# Patient Record
Sex: FEMALE | Race: BLACK OR AFRICAN AMERICAN | Employment: OTHER | URBAN - METROPOLITAN AREA
[De-identification: names, ages, dates, MRNs, and addresses within clinical notes are randomized per-mention and may not be internally consistent; named-entity substitution may affect disease eponyms.]

---

## 2017-03-31 ENCOUNTER — HOSPITAL ENCOUNTER (OUTPATIENT)
Dept: OTHER | Age: 77
Discharge: HOME | End: 2017-03-31
Attending: INTERNAL MEDICINE | Admitting: INTERNAL MEDICINE

## 2017-03-31 LAB
ALBUMIN SERPL-MCNC: 4.2 GM/DL (ref 3.4–5)
ALP BLD-CCNC: 146 IU/L (ref 40–129)
ALT SERPL-CCNC: 14 U/L (ref 10–40)
ANION GAP SERPL CALCULATED.3IONS-SCNC: 16 MMOL/L (ref 4–16)
AST SERPL-CCNC: 19 IU/L (ref 15–37)
BILIRUB SERPL-MCNC: 0.4 MG/DL (ref 0–1)
BUN BLDV-MCNC: 5 MG/DL (ref 6–23)
CALCIUM SERPL-MCNC: 9.8 MG/DL (ref 8.3–10.6)
CHLORIDE BLD-SCNC: 97 MMOL/L (ref 99–110)
CO2: 28 MMOL/L (ref 21–32)
CREAT SERPL-MCNC: 0.5 MG/DL (ref 0.6–1.1)
GFR AFRICAN AMERICAN: >60 ML/MIN/1.73M2
GFR NON-AFRICAN AMERICAN: >60 ML/MIN/1.73M2
GLUCOSE BLD-MCNC: 132 MG/DL (ref 70–140)
POTASSIUM SERPL-SCNC: 5 MMOL/L (ref 3.5–5.1)
SODIUM BLD-SCNC: 141 MMOL/L (ref 135–145)
TOTAL PROTEIN: 7.4 GM/DL (ref 6.4–8.2)

## 2017-04-01 LAB — HIV SCREEN: NON REACTIVE

## 2017-04-05 ENCOUNTER — HOSPITAL ENCOUNTER (OUTPATIENT)
Dept: CT IMAGING | Age: 77
Discharge: HOME | End: 2017-04-05
Attending: RADIOLOGY | Admitting: RADIOLOGY

## 2017-04-05 DIAGNOSIS — C21.1 ADENOCARCINOMA OF ANAL CANAL (HCC): ICD-10-CM

## 2017-04-05 DIAGNOSIS — C21.1 MALIGNANT NEOPLASM OF ANAL CANAL (HCC): ICD-10-CM

## 2017-04-05 RX ORDER — SODIUM CHLORIDE 0.9 % (FLUSH) 0.9 %
10 SYRINGE (ML) INJECTION ONCE
Status: COMPLETED | OUTPATIENT
Start: 2017-04-05 | End: 2017-04-05

## 2017-04-05 RX ADMIN — Medication 10 ML: at 10:40

## 2017-04-06 ENCOUNTER — HOSPITAL ENCOUNTER (OUTPATIENT)
Dept: PET IMAGING | Age: 77
Discharge: HOME | End: 2017-04-06
Attending: RADIOLOGY | Admitting: RADIOLOGY

## 2017-04-06 DIAGNOSIS — C21.1 MALIGNANT NEOPLASM OF ANAL CANAL (HCC): ICD-10-CM

## 2017-04-06 DIAGNOSIS — C21.1 ADENOCARCINOMA OF ANAL CANAL (HCC): ICD-10-CM

## 2017-05-08 ENCOUNTER — HOSPITAL ENCOUNTER (OUTPATIENT)
Dept: OTHER | Age: 77
Discharge: HOME | End: 2017-05-08
Attending: INTERNAL MEDICINE | Admitting: INTERNAL MEDICINE

## 2017-05-08 LAB
ALBUMIN SERPL-MCNC: 3.7 GM/DL (ref 3.4–5)
ALP BLD-CCNC: 113 IU/L (ref 40–128)
ALT SERPL-CCNC: 12 U/L (ref 10–40)
ANION GAP SERPL CALCULATED.3IONS-SCNC: 12 MMOL/L (ref 4–16)
AST SERPL-CCNC: 16 IU/L (ref 15–37)
BILIRUB SERPL-MCNC: 0.3 MG/DL (ref 0–1)
BUN BLDV-MCNC: 8 MG/DL (ref 6–23)
CALCIUM SERPL-MCNC: 9 MG/DL (ref 8.3–10.6)
CHLORIDE BLD-SCNC: 97 MMOL/L (ref 99–110)
CO2: 28 MMOL/L (ref 21–32)
CREAT SERPL-MCNC: 0.5 MG/DL (ref 0.6–1.1)
GFR AFRICAN AMERICAN: >60 ML/MIN/1.73M2
GFR NON-AFRICAN AMERICAN: >60 ML/MIN/1.73M2
GLUCOSE BLD-MCNC: 114 MG/DL (ref 70–140)
POTASSIUM SERPL-SCNC: 4.1 MMOL/L (ref 3.5–5.1)
SODIUM BLD-SCNC: 137 MMOL/L (ref 135–145)
TOTAL PROTEIN: 6.9 GM/DL (ref 6.4–8.2)

## 2017-05-18 ENCOUNTER — HOSPITAL ENCOUNTER (OUTPATIENT)
Dept: OTHER | Age: 77
Discharge: HOME | End: 2017-05-18
Attending: INTERNAL MEDICINE | Admitting: INTERNAL MEDICINE

## 2017-05-18 LAB
ALBUMIN SERPL-MCNC: 3.8 GM/DL (ref 3.4–5)
ALP BLD-CCNC: 94 IU/L (ref 40–128)
ALT SERPL-CCNC: 13 U/L (ref 10–40)
ANION GAP SERPL CALCULATED.3IONS-SCNC: 12 MMOL/L (ref 4–16)
AST SERPL-CCNC: 17 IU/L (ref 15–37)
BILIRUB SERPL-MCNC: 0.4 MG/DL (ref 0–1)
BUN BLDV-MCNC: 9 MG/DL (ref 6–23)
CALCIUM SERPL-MCNC: 8.8 MG/DL (ref 8.3–10.6)
CHLORIDE BLD-SCNC: 100 MMOL/L (ref 99–110)
CO2: 26 MMOL/L (ref 21–32)
CREAT SERPL-MCNC: 0.4 MG/DL (ref 0.6–1.1)
GFR AFRICAN AMERICAN: >60 ML/MIN/1.73M2
GFR NON-AFRICAN AMERICAN: >60 ML/MIN/1.73M2
GLUCOSE BLD-MCNC: 142 MG/DL (ref 70–140)
POTASSIUM SERPL-SCNC: 3.9 MMOL/L (ref 3.5–5.1)
SODIUM BLD-SCNC: 138 MMOL/L (ref 135–145)
TOTAL PROTEIN: 6.3 GM/DL (ref 6.4–8.2)

## 2017-06-05 ENCOUNTER — HOSPITAL ENCOUNTER (OUTPATIENT)
Dept: OTHER | Age: 77
Discharge: HOME | End: 2017-06-05
Attending: INTERNAL MEDICINE | Admitting: INTERNAL MEDICINE

## 2017-06-05 LAB
ALBUMIN SERPL-MCNC: 3.4 GM/DL (ref 3.4–5)
ALP BLD-CCNC: 91 IU/L (ref 40–128)
ALT SERPL-CCNC: 28 U/L (ref 10–40)
ANION GAP SERPL CALCULATED.3IONS-SCNC: 14 MMOL/L (ref 4–16)
AST SERPL-CCNC: 34 IU/L (ref 15–37)
BILIRUB SERPL-MCNC: 0.6 MG/DL (ref 0–1)
BUN BLDV-MCNC: 5 MG/DL (ref 6–23)
CALCIUM SERPL-MCNC: 8.9 MG/DL (ref 8.3–10.6)
CHLORIDE BLD-SCNC: 99 MMOL/L (ref 99–110)
CO2: 27 MMOL/L (ref 21–32)
CREAT SERPL-MCNC: 0.5 MG/DL (ref 0.6–1.1)
GFR AFRICAN AMERICAN: >60 ML/MIN/1.73M2
GFR NON-AFRICAN AMERICAN: >60 ML/MIN/1.73M2
GLUCOSE BLD-MCNC: 104 MG/DL (ref 70–140)
POTASSIUM SERPL-SCNC: 3.5 MMOL/L (ref 3.5–5.1)
SODIUM BLD-SCNC: 140 MMOL/L (ref 135–145)
TOTAL PROTEIN: 5.7 GM/DL (ref 6.4–8.2)

## 2017-06-22 ENCOUNTER — HOSPITAL ENCOUNTER (OUTPATIENT)
Dept: OTHER | Age: 77
Discharge: HOME | End: 2017-06-22
Attending: INTERNAL MEDICINE | Admitting: INTERNAL MEDICINE

## 2017-06-22 LAB
ALBUMIN SERPL-MCNC: 3.6 GM/DL (ref 3.4–5)
ALP BLD-CCNC: 100 IU/L (ref 40–128)
ALT SERPL-CCNC: 26 U/L (ref 10–40)
ANION GAP SERPL CALCULATED.3IONS-SCNC: 12 MMOL/L (ref 4–16)
AST SERPL-CCNC: 30 IU/L (ref 15–37)
BILIRUB SERPL-MCNC: 0.5 MG/DL (ref 0–1)
BUN BLDV-MCNC: 4 MG/DL (ref 6–23)
CALCIUM SERPL-MCNC: 8.8 MG/DL (ref 8.3–10.6)
CHLORIDE BLD-SCNC: 103 MMOL/L (ref 99–110)
CO2: 25 MMOL/L (ref 21–32)
CREAT SERPL-MCNC: 0.5 MG/DL (ref 0.6–1.1)
GFR AFRICAN AMERICAN: >60 ML/MIN/1.73M2
GFR NON-AFRICAN AMERICAN: >60 ML/MIN/1.73M2
GLUCOSE BLD-MCNC: 112 MG/DL (ref 70–140)
POTASSIUM SERPL-SCNC: 5.1 MMOL/L (ref 3.5–5.1)
SODIUM BLD-SCNC: 140 MMOL/L (ref 135–145)
TOTAL PROTEIN: 6 GM/DL (ref 6.4–8.2)

## 2017-08-10 ENCOUNTER — HOSPITAL ENCOUNTER (OUTPATIENT)
Dept: OTHER | Age: 77
Discharge: HOME | End: 2017-08-10
Attending: INTERNAL MEDICINE | Admitting: INTERNAL MEDICINE

## 2017-08-10 LAB
ALBUMIN SERPL-MCNC: 3.9 GM/DL (ref 3.4–5)
ALP BLD-CCNC: 102 IU/L (ref 40–129)
ALT SERPL-CCNC: 13 U/L (ref 10–40)
ANION GAP SERPL CALCULATED.3IONS-SCNC: 12 MMOL/L (ref 4–16)
AST SERPL-CCNC: 21 IU/L (ref 15–37)
BILIRUB SERPL-MCNC: 0.5 MG/DL (ref 0–1)
BUN BLDV-MCNC: 7 MG/DL (ref 6–23)
CALCIUM SERPL-MCNC: 9.1 MG/DL (ref 8.3–10.6)
CHLORIDE BLD-SCNC: 102 MMOL/L (ref 99–110)
CO2: 25 MMOL/L (ref 21–32)
CREAT SERPL-MCNC: 0.4 MG/DL (ref 0.6–1.1)
GFR AFRICAN AMERICAN: >60 ML/MIN/1.73M2
GFR NON-AFRICAN AMERICAN: >60 ML/MIN/1.73M2
GLUCOSE BLD-MCNC: 124 MG/DL (ref 70–140)
POTASSIUM SERPL-SCNC: 4.3 MMOL/L (ref 3.5–5.1)
SODIUM BLD-SCNC: 139 MMOL/L (ref 135–145)
TOTAL PROTEIN: 6.3 GM/DL (ref 6.4–8.2)

## 2017-11-20 ENCOUNTER — HOSPITAL ENCOUNTER (OUTPATIENT)
Dept: OTHER | Age: 77
Discharge: HOME | End: 2017-11-20
Attending: INTERNAL MEDICINE | Admitting: INTERNAL MEDICINE

## 2017-11-20 LAB
ALBUMIN SERPL-MCNC: 3.9 GM/DL (ref 3.4–5)
ALP BLD-CCNC: 88 IU/L (ref 40–128)
ALT SERPL-CCNC: 12 U/L (ref 10–40)
ANION GAP SERPL CALCULATED.3IONS-SCNC: 12 MMOL/L (ref 4–16)
AST SERPL-CCNC: 17 IU/L (ref 15–37)
BILIRUB SERPL-MCNC: 0.4 MG/DL (ref 0–1)
BUN BLDV-MCNC: 11 MG/DL (ref 6–23)
CALCIUM SERPL-MCNC: 9 MG/DL (ref 8.3–10.6)
CHLORIDE BLD-SCNC: 98 MMOL/L (ref 99–110)
CO2: 28 MMOL/L (ref 21–32)
CREAT SERPL-MCNC: 0.5 MG/DL (ref 0.6–1.1)
GFR AFRICAN AMERICAN: >60 ML/MIN/1.73M2
GFR NON-AFRICAN AMERICAN: >60 ML/MIN/1.73M2
GLUCOSE BLD-MCNC: 113 MG/DL (ref 70–99)
POTASSIUM SERPL-SCNC: 3.5 MMOL/L (ref 3.5–5.1)
SODIUM BLD-SCNC: 138 MMOL/L (ref 135–145)
TOTAL PROTEIN: 6.1 GM/DL (ref 6.4–8.2)

## 2018-06-05 ENCOUNTER — HOSPITAL ENCOUNTER (OUTPATIENT)
Dept: OTHER | Age: 78
Discharge: HOME | End: 2018-06-05
Attending: INTERNAL MEDICINE | Admitting: INTERNAL MEDICINE

## 2018-06-05 LAB
ALBUMIN SERPL-MCNC: 4.3 GM/DL (ref 3.4–5)
ALP BLD-CCNC: 116 IU/L (ref 40–128)
ALT SERPL-CCNC: 15 U/L (ref 10–40)
ANION GAP SERPL CALCULATED.3IONS-SCNC: 12 MMOL/L (ref 4–16)
AST SERPL-CCNC: 21 IU/L (ref 15–37)
BILIRUB SERPL-MCNC: 0.5 MG/DL (ref 0–1)
BUN BLDV-MCNC: 14 MG/DL (ref 6–23)
CALCIUM SERPL-MCNC: 9.4 MG/DL (ref 8.3–10.6)
CEA: 9.6 NG/ML
CHLORIDE BLD-SCNC: 99 MMOL/L (ref 99–110)
CO2: 29 MMOL/L (ref 21–32)
CREAT SERPL-MCNC: 0.5 MG/DL (ref 0.6–1.1)
GFR AFRICAN AMERICAN: >60 ML/MIN/1.73M2
GFR NON-AFRICAN AMERICAN: >60 ML/MIN/1.73M2
GLUCOSE BLD-MCNC: 107 MG/DL (ref 70–99)
POTASSIUM SERPL-SCNC: 4 MMOL/L (ref 3.5–5.1)
SODIUM BLD-SCNC: 140 MMOL/L (ref 135–145)
TOTAL PROTEIN: 6.7 GM/DL (ref 6.4–8.2)

## 2019-01-17 ENCOUNTER — HOSPITAL ENCOUNTER (OUTPATIENT)
Age: 79
Setting detail: SPECIMEN
Discharge: HOME | End: 2019-01-17
Payer: MEDICARE

## 2019-01-17 LAB
ALBUMIN SERPL-MCNC: 4.5 GM/DL (ref 3.4–5)
ALP BLD-CCNC: 123 IU/L (ref 40–128)
ALT SERPL-CCNC: 16 U/L (ref 10–40)
ANION GAP SERPL CALCULATED.3IONS-SCNC: 14 MMOL/L (ref 4–16)
AST SERPL-CCNC: 22 IU/L (ref 15–37)
BILIRUB SERPL-MCNC: 0.7 MG/DL (ref 0–1)
BUN BLDV-MCNC: 10 MG/DL (ref 6–23)
CALCIUM SERPL-MCNC: 9.7 MG/DL (ref 8.3–10.6)
CEA: 9.2 NG/ML
CHLORIDE BLD-SCNC: 99 MMOL/L (ref 99–110)
CO2: 27 MMOL/L (ref 21–32)
CREAT SERPL-MCNC: 0.5 MG/DL (ref 0.6–1.1)
GFR AFRICAN AMERICAN: >60 ML/MIN/1.73M2
GFR NON-AFRICAN AMERICAN: >60 ML/MIN/1.73M2
GLUCOSE BLD-MCNC: 116 MG/DL (ref 70–99)
POTASSIUM SERPL-SCNC: 3.6 MMOL/L (ref 3.5–5.1)
SODIUM BLD-SCNC: 140 MMOL/L (ref 135–145)
TOTAL PROTEIN: 6.7 GM/DL (ref 6.4–8.2)

## 2019-01-17 PROCEDURE — 80053 COMPREHEN METABOLIC PANEL: CPT

## 2019-01-17 PROCEDURE — 82378 CARCINOEMBRYONIC ANTIGEN: CPT

## 2019-10-30 ENCOUNTER — HOSPITAL ENCOUNTER (OUTPATIENT)
Dept: RADIATION ONCOLOGY | Age: 79
Discharge: HOME | End: 2019-10-30
Payer: MEDICARE

## 2019-10-30 VITALS
BODY MASS INDEX: 24.35 KG/M2 | HEIGHT: 61 IN | HEART RATE: 98 BPM | SYSTOLIC BLOOD PRESSURE: 133 MMHG | RESPIRATION RATE: 16 BRPM | DIASTOLIC BLOOD PRESSURE: 60 MMHG | WEIGHT: 129 LBS | TEMPERATURE: 97.6 F | OXYGEN SATURATION: 99 %

## 2019-10-30 DIAGNOSIS — C21.1 MALIGNANT NEOPLASM OF ANAL CANAL (HCC): ICD-10-CM

## 2019-10-30 ASSESSMENT — PAIN DESCRIPTION - PAIN TYPE: TYPE: CHRONIC PAIN

## 2019-10-30 ASSESSMENT — PAIN DESCRIPTION - FREQUENCY: FREQUENCY: CONTINUOUS

## 2019-10-30 ASSESSMENT — PAIN DESCRIPTION - ONSET: ONSET: ON-GOING

## 2019-10-30 ASSESSMENT — PAIN - FUNCTIONAL ASSESSMENT: PAIN_FUNCTIONAL_ASSESSMENT: PREVENTS OR INTERFERES SOME ACTIVE ACTIVITIES AND ADLS

## 2019-10-30 ASSESSMENT — PAIN SCALES - GENERAL: PAINLEVEL_OUTOF10: 7

## 2019-10-30 ASSESSMENT — PAIN DESCRIPTION - DESCRIPTORS: DESCRIPTORS: ACHING;NAGGING

## 2019-10-30 ASSESSMENT — PAIN DESCRIPTION - ORIENTATION: ORIENTATION: RIGHT;LEFT

## 2019-10-30 ASSESSMENT — PAIN DESCRIPTION - PROGRESSION: CLINICAL_PROGRESSION: NOT CHANGED

## 2019-10-30 ASSESSMENT — PAIN DESCRIPTION - LOCATION: LOCATION: KNEE

## 2019-11-01 ENCOUNTER — TELEPHONE (OUTPATIENT)
Dept: RADIATION ONCOLOGY | Age: 79
End: 2019-11-01

## 2020-01-17 ENCOUNTER — HOSPITAL ENCOUNTER (OUTPATIENT)
Dept: INFUSION THERAPY | Age: 80
Discharge: HOME | End: 2020-01-17
Payer: MEDICARE

## 2020-01-17 LAB
ALBUMIN SERPL-MCNC: 4.2 GM/DL (ref 3.4–5)
ALP BLD-CCNC: 111 IU/L (ref 40–128)
ALT SERPL-CCNC: 15 U/L (ref 10–40)
ANION GAP SERPL CALCULATED.3IONS-SCNC: 12 MMOL/L (ref 4–16)
AST SERPL-CCNC: 18 IU/L (ref 15–37)
BILIRUB SERPL-MCNC: 0.5 MG/DL (ref 0–1)
BUN BLDV-MCNC: 14 MG/DL (ref 6–23)
CALCIUM SERPL-MCNC: 9.4 MG/DL (ref 8.3–10.6)
CEA: 8.6 NG/ML
CHLORIDE BLD-SCNC: 99 MMOL/L (ref 99–110)
CO2: 28 MMOL/L (ref 21–32)
CREAT SERPL-MCNC: 0.5 MG/DL (ref 0.6–1.1)
DIFFERENTIAL TYPE: ABNORMAL
GFR AFRICAN AMERICAN: >60 ML/MIN/1.73M2
GFR NON-AFRICAN AMERICAN: >60 ML/MIN/1.73M2
GLUCOSE BLD-MCNC: 142 MG/DL (ref 70–99)
HCT VFR BLD CALC: 35.3 % (ref 37–47)
HEMOGLOBIN: 11.9 GM/DL (ref 12.5–16)
LYMPHOCYTES ABSOLUTE: 0.3 K/CU MM
LYMPHOCYTES RELATIVE PERCENT: 4 % (ref 24–44)
MCH RBC QN AUTO: 30.7 PG (ref 27–31)
MCHC RBC AUTO-ENTMCNC: 33.7 % (ref 32–36)
MCV RBC AUTO: 91.2 FL (ref 78–100)
MONOCYTES ABSOLUTE: 0.3 K/CU MM
MONOCYTES RELATIVE PERCENT: 3 % (ref 0–4)
PDW BLD-RTO: 14.1 % (ref 11.7–14.9)
PLATELET # BLD: 309 K/CU MM (ref 140–440)
PMV BLD AUTO: 9.1 FL (ref 7.5–11.1)
POTASSIUM SERPL-SCNC: 4.4 MMOL/L (ref 3.5–5.1)
RBC # BLD: 3.87 M/CU MM (ref 4.2–5.4)
SEGMENTED NEUTROPHILS ABSOLUTE COUNT: 7.7 K/CU MM
SEGMENTED NEUTROPHILS RELATIVE PERCENT: 93 % (ref 36–66)
SODIUM BLD-SCNC: 139 MMOL/L (ref 135–145)
TOTAL PROTEIN: 6.6 GM/DL (ref 6.4–8.2)
WBC # BLD: 8.3 K/CU MM (ref 4–10.5)

## 2020-01-17 PROCEDURE — 36415 COLL VENOUS BLD VENIPUNCTURE: CPT

## 2020-01-17 PROCEDURE — 85025 COMPLETE CBC W/AUTO DIFF WBC: CPT

## 2020-01-17 PROCEDURE — 80053 COMPREHEN METABOLIC PANEL: CPT

## 2020-01-17 PROCEDURE — 82378 CARCINOEMBRYONIC ANTIGEN: CPT

## 2020-07-08 ENCOUNTER — HOSPITAL ENCOUNTER (OUTPATIENT)
Dept: RADIATION ONCOLOGY | Age: 80
Discharge: HOME | End: 2020-07-08
Payer: MEDICARE

## 2020-07-08 VITALS — BODY MASS INDEX: 26.07 KG/M2 | WEIGHT: 138 LBS

## 2020-07-08 ASSESSMENT — PAIN SCALES - GENERAL: PAINLEVEL_OUTOF10: 0

## 2020-07-08 NOTE — PROGRESS NOTES
[address] [facility]  [address] [facility], [address] [facility]  Phone: [phone]  Fax: [phone]    RADIATION ONCOLOGY FOLLOW UP REPORT    PATIENT NAME:  Lito Patiño              : 1940  MEDICAL RECORD NO: [id number]    [facility] NO: [id number]        PROVIDER: Tamra Fay MD      Pursuant to the emergency declaration under the [address] [place] [address] waiver authority and the [employer] Act, this Virtual  Visit was conducted, with patient's consent, to reduce the patient's risk of exposure to COVID-19 and provide continuity of care for an established patient. This patient was contacted today via telephone with audio capabilities only due to technology barriers preventing use of do[employer]. me virtual platform with 2-way Audio/Video capability. Patient gave verbal consent which was obtained during the visit. Total visit time was 12 min reviewing medical records, patient history, and imaging. Telephone call started at [phone] and ended at [phone]. Provider: Dr. Belgica Tavarez MD, Radiation Oncologist  Nursing update conducted by telephone prior to physician visit by Thelma Olson RN       DATE OF SERVICE: 2020     FOLLOW UP PHYSICIANS:   Primary Care: Deana Yang MD       DIAGNOSIS:   Cancer Staging  No matching staging information was found for the patient. TREATMENT COURSE:      Malignant neoplasm of anal canal ([address]    3/8/2017 Initial Diagnosis     Malignant neoplasm of anal canal ([address]       - 7/3/2017 Radiation     Pelvic IMRT + concurrent systemic therapy  4500 cGy to elective LNs  5400 cGy to primary tumor using SIB-IMRT / 30 fx       Chemotherapy     Concurrent with RT  5-FU + Mitomycin-C           CURRENT THERAPY:  Observation      INTERVAL HISTORY:   Doing well since last clinical visit.   Denies new symptoms including abdominal pain, jaundice, pelvic pain, diarrhea, rectal bleeding, dysuria/hematuria, vaginal bleeding  Good energy/appetite with no recent weight loss.       Past Medical History:   Diagnosis Date    Acid reflux     Anxiety     Bronchitis In Past    Carotid stenosis 11/10/2015    Cerebral embolism with cerebral infarction ([facility] 9-18-15    Seizure X 1, No Residual    Chronic back pain     Depression     History of external beam radiation therapy 2017    pelvis/anal canal in 2017 per [facility]. Beatrice Alanis [facility]    Hypertension     Prolonged emergence from general anesthesia     RA (rheumatoid arthritis) ([facility]     \"All Over\"    Seizure ([facility] 9-18-15    X 1    Sleep apnea     Uses CPAP    Teeth missing     Upper And Lower    Urinary incontinence     UTI (urinary tract infection) In Past    No Current Symptoms    Wears dentures     Full Upper , Partial Lower    Wears glasses     Wears partial dentures     Lower        Past Surgical History:   Procedure Laterality Date    CAROTID ENDARTERECTOMY Right [id number]    CARPAL TUNNEL RELEASE      COLONOSCOPY  In Past    DENTAL SURGERY      Teeth Extracted In Past    DILATION AND CURETTAGE OF UTERUS  1960's    Prior To Vaginal Hysterectomy    HYSTERECTOMY, VAGINAL  1960's       Family History   Problem Relation Age of Onset    Stroke Mother     Heart Disease Mother     Arthritis Mother     Diabetes Mother     High Blood Pressure Mother     High Cholesterol Mother     Miscarriages / [place] Mother     Other Father         \"Surgery For Twisted Bowels\"    Heart Disease Sister     Stroke Sister     High Blood Pressure Brother     Heart Disease Brother     Hearing Loss Brother         Heart Attack    Diabetes Sister     High Blood Pressure Sister     Other Sister         Gout    Arthritis Sister     Mental Illness Sister         Schizophrenia    High Blood Pressure Brother     High Blood Pressure Son     Mental Illness Son         Anxiety    Depression Son    [place] Other Son         Pancreatitis, Surgery For Brain Aneurysm    High Blood Pressure Son     Other Son         Gout    Learning Disabilities Son         Autistic, Mentally Retarded    Other Son         Seizures       Social History     Socioeconomic History    Marital status:      Spouse name: Not on file    Number of children: Not on file    Years of education: Not on file    Highest education level: Not on file   Occupational History    Not on file   Social Needs    Financial resource strain: Not on file    Food insecurity     Worry: Not on file     Inability: Not on file   [employer] needs     Medical: Not on file     Non-medical: Not on file   Tobacco Use    Smoking status: Never Smoker    Smokeless tobacco: Never Used   Substance and Sexual Activity    Alcohol use: No     Alcohol/week: 0.0 standard drinks    Drug use: No    Sexual activity: Never   Lifestyle    Physical activity     Days per week: Not on file     Minutes per session: Not on file    Stress: Not on file   Relationships    Social connections     Talks on phone: Not on file     Gets together: Not on file     Attends Confucianist service: Not on file     Active member of club or organization: Not on file     Attends meetings of clubs or organizations: Not on file     Relationship status: Not on file    Intimate partner violence     Fear of current or ex partner: Not on file     Emotionally abused: Not on file     Physically abused: Not on file     Forced sexual activity: Not on file   Other Topics Concern    Not on file   Social History Narrative    Not on file         MEDICATIONS:   Current Outpatient Medications   Medication Sig Dispense Refill    diltiazem (DILACOR XR) 180 MG extended release capsule Take 180 mg by mouth daily      atorvastatin (LIPITOR) 20 MG tablet Take 20 mg by mouth daily      aspirin 81 MG tablet Take 81 mg by mouth daily      POTASSIUM CHLORIDE ER PO Take 10 mEq by mouth      Cyanocobalamin (VITAMIN B-12 PO) Take by mouth every morning      CALCIUM PO Take by mouth every morning      vitamin D3 (COLECALCIFEROL) 400 UNITS TABS Take by mouth every morning      folic acid (FOLVITE) 103 MCG tablet Take 400 mcg by mouth every morning      hydrochlorothiazide (MICROZIDE) 12.5 MG capsule Take 12.5 mg by mouth every morning       methotrexate 2.5 MG tablet Take 2.5 mg by mouth once a week Indications: Take 7 tablets one time a week       predniSONE (DELTASONE) 5 MG tablet Take 5 mg by mouth 3 times daily       clopidogrel (PLAVIX) 75 MG tablet Take 1 tablet by mouth daily (Patient taking differently: Take 75 mg by mouth every morning ) 30 tablet 0     No current facility-administered medications for this encounter. REVIEW OF SYSTEMS:  Pertinent positive and negatives in History: the remainder of the 14-pt ROS is negative. EXAMINATION:   There were no vitals filed for this visit.       A&O x3, NAD  Nl mood/affect/judgement      LABORATORY STUDIES:   CBC:   Lab Results   Component Value Date    WBC 8.3 01/17/2020    RBC 3.87 01/17/2020    RBC 3.24 08/10/2017    HGB 11.9 01/17/2020    HCT 35.3 01/17/2020    MCV 91.2 01/17/2020    MCH 30.7 01/17/2020    MCHC 33.7 01/17/2020    RDW 14.1 01/17/2020     01/17/2020    MPV 9.1 01/17/2020     CMP:  Lab Results   Component Value Date     01/17/2020    K 4.4 01/17/2020    CL 99 01/17/2020    CO2 28 01/17/2020    BUN 14 01/17/2020    CREATININE 0.5 01/17/2020    GFRAA >60 01/17/2020    LABGLOM >60 01/17/2020    GLUCOSE 142 01/17/2020    PROT 6.6 01/17/2020    LABALBU 4.2 01/17/2020    CALCIUM 9.4 01/17/2020    BILITOT 0.5 01/17/2020    ALKPHOS 111 01/17/2020    AST 18 01/17/2020    ALT 15 01/17/2020     Onc labs:   Lab Results   Component Value Date    CEA 8.6 01/17/2020       MEDICAL DECISION MAKING:   Doing well without new symptoms  Did not get CT scans ordered 8 months ago  This visit is her 3-yr post tx visit  Will order CT Chest/Abd/Pelvis and she can RTC in 6 months for clinical eval pending normal results      Electronically signed by Electronically signed by Coni Lee MD on 7/8/2020 at 9:43 AM

## 2020-07-17 ENCOUNTER — HOSPITAL ENCOUNTER (OUTPATIENT)
Dept: INFUSION THERAPY | Age: 80
Discharge: HOME | End: 2020-07-17
Payer: MEDICARE

## 2020-07-17 LAB
ALBUMIN SERPL-MCNC: 4 GM/DL (ref 3.4–5)
ALP BLD-CCNC: 93 IU/L (ref 40–128)
ALT SERPL-CCNC: 12 U/L (ref 10–40)
ANION GAP SERPL CALCULATED.3IONS-SCNC: 13 MMOL/L (ref 4–16)
AST SERPL-CCNC: 15 IU/L (ref 15–37)
BASOPHILS ABSOLUTE: 0 K/CU MM
BASOPHILS RELATIVE PERCENT: 0.3 % (ref 0–1)
BILIRUB SERPL-MCNC: 0.5 MG/DL (ref 0–1)
BUN BLDV-MCNC: 16 MG/DL (ref 6–23)
CALCIUM SERPL-MCNC: 9.5 MG/DL (ref 8.3–10.6)
CEA: 7.5 NG/ML
CHLORIDE BLD-SCNC: 102 MMOL/L (ref 99–110)
CO2: 28 MMOL/L (ref 21–32)
CREAT SERPL-MCNC: 0.6 MG/DL (ref 0.6–1.1)
DIFFERENTIAL TYPE: ABNORMAL
EOSINOPHILS ABSOLUTE: 0.1 K/CU MM
EOSINOPHILS RELATIVE PERCENT: 0.8 % (ref 0–3)
GFR AFRICAN AMERICAN: >60 ML/MIN/1.73M2
GFR NON-AFRICAN AMERICAN: >60 ML/MIN/1.73M2
GLUCOSE BLD-MCNC: 125 MG/DL (ref 70–99)
HCT VFR BLD CALC: 32.7 % (ref 37–47)
HEMOGLOBIN: 11.2 GM/DL (ref 12.5–16)
LYMPHOCYTES ABSOLUTE: 0.4 K/CU MM
LYMPHOCYTES RELATIVE PERCENT: 5.4 % (ref 24–44)
MCH RBC QN AUTO: 31 PG (ref 27–31)
MCHC RBC AUTO-ENTMCNC: 34.3 % (ref 32–36)
MCV RBC AUTO: 90.6 FL (ref 78–100)
MONOCYTES ABSOLUTE: 0.7 K/CU MM
MONOCYTES RELATIVE PERCENT: 9 % (ref 0–4)
PDW BLD-RTO: 14.9 % (ref 11.7–14.9)
PLATELET # BLD: 318 K/CU MM (ref 140–440)
PMV BLD AUTO: 9.2 FL (ref 7.5–11.1)
POTASSIUM SERPL-SCNC: 3.5 MMOL/L (ref 3.5–5.1)
RBC # BLD: 3.61 M/CU MM (ref 4.2–5.4)
SEGMENTED NEUTROPHILS ABSOLUTE COUNT: 6.4 K/CU MM
SEGMENTED NEUTROPHILS RELATIVE PERCENT: 84.5 % (ref 36–66)
SODIUM BLD-SCNC: 143 MMOL/L (ref 135–145)
TOTAL PROTEIN: 6.3 GM/DL (ref 6.4–8.2)
WBC # BLD: 7.6 K/CU MM (ref 4–10.5)

## 2020-07-17 PROCEDURE — 80053 COMPREHEN METABOLIC PANEL: CPT

## 2020-07-17 PROCEDURE — 85025 COMPLETE CBC W/AUTO DIFF WBC: CPT

## 2020-07-17 PROCEDURE — 36415 COLL VENOUS BLD VENIPUNCTURE: CPT

## 2020-07-17 PROCEDURE — 82378 CARCINOEMBRYONIC ANTIGEN: CPT

## 2020-07-24 ENCOUNTER — HOSPITAL ENCOUNTER (OUTPATIENT)
Dept: INFUSION THERAPY | Age: 80
Discharge: HOME | End: 2020-07-24
Payer: MEDICARE

## 2020-07-24 PROCEDURE — 99211 OFF/OP EST MAY X REQ PHY/QHP: CPT

## 2020-11-03 PROBLEM — I10 HYPERTENSION: Status: RESOLVED | Noted: 2020-11-03 | Resolved: 2020-11-03

## 2020-11-09 PROBLEM — D64.9 ANEMIA, UNSPECIFIED: Status: ACTIVE | Noted: 2020-11-09

## 2020-11-09 PROBLEM — R53.83 OTHER FATIGUE: Status: ACTIVE | Noted: 2020-11-09

## 2021-01-16 ENCOUNTER — HOSPITAL ENCOUNTER (OUTPATIENT)
Age: 81
Discharge: HOME | End: 2021-01-16
Payer: MEDICARE

## 2021-01-16 ENCOUNTER — HOSPITAL ENCOUNTER (OUTPATIENT)
Dept: GENERAL RADIOLOGY | Age: 81
Discharge: HOME | End: 2021-01-16
Payer: MEDICARE

## 2021-01-16 DIAGNOSIS — M79.631 RIGHT FOREARM PAIN: ICD-10-CM

## 2021-01-16 DIAGNOSIS — M79.641 RIGHT HAND PAIN: ICD-10-CM

## 2021-01-16 PROCEDURE — 73130 X-RAY EXAM OF HAND: CPT

## 2021-01-16 PROCEDURE — 73090 X-RAY EXAM OF FOREARM: CPT

## 2021-01-20 ENCOUNTER — HOSPITAL ENCOUNTER (OUTPATIENT)
Dept: RADIATION ONCOLOGY | Age: 81
Discharge: HOME | End: 2021-01-20
Payer: MEDICARE

## 2021-01-20 ENCOUNTER — HOSPITAL ENCOUNTER (OUTPATIENT)
Dept: INFUSION THERAPY | Age: 81
Discharge: HOME | End: 2021-01-20
Payer: MEDICARE

## 2021-01-20 VITALS
SYSTOLIC BLOOD PRESSURE: 149 MMHG | TEMPERATURE: 97.7 F | BODY MASS INDEX: 23.45 KG/M2 | OXYGEN SATURATION: 98 % | HEART RATE: 129 BPM | DIASTOLIC BLOOD PRESSURE: 74 MMHG | RESPIRATION RATE: 16 BRPM | HEIGHT: 62 IN | WEIGHT: 127.4 LBS

## 2021-01-20 DIAGNOSIS — C21.0: ICD-10-CM

## 2021-01-20 DIAGNOSIS — C21.0: Primary | ICD-10-CM

## 2021-01-20 LAB
ALBUMIN SERPL-MCNC: 4 GM/DL (ref 3.4–5)
ALP BLD-CCNC: 107 IU/L (ref 40–128)
ALT SERPL-CCNC: 16 U/L (ref 10–40)
ANION GAP SERPL CALCULATED.3IONS-SCNC: 11 MMOL/L (ref 4–16)
AST SERPL-CCNC: 22 IU/L (ref 15–37)
BASOPHILS ABSOLUTE: 0 K/CU MM
BASOPHILS RELATIVE PERCENT: 0.2 % (ref 0–1)
BILIRUB SERPL-MCNC: 0.6 MG/DL (ref 0–1)
BUN BLDV-MCNC: 13 MG/DL (ref 6–23)
CALCIUM SERPL-MCNC: 9 MG/DL (ref 8.3–10.6)
CHLORIDE BLD-SCNC: 102 MMOL/L (ref 99–110)
CO2: 26 MMOL/L (ref 21–32)
CREAT SERPL-MCNC: 0.6 MG/DL (ref 0.6–1.1)
DIFFERENTIAL TYPE: ABNORMAL
EOSINOPHILS ABSOLUTE: 0.1 K/CU MM
EOSINOPHILS RELATIVE PERCENT: 0.6 % (ref 0–3)
GFR AFRICAN AMERICAN: >60 ML/MIN/1.73M2
GFR NON-AFRICAN AMERICAN: >60 ML/MIN/1.73M2
GLUCOSE BLD-MCNC: 116 MG/DL (ref 70–99)
HCT VFR BLD CALC: 34.8 % (ref 37–47)
HEMOGLOBIN: 11.8 GM/DL (ref 12.5–16)
LYMPHOCYTES ABSOLUTE: 0.5 K/CU MM
LYMPHOCYTES RELATIVE PERCENT: 6 % (ref 24–44)
MCH RBC QN AUTO: 30.6 PG (ref 27–31)
MCHC RBC AUTO-ENTMCNC: 33.9 % (ref 32–36)
MCV RBC AUTO: 90.2 FL (ref 78–100)
MONOCYTES ABSOLUTE: 0.5 K/CU MM
MONOCYTES RELATIVE PERCENT: 6.2 % (ref 0–4)
PDW BLD-RTO: 14.3 % (ref 11.7–14.9)
PLATELET # BLD: 318 K/CU MM (ref 140–440)
PMV BLD AUTO: 9.3 FL (ref 7.5–11.1)
POTASSIUM SERPL-SCNC: 3.5 MMOL/L (ref 3.5–5.1)
RBC # BLD: 3.86 M/CU MM (ref 4.2–5.4)
SEGMENTED NEUTROPHILS ABSOLUTE COUNT: 7 K/CU MM
SEGMENTED NEUTROPHILS RELATIVE PERCENT: 87 % (ref 36–66)
SODIUM BLD-SCNC: 139 MMOL/L (ref 135–145)
TOTAL PROTEIN: 6.1 GM/DL (ref 6.4–8.2)
WBC # BLD: 8 K/CU MM (ref 4–10.5)

## 2021-01-20 PROCEDURE — 85025 COMPLETE CBC W/AUTO DIFF WBC: CPT

## 2021-01-20 PROCEDURE — 36415 COLL VENOUS BLD VENIPUNCTURE: CPT

## 2021-01-20 PROCEDURE — 99214 OFFICE O/P EST MOD 30 MIN: CPT | Performed by: RADIOLOGY

## 2021-01-20 PROCEDURE — 99211 OFF/OP EST MAY X REQ PHY/QHP: CPT

## 2021-01-20 PROCEDURE — 80053 COMPREHEN METABOLIC PANEL: CPT

## 2021-01-20 ASSESSMENT — PAIN SCALES - GENERAL: PAINLEVEL_OUTOF10: 0

## 2021-01-20 NOTE — PROGRESS NOTES
Archana Velazquez  1/20/2021    Patient is seen today for follow up. Vitals:    01/20/21 0943   BP: (!) 149/74   Pulse: 129   Resp: 16   Temp: 97.7 °F (36.5 °C)   SpO2: 98%        Oxygen Therapy  SpO2: 98 %  Pulse Oximeter Device Mode: Intermittent  Pulse Oximeter Device Location: Finger, Left  O2 Device: None (Room air)  Skin Assessment: Clean, dry, & intact    Wt Readings from Last 3 Encounters:   01/20/21 127 lb 6.4 oz (57.8 kg)   07/08/20 138 lb (62.6 kg)   10/30/19 129 lb (58.5 kg)       Pain Assessment  Pain Assessment: 0-10  Pain Level: 0  Patient's Stated Pain Goal: No pain  Multiple Pain Sites: No  Denies Need for Intervention       Allergies   Allergen Reactions    Cortisone Rash    Codeine      Unsure Of Reaction    Pcn [Penicillins]      Unknown Reaction        Current Outpatient Medications   Medication Sig Dispense Refill    diltiazem (DILACOR XR) 180 MG extended release capsule Take 180 mg by mouth daily      atorvastatin (LIPITOR) 20 MG tablet Take 20 mg by mouth daily      aspirin 81 MG tablet Take 81 mg by mouth daily      POTASSIUM CHLORIDE ER PO Take 10 mEq by mouth      Cyanocobalamin (VITAMIN B-12 PO) Take by mouth every morning      CALCIUM PO Take by mouth every morning      vitamin D3 (COLECALCIFEROL) 400 UNITS TABS Take by mouth every morning      folic acid (FOLVITE) 013 MCG tablet Take 400 mcg by mouth every morning      clopidogrel (PLAVIX) 75 MG tablet Take 1 tablet by mouth daily (Patient taking differently: Take 75 mg by mouth every morning ) 30 tablet 0    hydrochlorothiazide (MICROZIDE) 12.5 MG capsule Take 12.5 mg by mouth every morning       methotrexate 2.5 MG tablet Take 2.5 mg by mouth once a week Indications: Take 7 tablets one time a week       predniSONE (DELTASONE) 5 MG tablet Take 5 mg by mouth 3 times daily        No current facility-administered medications for this encounter. Additional Comments: Patient here for a 6 month follow up.  Denies any pain at this time.     Electronically signed by Miki Oliver MA on 1/20/2021 at 9:48 AM

## 2021-01-20 NOTE — PROGRESS NOTES
[address] [address], [address] [facility]  Phone: [phone]  Fax: [phone]    RADIATION ONCOLOGY FOLLOW UP REPORT    PATIENT NAME:  Tone Quan              : 1940  MEDICAL RECORD NO: [id number]    [facility] NO: [id number]        PROVIDER: Alexandra Dumas MD      DATE OF SERVICE: 2021     FOLLOW UP PHYSICIANS:   Medical Oncology: Carlton Cevallos MD   GI: Dr. Alberta Correa      DIAGNOSIS:  Visit Diagnoses       Codes    Malignant tumor of anus ([facility]    -  Primary C21.0         T2 N0 M0 - Stage II      TREATMENT COURSE:   Oncology History   Malignant neoplasm of anal canal ([facility]   3/8/2017 Initial Diagnosis    Malignant neoplasm of anal canal ([facility]      - 7/3/2017 Radiation    Pelvic IMRT + concurrent systemic therapy  4500 cGy to elective LNs  5400 cGy to primary tumor using SIB-IMRT / 30 fx      Chemotherapy    Concurrent with RT  5-FU + Mitomycin-C         CURRENT THERAPY:  Surveillance      INTERVAL HISTORY:   Last visit was telemed visit 6 months ago. That was her 3-yr treatment completion anniversary and we ordered her final set of CT imaging studies but she never had them done. Today she states she has been doing well. She denies rectal bleeding, pelvic pain, diarrhea, tenesmus, new masses. No recent weight loss. No chest pain, increased dyspnea, cough, hemoptysis. Pts daughter gives much of the interval history and states she has more trouble with dementia and memory lately.       Past Medical History:   Diagnosis Date    Acid reflux     Anxiety     Bronchitis In Past    Carotid stenosis 11/10/2015    Cerebral embolism with cerebral infarction ([facility] 15    Seizure X 1, No Residual    Chronic back pain     Depression     History of external beam radiation therapy 2017    pelvis/anal canal in 2017 per Dr. Benji Baptiste [facility]    Hypertension     Prolonged emergence from general anesthesia     RA (rheumatoid arthritis) ([facility] \"All Over\"    Seizure ([facility] 9-18-15    X 1    Sleep apnea     Uses CPAP    Teeth missing     Upper And Lower    Urinary incontinence     UTI (urinary tract infection) In Past    No Current Symptoms    Wears dentures     Full Upper , Partial Lower    Wears glasses     Wears partial dentures     Lower        Past Surgical History:   Procedure Laterality Date    CAROTID ENDARTERECTOMY Right [id number]    CARPAL TUNNEL RELEASE      COLONOSCOPY  In Past    DENTAL SURGERY      Teeth Extracted In Past    DILATION AND CURETTAGE OF UTERUS  1960's    Prior To Vaginal Hysterectomy    HYSTERECTOMY, VAGINAL  1960's       Family History   Problem Relation Age of Onset    Stroke Mother     Heart Disease Mother     Arthritis Mother     Diabetes Mother     High Blood Pressure Mother     High Cholesterol Mother     Miscarriages / [place] Mother     Other Father         \"Surgery For Twisted Bowels\"    Heart Disease Sister     Stroke Sister     High Blood Pressure Brother     Heart Disease Brother     Hearing Loss Brother         Heart Attack    Diabetes Sister     High Blood Pressure Sister     Other Sister         Gout    Arthritis Sister     Mental Illness Sister         Schizophrenia    High Blood Pressure Brother     High Blood Pressure Son     Mental Illness Son         Anxiety    Depression Son     Other Son         Pancreatitis, Surgery For Brain Aneurysm    High Blood Pressure Son     Other Son         Gout    Learning Disabilities Son         Autistic, Mentally Retarded    Other Son         Seizures       Social History     Socioeconomic History    Marital status:      Spouse name: Not on file    Number of children: Not on file    Years of education: Not on file    Highest education level: Not on file   Occupational History    Not on file   Social Needs    Financial resource strain: Not on file    Food insecurity     Worry: Not on file     Inability: Not on file   [employer] Transportation needs     Medical: Not on file     Non-medical: Not on file   Tobacco Use    Smoking status: Never Smoker    Smokeless tobacco: Never Used   Substance and Sexual Activity    Alcohol use: No     Alcohol/week: 0.0 standard drinks    Drug use: No    Sexual activity: Never   Lifestyle    Physical activity     Days per week: Not on file     Minutes per session: Not on file    Stress: Not on file   Relationships    Social connections     Talks on phone: Not on file     Gets together: Not on file     Attends Shinto service: Not on file     Active member of club or organization: Not on file     Attends meetings of clubs or organizations: Not on file     Relationship status: Not on file    Intimate partner violence     Fear of current or ex partner: Not on file     Emotionally abused: Not on file     Physically abused: Not on file     Forced sexual activity: Not on file   Other Topics Concern    Not on file   Social History Narrative    Not on file         MEDICATIONS:     Current Outpatient Medications:     diltiazem (DILACOR XR) 180 MG extended release capsule, Take 180 mg by mouth daily, Disp: , Rfl:     atorvastatin (LIPITOR) 20 MG tablet, Take 20 mg by mouth daily, Disp: , Rfl:     aspirin 81 MG tablet, Take 81 mg by mouth daily, Disp: , Rfl:     POTASSIUM CHLORIDE ER PO, Take 10 mEq by mouth, Disp: , Rfl:     Cyanocobalamin (VITAMIN B-12 PO), Take by mouth every morning, Disp: , Rfl:     CALCIUM PO, Take by mouth every morning, Disp: , Rfl:     vitamin D3 (COLECALCIFEROL) 400 UNITS TABS, Take by mouth every morning, Disp: , Rfl:     folic acid (FOLVITE) 001 MCG tablet, Take 400 mcg by mouth every morning, Disp: , Rfl:     clopidogrel (PLAVIX) 75 MG tablet, Take 1 tablet by mouth daily (Patient taking differently: Take 75 mg by mouth every morning ), Disp: 30 tablet, Rfl: 0    hydrochlorothiazide (MICROZIDE) 12.5 MG capsule, Take 12.5 mg by mouth every morning , Disp: , Rfl:    methotrexate 2.5 MG tablet, Take 2.5 mg by mouth once a week Indications: Take 7 tablets one time a week , Disp: , Rfl:     predniSONE (DELTASONE) 5 MG tablet, Take 5 mg by mouth 3 times daily , Disp: , Rfl:       REVIEW OF SYSTEMS:  Pertinent positive and negatives in History: the remainder of the 14-pt ROS is negative.       EXAMINATION:   Vitals:    01/20/21 0943   BP: (!) 149/74   Pulse: 129   Resp: 16   Temp: 97.7 °F (36.5 °C)   SpO2: 98%     A&O x3, NAD  Sclera anicteric, EOMI  No cervical/SCV LAD - No right inguinal LAD, there is a firm mass ~1.5cm that is movable in left lateral inguinofemoral region  HR RRR, no m/r/g  Lungs CTAB  Abd soft, NTND, no HSM  No UE/LE edema  No spinal or other bony tenderness to firm palpation  Nl mood/affect/judgement  Rectal: nl sphincter tone, small non-nodular tissue thickening in posterior anal canal from 5-7 o'clock position (dorsal lithotomy) ~1cm in length      LABORATORY STUDIES:   CBC:   Lab Results   Component Value Date    WBC 7.6 07/17/2020    RBC 3.61 07/17/2020    RBC 3.24 08/10/2017    HGB 11.2 07/17/2020    HCT 32.7 07/17/2020    MCV 90.6 07/17/2020    MCH 31.0 07/17/2020    MCHC 34.3 07/17/2020    RDW 14.9 07/17/2020     07/17/2020    MPV 9.2 07/17/2020     CMP:  Lab Results   Component Value Date     07/17/2020    K 3.5 07/17/2020     07/17/2020    CO2 28 07/17/2020    BUN 16 07/17/2020    CREATININE 0.6 07/17/2020    GFRAA >60 07/17/2020    LABGLOM >60 07/17/2020    GLUCOSE 125 07/17/2020    PROT 6.3 07/17/2020    LABALBU 4.0 07/17/2020    CALCIUM 9.5 07/17/2020    BILITOT 0.5 07/17/2020    ALKPHOS 93 07/17/2020    AST 15 07/17/2020    ALT 12 07/17/2020     Onc labs:   Lab Results   Component Value Date    CEA 7.5 07/17/2020       MEDICAL DECISION MAKING:     3.5 years post-treatment completion  Reviewed surveillance guidelines:  CT Chest/Abd/Pel yearly x3 years, Anoscopy Q6-12 months x 3 years, DAVIS + node palpation Q3-6 months x 5 yrs    Ordered CT images as above - will eval for nodes in inguinal region based on exam today  Check CBC/CMP     RTC 6 months pending normal scan results      TIME SPENT   [] 10 - 19 minutes (33226)  [] 20 - 29 minutes (86307)  [x] 30 - 39 minutes (42138)  [] 40 - 54 minutes (17075)    [x] Preparing to see patient and reviewing tests and clinical notes for interim history  [] Individual interpretation of results  [x] Evaluating patient  [] Communicating results to patient/family/caregiver  [x] Counseling and educating the patient/family/caregiver  [x] Discussion or coordination of care with other health care professionals and coordinating care  [x] Ordering of unique tests, medications, or procedures  [x] Documentation within the EHR       Electronically signed by Electronically signed by Ramírez Harmon MD on 1/20/2021 at 10:25 AM

## 2021-01-21 NOTE — PROGRESS NOTES
Patient Name: Ronel Mathews  Patient : 1940  Patient MRN: [id number]     Primary Oncologist: Kandace Dutta MD  Referring Provider: Kandace Dutta MD     Date of Service: 2021      Chief Complaint:   Chief Complaint   Patient presents with   [address]     She came in for follow-up visit. 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 She has been noticing episodes of blood in stool for a couple of months and no prior colonoscopy until she was seen by Dr. Sylvie Mishra. She has a long history of constipation and has been taking stool softeners. She underwent colonoscopy by Dr. Sylvie Mishra on 2017, which revealed a large friable obstructing mass lesion noted in the anal canal and diverticulosis coli. Pathology report revealed invasive intermediate grade keratinizing squamous cell carcinoma. She is currently  and has four children. She is agreeable to have the HIV test, as recommended by the guidelines. She denied any history of sexually transmitted disease. Bblood test in 2016 revealed white cell 7.1, hemoglobin 11.9, platelet 821. Creatinine was 0.52, AST 17, ALT 12. She was seen by Dr. Irene Marrero, who performed the rectal exam.  After having discussion with him, clinically she was felt to have stage [id number].     PET-CT scan in 2017 revealed no evidence of metastatic disease, except for the heterogenous activity in the area of soft tissue fullness in the anal region, compatible with the history of anal cancer. She started concomitant mitomycin/capecitabine and radiation therapy on May 8, 2017. Blood tests in June 2017 revealed white cell 4.4, hemoglobin 9.4, platelet 552. CMP was stable. Dr. Bolivar Cowden temporarily held radiation and Xeloda and eventually restarted again. She completed concomitant chemoradiation therapy on July 3, 2017. We discussed about surveillance and survivorship. I referred her back to see Dr. Paulino Ware. She will need anoscopy every six to 12 months up to three years. Imaging study will be considered once a year for three years. She gained weight since the last office visits. I reminded to follow-up with Dr. Paulino Ware for the potential anoscopy. CT chest, abdomen and pelvis in July 2018 showed no evidence of progression of the disease. DEXA scan in December 2018 was wnl. Colonoscopy was in September 2018. Repeat in 3 years as per GI. She is on MTX. Labs in June 2019 were reviewed. CT chest, abdomen and pelvis in July 2019 showed no evidence of metastatic disease. Mammogram in April 2019 was benign. CT chest, abdomen and pelvis in November 2019 were negative for metastatic disease. I gave refill of senna. Labs in January 2020 were stable. CEA improved. Blood test in July 2020 was reviewed. MTX and prednisone were increased due to increasing joint pain related to RA. She has proximal finger joint deformities with nodules. On January 26, 2020 when she came in for follow-up visit. She refused to have flu and Covid vaccine. She will call Dr. Paulino Ware for follow-up endoscopic study. She was seen by radiation oncologist.  [facility] in January 2021 showed stable CBC and CMP with hemoglobin of 11.8. She has mild anemia. I will order anemic w.u with next OV.     I referred her back to see Dr. Susy Vargas for removal of the [facility]. She denies any  nausea, vomiting or diarrhea. No bowel habit changes. No melena or hematuria. Denies any depression. We discussed about diet and I recommend to drink enough water. PAST MEDICAL HISTORY:  Hypertension, history of sleep apnea, rheumatoid arthritis on methotrexate, right carotid endarterectomy on November 18, 2015, and CVA in September 2015. She has a history of partial hysterectomy in 1965 or 1966. Last menstrual period was in 1966. FAMILY HISTORY:  No history of malignancy in the family. SOCIAL HISTORY:  No history of smoking. No alcohol or illicit drug use. She is  and has four children. Oncology History   Malignant neoplasm of anal canal ([facility] [address]   3/8/2017 Initial Diagnosis    Malignant neoplasm of anal canal ([facility] [address]      - 7/3/2017 Radiation    Pelvic IMRT + concurrent systemic therapy  4500 cGy to elective LNs  5400 cGy to primary tumor using SIB-IMRT / 30 fx      Chemotherapy    Concurrent with RT  5-FU + Mitomycin-C         Review of Systems: \"Per interval history; otherwise 10 point ROS is negative. \"     Vital Signs:  /62 (Site: Left Upper Arm, Position: Sitting, Cuff Size: Medium Adult)   Pulse 117   Temp 97.4 °F (36.3 °C) (Infrared)   Resp 16   Ht 5' 4\" (1.626 m)   Wt 127 lb (57.6 kg)   SpO2 99%   BMI 21.80 kg/m²     Physical Exam:  CONSTITUTIONAL: awake, alert, cooperative, no apparent distress   EYES: pupils equal, round and reactive to light, sclera clear and conjunctiva normal  ENT: Normocephalic, without obvious abnormality, atraumatic  NECK: supple, symmetrical, no jugular venous distension and no carotid bruits   HEMATOLOGIC/LYMPHATIC: no cervical, supraclavicular or axillary lymphadenopathy   LUNGS: no increased work of breathing and clear to auscultation   CARDIOVASCULAR: regular rate and rhythm, normal S1 and S2, no murmur noted  ABDOMEN: normal bowel sounds x 4, soft, non-distended, non-tender, no masses palpated, no

## 2021-01-26 ENCOUNTER — OFFICE VISIT (OUTPATIENT)
Dept: ONCOLOGY | Age: 81
End: 2021-01-26
Payer: MEDICARE

## 2021-01-26 ENCOUNTER — HOSPITAL ENCOUNTER (OUTPATIENT)
Dept: INFUSION THERAPY | Age: 81
Discharge: HOME | End: 2021-01-26
Payer: MEDICARE

## 2021-01-26 VITALS
RESPIRATION RATE: 16 BRPM | BODY MASS INDEX: 21.68 KG/M2 | HEIGHT: 64 IN | OXYGEN SATURATION: 99 % | WEIGHT: 127 LBS | DIASTOLIC BLOOD PRESSURE: 62 MMHG | SYSTOLIC BLOOD PRESSURE: 138 MMHG | HEART RATE: 117 BPM | TEMPERATURE: 97.4 F

## 2021-01-26 DIAGNOSIS — D64.9 ANEMIA, UNSPECIFIED TYPE: Primary | ICD-10-CM

## 2021-01-26 PROCEDURE — G8420 CALC BMI NORM PARAMETERS: HCPCS | Performed by: INTERNAL MEDICINE

## 2021-01-26 PROCEDURE — 1123F ACP DISCUSS/DSCN MKR DOCD: CPT | Performed by: INTERNAL MEDICINE

## 2021-01-26 PROCEDURE — 99211 OFF/OP EST MAY X REQ PHY/QHP: CPT

## 2021-01-26 PROCEDURE — G8399 PT W/DXA RESULTS DOCUMENT: HCPCS | Performed by: INTERNAL MEDICINE

## 2021-01-26 PROCEDURE — G8484 FLU IMMUNIZE NO ADMIN: HCPCS | Performed by: INTERNAL MEDICINE

## 2021-01-26 PROCEDURE — G8427 DOCREV CUR MEDS BY ELIG CLIN: HCPCS | Performed by: INTERNAL MEDICINE

## 2021-01-26 PROCEDURE — 1090F PRES/ABSN URINE INCON ASSESS: CPT | Performed by: INTERNAL MEDICINE

## 2021-01-26 PROCEDURE — 4040F PNEUMOC VAC/ADMIN/RCVD: CPT | Performed by: INTERNAL MEDICINE

## 2021-01-26 PROCEDURE — 1036F TOBACCO NON-USER: CPT | Performed by: INTERNAL MEDICINE

## 2021-01-26 PROCEDURE — 99213 OFFICE O/P EST LOW 20 MIN: CPT | Performed by: INTERNAL MEDICINE

## 2021-01-26 NOTE — PROGRESS NOTES
MA Rooming Questions  Patient: Andrea Mac  MRN: [id number]    Date: 1/26/2021        1. Do you have any new issues?   no         2. Do you need any refills on medications?    no    3. Have you had any imaging done since your last visit?   no    4. Have you been hospitalized or seen in the emergency room since your last visit here?   no    5. Did the patient have a depression screening completed today?  No    No data recorded     PHQ-9 Given to (if applicable):               PHQ-9 Score (if applicable):                     [] Positive     []  Negative              Does question #9 need addressed (if applicable)                     [] Yes    []  No               Tayla Turner MA

## 2021-02-24 NOTE — PROGRESS NOTES
2/24/21-LM concerning  surgery on 3/2/21 - have your covid-19 test performed within 10  days of your procedure, then quarantine yourself until after your procedure. Please call the PAT Nurse.

## 2021-02-25 RX ORDER — DOCUSATE SODIUM 100 MG/1
50 CAPSULE, LIQUID FILLED ORAL NIGHTLY
COMMUNITY
End: 2022-10-06

## 2021-02-25 NOTE — PROGRESS NOTES
Surgery:   3/2/21                       Arrival time:   Nothing to eat or drink after midnight unless instructed to take certain medications by the doctor or the nurse the am of surgery  Arrive at the front information desk -1st floor /[facility] is on [address] [facility]  Please leave money and all other valuables at home. Wear comfortable clothing. If you wear contacts please bring a case. No make up. You may be asked to remove rings or body piercing. Please bring insurance cards and picture ID am of procedure. Please bring any consent or paper work from your doctor. If you become ill,such as a cold, sore throat or fever contact your doctor. Please bathe or shower am of procedure.   Medications to take AM of procedure:     Any questions call [facility]  [phone]

## 2021-02-26 ENCOUNTER — HOSPITAL ENCOUNTER (OUTPATIENT)
Dept: LAB | Age: 81
Discharge: HOME | End: 2021-02-26
Payer: MEDICARE

## 2021-02-26 PROCEDURE — C9803 HOPD COVID-19 SPEC COLLECT: HCPCS

## 2021-02-26 PROCEDURE — U0002 COVID-19 LAB TEST NON-CDC: HCPCS

## 2021-02-27 LAB
SARS-COV-2: NOT DETECTED
SOURCE: NORMAL

## 2021-03-02 ENCOUNTER — HOSPITAL ENCOUNTER (OUTPATIENT)
Age: 81
Setting detail: OUTPATIENT SURGERY
Discharge: HOME | End: 2021-03-02
Attending: SURGERY | Admitting: SURGERY
Payer: MEDICARE

## 2021-03-02 ENCOUNTER — ANESTHESIA (OUTPATIENT)
Dept: OPERATING ROOM | Age: 81
End: 2021-03-02
Payer: MEDICARE

## 2021-03-02 ENCOUNTER — ANESTHESIA EVENT (OUTPATIENT)
Dept: OPERATING ROOM | Age: 81
End: 2021-03-02
Payer: MEDICARE

## 2021-03-02 VITALS
TEMPERATURE: 98.5 F | BODY MASS INDEX: 23.37 KG/M2 | WEIGHT: 127 LBS | SYSTOLIC BLOOD PRESSURE: 156 MMHG | OXYGEN SATURATION: 100 % | HEIGHT: 62 IN | RESPIRATION RATE: 16 BRPM | DIASTOLIC BLOOD PRESSURE: 74 MMHG | HEART RATE: 87 BPM

## 2021-03-02 VITALS — SYSTOLIC BLOOD PRESSURE: 101 MMHG | DIASTOLIC BLOOD PRESSURE: 58 MMHG | OXYGEN SATURATION: 100 %

## 2021-03-02 LAB
BASOPHILS ABSOLUTE: 0 K/CU MM
BASOPHILS RELATIVE PERCENT: 0.4 % (ref 0–1)
DIFFERENTIAL TYPE: ABNORMAL
EOSINOPHILS ABSOLUTE: 0.1 K/CU MM
EOSINOPHILS RELATIVE PERCENT: 0.9 % (ref 0–3)
HCT VFR BLD CALC: 36.7 % (ref 37–47)
HEMOGLOBIN: 12 GM/DL (ref 12.5–16)
IMMATURE NEUTROPHIL %: 0.3 % (ref 0–0.43)
LYMPHOCYTES ABSOLUTE: 0.7 K/CU MM
LYMPHOCYTES RELATIVE PERCENT: 9.8 % (ref 24–44)
MCH RBC QN AUTO: 30.5 PG (ref 27–31)
MCHC RBC AUTO-ENTMCNC: 32.7 % (ref 32–36)
MCV RBC AUTO: 93.4 FL (ref 78–100)
MONOCYTES ABSOLUTE: 0.7 K/CU MM
MONOCYTES RELATIVE PERCENT: 10.4 % (ref 0–4)
NUCLEATED RBC %: 0 %
PDW BLD-RTO: 15.2 % (ref 11.7–14.9)
PLATELET # BLD: 336 K/CU MM (ref 140–440)
PMV BLD AUTO: 9.8 FL (ref 7.5–11.1)
RBC # BLD: 3.93 M/CU MM (ref 4.2–5.4)
SEGMENTED NEUTROPHILS ABSOLUTE COUNT: 5.3 K/CU MM
SEGMENTED NEUTROPHILS RELATIVE PERCENT: 78.2 % (ref 36–66)
TOTAL IMMATURE NEUTOROPHIL: 0.02 K/CU MM
TOTAL NUCLEATED RBC: 0 K/CU MM
WBC # BLD: 6.7 K/CU MM (ref 4–10.5)

## 2021-03-02 PROCEDURE — 3600000012 HC SURGERY LEVEL 2 ADDTL 15MIN: Performed by: SURGERY

## 2021-03-02 PROCEDURE — 6360000002 HC RX W HCPCS: Performed by: NURSE ANESTHETIST, CERTIFIED REGISTERED

## 2021-03-02 PROCEDURE — 2709999900 HC NON-CHARGEABLE SUPPLY: Performed by: SURGERY

## 2021-03-02 PROCEDURE — 2580000003 HC RX 258: Performed by: ANESTHESIOLOGY

## 2021-03-02 PROCEDURE — 2580000003 HC RX 258: Performed by: SURGERY

## 2021-03-02 PROCEDURE — 7100000011 HC PHASE II RECOVERY - ADDTL 15 MIN: Performed by: SURGERY

## 2021-03-02 PROCEDURE — 3600000002 HC SURGERY LEVEL 2 BASE: Performed by: SURGERY

## 2021-03-02 PROCEDURE — 3700000000 HC ANESTHESIA ATTENDED CARE: Performed by: SURGERY

## 2021-03-02 PROCEDURE — 7100000010 HC PHASE II RECOVERY - FIRST 15 MIN: Performed by: SURGERY

## 2021-03-02 PROCEDURE — 6360000002 HC RX W HCPCS: Performed by: SURGERY

## 2021-03-02 PROCEDURE — 3700000001 HC ADD 15 MINUTES (ANESTHESIA): Performed by: SURGERY

## 2021-03-02 PROCEDURE — 2500000003 HC RX 250 WO HCPCS: Performed by: SURGERY

## 2021-03-02 PROCEDURE — 85025 COMPLETE CBC W/AUTO DIFF WBC: CPT

## 2021-03-02 RX ORDER — CEFAZOLIN SODIUM 1 G/50ML
1000 INJECTION, SOLUTION INTRAVENOUS EVERY 8 HOURS
Status: DISCONTINUED | OUTPATIENT
Start: 2021-03-02 | End: 2021-03-02 | Stop reason: SDUPTHER

## 2021-03-02 RX ORDER — LIDOCAINE HYDROCHLORIDE 20 MG/ML
INJECTION, SOLUTION INTRAVENOUS PRN
Status: DISCONTINUED | OUTPATIENT
Start: 2021-03-02 | End: 2021-03-02 | Stop reason: SDUPTHER

## 2021-03-02 RX ORDER — LIDOCAINE HYDROCHLORIDE 10 MG/ML
INJECTION, SOLUTION INFILTRATION; PERINEURAL
Status: COMPLETED | OUTPATIENT
Start: 2021-03-02 | End: 2021-03-02

## 2021-03-02 RX ORDER — PROPOFOL 10 MG/ML
INJECTION, EMULSION INTRAVENOUS PRN
Status: DISCONTINUED | OUTPATIENT
Start: 2021-03-02 | End: 2021-03-02 | Stop reason: SDUPTHER

## 2021-03-02 RX ORDER — SODIUM CHLORIDE, SODIUM LACTATE, POTASSIUM CHLORIDE, CALCIUM CHLORIDE 600; 310; 30; 20 MG/100ML; MG/100ML; MG/100ML; MG/100ML
INJECTION, SOLUTION INTRAVENOUS CONTINUOUS
Status: DISCONTINUED | OUTPATIENT
Start: 2021-03-02 | End: 2021-03-02 | Stop reason: HOSPADM

## 2021-03-02 RX ADMIN — CEFAZOLIN SODIUM 1 G: 1 INJECTION, POWDER, FOR SOLUTION INTRAMUSCULAR; INTRAVENOUS at 11:33

## 2021-03-02 RX ADMIN — SODIUM CHLORIDE, POTASSIUM CHLORIDE, SODIUM LACTATE AND CALCIUM CHLORIDE: 600; 310; 30; 20 INJECTION, SOLUTION INTRAVENOUS at 11:26

## 2021-03-02 RX ADMIN — PROPOFOL 220 MG: 10 INJECTION, EMULSION INTRAVENOUS at 11:32

## 2021-03-02 RX ADMIN — LIDOCAINE HYDROCHLORIDE 50 MG: 20 INJECTION, SOLUTION INTRAVENOUS at 11:32

## 2021-03-02 RX ADMIN — SODIUM CHLORIDE, POTASSIUM CHLORIDE, SODIUM LACTATE AND CALCIUM CHLORIDE: 600; 310; 30; 20 INJECTION, SOLUTION INTRAVENOUS at 11:12

## 2021-03-02 ASSESSMENT — PULMONARY FUNCTION TESTS
PIF_VALUE: 1

## 2021-03-02 ASSESSMENT — PAIN - FUNCTIONAL ASSESSMENT: PAIN_FUNCTIONAL_ASSESSMENT: 0-10

## 2021-03-02 ASSESSMENT — PAIN SCALES - GENERAL: PAINLEVEL_OUTOF10: 0

## 2021-03-02 NOTE — ANESTHESIA POSTPROCEDURE EVALUATION
Department of Anesthesiology  Postprocedure Note    Patient: Nomi Boothe  MRN: [id number]  YOB: 1940  Date of evaluation: 3/2/2021  Time:  12:10 PM     Procedure Summary     Date: 03/02/21 Room / Location: [address] [place] [address]    Anesthesia Start: 1125 Anesthesia Stop: 1210    Procedure: PORT REMOVAL (N/A ) Diagnosis: (NO LONGER NEEDED , THEREPY COMPLETE)    Surgeons: Janice West MD Responsible Provider: Andre Lr MD    Anesthesia Type: MAC ASA Status: 3          Anesthesia Type: MAC    Werner Phase I:  10    Werner Phase II:   10  Last vitals: Reviewed and per EMR flowsheets.        Anesthesia Post Evaluation    Patient location during evaluation: bedside  Patient participation: complete - patient participated  Level of consciousness: awake and alert  Pain score: 0  Airway patency: patent  Nausea & Vomiting: no nausea and no vomiting  Complications: no  Cardiovascular status: hemodynamically stable  Respiratory status: acceptable, room air, spontaneous ventilation and nonlabored ventilation  Hydration status: euvolemic

## 2021-03-02 NOTE — OP NOTE
Operative Note      Patient: Tone Quan  YOB: 1940  MRN: [id number]    Date of Procedure: 3/2/2021    Pre-Op Diagnosis: NO LONGER NEEDED , THEREPY COMPLETE    Post-Op Diagnosis: Same       Procedure(s):  PORT REMOVAL    Surgeon(s):  Margret Aden MD      Anesthesia: Monitor Anesthesia Care    Estimated Blood Loss (mL): Minimal      Detailed Description of Procedure:   Patient presented with exposed area of the port on the right infraclavicular area  Timeout performed per checklist  Local anesthesia infiltrated  Previous incision was reopened across the existing port port 3 cm long  Patient created a pocket of the port and the port was released from the attachments along the suture line coming to the pectoralis fascia the catheter site was easily retrieved from the surgeon positions  Reviewed this with antibiotic solution)  Lap sponge and the needle counts correct  Tolerated procedure well        Electronically signed by Margret Aden MD on 3/2/2021 at 12:11 PM

## 2021-03-02 NOTE — ANESTHESIA PRE PROCEDURE
Department of Anesthesiology  Preprocedure Note       Name:  Pina Dias   Age:  68 y.o.  :  1940                                          MRN:  [id number]         Date:  3/2/2021      Surgeon: Raciel Le):  Dheeraj Nicholson MD    Procedure: Procedure(s):  PORT REMOVAL    Medications prior to admission:   Prior to Admission medications    Medication Sig Start Date End Date Taking? Authorizing Provider   docusate sodium (COLACE) 100 MG capsule Take 100 mg by mouth nightly 2 tablets   Yes Historical Provider, MD   diltiazem (DILACOR XR) 180 MG extended release capsule Take 180 mg by mouth daily    Historical Provider, MD   atorvastatin (LIPITOR) 20 MG tablet Take 20 mg by mouth daily    Historical Provider, MD   aspirin 81 MG tablet Take 81 mg by mouth daily    Historical Provider, MD   CALCIUM PO Take by mouth every morning    Historical Provider, MD   vitamin D3 (COLECALCIFEROL) 400 UNITS TABS Take 5,000 Units by mouth daily     Historical Provider, MD   folic acid (FOLVITE) 925 MCG tablet Take 400 mcg by mouth every morning    Historical Provider, MD   methotrexate 2.5 MG tablet Take 2.5 mg by mouth once a week Indications: Take 7 tablets one time a week     Historical Provider, MD   predniSONE (DELTASONE) 5 MG tablet Take 5 mg by mouth 3 times daily     Historical Provider, MD       Current medications:    No current facility-administered medications for this encounter.       Current Outpatient Medications   Medication Sig Dispense Refill    docusate sodium (COLACE) 100 MG capsule Take 100 mg by mouth nightly 2 tablets      diltiazem (DILACOR XR) 180 MG extended release capsule Take 180 mg by mouth daily      atorvastatin (LIPITOR) 20 MG tablet Take 20 mg by mouth daily      aspirin 81 MG tablet Take 81 mg by mouth daily      CALCIUM PO Take by mouth every morning      vitamin D3 (COLECALCIFEROL) 400 UNITS TABS Take 5,000 Units by mouth daily       folic acid (FOLVITE) 611 MCG tablet Take 400 PO2ART, [id number], ESL9ULT, BEART, [id number]     Type & Screen (If Applicable):  No results found for: LABABO, LABRH    Drug/Infectious Status (If Applicable):  No results found for: HIV, HEPCAB    COVID-19 Screening (If Applicable):   Lab Results   Component Value Date    COVID19 NOT DETECTED 02/26/2021         Anesthesia Evaluation  Patient summary reviewed history of anesthetic complications (Prolonged emergence from general anesthesia): Airway: Mallampati: II  TM distance: >3 FB   Neck ROM: full  Mouth opening: > = 3 FB Dental:          Pulmonary:normal exam    (+) sleep apnea: on CPAP,                             Cardiovascular:  Exercise tolerance: good (>4 METS),   (+) hypertension:, pulmonary hypertension: mild, hyperlipidemia         Beta Blocker:  Not on Beta Blocker      ROS comment: Echo 9/2015:  CONCLUSIONS:   1. Normal left ventricular size and function. Ejection fraction 55%. 2.  Normal right ventricular size and function. 3.  Chamber dimensions are normal.   4.  Valves structures are normal.   5.  Diastolic dysfunction. 7.  Trace mitral regurgitation. 8.  Mild tricuspid regurgitation with mild pulmonary hypertension at 34 mmHg. Neuro/Psych:   (+) seizures:, CVA:, psychiatric history:depression/anxiety             GI/Hepatic/Renal:   (+) GERD:,          ROS comment: colon cancer. Endo/Other:    (+) : arthritis: rheumatoid. , malignancy/cancer. Abdominal:           Vascular: negative vascular ROS. Anesthesia Plan      MAC     ASA 3       Induction: intravenous. Anesthetic plan and risks discussed with patient. TARUN Chavez - DOMINGUEZ   3/2/2021        Pre Anesthesia Evaluation complete. Anesthesia plan, risks, benefits, alternatives, and personnel discussed with patient and/or legal guardian. Patient and/or legal guardian verbalized an understanding and agreed to proceed.  Anesthesia plan discussed with care team members and agreed upon.   Ariela Paris APRN - CRNA  3/2/2021

## 2021-03-02 NOTE — PROGRESS NOTES
1220:  Pt received from OR alert and oriented with no c/o pain or discomfort. Gauze drsg to right upper chest in place with no bleeding or drainage noted.

## 2021-03-02 NOTE — PROGRESS NOTES
1320:  Discharge instructions given to include COVID education, pt verbalized an understanding. 1330:  Pt discharged to home alert and oriented with no c/o pain or discomfort. Gauze drsg to right upper chest intact with no bleeding or drainage noted. Pt transferred to POV with out incident.

## 2021-04-07 ENCOUNTER — APPOINTMENT (OUTPATIENT)
Dept: CT IMAGING | Age: 81
DRG: 072 | End: 2021-04-07
Payer: MEDICARE

## 2021-04-07 ENCOUNTER — HOSPITAL ENCOUNTER (INPATIENT)
Age: 81
LOS: 1 days | Discharge: HOME | DRG: 072 | End: 2021-04-09
Attending: EMERGENCY MEDICINE | Admitting: INTERNAL MEDICINE
Payer: MEDICARE

## 2021-04-07 DIAGNOSIS — R56.9 SEIZURE (HCC): Primary | ICD-10-CM

## 2021-04-07 DIAGNOSIS — Z86.73 HISTORY OF CVA (CEREBROVASCULAR ACCIDENT): ICD-10-CM

## 2021-04-07 LAB
ALBUMIN SERPL-MCNC: 3.4 GM/DL (ref 3.4–5)
ALP BLD-CCNC: 117 IU/L (ref 40–129)
ALT SERPL-CCNC: 15 U/L (ref 10–40)
ANION GAP SERPL CALCULATED.3IONS-SCNC: 7 MMOL/L (ref 4–16)
AST SERPL-CCNC: 19 IU/L (ref 15–37)
BACTERIA: NEGATIVE /HPF
BASE EXCESS MIXED: 3 (ref 0–2.3)
BASOPHILS ABSOLUTE: 0 K/CU MM
BASOPHILS RELATIVE PERCENT: 0.3 % (ref 0–1)
BILIRUB SERPL-MCNC: 0.5 MG/DL (ref 0–1)
BILIRUBIN URINE: NEGATIVE MG/DL
BLOOD, URINE: NEGATIVE
BUN BLDV-MCNC: 17 MG/DL (ref 6–23)
CALCIUM SERPL-MCNC: 8.9 MG/DL (ref 8.3–10.6)
CHLORIDE BLD-SCNC: 101 MMOL/L (ref 99–110)
CLARITY: CLEAR
CO2: 28 MMOL/L (ref 21–32)
COLOR: COLORLESS
COMMENT: ABNORMAL
CREAT SERPL-MCNC: 0.5 MG/DL (ref 0.6–1.1)
DIFFERENTIAL TYPE: ABNORMAL
EOSINOPHILS ABSOLUTE: 0 K/CU MM
EOSINOPHILS RELATIVE PERCENT: 0.3 % (ref 0–3)
GFR AFRICAN AMERICAN: >60 ML/MIN/1.73M2
GFR NON-AFRICAN AMERICAN: >60 ML/MIN/1.73M2
GLUCOSE BLD-MCNC: 104 MG/DL (ref 70–99)
GLUCOSE, URINE: NEGATIVE MG/DL
HCO3 VENOUS: 28.5 MMOL/L (ref 19–25)
HCT VFR BLD CALC: 37.6 % (ref 37–47)
HEMOGLOBIN: 12.3 GM/DL (ref 12.5–16)
IMMATURE NEUTROPHIL %: 0.3 % (ref 0–0.43)
INR BLD: 0.98 INDEX
KETONES, URINE: NEGATIVE MG/DL
LACTATE: 1.6 MMOL/L (ref 0.4–2)
LEUKOCYTE ESTERASE, URINE: NEGATIVE
LYMPHOCYTES ABSOLUTE: 0.5 K/CU MM
LYMPHOCYTES RELATIVE PERCENT: 5.2 % (ref 24–44)
MAGNESIUM: 1.9 MG/DL (ref 1.8–2.4)
MCH RBC QN AUTO: 30.5 PG (ref 27–31)
MCHC RBC AUTO-ENTMCNC: 32.7 % (ref 32–36)
MCV RBC AUTO: 93.3 FL (ref 78–100)
MONOCYTES ABSOLUTE: 0.7 K/CU MM
MONOCYTES RELATIVE PERCENT: 7.6 % (ref 0–4)
MUCUS: ABNORMAL HPF
NITRITE URINE, QUANTITATIVE: NEGATIVE
NUCLEATED RBC %: 0 %
O2 SAT, VEN: 78.7 % (ref 50–70)
PCO2, VEN: 46 MMHG (ref 38–52)
PDW BLD-RTO: 15.1 % (ref 11.7–14.9)
PH VENOUS: 7.4 (ref 7.32–7.42)
PH, URINE: 8 (ref 5–8)
PLATELET # BLD: 317 K/CU MM (ref 140–440)
PMV BLD AUTO: 9.9 FL (ref 7.5–11.1)
PO2, VEN: 44 MMHG (ref 28–48)
POTASSIUM SERPL-SCNC: 3.5 MMOL/L (ref 3.5–5.1)
PROTEIN UA: NEGATIVE MG/DL
PROTHROMBIN TIME: 11.9 SECONDS (ref 11.7–14.5)
RBC # BLD: 4.03 M/CU MM (ref 4.2–5.4)
RBC URINE: ABNORMAL /HPF (ref 0–6)
SEGMENTED NEUTROPHILS ABSOLUTE COUNT: 7.9 K/CU MM
SEGMENTED NEUTROPHILS RELATIVE PERCENT: 86.3 % (ref 36–66)
SODIUM BLD-SCNC: 136 MMOL/L (ref 135–145)
SPECIFIC GRAVITY UA: 1.03 (ref 1–1.03)
SQUAMOUS EPITHELIAL: <1 /HPF
TOTAL IMMATURE NEUTOROPHIL: 0.03 K/CU MM
TOTAL NUCLEATED RBC: 0 K/CU MM
TOTAL PROTEIN: 5.8 GM/DL (ref 6.4–8.2)
TRICHOMONAS: ABNORMAL /HPF
TROPONIN T: 0.02 NG/ML
TROPONIN T: <0.01 NG/ML
UROBILINOGEN, URINE: NEGATIVE MG/DL (ref 0.2–1)
WBC # BLD: 9.2 K/CU MM (ref 4–10.5)
WBC UA: <1 /HPF (ref 0–5)

## 2021-04-07 PROCEDURE — G0378 HOSPITAL OBSERVATION PER HR: HCPCS

## 2021-04-07 PROCEDURE — 6370000000 HC RX 637 (ALT 250 FOR IP): Performed by: NURSE PRACTITIONER

## 2021-04-07 PROCEDURE — 83605 ASSAY OF LACTIC ACID: CPT

## 2021-04-07 PROCEDURE — 81001 URINALYSIS AUTO W/SCOPE: CPT

## 2021-04-07 PROCEDURE — 96372 THER/PROPH/DIAG INJ SC/IM: CPT

## 2021-04-07 PROCEDURE — 93005 ELECTROCARDIOGRAM TRACING: CPT | Performed by: EMERGENCY MEDICINE

## 2021-04-07 PROCEDURE — 6360000002 HC RX W HCPCS: Performed by: EMERGENCY MEDICINE

## 2021-04-07 PROCEDURE — 93010 ELECTROCARDIOGRAM REPORT: CPT | Performed by: INTERNAL MEDICINE

## 2021-04-07 PROCEDURE — 96365 THER/PROPH/DIAG IV INF INIT: CPT

## 2021-04-07 PROCEDURE — 82805 BLOOD GASES W/O2 SATURATION: CPT

## 2021-04-07 PROCEDURE — 84484 ASSAY OF TROPONIN QUANT: CPT

## 2021-04-07 PROCEDURE — 70450 CT HEAD/BRAIN W/O DYE: CPT

## 2021-04-07 PROCEDURE — 99285 EMERGENCY DEPT VISIT HI MDM: CPT

## 2021-04-07 PROCEDURE — 6360000004 HC RX CONTRAST MEDICATION: Performed by: EMERGENCY MEDICINE

## 2021-04-07 PROCEDURE — 2580000003 HC RX 258: Performed by: NURSE PRACTITIONER

## 2021-04-07 PROCEDURE — 96366 THER/PROPH/DIAG IV INF ADDON: CPT

## 2021-04-07 PROCEDURE — 94761 N-INVAS EAR/PLS OXIMETRY MLT: CPT

## 2021-04-07 PROCEDURE — 6360000002 HC RX W HCPCS: Performed by: NURSE PRACTITIONER

## 2021-04-07 PROCEDURE — 85610 PROTHROMBIN TIME: CPT

## 2021-04-07 PROCEDURE — 80053 COMPREHEN METABOLIC PANEL: CPT

## 2021-04-07 PROCEDURE — 83735 ASSAY OF MAGNESIUM: CPT

## 2021-04-07 PROCEDURE — 2580000003 HC RX 258: Performed by: EMERGENCY MEDICINE

## 2021-04-07 PROCEDURE — 85025 COMPLETE CBC W/AUTO DIFF WBC: CPT

## 2021-04-07 PROCEDURE — 70496 CT ANGIOGRAPHY HEAD: CPT

## 2021-04-07 PROCEDURE — 36415 COLL VENOUS BLD VENIPUNCTURE: CPT

## 2021-04-07 RX ORDER — ACETAMINOPHEN 325 MG/1
650 TABLET ORAL EVERY 6 HOURS PRN
Status: DISCONTINUED | OUTPATIENT
Start: 2021-04-07 | End: 2021-04-09 | Stop reason: HOSPADM

## 2021-04-07 RX ORDER — ASPIRIN 81 MG/1
81 TABLET ORAL NIGHTLY
Status: DISCONTINUED | OUTPATIENT
Start: 2021-04-07 | End: 2021-04-09 | Stop reason: HOSPADM

## 2021-04-07 RX ORDER — HYDROCHLOROTHIAZIDE 12.5 MG/1
12.5 TABLET ORAL DAILY
Status: DISCONTINUED | OUTPATIENT
Start: 2021-04-08 | End: 2021-04-09 | Stop reason: HOSPADM

## 2021-04-07 RX ORDER — SODIUM CHLORIDE 0.9 % (FLUSH) 0.9 %
5-40 SYRINGE (ML) INJECTION EVERY 12 HOURS SCHEDULED
Status: DISCONTINUED | OUTPATIENT
Start: 2021-04-07 | End: 2021-04-09 | Stop reason: HOSPADM

## 2021-04-07 RX ORDER — ATORVASTATIN CALCIUM 20 MG/1
20 TABLET, FILM COATED ORAL NIGHTLY
Status: DISCONTINUED | OUTPATIENT
Start: 2021-04-07 | End: 2021-04-09 | Stop reason: HOSPADM

## 2021-04-07 RX ORDER — SODIUM CHLORIDE 9 MG/ML
25 INJECTION, SOLUTION INTRAVENOUS PRN
Status: DISCONTINUED | OUTPATIENT
Start: 2021-04-07 | End: 2021-04-09 | Stop reason: HOSPADM

## 2021-04-07 RX ORDER — ACETAMINOPHEN 650 MG/1
650 SUPPOSITORY RECTAL EVERY 6 HOURS PRN
Status: DISCONTINUED | OUTPATIENT
Start: 2021-04-07 | End: 2021-04-09 | Stop reason: HOSPADM

## 2021-04-07 RX ORDER — SODIUM CHLORIDE 0.9 % (FLUSH) 0.9 %
10 SYRINGE (ML) INJECTION PRN
Status: DISCONTINUED | OUTPATIENT
Start: 2021-04-07 | End: 2021-04-09 | Stop reason: HOSPADM

## 2021-04-07 RX ORDER — HYDROCHLOROTHIAZIDE 12.5 MG/1
12.5 CAPSULE, GELATIN COATED ORAL DAILY
Status: ON HOLD | COMMUNITY
End: 2021-04-09 | Stop reason: ALTCHOICE

## 2021-04-07 RX ORDER — SODIUM CHLORIDE 9 MG/ML
1000 INJECTION, SOLUTION INTRAVENOUS CONTINUOUS
Status: DISCONTINUED | OUTPATIENT
Start: 2021-04-07 | End: 2021-04-09 | Stop reason: HOSPADM

## 2021-04-07 RX ORDER — POLYETHYLENE GLYCOL 3350 17 G/17G
17 POWDER, FOR SOLUTION ORAL DAILY PRN
Status: DISCONTINUED | OUTPATIENT
Start: 2021-04-07 | End: 2021-04-09 | Stop reason: HOSPADM

## 2021-04-07 RX ORDER — PREDNISONE 10 MG/1
5 TABLET ORAL 2 TIMES DAILY
Status: DISCONTINUED | OUTPATIENT
Start: 2021-04-07 | End: 2021-04-09 | Stop reason: HOSPADM

## 2021-04-07 RX ORDER — DOCUSATE SODIUM 100 MG/1
100 CAPSULE, LIQUID FILLED ORAL NIGHTLY
Status: DISCONTINUED | OUTPATIENT
Start: 2021-04-07 | End: 2021-04-09 | Stop reason: HOSPADM

## 2021-04-07 RX ORDER — PROMETHAZINE HYDROCHLORIDE 25 MG/1
12.5 TABLET ORAL EVERY 6 HOURS PRN
Status: DISCONTINUED | OUTPATIENT
Start: 2021-04-07 | End: 2021-04-09 | Stop reason: HOSPADM

## 2021-04-07 RX ORDER — ONDANSETRON 2 MG/ML
4 INJECTION INTRAMUSCULAR; INTRAVENOUS EVERY 6 HOURS PRN
Status: DISCONTINUED | OUTPATIENT
Start: 2021-04-07 | End: 2021-04-09 | Stop reason: HOSPADM

## 2021-04-07 RX ORDER — DILTIAZEM HYDROCHLORIDE 180 MG/1
180 CAPSULE, COATED, EXTENDED RELEASE ORAL NIGHTLY
Status: DISCONTINUED | OUTPATIENT
Start: 2021-04-07 | End: 2021-04-09 | Stop reason: HOSPADM

## 2021-04-07 RX ADMIN — ENOXAPARIN SODIUM 40 MG: 40 INJECTION SUBCUTANEOUS at 21:22

## 2021-04-07 RX ADMIN — LEVETIRACETAM 1000 MG: 100 INJECTION, SOLUTION INTRAVENOUS at 13:14

## 2021-04-07 RX ADMIN — DILTIAZEM HYDROCHLORIDE 180 MG: 180 CAPSULE, COATED, EXTENDED RELEASE ORAL at 23:43

## 2021-04-07 RX ADMIN — PREDNISONE 5 MG: 10 TABLET ORAL at 15:26

## 2021-04-07 RX ADMIN — DOCUSATE SODIUM 100 MG: 100 CAPSULE, LIQUID FILLED ORAL at 21:23

## 2021-04-07 RX ADMIN — IOPAMIDOL 80 ML: 755 INJECTION, SOLUTION INTRAVENOUS at 12:56

## 2021-04-07 RX ADMIN — PREDNISONE 5 MG: 10 TABLET ORAL at 21:23

## 2021-04-07 RX ADMIN — LEVETIRACETAM 500 MG: 100 INJECTION, SOLUTION INTRAVENOUS at 21:24

## 2021-04-07 RX ADMIN — ASPIRIN 81 MG: 81 TABLET, COATED ORAL at 21:23

## 2021-04-07 RX ADMIN — LEVETIRACETAM 500 MG: 100 INJECTION, SOLUTION INTRAVENOUS at 12:10

## 2021-04-07 RX ADMIN — SODIUM CHLORIDE 1000 ML: 9 INJECTION, SOLUTION INTRAVENOUS at 12:09

## 2021-04-07 RX ADMIN — SODIUM CHLORIDE, PRESERVATIVE FREE 10 ML: 5 INJECTION INTRAVENOUS at 23:44

## 2021-04-07 RX ADMIN — ATORVASTATIN CALCIUM 20 MG: 20 TABLET, FILM COATED ORAL at 23:43

## 2021-04-07 ASSESSMENT — PAIN SCALES - GENERAL: PAINLEVEL_OUTOF10: 0

## 2021-04-07 NOTE — ED NOTES
Provided pt with coffee and daughter remains at bedside. Pt denies any further needs at this time.       Andrey Hathaway RN  04/07/21 0809

## 2021-04-07 NOTE — ED NOTES
Report recieved from [facility]. Care transferred at this time.      Naheed Brewster RN  04/07/21 1944

## 2021-04-07 NOTE — H&P
History and Physical      Name:  Baltazar Nixon /Age/Sex: 1940  (68 y.o. female)   MRN & CSN:  [id number] & [id number] Admission Date/Time: 2021 11:13 AM   Location:  [facility] PCP: Steph Mendoza MD       Discussed patient with Dr. Greg Almanza and Plan:     Baltazar Nixon is a 68 y.o.  female  who presents with witnessed seizure    - Seizure  Occurred in Neurologist's office  Keppra loaded in ED; cont BID (pt had been weaned off anticonvulsants remotely)  Head CT/CTA non-acute  Neuro non-focal on exam  Brain MRI, per neuro  Seizure and fall precautions  UA pending, check TSH    - Elevated trop  0.020  Denies CP; denies hx of PCI/known CAD/CHF  Trend trop  Holding on CARD c/s      Chronic  - HTN- Cont dilt, HCTZ  - HLD- Cont statin  - RA- takes methotrexate, prednisone TID  - Hx of malignant neoplasm of anal canal- dx ; had mediport removed 2021; follows with Dr Anne Marie Wynne [facility]- noncompliant  - CVA hx- reports residual paresthesias, left side    Discussed patient with ER physician     Diet GEN   DVT Prophylaxis [x] Lovenox, []  Heparin, [] SCDs, [] Ambulation   GI Prophylaxis [] PPI,  [] H2 Blocker,  [] Carafate,  [] Diet/Tube Feeds   Code Status Full   Disposition Patient requires continued admission due to seizure   MDM [] Low, [x] Moderate,[]  High  Patient's risk as above due to      [] One or more chronic illnesses with exacerbation progression      [x] Two or more stable chronic illnesses      [x] Undiagnosed new problem with uncertain prognosis      [] Elective major surgery      []Prescription drug management       History of Present Illness:     Chief Complaint: witnessed seizure    Baltazar Nixon is a 68 y.o.  female  who presents with the above complaints, onset today. Context is, patient has hx of remote CVA with seizures believed to be associated with CVA. She was taking keppra, was remotely weaned off it.   While in Neurologist's office today for f/u, she had a witnessed tonic-clonic episode, lasting about 1 minute. She does not have recall of event. Denies recent seizures. Denies focal deficits, changes in speech/vision. Is aaox3 at time of exam.  She denies chest pain/pressure, sob, abdominal pain, new back pain, n/v, diarrhea. Confirms FULL code status. Denies regular alcohol use. She uses walker at baseline to ambulate. Son is at bedside. Ten point ROS reviewed negative, unless as noted above    Objective:     No intake or output data in the 24 hours ending 04/07/21 1418     Vitals:   Vitals:    04/07/21 1119   BP: 120/71   Pulse: 92   Resp: 18   Temp: 97.9 °F (36.6 °C)   SpO2: 100%       Physical Exam:     GEN Awake female, sitting upright in bed in no apparent distress. Appears given age. EYES Pupils are equally round. No scleral erythema, discharge, or conjunctivitis. HENT Mucous membranes are moist. Oral pharynx without exudates, no evidence of thrush. NECK Supple, no apparent thyromegaly or masses. RESP Clear to auscultation, no wheezes, rales or rhonchi. Symmetric chest movement while on room air. CARDIO/VASC S1/S2 auscultated. Regular rate without appreciable murmurs, rubs, or gallops. No JVD or carotid bruits. Peripheral pulses equal bilaterally and palpable. No peripheral edema. GI Abdomen is soft without significant tenderness, masses, or guarding. Bowel sounds are normoactive. Rectal exam deferred.  No costovertebral angle tenderness. Webber catheter is not present. HEME/LYMPH No palpable cervical lymphadenopathy and no hepatosplenomegaly. No petechiae or ecchymoses. MSK No gross joint deformities. Strength equal in BLE and BUE  SKIN Normal coloration, warm, dry. NEURO Cranial nerves appear grossly intact, normal speech, no lateralizing weakness. PSYCH Awake, alert, oriented x 4. Affect appropriate.     Past Medical History:        Past Medical History:   Diagnosis Date    Acid reflux     Anxiety     Bronchitis In Past    Cancer ([facility] [address] colon cancer    Carotid stenosis 11/10/2015    Cerebral embolism with cerebral infarction ([facility] [address] 9-18-15    Seizure X 1, No Residual    Chronic back pain     Depression     History of blood transfusion     History of external beam radiation therapy 2017    pel[facility] 2017 [facility] Hyper[facility]     Hypertension     Prolonged emergence from general anesthesia     RA (rheumatoid arthritis) ([facility] [address]     \"All Over\"    Seizure ([facility] [address] 9-18-15    X 1    Sleep apnea     Uses CPAP    Teeth missing     Upper And Lower    Urinary incontinence     UTI (urinary tract infection) In Past    No Current Symptoms    Wears dentures     Full Upper , Partial Lower    Wears glasses     Wears partial dentures     Lower       PSHX:  has a past surgical history that includes Carpal tunnel release; Dental surgery; Colonoscopy (In Past); Hysterectomy, vaginal (1960's); Dilation and curettage of uterus (1960's); Carotid endarterectomy (Right, [id number]); and Port Surgery (N/A, 3/2/2021). Allergies: Allergies   Allergen Reactions    Cortisone Rash    Codeine      Unsure Of Reaction    Pcn [Penicillins]      Unknown Reaction       FAM HX: family history includes Arthritis in her mother and sister; Depression in her son; Diabetes in her mother and sister; Hearing Loss in her brother; Heart Disease in her brother, mother, and sister; High Blood Pressure in her brother, brother, mother, sister, son, and son; High Cholesterol in her mother; Learning Disabilities in her son; Mental Illness in her sister and son; [employer] L[employer] / [place] in her mother; Other in her father, sister, son, son, and son; Stroke in her mother and sister.     Soc HX:   Social History     Socioeconomic History    Marital status:      Spouse name: Not on file    Number of children: Not on file    Years of education: Not on file    Highest education level: Not on file   Occupational History    Not on file   Social Needs    Financial resource strain: Not on file    Food insecurity     Worry: Not on file     Inability: Not on file    Transportation needs     Medical: Not on file     Non-medical: Not on file   Tobacco Use    Smoking status: Never Smoker    Smokeless tobacco: Never Used   Substance and Sexual Activity    Alcohol use: No     Alcohol/week: 0.0 standard drinks    Drug use: No    Sexual activity: Never   Lifestyle    Physical activity     Days per week: Not on file     Minutes per session: Not on file    Stress: Not on file   Relationships    Social connections     Talks on phone: Not on file     Gets together: Not on file     Attends Sikhism service: Not on file     Active member of club or organization: Not on file     Attends meetings of clubs or organizations: Not on file     Relationship status: Not on file    Intimate partner violence     Fear of current or ex partner: Not on file     Emotionally abused: Not on file     Physically abused: Not on file     Forced sexual activity: Not on file   Other Topics Concern    Not on file   Social History Narrative    Not on file       Medications:     Medications:      docusate sodium (COLACE) 100 MG capsule Take 100 mg by mouth nightly 2 tablets Historical Provider, MD Needs Review   diltiazem (DILACOR XR) 180 MG extended release capsule Take 180 mg by mouth nightly  Historical Provider, MD Needs Review   atorvastatin (LIPITOR) 20 MG tablet Take 20 mg by mouth nightly  Historical Provider, MD Needs Review   aspirin 81 MG tablet Take 81 mg by mouth nightly  Historical Provider, MD Needs Review   CALCIUM PO Take by mouth nightly  Historical Provider, MD Needs Review   vitamin D3 (COLECALCIFEROL) 400 UNITS TABS Take 5,000 Units by mouth daily  Historical Provider, MD Needs Review   folic acid (FOLVITE) 779 MCG tablet Take 400 mcg by mouth every morning Historical Provider, MD Needs Review   methotrexate 2.5 MG tablet Take 2.5 mg by mouth once a week to the right parotid.  There are calcifications or   clips near the right parotid gland. BONES: No acute osseous abnormality. CTA HEAD:     ANTERIOR CIRCULATION: No significant stenosis of the intracranial internal   carotid, anterior cerebral, or middle cerebral arteries. No aneurysm. POSTERIOR CIRCULATION: No significant stenosis of the vertebral, basilar, or   posterior cerebral arteries. No aneurysm. OTHER: No dural venous sinus thrombosis on this non-dedicated study. BRAIN: No mass effect or midline shift. No extra-axial fluid collection. The   gray-white differentiation is maintained.      Impression:       Unremarkable CTA of the head and neck.      CT Head WO Contrast [[id number]] Collected: 04/07/21 1316     Order Status: Completed Updated: 04/07/21 1340     Narrative:       EXAMINATION:   CT OF THE HEAD WITHOUT CONTRAST  4/7/2021 12:43 pm     TECHNIQUE:   CT of the head was performed without the administration of intravenous   contrast. Dose modulation, iterative reconstruction, and/or weight based   adjustment of the mA/kV was utilized to reduce the radiation dose to as low   as reasonably achievable. COMPARISON:   November 26, 2015     HISTORY:   ORDERING SYSTEM PROVIDED HISTORY: Seizure, history of remote seizure   associated with CVA   TECHNOLOGIST PROVIDED HISTORY:   Reason for exam:->Seizure, history of remote seizure associated with CVA   Has a \"code stroke\" or \"stroke alert\" been called? ->No   Decision Support Exception->Emergency Medical Condition (MA)   Reason for Exam: Seizure, history of remote seizure associated with CVA   Acuity: Acute   Type of Exam: Initial     FINDINGS:   BRAIN/VENTRICLES: There is no acute intracranial hemorrhage, mass effect or   midline shift.  No abnormal extra-axial fluid collection.  The gray-white   differentiation is maintained without evidence of an acute infarct.  There is   no evidence of hydrocephalus.  There are calcified plaques in

## 2021-04-07 NOTE — ED NOTES
Patient went to the bathroom and was unable to obtain a urine sample because it was contaminated with a bowel movement.       Horace Seip, RN  04/07/21 9740

## 2021-04-07 NOTE — ED PROVIDER NOTES
Emergency Department Encounter    Patient: Gloria Milan  MRN: [id number]  : 1940  Date of Evaluation: 2021  ED Provider:  KANIKA ROLLINS      Triage Chief Complaint:   Seizures      [place]:  Gloria Milan is a 68 y.o. female that presents to the emergency department with report of a seizure. Report is called prior to the patient's arrival by neurologist Dr. Angely Ji. Patient was being seen for a routine follow-up visit when she had a \"witnessed seizure\" in the office. Episode lasted 1 or 2 minutes and seem to be generalized. 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 \"  Neurologist indicates patient was on antiepileptics a long time ago, though they were titrated off. Patient was feeling dizzy today prior to going to the neurologist.  This was associated with a generalized, gradual onset headache. She denies any light or sound sensitivity. She continues to feel dizzy while in the emergency department. She denies any sensation of movement or spinning. She denies any thunderclap headache. She denies any neck pain or stiffness. She denies any new weakness, numbness, or tingling. Neurologist performed full evaluation in the emergency department, and she has \"minimal\" left-sided weakness from her old stroke, but this is baseline. Neurologist has requested evaluation including CT imaging and administration of levetiracetam.  Patient reports no other particular provocative or alleviating factors. ROS - see HPI, below listed is current ROS at time of my eval:  CONSTITUTIONAL: No fevers, chills, or sweats. EYES: No vision change, redness, drainage, or discharge. HENT: No sore throat, runny nose, or earache. No dental pain. No painful swallowing. RESPIRATORY: No difficulty breathing, cough, or sputum production. CARDIOVASCULAR: No anginal-type chest pain, orthopnea, or edema. GASTROINTESTINAL: No nausea, vomiting, or abdominal pain.   No Heart Disease Brother     Hearing Loss Brother         Heart Attack    Diabetes Sister     High Blood Pressure Sister     Other Sister         Gout    Arthritis Sister     Mental Illness Sister         Schizophrenia    High Blood Pressure Brother     High Blood Pressure Son     Mental Illness Son         Anxiety    Depression Son     Other Son         Pancreatitis, Surgery For Brain Aneurysm    High Blood Pressure Son     Other Son         Gout    Learning Disabilities Son         Autistic, Mentally Retarded    Other Son         Seizures     Social History     Socioeconomic History    Marital status:      Spouse name: Not on file    Number of children: Not on file    Years of education: Not on file    Highest education level: Not on file   Occupational History    Not on file   Social Needs    Financial resource strain: Not on file    Food insecurity     Worry: Not on file     Inability: Not on file   [employer] needs     Medical: Not on file     Non-medical: Not on file   Tobacco Use    Smoking status: Never Smoker    Smokeless tobacco: Never Used   Substance and Sexual Activity    Alcohol use: No     Alcohol/week: 0.0 standard drinks    Drug use: No    Sexual activity: Never   Lifestyle    Physical activity     Days per week: Not on file     Minutes per session: Not on file    Stress: Not on file   Relationships    Social connections     Talks on phone: Not on file     Gets together: Not on file     Attends Bahai service: Not on file     Active member of club or organization: Not on file     Attends meetings of clubs or organizations: Not on file     Relationship status: Not on file    Intimate partner violence     Fear of current or ex partner: Not on file     Emotionally abused: Not on file     Physically abused: Not on file     Forced sexual activity: Not on file   Other Topics Concern    Not on file   Social History Narrative    Not on file     Current Facility-Administered Medications   Medication Dose Route Frequency Provider Last Rate Last Admin    0.9 % sodium chloride infusion  1,000 mL Intravenous Continuous Adalgisa Sanchez MD   Stopped at 04/07/21 2100    sodium chloride flush 0.9 % injection 5-40 mL  5-40 mL Intravenous 2 times per day Rhenda , APRN - NP   10 mL at 04/08/21 0924    sodium chloride flush 0.9 % injection 10 mL  10 mL Intravenous PRN Rhenda Bacon, APRN - NP        0.9 % sodium chloride infusion  25 mL Intravenous PRN Rhenda , APRN - NP        enoxaparin (LOVENOX) injection 40 mg  40 mg Subcutaneous Nightly Rhenda Bacon, APRN - NP   40 mg at 04/07/21 2122    promethazine (PHENERGAN) tablet 12.5 mg  12.5 mg Oral Q6H PRN Rhenda Bacon, APRN - NP        Or    ondansetron (ZOFRAN) injection 4 mg  4 mg Intravenous Q6H PRN Rhenda Bacon, APRN - NP        polyethylene glycol (GLYCOLAX) packet 17 g  17 g Oral Daily PRN Rhenda Bacon, APRN - NP        acetaminophen (TYLENOL) tablet 650 mg  650 mg Oral Q6H PRN Rhenda , APRN - NP   650 mg at 04/08/21 1234    Or    acetaminophen (TYLENOL) suppository 650 mg  650 mg Rectal Q6H PRN Rhenda Bacon, APRN - NP        levETIRAcetam (KEPPRA) 500 mg in sodium chloride 0.9 % 100 mL IVPB  500 mg Intravenous Q12H Rhenda , APRN - NP   Stopped at 04/08/21 1001    aspirin EC tablet 81 mg  81 mg Oral Nightly Rhenda , APRN - NP   81 mg at 04/07/21 2123    atorvastatin (LIPITOR) tablet 20 mg  20 mg Oral Nightly Rhenda Bacon, APRN - NP   20 mg at 04/07/21 2343    dilTIAZem (CARDIZEM CD) extended release capsule 180 mg  180 mg Oral Nightly Rhenda Bacon, APRN - NP   180 mg at 04/07/21 2343    docusate sodium (COLACE) capsule 100 mg  100 mg Oral Nightly Rhenda Bacon, APRN - NP   100 mg at 04/07/21 2123    hydroCHLOROthiazide (HYDRODIURIL) tablet 12.5 mg  12.5 mg Oral Daily TARUN Vargas NP   12.5 mg at 04/08/21 9652    predniSONE (DELTASONE) tablet 5 mg  5 mg Oral BID TARUN Dallas NP   5 mg at 04/08/21 9772     Allergies   Allergen Reactions    Cortisone Rash    Codeine      Unsure Of Reaction    Pcn [Penicillins]      Unknown Reaction       Nursing Notes Reviewed    Physical Exam:  Triage VS:    ED Triage Vitals   Enc Vitals Group      BP       Pulse       Resp       Temp       Temp src       SpO2       Weight       Height       Head Circumference       Peak Flow       Pain Score       Pain Loc       Pain Edu? Excl. in [address]? My pulse ox interpretation is -normal on room air    GENERAL: Patient is awake, alert, and oriented appropriately. Patient is resting comfortably in a still position on the exam table. Patient speaking in full and complete sentences. Well-nourished and well-developed. HEENT: Normocephalic and atraumatic. Pupils equal, round, and reactive to light. No redness or matting. Bilateral external ears are unremarkable. Nasal mucosa is pink without purulence. Oral mucosa is moist and pink. NECK: Supple with normal range of motion. No Kernig's or Brudzinski signs. No visible JVD. RESPIRATORY: Symmetric aeration bilaterally. No audible wheezes, rales, rhonchi, or stridor. No chest wall tenderness. CARDIOVASCULAR: Regular rate and rhythm. No audible murmurs, rubs, or gallops. No central or peripheral cyanosis. GASTROINTESTINAL: Soft, nontender, and nondistended. No McBurney's or Bailey's point tenderness. No guarding, rebound, rigidity. No mass or pulsatile mass. Bowel sounds are present in all quadrants. No costovertebral angle tenderness. NEUROLOGICAL: Awake, alert and oriented x 3. Cranial nerves III through XII are grossly intact as tested without facial droop or dermatomal paresthesias. Of note, forehead wrinkles are symmetric and intact. Conjugate gaze without entrapment. No asymmetry of the corners of the mouth or nasolabial folds.   No gross motor or cerebellar deficits. Motor exhibits 5/5 strength in the bilateral trapezius, biceps, triceps, handgrip, quadriceps, psoas, dorsiflexors, and plantar flexors. No gross dermatomal paresthesias. No gross deficits of the cerebellum. MUSCULOSKELETAL: Metacarpophalangeal joint changes consistent with history of rheumatoid arthritis. Otherwise, no asymmetric edema, [place] Dilling' sign, or cords. No tenderness or limitation range of motion to the bilateral shoulders, elbows, wrists, hips, knees, or ankles. No accompanying long bone tenderness or deformity. SKIN: Normal tone for ethnicity. Normal turgor and brisk capillary refill peripherally. No petechiae, purpura, vesicles, bullae, or other lesions. No icterus. PSYCHIATRIC: Normal mood. Normal affect. No voiced suicidal or homicidal ideation. Patient does not respond to internal stimuli. Emergency department course. Patient is [facility] to [facility] and assessed and reassessed by me. After initial evaluation, orders are placed for medical screening studies including CBC, metabolic panel, magnesium, and a CT of the head and CT angiogram of head and neck. NIH stroke scale is 0. With the reported seizure activity and the neurologist evaluation, thrombolytics would not be indicated, so stroke alert is not activated. Levetiracetam 500 mg first dose is ordered IV. Patient is agreeable to continuing plan. At approximately 1230, neurology Chandana Tarango presents to the emergency department to begin further evaluation of the patient. She has requested a total dosage of 1500 mg for the levetiracetam.  She will discuss with the on-call neurologist and provide further recommendations. Medical screening studies are pending. Upon most recent reevaluation, patient remains generally stable. There has been no recurrent seizure activity. There has been no indication of an inappropriate postictal phase or status epilepticus.   Laboratory testing is generally reassuring. Neurology team has requested further management in the hospital. Hospitalist KAL Santana is paged at [phone] to discuss admission. Initial verbal orders are discussed at 0542. Decision time to admit: 1325. Patient seen during [facility], I did don appropriate PPE during my encounters with the patient, including n95 (when appropriate) mask and eye protection as appropriate.     I have reviewed and interpreted all of the currently available lab results from this visit (if applicable):  Results for orders placed or performed during the hospital encounter of 04/07/21   CBC Auto Differential   Result Value Ref Range    WBC 9.2 4.0 - 10.5 K/CU MM    RBC 4.03 (L) 4.2 - 5.4 M/CU MM    Hemoglobin 12.3 (L) 12.5 - 16.0 GM/DL    Hematocrit 37.6 37 - 47 %    MCV 93.3 78 - 100 FL    MCH 30.5 27 - 31 PG    MCHC 32.7 32.0 - 36.0 %    RDW 15.1 (H) 11.7 - 14.9 %    Platelets 689 348 - 450 K/CU MM    MPV 9.9 7.5 - 11.1 FL    Differential Type AUTOMATED DIFFERENTIAL     Segs Relative 86.3 (H) 36 - 66 %    Lymphocytes % 5.2 (L) 24 - 44 %    Monocytes % 7.6 (H) 0 - 4 %    Eosinophils % 0.3 0 - 3 %    Basophils % 0.3 0 - 1 %    Segs Absolute 7.9 K/CU MM    Lymphocytes Absolute 0.5 K/CU MM    Monocytes Absolute 0.7 K/CU MM    Eosinophils Absolute 0.0 K/CU MM    Basophils Absolute 0.0 K/CU MM    Nucleated RBC % 0.0 %    Total Nucleated RBC 0.0 K/CU MM    Total Immature Neutrophil 0.03 K/CU MM    Immature Neutrophil % 0.3 0 - 0.43 %   Troponin   Result Value Ref Range    Troponin T 0.020 (H) <0.01 NG/ML   Protime-INR   Result Value Ref Range    Protime 11.9 11.7 - 14.5 SECONDS    INR 0.98 INDEX   Urinalysis Reflex to Culture    Specimen: Urine   Result Value Ref Range    Color, UA COLORLESS (A) YELLOW    Clarity, UA CLEAR CLEAR    Glucose, Urine NEGATIVE NEGATIVE MG/DL    Bilirubin Urine NEGATIVE NEGATIVE MG/DL    Ketones, Urine NEGATIVE NEGATIVE MG/DL    Specific Gravity, UA 1.029 1.001 - 1.035    Blood, Urine NEGATIVE NEGATIVE    pH, Urine 8.0 5.0 - 8.0    Protein, UA NEGATIVE NEGATIVE MG/DL    Urobilinogen, Urine NEGATIVE 0.2 - 1.0 MG/DL    Nitrite Urine, Quantitative NEGATIVE NEGATIVE    Leukocyte Esterase, Urine NEGATIVE NEGATIVE    RBC, UA NONE SEEN 0 - 6 /HPF    WBC, UA <1 0 - 5 /HPF    Bacteria, UA NEGATIVE NEGATIVE /HPF    Squam Epithel, UA <1 /HPF    Mucus, UA RARE (A) NEGATIVE HPF    Trichomonas, UA NONE SEEN NONE SEEN /HPF   Lactic Acid, Plasma   Result Value Ref Range    Lactate 1.6 0.4 - 2.0 mMOL/L   Blood Gas, Venous   Result Value Ref Range    pH, Julio 7.40 7.32 - 7.42    pCO2, Julio 46 38 - 52 mmHG    pO2, Julio 44 28 - 48 mmHG    Base Exc, Mixed 3 (H) 0 - 2.3    HCO3, Venous 28.5 (H) 19 - 25 MMOL/L    O2 Sat, Julio 78.7 (H) 50 - 70 %    Comment VBG    Comprehensive Metabolic Panel w/ Reflex to MG   Result Value Ref Range    Sodium 136 135 - 145 MMOL/L    Potassium 3.5 3.5 - 5.1 MMOL/L    Chloride 101 99 - 110 mMol/L    CO2 28 21 - 32 MMOL/L    BUN 17 6 - 23 MG/DL    CREATININE 0.5 (L) 0.6 - 1.1 MG/DL    Glucose 104 (H) 70 - 99 MG/DL    Calcium 8.9 8.3 - 10.6 MG/DL    Albumin 3.4 3.4 - 5.0 GM/DL    Total Protein 5.8 (L) 6.4 - 8.2 GM/DL    Total Bilirubin 0.5 0.0 - 1.0 MG/DL    ALT 15 10 - 40 U/L    AST 19 15 - 37 IU/L    Alkaline Phosphatase 117 40 - 129 IU/L    GFR Non-African American >60 >60 mL/min/1.73m2    GFR African American >60 >60 mL/min/1.73m2    Anion Gap 7 4 - 16   Magnesium   Result Value Ref Range    Magnesium 1.9 1.8 - 2.4 mg/dl   Troponin   Result Value Ref Range    Troponin T <0.010 <0.01 NG/ML   Troponin   Result Value Ref Range    Troponin T <0.010 <0.01 NG/ML   Basic Metabolic Panel w/ Reflex to MG   Result Value Ref Range    Sodium 136 135 - 145 MMOL/L    Potassium 3.9 3.5 - 5.1 MMOL/L    Chloride 104 99 - 110 mMol/L    CO2 27 21 - 32 MMOL/L    Anion Gap 5 4 - 16    BUN 12 6 - 23 MG/DL    CREATININE 0.6 0.6 - 1.1 MG/DL    Glucose 100 (H) 70 - 99 MG/DL    Calcium 8.2 (L) 8.3 - 10.6 MG/DL 2015 HISTORY: ORDERING SYSTEM PROVIDED HISTORY: Seizure, history of remote seizure associated with CVA TECHNOLOGIST PROVIDED HISTORY: Reason for exam:->Seizure, history of remote seizure associated with CVA Has a \"code stroke\" or \"stroke alert\" been called? ->No Decision Support Exception->Emergency Medical Condition (MA) Reason for Exam: Seizure, history of remote seizure associated with CVA Acuity: Acute Type of Exam: Initial FINDINGS: BRAIN/VENTRICLES: There is no acute intracranial hemorrhage, mass effect or midline shift. No abnormal extra-axial fluid collection. The gray-white differentiation is maintained without evidence of an acute infarct. There is no evidence of hydrocephalus. There are calcified plaques in the cavernous portion of the internal carotid arteries. There are basal ganglia calcifications bilaterally. ORBITS: The visualized portion of the orbits demonstrate no acute abnormality. SINUSES: The visualized paranasal sinuses and mastoid air cells demonstrate no acute abnormality. SOFT TISSUES/SKULL:  No acute abnormality of the visualized skull or soft tissues. 1. No acute intracranial abnormality. Cta Head Neck W Contrast    Result Date: 4/7/2021  EXAMINATION: CTA OF THE HEAD AND NECK WITH CONTRAST 4/7/2021 12:54 pm: TECHNIQUE: CTA of the head and neck was performed with the administration of intravenous contrast. Multiplanar reformatted images are provided for review. MIP images are provided for review. Stenosis of the internal carotid arteries measured using NASCET criteria. Dose modulation, iterative reconstruction, and/or weight based adjustment of the mA/kV was utilized to reduce the radiation dose to as low as reasonably achievable. COMPARISON: None.  HISTORY: ORDERING SYSTEM PROVIDED HISTORY: Seizure, history of remote seizure associated with CVA TECHNOLOGIST PROVIDED HISTORY: Reason for exam:->Seizure, history of remote seizure associated with CVA Decision Support Exception->Emergency Medical Condition (MA) Reason for Exam: Seizure, history of remote seizure associated with CVA Acuity: Acute Type of Exam: Initial Relevant Medical/Surgical History: 80 ML ISOVUE 56 FINDINGS: CTA NECK: AORTIC ARCH/ARCH VESSELS: No dissection or arterial injury. No significant stenosis of the brachiocephalic or subclavian arteries. CAROTID ARTERIES: No dissection, arterial injury, or hemodynamically significant stenosis by NASCET criteria. VERTEBRAL ARTERIES: No dissection, arterial injury, or significant stenosis. SOFT TISSUES: The lung apices are clear. No cervical or superior mediastinal lymphadenopathy. The larynx and pharynx are unremarkable. No acute abnormality of the salivary and thyroid glands. There are indeterminate calcifications posterior to the right parotid. There are calcifications or clips near the right parotid gland. BONES: No acute osseous abnormality. CTA HEAD: ANTERIOR CIRCULATION: No significant stenosis of the intracranial internal carotid, anterior cerebral, or middle cerebral arteries. No aneurysm. POSTERIOR CIRCULATION: No significant stenosis of the vertebral, basilar, or posterior cerebral arteries. No aneurysm. OTHER: No dural venous sinus thrombosis on this non-dedicated study. BRAIN: No mass effect or midline shift. No extra-axial fluid collection. The gray-white differentiation is maintained. Unremarkable CTA of the head and neck. EKG (if obtained): (All EKG's are interpreted by myself in the absence of a cardiologist)  Twelve-lead EKG interpreted by me in the absence of a cardiologist.  There is no criteria ST elevation or reciprocal change. There are no hyperacute T wave changes. There is no sign of acute ischemia or infarction. This tracing shows a normal sinus rhythm with sinus arrhythmia.   Rate and intervals are 92 beats per minute, [place] interval 176 milliseconds, QRS duration 84 milliseconds, QTc interval 462 milliseconds, and R axis normal at

## 2021-04-08 ENCOUNTER — APPOINTMENT (OUTPATIENT)
Dept: MRI IMAGING | Age: 81
DRG: 072 | End: 2021-04-08
Payer: MEDICARE

## 2021-04-08 LAB
ANION GAP SERPL CALCULATED.3IONS-SCNC: 5 MMOL/L (ref 4–16)
BASOPHILS ABSOLUTE: 0 K/CU MM
BASOPHILS RELATIVE PERCENT: 0.3 % (ref 0–1)
BUN BLDV-MCNC: 12 MG/DL (ref 6–23)
CALCIUM SERPL-MCNC: 8.2 MG/DL (ref 8.3–10.6)
CHLORIDE BLD-SCNC: 104 MMOL/L (ref 99–110)
CO2: 27 MMOL/L (ref 21–32)
CREAT SERPL-MCNC: 0.6 MG/DL (ref 0.6–1.1)
DIFFERENTIAL TYPE: ABNORMAL
EOSINOPHILS ABSOLUTE: 0 K/CU MM
EOSINOPHILS RELATIVE PERCENT: 0.2 % (ref 0–3)
GFR AFRICAN AMERICAN: >60 ML/MIN/1.73M2
GFR NON-AFRICAN AMERICAN: >60 ML/MIN/1.73M2
GLUCOSE BLD-MCNC: 100 MG/DL (ref 70–99)
HCT VFR BLD CALC: 32.2 % (ref 37–47)
HEMOGLOBIN: 10.6 GM/DL (ref 12.5–16)
IMMATURE NEUTROPHIL %: 0.3 % (ref 0–0.43)
LYMPHOCYTES ABSOLUTE: 0.7 K/CU MM
LYMPHOCYTES RELATIVE PERCENT: 11.3 % (ref 24–44)
MCH RBC QN AUTO: 30.9 PG (ref 27–31)
MCHC RBC AUTO-ENTMCNC: 32.9 % (ref 32–36)
MCV RBC AUTO: 93.9 FL (ref 78–100)
MONOCYTES ABSOLUTE: 0.4 K/CU MM
MONOCYTES RELATIVE PERCENT: 6.7 % (ref 0–4)
NUCLEATED RBC %: 0 %
PDW BLD-RTO: 15 % (ref 11.7–14.9)
PLATELET # BLD: 302 K/CU MM (ref 140–440)
PMV BLD AUTO: 10 FL (ref 7.5–11.1)
POTASSIUM SERPL-SCNC: 3.9 MMOL/L (ref 3.5–5.1)
RBC # BLD: 3.43 M/CU MM (ref 4.2–5.4)
SEGMENTED NEUTROPHILS ABSOLUTE COUNT: 4.8 K/CU MM
SEGMENTED NEUTROPHILS RELATIVE PERCENT: 81.2 % (ref 36–66)
SODIUM BLD-SCNC: 136 MMOL/L (ref 135–145)
TOTAL IMMATURE NEUTOROPHIL: 0.02 K/CU MM
TOTAL NUCLEATED RBC: 0 K/CU MM
TROPONIN T: <0.01 NG/ML
TSH HIGH SENSITIVITY: 0.64 UIU/ML (ref 0.27–4.2)
WBC # BLD: 5.9 K/CU MM (ref 4–10.5)

## 2021-04-08 PROCEDURE — 94761 N-INVAS EAR/PLS OXIMETRY MLT: CPT

## 2021-04-08 PROCEDURE — 70551 MRI BRAIN STEM W/O DYE: CPT

## 2021-04-08 PROCEDURE — 2580000003 HC RX 258: Performed by: NURSE PRACTITIONER

## 2021-04-08 PROCEDURE — 96372 THER/PROPH/DIAG INJ SC/IM: CPT

## 2021-04-08 PROCEDURE — 6360000002 HC RX W HCPCS: Performed by: NURSE PRACTITIONER

## 2021-04-08 PROCEDURE — 84443 ASSAY THYROID STIM HORMONE: CPT

## 2021-04-08 PROCEDURE — 99223 1ST HOSP IP/OBS HIGH 75: CPT | Performed by: NURSE PRACTITIONER

## 2021-04-08 PROCEDURE — 6370000000 HC RX 637 (ALT 250 FOR IP): Performed by: NURSE PRACTITIONER

## 2021-04-08 PROCEDURE — 85025 COMPLETE CBC W/AUTO DIFF WBC: CPT

## 2021-04-08 PROCEDURE — G0378 HOSPITAL OBSERVATION PER HR: HCPCS

## 2021-04-08 PROCEDURE — 96366 THER/PROPH/DIAG IV INF ADDON: CPT

## 2021-04-08 PROCEDURE — 95819 EEG AWAKE AND ASLEEP: CPT

## 2021-04-08 PROCEDURE — 80048 BASIC METABOLIC PNL TOTAL CA: CPT

## 2021-04-08 PROCEDURE — 36415 COLL VENOUS BLD VENIPUNCTURE: CPT

## 2021-04-08 RX ADMIN — ENOXAPARIN SODIUM 40 MG: 40 INJECTION SUBCUTANEOUS at 21:14

## 2021-04-08 RX ADMIN — ACETAMINOPHEN 650 MG: 325 TABLET ORAL at 12:34

## 2021-04-08 RX ADMIN — DOCUSATE SODIUM 100 MG: 100 CAPSULE, LIQUID FILLED ORAL at 21:14

## 2021-04-08 RX ADMIN — DILTIAZEM HYDROCHLORIDE 180 MG: 180 CAPSULE, COATED, EXTENDED RELEASE ORAL at 21:14

## 2021-04-08 RX ADMIN — PREDNISONE 5 MG: 10 TABLET ORAL at 09:24

## 2021-04-08 RX ADMIN — LEVETIRACETAM 500 MG: 100 INJECTION, SOLUTION INTRAVENOUS at 09:24

## 2021-04-08 RX ADMIN — SODIUM CHLORIDE, PRESERVATIVE FREE 10 ML: 5 INJECTION INTRAVENOUS at 09:24

## 2021-04-08 RX ADMIN — PREDNISONE 5 MG: 10 TABLET ORAL at 21:14

## 2021-04-08 RX ADMIN — LEVETIRACETAM 500 MG: 100 INJECTION, SOLUTION INTRAVENOUS at 21:15

## 2021-04-08 RX ADMIN — ATORVASTATIN CALCIUM 20 MG: 20 TABLET, FILM COATED ORAL at 21:14

## 2021-04-08 RX ADMIN — SODIUM CHLORIDE, PRESERVATIVE FREE 10 ML: 5 INJECTION INTRAVENOUS at 21:15

## 2021-04-08 RX ADMIN — HYDROCHLOROTHIAZIDE 12.5 MG: 12.5 TABLET ORAL at 09:24

## 2021-04-08 RX ADMIN — ASPIRIN 81 MG: 81 TABLET, COATED ORAL at 21:14

## 2021-04-08 ASSESSMENT — PAIN DESCRIPTION - PAIN TYPE: TYPE: ACUTE PAIN

## 2021-04-08 ASSESSMENT — PAIN SCALES - GENERAL
PAINLEVEL_OUTOF10: 8
PAINLEVEL_OUTOF10: 8

## 2021-04-08 ASSESSMENT — PAIN DESCRIPTION - ORIENTATION: ORIENTATION: RIGHT

## 2021-04-08 NOTE — CONSULTS
No Current Symptoms    Wears dentures     Full Upper , Partial Lower    Wears glasses     Wears partial dentures     Lower    :   Past Surgical History:   Procedure Laterality Date    CAROTID ENDARTERECTOMY Right [id number]    CARPAL TUNNEL RELEASE      COLONOSCOPY  In Past    DENTAL SURGERY      Teeth Extracted In Past    DILATION AND CURETTAGE OF UTERUS  1960's    Prior To Vaginal Hysterectomy    HYSTERECTOMY, VAGINAL  1960's    PORT SURGERY N/A 3/2/2021    PORT REMOVAL performed by Zoë Cisse MD at [facility] OR     Medications:  Scheduled Meds:   sodium chloride flush  5-40 mL Intravenous 2 times per day    enoxaparin  40 mg Subcutaneous Nightly    levetiracetam  500 mg Intravenous Q12H    aspirin  81 mg Oral Nightly    atorvastatin  20 mg Oral Nightly    dilTIAZem  180 mg Oral Nightly    docusate sodium  100 mg Oral Nightly    hydroCHLOROthiazide  12.5 mg Oral Daily    predniSONE  5 mg Oral BID     Continuous Infusions:   sodium chloride Stopped (04/07/21 2100)    sodium chloride       PRN Meds:.sodium chloride flush, sodium chloride, promethazine **OR** ondansetron, polyethylene glycol, acetaminophen **OR** acetaminophen    Allergies   Allergen Reactions    Cortisone Rash    Codeine      Unsure Of Reaction    Pcn [Penicillins]      Unknown Reaction     Social History     Socioeconomic History    Marital status:      Spouse name: Not on file    Number of children: Not on file    Years of education: Not on file    Highest education level: Not on file   Occupational History    Not on file   Social Needs    Financial resource strain: Not on file    Food insecurity     Worry: Not on file     Inability: Not on file    Transportation needs     Medical: Not on file     Non-medical: Not on file   Tobacco Use    Smoking status: Never Smoker    Smokeless tobacco: Never Used   Substance and Sexual Activity    Alcohol use: No     Alcohol/week: 0.0 standard drinks    Drug use: No    Sexual activity: Never   Lifestyle    Physical activity     Days per week: Not on file     Minutes per session: Not on file    Stress: Not on file   Relationships    Social connections     Talks on phone: Not on file     Gets together: Not on file     Attends Buddhism service: Not on file     Active member of club or organization: Not on file     Attends meetings of clubs or organizations: Not on file     Relationship status: Not on file    Intimate partner violence     Fear of current or ex partner: Not on file     Emotionally abused: Not on file     Physically abused: Not on file     Forced sexual activity: Not on file   Other Topics Concern    Not on file   Social History Narrative    Not on file      Family History   Problem Relation Age of Onset    Stroke Mother     Heart Disease Mother     Arthritis Mother     Diabetes Mother     High Blood Pressure Mother     High Cholesterol Mother    Jessie Guzman / [place] Mother     Other Father         \"Surgery For Twisted Bowels\"    Heart Disease Sister     Stroke Sister     High Blood Pressure Brother     Heart Disease Brother     Hearing Loss Brother         Heart Attack    Diabetes Sister     High Blood Pressure Sister     Other Sister         Gout    Arthritis Sister     Mental Illness Sister         Schizophrenia    High Blood Pressure Brother     High Blood Pressure Son     Mental Illness Son         Anxiety    Depression Son     Other Son         Pancreatitis, Surgery For Brain Aneurysm    High Blood Pressure Son     Other Son         Gout    Learning Disabilities Son         Autistic, Mentally Retarded    Other Son         Seizures       Review of Symptoms:    14-point system review completed. All of which are negative except as mentioned above. Physical Exam:           Gen: A&O x 4, NAD, cooperative  HEENT: NC/AT, EOMI, PERRL, mmm, neck supple, no meningeal signs;    Heart: regular   Lungs: no distress  Ext: no edema, no calf tenderness b/l  Psych: normal mood and affect  Skin: no rashes or lesions    NEUROLOGIC EXAM:    Mental Status: A&O to self, location, month and year, NAD, speech clear, language fluent, repetition and naming intact, follows commands appropriately    Cranial Nerve Exam:   CN II-XII: PERRL, VFF, no nystagmus, no gaze paresis, sensation V1-V3 intact b/l, muscles of facial expression symmetric; hearing intact to conversational tone, palate elevates symmetrically, shoulder elevation symmetric and tongue protrudes midline with movement side to side. Motor Exam:       Strength 5/5 UE's/LE's b/l  Tone and bulk normal   No pronator drift    Deep Tendon Reflexes: 1/4 biceps, triceps, brachioradialis, patellar, and achilles b/l; flexor plantar responses b/l    Sensation: Intact light touch UE's/LE's b/l  Decreased light touch and temp on the RUE and RLE    Coordination/Cerebellum:       Tremors--none      Rapidly alternating movements: no dysdiadochokinesia b/l                Finger-to-Nose: no dysmetria b/l    Gait and stance:      Gait: deferred      LABS:     Recent Labs     04/07/21  1145 04/08/21  0532   WBC 9.2 5.9    136   K 3.5 3.9    104   CO2 28 27   BUN 17 12   CREATININE 0.5* 0.6   GLUCOSE 104* 100*   INR 0.98  --          IMAGING:    MRI Brain; Impression:     1. 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 This may represent the source of the patient's seizures. 2. Progressive chronic small vessel ischemic changes. 3. No acute intracranial process identified. MRI brain w contrast pending  EEG pending       ASSESSMENT/PLAN:     3 56-year-old female with acute seizure activity, will rule out epileptic etiology with the EEG superimposed on new we found right mesial temporal lobe sclerosis. Will rule out malignancy with contrasted MRI of the brain.   Plan of care as follows:  1. Neuro exam:  1. Patient has mild right upper extremity drift, and decreased sensation on the right arm and right leg  2. Neurodiagnostics:  1. MRI without contrast as above  2. MRI with contrast pending  3. EEG pending  3. Medications:  1. Keppra 500mg twice daily  2. Continue aspirin and statin  4. PT/OT/ST:  1. Per their recommendations  5. Follow-up:  1. Pending completion of neurodiagnostics        Thank you for allowing us to participate in the care of your patient. If there are any questions regarding evaluation please feel free to contact us. TARUN Latif - CNP, 4/8/2021  Attending Note:  I have rounded on this patient with Rod Greene CNP. I have reviewed the chart and we have discussed this case in detail. The patient was seen and examined by myself. Pertinent labs and imaging have been personally reviewed. Our findings and impressions were discussed with the patient. I concur with the Nurse Practioner's assessment and plan.     Scotty Marcus DO 4/23/2021 9:10 AM

## 2021-04-08 NOTE — CARE COORDINATION
In to see patient to initiate discharge planning though she is out of room at this time. CM will re-attempt.

## 2021-04-08 NOTE — PROGRESS NOTES
Patient arrived from ED via bed. Alert and oriented. No s/s of acute distress. Patient was oriented to the unit. Educated patient on the used of call light. 4x4 skin assessment was  completed by this RN and Janice Kenney. Patient has redness to buttocks but no pressure injuries noted.

## 2021-04-08 NOTE — ED NOTES
Attempt to call report to [facility]. Requested me to call back in 5 mins.      María Aquino RN  04/07/21 7334

## 2021-04-08 NOTE — PROGRESS NOTES
Hospitalist Progress Note      Name:  Gloria Milan /Age/Sex: 1940  (68 y.o. female)   MRN & CSN:  [id number] & [id number] Admission Date/Time: 2021 11:13 AM   Location:  [facility]- PCP: Mir Wilson MD             Assessment and Plan:     Gloria Milan is a 68 y.o.  female  who presents with witnessed seizure     - Seizure  Occurred in Neurologist's office  Keppra loaded in ED; cont BID (pt had been weaned off anticonvulsants remotely)  Discussed with neurology, MRI brain without contrast     - Elevated trop  0.020  Denies CP; denies hx of PCI/known CAD/CHF  Trend trop  Holding on CARD c/s        Chronic  - HTN- Cont dilt, HCTZ  - HLD- Cont statin  - RA- takes methotrexate, prednisone TID  - Hx of malignant neoplasm of anal canal- dx ; had mediport removed 2021; follows with Dr Leigha Douglas  - VIC- noncompliant  - CVA hx- reports residual paresthesias, left side     History of anal cancer-completed treatment in 2017 at the local cancer center, daughter reports that she had a follow-up scope by gastroenterology last year at [facility], reviewed oncology note in epic    Subjective:     Patient reports she is here because of a seizure - she had a sz in Dr. Fuchs He office yesterday    She lives with her 64year old disabled son - who needs a lot of care, including bathing which she does herself - she is his PCG despite having RA she says. She has some hand deformities. Her dgt is in the room with her    MRI just done this am - results reviewed with her and her dgt    Objective:        Intake/Output Summary (Last 24 hours) at 2021 1016  Last data filed at 2021 2100  Gross per 24 hour   Intake 1000 ml   Output --   Net 1000 ml      Vitals:   Vitals:    21 0900   BP: 136/68   Pulse: 65   Resp: 18   Temp:    SpO2: 100%     Physical Exam:      Lungs-respiratory effort nonlabored at rest    Neurologic-speech clear    Skin-no cyanosis      Medications:   Medications:    sodium chloride flush  5-40 mL Intravenous 2 times per day    enoxaparin  40 mg Subcutaneous Nightly    levetiracetam  500 mg Intravenous Q12H    aspirin  81 mg Oral Nightly    atorvastatin  20 mg Oral Nightly    dilTIAZem  180 mg Oral Nightly    docusate sodium  100 mg Oral Nightly    hydroCHLOROthiazide  12.5 mg Oral Daily    predniSONE  5 mg Oral BID      Infusions:    sodium chloride Stopped (04/07/21 2100)    sodium chloride       PRN Meds: sodium chloride flush, 10 mL, PRN  sodium chloride, 25 mL, PRN  promethazine, 12.5 mg, Q6H PRN    Or  ondansetron, 4 mg, Q6H PRN  polyethylene glycol, 17 g, Daily PRN  acetaminophen, 650 mg, Q6H PRN    Or  acetaminophen, 650 mg, Q6H PRN

## 2021-04-09 ENCOUNTER — APPOINTMENT (OUTPATIENT)
Dept: MRI IMAGING | Age: 81
DRG: 072 | End: 2021-04-09
Payer: MEDICARE

## 2021-04-09 VITALS
BODY MASS INDEX: 24.53 KG/M2 | TEMPERATURE: 97.7 F | HEART RATE: 80 BPM | HEIGHT: 62 IN | DIASTOLIC BLOOD PRESSURE: 73 MMHG | SYSTOLIC BLOOD PRESSURE: 146 MMHG | RESPIRATION RATE: 22 BRPM | WEIGHT: 133.3 LBS | OXYGEN SATURATION: 97 %

## 2021-04-09 PROCEDURE — 94761 N-INVAS EAR/PLS OXIMETRY MLT: CPT

## 2021-04-09 PROCEDURE — 96366 THER/PROPH/DIAG IV INF ADDON: CPT

## 2021-04-09 PROCEDURE — 99233 SBSQ HOSP IP/OBS HIGH 50: CPT | Performed by: NURSE PRACTITIONER

## 2021-04-09 PROCEDURE — 6360000002 HC RX W HCPCS: Performed by: NURSE PRACTITIONER

## 2021-04-09 PROCEDURE — 6360000004 HC RX CONTRAST MEDICATION: Performed by: NURSE PRACTITIONER

## 2021-04-09 PROCEDURE — A9579 GAD-BASE MR CONTRAST NOS,1ML: HCPCS | Performed by: NURSE PRACTITIONER

## 2021-04-09 PROCEDURE — 2580000003 HC RX 258: Performed by: NURSE PRACTITIONER

## 2021-04-09 PROCEDURE — 6370000000 HC RX 637 (ALT 250 FOR IP): Performed by: NURSE PRACTITIONER

## 2021-04-09 PROCEDURE — 70552 MRI BRAIN STEM W/DYE: CPT

## 2021-04-09 PROCEDURE — G0378 HOSPITAL OBSERVATION PER HR: HCPCS

## 2021-04-09 PROCEDURE — 1200000000 HC SEMI PRIVATE

## 2021-04-09 RX ORDER — DONEPEZIL HYDROCHLORIDE 10 MG/1
10 TABLET, FILM COATED ORAL EVERY MORNING
COMMUNITY
Start: 2021-04-06 | End: 2022-04-20 | Stop reason: SDUPTHER

## 2021-04-09 RX ORDER — LEVETIRACETAM 500 MG/1
500 TABLET ORAL 2 TIMES DAILY
Qty: 60 TABLET | Refills: 1 | Status: SHIPPED | OUTPATIENT
Start: 2021-04-09 | End: 2021-12-07 | Stop reason: SDUPTHER

## 2021-04-09 RX ORDER — TRIAMTERENE AND HYDROCHLOROTHIAZIDE 37.5; 25 MG/1; MG/1
TABLET ORAL
COMMUNITY
Start: 2021-03-16

## 2021-04-09 RX ADMIN — LEVETIRACETAM 500 MG: 100 INJECTION, SOLUTION INTRAVENOUS at 08:24

## 2021-04-09 RX ADMIN — HYDROCHLOROTHIAZIDE 12.5 MG: 12.5 TABLET ORAL at 08:24

## 2021-04-09 RX ADMIN — GADOTERIDOL 12 ML: 279.3 INJECTION, SOLUTION INTRAVENOUS at 07:59

## 2021-04-09 RX ADMIN — SODIUM CHLORIDE, PRESERVATIVE FREE 10 ML: 5 INJECTION INTRAVENOUS at 08:29

## 2021-04-09 RX ADMIN — PREDNISONE 5 MG: 10 TABLET ORAL at 08:24

## 2021-04-09 ASSESSMENT — PAIN SCALES - GENERAL: PAINLEVEL_OUTOF10: 0

## 2021-04-09 NOTE — DISCHARGE SUMMARY
Discharge Summary    Name:  Viki Sosa /Age/Sex: 1940  (68 y.o. female)   MRN & CSN:  [id number] & [id number] Admission Date/Time: 2021 11:13 AM   Attending:  Aliya Ordonez MD Discharging Physician: Scooter Huang MD     HPI:     Per H&P:  Chief Complaint: witnessed seizure     Viki Sosa is a 68 y.o.  female  who presents with the above complaints, onset today. Context is, patient has hx of remote CVA with seizures believed to be associated with CVA. She was taking keppra, was remotely weaned off it. While in Neurologist's office today for f/u, she had a witnessed tonic-clonic episode, lasting about 1 minute. She does not have recall of event. Denies recent seizures. Denies focal deficits, changes in speech/vision. Is aaox3 at time of exam.  She denies chest pain/pressure, sob, abdominal pain, new back pain, n/v, diarrhea. Confirms FULL code status. Denies regular alcohol use. She uses walker at baseline to ambulate. Son is at bedside. Hospital Course:     Hurley Dandy is a pleasant 68year old who presented to the ED after having a witnessed GTC seizure in Dr. Sabiha Malone office. She had right arm weakness after the seizure. She was diagnosed with seizures in  and was placed on Keppra 500 mg twice a day. She had been weaned off of it. The last time Keppra was dispensed per pharmacy record was in 2018 (it was prescribed by neurologist Dr. Sylvia Blankenship who is now retired). He has a history of multiple blackout episodes when she was younger in her 25s. She was admitted and while here she had an MRI which showed volume loss and abnormal T2 signal in the right hippocampus suggestive of mesial temporal sclerosis or a prior remote insult. The radiologist felt that this may be the source of her seizures. She was seen by neurology while here. She has been restarted on Keppra 500 mg BID. She is now doing well, and was discharged home in stable condition.     Problem list    Acute nightly              docusate sodium (COLACE) 100 MG capsule  Take 100 mg by mouth nightly 2 tablets             donepezil (ARICEPT) 5 MG tablet               folic acid (FOLVITE) 572 MCG tablet  Take 400 mcg by mouth every morning             levETIRAcetam (KEPPRA) 500 MG tablet  Take 1 tablet by mouth 2 times daily             methotrexate 2.5 MG tablet  Take 2.5 mg by mouth once a week Indications: Take 7 tablets one time a week              predniSONE (DELTASONE) 5 MG tablet  Take 5 mg by mouth 2 times daily              triamterene-hydroCHLOROthiazide (MAXZIDE-25) 37.5-25 MG per tablet  take 1 tablet by mouth once daily             vitamin D3 (COLECALCIFEROL) 400 UNITS TABS  Take 5,000 Units by mouth daily                  Objective Findings at Discharge:   /80   Pulse 79   Temp 97.8 °F (36.6 °C) (Oral)   Resp 18   Ht 5' 2\" (1.575 m)   Wt 133 lb 4.8 oz (60.5 kg)   SpO2 97%   BMI 24.38 kg/m²            PHYSICAL EXAM   GEN Awake female, sitting upright in bed in no apparent distress. Appears given age.     LABS:    CBC:   Lab Results   Component Value Date    WBC 5.9 04/08/2021    HGB 10.6 04/08/2021    HCT 32.2 04/08/2021    MCV 93.9 04/08/2021     04/08/2021     BMP:   Lab Results   Component Value Date     04/08/2021    K 3.9 04/08/2021     04/08/2021    CO2 27 04/08/2021    PHOS 3.3 09/17/2015    BUN 12 04/08/2021    CREATININE 0.6 04/08/2021    CALCIUM 8.2 04/08/2021     Hepatic:   Recent Labs     04/07/21  1145   AST 19   ALT 15   BILITOT 0.5   ALKPHOS 117        IMAGING:    MRI BRAIN W CONTRAST [[id number]] Collected: 04/09/21 2664      Order Status: Completed Updated: 04/09/21 1006     Narrative:       EXAMINATION:   MRI OF THE BRAIN WITH CONTRAST  4/9/2021 7:58 am     TECHNIQUE:   Multiplanar multisequence MRI of the head/brain was performed with the   administration of intravenous contrast.     COMPARISON:   04/08/2021     HISTORY:   ORDERING SYSTEM PROVIDED HISTORY: rule out METs   TECHNOLOGIST PROVIDED HISTORY:   Reason for exam:->rule out METs   Reason for Exam: r/o mets; pt had a MRI brain without contrast 04/08/2021   Acuity: Acute   Type of Exam: Initial   Relevant Medical/Surgical History: 12mL prohance; GFR >60     FINDINGS:   Tiny focus of linear enhancement is identified within the high left frontal   lobe near the convexity likely representing a small developmental venous   anomaly, which is incidental.     No intracranial hemorrhage,  mass effect or hydrocephalus is detected. Chronic microvascular disease is better detailed on previous noncontrast MRI   of the brain. Impression:       1. Small incidental developmental venous anomaly within the high left   frontal lobe near the convexity. 2.  No abnormal enhancement to suggest intracranial metastatic disease. MRI BRAIN WO CONTRAST [[id number]] Collected: 04/08/21 0912     Order Status: Completed Updated: 04/08/21 1006     Narrative:       EXAMINATION:   MRI OF THE BRAIN WITHOUT CONTRAST  4/8/2021 8:51 am     TECHNIQUE:   Multiplanar multisequence MRI of the brain was performed without the   administration of intravenous contrast.     COMPARISON:   09/17/2015     HISTORY:   ORDERING SYSTEM PROVIDED HISTORY: witnessed tonic-clonic seizure   TECHNOLOGIST PROVIDED HISTORY:   Reason for exam:->witnessed tonic-clonic seizure   Decision Support Exception->Emergency Medical Condition (MA)   Reason for Exam: witnessed tonic-clonic seizure   Acuity: Acute   Type of Exam: Initial   Relevant Medical/Surgical History: colon ca     FINDINGS:   INTRACRANIAL STRUCTURES/VENTRICLES: There are no areas of restricted   diffusion identified to suggest an acute infarct. There is no acute   intracranial hemorrhage. No mass effect or midline shift is present.  There   is focal and confluent abnormal increased T2/FLAIR signal intensity within   the periventricular, subcortical and deep white matter of both hemispheres, worsened from the previous exam.     There is abnormal increased T2 signal intensity and volume loss within the   right hippocampus. There is no cortical dysplasia identified. There is no sellar or suprasellar mass present. There is no ventriculomegaly   or abnormal extra-axial fluid collection present. The proximal portions of   the [place] demonstrate normal flow voids. There is no   cerebellopontine angle mass identified. ORBITS: Limited evaluation of the orbits is unremarkable. SINUSES: Paranasal sinuses and mastoid air cells are clear. BONES/SOFT TISSUES: Bone marrow signal intensity is normal.  Degenerative   overgrowth is noted surrounding the dens suggesting pannus, similar to the   previous exam.      Impression:       1. 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 This may represent the source of the patient's seizures. 2. Progressive chronic small vessel ischemic changes. 3. No acute intracranial process identified. CTA HEAD NECK W CONTRAST [[id number]] Collected: 04/07/21 1322     Order Status: Completed Updated: 04/07/21 7132     Narrative:       EXAMINATION:   CTA OF THE HEAD AND NECK WITH CONTRAST 4/7/2021 12:54 pm:     TECHNIQUE:   CTA of the head and neck was performed with the administration of intravenous   contrast. Multiplanar reformatted images are provided for review. MIP images   are provided for review. Stenosis of the internal carotid arteries measured   using NASCET criteria. Dose modulation, iterative reconstruction, and/or   weight based adjustment of the mA/kV was utilized to reduce the radiation   dose to as low as reasonably achievable. COMPARISON:   None.      HISTORY:   ORDERING SYSTEM PROVIDED HISTORY: Seizure, history of remote seizure   associated with CVA   TECHNOLOGIST PROVIDED HISTORY:   Reason for exam:->Seizure, history of remote seizure associated with CVA   Decision Support Exception->Emergency Medical Condition (MA)   Reason for Exam: Seizure, history of remote seizure associated with CVA   Acuity: Acute   Type of Exam: Initial   Relevant Medical/Surgical History: 80 ML ISOVUE 56     FINDINGS:     CTA NECK:     AORTIC ARCH/ARCH VESSELS: No dissection or arterial injury. No significant   stenosis of the brachiocephalic or subclavian arteries. CAROTID ARTERIES: No dissection, arterial injury, or hemodynamically   significant stenosis by NASCET criteria. VERTEBRAL ARTERIES: No dissection, arterial injury, or significant stenosis. SOFT TISSUES: The lung apices are clear. No cervical or superior mediastinal   lymphadenopathy. The larynx and pharynx are unremarkable. No acute   abnormality of the salivary and thyroid glands. There are indeterminate   calcifications posterior to the right parotid. There are calcifications or   clips near the right parotid gland. BONES: No acute osseous abnormality. CTA HEAD:     ANTERIOR CIRCULATION: No significant stenosis of the intracranial internal   carotid, anterior cerebral, or middle cerebral arteries. No aneurysm. POSTERIOR CIRCULATION: No significant stenosis of the vertebral, basilar, or   posterior cerebral arteries. No aneurysm. OTHER: No dural venous sinus thrombosis on this non-dedicated study. BRAIN: No mass effect or midline shift. No extra-axial fluid collection. The   gray-white differentiation is maintained. Impression:       Unremarkable CTA of the head and neck.       CT Head WO Contrast [[id number]] Collected: 04/07/21 1316     Order Status: Completed Updated: 04/07/21 1512     Narrative:       EXAMINATION:   CT OF THE HEAD WITHOUT CONTRAST  4/7/2021 12:43 pm     TECHNIQUE:   CT of the head was performed without the administration of intravenous   contrast. Dose modulation, iterative reconstruction, and/or weight based   adjustment of the mA/kV was utilized to reduce the radiation dose to as low   as reasonably achievable. COMPARISON:   November 26, 2015     HISTORY:   ORDERING SYSTEM PROVIDED HISTORY: Seizure, history of remote seizure   associated with CVA   TECHNOLOGIST PROVIDED HISTORY:   Reason for exam:->Seizure, history of remote seizure associated with CVA   Has a \"code stroke\" or \"stroke alert\" been called? ->No   Decision Support Exception->Emergency Medical Condition (MA)   Reason for Exam: Seizure, history of remote seizure associated with CVA   Acuity: Acute   Type of Exam: Initial     FINDINGS:   BRAIN/VENTRICLES: There is no acute intracranial hemorrhage, mass effect or   midline shift. No abnormal extra-axial fluid collection. The gray-white   differentiation is maintained without evidence of an acute infarct. There is   no evidence of hydrocephalus. There are calcified plaques in the cavernous   portion of the internal carotid arteries. There are basal ganglia   calcifications bilaterally. ORBITS: The visualized portion of the orbits demonstrate no acute abnormality. SINUSES: The visualized paranasal sinuses and mastoid air cells demonstrate   no acute abnormality. SOFT TISSUES/SKULL:  No acute abnormality of the visualized skull or soft   tissues. Impression:       1. No acute intracranial abnormality.           Discharge Time of 35 minutes    Electronically signed by Hammad Dickey MD on 4/9/2021 at 1:43 PM

## 2021-04-09 NOTE — PROGRESS NOTES
Neurology Service Progress Note   [facility]  Patient Name: Wicho Moore  : 1940        Subjective:   Reason for consult: \"I had a seizure yesterday\"    Patient seen and examined  No acute changes overnight  Goddaughter at the bedside states she was confused yesterday and she is concerned about her mentation but I talked to Dr. Janice Salinas today and she is actually already scheduled for neuro cognitive work up next week. No repeat seizures. Tolerating Keppra well.        Past Medical History:   Diagnosis Date    Acid reflux     Anxiety     Bronchitis In Past    Cancer ([facility]     colon cancer    Carotid stenosis 11/10/2015    Cerebral embolism with cerebral infarction ([facility] 9-18-15    Seizure X 1, No Residual    Chronic back pain     Depression     History of blood transfusion     History of external beam radiation therapy 2017    pelvis/anal canal in 2017 per Dr. Whitfield Opitz [facility] [address] Hyperlipidemia     Hypertension     Prolonged emergence from general anesthesia     RA (rheumatoid arthritis) ([facility]     \"All Over\"    Seizure ([facility] 9-18-15    X 1    Sleep apnea     Uses CPAP    Teeth missing     Upper And Lower    Urinary incontinence     UTI (urinary tract infection) In Past    No Current Symptoms    Wears dentures     Full Upper , Partial Lower    Wears glasses     Wears partial dentures     Lower    :   Past Surgical History:   Procedure Laterality Date    CAROTID ENDARTERECTOMY Right [id number]    CARPAL TUNNEL RELEASE      COLONOSCOPY  In Past    DENTAL SURGERY      Teeth Extracted In Past    DILATION AND CURETTAGE OF UTERUS  's    Prior To Vaginal Hysterectomy    HYSTERECTOMY, VAGINAL  's    PORT SURGERY N/A 3/2/2021    PORT REMOVAL performed by Lore Huang MD at [facility] OR     Medications:  Scheduled Meds:   sodium chloride flush  5-40 mL Intravenous 2 times per day    enoxaparin  40 mg Subcutaneous Nightly    levetiracetam  500 mg Intravenous Q12H    aspirin  81 mg Oral Nightly    atorvastatin  20 mg Oral Nightly    dilTIAZem  180 mg Oral Nightly    docusate sodium  100 mg Oral Nightly    hydroCHLOROthiazide  12.5 mg Oral Daily    predniSONE  5 mg Oral BID     Continuous Infusions:   sodium chloride Stopped (04/07/21 2100)    sodium chloride       PRN Meds:.sodium chloride flush, sodium chloride, promethazine **OR** ondansetron, polyethylene glycol, acetaminophen **OR** acetaminophen    Allergies   Allergen Reactions    Cortisone Rash    Codeine      Unsure Of Reaction    Pcn [Penicillins]      Unknown Reaction     Social History     Socioeconomic History    Marital status:      Spouse name: Not on file    Number of children: Not on file    Years of education: Not on file    Highest education level: Not on file   Occupational History    Not on file   Social Needs    Financial resource strain: Not on file    Food insecurity     Worry: Not on file     Inability: Not on file    Transportation needs     Medical: Not on file     Non-medical: Not on file   Tobacco Use    Smoking status: Never Smoker    Smokeless tobacco: Never Used   Substance and Sexual Activity    Alcohol use: No     Alcohol/week: 0.0 standard drinks    Drug use: No    Sexual activity: Never   Lifestyle    Physical activity     Days per week: Not on file     Minutes per session: Not on file    Stress: Not on file   Relationships    Social connections     Talks on phone: Not on file     Gets together: Not on file     Attends Hinduism service: Not on file     Active member of club or organization: Not on file     Attends meetings of clubs or organizations: Not on file     Relationship status: Not on file    Intimate partner violence     Fear of current or ex partner: Not on file     Emotionally abused: Not on file     Physically abused: Not on file     Forced sexual activity: Not on file   Other Topics Concern    Not on file   Social History Narrative    Not on file      Family History   Problem Relation Age of Onset    Stroke Mother     Heart Disease Mother     Arthritis Mother     Diabetes Mother     High Blood Pressure Mother     High Cholesterol Mother    Rendell [place] / [place] Mother     Other Father         \"Surgery For Twisted Bowels\"    Heart Disease Sister     Stroke Sister     High Blood Pressure Brother     Heart Disease Brother     Hearing Loss Brother         Heart Attack    Diabetes Sister     High Blood Pressure Sister     Other Sister         Gout    Arthritis Sister     Mental Illness Sister         Schizophrenia    High Blood Pressure Brother     High Blood Pressure Son     Mental Illness Son         Anxiety    Depression Son     Other Son         Pancreatitis, Surgery For Brain Aneurysm    High Blood Pressure Son     Other Son         Gout    Learning Disabilities Son         Autistic, Mentally Retarded    Other Son         Seizures       Review of Symptoms:    14-point system review completed. All of which are negative except as mentioned above. Physical Exam:           Gen: A&O x 4, NAD, cooperative  HEENT: NC/AT, EOMI, PERRL, mmm, neck supple, no meningeal signs; Heart: regular   Lungs: no distress  Ext: no edema, no calf tenderness b/l  Psych: normal mood and affect  Skin: no rashes or lesions    NEUROLOGIC EXAM:    Mental Status: A&O to self, location, month and year, NAD, speech clear, language fluent, repetition and naming intact, follows commands appropriately    Cranial Nerve Exam:   CN II-XII: PERRL, VFF, no nystagmus, no gaze paresis, sensation V1-V3 intact b/l, muscles of facial expression symmetric; hearing intact to conversational tone, palate elevates symmetrically, shoulder elevation symmetric and tongue protrudes midline with movement side to side.     Motor Exam:       Strength 5/5 UE's/LE's b/l  Tone and bulk normal   No pronator drift    Deep Tendon Reflexes: 1/4 biceps, triceps, brachioradialis, patellar, and achilles b/l; flexor plantar responses b/l    Sensation: Intact light touch UE's/LE's b/l  Decreased light touch and temp on the RUE and RLE    Coordination/Cerebellum:       Tremors--none      Rapidly alternating movements: no dysdiadochokinesia b/l                Finger-to-Nose: no dysmetria b/l    Gait and stance:      Gait: deferred      LABS:     Recent Labs     04/07/21  1145 04/08/21  0532   WBC 9.2 5.9    136   K 3.5 3.9    104   CO2 28 27   BUN 17 12   CREATININE 0.5* 0.6   GLUCOSE 104* 100*   INR 0.98  --          IMAGING:    MRI Brain; Impression:     1. 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 This may represent the source of the patient's seizures. 2. Progressive chronic small vessel ischemic changes. 3. No acute intracranial process identified. MRI brain w contrast- left venous anomaly, no acute treatment   EEG-normal awake       ASSESSMENT/PLAN:     3 42-year-old female with acute seizure activity, will rule out epileptic etiology with the EEG superimposed on new we found right mesial temporal lobe sclerosis. Will rule out malignancy with contrasted MRI of the brain. Plan of care as follows:  1. Neuro exam:  1. Patient has mild right upper extremity drift, and decreased sensation on the right arm and right leg  2. Neurodiagnostics:  1. MRI without contrast as above  2. MRI with contrast as above   3. EEG normal awake   3. Medications:  1. Keppra 500mg twice daily  2. Continue aspirin and statin  4. PT/OT/ST:  1. Per their recommendations  5. Follow-up:  1. Stable for discharge, follow up in our office         Thank you for allowing us to participate in the care of your patient. If there are any questions regarding evaluation please feel free to contact us.      TARUN Girard - ELIZA, 4/9/2021

## 2021-04-09 NOTE — CARE COORDINATION
In to see patient for discharge planning though she was eating breakfast. CM will re-attempt today. 1:40 PM  Chart reviewed, in to see pt for dc planning. Introduced self and role explained. Pt was admitted for witnessed seizure. States she lives at home with her mentally handicapped son/Antonio who is 64years old and she is his primary caregiver. Pt sister is visiting (pt provided permission for PHI sharing with sister present) and she voiced concern for patient. Pt has [facility] aide for 6 hours/day for her son. She is agreeable to skilled [facility] for herself and agreed to referral to [facility]. Explained she follows with PCP, has insurance with affordable RX co-pays and reliable transportation per her daughter/Patricia or granddaughter/Dwayne. Elderly resources placed on AVS with AAA/Passport information. CM remains available if needs arise.        3:07 PM  PS to Dr. Alexandr Lima to request [facility] order and PS to [facility] with [facility] to provide [facility] referral.

## 2021-04-09 NOTE — PROGRESS NOTES
[facility] Liaison spoke w/pt & is aware of discharge & will initiate [facility].

## 2021-04-09 NOTE — PROGRESS NOTES
CLINICAL PHARMACY NOTE: MEDS TO [address] Select Patient?: No  Total # of Prescriptions Filled: 1   The following medications were delivered to the patient:  · levetiracetam 500mg  Total # of Interventions Completed: 0  Time Spent (min): 15    Additional Documentation:

## 2021-04-09 NOTE — PROGRESS NOTES
Upon arriving to patients room I woke her, she stated she was at home and was about to walk out the front door. I asked her where she was and she apologized and said she was at the hospital. I began to assess patient and asked her name and birth date she began to struggle to get out the words and sounded mixed up. Paged Sarah Putnam, as patient began to wake more she became less confused and was able to state her birth date perfectly clear.

## 2021-04-09 NOTE — PROCEDURES
[address], [facility], [address] [facility]                             Routine EEG Report        History: This is a patient presenting to assess for seizure disorder. Technical:  This routine EEG recording was collected digitally and stored in a computer that has seizure detection as well as spike detection software. Report: With the patient alert, the background showed an alpha rhythm in the 8-9 Hz range. The background activity attenuated with eye opening. No focal slowing or epileptiform discharges were noted. The patient slept during the recording achieving stage II. Hyperventilation and photic stimulation were performed as activating maneuvers during the recording; no abnormalities were elicited by these maneuvers. Clinical Event: None    Single channel EKG showed regular rhythm. IMPRESSION:    Normal awake and sleep EEG. No focal slowing or epileptiform discharges were seen. No clinical episode took place.     Signed: Electronically signed by Colton Bull DO

## 2021-04-12 LAB
EKG ATRIAL RATE: 92 BPM
EKG DIAGNOSIS: NORMAL
EKG P AXIS: 60 DEGREES
EKG P-R INTERVAL: 176 MS
EKG Q-T INTERVAL: 374 MS
EKG QRS DURATION: 84 MS
EKG QTC CALCULATION (BAZETT): 462 MS
EKG R AXIS: 27 DEGREES
EKG T AXIS: 62 DEGREES
EKG VENTRICULAR RATE: 92 BPM

## 2021-04-17 NOTE — PROGRESS NOTES
Physician Progress Note      PATIENT:               Raúl Ly  CSN #:                  [id number]  :                       1940  ADMIT DATE:       2021 11:13 AM  DISCH DATE:        2021 3:42 PM  RESPONDING  PROVIDER #:        Susan Fernández MD          QUERY TEXT:    Pt admitted with seizures. Pt noted to have prior CVA and Mesial Temporal   Sclerosis. If possible, please document in progress notes and discharge   summary if you are evaluating and/or treating any of the following: The medical record reflects the following:  Risk Factors: Prior CVA   Clinical Indicators: MRI Brain w/o contrast: 1. Volume loss and abnormal   increased T2 signal intensity within the right  hippocampus suggestive of mesial temporal sclerosis or prior remote insult. D/C Summary about medical history: \"She was diagnosed with seizures in    and was placed on Keppra 500 mg twice a day. She had been weaned off of it. \"   and \"She was admitted and while here she had an MRI which showed volume loss   and abnormal T2 signal in the right hippocampus suggestive of mesial temporal   sclerosis or a prior remote insult. The radiologist felt that this may be the   source of her seizures. She was seen by neurology while here. \"  Treatment: Restarted on Keppra 500mg BID. 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     Query created by: Lili Rodriguez on 4/15/2021 5:07 PM      Electronically signed by:  Susan Fernández MD 2021 12:04 PM

## 2021-05-08 ENCOUNTER — APPOINTMENT (OUTPATIENT)
Dept: CT IMAGING | Age: 81
DRG: 565 | End: 2021-05-08
Payer: MEDICARE

## 2021-05-08 ENCOUNTER — APPOINTMENT (OUTPATIENT)
Dept: GENERAL RADIOLOGY | Age: 81
DRG: 565 | End: 2021-05-08
Payer: MEDICARE

## 2021-05-08 ENCOUNTER — HOSPITAL ENCOUNTER (INPATIENT)
Age: 81
LOS: 2 days | Discharge: NURSING, REHAB OR LONG-TERM CARE | DRG: 565 | End: 2021-05-14
Attending: HOSPITALIST | Admitting: HOSPITALIST
Payer: MEDICARE

## 2021-05-08 DIAGNOSIS — S89.92XA INJURY OF LEFT KNEE, INITIAL ENCOUNTER: Primary | ICD-10-CM

## 2021-05-08 DIAGNOSIS — S82.142A CLOSED FRACTURE OF LEFT TIBIAL PLATEAU, INITIAL ENCOUNTER: ICD-10-CM

## 2021-05-08 PROBLEM — M25.462 EFFUSION, LEFT KNEE: Status: ACTIVE | Noted: 2021-05-08

## 2021-05-08 LAB
ALBUMIN SERPL-MCNC: 3.8 GM/DL (ref 3.4–5)
ALP BLD-CCNC: 134 IU/L (ref 40–129)
ALT SERPL-CCNC: 15 U/L (ref 10–40)
ANION GAP SERPL CALCULATED.3IONS-SCNC: 6 MMOL/L (ref 4–16)
AST SERPL-CCNC: 26 IU/L (ref 15–37)
BASOPHILS ABSOLUTE: 0 K/CU MM
BASOPHILS RELATIVE PERCENT: 0.2 % (ref 0–1)
BILIRUB SERPL-MCNC: 0.9 MG/DL (ref 0–1)
BUN BLDV-MCNC: 14 MG/DL (ref 6–23)
CALCIUM SERPL-MCNC: 9.7 MG/DL (ref 8.3–10.6)
CHLORIDE BLD-SCNC: 100 MMOL/L (ref 99–110)
CO2: 29 MMOL/L (ref 21–32)
CREAT SERPL-MCNC: 0.5 MG/DL (ref 0.6–1.1)
DIFFERENTIAL TYPE: ABNORMAL
EOSINOPHILS ABSOLUTE: 0 K/CU MM
EOSINOPHILS RELATIVE PERCENT: 0 % (ref 0–3)
GFR AFRICAN AMERICAN: >60 ML/MIN/1.73M2
GFR NON-AFRICAN AMERICAN: >60 ML/MIN/1.73M2
GLUCOSE BLD-MCNC: 140 MG/DL (ref 70–99)
HCT VFR BLD CALC: 36.8 % (ref 37–47)
HEMOGLOBIN: 12.3 GM/DL (ref 12.5–16)
IMMATURE NEUTROPHIL %: 0.4 % (ref 0–0.43)
LYMPHOCYTES ABSOLUTE: 0.4 K/CU MM
LYMPHOCYTES RELATIVE PERCENT: 4.2 % (ref 24–44)
MCH RBC QN AUTO: 30.8 PG (ref 27–31)
MCHC RBC AUTO-ENTMCNC: 33.4 % (ref 32–36)
MCV RBC AUTO: 92.2 FL (ref 78–100)
MONOCYTES ABSOLUTE: 0.5 K/CU MM
MONOCYTES RELATIVE PERCENT: 5.5 % (ref 0–4)
NUCLEATED RBC %: 0 %
PDW BLD-RTO: 14.6 % (ref 11.7–14.9)
PLATELET # BLD: 330 K/CU MM (ref 140–440)
PMV BLD AUTO: 9.5 FL (ref 7.5–11.1)
POTASSIUM SERPL-SCNC: 4.2 MMOL/L (ref 3.5–5.1)
RBC # BLD: 3.99 M/CU MM (ref 4.2–5.4)
SEGMENTED NEUTROPHILS ABSOLUTE COUNT: 8.1 K/CU MM
SEGMENTED NEUTROPHILS RELATIVE PERCENT: 89.7 % (ref 36–66)
SODIUM BLD-SCNC: 135 MMOL/L (ref 135–145)
TOTAL IMMATURE NEUTOROPHIL: 0.04 K/CU MM
TOTAL NUCLEATED RBC: 0 K/CU MM
TOTAL PROTEIN: 6.2 GM/DL (ref 6.4–8.2)
WBC # BLD: 9 K/CU MM (ref 4–10.5)

## 2021-05-08 PROCEDURE — 6370000000 HC RX 637 (ALT 250 FOR IP): Performed by: PHYSICIAN ASSISTANT

## 2021-05-08 PROCEDURE — 99285 EMERGENCY DEPT VISIT HI MDM: CPT

## 2021-05-08 PROCEDURE — 96372 THER/PROPH/DIAG INJ SC/IM: CPT

## 2021-05-08 PROCEDURE — G0378 HOSPITAL OBSERVATION PER HR: HCPCS

## 2021-05-08 PROCEDURE — 73564 X-RAY EXAM KNEE 4 OR MORE: CPT

## 2021-05-08 PROCEDURE — 96374 THER/PROPH/DIAG INJ IV PUSH: CPT

## 2021-05-08 PROCEDURE — 80053 COMPREHEN METABOLIC PANEL: CPT

## 2021-05-08 PROCEDURE — 1200000000 HC SEMI PRIVATE

## 2021-05-08 PROCEDURE — 6360000002 HC RX W HCPCS: Performed by: NURSE PRACTITIONER

## 2021-05-08 PROCEDURE — 6370000000 HC RX 637 (ALT 250 FOR IP): Performed by: NURSE PRACTITIONER

## 2021-05-08 PROCEDURE — 2580000003 HC RX 258: Performed by: NURSE PRACTITIONER

## 2021-05-08 PROCEDURE — 73700 CT LOWER EXTREMITY W/O DYE: CPT

## 2021-05-08 PROCEDURE — 85025 COMPLETE CBC W/AUTO DIFF WBC: CPT

## 2021-05-08 RX ORDER — KETOROLAC TROMETHAMINE 30 MG/ML
15 INJECTION, SOLUTION INTRAMUSCULAR; INTRAVENOUS EVERY 6 HOURS PRN
Status: DISCONTINUED | OUTPATIENT
Start: 2021-05-08 | End: 2021-05-09

## 2021-05-08 RX ORDER — HYDROCODONE BITARTRATE AND ACETAMINOPHEN 5; 325 MG/1; MG/1
1 TABLET ORAL EVERY 6 HOURS PRN
Status: DISCONTINUED | OUTPATIENT
Start: 2021-05-08 | End: 2021-05-11

## 2021-05-08 RX ORDER — LEVETIRACETAM 500 MG/1
500 TABLET ORAL 2 TIMES DAILY
Status: DISCONTINUED | OUTPATIENT
Start: 2021-05-08 | End: 2021-05-14 | Stop reason: HOSPADM

## 2021-05-08 RX ORDER — ATORVASTATIN CALCIUM 20 MG/1
20 TABLET, FILM COATED ORAL NIGHTLY
Status: DISCONTINUED | OUTPATIENT
Start: 2021-05-08 | End: 2021-05-14 | Stop reason: HOSPADM

## 2021-05-08 RX ORDER — SODIUM CHLORIDE 9 MG/ML
25 INJECTION, SOLUTION INTRAVENOUS PRN
Status: DISCONTINUED | OUTPATIENT
Start: 2021-05-08 | End: 2021-05-14 | Stop reason: HOSPADM

## 2021-05-08 RX ORDER — ACETAMINOPHEN 500 MG
500 TABLET ORAL ONCE
Status: COMPLETED | OUTPATIENT
Start: 2021-05-08 | End: 2021-05-08

## 2021-05-08 RX ORDER — TRIAMTERENE AND HYDROCHLOROTHIAZIDE 37.5; 25 MG/1; MG/1
1 TABLET ORAL DAILY
Status: DISCONTINUED | OUTPATIENT
Start: 2021-05-08 | End: 2021-05-14 | Stop reason: HOSPADM

## 2021-05-08 RX ORDER — HEPARIN SODIUM 5000 [USP'U]/ML
5000 INJECTION, SOLUTION INTRAVENOUS; SUBCUTANEOUS EVERY 8 HOURS SCHEDULED
Status: DISCONTINUED | OUTPATIENT
Start: 2021-05-08 | End: 2021-05-14 | Stop reason: HOSPADM

## 2021-05-08 RX ORDER — POLYETHYLENE GLYCOL 3350 17 G/17G
17 POWDER, FOR SOLUTION ORAL DAILY PRN
Status: DISCONTINUED | OUTPATIENT
Start: 2021-05-08 | End: 2021-05-14 | Stop reason: HOSPADM

## 2021-05-08 RX ORDER — SODIUM CHLORIDE 0.9 % (FLUSH) 0.9 %
5-40 SYRINGE (ML) INJECTION EVERY 12 HOURS SCHEDULED
Status: DISCONTINUED | OUTPATIENT
Start: 2021-05-08 | End: 2021-05-14 | Stop reason: HOSPADM

## 2021-05-08 RX ORDER — SODIUM CHLORIDE 0.9 % (FLUSH) 0.9 %
10 SYRINGE (ML) INJECTION PRN
Status: DISCONTINUED | OUTPATIENT
Start: 2021-05-08 | End: 2021-05-14 | Stop reason: HOSPADM

## 2021-05-08 RX ORDER — DONEPEZIL HYDROCHLORIDE 5 MG/1
5 TABLET, FILM COATED ORAL NIGHTLY
Status: DISCONTINUED | OUTPATIENT
Start: 2021-05-08 | End: 2021-05-14 | Stop reason: HOSPADM

## 2021-05-08 RX ORDER — ACETAMINOPHEN 325 MG/1
650 TABLET ORAL EVERY 6 HOURS PRN
Status: DISCONTINUED | OUTPATIENT
Start: 2021-05-08 | End: 2021-05-14 | Stop reason: HOSPADM

## 2021-05-08 RX ORDER — ACETAMINOPHEN 650 MG/1
650 SUPPOSITORY RECTAL EVERY 6 HOURS PRN
Status: DISCONTINUED | OUTPATIENT
Start: 2021-05-08 | End: 2021-05-14 | Stop reason: HOSPADM

## 2021-05-08 RX ORDER — DOCUSATE SODIUM 100 MG/1
100 CAPSULE, LIQUID FILLED ORAL NIGHTLY
Status: DISCONTINUED | OUTPATIENT
Start: 2021-05-08 | End: 2021-05-14 | Stop reason: HOSPADM

## 2021-05-08 RX ORDER — DILTIAZEM HYDROCHLORIDE 180 MG/1
180 CAPSULE, COATED, EXTENDED RELEASE ORAL NIGHTLY
Status: DISCONTINUED | OUTPATIENT
Start: 2021-05-08 | End: 2021-05-13

## 2021-05-08 RX ORDER — ASPIRIN 81 MG/1
81 TABLET, CHEWABLE ORAL NIGHTLY
Status: DISCONTINUED | OUTPATIENT
Start: 2021-05-08 | End: 2021-05-14 | Stop reason: HOSPADM

## 2021-05-08 RX ORDER — PREDNISONE 1 MG/1
5 TABLET ORAL 2 TIMES DAILY
Status: DISCONTINUED | OUTPATIENT
Start: 2021-05-08 | End: 2021-05-14 | Stop reason: HOSPADM

## 2021-05-08 RX ADMIN — HEPARIN SODIUM 5000 UNITS: 5000 INJECTION INTRAVENOUS; SUBCUTANEOUS at 22:43

## 2021-05-08 RX ADMIN — ATORVASTATIN CALCIUM 20 MG: 20 TABLET, FILM COATED ORAL at 20:10

## 2021-05-08 RX ADMIN — DONEPEZIL HYDROCHLORIDE 5 MG: 5 TABLET, FILM COATED ORAL at 20:10

## 2021-05-08 RX ADMIN — DOCUSATE SODIUM 100 MG: 100 CAPSULE, LIQUID FILLED ORAL at 20:10

## 2021-05-08 RX ADMIN — ASPIRIN 81 MG CHEWABLE TABLET 81 MG: 81 TABLET CHEWABLE at 20:10

## 2021-05-08 RX ADMIN — ACETAMINOPHEN 500 MG: 500 TABLET ORAL at 13:08

## 2021-05-08 RX ADMIN — LEVETIRACETAM 500 MG: 500 TABLET, FILM COATED ORAL at 20:11

## 2021-05-08 RX ADMIN — PREDNISONE 5 MG: 5 TABLET ORAL at 20:11

## 2021-05-08 RX ADMIN — HYDROCODONE BITARTRATE AND ACETAMINOPHEN 1 TABLET: 5; 325 TABLET ORAL at 18:38

## 2021-05-08 RX ADMIN — KETOROLAC TROMETHAMINE 15 MG: 30 INJECTION, SOLUTION INTRAMUSCULAR; INTRAVENOUS at 20:11

## 2021-05-08 RX ADMIN — SODIUM CHLORIDE, PRESERVATIVE FREE 10 ML: 5 INJECTION INTRAVENOUS at 20:11

## 2021-05-08 RX ADMIN — DILTIAZEM HYDROCHLORIDE 180 MG: 180 CAPSULE, COATED, EXTENDED RELEASE ORAL at 20:10

## 2021-05-08 ASSESSMENT — PAIN SCALES - GENERAL: PAINLEVEL_OUTOF10: 10

## 2021-05-08 ASSESSMENT — PAIN DESCRIPTION - DIRECTION
RADIATING_TOWARDS: DOWN
RADIATING_TOWARDS: DOWN

## 2021-05-08 ASSESSMENT — PAIN DESCRIPTION - PROGRESSION
CLINICAL_PROGRESSION: GRADUALLY WORSENING

## 2021-05-08 ASSESSMENT — PAIN DESCRIPTION - ONSET
ONSET: ON-GOING
ONSET: ON-GOING

## 2021-05-08 ASSESSMENT — PAIN DESCRIPTION - LOCATION
LOCATION: LEG
LOCATION: LEG

## 2021-05-08 ASSESSMENT — PAIN DESCRIPTION - ORIENTATION: ORIENTATION: RIGHT

## 2021-05-08 ASSESSMENT — PAIN DESCRIPTION - PAIN TYPE
TYPE: ACUTE PAIN
TYPE: ACUTE PAIN

## 2021-05-08 NOTE — ED NOTES
Patient tolerated knee immobilizer. Pain improved with tylenol.      Marvetta Nageotte, RN  05/08/21 7054

## 2021-05-08 NOTE — H&P
History and Physical      Name:  Christa Castrejon /Age/Sex: 1940  (68 y.o. female)   MRN & CSN:  [id number] & [id number] Admission Date/Time: 2021 11:34 AM   Location:  [place] [address] PCP: Sol Gama MD       Discussed patient with Dr. Pastor Munson and Plan:     Christa Castrejon is a 68 y.o.  female  who presents with left knee pain, inability to care for self    - Large effusion, left knee, with ? Tibial plateau fx  XR with effusion but indeterminate fx  CT LEFT knee pending from ED  Neurovascularly intact  Mechanical fall  Ortho consulted, Dr Krista Macdonald  PT/OT/CM consulted- ? Placement, as pt lives alone  Up with assistance, fall precautions      Chronic  - HTN- Cont dilt, HCTZ  - HLD- Cont statin  - RA- takes methotrexate, prednisone TID  - Hx of malignant neoplasm of anal canal- dx ; had mediport removed 2021; follows with Dr Manuel Alvarado  - VIC- noncompliant  - CVA hx- reports residual paresthesias, left side; cont aricept    Discussed patient with ER physician     Diet CARD   DVT Prophylaxis [x] Lovenox, []  Heparin, [] SCDs, [] Ambulation   GI Prophylaxis [] PPI,  [] H2 Blocker,  [] Carafate,  [] Diet/Tube Feeds   Code Status Full   Disposition Patient requires continued admission due to    MDM [] Low, [x] Moderate,[]  High  Patient's risk as above due to      [] One or more chronic illnesses with exacerbation progression      [x] Two or more stable chronic illnesses      [x] Undiagnosed new problem with uncertain prognosis      [] Elective major surgery      []Prescription drug management       History of Present Illness:     Chief Complaint: LEFT knee pain, inability to take of self    Christa Castrejon is a 68 y.o.  female  who presents with the above complaints, onset today. Context is, patient had mechanical fall today in her home, landing on her knees, mostly the left. Denies hitting head, LOC. Denies headache, neck pain.  States she ambulates with cane while in the home and while she was in the kitchen, she turned and \"got her legs twisted up\" and fell. She lives independently and feels she will not be able to care for her self at home. Denies fever/chills, urinary sx, resp sx, chest pain/pressure, sob, abdominal pain, new back pain, n/v, diarrhea. PMH as above in A/P. Daughter is at bedside; helps with hx. Pt confirms FULL code status. Denies regular alcohol use. Ten point ROS reviewed negative, unless as noted above    Objective:     No intake or output data in the 24 hours ending 05/08/21 1342     Vitals:   Vitals:    05/08/21 1152   BP: (!) 141/97   Pulse: 83   Resp: 19   SpO2: 100%   TEMP    97.2    Physical Exam:     GEN Awake female, sitting upright in bed in no apparent distress. Appears given age. EYES Pupils are equally round. No scleral erythema, discharge, or conjunctivitis. HENT Mucous membranes are moist. Oral pharynx without exudates, no evidence of thrush. NECK Supple, no apparent thyromegaly or masses. RESP Clear to auscultation, no wheezes, rales or rhonchi. Symmetric chest movement while on room air. CARDIO/VASC S1/S2 auscultated. Regular rate without appreciable murmurs, rubs, or gallops. No JVD or carotid bruits. Peripheral pulses equal bilaterally and palpable. No peripheral edema. GI Abdomen is soft without significant tenderness, masses, or guarding. Bowel sounds are normoactive. Rectal exam deferred.  No costovertebral angle tenderness. Webber catheter is not present. HEME/LYMPH No palpable cervical lymphadenopathy and no hepatosplenomegaly. No petechiae or ecchymoses. MSK LEFT knee with edema, no warmth or erythema. Limited ROM due to pain. + CMS distally in LLE. Strength equal in BLE and BUE  SKIN Normal coloration, warm, dry. NEURO Cranial nerves appear grossly intact, normal speech, no lateralizing weakness. PSYCH Awake, alert, oriented x 3. Affect appropriate.     Past Medical History:        Past Medical History:   Diagnosis Date    Acid reflux Historical Provider, MD Needs Review   folic acid (FOLVITE) 527 MCG tablet Take 400 mcg by mouth every morning Historical Provider, MD Needs Review   methotrexate 2.5 MG tablet Take 2.5 mg by mouth once a week Indications:  Take 7 tablets one time a week  Historical Provider, MD Needs Review   predniSONE (DELTASONE) 5 MG tablet Take 5 mg by mouth 2 times daily  Historical Provider, MD Needs Review       Data:         Electronically signed by TARUN Frazier NP on 5/8/2021 at 1:42 PM

## 2021-05-08 NOTE — ED PROVIDER NOTES
EMERGENCY DEPARTMENT ENCOUNTER      PCP: Saint Richardson, MD    [address] [facility]    Chief Complaint   Patient presents with   Hezzie Low    Knee Pain     left         This patient was not evaluated by the attending physician. I have independently evaluated this patient. HPI    Ghulam Padilla is a 68 y.o. female who presents with fall. Onset was 4 hours. Context is patient was at home and while ambulating tripped causing her to fall onto bent left knee. She has left knee pain since. No other injuries. No head, neck, torso trauma. Knee pain is generalized, does not radiate. There is associated swelling. Pain is worse with range of motion and attempted ambulation. .   The fall was mechanical in nature without preceding symptoms. No unusual behavior per family member who is present today. REVIEW OF SYSTEMS    General: No Fever  ENT:  No visual changes. No headache. Cardiac: No Chest Pain, No syncope  Respiratory: No cough or difficulty breathing  GI: No vomiting. No Bloody Stool or Diarrhea  : No Dysuria or Hematuria  MSKTL:  See HPI. No neck or back pain.   Skin:  Denies rash  Neurologic:  Denies headache, focal weakness or sensory changes   Endocrine:  Denies polyuria or polydypsia   Lymphatic:  Denies swollen glands   See HPI and nursing notes for additional information       PAST MEDICAL & SURGICAL HISTORY    Past Medical History:   Diagnosis Date    Acid reflux     Anxiety     Bronchitis In Past    Cancer ([address]     colon cancer    Carotid stenosis 11/10/2015    Cerebral embolism with cerebral infarction ([address] 9-18-15    Seizure X 1, No Residual    Chronic back pain     Depression     History of blood transfusion     History of external beam radiation therapy 2017    pelvis/anal canal in 2017 per Dr. Franco Dumas at [facility] Hyperlipidemia     Hypertension     Prolonged emergence from general anesthesia     RA (rheumatoid arthritis) ([address]     \"All Over\"    Seizure ([address] 9-18-15    X 1    Sleep apnea     Uses CPAP    Teeth missing     Upper And Lower    Urinary incontinence     UTI (urinary tract infection) In Past    No Current Symptoms    Wears dentures     Full Upper , Partial Lower    Wears glasses     Wears partial dentures     Lower     Past Surgical History:   Procedure Laterality Date    CAROTID ENDARTERECTOMY Right [id number]    CARPAL TUNNEL RELEASE      COLONOSCOPY  In Past    DENTAL SURGERY      Teeth Extracted In Past    DILATION AND CURETTAGE OF UTERUS  1960's    Prior To Vaginal Hysterectomy    HYSTERECTOMY, VAGINAL  1960's    PORT SURGERY N/A 3/2/2021    PORT REMOVAL performed by Michel Jenkins MD at [address] [place] [address]    Current Outpatient Rx   Medication Sig Dispense Refill    levETIRAcetam (KEPPRA) 500 MG tablet Take 1 tablet by mouth 2 times daily 60 tablet 1    donepezil (ARICEPT) 5 MG tablet       triamterene-hydroCHLOROthiazide (MAXZIDE-25) 37.5-25 MG per tablet take 1 tablet by mouth once daily      docusate sodium (COLACE) 100 MG capsule Take 100 mg by mouth nightly 2 tablets      diltiazem (DILACOR XR) 180 MG extended release capsule Take 180 mg by mouth nightly       atorvastatin (LIPITOR) 20 MG tablet Take 20 mg by mouth nightly       aspirin 81 MG tablet Take 81 mg by mouth nightly       CALCIUM PO Take by mouth nightly       vitamin D3 (COLECALCIFEROL) 400 UNITS TABS Take 5,000 Units by mouth daily       folic acid (FOLVITE) 193 MCG tablet Take 400 mcg by mouth every morning      methotrexate 2.5 MG tablet Take 2.5 mg by mouth once a week Indications:  Take 7 tablets one time a week       predniSONE (DELTASONE) 5 MG tablet Take 5 mg by mouth 2 times daily          ALLERGIES    Allergies   Allergen Reactions    Cortisone Rash    Codeine      Unsure Of Reaction    Pcn [Penicillins]      Unknown Reaction       SOCIAL & FAMILY HISTORY    Social History     Socioeconomic History    Marital status:      Spouse name: None    Number of children: None    Years of education: None    Highest education level: None   Occupational History    None   Social Needs    Financial resource strain: None    Food insecurity     Worry: None     Inability: None    Transportation needs     Medical: None     Non-medical: None   Tobacco Use    Smoking status: Never Smoker    Smokeless tobacco: Never Used   Substance and Sexual Activity    Alcohol use: No     Alcohol/week: 0.0 standard drinks    Drug use: No    Sexual activity: Never   Lifestyle    Physical activity     Days per week: None     Minutes per session: None    Stress: None   Relationships    Social connections     Talks on phone: None     Gets together: None     Attends Spiritism service: None     Active member of club or organization: None     Attends meetings of clubs or organizations: None     Relationship status: None    Intimate partner violence     Fear of current or ex partner: None     Emotionally abused: None     Physically abused: None     Forced sexual activity: None   Other Topics Concern    None   Social History Narrative    None     Family History   Problem Relation Age of Onset    Stroke Mother     Heart Disease Mother     Arthritis Mother     Diabetes Mother     High Blood Pressure Mother     High Cholesterol Mother    Rosanna Germain / [place] Mother     Other Father         \"Surgery For Twisted Bowels\"    Heart Disease Sister     Stroke Sister     High Blood Pressure Brother     Heart Disease Brother     Hearing Loss Brother         Heart Attack    Diabetes Sister     High Blood Pressure Sister     Other Sister         Gout    Arthritis Sister     Mental Illness Sister         Schizophrenia    High Blood Pressure Brother     High Blood Pressure Son     Mental Illness Son         Anxiety    Depression Son     Other Son         Pancreatitis, Surgery For Brain Aneurysm    High Blood Pressure Son     Other Son         Gout    Light touch sensation intact throughout. - Upper and lower extremity DTRs 2+ bilaterally.  -No truncal ataxia    Psych: Pleasant affect, no hallucinations          RADIOLOGY   XR KNEE LEFT (MIN 4 VIEWS)   Preliminary Result   Findings are suspicious for a depressed lateral tibial plateau fracture with   no obvious fracture line identified. There is a well-corticated ossific   density adjacent to the lateral compartment which may be related to prior   trauma or a loose body. Large joint effusion. Osteopenia. CT KNEE LEFT WO CONTRAST    (Results Pending)           ED COURSE & MEDICAL DECISION MAKING        Patient presents as above with mechanical fall and subsequent knee injury. X-rays today are concerning for lateral tibial plateau fracture. 1315-I discussed patient case with patient's preferred orthopedist, Dr. Karla Martin. He agrees to consult patient. He recommends knee immobilizer for comfort. In consideration of current COVID19 pandemic, with effort to minimize unnecessary provider exposure, this patient was seen at bedside by me independently. However, in compliance with current hospital CEZAR/ED protocol, prior to admission I did discuss this patient case with emergency department physician, Dr. Nelli Rogers.      [phone] I discussed Pt case with hospitalist nurse practitioner Shi Gibbons, who agrees to admit Pt. he will follow CT results and consult orthopedist, Dr. Karla Martin. Clinical  IMPRESSION    1. Injury of left knee, initial encounter    2. Closed fracture of left tibial plateau, initial encounter        Pt admitted. Comment: Please note this report has been produced using speech recognition software and may contain errors related to that system including errors in grammar, punctuation, and spelling, as well as words and phrases that may be inappropriate. If there are any questions or concerns please feel free to contact the dictating provider for clarification.         Ines Wisdom

## 2021-05-08 NOTE — ED TRIAGE NOTES
Pt to ED with complaints of left knee pain after trip and fall. Pt denies hitting head and denies being on any blood thinners.

## 2021-05-08 NOTE — ED NOTES
[address] [facility] paged Dr Cotter Cam  05/08/21 1308  1314 Dr Mariam Lorenzo returned call      Raleigh Cast  05/08/21 1762

## 2021-05-08 NOTE — ED NOTES
[facility] p[facility] hospitalist     Billy Shepherd  05/08/21 1322  1341 Loreta Son mid level with apogee returned call      Billy Shepherd  05/08/21 1344

## 2021-05-08 NOTE — ED NOTES
1624 paged Dr Johnson Mealing  05/08/21 1253  1300 Dr Aj Miner returned call      Violet Clark  05/08/21 1309

## 2021-05-09 ENCOUNTER — APPOINTMENT (OUTPATIENT)
Dept: ULTRASOUND IMAGING | Age: 81
DRG: 565 | End: 2021-05-09
Payer: MEDICARE

## 2021-05-09 LAB
ANION GAP SERPL CALCULATED.3IONS-SCNC: 9 MMOL/L (ref 4–16)
BASOPHILS ABSOLUTE: 0 K/CU MM
BASOPHILS RELATIVE PERCENT: 0.3 % (ref 0–1)
BUN BLDV-MCNC: 21 MG/DL (ref 6–23)
CALCIUM SERPL-MCNC: 8.4 MG/DL (ref 8.3–10.6)
CHLORIDE BLD-SCNC: 101 MMOL/L (ref 99–110)
CO2: 27 MMOL/L (ref 21–32)
CREAT SERPL-MCNC: 0.6 MG/DL (ref 0.6–1.1)
DIFFERENTIAL TYPE: ABNORMAL
EOSINOPHILS ABSOLUTE: 0 K/CU MM
EOSINOPHILS RELATIVE PERCENT: 0.3 % (ref 0–3)
GFR AFRICAN AMERICAN: >60 ML/MIN/1.73M2
GFR NON-AFRICAN AMERICAN: >60 ML/MIN/1.73M2
GLUCOSE BLD-MCNC: 91 MG/DL (ref 70–99)
HCT VFR BLD CALC: 30.9 % (ref 37–47)
HEMOGLOBIN: 10.2 GM/DL (ref 12.5–16)
IMMATURE NEUTROPHIL %: 0.5 % (ref 0–0.43)
LYMPHOCYTES ABSOLUTE: 0.6 K/CU MM
LYMPHOCYTES RELATIVE PERCENT: 10.1 % (ref 24–44)
MCH RBC QN AUTO: 30.3 PG (ref 27–31)
MCHC RBC AUTO-ENTMCNC: 33 % (ref 32–36)
MCV RBC AUTO: 91.7 FL (ref 78–100)
MONOCYTES ABSOLUTE: 0.7 K/CU MM
MONOCYTES RELATIVE PERCENT: 12.2 % (ref 0–4)
NUCLEATED RBC %: 0 %
PDW BLD-RTO: 14.7 % (ref 11.7–14.9)
PLATELET # BLD: 282 K/CU MM (ref 140–440)
PMV BLD AUTO: 9.9 FL (ref 7.5–11.1)
POTASSIUM SERPL-SCNC: 4 MMOL/L (ref 3.5–5.1)
RBC # BLD: 3.37 M/CU MM (ref 4.2–5.4)
SEGMENTED NEUTROPHILS ABSOLUTE COUNT: 4.6 K/CU MM
SEGMENTED NEUTROPHILS RELATIVE PERCENT: 76.6 % (ref 36–66)
SODIUM BLD-SCNC: 137 MMOL/L (ref 135–145)
TOTAL IMMATURE NEUTOROPHIL: 0.03 K/CU MM
TOTAL NUCLEATED RBC: 0 K/CU MM
WBC # BLD: 6 K/CU MM (ref 4–10.5)

## 2021-05-09 PROCEDURE — 99222 1ST HOSP IP/OBS MODERATE 55: CPT | Performed by: INTERNAL MEDICINE

## 2021-05-09 PROCEDURE — 6360000002 HC RX W HCPCS: Performed by: NURSE PRACTITIONER

## 2021-05-09 PROCEDURE — 96372 THER/PROPH/DIAG INJ SC/IM: CPT

## 2021-05-09 PROCEDURE — 94761 N-INVAS EAR/PLS OXIMETRY MLT: CPT

## 2021-05-09 PROCEDURE — 96376 TX/PRO/DX INJ SAME DRUG ADON: CPT

## 2021-05-09 PROCEDURE — 76882 US LMTD JT/FCL EVL NVASC XTR: CPT

## 2021-05-09 PROCEDURE — G0378 HOSPITAL OBSERVATION PER HR: HCPCS

## 2021-05-09 PROCEDURE — 6370000000 HC RX 637 (ALT 250 FOR IP): Performed by: HOSPITALIST

## 2021-05-09 PROCEDURE — 2580000003 HC RX 258: Performed by: NURSE PRACTITIONER

## 2021-05-09 PROCEDURE — 93970 EXTREMITY STUDY: CPT

## 2021-05-09 PROCEDURE — 36415 COLL VENOUS BLD VENIPUNCTURE: CPT

## 2021-05-09 PROCEDURE — 80048 BASIC METABOLIC PNL TOTAL CA: CPT

## 2021-05-09 PROCEDURE — 85025 COMPLETE CBC W/AUTO DIFF WBC: CPT

## 2021-05-09 PROCEDURE — 6370000000 HC RX 637 (ALT 250 FOR IP): Performed by: NURSE PRACTITIONER

## 2021-05-09 PROCEDURE — 93925 LOWER EXTREMITY STUDY: CPT

## 2021-05-09 RX ORDER — KETOROLAC TROMETHAMINE 30 MG/ML
15 INJECTION, SOLUTION INTRAMUSCULAR; INTRAVENOUS ONCE
Status: COMPLETED | OUTPATIENT
Start: 2021-05-09 | End: 2021-05-09

## 2021-05-09 RX ORDER — HYDROCODONE BITARTRATE AND ACETAMINOPHEN 5; 325 MG/1; MG/1
1 TABLET ORAL EVERY 6 HOURS PRN
Status: DISCONTINUED | OUTPATIENT
Start: 2021-05-09 | End: 2021-05-14 | Stop reason: HOSPADM

## 2021-05-09 RX ORDER — CILOSTAZOL 100 MG/1
50 TABLET ORAL 2 TIMES DAILY
Status: DISCONTINUED | OUTPATIENT
Start: 2021-05-09 | End: 2021-05-13

## 2021-05-09 RX ADMIN — KETOROLAC TROMETHAMINE 15 MG: 30 INJECTION, SOLUTION INTRAMUSCULAR; INTRAVENOUS at 22:43

## 2021-05-09 RX ADMIN — HEPARIN SODIUM 5000 UNITS: 5000 INJECTION INTRAVENOUS; SUBCUTANEOUS at 05:24

## 2021-05-09 RX ADMIN — HYDROCODONE BITARTRATE AND ACETAMINOPHEN 1 TABLET: 5; 325 TABLET ORAL at 20:12

## 2021-05-09 RX ADMIN — HEPARIN SODIUM 5000 UNITS: 5000 INJECTION INTRAVENOUS; SUBCUTANEOUS at 13:59

## 2021-05-09 RX ADMIN — PREDNISONE 5 MG: 5 TABLET ORAL at 20:23

## 2021-05-09 RX ADMIN — TRIAMTERENE AND HYDROCHLOROTHIAZIDE 1 TABLET: 37.5; 25 TABLET ORAL at 08:51

## 2021-05-09 RX ADMIN — DILTIAZEM HYDROCHLORIDE 180 MG: 180 CAPSULE, COATED, EXTENDED RELEASE ORAL at 20:23

## 2021-05-09 RX ADMIN — ASPIRIN 81 MG CHEWABLE TABLET 81 MG: 81 TABLET CHEWABLE at 20:24

## 2021-05-09 RX ADMIN — ATORVASTATIN CALCIUM 20 MG: 20 TABLET, FILM COATED ORAL at 20:24

## 2021-05-09 RX ADMIN — CILOSTAZOL 50 MG: 100 TABLET ORAL at 20:23

## 2021-05-09 RX ADMIN — SODIUM CHLORIDE, PRESERVATIVE FREE 10 ML: 5 INJECTION INTRAVENOUS at 20:24

## 2021-05-09 RX ADMIN — DOCUSATE SODIUM 100 MG: 100 CAPSULE, LIQUID FILLED ORAL at 20:23

## 2021-05-09 RX ADMIN — HEPARIN SODIUM 5000 UNITS: 5000 INJECTION INTRAVENOUS; SUBCUTANEOUS at 20:26

## 2021-05-09 RX ADMIN — HYDROCODONE BITARTRATE AND ACETAMINOPHEN 1 TABLET: 5; 325 TABLET ORAL at 14:00

## 2021-05-09 RX ADMIN — DONEPEZIL HYDROCHLORIDE 5 MG: 5 TABLET, FILM COATED ORAL at 20:23

## 2021-05-09 RX ADMIN — LEVETIRACETAM 500 MG: 500 TABLET, FILM COATED ORAL at 20:23

## 2021-05-09 RX ADMIN — PREDNISONE 5 MG: 5 TABLET ORAL at 08:51

## 2021-05-09 RX ADMIN — KETOROLAC TROMETHAMINE 15 MG: 30 INJECTION, SOLUTION INTRAMUSCULAR; INTRAVENOUS at 08:48

## 2021-05-09 RX ADMIN — SODIUM CHLORIDE, PRESERVATIVE FREE 10 ML: 5 INJECTION INTRAVENOUS at 08:51

## 2021-05-09 RX ADMIN — LEVETIRACETAM 500 MG: 500 TABLET, FILM COATED ORAL at 08:51

## 2021-05-09 ASSESSMENT — PAIN DESCRIPTION - PROGRESSION
CLINICAL_PROGRESSION: GRADUALLY WORSENING

## 2021-05-09 ASSESSMENT — PAIN - FUNCTIONAL ASSESSMENT: PAIN_FUNCTIONAL_ASSESSMENT: ACTIVITIES ARE NOT PREVENTED

## 2021-05-09 ASSESSMENT — PAIN SCALES - GENERAL
PAINLEVEL_OUTOF10: 0
PAINLEVEL_OUTOF10: 0
PAINLEVEL_OUTOF10: 10
PAINLEVEL_OUTOF10: 0
PAINLEVEL_OUTOF10: 6
PAINLEVEL_OUTOF10: 10

## 2021-05-09 ASSESSMENT — PAIN DESCRIPTION - DIRECTION: RADIATING_TOWARDS: ACROSS

## 2021-05-09 ASSESSMENT — PAIN DESCRIPTION - ONSET: ONSET: ON-GOING

## 2021-05-09 ASSESSMENT — PAIN DESCRIPTION - LOCATION: LOCATION: ANKLE

## 2021-05-09 ASSESSMENT — PAIN DESCRIPTION - PAIN TYPE: TYPE: ACUTE PAIN

## 2021-05-09 ASSESSMENT — PAIN DESCRIPTION - FREQUENCY: FREQUENCY: CONTINUOUS

## 2021-05-09 NOTE — PROGRESS NOTES
ALT 15   BILITOT 0.9   ALKPHOS 134*       Imaging Last 24 Hours:  Xr Knee Left (min 4 Views)    Result Date: 5/8/2021  EXAMINATION: FOUR X-RAY VIEWS OF THE LEFT KNEE 5/8/2021 11:39 am COMPARISON: 06/30/2011 HISTORY: ORDERING SYSTEM PROVIDED HISTORY: fall, pain TECHNOLOGIST PROVIDED HISTORY: Reason for exam:->fall, pain Reason for Exam: fall, pain Acuity: Acute Type of Exam: Initial FINDINGS: Tricompartmental degenerative changes of the left knee. There is osteopenia. There is depression of the lateral tibial plateau with slightly increased sclerosis. There is a well-corticated ossific density adjacent to the lateral compartment as well which may be related to prior trauma. A discrete fracture line is otherwise not well-visualized. Large joint effusion. Vascular calcifications. Findings are suspicious for a depressed lateral tibial plateau fracture with no obvious fracture line identified. There is a well-corticated ossific density adjacent to the lateral compartment which may be related to prior trauma or a loose body. Large joint effusion. Osteopenia. Ct Knee Left Wo Contrast    Result Date: 5/8/2021  EXAMINATION: CT OF THE LEFT KNEE WITHOUT CONTRAST 5/8/2021 2:25 pm TECHNIQUE: CT of the left knee was performed without the administration of intravenous contrast.  Multiplanar reformatted images are provided for review. Dose modulation, iterative reconstruction, and/or weight based adjustment of the mA/kV was utilized to reduce the radiation dose to as low as reasonably achievable.  COMPARISON: Radiographs same day HISTORY ORDERING SYSTEM PROVIDED HISTORY: knee pain, possible lateral plateau fx on xray TECHNOLOGIST PROVIDED HISTORY: Reason for exam:->knee pain, possible lateral plateau fx on xray Decision Support Exception - unselect if not a suspected or confirmed emergency medical condition->Emergency Medical Condition (MA) Reason for Exam: knee pain, possible lateral plateau fx on xray FINDINGS: No

## 2021-05-09 NOTE — PROGRESS NOTES
Has PAD noted on arterial doppler  and add pletal in addition to ASA. Consult to vasc with her LE pain.  Warm on exam but continue to monitor

## 2021-05-09 NOTE — CONSULTS
ONCOLOGY HEMATOLOGY CARE ([facility])  CONSULTATION REPORT    2021  5:14 PM    Patient:    Michelle Alegre  : 1940   68 y.o. MRN: [id number]  Admitted: 2021 11:34 AM ATT: Junior Dari MD   1115/1115-A  AdmitDx: Injury of left knee, initial encounter [S89.92XA]  Closed fracture of left tibial plateau, initial encounter [S82.142A]  Effusion, left knee [M25.462]  PCP: Felecia Lord MD    Reason for Consult: H/o Anal cancer    Requesting Physician:  Junior Dari MD      History Obtained From:  Patient and review of all records      [facility]    Chief Complaint   Patient presents with    Fall    Knee Pain     left       HISTORY OF PRESENT ILLNESS   Michelle Alegre is a 68 y.o. female reported that she attained a fall and reported landing on left knee. Reported pain and swelling of the left knee. No fever. No chest pain, increased sob, palpitations or any dizziness. No fever. No overt bleeding. No ha, vision changes. No abdominal pain, nausea, emesis, diarrhea or any constipation. Reported pain in the LE sometimes even at rest.    21: MRI brain:  1.  Small incidental developmental venous anomaly within the high left   frontal lobe near the convexity.       2.  No abnormal enhancement to suggest intracranial metastatic disease.         21: CT knee:  No acute bony abnormality identified.  Chronic appearing concavity of the   lateral tibial plateau.       Advanced tricompartmental osteoarthrosis with multiple intra-articular   bodies. [place] Feast is a small to moderate-sized knee joint effusion with synovial   thickening.       Extensive vascular calcifications. 21: Ultrasound:  1. Partially limited study due to patient movement throughout the study. 2. Three-vessel runoff into the mid right leg and two-vessel runoff into the   mid left leg with no visualized flow in the mid left posterior tibial artery.    3. At least 75% stenosis between the distal left superficial femoral artery and proximal left popliteal artery by peak systolic velocity criteria. 4. 50-74% stenosis between proximal and distal portions of the right   popliteal artery by peak systolic velocity criteria. 5. Biphasic and monophasic waveforms throughout the bilateral extremities   suggestive of inflow disease.      5/9/21: Ultrasound:  No evidence of DVT in either lower extremity.       In the left suprapatellar region at the area of interest, when correlating   with the recent CT as well, there is a complex joint effusion with synovial   thickening and intra-articular bodies.       Right-sided Baker's cyst.           BACKGROUND ONCOLOGY HISTORY:  T2NxMx anal cancer s/p CXRT completed in July 2017, followed by Dr Jun Sahu, last visit was in jan 2021     [address]    Past Medical History:   Diagnosis Date    Acid reflux     Anxiety     Bronchitis In Past    Cancer ([address]     colon cancer    Carotid stenosis 11/10/2015    Cerebral embolism with cerebral infarction ([address] 9-18-15    Seizure X 1, No Residual    Chronic back pain     Depression     History of blood transfusion     History of external beam radiation therapy 2017    pelvis/anal canal in 2017 per Dr. U[facility]a [facility] [address] [facility] Hyperlipidemia     Hypertension     Prolonged emergence from general anesthesia     RA (rheumatoid arthritis) ([address]     \"All Over\"    Seizure ([address] 9-18-15    X 1    Sleep apnea     Uses CPAP    Teeth missing     Upper And Lower    Urinary incontinence     UTI (urinary tract infection) In Past    No Current Symptoms    Wears dentures     Full Upper , Partial Lower    Wears glasses     Wears partial dentures     Lower       SURGICAL HISTORY    Past Surgical History:   Procedure Laterality Date    CAROTID ENDARTERECTOMY Right [id number]    CARPAL TUNNEL RELEASE      COLONOSCOPY  In Past    DENTAL SURGERY      Teeth Extracted In Past    DILATION AND CURETTAGE OF UTERUS  1960's    Prior To Vaginal Hysterectomy    HYSTERECTOMY, VAGINAL  1960's    PORT SURGERY N/A 3/2/2021    PORT REMOVAL performed by Raul Kidd [facility] at [facility] OR       Current Medications:    Medications    Scheduled Medications:    cilostazol  50 mg Oral BID    aspirin  81 mg Oral Nightly    atorvastatin  20 mg Oral Nightly    dilTIAZem  180 mg Oral Nightly    docusate sodium  100 mg Oral Nightly    donepezil  5 mg Oral Nightly    levETIRAcetam  500 mg Oral BID    predniSONE  5 mg Oral BID    triamterene-hydroCHLOROthiazide  1 tablet Oral Daily    sodium chloride flush  5-40 mL Intravenous 2 times per day    heparin (porcine)  5,000 Units Subcutaneous 3 times per day     PRN Medications: HYDROcodone 5 mg - acetaminophen, HYDROcodone 5 mg - acetaminophen, sodium chloride flush, sodium chloride, polyethylene glycol, acetaminophen **OR** acetaminophen    Allergies:  Cortisone, Codeine, and Pcn [penicillins]    FAMILY HISTORY    Family History   Problem Relation Age of Onset    Stroke Mother     Heart Disease Mother     Arthritis Mother     Diabetes Mother     High Blood Pressure Mother     High Cholesterol Mother     Miscarriages / [place] Mother     Other Father         \"Surgery For Twisted Bowels\"    Heart Disease Sister     Stroke Sister     High Blood Pressure Brother     Heart Disease Brother     Hearing Loss Brother         Heart Attack    Diabetes Sister     High Blood Pressure Sister     Other Sister         Gout    Arthritis Sister     Mental Illness Sister         Schizophrenia    High Blood Pressure Brother     High Blood Pressure Son     Mental Illness Son         Anxiety    Depression Son     Other Son         Pancreatitis, Surgery For Brain Aneurysm    High Blood Pressure Son     Other Son         Gout    Learning Disabilities Son         Autistic, Mentally Retarded    Other Son         Seizures       SOCIAL HISTORY    Social History     Socioeconomic History    Marital status:      Spouse name: None    Number of children: None    Years of education: None    Highest education level: None   Occupational History    None   Social Needs    Financial resource strain: None    Food insecurity     Worry: None     Inability: None    Transportation needs     Medical: None     Non-medical: None   Tobacco Use    Smoking status: Never Smoker    Smokeless tobacco: Never Used   Substance and Sexual Activity    Alcohol use: No     Alcohol/week: 0.0 standard drinks    Drug use: No    Sexual activity: Never   Lifestyle    Physical activity     Days per week: None     Minutes per session: None    Stress: None   Relationships    Social connections     Talks on phone: None     Gets together: None     Attends Religion service: None     Active member of club or organization: None     Attends meetings of clubs or organizations: None     Relationship status: None    Intimate partner violence     Fear of current or ex partner: None     Emotionally abused: None     Physically abused: None     Forced sexual activity: None   Other Topics Concern    None   Social History Narrative    None       REVIEW OF SYSTEMS    Constitutional:  Denies fever, chills, loss of appetite  HEENT:  Denies swelling of neck glands  Respiratory:  Denies cough, shortness of breath or hemoptysis  Cardiovascular:  Denies chest pain, palpitations or swelling   GI:  Denies abdominal pain, nausea, vomiting, constipation or diarrhea   Musculoskeletal:  Denies back pain   Skin:  Denies rash   Neurologic:  Denies headache, focal weakness or sensory changes   PSYCHIATRIC:  Neg depression, personality changes, anxiety. ENDOCRINE:  Neg polydipsia, polyuria, abnormal weight changes, heat /cold intolerance.   ALL/IMM:  Neg reactions to drugs other than listed  All systems negative except  for symptoms of HPI and as marked as above    PHYSICAL EXAM      Vitals: BP (!) 107/55   Pulse 84   Temp 98.6 °F (37 °C) (Oral)   Resp 16   Ht 5' 2\" (1.575 m) Wt 125 lb (56.7 kg)   SpO2 97%   BMI 22.86 kg/m²     CONSTITUTIONAL: awake, alert, lying on the bed  EYES: STEPHEN, No palor or any icetrus  ENT: ATNC  NECK: No JVD  HEMATOLOGIC/LYMPHATIC: no cervical, supraclavicular or axillary lymphadenopathy   LUNGS: CTAB  CARDIOVASCULAR: s1s2 rrr  ABDOMEN: soft ntnd bs pos  MUSCULOSKELETAL: left knee in brace, swelling, slight ttp, fluctuant  NEUROLOGIC: GI  EXTREMITIES: Discoloration LE      LABORATORY RESULTS  CBC:   Recent Labs     05/08/21  1445 05/09/21  0556   WBC 9.0 6.0   HGB 12.3* 10.2*    282     BMP:    Recent Labs     05/08/21  1445 05/09/21  0556    137   K 4.2 4.0    101   CO2 29 27   BUN 14 21   CREATININE 0.5* 0.6   GLUCOSE 140* 91     Hepatic:   Recent Labs     05/08/21  1445   AST 26   ALT 15   BILITOT 0.9   ALKPHOS 134*     INR: No results for input(s): INR in the last 72 hours. RADIOLOGY REPORTS  Reviewed    IMPRESSION & RECOMMENDATIONS:  Left knee swelling: Note h/o RA on MTX and fall. Ortho consulted. May need arthrocentesis. Adequate analgesic regimen. Knee rest.     H/O anal cancer: Completed Chemo XRT in 2017, is being followed    Anemia: NC. Anemia w/u ordered    LE pan:vasc surgery consulted. Continue other medical care    ECOG Performance Status (ECOG Scale 0-4): This plan was discussed with the patient and she verbalized understanding. We will continue to follow the patient. Thank you for allowing us to participate in the care of this patient.      Rosenda Small MD, 5/9/2021, 5:14 PM

## 2021-05-10 LAB
BASOPHILS ABSOLUTE: 0 K/CU MM
BASOPHILS RELATIVE PERCENT: 0.2 % (ref 0–1)
DIFFERENTIAL TYPE: ABNORMAL
EOSINOPHILS ABSOLUTE: 0 K/CU MM
EOSINOPHILS RELATIVE PERCENT: 0.2 % (ref 0–3)
ERYTHROCYTE SEDIMENTATION RATE: 5 MM/HR (ref 0–30)
FERRITIN: 66 NG/ML (ref 15–150)
FOLATE: 11.7 NG/ML (ref 3.1–17.5)
HCT VFR BLD CALC: 32.2 % (ref 37–47)
HEMOGLOBIN: 10.3 GM/DL (ref 12.5–16)
IMMATURE NEUTROPHIL %: 0.7 % (ref 0–0.43)
IRON: 30 UG/DL (ref 37–145)
LACTATE DEHYDROGENASE: 285 IU/L (ref 120–246)
LYMPHOCYTES ABSOLUTE: 0.6 K/CU MM
LYMPHOCYTES RELATIVE PERCENT: 6.8 % (ref 24–44)
MCH RBC QN AUTO: 29.9 PG (ref 27–31)
MCHC RBC AUTO-ENTMCNC: 32 % (ref 32–36)
MCV RBC AUTO: 93.6 FL (ref 78–100)
MONOCYTES ABSOLUTE: 0.9 K/CU MM
MONOCYTES RELATIVE PERCENT: 10.8 % (ref 0–4)
NUCLEATED RBC %: 0 %
PCT TRANSFERRIN: 9 % (ref 10–44)
PDW BLD-RTO: 14.8 % (ref 11.7–14.9)
PLATELET # BLD: 287 K/CU MM (ref 140–440)
PMV BLD AUTO: 10.3 FL (ref 7.5–11.1)
RBC # BLD: 3.44 M/CU MM (ref 4.2–5.4)
RETICULOCYTE COUNT PCT: 2.3 % (ref 0.2–2.2)
SEGMENTED NEUTROPHILS ABSOLUTE COUNT: 6.6 K/CU MM
SEGMENTED NEUTROPHILS RELATIVE PERCENT: 81.3 % (ref 36–66)
TOTAL IMMATURE NEUTOROPHIL: 0.06 K/CU MM
TOTAL IRON BINDING CAPACITY: 321 UG/DL (ref 250–450)
TOTAL NUCLEATED RBC: 0 K/CU MM
UNSATURATED IRON BINDING CAPACITY: 291 UG/DL (ref 110–370)
VITAMIN B-12: 859.8 PG/ML (ref 211–911)
WBC # BLD: 8.1 K/CU MM (ref 4–10.5)

## 2021-05-10 PROCEDURE — 85045 AUTOMATED RETICULOCYTE COUNT: CPT

## 2021-05-10 PROCEDURE — 6370000000 HC RX 637 (ALT 250 FOR IP): Performed by: HOSPITALIST

## 2021-05-10 PROCEDURE — 83550 IRON BINDING TEST: CPT

## 2021-05-10 PROCEDURE — 83615 LACTATE (LD) (LDH) ENZYME: CPT

## 2021-05-10 PROCEDURE — 36415 COLL VENOUS BLD VENIPUNCTURE: CPT

## 2021-05-10 PROCEDURE — 83540 ASSAY OF IRON: CPT

## 2021-05-10 PROCEDURE — 0S9D3ZZ DRAINAGE OF LEFT KNEE JOINT, PERCUTANEOUS APPROACH: ICD-10-PCS | Performed by: ORTHOPAEDIC SURGERY

## 2021-05-10 PROCEDURE — 85652 RBC SED RATE AUTOMATED: CPT

## 2021-05-10 PROCEDURE — 84165 PROTEIN E-PHORESIS SERUM: CPT

## 2021-05-10 PROCEDURE — 2580000003 HC RX 258: Performed by: NURSE PRACTITIONER

## 2021-05-10 PROCEDURE — 82746 ASSAY OF FOLIC ACID SERUM: CPT

## 2021-05-10 PROCEDURE — 6360000002 HC RX W HCPCS: Performed by: NURSE PRACTITIONER

## 2021-05-10 PROCEDURE — 2500000003 HC RX 250 WO HCPCS: Performed by: PHYSICIAN ASSISTANT

## 2021-05-10 PROCEDURE — 6360000002 HC RX W HCPCS: Performed by: PHYSICIAN ASSISTANT

## 2021-05-10 PROCEDURE — 6370000000 HC RX 637 (ALT 250 FOR IP): Performed by: NURSE PRACTITIONER

## 2021-05-10 PROCEDURE — 82728 ASSAY OF FERRITIN: CPT

## 2021-05-10 PROCEDURE — 96372 THER/PROPH/DIAG INJ SC/IM: CPT

## 2021-05-10 PROCEDURE — G0378 HOSPITAL OBSERVATION PER HR: HCPCS

## 2021-05-10 PROCEDURE — 82607 VITAMIN B-12: CPT

## 2021-05-10 PROCEDURE — 85025 COMPLETE CBC W/AUTO DIFF WBC: CPT

## 2021-05-10 PROCEDURE — 94761 N-INVAS EAR/PLS OXIMETRY MLT: CPT

## 2021-05-10 RX ORDER — TRIAMCINOLONE ACETONIDE 40 MG/ML
40 INJECTION, SUSPENSION INTRA-ARTICULAR; INTRAMUSCULAR ONCE
Status: DISCONTINUED | OUTPATIENT
Start: 2021-05-10 | End: 2021-05-14 | Stop reason: HOSPADM

## 2021-05-10 RX ORDER — LIDOCAINE HYDROCHLORIDE 10 MG/ML
5 INJECTION, SOLUTION INFILTRATION; PERINEURAL ONCE
Status: COMPLETED | OUTPATIENT
Start: 2021-05-10 | End: 2021-05-10

## 2021-05-10 RX ORDER — DEXAMETHASONE SODIUM PHOSPHATE 4 MG/ML
4 INJECTION, SOLUTION INTRA-ARTICULAR; INTRALESIONAL; INTRAMUSCULAR; INTRAVENOUS; SOFT TISSUE ONCE
Status: COMPLETED | OUTPATIENT
Start: 2021-05-10 | End: 2021-05-10

## 2021-05-10 RX ADMIN — HYDROCODONE BITARTRATE AND ACETAMINOPHEN 1 TABLET: 5; 325 TABLET ORAL at 13:42

## 2021-05-10 RX ADMIN — TRIAMTERENE AND HYDROCHLOROTHIAZIDE 1 TABLET: 37.5; 25 TABLET ORAL at 09:42

## 2021-05-10 RX ADMIN — DILTIAZEM HYDROCHLORIDE 180 MG: 180 CAPSULE, COATED, EXTENDED RELEASE ORAL at 21:02

## 2021-05-10 RX ADMIN — PREDNISONE 5 MG: 5 TABLET ORAL at 21:02

## 2021-05-10 RX ADMIN — ATORVASTATIN CALCIUM 20 MG: 20 TABLET, FILM COATED ORAL at 21:02

## 2021-05-10 RX ADMIN — HEPARIN SODIUM 5000 UNITS: 5000 INJECTION INTRAVENOUS; SUBCUTANEOUS at 13:42

## 2021-05-10 RX ADMIN — HEPARIN SODIUM 5000 UNITS: 5000 INJECTION INTRAVENOUS; SUBCUTANEOUS at 21:02

## 2021-05-10 RX ADMIN — DEXAMETHASONE SODIUM PHOSPHATE 4 MG: 4 INJECTION, SOLUTION INTRAMUSCULAR; INTRAVENOUS at 16:36

## 2021-05-10 RX ADMIN — SODIUM CHLORIDE, PRESERVATIVE FREE 10 ML: 5 INJECTION INTRAVENOUS at 21:01

## 2021-05-10 RX ADMIN — HYDROCODONE BITARTRATE AND ACETAMINOPHEN 1 TABLET: 5; 325 TABLET ORAL at 05:25

## 2021-05-10 RX ADMIN — CILOSTAZOL 50 MG: 100 TABLET ORAL at 21:02

## 2021-05-10 RX ADMIN — CILOSTAZOL 50 MG: 100 TABLET ORAL at 09:42

## 2021-05-10 RX ADMIN — LEVETIRACETAM 500 MG: 500 TABLET, FILM COATED ORAL at 09:42

## 2021-05-10 RX ADMIN — ASPIRIN 81 MG CHEWABLE TABLET 81 MG: 81 TABLET CHEWABLE at 21:01

## 2021-05-10 RX ADMIN — HYDROCODONE BITARTRATE AND ACETAMINOPHEN 1 TABLET: 5; 325 TABLET ORAL at 21:02

## 2021-05-10 RX ADMIN — LIDOCAINE HYDROCHLORIDE 5 ML: 10 INJECTION, SOLUTION INFILTRATION; PERINEURAL at 16:36

## 2021-05-10 RX ADMIN — PREDNISONE 5 MG: 5 TABLET ORAL at 09:42

## 2021-05-10 RX ADMIN — HEPARIN SODIUM 5000 UNITS: 5000 INJECTION INTRAVENOUS; SUBCUTANEOUS at 05:24

## 2021-05-10 RX ADMIN — LEVETIRACETAM 500 MG: 500 TABLET, FILM COATED ORAL at 21:01

## 2021-05-10 RX ADMIN — METHOTREXATE 17.5 MG: 2.5 TABLET ORAL at 09:43

## 2021-05-10 RX ADMIN — DOCUSATE SODIUM 100 MG: 100 CAPSULE, LIQUID FILLED ORAL at 21:04

## 2021-05-10 RX ADMIN — DONEPEZIL HYDROCHLORIDE 5 MG: 5 TABLET, FILM COATED ORAL at 21:02

## 2021-05-10 ASSESSMENT — PAIN SCALES - GENERAL
PAINLEVEL_OUTOF10: 4
PAINLEVEL_OUTOF10: 5
PAINLEVEL_OUTOF10: 7
PAINLEVEL_OUTOF10: 0

## 2021-05-10 NOTE — CONSULTS
Department of Cardiovascular & Thoracic Surgery   Consult Note    Reason for Consult:  PAD    Requesting Physician: Dr. Roger Hernández    Date of Consult: 5/10/21      History Obtained From:  patient     HISTORY OF PRESENT ILLNESS:    The patient is a 68 y.o. female who presents with L leg pain. She states she suffered a fall at home over the weekend, landing on her left knee. She states that she also has pain in the left foot which she first noticed a couple of months ago. It happens when she is laying in bed. She does not have pain when she walks, though she doesn't walk much because she is weak and has bad arthritis. She is a lifelong nonsmoker. She denies any issues with wounds on the feet.        Past Medical History:        Diagnosis Date    Acid reflux     Anxiety     Bronchitis In Past    Cancer ([address]     colon cancer    Carotid stenosis 11/10/2015    Cerebral embolism with cerebral infarction ([address] 9-18-15    Seizure X 1, No Residual    Chronic back pain     Depression     History of blood transfusion     History of external beam radiation therapy 2017    pelvis/anal canal in 2017 per Dr. Alissa Arias at [facility] Hyperlipidemia     Hypertension     Prolonged emergence from general anesthesia     RA (rheumatoid arthritis) ([address]     \"All Over\"    Seizure ([address] 9-18-15    X 1    Sleep apnea     Uses CPAP    Teeth missing     Upper And Lower    Urinary incontinence     UTI (urinary tract infection) In Past    No Current Symptoms    Wears dentures     Full Upper , Partial Lower    Wears glasses     Wears partial dentures     Lower     Past Surgical History:        Procedure Laterality Date    CAROTID ENDARTERECTOMY Right [id number]    CARPAL TUNNEL RELEASE      COLONOSCOPY  In Past    DENTAL SURGERY      Teeth Extracted In Past    DILATION AND CURETTAGE OF UTERUS  1960's    Prior To Vaginal Hysterectomy    HYSTERECTOMY, VAGINAL  1960's    PORT SURGERY N/A 3/2/2021    PORT REMOVAL performed by Monika Minaya MD at [facility] OR     Current Medications:   Current Facility-Administered Medications: methotrexate (RHEUMATREX) chemo tablet 17.5 mg, 17.5 mg, Oral, Once per day on Mon  triamcinolone acetonide (KENALOG-40) injection 40 mg, 40 mg, Intra-articular, Once  HYDROcodone-acetaminophen (NORCO) 5-325 MG per tablet 1 tablet, 1 tablet, Oral, Q6H PRN  cilostazol (PLETAL) tablet 50 mg, 50 mg, Oral, BID  aspirin chewable tablet 81 mg, 81 mg, Oral, Nightly  atorvastatin (LIPITOR) tablet 20 mg, 20 mg, Oral, Nightly  dilTIAZem (CARDIZEM CD) extended release capsule 180 mg, 180 mg, Oral, Nightly  docusate sodium (COLACE) capsule 100 mg, 100 mg, Oral, Nightly  donepezil (ARICEPT) tablet 5 mg, 5 mg, Oral, Nightly  levETIRAcetam (KEPPRA) tablet 500 mg, 500 mg, Oral, BID  predniSONE (DELTASONE) tablet 5 mg, 5 mg, Oral, BID  triamterene-hydroCHLOROthiazide (MAXZIDE-25) 37.5-25 MG per tablet 1 tablet, 1 tablet, Oral, Daily  HYDROcodone-acetaminophen (NORCO) 5-325 MG per tablet 1 tablet, 1 tablet, Oral, Q6H PRN  sodium chloride flush 0.9 % injection 5-40 mL, 5-40 mL, Intravenous, 2 times per day  sodium chloride flush 0.9 % injection 10 mL, 10 mL, Intravenous, PRN  0.9 % sodium chloride infusion, 25 mL, Intravenous, PRN  polyethylene glycol (GLYCOLAX) packet 17 g, 17 g, Oral, Daily PRN  acetaminophen (TYLENOL) tablet 650 mg, 650 mg, Oral, Q6H PRN **OR** acetaminophen (TYLENOL) suppository 650 mg, 650 mg, Rectal, Q6H PRN  heparin (porcine) injection 5,000 Units, 5,000 Units, Subcutaneous, 3 times per day  Allergies:     Allergies   Allergen Reactions    Cortisone Rash    Codeine      Unsure Of Reaction    Pcn [Penicillins]      Unknown Reaction       Social History:   Social History     Socioeconomic History    Marital status:      Spouse name: Not on file    Number of children: Not on file    Years of education: Not on file    Highest education level: Not on file   Occupational History    Not on file Social Needs    Financial resource strain: Not on file    Food insecurity     Worry: Not on file     Inability: Not on file   [employer] needs     Medical: Not on file     Non-medical: Not on file   Tobacco Use    Smoking status: Never Smoker    Smokeless tobacco: Never Used   Substance and Sexual Activity    Alcohol use: No     Alcohol/week: 0.0 standard drinks    Drug use: No    Sexual activity: Never   Lifestyle    Physical activity     Days per week: Not on file     Minutes per session: Not on file    Stress: Not on file   Relationships    Social connections     Talks on phone: Not on file     Gets together: Not on file     Attends Yazdanism service: Not on file     Active member of club or organization: Not on file     Attends meetings of clubs or organizations: Not on file     Relationship status: Not on file    Intimate partner violence     Fear of current or ex partner: Not on file     Emotionally abused: Not on file     Physically abused: Not on file     Forced sexual activity: Not on file   Other Topics Concern    Not on file   Social History Narrative    Not on file       Family History:        Problem Relation Age of Onset    Stroke Mother     Heart Disease Mother     Arthritis Mother     Diabetes Mother     High Blood Pressure Mother     High Cholesterol Mother    Koby Chaudhari / Ray Barry Mother     Other Father         \"Surgery For Twisted Bowels\"    Heart Disease Sister     Stroke Sister     High Blood Pressure Brother     Heart Disease Brother     Hearing Loss Brother         Heart Attack    Diabetes Sister     High Blood Pressure Sister     Other Sister         Gout    Arthritis Sister     Mental Illness Sister         Schizophrenia    High Blood Pressure Brother     High Blood Pressure Son     Mental Illness Son         Anxiety    Depression Son     Other Son         Pancreatitis, Surgery For Brain Aneurysm    High Blood Pressure Son    Pablo Galla Other Son Gout    Learning Disabilities Son         Autistic, Mentally Retarded    Other Son         Seizures       REVIEW OF SYSTEMS:  Constitutional: - fatigue, - fever, - chills, - night sweats  Eyes: - vision loss  Cardiovascular: -  chest pain, - palpitations, - leg swelling, + leg pain   Respiratory: - cough, - shortness of breath, - wheezing   GI: - nausea, - vomiting, - abdominal pain, - constipation, - diarrhea   : - dysuria   MSK: - joint pain, - muscle pain  Integument: - rash, - skin color change   Heme: - easy bruising or bleeding  Neurologic: - headache, - weakness, - dizziness, - paresthesias       EXAM:  Constitutional: Blood pressure (!) 107/54, pulse 83, temperature 97.8 °F (36.6 °C), temperature source Oral, resp. rate 16, height 5' 2\" (1.575 m), weight 125 lb (56.7 kg), SpO2 96 %, not currently breastfeeding. No apparent distress, appears stated age and cooperative. Neurologic: follows commands, no focal weakness noted   Lungs: Good respiratory effort. Clear to auscultation,   CV: Regular rate/ rhythm , no peripheral edema, feet warm and well perfused, biphasic DP and PT pulse L side   GI: Soft, non-tender in all four quadrants, non-distended, + bowel sounds, liver and spleen no palpable masses  : bladder nondistended   MSK: no obvious deformity   Skin: warm, pink and dry       DATA:  Impression   1. Partially limited study due to patient movement throughout the study. 2. Three-vessel runoff into the mid right leg and two-vessel runoff into the   mid left leg with no visualized flow in the mid left posterior tibial artery. 3. At least 75% stenosis between the distal left superficial femoral artery   and proximal left popliteal artery by peak systolic velocity criteria. 4. 50-74% stenosis between proximal and distal portions of the right   popliteal artery by peak systolic velocity criteria.    5. Biphasic and monophasic waveforms throughout the bilateral extremities   suggestive of inflow disease. IMPRESSION  Patient Active Problem List   Diagnosis    Cerebral embolism with cerebral infarction ([address]    Partial seizures ([address]    RA (rheumatoid arthritis) (HCC)    Left hemiparesis (HCC)    Abnormality of gait    Essential hypertension    Rheumatoid arthritis involving multiple sites with positive rheumatoid factor ([address]    Carotid stenosis    CVA, old, hemiparesis ([address]    Malignant neoplasm of anal canal (HCC)    Anemia, unspecified    Other fatigue    Seizure ([address]    Effusion, left knee       PAD      RECOMMENDATIONS:  Pt does have some peripheral vascular disease but she has excellent pulses in the L foot and it is well perfused. No critical limb ischemia at this time. Recommend outpt follow up for surveillance.          Romayne Hillier PA-C

## 2021-05-10 NOTE — PROGRESS NOTES
Dr. Nam Prudent. 2.  She has anemia. Anemic work-up has been ordered. 3.  She has history of RA. She fell and swollen left knee. Awaiting for orthopedic evaluation. To continue with the present management. We will continue to follow the patient. Thank you.

## 2021-05-10 NOTE — PROGRESS NOTES
Progress Note  Date:5/10/2021       Room:[address] [place] [address]  Patient Jessica Duran     YOB: 1940     Age:80 y.o. Pain is better and daughter at bedside  Subjective    Subjective:  Diet:  Poor intake. Pain:  She complains of pain that is moderate. Review of Systems  Objective         Vitals Last 24 Hours:  TEMPERATURE:  Temp  Av.2 °F (36.8 °C)  Min: 97.9 °F (36.6 °C)  Max: 98.6 °F (37 °C)  RESPIRATIONS RANGE: Resp  Av  Min: 16  Max: 20  PULSE OXIMETRY RANGE: SpO2  Av.3 %  Min: 97 %  Max: 98 %  PULSE RANGE: Pulse  Av.8  Min: 84  Max: 90  BLOOD PRESSURE RANGE: Systolic (21CNA), AZL:798 , Min:107 , SCHOFIELD:341   ; Diastolic (74IWI), YQX:21, Min:54, Max:58    I/O (24Hr): Intake/Output Summary (Last 24 hours) at 5/10/2021 0736  Last data filed at 5/10/2021 0315  Gross per 24 hour   Intake 240 ml   Output 500 ml   Net -260 ml     Objective:  General Appearance:  Uncomfortable. Vital signs: (most recent): Blood pressure (!) 123/58, pulse 87, temperature 98 °F (36.7 °C), temperature source Oral, resp. rate 16, height 5' 2\" (1.575 m), weight 125 lb (56.7 kg), SpO2 98 %, not currently breastfeeding. Vital signs are normal.    HEENT: Normal HEENT exam.    Lungs:  Normal effort. Heart: Normal rate. Abdomen: Abdomen is soft. Bowel sounds are normal.     Extremities: Decreased range of motion. There is effusion. Pulses: Distal pulses are intact. Neurological: Patient is alert. Pupils:  Pupils are equal, round, and reactive to light. Skin:  Warm.       Labs/Imaging/Diagnostics    Labs:  CBC:  Recent Labs     21  1445 21  0556   WBC 9.0 6.0   RBC 3.99* 3.37*   HGB 12.3* 10.2*   HCT 36.8* 30.9*   MCV 92.2 91.7   RDW 14.6 14.7    282     CHEMISTRIES:  Recent Labs     21  1445 21  0556    137   K 4.2 4.0    101   CO2 29 27   BUN 14 21   CREATININE 0.5* 0.6   GLUCOSE 140* 91     PT/INR:No results for input(s): PROTIME, INR in the last 72 hours. APTT:No results for input(s): APTT in the last 72 hours. LIVER PROFILE:  Recent Labs     05/08/21  1445   AST 26   ALT 15   BILITOT 0.9   ALKPHOS 134*       Imaging Last 24 Hours:  Xr Knee Left (min 4 Views)    Result Date: 5/8/2021  EXAMINATION: FOUR X-RAY VIEWS OF THE LEFT KNEE 5/8/2021 11:39 am COMPARISON: 06/30/2011 HISTORY: ORDERING SYSTEM PROVIDED HISTORY: fall, pain TECHNOLOGIST PROVIDED HISTORY: Reason for exam:->fall, pain Reason for Exam: fall, pain Acuity: Acute Type of Exam: Initial FINDINGS: Tricompartmental degenerative changes of the left knee. There is osteopenia. There is depression of the lateral tibial plateau with slightly increased sclerosis. There is a well-corticated ossific density adjacent to the lateral compartment as well which may be related to prior trauma. A discrete fracture line is otherwise not well-visualized. Large joint effusion. Vascular calcifications. Findings are suspicious for a depressed lateral tibial plateau fracture with no obvious fracture line identified. There is a well-corticated ossific density adjacent to the lateral compartment which may be related to prior trauma or a loose body. Large joint effusion. Osteopenia. Ct Knee Left Wo Contrast    Result Date: 5/8/2021  EXAMINATION: CT OF THE LEFT KNEE WITHOUT CONTRAST 5/8/2021 2:25 pm TECHNIQUE: CT of the left knee was performed without the administration of intravenous contrast.  Multiplanar reformatted images are provided for review. Dose modulation, iterative reconstruction, and/or weight based adjustment of the mA/kV was utilized to reduce the radiation dose to as low as reasonably achievable.  COMPARISON: Radiographs same day HISTORY ORDERING SYSTEM PROVIDED HISTORY: knee pain, possible lateral plateau fx on xray TECHNOLOGIST PROVIDED HISTORY: Reason for exam:->knee pain, possible lateral plateau fx on xray Decision Support Exception - unselect if not a suspected or confirmed emergency medical condition->Emergency Medical Condition (MA) Reason for Exam: knee pain, possible lateral plateau fx on xray FINDINGS: No acute fracture is seen. No evidence of dislocation. There is lateral patellar subluxation. The tibia is slightly laterally subluxated in relation to the femur. Chronic appearing concavity of the lateral tibial plateau. Advanced left knee tricompartmental osteoarthrosis with multiple intra-articular bodies. There is a small to moderate-sized knee joint effusion with synovial thickening. Small Baker's cyst. Extensive vascular calcifications are seen. No acute bony abnormality identified. Chronic appearing concavity of the lateral tibial plateau. Advanced tricompartmental osteoarthrosis with multiple intra-articular bodies. There is a small to moderate-sized knee joint effusion with synovial thickening. Extensive vascular calcifications. Assessment//Plan           Hospital Problems           Last Modified POA    Effusion, left knee 5/8/2021 Yes        Assessment & Plan  Fall with left knee pain   -CT with chronic changes but severe OA and has effusion  -ortho eval  -PT/OT  Leg pain with PAD  -75% stenosis left SFA and prox left popliteal and 50-74% stenosis popliteal artery  -no DVT  -ASA, statin, added pletal  -vasc consult  HTN  -CCb, thiazide  RA  -MTX, pred  Hx of CVA  -ASa, statin, aricept  -keppra  AOCD   -stable  Hx of anal cancer  -oncology f/u and labs ordered  -will place official consult as do not see one  DVT prophyalxis  -heparin      PT/OT eval and vasc and ortho recs. Anticipate discharge after above consultant recommendations and PT/OT evals.    Electronically signed by Niraj Griffin MD on 5/9/21 at 7:28 AM EDT

## 2021-05-10 NOTE — CONSULTS
Orthopaedic Consult    Patient Name: Leann Martin   (44/5/2073)  MRN   [id number]   Today's date:  5/10/2021     CHIEF COMPLAINT:  \"I fell on my knee and it hurts. \"    History Obtained From:  patient, electronic medical record    HISTORY OF PRESENT ILLNESS:      The patient is a 68 y.o. female  who presents with history of mechanical fall on 5/8/21. She states that she walks with a walker and tripped causing her to fall on her bent LEFT knee. She states she has known osteo and rheumatoid arthritis and has had previous injections in the knee. She takes prednisone daily as well as methotrexate. She states that after her fall she was unable to walk on the left knee. She had immediate pain and swelling. XR from ER was personally viewed today and demonstrates advanced tricompartmental OA. CT also performed and demonstrates same with no acute fracture. Pt placed in a knee immobilizer and consultation requested for further eval and tx.       Past Medical History         Diagnosis Date    Acid reflux     Anxiety     Bronchitis In Past    Cancer ([address]     colon cancer    Carotid stenosis 11/10/2015    Cerebral embolism with cerebral infarction ([address] 9-18-15    Seizure X 1, No Residual    Chronic back pain     Depression     History of blood transfusion     History of external beam radiation therapy 2017    pelvis/anal canal in 2017 per Dr. Rosemary Keita at [facility] Hyperlipidemia     Hypertension     Prolonged emergence from general anesthesia     RA (rheumatoid arthritis) ([address]     \"All Over\"    Seizure ([address] 9-18-15    X 1    Sleep apnea     Uses CPAP    Teeth missing     Upper And Lower    Urinary incontinence     UTI (urinary tract infection) In Past    No Current Symptoms    Wears dentures     Full Upper , Partial Lower    Wears glasses     Wears partial dentures     Lower       Past Surgical History         Procedure Laterality Date    CAROTID ENDARTERECTOMY Right [id number]    CARPAL TUNNEL RELEASE      COLONOSCOPY  In Past    DENTAL SURGERY      Teeth Extracted In Past    DILATION AND CURETTAGE OF UTERUS  1960's    Prior To Vaginal Hysterectomy    HYSTERECTOMY, VAGINAL  1960's    PORT SURGERY N/A 3/2/2021    PORT REMOVAL performed by Julia [facility]arcenas MD at [facility]       Medications Prior to Admission:     Prior to Admission medications    Medication Sig Start Date End Date Taking? Authorizing Provider   levETIRAcetam (KEPPRA) 500 MG tablet Take 1 tablet by mouth 2 times daily 4/9/21  Yes Coni Coker MD   triamterene-hydroCHLOROthiazide [facility]) 37.5-25 MG per tablet take 1 tablet by mouth once daily 3/16/21  Yes Historical Provider, MD   docusate sodium (COLACE) 100 MG capsule Take 100 mg by mouth nightly 2 tablets   Yes Historical Provider, MD   diltiazem (DILACOR XR) 180 MG extended release capsule Take 180 mg by mouth nightly    Yes Historical Provider, MD   atorvastatin (LIPITOR) 20 MG tablet Take 20 mg by mouth nightly    Yes Historical Provider, MD   aspirin 81 MG tablet Take 81 mg by mouth nightly    Yes Historical Provider, MD   CALCIUM PO Take by mouth nightly    Yes Historical Provider, MD   vitamin D3 (COLECALCIFEROL) 400 UNITS TABS Take 5,000 Units by mouth daily    Yes Historical Provider, MD   folic acid (FOLVITE) 691 MCG tablet Take 400 mcg by mouth every morning   Yes Historical Provider, MD   methotrexate 2.5 MG tablet Take 2.5 mg by mouth once a week Indications: Take 7 tablets one time a week    Yes Historical Provider, MD   predniSONE (DELTASONE) 5 MG tablet Take 5 mg by mouth 2 times daily    Yes Historical Provider, MD   donepezil (ARICEPT) 5 MG tablet  4/6/21   Historical Provider, MD       Allergies     Cortisone, Codeine, and Pcn [penicillins]    Social History   TOBACCO:   reports that she has never smoked.  She has never used smokeless tobacco.  ETOH:   reports no history of alcohol use. Family History         Problem Relation Age of Onset    Stroke Mother     Heart Disease Mother     Arthritis Mother     Diabetes Mother     High Blood Pressure Mother     High Cholesterol Mother    [de-identified] / [place] Mother     Other Father         \"Surgery For Twisted Bowels\"    Heart Disease Sister     Stroke Sister     High Blood Pressure Brother     Heart Disease Brother     Hearing Loss Brother         Heart Attack    Diabetes Sister     High Blood Pressure Sister     Other Sister         Gout    Arthritis Sister     Mental Illness Sister         Schizophrenia    High Blood Pressure Brother     High Blood Pressure Son     Mental Illness Son         Anxiety    Depression Son     Other Son         Pancreatitis, Surgery For Brain Aneurysm    High Blood Pressure Son     Other Son         Gout    Learning Disabilities Son         Autistic, Mentally Retarded    Other Son         Seizures       Review of Systems   Constitutional:  No weight loss, no night sweats  Eyes:  No visual changes  ENT:  No nasal drainage or ear pain  CV:  No chest pain  PULM:  No cough, no shortness of breath  GI:  No nausea, vomiting, diarrhea  :  No dysuria  Musc:  As above. Neuro: No numbness, tingling or parethesias  Skin:  No rashes  Psych: No depression symptoms    Physical Exam:    Vitals: BP (!) 107/54   Pulse 83   Temp 97.8 °F (36.6 °C) (Oral)   Resp 16   Ht 5' 2\" (1.575 m)   Wt 125 lb (56.7 kg)   SpO2 96%   BMI 22.86 kg/m² ,  Body mass index is 22.86 kg/m². General appearance: alert, appears stated age and cooperative  Skin: Skin color, texture, turgor normal. No rashes or lesions  HEENT: Head: Normal, normocephalic, atraumatic. Neck: supple, symmetrical, trachea midline  Lungs: Pt without audible wheezing, no distress. Easy conversations. Heart: regular rate and rhythm  Extremities: Moderate effusion of LEFT knee with generalized tenderness.   Limited flexion due to pain.  Right knee with small effusion as well. Valgus arthrosis noted bilaterally. Mild LE edema. No calf tenderness. Neurologic: Mental status: Alert, oriented, thought content appropriate    Labs     CBC:   Recent Labs     05/08/21  1445 05/09/21  0556 05/10/21  1031   WBC 9.0 6.0 8.1   HGB 12.3* 10.2* 10.3*    282 287     BMP:    Recent Labs     05/08/21  1445 05/09/21  0556    137   K 4.2 4.0    101   CO2 29 27   BUN 14 21   CREATININE 0.5* 0.6   GLUCOSE 140* 91     INR: No results for input(s): INR in the last 72 hours. X-ray view   Imaging Review  LEFT knee (5/8/21): Findings are suspicious for a depressed lateral tibial plateau fracture with   no obvious fracture line identified.  There is a well-corticated ossific   density adjacent to the lateral compartment which may be related to prior   trauma or a loose body.       Large joint effusion.         CT LEFT knee (5/8/21): No acute bony abnormality identified.  Chronic appearing concavity of the   lateral tibial plateau.       Advanced tricompartmental osteoarthrosis with multiple intra-articular   bodies. Winston Dawson is a small to moderate-sized knee joint effusion with synovial   thickening.       Extensive vascular calcifications. Assessment and Plan     Patient Active Problem List   Diagnosis Code    Cerebral embolism with cerebral infarction ([address] I63.40    Partial seizures ([address] R56.9    RA (rheumatoid arthritis) ([facility]) M06.9    Left hemiparesis ([address] G81.94    Abnormality of gait R26.9    Essential hypertension I10    Rheumatoid arthritis involving multiple sites with positive rheumatoid factor ([facility]) M05.79    Carotid stenosis I65.29    CVA, old, hemiparesis ([address] I69.359    Malignant neoplasm of anal canal ([facility]) C21.1    Anemia, unspecified D64.9    Other fatigue R53.83    Seizure ([facility]) R56.9    Effusion, left knee M25.462     1. Advanced tricompartmental OA LEFT knee  2.   Rheumatoid

## 2021-05-11 PROCEDURE — 6370000000 HC RX 637 (ALT 250 FOR IP): Performed by: NURSE PRACTITIONER

## 2021-05-11 PROCEDURE — 97535 SELF CARE MNGMENT TRAINING: CPT

## 2021-05-11 PROCEDURE — 99212 OFFICE O/P EST SF 10 MIN: CPT | Performed by: NURSE PRACTITIONER

## 2021-05-11 PROCEDURE — G0378 HOSPITAL OBSERVATION PER HR: HCPCS

## 2021-05-11 PROCEDURE — 94761 N-INVAS EAR/PLS OXIMETRY MLT: CPT

## 2021-05-11 PROCEDURE — 2580000003 HC RX 258: Performed by: NURSE PRACTITIONER

## 2021-05-11 PROCEDURE — 6370000000 HC RX 637 (ALT 250 FOR IP): Performed by: HOSPITALIST

## 2021-05-11 PROCEDURE — 97166 OT EVAL MOD COMPLEX 45 MIN: CPT

## 2021-05-11 PROCEDURE — 96372 THER/PROPH/DIAG INJ SC/IM: CPT

## 2021-05-11 PROCEDURE — 97162 PT EVAL MOD COMPLEX 30 MIN: CPT

## 2021-05-11 PROCEDURE — 6360000002 HC RX W HCPCS: Performed by: NURSE PRACTITIONER

## 2021-05-11 PROCEDURE — 97530 THERAPEUTIC ACTIVITIES: CPT

## 2021-05-11 PROCEDURE — 97116 GAIT TRAINING THERAPY: CPT

## 2021-05-11 RX ORDER — FERROUS SULFATE 325(65) MG
325 TABLET ORAL
Status: DISCONTINUED | OUTPATIENT
Start: 2021-05-11 | End: 2021-05-14 | Stop reason: HOSPADM

## 2021-05-11 RX ADMIN — HYDROCODONE BITARTRATE AND ACETAMINOPHEN 1 TABLET: 5; 325 TABLET ORAL at 17:27

## 2021-05-11 RX ADMIN — SODIUM CHLORIDE, PRESERVATIVE FREE 10 ML: 5 INJECTION INTRAVENOUS at 21:39

## 2021-05-11 RX ADMIN — CILOSTAZOL 50 MG: 100 TABLET ORAL at 21:38

## 2021-05-11 RX ADMIN — TRIAMTERENE AND HYDROCHLOROTHIAZIDE 1 TABLET: 37.5; 25 TABLET ORAL at 09:16

## 2021-05-11 RX ADMIN — FERROUS SULFATE TAB 325 MG (65 MG ELEMENTAL FE) 325 MG: 325 (65 FE) TAB at 09:21

## 2021-05-11 RX ADMIN — DILTIAZEM HYDROCHLORIDE 180 MG: 180 CAPSULE, COATED, EXTENDED RELEASE ORAL at 21:38

## 2021-05-11 RX ADMIN — DOCUSATE SODIUM 100 MG: 100 CAPSULE, LIQUID FILLED ORAL at 21:38

## 2021-05-11 RX ADMIN — CILOSTAZOL 50 MG: 100 TABLET ORAL at 09:17

## 2021-05-11 RX ADMIN — DONEPEZIL HYDROCHLORIDE 5 MG: 5 TABLET, FILM COATED ORAL at 21:38

## 2021-05-11 RX ADMIN — LEVETIRACETAM 500 MG: 500 TABLET, FILM COATED ORAL at 09:17

## 2021-05-11 RX ADMIN — PREDNISONE 5 MG: 5 TABLET ORAL at 21:38

## 2021-05-11 RX ADMIN — ATORVASTATIN CALCIUM 20 MG: 20 TABLET, FILM COATED ORAL at 21:38

## 2021-05-11 RX ADMIN — HYDROCODONE BITARTRATE AND ACETAMINOPHEN 1 TABLET: 5; 325 TABLET ORAL at 05:13

## 2021-05-11 RX ADMIN — HEPARIN SODIUM 5000 UNITS: 5000 INJECTION INTRAVENOUS; SUBCUTANEOUS at 21:37

## 2021-05-11 RX ADMIN — LEVETIRACETAM 500 MG: 500 TABLET, FILM COATED ORAL at 21:38

## 2021-05-11 RX ADMIN — ASPIRIN 81 MG CHEWABLE TABLET 81 MG: 81 TABLET CHEWABLE at 21:38

## 2021-05-11 RX ADMIN — HEPARIN SODIUM 5000 UNITS: 5000 INJECTION INTRAVENOUS; SUBCUTANEOUS at 13:59

## 2021-05-11 RX ADMIN — HEPARIN SODIUM 5000 UNITS: 5000 INJECTION INTRAVENOUS; SUBCUTANEOUS at 05:10

## 2021-05-11 RX ADMIN — PREDNISONE 5 MG: 5 TABLET ORAL at 09:16

## 2021-05-11 ASSESSMENT — PAIN SCALES - GENERAL
PAINLEVEL_OUTOF10: 7
PAINLEVEL_OUTOF10: 0

## 2021-05-11 NOTE — PROGRESS NOTES
ONCOLOGY HEMATOLOGY CARE ([facility])  PROGRESS NOTE      Patient was seen and examined today. Not in any acute distress and no overnight events. She had knee aspiration and corticosteroid injection yesterday. Continues to deny fever, chills, night sweats  No chest pain, shortness of breath, no palpitations  She denies dizziness, headache  No abodominal pain  Reports pain in knee is improved    PHYSICAL EXAM    Vitals: BP (!) 113/59   Pulse 92   Temp 98.2 °F (36.8 °C) (Oral)   Resp 17   Ht 5' 2\" (1.575 m)   Wt 125 lb (56.7 kg)   SpO2 96%   BMI 22.86 kg/m²   CONSTITUTIONAL: awake, alert, cooperative, no apparent distress   EYES: sclera clear and conjunctiva normal  ENT: Normocephalic, without obvious abnormality, atraumatic  NECK: supple, symmetrical  HEMATOLOGIC/LYMPHATIC: no cervical, supraclavicular or axillary lymphadenopathy   LUNGS: no increased work of breathing and clear to auscultation   CARDIOVASCULAR: regular rate and rhythm, normal S1 and S2, no murmur noted  ABDOMEN: normal bowel sounds x 4, soft, non-distended, non-tender, no masses palpated, no hepatosplenomgaly   MUSCULOSKELETAL: full range of motion noted, tone is normal  NEUROLOGIC: awake, alert, oriented to name, place and time. Motor skills grossly intact. SKIN: Normal skin color, texture, turgor and no jaundice. appears intact   EXTREMITIES: Left knee swelling, bandage to knee c/d/i    LABORATORY RESULTS  CBC:   Recent Labs     05/08/21  1445 05/09/21  0556 05/10/21  1031   WBC 9.0 6.0 8.1   HGB 12.3* 10.2* 10.3*    282 287     BMP:    Recent Labs     05/08/21  1445 05/09/21  0556    137   K 4.2 4.0    101   CO2 29 27   BUN 14 21   CREATININE 0.5* 0.6   GLUCOSE 140* 91     Hepatic:   Recent Labs     05/08/21  1445   AST 26   ALT 15   BILITOT 0.9   ALKPHOS 134*       ASSESSMENT/ RECOMMENDATION    1. History of anal cancer. She had chemo and radiotherapy in 2017. She has been also seen by Dr. Lisa Cullen.     2.  She has anemia. Anemic work-reviewed. We will resume oral iron. 3.  She has history of RA. She fell and swollen left knee. Awaiting for orthopedic evaluation. 4. Seizure disorder- remains on keppra    To continue with the present management. We will continue to follow the patient. Thank you.

## 2021-05-11 NOTE — PROGRESS NOTES
Per Dtr , her mom stated that she was having the same pain across the top of her fore head as she did when she experienced her last seizure. Dtr wanted this to be noted.  Stated Nursing staff will cont to monitor

## 2021-05-11 NOTE — PROGRESS NOTES
Hospitalist Progress Note      Name:  Jennifer Live /Age/Sex: 1940  (68 y.o. female)   MRN & CSN:  [id number] & [id number] Admission Date/Time: 2021 11:34 AM   Location:  [address] [place] [address] PCP: Maksim Quezada MD         Hospital Day: 4    ASSESSMENT & PLAN:  Jennifer Live is a 68 y.o.  female   presented to the hospital with complaint of left knee pain after a fall. Left knee swelling. CT of the left knee demonstrated small to moderate sized left knee effusion. Bilateral lower extremity Doppler without evidence of DVT. Underwent arthrocentesis of the left knee with aspiration of 30 cc of bloody fluid and subsequently had a intra-articular steroid injection. #.  Mechanical fall---with a left knee effusion  -PT/OT    #. Left knee effusion---s/p arthrocentesis with aspiration of 30 cc of bloody fluid and intra-articular corticosteroid injection    #. Rheumatoid arthritis---on methotrexate and prednisone    #. Peripheral vascular disease  -Aspirin and Lipitor    #. Hypertension---on Maxzide    #. Seizure disorder---on Keppra 500 twice daily    #. History of anal cancer---s/p chemo and radiation in 2017      Diet DIET CARDIAC;   DVT Prophylaxis [x] Lovenox, []  Heparin, [] SCDs, [] Ambulation   GI Prophylaxis [] PPI,  [] H2 Blocker,  [] Carafate,  [] Diet/Tube Feeds   Code Status Full Code   Disposition  Home/SNF   MDM [] Low, [x] Moderate,[]  High     Chief complaint/Interval History/ROS     Chief Complaint: Follow home, left knee pain, left knee effusion    INTERVAL HISTORY:    Underwent a left knee arthrocentesis with removal of 30 cc of bloody fluid. ROS:  No chest pain. No abdominal pain. No nausea. No vomiting. Objective:        Intake/Output Summary (Last 24 hours) at 2021 1537  Last data filed at 2021 0039  Gross per 24 hour   Intake --   Output 1050 ml   Net -1050 ml      Vitals:   Vitals:    21 1403   BP: (!) 112/58   Pulse: 96   Resp: 18   Temp: 97.4 °F (36.3 °C) SpO2: 98%     Physical Exam:   GEN: Awake female, nontoxic appearing. Pleasant. Answers questions appropriate. EYES: No eye discharge. Ocular muscles intact. HENT: Membranes moist.  No nasal discharge. NECK: Supple  RESP: Clear to auscultation bilaterally  CV: RRR. No pitting extremity edema. GI: Abdomen soft. Nontender. Nondistended. :  no Webber in place  MSK: No bony fractures. Left knee effusion. SKIN: warm, dry, no rashes,  NEURO: Cranial nerves appear grossly intact, normal speech, no lateralizing weakness.   PSYCH: Awake, alert, oriented     Medications:   Medications:    ferrous sulfate  325 mg Oral Daily with breakfast    methotrexate  17.5 mg Oral Once per day on Mon    triamcinolone acetonide  40 mg Intra-articular Once    cilostazol  50 mg Oral BID    aspirin  81 mg Oral Nightly    atorvastatin  20 mg Oral Nightly    dilTIAZem  180 mg Oral Nightly    docusate sodium  100 mg Oral Nightly    donepezil  5 mg Oral Nightly    levETIRAcetam  500 mg Oral BID    predniSONE  5 mg Oral BID    triamterene-hydroCHLOROthiazide  1 tablet Oral Daily    sodium chloride flush  5-40 mL Intravenous 2 times per day    heparin (porcine)  5,000 Units Subcutaneous 3 times per day      Infusions:    sodium chloride       PRN Meds: HYDROcodone 5 mg - acetaminophen, 1 tablet, Q6H PRN  HYDROcodone 5 mg - acetaminophen, 1 tablet, Q6H PRN  sodium chloride flush, 10 mL, PRN  sodium chloride, 25 mL, PRN  polyethylene glycol, 17 g, Daily PRN  acetaminophen, 650 mg, Q6H PRN    Or  acetaminophen, 650 mg, Q6H PRN        Electronically signed by Mya Nelson MD on 5/11/2021 at 3:37 PM

## 2021-05-11 NOTE — PROGRESS NOTES
Occupational Therapy    [facility] ACUTE CARE OCCUPATIONAL THERAPY EVALUATION    Diandra Toney, 1940, 1115/1115-A, 5/11/2021    Discharge Recommendation: Ronaldo Israel    History:  [place]:  The primary encounter diagnosis was Injury of left knee, initial encounter. A diagnosis of Closed fracture of left tibial plateau, initial encounter was also pertinent to this visit. Subjective:  Patient states: \"My mind is gone. \"  Pain: Pt denied pain at rest this date, reported 7/10 pain in Lt knee with OOB activity  Communication with other providers: PT Michaela Daily, RN Zari Iniguez  Restrictions: General Precautions, Fall Risk, WBAT LLE, Bed/Chair Alarm    Home Setup/Prior level of function:  Social/Functional History  Lives With: Son (pt reports son is handicapped; daughter stays at night but works during day)  Type of Home: [address]: One level  Home Access: Stairs to enter without rails  Entrance Stairs - Number of Steps: 1  Bathroom Shower/Tub: Tub/Shower unit, Shower chair with back  [employer]: Grab bars in [address] [place]: [address] [facility] [address] [facility], 4 wheeled walker (Uses cane in home; Alyssa Mock in community)  [address] Help From: Family  ADL Assistance: Needs assistance (Family assists with tub transfers and lower body/back washing; assist with upper and lower body dressing)  Homemaking Assistance: Needs assistance (Unable to stand long enough to perform dishwashing/laundry etc)  Ambulation Assistance: Independent (mod I with cane in home, 4WW in community)   Transfer Assistance: Needs assistance (transferring into tub)  Active : Yes (was driving, had seizure and unable to drive for six weeks)  Additional Comments: Pt may not be a reliable historian and above information should be confirmed with daughter. Examination:  · Observation: Supine in bed upon arrival. Pleasant and agreeable to treatment. Pt reported being confused.    · Vision: WFL (glasses)  · Hearing: WFL  · Vitals: Stable vitals

## 2021-05-12 ENCOUNTER — TELEPHONE (OUTPATIENT)
Dept: ONCOLOGY | Age: 81
End: 2021-05-12

## 2021-05-12 PROBLEM — M25.462 EFFUSION OF LEFT KNEE: Status: ACTIVE | Noted: 2021-05-12

## 2021-05-12 LAB
ALBUMIN ELP: 2.9 GM/DL (ref 3.2–5.6)
ALBUMIN SERPL-MCNC: 3.4 GM/DL (ref 3.4–5)
ALP BLD-CCNC: 92 IU/L (ref 40–129)
ALPHA-1-GLOBULIN: 0.3 GM/DL (ref 0.1–0.4)
ALPHA-2-GLOBULIN: 0.8 GM/DL (ref 0.4–1.2)
ALT SERPL-CCNC: 16 U/L (ref 10–40)
ANION GAP SERPL CALCULATED.3IONS-SCNC: 8 MMOL/L (ref 4–16)
AST SERPL-CCNC: 20 IU/L (ref 15–37)
BANDED NEUTROPHILS ABSOLUTE COUNT: 0.3 K/CU MM
BANDED NEUTROPHILS RELATIVE PERCENT: 3 % (ref 5–11)
BETA GLOBULIN: 0.9 GM/DL (ref 0.5–1.3)
BILIRUB SERPL-MCNC: 0.4 MG/DL (ref 0–1)
BUN BLDV-MCNC: 20 MG/DL (ref 6–23)
CALCIUM SERPL-MCNC: 9.1 MG/DL (ref 8.3–10.6)
CHLORIDE BLD-SCNC: 102 MMOL/L (ref 99–110)
CO2: 25 MMOL/L (ref 21–32)
CREAT SERPL-MCNC: 0.8 MG/DL (ref 0.6–1.1)
DIFFERENTIAL TYPE: ABNORMAL
GAMMA GLOBULIN: 0.5 GM/DL (ref 0.5–1.6)
GFR AFRICAN AMERICAN: >60 ML/MIN/1.73M2
GFR NON-AFRICAN AMERICAN: >60 ML/MIN/1.73M2
GLUCOSE BLD-MCNC: 193 MG/DL (ref 70–99)
GLUCOSE BLD-MCNC: 195 MG/DL (ref 70–99)
HCT VFR BLD CALC: 29.9 % (ref 37–47)
HCT VFR BLD CALC: 32.5 % (ref 37–47)
HEMOGLOBIN: 11 GM/DL (ref 12.5–16)
HEMOGLOBIN: 9.8 GM/DL (ref 12.5–16)
LYMPHOCYTES ABSOLUTE: 0.3 K/CU MM
LYMPHOCYTES RELATIVE PERCENT: 3 % (ref 24–44)
MCH RBC QN AUTO: 30.7 PG (ref 27–31)
MCH RBC QN AUTO: 31.3 PG (ref 27–31)
MCHC RBC AUTO-ENTMCNC: 32.8 % (ref 32–36)
MCHC RBC AUTO-ENTMCNC: 33.8 % (ref 32–36)
MCV RBC AUTO: 92.6 FL (ref 78–100)
MCV RBC AUTO: 93.7 FL (ref 78–100)
MONOCYTES ABSOLUTE: 0.4 K/CU MM
MONOCYTES RELATIVE PERCENT: 4 % (ref 0–4)
PDW BLD-RTO: 14.7 % (ref 11.7–14.9)
PDW BLD-RTO: 14.7 % (ref 11.7–14.9)
PLATELET # BLD: 336 K/CU MM (ref 140–440)
PLATELET # BLD: 370 K/CU MM (ref 140–440)
PMV BLD AUTO: 9.6 FL (ref 7.5–11.1)
PMV BLD AUTO: 9.7 FL (ref 7.5–11.1)
POTASSIUM SERPL-SCNC: 4.4 MMOL/L (ref 3.5–5.1)
RBC # BLD: 3.19 M/CU MM (ref 4.2–5.4)
RBC # BLD: 3.51 M/CU MM (ref 4.2–5.4)
SEGMENTED NEUTROPHILS ABSOLUTE COUNT: 9 K/CU MM
SEGMENTED NEUTROPHILS RELATIVE PERCENT: 90 % (ref 36–66)
SODIUM BLD-SCNC: 135 MMOL/L (ref 135–145)
SPEP INTERPRETATION: ABNORMAL
TOTAL PROTEIN: 5.5 GM/DL (ref 6.4–8.2)
TOTAL PROTEIN: 5.7 GM/DL (ref 6.4–8.2)
TOXIC GRANULATION: PRESENT
TROPONIN T: 0.01 NG/ML
WBC # BLD: 10 K/CU MM (ref 4–10.5)
WBC # BLD: 9.3 K/CU MM (ref 4–10.5)

## 2021-05-12 PROCEDURE — 6370000000 HC RX 637 (ALT 250 FOR IP): Performed by: HOSPITALIST

## 2021-05-12 PROCEDURE — 6360000002 HC RX W HCPCS: Performed by: NURSE PRACTITIONER

## 2021-05-12 PROCEDURE — 85007 BL SMEAR W/DIFF WBC COUNT: CPT

## 2021-05-12 PROCEDURE — 97110 THERAPEUTIC EXERCISES: CPT

## 2021-05-12 PROCEDURE — 97116 GAIT TRAINING THERAPY: CPT

## 2021-05-12 PROCEDURE — 6370000000 HC RX 637 (ALT 250 FOR IP): Performed by: NURSE PRACTITIONER

## 2021-05-12 PROCEDURE — 85027 COMPLETE CBC AUTOMATED: CPT

## 2021-05-12 PROCEDURE — 80053 COMPREHEN METABOLIC PANEL: CPT

## 2021-05-12 PROCEDURE — 6370000000 HC RX 637 (ALT 250 FOR IP): Performed by: SPECIALIST

## 2021-05-12 PROCEDURE — 6370000000 HC RX 637 (ALT 250 FOR IP): Performed by: INTERNAL MEDICINE

## 2021-05-12 PROCEDURE — 94761 N-INVAS EAR/PLS OXIMETRY MLT: CPT

## 2021-05-12 PROCEDURE — 93005 ELECTROCARDIOGRAM TRACING: CPT | Performed by: PHYSICIAN ASSISTANT

## 2021-05-12 PROCEDURE — 82962 GLUCOSE BLOOD TEST: CPT

## 2021-05-12 PROCEDURE — 97535 SELF CARE MNGMENT TRAINING: CPT

## 2021-05-12 PROCEDURE — 84484 ASSAY OF TROPONIN QUANT: CPT

## 2021-05-12 PROCEDURE — 2580000003 HC RX 258: Performed by: NURSE PRACTITIONER

## 2021-05-12 PROCEDURE — 99231 SBSQ HOSP IP/OBS SF/LOW 25: CPT | Performed by: INTERNAL MEDICINE

## 2021-05-12 PROCEDURE — 97530 THERAPEUTIC ACTIVITIES: CPT

## 2021-05-12 PROCEDURE — 96372 THER/PROPH/DIAG INJ SC/IM: CPT

## 2021-05-12 PROCEDURE — 36415 COLL VENOUS BLD VENIPUNCTURE: CPT

## 2021-05-12 PROCEDURE — 2140000000 HC CCU INTERMEDIATE R&B

## 2021-05-12 RX ORDER — HYDROCORTISONE ACETATE 25 MG/1
25 SUPPOSITORY RECTAL 2 TIMES DAILY
Status: DISCONTINUED | OUTPATIENT
Start: 2021-05-12 | End: 2021-05-14 | Stop reason: HOSPADM

## 2021-05-12 RX ORDER — DILTIAZEM HYDROCHLORIDE 5 MG/ML
10 INJECTION INTRAVENOUS ONCE
Status: DISCONTINUED | OUTPATIENT
Start: 2021-05-12 | End: 2021-05-12

## 2021-05-12 RX ORDER — SODIUM CHLORIDE 9 MG/ML
INJECTION, SOLUTION INTRAVENOUS CONTINUOUS
Status: DISCONTINUED | OUTPATIENT
Start: 2021-05-12 | End: 2021-05-13

## 2021-05-12 RX ORDER — MESALAMINE 1000 MG/1
500 SUPPOSITORY RECTAL 2 TIMES DAILY
Status: DISCONTINUED | OUTPATIENT
Start: 2021-05-12 | End: 2021-05-12

## 2021-05-12 RX ORDER — LORAZEPAM 0.5 MG/1
0.5 TABLET ORAL EVERY 4 HOURS PRN
Status: DISCONTINUED | OUTPATIENT
Start: 2021-05-12 | End: 2021-05-14 | Stop reason: HOSPADM

## 2021-05-12 RX ORDER — FERROUS SULFATE 325(65) MG
325 TABLET ORAL
Qty: 30 TABLET | Refills: 2 | Status: SHIPPED | OUTPATIENT
Start: 2021-05-13 | End: 2022-04-20

## 2021-05-12 RX ADMIN — HEPARIN SODIUM 5000 UNITS: 5000 INJECTION INTRAVENOUS; SUBCUTANEOUS at 05:17

## 2021-05-12 RX ADMIN — SODIUM CHLORIDE, PRESERVATIVE FREE 10 ML: 5 INJECTION INTRAVENOUS at 09:09

## 2021-05-12 RX ADMIN — DILTIAZEM HYDROCHLORIDE 180 MG: 180 CAPSULE, COATED, EXTENDED RELEASE ORAL at 20:32

## 2021-05-12 RX ADMIN — HYDROCODONE BITARTRATE AND ACETAMINOPHEN 1 TABLET: 5; 325 TABLET ORAL at 05:17

## 2021-05-12 RX ADMIN — LORAZEPAM 0.5 MG: 0.5 TABLET ORAL at 18:42

## 2021-05-12 RX ADMIN — DONEPEZIL HYDROCHLORIDE 5 MG: 5 TABLET, FILM COATED ORAL at 20:39

## 2021-05-12 RX ADMIN — HEPARIN SODIUM 5000 UNITS: 5000 INJECTION INTRAVENOUS; SUBCUTANEOUS at 20:33

## 2021-05-12 RX ADMIN — FERROUS SULFATE TAB 325 MG (65 MG ELEMENTAL FE) 325 MG: 325 (65 FE) TAB at 09:08

## 2021-05-12 RX ADMIN — HYDROCODONE BITARTRATE AND ACETAMINOPHEN 1 TABLET: 5; 325 TABLET ORAL at 12:11

## 2021-05-12 RX ADMIN — LEVETIRACETAM 500 MG: 500 TABLET, FILM COATED ORAL at 20:32

## 2021-05-12 RX ADMIN — DOCUSATE SODIUM 100 MG: 100 CAPSULE, LIQUID FILLED ORAL at 20:32

## 2021-05-12 RX ADMIN — SODIUM CHLORIDE: 9 INJECTION, SOLUTION INTRAVENOUS at 21:58

## 2021-05-12 RX ADMIN — CILOSTAZOL 50 MG: 100 TABLET ORAL at 09:08

## 2021-05-12 RX ADMIN — TRIAMTERENE AND HYDROCHLOROTHIAZIDE 1 TABLET: 37.5; 25 TABLET ORAL at 09:08

## 2021-05-12 RX ADMIN — ATORVASTATIN CALCIUM 20 MG: 20 TABLET, FILM COATED ORAL at 20:32

## 2021-05-12 RX ADMIN — SODIUM CHLORIDE, PRESERVATIVE FREE 10 ML: 5 INJECTION INTRAVENOUS at 21:56

## 2021-05-12 RX ADMIN — HYDROCORTISONE ACETATE 25 MG: 25 SUPPOSITORY RECTAL at 21:08

## 2021-05-12 RX ADMIN — PREDNISONE 5 MG: 5 TABLET ORAL at 09:08

## 2021-05-12 RX ADMIN — LEVETIRACETAM 500 MG: 500 TABLET, FILM COATED ORAL at 09:08

## 2021-05-12 RX ADMIN — HEPARIN SODIUM 5000 UNITS: 5000 INJECTION INTRAVENOUS; SUBCUTANEOUS at 14:31

## 2021-05-12 RX ADMIN — PREDNISONE 5 MG: 5 TABLET ORAL at 20:32

## 2021-05-12 ASSESSMENT — PAIN SCALES - GENERAL
PAINLEVEL_OUTOF10: 0
PAINLEVEL_OUTOF10: 4
PAINLEVEL_OUTOF10: 7

## 2021-05-12 ASSESSMENT — PAIN DESCRIPTION - FREQUENCY: FREQUENCY: INTERMITTENT

## 2021-05-12 ASSESSMENT — PAIN DESCRIPTION - LOCATION: LOCATION: KNEE

## 2021-05-12 ASSESSMENT — PAIN DESCRIPTION - DESCRIPTORS: DESCRIPTORS: ACHING;DULL

## 2021-05-12 NOTE — DISCHARGE INSTR - COC
Continuity of Care Form    Patient Name: Sherrie Peck   :  1940  MRN:  [id number]    Admit date:  2021  Discharge date:  21    Code Status Order: Full Code   Advance Directives:   [facility] Documentation       Date/Time Healthcare Directive Type of Healthcare Directive Copy in [address] [facility] [address] Agent's Name Healthcare Agent's Phone Number    21 1723  No, patient does not have an advance directive for healthcare treatment -- -- -- -- --            Admitting Physician:  Spencer Christianson MD  PCP: Angeles Almodovar MD    Discharging Nurse: JEN [facility] Unit/Room#: 1115/1115-A  Discharging Unit Phone Number: [phone]    Emergency Contact:   Extended Emergency Contact Information  Primary Emergency Contact: [address] of [address] [place] [address] Phone: [phone]  Mobile Phone: [phone]  Relation: Grandchild  Secondary Emergency Contact: [facility] [address] Phone: [phone]  Mobile Phone: [phone]  Relation: Child    Past Surgical History:  Past Surgical History:   Procedure Laterality Date    ARTHROCENTESIS / ASPIRATION / INJECTION KNEE  2021         CAROTID ENDARTERECTOMY Right [id number]    CARPAL TUNNEL RELEASE      COLONOSCOPY  In Past    DENTAL SURGERY      Teeth Extracted In Past    DILATION AND CURETTAGE OF UTERUS  's    Prior To Vaginal Hysterectomy    HYSTERECTOMY, VAGINAL  's    PORT SURGERY N/A 3/2/2021    PORT REMOVAL performed by Amarjit Mahoney MD at [facility] OR       Immunization History: There is no immunization history on file for this patient.     Active Problems:  Patient Active Problem List   Diagnosis Code    Cerebral embolism with cerebral infarction ([address] I63.40    Partial seizures ([address] R56.9    RA (rheumatoid arthritis) ([address] M06.9    Left hemiparesis (HCC) G81.94    Abnormality of gait R26.9    Essential hypertension I10    Rheumatoid arthritis involving multiple sites with positive rheumatoid factor (HCC) M05.79    Carotid stenosis I65.29    CVA, old, hemiparesis ([address] I69.359    Malignant neoplasm of anal canal (HCC) C21.1    Anemia, unspecified D64.9    Other fatigue R53.83    Seizure (HCC) R56.9    Effusion, left knee M25.462    Effusion of left knee M25.462       Isolation/Infection:   Isolation            No Isolation          Patient Infection Status       Infection Onset Added Last Indicated Last Indicated By Review Planned Expiration Resolved Resolved By    None active    Resolved    COVID-19 Rule Out 02/26/21 02/26/21 02/26/21 Covid-19 Ambulatory (Ordered)   02/27/21 Rule-Out Test Resulted            Nurse Assessment:  Last Vital Signs: BP (!) 146/67   Pulse 95   Temp 97.9 °F (36.6 °C) (Oral)   Resp 16   Ht 5' 2\" (1.575 m)   Wt 125 lb (56.7 kg)   SpO2 100%   BMI 22.86 kg/m²     Last documented pain score (0-10 scale): Pain Level: 0  Last Weight:   Wt Readings from Last 1 Encounters:   05/10/21 125 lb (56.7 kg)     Mental Status:  oriented    IV Access:  - None    Nursing Mobility/ADLs:  Walking   Assisted  Transfer  Assisted  Bathing  Assisted  Dressing  Assisted  Toileting  Assisted  Feeding  Independent  Med Admin  Assisted  Med Delivery   whole    Wound Care Documentation and Therapy:  Incision 11/18/15 Neck Right (Active)   Number of days: 2001        Elimination:  Continence:   · Bowel: Yes  · Bladder: Yes  Urinary Catheter: None   Colostomy/Ileostomy/Ileal Conduit: No       Date of Last BM: 5/11/21    Intake/Output Summary (Last 24 hours) at 5/12/2021 1143  Last data filed at 5/12/2021 0908  Gross per 24 hour   Intake 10 ml   Output --   Net 10 ml     No intake/output data recorded.     Safety Concerns:     None    Impairments/Disabilities:      None    Nutrition Therapy:  Current Nutrition Therapy:   - Oral Diet:  General    Routes of Feeding: Oral  Liquids: No Restrictions  Daily Fluid Restriction: no  Last Modified Barium Swallow with Video (Video Swallowing Test): not done    Treatments at the Time of Hospital Discharge:   Respiratory Treatments:   Oxygen Therapy:  is not on home oxygen therapy. Ventilator:    - No ventilator support    Rehab Therapies: Physical Therapy  Weight Bearing Status/Restrictions: as tolerated  Other Medical Equipment (for information only, NOT a DME order): Other Treatments:     Patient's personal belongings (please select all that are sent with patient):  None    RN SIGNATURE:  Electronically signed by Michael Brambila RN on 5/14/21 at 12:03 PM EDT      PHYSICIAN SECTION    Prognosis: Good    Condition at Discharge: Stable    Rehab Potential (if transferring to Rehab): Good    Recommended Labs or Other Treatments After Discharge:     Physician Certification: I certify the above information and transfer of María Ramos  is necessary for the continuing treatment of the diagnosis listed and that she requires [facility] for greater 30 days.      Update Admission H&P: No change in H&P    PHYSICIAN SIGNATURE:  Electronically signed by Franco Bridges MD on 5/13/21 at 11:44 AM EDT

## 2021-05-12 NOTE — PROGRESS NOTES
Occupational Therapy  . Occupational Therapy Treatment Note      Name: Sherrie Peck MRN: [id number] :   1940   Date:  2021   Admission Date: 2021 Room:  [address] [place] [address]-A     Primary Problem:  The primary encounter diagnosis was Injury of left knee, initial encounter. A diagnosis of Closed fracture of left tibial plateau, initial encounter was also pertinent to this visit. Restrictions/Precautions:    General Precautions, Fall Risk, WBAT LLE    Communication with other providers:  Per chart review and Nurse Rylie, patient is appropriate for therapeutic intervention. Notified RN Rylie of apparent blood staining on pull up and toilet tissue. Nurse notified that pt reports Hx of rectal cancer and that blood during toileting is new from status at home. Also reported to nurse that it appears to be rectal, but is hard for this therapist to determine for certain. Pt reported Hx of removal of uterus. Subjective:  Patient states:  \"I had chemo for cancer in my rectum. The blood in my depends is new. I wasn't having that at home. It's new. \" Family member Matthew Vladislav present and expressing concern for blood in Depends, stating, \"She never told me about this. \"   Pain: 0/10, denies pain at this time (location, type, intensity)    Objective:    Observation:  A&O x4, received seated in bedside chair, urgent for toilet and call light no. Pt required up to Mod A c RW + max cues for safe body positioning for safety / unsteadiness with decreased safety awareness and urgency to get to toilet as fast as possible. Required cues to slow down and not worry about an accident in the depends and safe pace was more important. Actively participated throughout. Objective Measures:  N/A    Treatment, including education:  Therapeutic Activity Training:   Therapeutic activity training was instructed today. Cues were given for safety, sequence, UE/LE placement, awareness, and balance.     Activities performed today included OT POC per patient's tolerance. Time in:  1107  Time out:  1135  Timed treatment minutes:  28  Total treatment time:  28      Electronically signed by:    LIZ Vaz  5/12/2021, 10:12 AM       Goals:  1. Pt will complete all aspects of bed mobility for EOB/OOB ADLs with supervision with HOB flat. 2. Pt will complete UB/LB bathing with Min A using long-handled sponge. 3. Pt will complete all aspects of LB dressing with Mod A using AE PRN. 4. Pt will complete all functional transfers to and from bed, chair, toilet, shower chair with SBA. 5. Pt will ambulate HH distance to bathroom for toileting with RW with SBA. 6. Pt will complete all aspects of toileting task with SBA. 7. Pt will complete oral hygiene/grooming routine in standing at sink with SBA. 8. Pt will complete ther ex/ther act with focus on activity tolerance in standing > 5 minutes.

## 2021-05-12 NOTE — CARE COORDINATION
F/u with Pt in the room. LSW talked to the Pt about her discharge plans. Therapy recommended SNF. Pt stated that she would like to go to [facility]. LSW called [facility] with the referral. LSW had to leave a message. LSW faxed facesheet to [facility]. Call from [facility]. They will accept the Pt. They can take her when she is medically stable. . PS sent to hospitlist.

## 2021-05-12 NOTE — PROGRESS NOTES
Physical Therapy    Physical Therapy Treatment Note  Name: Michelle Alegre MRN: [id number] :   1940   Date:  2021   Admission Date: 2021 Room:  [address] [place] [address]   Restrictions/Precautions:        general precautions, WBAT L LE, fall risk    Communication with other providers:  RN approves tx session. Subjective:  Patient states:  Agreeable to tx session; \"I don't know why they want me to go to more rehab. \"    Pain:   Location, Type, Intensity (0/10 to 10/10):  Soreness LLE does not rate. Objective:    Observation:  Pt in chair upon arrival.    Treatment, including education/measures:  Transfers:  Scooting :SBA  Sit to stand :[place]  Stand to sit :CGA; With VC's for eccentric control. Gait:  Pt amb with RW for 75 ft with CG assist; pt amb with verydecreased tremaine, R toe out, wide JUAN, forward flexed posture. No LOB but pt very unsteady throughout amb trial.   Pt needed VC's for pathway, safety    Exercises: Ankle Pumps x 10  Marching x 10  LAQ x 10  Pillow Squeezes x 10  Glut Sets x 10    Safety  Patient left safely in the chair, with call light/phone in reach with alarm applied. Gait belt and mask were used for transfers and gait.     Assessment / Impression:       Patient's tolerance of treatment:  good   Adverse Reaction: none  Significant change in status and impact:  none  Barriers to improvement:  Safety, endurance, strength    Plan for Next Session:    Will cont to work towards pt's goals per her tolerance    Time in:  1500  Time out:  1523  Timed treatment minutes:  23  Total treatment time:  23    Previously filed items:  Social/Functional History  Lives With: Son(pt reports son is handicapped; daughter stays at night but works during day)  Type of Home: [address]: One level  Home Access: Stairs to enter without rails  Entrance Stairs - Number of Steps: 1  Bathroom Shower/Tub: Tub/Shower unit, Shower chair with back  [employer]: Grab bars in [address]: Lashonda Rosas, 4 wheeled walker(Uses cane in home; Hedwig Blotter in community)  [address] Help From: Family  ADL Assistance: Needs assistance(family assists with tub transfers and lower body/back washing; assist with upper and lower body dressing)  Homemaking Assistance: Needs assistance(unable to stand long enough to perform dishwashing/laundry etc)  Active : Yes(was driving, had seizure and unable to drive for six weeks)  Short term goals  Time Frame for Short term goals: 1 week  Short term goal 1: Pt to complete all bed mobility mod I  Short term goal 2: Pt to complete all STS transfers to/from bed, commode, and chair mod I  Short term goal 3: Pt to ambulate 48' with LRAD CGA       Electronically signed by:    Malena Camopverde PTA  5/12/2021, 3:25 PM

## 2021-05-12 NOTE — PROGRESS NOTES
ONCOLOGY HEMATOLOGY CARE ([facility])  PROGRESS NOTE      Patient was seen and examined today. Not in any acute distress and no overnight events. She had knee aspiration and corticosteroid injection yesterday. Clinically stable. No acute pain. Likely she will go to rehab. To continue with PT.    PHYSICAL EXAM    Vitals: BP (!) 146/67   Pulse 95   Temp 97.9 °F (36.6 °C) (Oral)   Resp 16   Ht 5' 2\" (1.575 m)   Wt 125 lb (56.7 kg)   SpO2 100%   BMI 22.86 kg/m²   CONSTITUTIONAL: awake, alert, cooperative, no apparent distress   EYES: sclera clear and conjunctiva normal  ENT: Normocephalic, without obvious abnormality, atraumatic  NECK: supple, symmetrical  HEMATOLOGIC/LYMPHATIC: no cervical, supraclavicular or axillary lymphadenopathy   LUNGS: no increased work of breathing and clear to auscultation   CARDIOVASCULAR: regular rate and rhythm, normal S1 and S2, no murmur noted  ABDOMEN: normal bowel sounds x 4, soft, non-distended, non-tender, no masses palpated, no hepatosplenomgaly   MUSCULOSKELETAL: full range of motion noted, tone is normal  NEUROLOGIC: awake, alert, oriented to name, place and time. Motor skills grossly intact. CN II - XII grossly intact. SKIN: Normal skin color, texture, turgor and no jaundice. appears intact   EXTREMITIES: Left knee swelling improves. LABORATORY RESULTS  CBC:   Recent Labs     05/10/21  1031   WBC 8.1   HGB 10.3*        BMP:    No results for input(s): NA, K, CL, CO2, BUN, CREATININE, GLUCOSE in the last 72 hours. Hepatic:   No results for input(s): AST, ALT, ALB, BILITOT, ALKPHOS in the last 72 hours. ASSESSMENT/ RECOMMENDATION    1. History of anal cancer. She had chemo and radiotherapy in 2017. She has been also seen by Dr. Nel Bojorquez. Clinically in remission. 2.  She has anemia. Anemic work-reviewed. We will resume oral iron. 3.  She has history of RA. She fell and swollen left knee. S/p knee aspiration and steroid injection    4.  Seizure disorder- remains on keppra    To continue with PT/OT. We will continue to follow the patient. Thank you.

## 2021-05-12 NOTE — PROGRESS NOTES
Hospitalist Progress Note      Name:  Wicho Moore /Age/Sex: 1940  (68 y.o. female)   MRN & CSN:  [id number] & [id number] Admission Date/Time: 2021 11:34 AM   Location:  [facility] PCP: Xavier Kim MD         Hospital Day: 5    ASSESSMENT & PLAN:  Wicho Moore is a 68 y.o.  female   presented to the hospital with complaint of left knee pain after a fall. Left knee swelling. CT of the left knee demonstrated small to moderate sized left knee effusion. Bilateral lower extremity Doppler without evidence of DVT. Underwent arthrocentesis of the left knee with aspiration of 30 cc of bloody fluid and subsequently had a intra-articular steroid injection. #.  Mechanical fall---with a left knee effusion. PT recommended SNF placement for rehab placement. Daughter and the patient to think about SNF placement and make a decision by today. #.  Left knee effusion---s/p arthrocentesis with aspiration of 30 cc of bloody fluid and intra-articular corticosteroid injection. Swelling of the knee has subsided. Reported minimal pain with ambulation. #.  Rheumatoid arthritis---on methotrexate and prednisone    #. Peripheral vascular disease--aspirin and Lipitor. #.  Hypertension---on Maxzide    #. Seizure disorder---on Keppra 500 twice daily    #. History of anal cancer---s/p chemo and radiation in 2017      Diet DIET CARDIAC;   DVT Prophylaxis [x] Lovenox, []  Heparin, [] SCDs, [] Ambulation   GI Prophylaxis [] PPI,  [] H2 Blocker,  [] Carafate,  [] Diet/Tube Feeds   Code Status Full Code   Disposition  Home/SNF   MDM [] Low, [x] Moderate,[]  High     Chief complaint/Interval History/ROS     Chief Complaint: Follow home, left knee pain, left knee effusion    INTERVAL HISTORY:    : Overall stable. PT recommended SNF placement. Daughter and the patient to discuss whether to pursue SNF placement. Hopefully they will make decision today.     : Underwent a left knee arthrocentesis with removal of 30 cc of bloody fluid. ROS:  No chest pain. No abdominal pain. No nausea. No vomiting. Objective: Intake/Output Summary (Last 24 hours) at 5/12/2021 1133  Last data filed at 5/12/2021 0908  Gross per 24 hour   Intake 10 ml   Output --   Net 10 ml      Vitals:   Vitals:    05/12/21 0718   BP: (!) 146/67   Pulse: 95   Resp: 16   Temp: 97.9 °F (36.6 °C)   SpO2: 100%     Physical Exam:   GEN: Awake female, nontoxic appearing. Pleasant. Answers questions appropriate. EYES: No eye discharge. Ocular muscles intact. HENT: Membranes moist.  No nasal discharge. NECK: Supple  RESP: Clear to auscultation bilaterally  CV: RRR. No pitting extremity edema. GI: Abdomen soft. Nontender. Nondistended. :  no Webber in place  MSK: No bony fractures. Left knee effusion. SKIN: warm, dry, no rashes,  NEURO: Cranial nerves appear grossly intact, normal speech, no lateralizing weakness.   PSYCH: Awake, alert, oriented     Medications:   Medications:    ferrous sulfate  325 mg Oral Daily with breakfast    methotrexate  17.5 mg Oral Once per day on Mon    triamcinolone acetonide  40 mg Intra-articular Once    cilostazol  50 mg Oral BID    aspirin  81 mg Oral Nightly    atorvastatin  20 mg Oral Nightly    dilTIAZem  180 mg Oral Nightly    docusate sodium  100 mg Oral Nightly    donepezil  5 mg Oral Nightly    levETIRAcetam  500 mg Oral BID    predniSONE  5 mg Oral BID    triamterene-hydroCHLOROthiazide  1 tablet Oral Daily    sodium chloride flush  5-40 mL Intravenous 2 times per day    heparin (porcine)  5,000 Units Subcutaneous 3 times per day      Infusions:    sodium chloride       PRN Meds: HYDROcodone 5 mg - acetaminophen, 1 tablet, Q6H PRN  sodium chloride flush, 10 mL, PRN  sodium chloride, 25 mL, PRN  polyethylene glycol, 17 g, Daily PRN  acetaminophen, 650 mg, Q6H PRN    Or  acetaminophen, 650 mg, Q6H PRN        Electronically signed by Vj Manuel MD on 5/12/2021 at 11:33 AM

## 2021-05-13 LAB
ALBUMIN SERPL-MCNC: 3.5 GM/DL (ref 3.4–5)
ALP BLD-CCNC: 98 IU/L (ref 40–129)
ALT SERPL-CCNC: 16 U/L (ref 10–40)
AMYLASE: 114 U/L (ref 25–115)
ANION GAP SERPL CALCULATED.3IONS-SCNC: 5 MMOL/L (ref 4–16)
APTT: 65.4 SECONDS (ref 25.1–37.1)
AST SERPL-CCNC: 20 IU/L (ref 15–37)
BASOPHILS ABSOLUTE: 0 K/CU MM
BASOPHILS RELATIVE PERCENT: 0.1 % (ref 0–1)
BILIRUB SERPL-MCNC: 0.6 MG/DL (ref 0–1)
BUN BLDV-MCNC: 17 MG/DL (ref 6–23)
CALCIUM SERPL-MCNC: 9.2 MG/DL (ref 8.3–10.6)
CHLORIDE BLD-SCNC: 105 MMOL/L (ref 99–110)
CO2: 26 MMOL/L (ref 21–32)
CREAT SERPL-MCNC: 0.6 MG/DL (ref 0.6–1.1)
DIFFERENTIAL TYPE: ABNORMAL
EKG ATRIAL RATE: 101 BPM
EKG ATRIAL RATE: 114 BPM
EKG ATRIAL RATE: 85 BPM
EKG DIAGNOSIS: NORMAL
EKG P AXIS: 28 DEGREES
EKG P AXIS: 55 DEGREES
EKG P AXIS: 63 DEGREES
EKG P-R INTERVAL: 150 MS
EKG P-R INTERVAL: 164 MS
EKG P-R INTERVAL: 178 MS
EKG Q-T INTERVAL: 332 MS
EKG Q-T INTERVAL: 356 MS
EKG Q-T INTERVAL: 376 MS
EKG QRS DURATION: 100 MS
EKG QRS DURATION: 84 MS
EKG QRS DURATION: 84 MS
EKG QTC CALCULATION (BAZETT): 447 MS
EKG QTC CALCULATION (BAZETT): 457 MS
EKG QTC CALCULATION (BAZETT): 461 MS
EKG R AXIS: -41 DEGREES
EKG R AXIS: 39 DEGREES
EKG R AXIS: 58 DEGREES
EKG T AXIS: 36 DEGREES
EKG T AXIS: 51 DEGREES
EKG T AXIS: 63 DEGREES
EKG VENTRICULAR RATE: 101 BPM
EKG VENTRICULAR RATE: 114 BPM
EKG VENTRICULAR RATE: 85 BPM
EOSINOPHILS ABSOLUTE: 0 K/CU MM
EOSINOPHILS RELATIVE PERCENT: 0 % (ref 0–3)
GFR AFRICAN AMERICAN: >60 ML/MIN/1.73M2
GFR NON-AFRICAN AMERICAN: >60 ML/MIN/1.73M2
GLUCOSE BLD-MCNC: 121 MG/DL (ref 70–99)
HCT VFR BLD CALC: 30.9 % (ref 37–47)
HEMOGLOBIN: 10.7 GM/DL (ref 12.5–16)
IMMATURE NEUTROPHIL %: 0.6 % (ref 0–0.43)
INR BLD: 0.95 INDEX
LIPASE: 29 IU/L (ref 13–60)
LV EF: 53 %
LVEF MODALITY: NORMAL
LYMPHOCYTES ABSOLUTE: 0.6 K/CU MM
LYMPHOCYTES RELATIVE PERCENT: 5.7 % (ref 24–44)
MCH RBC QN AUTO: 31.8 PG (ref 27–31)
MCHC RBC AUTO-ENTMCNC: 34.6 % (ref 32–36)
MCV RBC AUTO: 91.7 FL (ref 78–100)
MONOCYTES ABSOLUTE: 0.7 K/CU MM
MONOCYTES RELATIVE PERCENT: 6.4 % (ref 0–4)
NUCLEATED RBC %: 0 %
PDW BLD-RTO: 14.7 % (ref 11.7–14.9)
PLATELET # BLD: 373 K/CU MM (ref 140–440)
PMV BLD AUTO: 9.8 FL (ref 7.5–11.1)
POTASSIUM SERPL-SCNC: 4.6 MMOL/L (ref 3.5–5.1)
PROTHROMBIN TIME: 11.5 SECONDS (ref 11.7–14.5)
RBC # BLD: 3.37 M/CU MM (ref 4.2–5.4)
SEGMENTED NEUTROPHILS ABSOLUTE COUNT: 9.1 K/CU MM
SEGMENTED NEUTROPHILS RELATIVE PERCENT: 87.2 % (ref 36–66)
SODIUM BLD-SCNC: 136 MMOL/L (ref 135–145)
TOTAL IMMATURE NEUTOROPHIL: 0.06 K/CU MM
TOTAL NUCLEATED RBC: 0 K/CU MM
TOTAL PROTEIN: 5.5 GM/DL (ref 6.4–8.2)
TROPONIN T: 0.02 NG/ML
TROPONIN T: 0.05 NG/ML
WBC # BLD: 10.5 K/CU MM (ref 4–10.5)

## 2021-05-13 PROCEDURE — 94761 N-INVAS EAR/PLS OXIMETRY MLT: CPT

## 2021-05-13 PROCEDURE — 80053 COMPREHEN METABOLIC PANEL: CPT

## 2021-05-13 PROCEDURE — 97116 GAIT TRAINING THERAPY: CPT

## 2021-05-13 PROCEDURE — 93005 ELECTROCARDIOGRAM TRACING: CPT | Performed by: NURSE PRACTITIONER

## 2021-05-13 PROCEDURE — 6370000000 HC RX 637 (ALT 250 FOR IP): Performed by: SPECIALIST

## 2021-05-13 PROCEDURE — 85730 THROMBOPLASTIN TIME PARTIAL: CPT

## 2021-05-13 PROCEDURE — 36415 COLL VENOUS BLD VENIPUNCTURE: CPT

## 2021-05-13 PROCEDURE — 82150 ASSAY OF AMYLASE: CPT

## 2021-05-13 PROCEDURE — 93010 ELECTROCARDIOGRAM REPORT: CPT | Performed by: INTERNAL MEDICINE

## 2021-05-13 PROCEDURE — 2580000003 HC RX 258

## 2021-05-13 PROCEDURE — 93306 TTE W/DOPPLER COMPLETE: CPT

## 2021-05-13 PROCEDURE — 6370000000 HC RX 637 (ALT 250 FOR IP): Performed by: NURSE PRACTITIONER

## 2021-05-13 PROCEDURE — APPNB60 APP NON BILLABLE TIME 46-60 MINS: Performed by: NURSE PRACTITIONER

## 2021-05-13 PROCEDURE — 99232 SBSQ HOSP IP/OBS MODERATE 35: CPT | Performed by: INTERNAL MEDICINE

## 2021-05-13 PROCEDURE — 6360000002 HC RX W HCPCS: Performed by: NURSE PRACTITIONER

## 2021-05-13 PROCEDURE — 6370000000 HC RX 637 (ALT 250 FOR IP): Performed by: HOSPITALIST

## 2021-05-13 PROCEDURE — 85610 PROTHROMBIN TIME: CPT

## 2021-05-13 PROCEDURE — 84484 ASSAY OF TROPONIN QUANT: CPT

## 2021-05-13 PROCEDURE — 99223 1ST HOSP IP/OBS HIGH 75: CPT | Performed by: INTERNAL MEDICINE

## 2021-05-13 PROCEDURE — 83690 ASSAY OF LIPASE: CPT

## 2021-05-13 PROCEDURE — 97535 SELF CARE MNGMENT TRAINING: CPT

## 2021-05-13 PROCEDURE — 2140000000 HC CCU INTERMEDIATE R&B

## 2021-05-13 PROCEDURE — 6360000002 HC RX W HCPCS

## 2021-05-13 PROCEDURE — 2580000003 HC RX 258: Performed by: NURSE PRACTITIONER

## 2021-05-13 PROCEDURE — 97530 THERAPEUTIC ACTIVITIES: CPT

## 2021-05-13 PROCEDURE — 85025 COMPLETE CBC W/AUTO DIFF WBC: CPT

## 2021-05-13 RX ORDER — DILTIAZEM HYDROCHLORIDE 240 MG/1
240 CAPSULE, COATED, EXTENDED RELEASE ORAL NIGHTLY
Status: DISCONTINUED | OUTPATIENT
Start: 2021-05-13 | End: 2021-05-14 | Stop reason: HOSPADM

## 2021-05-13 RX ADMIN — HEPARIN SODIUM 5000 UNITS: 5000 INJECTION INTRAVENOUS; SUBCUTANEOUS at 05:50

## 2021-05-13 RX ADMIN — DILTIAZEM HYDROCHLORIDE 240 MG: 240 CAPSULE, COATED, EXTENDED RELEASE ORAL at 20:07

## 2021-05-13 RX ADMIN — HEPARIN SODIUM 5000 UNITS: 5000 INJECTION INTRAVENOUS; SUBCUTANEOUS at 13:37

## 2021-05-13 RX ADMIN — SODIUM CHLORIDE, PRESERVATIVE FREE 10 ML: 5 INJECTION INTRAVENOUS at 10:59

## 2021-05-13 RX ADMIN — DOCUSATE SODIUM 100 MG: 100 CAPSULE, LIQUID FILLED ORAL at 20:06

## 2021-05-13 RX ADMIN — HYDROCODONE BITARTRATE AND ACETAMINOPHEN 1 TABLET: 5; 325 TABLET ORAL at 20:06

## 2021-05-13 RX ADMIN — HEPARIN SODIUM 5000 UNITS: 5000 INJECTION INTRAVENOUS; SUBCUTANEOUS at 20:54

## 2021-05-13 RX ADMIN — ASPIRIN 81 MG CHEWABLE TABLET 81 MG: 81 TABLET CHEWABLE at 20:06

## 2021-05-13 RX ADMIN — PREDNISONE 5 MG: 5 TABLET ORAL at 09:42

## 2021-05-13 RX ADMIN — LEVETIRACETAM 500 MG: 500 TABLET, FILM COATED ORAL at 09:42

## 2021-05-13 RX ADMIN — ATORVASTATIN CALCIUM 20 MG: 20 TABLET, FILM COATED ORAL at 20:07

## 2021-05-13 RX ADMIN — DONEPEZIL HYDROCHLORIDE 5 MG: 5 TABLET, FILM COATED ORAL at 20:07

## 2021-05-13 RX ADMIN — DILTIAZEM HYDROCHLORIDE 30 MG: 30 TABLET, FILM COATED ORAL at 09:41

## 2021-05-13 RX ADMIN — HYDROCORTISONE ACETATE 25 MG: 25 SUPPOSITORY RECTAL at 09:42

## 2021-05-13 RX ADMIN — PREDNISONE 5 MG: 5 TABLET ORAL at 20:06

## 2021-05-13 RX ADMIN — HYDROCORTISONE ACETATE 25 MG: 25 SUPPOSITORY RECTAL at 20:54

## 2021-05-13 RX ADMIN — FERROUS SULFATE TAB 325 MG (65 MG ELEMENTAL FE) 325 MG: 325 (65 FE) TAB at 10:59

## 2021-05-13 RX ADMIN — TRIAMTERENE AND HYDROCHLOROTHIAZIDE 1 TABLET: 37.5; 25 TABLET ORAL at 09:42

## 2021-05-13 RX ADMIN — LEVETIRACETAM 500 MG: 500 TABLET, FILM COATED ORAL at 20:07

## 2021-05-13 ASSESSMENT — ENCOUNTER SYMPTOMS
EYE PAIN: 0
VOMITING: 0
NAUSEA: 0
BLOOD IN STOOL: 0
CHEST TIGHTNESS: 1
SHORTNESS OF BREATH: 0
ABDOMINAL PAIN: 0
BACK PAIN: 0
CONSTIPATION: 0
WHEEZING: 0
PHOTOPHOBIA: 0
DIARRHEA: 0
COUGH: 0
COLOR CHANGE: 0

## 2021-05-13 ASSESSMENT — PAIN SCALES - GENERAL
PAINLEVEL_OUTOF10: 8
PAINLEVEL_OUTOF10: 0
PAINLEVEL_OUTOF10: 0

## 2021-05-13 ASSESSMENT — PAIN DESCRIPTION - ORIENTATION: ORIENTATION: RIGHT

## 2021-05-13 ASSESSMENT — PAIN - FUNCTIONAL ASSESSMENT: PAIN_FUNCTIONAL_ASSESSMENT: PREVENTS OR INTERFERES SOME ACTIVE ACTIVITIES AND ADLS

## 2021-05-13 ASSESSMENT — PAIN DESCRIPTION - FREQUENCY: FREQUENCY: INTERMITTENT

## 2021-05-13 ASSESSMENT — PAIN DESCRIPTION - LOCATION: LOCATION: KNEE

## 2021-05-13 ASSESSMENT — PAIN DESCRIPTION - DESCRIPTORS: DESCRIPTORS: ACHING

## 2021-05-13 NOTE — PROGRESS NOTES
Pt having increased heart rate. Hospitalist notified and ordered telemetry and EKG. EKG results show SVT.

## 2021-05-13 NOTE — PROGRESS NOTES
Occupational Therapy  . Occupational Therapy Treatment Note      Name: Jennifer Live MRN: [id number] :   1940   Date:  2021   Admission Date: 2021 Room:  [facility]5/3125-A     Primary Problem:  The primary encounter diagnosis was Injury of left knee, initial encounter. A diagnosis of Closed fracture of left tibial plateau, initial encounter was also pertinent to this visit    Restrictions/Precautions:    WBAT LLE, general precautions, fall risk    Communication with other providers:  Per Nurse Radha swain for tx. Subjective:  Patient states:  \"can you pull the back of my britches up. \"  Pain: denies   (location, type, intensity)    Objective:    Observation:  patient in high fowlers with daughter and CM in room. m patient urgently needing to use restroom. Objective Measures:  Tele- HR- , room air    Treatment, including education:    ADL activity training was instructed today. Cues were given for safety, sequence, UE/LE placement, visual cues, and balance. Activities performed today included dressing, toileting, hand hygiene, and grooming. Oral care- SET UP while in high fowlers. Facial hygiene- MOD I while in high fowlers  toileting- CGA. Malka care while seated. LB dressing- Min A. patient able to doff depends, required assistance to thread BLE  And assistance to pull back of depends up . Grooming- Mod I while high fowlers. Therapeutic activity training was instructed today. Cues were given for safety, sequence, UE/LE placement, awareness, and balance. Activities performed today included bed mobility training, sup-sit, sit-stand, SPT. Supine to EOB- SBA with extra effort  Stand to FWW- CGA with extra effort  Functional mobility x10 feet x2- CGA for safety. Patient with R toe out, forward flexed position. Appears unsteady throughout. Sit to commode- CGA with use of toilet seat for slow and safe descent. Stand from commode-  CGA-Min A with use of toilet seat to push up.   Sit to

## 2021-05-13 NOTE — CONSULTS
Electrophysiology Consult Note      Reason for consultation:  svt    Chief complaint : sp mechanical fall    Referring physician: Mejia Faustin      Primary care physician: Paz Mathew MD      History of Present Illness:     Patient is 70-year-old female with history of carotid stenosis, CVA, seizures, hypertension, hyperlipidemia, rheumatoid arthritis, malignancy of anal canal presents with complaints of left knee pain. Patient reports that she had a mechanical fall at home and after falling to her knees she began to have the left knee pain. She denies lightheadedness or dizziness prior to the fall. Patient noted to have a small left knee effusion on CT. She did have arthrocentesis of the left knee and 30 cc of bloody fluid was aspirated. Patient also received steroid injection    EP was consulted for the episode of SVT. Patient reports he was laying in the bed when her heart rate increased to 160s. She denied any palpitations or feeling tachycardic at that time. However she did have some chest tightness. She reports that she had intermittent chest tightness over the last few weeks. She reports that it will come suddenly and resolve suddenly. She denies aggravating or elevating factors.   She denies any shortness of breath, lightheadedness, syncope or edema or dizziness    Patient was given adenosine and converted to sinus rhythm      Pastmedical history:   Past Medical History:   Diagnosis Date    Acid reflux     Anxiety     Bronchitis In Past    Cancer ([facility] [address]     colon cancer    Carotid stenosis 11/10/2015    Cerebral embolism with cerebral infarction ([facility] [address] 9-18-15    Seizure X 1, No Residual    Chronic back pain     Depression     History of blood transfusion     History of external beam radiation therapy 2017    pelvis/anal canal in 2017 [facility] Annie Cuba [facility]    Hyperlipidemia     Hypertension     Prolonged emergence from general anesthesia  RA (rheumatoid arthritis) (HCC)     \"All Over\"    Seizure ([facility] [address] 9-18-15    X 1    Sleep apnea     Uses CPAP    Teeth missing     Upper And Lower    Urinary incontinence     UTI (urinary tract infection) In Past    No Current Symptoms    Wears dentures     Full Upper , Partial Lower    Wears glasses     Wears partial dentures     Lower       Surgical history :   Past Surgical History:   Procedure Laterality Date    ARTHROCENTESIS / ASPIRATION / INJECTION KNEE  5/11/2021         CAROTID ENDARTERECTOMY Right [id number]    CARPAL TUNNEL RELEASE      COLONOSCOPY  In Past    DENTAL SURGERY      Teeth Extracted In Past    DILATION AND CURETTAGE OF UTERUS  1960's    Prior To Vaginal Hysterectomy    HYSTERECTOMY, VAGINAL  1960's    PORT SURGERY N/A 3/2/2021    PORT REMOVAL [facility] by [facility] OR       Family history:   Family History   Problem Relation Age of Onset    Stroke Mother     Heart Disease Mother     Arthritis Mother     Diabetes Mother     High Blood Pressure Mother     High Cholesterol Mother     Miscarriages / [place] Mother     Other Father         \"Surgery For Twisted Bowels\"    Heart Disease Sister     Stroke Sister     High Blood Pressure Brother     Heart Disease Brother     Hearing Loss Brother         Heart Attack    Diabetes Sister     High Blood Pressure Sister     Other Sister         Gout    Arthritis Sister     Mental Illness Sister         Schizophrenia    High Blood Pressure Brother     High Blood Pressure Son     Mental Illness Son         Anxiety    Depression Son     Other Son         Pancreatitis, Surgery For Brain Aneurysm    High Blood Pressure Son     Other Son         Gout    Learning Disabilities Son         Autistic, Mentally Retarded    Other Son         Seizures       Social history :  reports that she has never smoked.  She has never used smokeless tobacco. She reports that she does not drink alcohol or use arthralgias. Negative for back pain, myalgias and neck stiffness. Skin: Negative for color change and rash. Allergic/Immunologic: Negative for food allergies. Neurological: Negative for dizziness, syncope, numbness and headaches. Hematological: Does not bruise/bleed easily. Psychiatric/Behavioral: Negative for agitation, behavioral problems and confusion. Examination:      Vitals:    05/13/21 0700 05/13/21 0844 05/13/21 0941 05/13/21 1130   BP: 138/75  138/75 (!) 143/95   Pulse: 87   106   Resp: 18 17  19   Temp: 98.4 °F (36.9 °C)   98.1 °F (36.7 °C)   TempSrc: Oral   Oral   SpO2: 98% 98%  99%   Weight:       Height:            Body mass index is 23.96 kg/m². Physical Exam  Constitutional:       Appearance: Normal appearance. She is not ill-appearing. HENT:      Head: Normocephalic and atraumatic. Mouth/Throat:      Mouth: Mucous membranes are moist.   Eyes:      Conjunctiva/sclera: Conjunctivae normal.   Neck:      Musculoskeletal: Normal range of motion. Cardiovascular:      Rate and Rhythm: Normal rate and regular rhythm. Heart sounds: No murmur (grade 2/6 systolic murmur). Pulmonary:      Effort: Pulmonary effort is normal.      Breath sounds: No rales. Abdominal:      General: Abdomen is flat. Palpations: Abdomen is soft. Musculoskeletal: Normal range of motion. General: No tenderness. Right lower leg: No edema. Left lower leg: No edema. Skin:     General: Skin is warm and dry. Neurological:      General: No focal deficit present. Mental Status: She is alert and oriented to person, place, and time.                CBC:   Lab Results   Component Value Date    WBC 10.5 05/13/2021    HGB 10.7 05/13/2021    HCT 30.9 05/13/2021     05/13/2021     Lipids:  Lab Results   Component Value Date    CHOL 178 09/21/2015    TRIG 197 (H) 09/21/2015    HDL 63 09/21/2015    LDLDIRECT 95 09/21/2015     PT/INR:   Lab Results   Component Value Date INR 0.95 05/13/2021        BMP:    Lab Results   Component Value Date     05/13/2021    K 4.6 05/13/2021     05/13/2021    CO2 26 05/13/2021    BUN 17 05/13/2021     CMP:   Lab Results   Component Value Date    AST 20 05/13/2021    PROT 5.5 (L) 05/13/2021    BILITOT 0.6 05/13/2021    ALKPHOS 98 05/13/2021     TSH:  No results found for: TSH    EKGINTERPRETATION - EKG Interpretation:        IMPRESSION / RECOMMENDATIONS:     SVT  Sp mecahnical fall  Hypertension   Hyperlipidemia  History of Anal cancer    Discussed with the patient about pathophysiology of SVT and decided to manage this medically. Increase Cardizem as tolerated. 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 Patient does not want any procedures at this point. We will follow the patient as an outpatient    Thanks again for allowing me to participate in care of this patient. Please call me if you have any questions. With best regards. Antionette Koch MD, 5/13/2021 2:27 PM     Please note this report has been partially produced using speech recognition software and may contain errors related to that system including errors in grammar, punctuation, and spelling, as well as words and phrases that may be inappropriate. If there are any questions or concerns please feel free to contact the dictating provider for clarification.

## 2021-05-13 NOTE — CONSULTS
[address] [place] [address], [address] [place] [address]                                  CONSULTATION    PATIENT NAME: Lamin Arnold                      :        1940  MED REC NO:   [id number]                          ROOM:       1107  ACCOUNT NO:   [de-identified]                           ADMIT DATE: 2021  PROVIDER:     Raffi Cheney MD    CONSULT DATE:  2021    The patient is in room 1115. CHIEF COMPLAINT:  1. History of rectal bleeding. 2.  History of carcinoma of the anal canal status post radiation and  chemotherapy. HISTORY OF PRESENT ILLNESS:  The patient is a 72-year-old [facility] [employer]  female patient known to me who was diagnosed with carcinoma of the anal  canal on 2017 and subsequently received radiation and chemotherapy  and also has history of hypertension, seizure disorder, rheumatoid  arthritis, CVA, gastroesophageal reflux disease, chronic back pain,  urinary tract infection/urinary incontinence, depression/anxiety and  hyperlipidemia. The patient was admitted to the hospital for swelling  of the left knee after she had a fall on 2021. The patient  apparently was doing well, but last night started passing a small amount  of bright red blood per rectum. The patient denies abdominal pain,  nausea, vomiting, hematemesis, anorexia or weight loss. The patient's  hemoglobin on 05/10/2021 was 10.3 gm percent and today is 11 gm percent. The patient is hemodynamically stable.     The patient has had multiple colonoscopies done by me, the first one was  on 2017 when she was diagnosed with anal canal carcinoma and the  second colonoscopy on 10/09/2017 showed diverticulosis coli and post  radiation changes in the anal canal and the third colonoscopy in the  2018 again showed diverticulosis coli and post radiation changes  distally in the rectum and anal canal and her last colonoscopy was on  2021

## 2021-05-13 NOTE — PROGRESS NOTES
Rapid response called. Adenosine 6 mg push at 2203. EKG redone at 2209. Labs drawn. Rapid ended at 2211. Pt will be transferred to 3N. HR stable at this time. Message left per Asher to family member Alena Aden and unable to reach Nicki Rodríguez.

## 2021-05-13 NOTE — PROGRESS NOTES
ONCOLOGY HEMATOLOGY CARE ([facility])  PROGRESS NOTE      Patient was seen and examined today. Not in any acute distress and no overnight events. She had knee aspiration and corticosteroid injection yesterday. She had episode of bright red blood per rectum yesterday  and was seen by Dr. Viv Wasserman. She was suspected to have bleeding from hemorrhoid or from the previous radiation therapy. This morning she denied any further bloody stool. PHYSICAL EXAM    Vitals: /70   Pulse 84   Temp 98.5 °F (36.9 °C) (Oral)   Resp 17   Ht 5' 2\" (1.575 m)   Wt 131 lb (59.4 kg)   SpO2 100%   BMI 23.96 kg/m²   CONSTITUTIONAL: awake, alert, cooperative, no apparent distress   EYES: sclera clear and conjunctiva normal  ENT: Normocephalic, without obvious abnormality, atraumatic  NECK: supple, symmetrical  HEMATOLOGIC/LYMPHATIC: no cervical, supraclavicular or axillary lymphadenopathy   LUNGS: no increased work of breathing and clear to auscultation   CARDIOVASCULAR: regular rate and rhythm, normal S1 and S2, no murmur noted  ABDOMEN: normal bowel sounds x 4, soft, non-distended, non-tender, no masses palpated, no hepatosplenomgaly   MUSCULOSKELETAL: full range of motion noted, tone is normal  NEUROLOGIC: awake, alert, oriented to name, place and time. No focal neuro deficit. SKIN: No jaundice. appears intact   EXTREMITIES: Left knee swelling improves. No cyanosis. LABORATORY RESULTS  CBC:   Recent Labs     05/12/21  1325 05/12/21  2330 05/13/21  0349   WBC 10.0 9.3 10.5   HGB 11.0* 9.8* 10.7*    336 373     BMP:    Recent Labs     05/12/21 2220 05/13/21  0349    136   K 4.4 4.6    105   CO2 25 26   BUN 20 17   CREATININE 0.8 0.6   GLUCOSE 193* 121*     Hepatic:   Recent Labs     05/12/21 2220 05/13/21  0349   AST 20 20   ALT 16 16   BILITOT 0.4 0.6   ALKPHOS 92 98       ASSESSMENT/ RECOMMENDATION    1. History of anal cancer. She had chemo and radiotherapy in 2017.   She has been also seen by

## 2021-05-13 NOTE — SIGNIFICANT EVENT
Significant event documentation:    Reason for call/time of call:   Rapid response tachycardia    Physical Exam:   Vitals:    05/12/21 2221   BP: 120/64   Pulse: 116   Resp: 20   Temp:    SpO2:       Mental status: A&Ox3, no lateralizing weakness, patient is anxious  CV: tachycardic  Lungs: CTA    Interventions/orders placed:  Rapid response called for heart rate in the 160s, /53. EKG obtained showing SVT. RN stated that patient had just received Ativan and her nighttime Cardizem. According to chart, I do not see any history of atrial fibrillation or tachyarrhythmias and patient is unable to state whether or not she has had this before. Patient stated that she felt anxious and had some slight left-sided chest pain without radiation. Patient was given fluids and was given 6 mg of adenosine. Patient's heart rate went from the 160s to low 100s. Blood pressure was stable. Patient was transferred to [facility] for closer monitoring. Cardiology consult placed, CBC, CMP, troponin stat pending. I called patient's daughter Carrie Dougherty and discussed events in detail, answering all questions, states that patient was on cardizem prior to hospitalization and they don't know why. Dr. Torres Even present at bedside, in agreement. Daughter is asking if she can potentially be power of  for her mother.       Harrison Garcia PA-C  Hospitalist

## 2021-05-13 NOTE — PROGRESS NOTES
Hospitalist Progress Note      Name:  Marilyn Lieberman /Age/Sex: 1940  (68 y.o. female)   MRN & CSN:  [id number] & [id number] Admission Date/Time: 2021 11:34 AM   Location:  [facility]/3125 PCP: Toby Dalton MD         Hospital Day: 6    ASSESSMENT & PLAN:  Marilyn Lieberman is a 68 y.o.  female   presented to the hospital with complaint of left knee pain after a fall. Left knee swelling. CT of the left knee demonstrated small to moderate sized left knee effusion. Bilateral lower extremity Doppler without evidence of DVT. Underwent arthrocentesis of the left knee with aspiration of 30 cc of bloody fluid and subsequently had a intra-articular steroid injection. #.  SVT---patient went into SVT on the night of -. Heart rate is in the 160s. Adenosine given. Cardiology consulted. -Increase diltiazem 240 mg daily  -Observe overnight    #. Mechanical fall---with a left knee effusion. PT recommended SNF placement for rehab placement. Daughter and the patient to think about SNF placement and make a decision by today. #.  Left knee effusion---s/p arthrocentesis with aspiration of 30 cc of bloody fluid and intra-articular corticosteroid injection. Swelling of the knee has subsided. Evaluate ambulation at this point. #.  Rheumatoid arthritis---on methotrexate and prednisone    #. Peripheral vascular disease--aspirin and Lipitor. #.  Hypertension---on Maxzide    #. Seizure disorder---on Keppra 500 twice daily    #. History of anal cancer---s/p chemo and radiation in 2017. Noted to have blood in the diaper x2 episodes yesterday. Hemoglobin stable. GI consulted. Patient had colonoscopy in 2021. GI will consider sigmoidoscopy depending on clinical course.   -Continue monitoring    Diet DIET CARDIAC;   DVT Prophylaxis [x] Lovenox, []  Heparin, [] SCDs, [] Ambulation   GI Prophylaxis [] PPI,  [] H2 Blocker,  [] Carafate,  [] Diet/Tube Feeds   Code Status Full Code   Disposition Home/SNF   MDM [] Low, [x] Moderate,[]  High     Chief complaint/Interval History/ROS     Chief Complaint: Follow home, left knee pain, left knee effusion    INTERVAL HISTORY:    5/13: Patient went into SVT last night. She was given adenosine. Heart rate is more improved. Seen by cardiology. Diltiazem increased to 240 mg daily. 5/12: Overall stable. PT recommended SNF placement. Daughter and the patient to discuss whether to pursue SNF placement. Hopefully they will make decision today. 5/11: Underwent a left knee arthrocentesis with removal of 30 cc of bloody fluid. ROS:  No chest pain. No abdominal pain. No nausea. No vomiting. Objective: Intake/Output Summary (Last 24 hours) at 5/13/2021 1207  Last data filed at 5/12/2021 1845  Gross per 24 hour   Intake 360 ml   Output --   Net 360 ml      Vitals:   Vitals:    05/13/21 1130   BP: (!) 143/95   Pulse: 106   Resp: 19   Temp: 98.1 °F (36.7 °C)   SpO2: 99%     Physical Exam:   GEN: Awake female, nontoxic appearing. Pleasant. Answers questions appropriate. EYES: No eye discharge. Ocular muscles intact. HENT: Membranes moist.  No nasal discharge. NECK: Supple  RESP: Clear to auscultation bilaterally  CV: RRR. No pitting extremity edema. GI: Abdomen soft. Nontender. Nondistended. :  no Webber in place  MSK: No bony fractures. SKIN: warm, dry, no rashes,  NEURO: Cranial nerves appear grossly intact, normal speech, no lateralizing weakness.   PSYCH: Awake, alert, oriented     Medications:   Medications:    dilTIAZem  240 mg Oral Nightly    hydrocortisone  25 mg Rectal BID    ferrous sulfate  325 mg Oral Daily with breakfast    methotrexate  17.5 mg Oral Once per day on Mon    triamcinolone acetonide  40 mg Intra-articular Once    [Held by provider] cilostazol  50 mg Oral BID    aspirin  81 mg Oral Nightly    atorvastatin  20 mg Oral Nightly    docusate sodium  100 mg Oral Nightly    donepezil  5 mg Oral Nightly    levETIRAcetam  500 mg Oral BID    predniSONE  5 mg Oral BID    triamterene-hydroCHLOROthiazide  1 tablet Oral Daily    sodium chloride flush  5-40 mL Intravenous 2 times per day    heparin (porcine)  5,000 Units Subcutaneous 3 times per day      Infusions:    sodium chloride       PRN Meds: LORazepam, 0.5 mg, Q4H PRN  HYDROcodone 5 mg - acetaminophen, 1 tablet, Q6H PRN  sodium chloride flush, 10 mL, PRN  sodium chloride, 25 mL, PRN  polyethylene glycol, 17 g, Daily PRN  acetaminophen, 650 mg, Q6H PRN    Or  acetaminophen, 650 mg, Q6H PRN        Electronically signed by Leander Diaz MD on 5/13/2021 at 12:07 PM

## 2021-05-13 NOTE — PROGRESS NOTES
Physical Therapy    Physical Therapy Treatment Note  Name: Bhargav Galo MRN: [id number] :   1940   Date:  2021   Admission Date: 2021 Room:  [address] [place], [place] [address]A   Restrictions/Precautions:        Fall risk  Communication with other providers:  RN  Subjective:  Patient states:  \"I want to get up in the chair\"  Pain:   Location, Type, Intensity (0/10 to 10/10):  No c/o  Objective:    Observation:  Supine in bed  Treatment, including education/measures:  Sup to sit: SBA  Sitting balance: SBA  Transfers: CGA from bed with RW  Gait: ambulated x40ft with CGA, x1 min LOB d/t pain in knee, able to self correct, antalgic gait pattern, patient reports this is close to her baseline. Patient left in chair with chair alarm, call light within reach, RN notified, gait belt used.   Assessment / Impression:       Patient's tolerance of treatment:  Tolerated well   Adverse Reaction: None  Significant change in status and impact:  Improved mobility  Barriers to improvement:  Arthritis, balance, strength  Plan for Next Session:    Cont POC  Time in:  1334  Time out:  1345  Timed treatment minutes:  11  Total treatment time:  11    Previously filed items:  Social/Functional History  Lives With: Son(pt reports son is handicapped; daughter stays at night but works during day)  Type of Home: House  Home Layout: One level  Home Access: Stairs to enter without rails  Entrance Stairs - Number of Steps: 1  Bathroom Shower/Tub: Tub/Shower unit, Shower chair with back  [employer]: Grab bars in [address]: [employer], 4 wheeled walker(Uses cane in home; Farhana Madeleine in community)  [address] Help From: Family  ADL Assistance: Needs assistance(family assists with tub transfers and lower body/back washing; assist with upper and lower body dressing)  Homemaking Assistance: Needs assistance(unable to stand long enough to perform dishwashing/laundry etc)  Active : Yes(was driving, had seizure and unable to drive for six

## 2021-05-14 VITALS
WEIGHT: 131 LBS | SYSTOLIC BLOOD PRESSURE: 126 MMHG | OXYGEN SATURATION: 97 % | DIASTOLIC BLOOD PRESSURE: 72 MMHG | RESPIRATION RATE: 14 BRPM | HEART RATE: 74 BPM | BODY MASS INDEX: 24.11 KG/M2 | TEMPERATURE: 97.8 F | HEIGHT: 62 IN

## 2021-05-14 LAB
SARS-COV-2, NAAT: NOT DETECTED
SOURCE: NORMAL

## 2021-05-14 PROCEDURE — 6360000002 HC RX W HCPCS: Performed by: NURSE PRACTITIONER

## 2021-05-14 PROCEDURE — 6370000000 HC RX 637 (ALT 250 FOR IP): Performed by: NURSE PRACTITIONER

## 2021-05-14 PROCEDURE — 87635 SARS-COV-2 COVID-19 AMP PRB: CPT

## 2021-05-14 PROCEDURE — 99231 SBSQ HOSP IP/OBS SF/LOW 25: CPT | Performed by: INTERNAL MEDICINE

## 2021-05-14 PROCEDURE — 2580000003 HC RX 258: Performed by: NURSE PRACTITIONER

## 2021-05-14 PROCEDURE — 6370000000 HC RX 637 (ALT 250 FOR IP): Performed by: SPECIALIST

## 2021-05-14 PROCEDURE — APPSS45 APP SPLIT SHARED TIME 31-45 MINUTES: Performed by: NURSE PRACTITIONER

## 2021-05-14 PROCEDURE — 94761 N-INVAS EAR/PLS OXIMETRY MLT: CPT

## 2021-05-14 RX ORDER — DILTIAZEM HYDROCHLORIDE 240 MG/1
240 CAPSULE, EXTENDED RELEASE ORAL NIGHTLY
Qty: 30 CAPSULE | Refills: 1 | Status: SHIPPED | OUTPATIENT
Start: 2021-05-14 | End: 2021-08-30 | Stop reason: SDUPTHER

## 2021-05-14 RX ADMIN — SODIUM CHLORIDE, PRESERVATIVE FREE 10 ML: 5 INJECTION INTRAVENOUS at 07:55

## 2021-05-14 RX ADMIN — PREDNISONE 5 MG: 5 TABLET ORAL at 07:54

## 2021-05-14 RX ADMIN — LEVETIRACETAM 500 MG: 500 TABLET, FILM COATED ORAL at 07:55

## 2021-05-14 RX ADMIN — HYDROCORTISONE ACETATE 25 MG: 25 SUPPOSITORY RECTAL at 07:55

## 2021-05-14 RX ADMIN — FERROUS SULFATE TAB 325 MG (65 MG ELEMENTAL FE) 325 MG: 325 (65 FE) TAB at 07:54

## 2021-05-14 RX ADMIN — ACETAMINOPHEN 650 MG: 325 TABLET ORAL at 13:59

## 2021-05-14 RX ADMIN — TRIAMTERENE AND HYDROCHLOROTHIAZIDE 1 TABLET: 37.5; 25 TABLET ORAL at 07:55

## 2021-05-14 RX ADMIN — HEPARIN SODIUM 5000 UNITS: 5000 INJECTION INTRAVENOUS; SUBCUTANEOUS at 05:40

## 2021-05-14 NOTE — PROGRESS NOTES
ONCOLOGY HEMATOLOGY CARE ([facility])  PROGRESS NOTE      Patient was seen and examined today. Not in any acute distress and no overnight events. She had knee aspiration and corticosteroid injection yesterday. She had episode of bright red blood per rectum and was seen by Dr. Viv Wasserman. She was suspected to have bleeding from hemorrhoid or from the previous radiation therapy. She denied further episodes of bright red blood per rectum. Awaiting for placement to [facility]. She has been ambulating with assistance. PHYSICAL EXAM    Vitals: /60   Pulse 61   Temp 97.9 °F (36.6 °C) (Oral)   Resp 13   Ht 5' 2\" (1.575 m)   Wt 131 lb (59.4 kg)   SpO2 100%   BMI 23.96 kg/m²   CONSTITUTIONAL: awake, alert, cooperative, no apparent distress   EYES: sclera clear and conjunctiva normal  ENT: Normocephalic, without obvious abnormality, atraumatic  NECK: supple, symmetrical  HEMATOLOGIC/LYMPHATIC: no cervical, supraclavicular or axillary lymphadenopathy   LUNGS: no increased work of breathing and clear to auscultation   CARDIOVASCULAR: regular rate and rhythm, normal S1 and S2, no murmur noted  ABDOMEN: normal bowel sounds x 4, soft, non-distended, non-tender, no masses palpated, no hepatosplenomgaly   MUSCULOSKELETAL: full range of motion noted, tone is normal  NEUROLOGIC: awake, alert, oriented to name, place and time. Cranial nerves II through XII grossly intact. SKIN: No jaundice. appears intact   EXTREMITIES: Left knee swelling improves. No cyanosis.     LABORATORY RESULTS  CBC:   Recent Labs     05/12/21  1325 05/12/21  2330 05/13/21  0349   WBC 10.0 9.3 10.5   HGB 11.0* 9.8* 10.7*    336 373     BMP:    Recent Labs     05/12/21 2220 05/13/21  0349    136   K 4.4 4.6    105   CO2 25 26   BUN 20 17   CREATININE 0.8 0.6   GLUCOSE 193* 121*     Hepatic:   Recent Labs     05/12/21 2220 05/13/21  0349   AST 20 20   ALT 16 16   BILITOT 0.4 0.6   ALKPHOS 92 98       ASSESSMENT/ RECOMMENDATION    1. History of anal cancer. She had chemo and radiotherapy in 2017. Had colonoscopy in February 2021. She has been also seen by Dr. Lolita Camara. Clinically in remission. 2.  She has anemia. Anemic work-reviewed. She is on oral iron. We will continue to monitor CBC. 3.  She has history of RA. She fell and  Had swollen left knee. S/p knee aspiration and steroid injection    4. Seizure disorder- remains on keppra    To continue with PT/OT. Likely she will be discharged to [facility]. I recommend to follow-up at the cancer center couple weeks after discharge.   Thanks

## 2021-05-14 NOTE — PROGRESS NOTES
Dr. Juan R Perez at bedside. To discharge patient to [facility] today. Raymon Padilla CM to make sure [facility] is ready to take patient and setting up transport. This RN notified daughter Judith Maravilla of discharge plan. Will update with transport time.

## 2021-05-14 NOTE — DISCHARGE SUMMARY
Discharge Summary    Name:  Bhargav Galo /Age/Sex: 1940  (68 y.o. female)   MRN & CSN:  [id number] & [id number] Admission Date/Time: 2021 11:34 AM   Attending:  Rafa Tucker MD Discharging Physician: Rafa Tucker MD     Hospital Course:   Bhargav Galo is a 68 y.o.  female   presented to the hospital with complaint of left knee pain after a fall. Left knee swelling. CT of the left knee demonstrated small to moderate sized left knee effusion. Bilateral lower extremity Doppler without evidence of DVT. Underwent arthrocentesis of the left knee with aspiration of 30 cc of bloody fluid and subsequently had a intra-articular steroid injection. Patient was noted to have blood stained diapers concerning for rectal bleeding. Given her history of and no cancer, GI was consulted. GI evaluated the patient without plans of colonoscopy since the patient had a colonoscopy in 2021. Hemoglobin remained stable. Additionally, patient had an episode of SVT which responded to adenosine. Diltiazem was increased from 180 mg to 240 mg daily. Patient was evaluated by electrophysiology. No plans for procedures at this point. Patient to follow-up with EP on outpatient basis. Patient discharged in stable condition.     #. SVT---patient went into SVT on the night of -21. Heart rate is in the 160s. Adenosine given. #.  Mechanical fall---with a left knee effusion.        #. Left knee effusion---s/p arthrocentesis with aspiration of 30 cc of bloody fluid and intra-articular corticosteroid injection. Swelling of the knee has subsided. Able to ambulate with minimal pain at this point.     #. Rheumatoid arthritis---on methotrexate and prednisone     #. Peripheral vascular disease--aspirin and Lipitor.     #. Hypertension---on Maxzide     #.  Seizure disorder---on Keppra 500 twice daily     #. History of anal cancer---s/p chemo and radiation in 2017.   Noted to have blood in the diaper x2 episodes yesterday. Hemoglobin stable. GI consulted. Patient had colonoscopy in February 2021. GI will consider sigmoidoscopy depending on clinical course.     The patient expressed appropriate understanding of and agreement with the discharge recommendations, medications, and plan. Consults this admission:  IP CONSULT TO ORTHOPEDIC SURGERY  IP CONSULT TO ORTHOPEDIC SURGERY  IP CONSULT TO HOSPITALIST  IP CONSULT TO CASE MANAGEMENT  IP CONSULT TO VASCULAR SURGERY  IP CONSULT TO ONCOLOGY  IP CONSULT TO HOME CARE NEEDS  IP CONSULT TO GI  IP CONSULT TO CARDIOLOGY    Discharge Instruction:   Follow-up with PCP    Diet:  regular diet   Activity: activity as tolerated  Disposition: Discharged to:   []Home, []C, []SNF, []Acute Rehab, []Hospice  Condition on discharge: Stable    Discharge Medications:      Lora Cabot   [place] Medication Instructions [id number]    Printed on:05/14/21 3491   Medication Information                      aspirin 81 MG tablet  Take 81 mg by mouth nightly              atorvastatin (LIPITOR) 20 MG tablet  Take 20 mg by mouth nightly              CALCIUM PO  Take by mouth nightly              dilTIAZem (DILACOR XR) 240 MG extended release capsule  Take 1 capsule by mouth nightly             docusate sodium (COLACE) 100 MG capsule  Take 100 mg by mouth nightly 2 tablets             donepezil (ARICEPT) 5 MG tablet               ferrous sulfate (IRON 325) 325 (65 Fe) MG tablet  Take 1 tablet by mouth daily (with breakfast)             folic acid (FOLVITE) 016 MCG tablet  Take 400 mcg by mouth every morning             levETIRAcetam (KEPPRA) 500 MG tablet  Take 1 tablet by mouth 2 times daily             methotrexate 2.5 MG tablet  Take 2.5 mg by mouth once a week Indications:  Take 7 tablets one time a week              predniSONE (DELTASONE) 5 MG tablet  Take 5 mg by mouth 2 times daily              triamterene-hydroCHLOROthiazide (MAXZIDE-25) 37.5-25 MG per tablet  take 1 tablet by mouth once daily             vitamin D3 (COLECALCIFEROL) 400 UNITS TABS  Take 5,000 Units by mouth daily                  Objective Findings at Discharge:   BP (!) 157/78   Pulse 89   Temp 98.1 °F (36.7 °C) (Oral)   Resp 17   Ht 5' 2\" (1.575 m)   Wt 131 lb (59.4 kg)   SpO2 97%   BMI 23.96 kg/m²            PHYSICAL EXAM   GEN Awake female, nontoxic appearing. Pleasant. Answers questions appropriate. EYES no eye discharge. Ocular muscles intact. HENT membranes moist.  No nasal discharge. NECK Supple  RESP clear to auscultation bilaterally  CARDIO/VASC regular rate and rhythm. No pitting lower extremity edema. No murmur. GI abdomen soft. Nontender. Distended.  no Webber in place. MSK No gross joint deformities. SKIN Normal coloration, warm, dry. NEURO Cranial nerves appear grossly intact, normal speech, no lateralizing weakness. PSYCH Awake, alert, oriented x 4. Affect appropriate.     BMP/CBC  Recent Labs     05/12/21  1325 05/12/21  2220 05/12/21  2330 05/13/21  0349   NA  --  135  --  136   K  --  4.4  --  4.6   CL  --  102  --  105   CO2  --  25  --  26   BUN  --  20  --  17   CREATININE  --  0.8  --  0.6   WBC 10.0  --  9.3 10.5   HCT 32.5*  --  29.9* 30.9*     --  336 373       IMAGING:  All imaging reviewed    Discharge Time of 28 minutes    Electronically signed by Lori Rivera MD on 5/14/2021 at 9:34 AM

## 2021-05-14 NOTE — PROGRESS NOTES
ASPIRATION / INJECTION KNEE (5/11/2021). Social History:   reports that she has never smoked. She has never used smokeless tobacco. She reports that she does not drink alcohol or use drugs. Family history:  family history includes Arthritis in her mother and sister; Depression in her son; Diabetes in her mother and sister; Hearing Loss in her brother; Heart Disease in her brother, mother, and sister; High Blood Pressure in her brother, brother, mother, sister, son, and son; High Cholesterol in her mother; Learning Disabilities in her son; Mental Illness in her sister and son; Simón Oms / Raúl Hopkinsy in her mother; Other in her father, sister, son, son, and son; Stroke in her mother and sister. Allergies   Allergen Reactions    Cortisone Rash    Codeine      Unsure Of Reaction    Pcn [Penicillins]      Unknown Reaction       Review of Systems:   All 14 systems were reviewed and are negative  Except for the positive findings which are documented     BP (!) 157/78   Pulse 89   Temp 98.1 °F (36.7 °C) (Oral)   Resp 17   Ht 5' 2\" (1.575 m)   Wt 131 lb (59.4 kg)   SpO2 97%   BMI 23.96 kg/m²       Intake/Output Summary (Last 24 hours) at 5/14/2021 1209  Last data filed at 5/14/2021 8701  Gross per 24 hour   Intake 240 ml   Output --   Net 240 ml       Physical Exam:  Constitutional:  Well developed,  No acute distress  HENT:  Normocephalic, Atraumatic, Bilateral external ears normal,  Nose normal.   Neck- trachea is midline  Eyes:  PEERL, Conjunctiva normal  Respiratory:  Normal breath sounds, No respiratory distress, No wheezing, No chest tenderness. Cardiovascular:  Normal heart rate, Normal rhythm, no murmurs appreciated, No rubs appreciated, No gallops appreciated, JVP not elevated  Abdomen/GI:  Soft, No tenderness  Musculoskeletal:  Intact distal pulses, no edema to lower legs,  No tenderness, No cyanosis, No clubbing. Integument:  Warm, Dry  Lymphatic:  No lymphadenopathy noted.    Neurologic: Alert & oriented  Psychiatric:  Affect and Mood :pleasant     Medications:    dilTIAZem  240 mg Oral Nightly    hydrocortisone  25 mg Rectal BID    ferrous sulfate  325 mg Oral Daily with breakfast    methotrexate  17.5 mg Oral Once per day on Mon    triamcinolone acetonide  40 mg Intra-articular Once    aspirin  81 mg Oral Nightly    atorvastatin  20 mg Oral Nightly    docusate sodium  100 mg Oral Nightly    donepezil  5 mg Oral Nightly    levETIRAcetam  500 mg Oral BID    predniSONE  5 mg Oral BID    triamterene-hydroCHLOROthiazide  1 tablet Oral Daily    sodium chloride flush  5-40 mL Intravenous 2 times per day    heparin (porcine)  5,000 Units Subcutaneous 3 times per day      sodium chloride       LORazepam, HYDROcodone 5 mg - acetaminophen, sodium chloride flush, sodium chloride, polyethylene glycol, acetaminophen **OR** acetaminophen    Lab Data:  CBC:   Recent Labs     05/12/21  1325 05/12/21  2330 05/13/21  0349   WBC 10.0 9.3 10.5   HGB 11.0* 9.8* 10.7*   HCT 32.5* 29.9* 30.9*   MCV 92.6 93.7 91.7    336 373     BMP:   Recent Labs     05/12/21  2220 05/13/21  0349    136   K 4.4 4.6    105   CO2 25 26   BUN 20 17   CREATININE 0.8 0.6     PT/INR:   Recent Labs     05/13/21  0349   PROTIME 11.5*   INR 0.95     BNP:  No results for input(s): PROBNP in the last 72 hours. TROPONIN:   Recent Labs     05/12/21  2220 05/13/21  0349 05/13/21  1239   TROPONINT 0.015* 0.054* 0.023*        ECHO :       NM Myocardial Spect:       Impression:  Active Problems:    Effusion, left knee    Effusion of left knee    Injury of left knee    SVT (supraventricular tachycardia) ([facility])  Resolved Problems:    * No resolved hospital problems. *       All labs, medications and tests reviewed by myself, continue all other medications of all above medical condition listed as is except for changes mentioned above. Thank you   Please call with questions.     Electronically signed by Afshan Lees APRN - CNP on 5/14/2021 at 12:09 PM

## 2021-05-14 NOTE — PROGRESS NOTES
Patient dressed, IV removed. Daughter at bedside. Carl Pink here to transport patient to [facility]. Discharge teaching complete. Discharged with all belongings.

## 2021-05-14 NOTE — PLAN OF CARE
Problem: Falls - Risk of:  Goal: Will remain free from falls  Description: Will remain free from falls  5/14/2021 0952 by Tony Gates RN  Outcome: Completed  5/13/2021 2002 by Brigida Molina RN  Outcome: Ongoing  Goal: Absence of physical injury  Description: Absence of physical injury  5/14/2021 0952 by Tony Gates RN  Outcome: Completed  5/13/2021 2002 by Brigida Molina RN  Outcome: Ongoing     Problem: Pain:  Goal: Pain level will decrease  Description: Pain level will decrease  5/14/2021 0952 by Tony Gates RN  Outcome: Completed  5/13/2021 2002 by Brigida Molina RN  Outcome: Ongoing  Goal: Control of acute pain  Description: Control of acute pain  5/14/2021 0952 by Tony Gates RN  Outcome: Completed  5/13/2021 2002 by Brigida Molina RN  Outcome: Ongoing  Goal: Control of chronic pain  Description: Control of chronic pain  5/14/2021 0952 by Tony Gates RN  Outcome: Completed  5/13/2021 2002 by Brigida Molina RN  Outcome: Ongoing     Problem: Skin Integrity:  Goal: Will show no infection signs and symptoms  Description: Will show no infection signs and symptoms  Outcome: Completed  Goal: Absence of new skin breakdown  Description: Absence of new skin breakdown  Outcome: Completed

## 2021-05-18 ENCOUNTER — HOSPITAL ENCOUNTER (OUTPATIENT)
Age: 81
Setting detail: SPECIMEN
Discharge: HOME | End: 2021-05-18

## 2021-05-18 LAB
ALBUMIN SERPL-MCNC: 4 GM/DL (ref 3.4–5)
ALP BLD-CCNC: 126 IU/L (ref 40–128)
ALT SERPL-CCNC: 13 U/L (ref 10–40)
ANION GAP SERPL CALCULATED.3IONS-SCNC: 11 MMOL/L (ref 4–16)
AST SERPL-CCNC: 14 IU/L (ref 15–37)
BASOPHILS ABSOLUTE: 0 K/CU MM
BASOPHILS RELATIVE PERCENT: 0.2 % (ref 0–1)
BILIRUB SERPL-MCNC: 0.7 MG/DL (ref 0–1)
BUN BLDV-MCNC: 12 MG/DL (ref 6–23)
CALCIUM SERPL-MCNC: 9.4 MG/DL (ref 8.3–10.6)
CHLORIDE BLD-SCNC: 98 MMOL/L (ref 99–110)
CO2: 28 MMOL/L (ref 21–32)
CREAT SERPL-MCNC: 0.6 MG/DL (ref 0.6–1.1)
DIFFERENTIAL TYPE: ABNORMAL
EOSINOPHILS ABSOLUTE: 0.1 K/CU MM
EOSINOPHILS RELATIVE PERCENT: 0.7 % (ref 0–3)
GFR AFRICAN AMERICAN: >60 ML/MIN/1.73M2
GFR NON-AFRICAN AMERICAN: >60 ML/MIN/1.73M2
GLUCOSE BLD-MCNC: 66 MG/DL (ref 70–99)
HCT VFR BLD CALC: 37.9 % (ref 37–47)
HEMOGLOBIN: 12.4 GM/DL (ref 12.5–16)
IMMATURE NEUTROPHIL %: 2.1 % (ref 0–0.43)
LYMPHOCYTES ABSOLUTE: 0.9 K/CU MM
LYMPHOCYTES RELATIVE PERCENT: 9.8 % (ref 24–44)
MCH RBC QN AUTO: 31.2 PG (ref 27–31)
MCHC RBC AUTO-ENTMCNC: 32.7 % (ref 32–36)
MCV RBC AUTO: 95.2 FL (ref 78–100)
MONOCYTES ABSOLUTE: 1 K/CU MM
MONOCYTES RELATIVE PERCENT: 10.8 % (ref 0–4)
NUCLEATED RBC %: 0 %
PDW BLD-RTO: 15.9 % (ref 11.7–14.9)
PLATELET # BLD: 436 K/CU MM (ref 140–440)
PMV BLD AUTO: 9.7 FL (ref 7.5–11.1)
POTASSIUM SERPL-SCNC: 3.5 MMOL/L (ref 3.5–5.1)
RBC # BLD: 3.98 M/CU MM (ref 4.2–5.4)
SEGMENTED NEUTROPHILS ABSOLUTE COUNT: 6.9 K/CU MM
SEGMENTED NEUTROPHILS RELATIVE PERCENT: 76.4 % (ref 36–66)
SODIUM BLD-SCNC: 137 MMOL/L (ref 135–145)
TOTAL IMMATURE NEUTOROPHIL: 0.19 K/CU MM
TOTAL NUCLEATED RBC: 0 K/CU MM
TOTAL PROTEIN: 6.2 GM/DL (ref 6.4–8.2)
WBC # BLD: 9.1 K/CU MM (ref 4–10.5)

## 2021-05-18 PROCEDURE — 80053 COMPREHEN METABOLIC PANEL: CPT

## 2021-05-18 PROCEDURE — 85025 COMPLETE CBC W/AUTO DIFF WBC: CPT

## 2021-05-18 PROCEDURE — 36415 COLL VENOUS BLD VENIPUNCTURE: CPT

## 2021-05-24 ENCOUNTER — OFFICE VISIT (OUTPATIENT)
Dept: CARDIOLOGY CLINIC | Age: 81
End: 2021-05-24
Payer: MEDICARE

## 2021-05-24 VITALS
DIASTOLIC BLOOD PRESSURE: 64 MMHG | WEIGHT: 127 LBS | SYSTOLIC BLOOD PRESSURE: 130 MMHG | HEIGHT: 62 IN | HEART RATE: 94 BPM | BODY MASS INDEX: 23.37 KG/M2

## 2021-05-24 DIAGNOSIS — I47.1 SVT (SUPRAVENTRICULAR TACHYCARDIA) (HCC): Primary | ICD-10-CM

## 2021-05-24 DIAGNOSIS — I10 ESSENTIAL HYPERTENSION: ICD-10-CM

## 2021-05-24 PROCEDURE — 93000 ELECTROCARDIOGRAM COMPLETE: CPT | Performed by: NURSE PRACTITIONER

## 2021-05-24 PROCEDURE — G8427 DOCREV CUR MEDS BY ELIG CLIN: HCPCS | Performed by: NURSE PRACTITIONER

## 2021-05-24 PROCEDURE — 1123F ACP DISCUSS/DSCN MKR DOCD: CPT | Performed by: NURSE PRACTITIONER

## 2021-05-24 PROCEDURE — 1111F DSCHRG MED/CURRENT MED MERGE: CPT | Performed by: NURSE PRACTITIONER

## 2021-05-24 PROCEDURE — 1090F PRES/ABSN URINE INCON ASSESS: CPT | Performed by: NURSE PRACTITIONER

## 2021-05-24 PROCEDURE — 99214 OFFICE O/P EST MOD 30 MIN: CPT | Performed by: NURSE PRACTITIONER

## 2021-05-24 PROCEDURE — 1036F TOBACCO NON-USER: CPT | Performed by: NURSE PRACTITIONER

## 2021-05-24 PROCEDURE — 4040F PNEUMOC VAC/ADMIN/RCVD: CPT | Performed by: NURSE PRACTITIONER

## 2021-05-24 PROCEDURE — G8399 PT W/DXA RESULTS DOCUMENT: HCPCS | Performed by: NURSE PRACTITIONER

## 2021-05-24 PROCEDURE — G8420 CALC BMI NORM PARAMETERS: HCPCS | Performed by: NURSE PRACTITIONER

## 2021-05-24 ASSESSMENT — ENCOUNTER SYMPTOMS
COLOR CHANGE: 0
SINUS PAIN: 0
ABDOMINAL PAIN: 0
CHEST TIGHTNESS: 0
NAUSEA: 0
SINUS PRESSURE: 0
EYE PAIN: 0
VOMITING: 0
EYE DISCHARGE: 0
BLOOD IN STOOL: 0
CONSTIPATION: 0
DIARRHEA: 0
WHEEZING: 0
BACK PAIN: 0
ABDOMINAL DISTENTION: 0
COUGH: 0
SHORTNESS OF BREATH: 0

## 2021-05-24 NOTE — PROGRESS NOTES
Electrophysiology  Note      Reason for consultation:  svt    Chief complaint: follow up on SVT    Referring physician: Mario Russell      Primary care physician: Susy Tinoco PA-C      History of Present Illness: This visit 5/24/2021  Patient is here today for follow up on SVT. She states that she is feeling well. She denies chest pain,  palpitations, shortness of breath, edema, dizziness, or syncope. Previous visit  Patient is 62-year-old female with history of carotid stenosis, CVA, seizures, hypertension, hyperlipidemia, rheumatoid arthritis, malignancy of anal canal presents with complaints of left knee pain. Patient reports that she had a mechanical fall at home and after falling to her knees she began to have the left knee pain. She denies lightheadedness or dizziness prior to the fall. Patient noted to have a small left knee effusion on CT. She did have arthrocentesis of the left knee and 30 cc of bloody fluid was aspirated. Patient also received steroid injection    EP was consulted for the episode of SVT. Patient reports he was laying in the bed when her heart rate increased to 160s. She denied any palpitations or feeling tachycardic at that time. However she did have some chest tightness. She reports that she had intermittent chest tightness over the last few weeks. She reports that it will come suddenly and resolve suddenly. She denies aggravating or elevating factors.   She denies any shortness of breath, lightheadedness, syncope or edema or dizziness    Patient was given adenosine and converted to sinus rhythm      Pastmedical history:   Past Medical History:   Diagnosis Date    Acid reflux     Anxiety     Bronchitis In Past    Cancer ([facility] [address]     colon cancer    Carotid stenosis 11/10/2015    Cerebral embolism with cerebral infarction ([facility] [address] 9-18-15    Seizure X 1, No Residual    Chronic back pain     Depression     History of blood transfusion     History of external beam radiation therapy 2017    pelvis/anal canal in 2017 per Dr. Sedrick Izquierdo [facility]    Hyperlipidemia     Hypertension     Prolonged emergence from general anesthesia     RA (rheumatoid arthritis) ([facility] [address]     \"All Over\"    Seizure ([facility] [address] 9-18-15    X 1    Sleep apnea     Uses CPAP    Teeth missing     Upper And Lower    Urinary incontinence     UTI (urinary tract infection) In Past    No Current Symptoms    Wears dentures     Full Upper , Partial Lower    Wears glasses     Wears partial dentures     Lower       Surgical history :   Past Surgical History:   Procedure Laterality Date    ARTHROCENTESIS / ASPIRATION / INJECTION KNEE  5/11/2021         CAROTID ENDARTERECTOMY Right [id number]    CARPAL TUNNEL RELEASE      COLONOSCOPY  In Past    DENTAL SURGERY      Teeth Extracted In Past    DILATION AND CURETTAGE OF UTERUS  1960's    Prior To Vaginal Hysterectomy    HYSTERECTOMY, VAGINAL  1960's    PORT SURGERY N/A 3/2/2021    PORT REMOVAL performed by [facility] at [facility]       Family history:   Family History   Problem Relation Age of Onset    Stroke Mother     Heart Disease Mother     Arthritis Mother     Diabetes Mother     High Blood Pressure Mother     High Cholesterol Mother    Eda Rocker / Jewel Stephan Mother     Other Father         \"Surgery For Twisted Bowels\"    Heart Disease Sister     Stroke Sister     High Blood Pressure Brother     Heart Disease Brother     Hearing Loss Brother         Heart Attack    Diabetes Sister     High Blood Pressure Sister     Other Sister         Gout    Arthritis Sister     Mental Illness Sister         Schizophrenia    High Blood Pressure Brother     High Blood Pressure Son     Mental Illness Son         Anxiety    Depression Son     Other Son         Pancreatitis, Surgery For Brain Aneurysm    High Blood Pressure Son     Other Son         Gout    Learning Disabilities Son Autistic, Mentally Retarded    Other Son         Seizures       Social history :  reports that she has never smoked. She has never used smokeless tobacco. She reports that she does not drink alcohol and does not use drugs. Allergies   Allergen Reactions    Cortisone Rash    Codeine      Unsure Of Reaction    Pcn [Penicillins]      Unknown Reaction       Current Outpatient Medications on File Prior to Visit   Medication Sig Dispense Refill    dilTIAZem (DILACOR XR) 240 MG extended release capsule Take 1 capsule by mouth nightly 30 capsule 1    levETIRAcetam (KEPPRA) 500 MG tablet Take 1 tablet by mouth 2 times daily 60 tablet 1    donepezil (ARICEPT) 5 MG tablet       triamterene-hydroCHLOROthiazide (MAXZIDE-25) 37.5-25 MG per tablet take 1 tablet by mouth once daily      docusate sodium (COLACE) 100 MG capsule Take 100 mg by mouth nightly 2 tablets      atorvastatin (LIPITOR) 20 MG tablet Take 20 mg by mouth nightly       aspirin 81 MG tablet Take 81 mg by mouth nightly       CALCIUM PO Take by mouth nightly       vitamin D3 (COLECALCIFEROL) 400 UNITS TABS Take 5,000 Units by mouth daily       folic acid (FOLVITE) 478 MCG tablet Take 400 mcg by mouth every morning      methotrexate 2.5 MG tablet Take 2.5 mg by mouth once a week Indications: Take 7 tablets one time a week       predniSONE (DELTASONE) 5 MG tablet Take 5 mg by mouth 2 times daily       ferrous sulfate (IRON 325) 325 (65 Fe) MG tablet Take 1 tablet by mouth daily (with breakfast) 30 tablet 2     No current facility-administered medications on file prior to visit. Review of Systems:   Review of Systems   Constitutional: Negative for activity change, chills, fatigue and fever. HENT: Negative for congestion, sinus pressure and sinus pain. Eyes: Negative for pain, discharge and visual disturbance. Respiratory: Negative for cough, chest tightness, shortness of breath and wheezing.     Cardiovascular: Negative for chest pain, palpitations and leg swelling. Gastrointestinal: Negative for abdominal distention, abdominal pain, blood in stool, constipation, diarrhea, nausea and vomiting. Endocrine: Negative for cold intolerance and heat intolerance. Genitourinary: Negative for difficulty urinating, dysuria and flank pain. Musculoskeletal: Positive for arthralgias. Negative for back pain, myalgias and neck stiffness. Skin: Negative for color change, pallor, rash and wound. Allergic/Immunologic: Negative for food allergies. Neurological: Negative for dizziness, syncope, numbness and headaches. Hematological: Does not bruise/bleed easily. Psychiatric/Behavioral: Negative for agitation, behavioral problems and confusion. The patient is not nervous/anxious. Examination:      Vitals:    05/24/21 1048 05/24/21 1053   BP: (!) 148/70 130/64   Site: Left Upper Arm Left Upper Arm   Position: Sitting Sitting   Cuff Size: Medium Adult Medium Adult   Pulse: 94    Weight: 127 lb (57.6 kg)    Height: 5' 2\" (1.575 m)         Body mass index is 23.23 kg/m². Physical Exam  Constitutional:       Appearance: Normal appearance. HENT:      Head: Normocephalic and atraumatic. Right Ear: External ear normal.      Left Ear: External ear normal.      Mouth/Throat:      Mouth: Mucous membranes are moist.      Pharynx: No oropharyngeal exudate or posterior oropharyngeal erythema. Eyes:      General:         Right eye: No discharge. Left eye: No discharge. Conjunctiva/sclera: Conjunctivae normal.   Neck:      Vascular: No carotid bruit. Cardiovascular:      Rate and Rhythm: Normal rate and regular rhythm. Heart sounds: No murmur (grade 2/6 systolic murmur) heard. Pulmonary:      Effort: Pulmonary effort is normal.      Breath sounds: No wheezing or rhonchi. Abdominal:      General: Abdomen is flat. There is no distension. Palpations: Abdomen is soft.    Musculoskeletal:         General: Normal range of motion. Cervical back: Neck supple. No tenderness. Right lower leg: No edema. Left lower leg: No edema. Skin:     General: Skin is warm and dry. Neurological:      General: No focal deficit present. Mental Status: She is alert and oriented to person, place, and time. Psychiatric:         Mood and Affect: Mood normal.         Behavior: Behavior normal.         Thought Content: Thought content normal.         Judgment: Judgment normal.               CBC:   Lab Results   Component Value Date    WBC 9.1 05/18/2021    HGB 12.4 05/18/2021    HCT 37.9 05/18/2021     05/18/2021     Lipids:  Lab Results   Component Value Date    CHOL 178 09/21/2015    TRIG 197 (H) 09/21/2015    HDL 63 09/21/2015    LDLDIRECT 95 09/21/2015     PT/INR:   Lab Results   Component Value Date    INR 0.95 05/13/2021        BMP:    Lab Results   Component Value Date     05/18/2021    K 3.5 05/18/2021    CL 98 (L) 05/18/2021    CO2 28 05/18/2021    BUN 12 05/18/2021     CMP:   Lab Results   Component Value Date    AST 14 (L) 05/18/2021    PROT 6.2 (L) 05/18/2021    BILITOT 0.7 05/18/2021    ALKPHOS 126 05/18/2021     TSH:  No results found for: TSH    EKGINTERPRETATION - EKG Interpretation:  Sinus rhythm      IMPRESSION / RECOMMENDATIONS:       SVT  S/P Mechanical fall  HTN  HLD  History of anal cancer    Patient reports no episodes of palpitations or tachycardia  EKG reviewed- Sinus rhythm with heart rate of 95  Continue Cardizem  mg daily    Vitals:    05/24/21 1053   BP: 130/64   Pulse:      BP is stable. Continue triamterene- hydrocholorothiazide 37.5-25 mg daily    Discussed with patient regarding establishing with care with Cardiology and follow with EP as needed. Patient agreed to plan. Patient to see Dr Joya Vargas for cardiology needs as he was rounding cardiologist at time of admission. Thanks again for allowing me to participate in care of this patient.  Please call me if you have any

## 2021-05-24 NOTE — PATIENT INSTRUCTIONS
**It is YOUR responsibilty to bring medication bottles and/or updated medication list to [address] [place] [address]. This will allow us to better serve you and all your healthcare needs**    Please be informed that if you contact our office outside of normal business hours the physician on call cannot help with any scheduling or rescheduling issues, procedure instruction questions or any type of medication issue. We advise you for any urgent/emergency that you go to the nearest emergency room!     PLEASE CALL OUR OFFICE DURING NORMAL BUSINESS HOURS    Monday - Friday   8 am to 5 pm    [place]: [address]: [phone]    [place]:  [phone]

## 2021-05-24 NOTE — LETTER
Miguel Rodriguez  1940  [id number]    Have you had any Chest Pain that is not new? - No           Have you had any Shortness of Breath - No  If Yes - When         Have you had any dizziness - No       Have you had any palpitations that are not new? - No     Do you have any edema - swelling in No    If Yes - CHECK TO SEE IF THE EDEMA IS PITTING      If we do not have these labs you are retrieve these labs for these providers!     Do you have a surgery or procedure scheduled in the near future - No  If Yes- DO EKG

## 2021-06-28 NOTE — PROGRESS NOTES
Patient Name: Koko Romero  Patient : 1940  Patient MRN: [id number]     Primary Oncologist: José Miguel Null MD  Referring Provider: Wileen Carrel, PA-C     Date of Service: 2021      Chief Complaint:   Chief Complaint   Patient presents with    Follow-up     She came in for follow-up visit. Patient Active Problem List:     Cerebral embolism with cerebral infarction     Partial seizures (HCC)     RA (rheumatoid arthritis) (HCC)     Left hemiparesis (HCC)     Abnormality of gait     Essential hypertension     Rheumatoid arthritis involving multiple sites with positive rheumatoid factor      Carotid stenosis     CVA, old, hemiparesis ([facility] [address]     Malignant neoplasm of anal canal (HCC)     Anemia, unspecified     Other fatigue       HPI:   Loly Sanches is a pleasant 68year-old [facility] [place] female patient who was referred for evaluation of 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 She has been noticing episodes of blood in stool for a couple of months and no prior colonoscopy until she was seen by Dr. Lena Fisher. She has a long history of constipation and has been taking stool softeners. She underwent colonoscopy by Dr. Lena Fisher on 2017, which revealed a large friable obstructing mass lesion noted in the anal canal and diverticulosis coli. Pathology report revealed invasive intermediate grade keratinizing squamous cell carcinoma. She is currently  and has four children. She is agreeable to have the HIV test, as recommended by the guidelines. She denied any history of sexually transmitted disease. Bblood test in 2016 revealed white cell 7.1, hemoglobin 11.9, platelet 750. Creatinine was 0.52, AST 17, ALT 12. She was seen by Dr. Daniel Ponce, who performed the rectal exam.  After having discussion with him, clinically she was felt to have stage T2NxMx.     PET-CT scan in 2017 revealed no evidence of metastatic disease, except for the heterogenous activity in the area of soft tissue fullness in the anal region, compatible with the history of anal cancer. She started concomitant mitomycin/capecitabine and radiation therapy on May 8, 2017. Blood tests in June 2017 revealed white cell 4.4, hemoglobin 9.4, platelet 448. CMP was stable. Dr. David Everett temporarily held radiation and Xeloda and eventually restarted again. She completed concomitant chemoradiation therapy on July 3, 2017. We discussed about surveillance and survivorship. I referred her back to see Dr. Zack Cabrera. She will need anoscopy every six to 12 months up to three years. Imaging study will be considered once a year for three years. She gained weight since the last office visits. I reminded to follow-up with Dr. Zack Cabrera for the potential anoscopy. CT chest, abdomen and pelvis in July 2018 showed no evidence of progression of the disease. DEXA scan in December 2018 was wnl. Colonoscopy was in September 2018. Repeat in 3 years as per GI. She is on MTX. Labs in June 2019 were reviewed. CT chest, abdomen and pelvis in July 2019 showed no evidence of metastatic disease. Mammogram in April 2019 was benign. CT chest, abdomen and pelvis in November 2019 were negative for metastatic disease. Labs in January 2020 were stable. CEA improved. Blood test in July 2020 was reviewed. MTX and prednisone were increased due to increasing joint pain related to RA. She has proximal finger joint deformities with nodules. She refused to have flu and Covid vaccine. Labs in January 2021 showed stable CBC and CMP with hemoglobin of 11.8. On July 27, 2021 she came in for follow-up visit. She will have labs today including anemic work-up and CEA level. I recommend to call for the test result. I referred her back to see Dr. Willow Rosenbaum for removal of the Mediport. Due to the constipation I recommend to take iron pill every other day.   She was hospitalized in February 2021 due to the seizure. At that time she was off the antiseizure medication she was seen by neurologist who recommended to continue with Keppra indefinitely. Reportedly colonoscopy in February 2021 by Dr. Libra Kaur was okay. Follow-up study in 2 years was recommended. I gave refill of the senna twice a day for the constipation. I recommend with high-fiber diet. She denies any  nausea, vomiting or diarrhea. No bowel habit changes. No melena or hematuria. Denies any depression. She denies any chest pain, shortness of breath or palpitation. PAST MEDICAL HISTORY:  Hypertension, sleep apnea, rheumatoid arthritis on methotrexate, right carotid endarterectomy on November 18, 2015, and CVA in September 2015. She has a history of partial hysterectomy in 1965 or 1966. Last menstrual period was in 1966. Seizures. FAMILY HISTORY:  No history of malignancy in the family. SOCIAL HISTORY:  No history of smoking. No alcohol or illicit drug use. She is  and has four children. Oncology History   Malignant neoplasm of anal canal ([facility] [address]   3/8/2017 Initial Diagnosis    Malignant neoplasm of anal canal ([facility] [address]      - 7/3/2017 Radiation    Pelvic IMRT + concurrent systemic therapy  4500 cGy to elective LNs  5400 cGy to primary tumor using SIB-IMRT / 30 fx      Chemotherapy    Concurrent with RT  5-FU + Mitomycin-C         Review of Systems: \"Per interval history; otherwise 10 point ROS is negative. \"     Vital Signs:  BP (!) 141/63 (Site: Right Upper Arm, Position: Sitting, Cuff Size: Medium Adult)   Pulse 88   Temp 96.5 °F (35.8 °C) (Infrared)   Ht 5' 2\" (1.575 m)   Wt 132 lb (59.9 kg)   SpO2 97%   BMI 24.14 kg/m²     Physical Exam:  CONSTITUTIONAL: awake, alert, cooperative, no apparent distress   EYES: pupils equal, round and reactive to light, sclera clear and conjunctiva pallor  ENT: Normocephalic, without obvious abnormality, atraumatic  NECK: supple, symmetrical, no 1.9 04/07/2021    POCGLU 195 (H) 05/12/2021     Lab Results   Component Value Date     (H) 05/10/2021    HOMOCYSTEINE 10.9 (H) 09/19/2015     Lab Results   Component Value Date    TSHHS 0.643 04/08/2021    T4FREE 1.06 09/16/2015     Immunology:  Lab Results   Component Value Date    PROT 5.6 (L) 07/20/2021    SPEP  05/10/2021     INTERPRETATION - Nonspecific pattern, decreased albumin. No monoclonal gammopathy. RS    ALBUMINELP 2.9 (L) 05/10/2021    LABALPH 0.3 05/10/2021    LABALPH 0.8 05/10/2021    LABBETA 0.9 05/10/2021    GAMGLOB 0.5 05/10/2021     Coagulation Panel:  Lab Results   Component Value Date    PROTIME 11.5 (L) 05/13/2021    INR 0.95 05/13/2021    APTT 65.4 (H) 05/13/2021     Tumor Markers:  Lab Results   Component Value Date    CEA 9.7 07/20/2021      Observations:  PHQ-9 Total Score: 0 (7/27/2021 11:09 AM)        Assessment & Plan:    1. She has clinical stage T2NxMx anal canal carcinoma. PET-CT scan revealed local disease without evidence of metastatic disease. Baseline blood test in March 2017 revealed white cell 10.2, hemoglobin 12.6, platelet 055. CMP was benign with alk phos 146. HIV screening was negative. She completed chemoradiation therapy in July 2017. I advised her to follow up with Dr. Nathanael Reeder for possible anoscopy every six to 12 months in the first three years. CT chest abdomen and pelvis in July 2018 was negative. CEA improved. She had colonoscopy in September 2018. CT chest, abdomen and pelvis in July 2019 showed no metastatic disease. CT chest, abdomen and pelvis in November 2019 was negative for mets. Labs in January 2020 were reviewed. Labs in July 2020 were reviewed. Reportedly colonoscopy in February 2021 was okay. She is agreeable to continue with surveillance. I will check labs today. Advised to call the test result. 2.  Mild anemia. Anemic work-up today. Advised to call for the test result.   I recommend to take iron pill every other day due to constipation. 3.   She has a history of rheumatoid arthritis and is on methotrexate. She will follow up with Dr. Dona Newman. Weight is stable. 4.   I advised her to keep her other age-appropriate cancer screening up to date. Mammogram in April 2019 was benign. Reportedly mammogram in 2020 was benign. 5.  She has history of seizures. She has been following neurologist.    6.  She has constipation. I recommend high-fiber diet. I gave refill of the senna. All of her questions have been answered for today. RTC in 6 months or sooner. Recent imaging and labs were reviewed and discussed with the patient.       Electronically signed by Thong Alarcon MD on 1/21/21 at 8:07 AM EST

## 2021-07-20 ENCOUNTER — HOSPITAL ENCOUNTER (OUTPATIENT)
Dept: INFUSION THERAPY | Age: 81
Discharge: HOME | End: 2021-07-20
Payer: MEDICARE

## 2021-07-20 DIAGNOSIS — D64.9 ANEMIA, UNSPECIFIED TYPE: ICD-10-CM

## 2021-07-20 LAB
ALBUMIN SERPL-MCNC: 4.2 GM/DL (ref 3.4–5)
ALP BLD-CCNC: 104 IU/L (ref 40–129)
ALT SERPL-CCNC: 13 U/L (ref 10–40)
ANION GAP SERPL CALCULATED.3IONS-SCNC: 14 MMOL/L (ref 4–16)
AST SERPL-CCNC: 16 IU/L (ref 15–37)
BASOPHILS ABSOLUTE: 0 K/CU MM
BASOPHILS RELATIVE PERCENT: 0.4 % (ref 0–1)
BILIRUB SERPL-MCNC: 0.4 MG/DL (ref 0–1)
BUN BLDV-MCNC: 13 MG/DL (ref 6–23)
CALCIUM SERPL-MCNC: 9.3 MG/DL (ref 8.3–10.6)
CEA: 9.7 NG/ML
CHLORIDE BLD-SCNC: 100 MMOL/L (ref 99–110)
CO2: 25 MMOL/L (ref 21–32)
CREAT SERPL-MCNC: 0.6 MG/DL (ref 0.6–1.1)
DIFFERENTIAL TYPE: ABNORMAL
EOSINOPHILS ABSOLUTE: 0.1 K/CU MM
EOSINOPHILS RELATIVE PERCENT: 1 % (ref 0–3)
FERRITIN: 56 NG/ML (ref 15–150)
FOLATE: >20 NG/ML (ref 3.1–17.5)
GFR AFRICAN AMERICAN: >60 ML/MIN/1.73M2
GFR NON-AFRICAN AMERICAN: >60 ML/MIN/1.73M2
GLUCOSE BLD-MCNC: 131 MG/DL (ref 70–99)
HCT VFR BLD CALC: 32.4 % (ref 37–47)
HEMOGLOBIN: 10.7 GM/DL (ref 12.5–16)
IRON: 178 UG/DL (ref 37–145)
LYMPHOCYTES ABSOLUTE: 0.7 K/CU MM
LYMPHOCYTES RELATIVE PERCENT: 13.2 % (ref 24–44)
MCH RBC QN AUTO: 31 PG (ref 27–31)
MCHC RBC AUTO-ENTMCNC: 33 % (ref 32–36)
MCV RBC AUTO: 93.9 FL (ref 78–100)
MONOCYTES ABSOLUTE: 0.5 K/CU MM
MONOCYTES RELATIVE PERCENT: 10.4 % (ref 0–4)
PCT TRANSFERRIN: 60 % (ref 10–44)
PDW BLD-RTO: 15.9 % (ref 11.7–14.9)
PLATELET # BLD: 327 K/CU MM (ref 140–440)
PMV BLD AUTO: 9.5 FL (ref 7.5–11.1)
POTASSIUM SERPL-SCNC: 3.6 MMOL/L (ref 3.5–5.1)
RBC # BLD: 3.45 M/CU MM (ref 4.2–5.4)
SEGMENTED NEUTROPHILS ABSOLUTE COUNT: 3.9 K/CU MM
SEGMENTED NEUTROPHILS RELATIVE PERCENT: 75 % (ref 36–66)
SODIUM BLD-SCNC: 139 MMOL/L (ref 135–145)
TOTAL IRON BINDING CAPACITY: 295 UG/DL (ref 250–450)
TOTAL PROTEIN: 5.6 GM/DL (ref 6.4–8.2)
UNSATURATED IRON BINDING CAPACITY: 117 UG/DL (ref 110–370)
VITAMIN B-12: 1107 PG/ML (ref 211–911)
WBC # BLD: 5.2 K/CU MM (ref 4–10.5)

## 2021-07-20 PROCEDURE — 36415 COLL VENOUS BLD VENIPUNCTURE: CPT

## 2021-07-20 PROCEDURE — 82378 CARCINOEMBRYONIC ANTIGEN: CPT

## 2021-07-20 PROCEDURE — 82746 ASSAY OF FOLIC ACID SERUM: CPT

## 2021-07-20 PROCEDURE — 85025 COMPLETE CBC W/AUTO DIFF WBC: CPT

## 2021-07-20 PROCEDURE — 83540 ASSAY OF IRON: CPT

## 2021-07-20 PROCEDURE — 83550 IRON BINDING TEST: CPT

## 2021-07-20 PROCEDURE — 82607 VITAMIN B-12: CPT

## 2021-07-20 PROCEDURE — 80053 COMPREHEN METABOLIC PANEL: CPT

## 2021-07-20 PROCEDURE — 82728 ASSAY OF FERRITIN: CPT

## 2021-07-22 ENCOUNTER — APPOINTMENT (OUTPATIENT)
Dept: RADIATION ONCOLOGY | Age: 81
End: 2021-07-22
Payer: MEDICARE

## 2021-07-27 ENCOUNTER — HOSPITAL ENCOUNTER (OUTPATIENT)
Dept: INFUSION THERAPY | Age: 81
Discharge: HOME | End: 2021-07-27
Payer: MEDICARE

## 2021-07-27 ENCOUNTER — OFFICE VISIT (OUTPATIENT)
Dept: ONCOLOGY | Age: 81
End: 2021-07-27
Payer: MEDICARE

## 2021-07-27 VITALS
OXYGEN SATURATION: 97 % | BODY MASS INDEX: 24.29 KG/M2 | DIASTOLIC BLOOD PRESSURE: 63 MMHG | HEART RATE: 88 BPM | HEIGHT: 62 IN | TEMPERATURE: 96.5 F | SYSTOLIC BLOOD PRESSURE: 141 MMHG | WEIGHT: 132 LBS

## 2021-07-27 DIAGNOSIS — D64.9 ANEMIA, UNSPECIFIED TYPE: ICD-10-CM

## 2021-07-27 DIAGNOSIS — C21.0 ANAL CANCER (HCC): Primary | ICD-10-CM

## 2021-07-27 PROCEDURE — 1090F PRES/ABSN URINE INCON ASSESS: CPT | Performed by: INTERNAL MEDICINE

## 2021-07-27 PROCEDURE — 99214 OFFICE O/P EST MOD 30 MIN: CPT | Performed by: INTERNAL MEDICINE

## 2021-07-27 PROCEDURE — G8427 DOCREV CUR MEDS BY ELIG CLIN: HCPCS | Performed by: INTERNAL MEDICINE

## 2021-07-27 PROCEDURE — 4040F PNEUMOC VAC/ADMIN/RCVD: CPT | Performed by: INTERNAL MEDICINE

## 2021-07-27 PROCEDURE — G8399 PT W/DXA RESULTS DOCUMENT: HCPCS | Performed by: INTERNAL MEDICINE

## 2021-07-27 PROCEDURE — 1123F ACP DISCUSS/DSCN MKR DOCD: CPT | Performed by: INTERNAL MEDICINE

## 2021-07-27 PROCEDURE — 1036F TOBACCO NON-USER: CPT | Performed by: INTERNAL MEDICINE

## 2021-07-27 PROCEDURE — G8420 CALC BMI NORM PARAMETERS: HCPCS | Performed by: INTERNAL MEDICINE

## 2021-07-27 PROCEDURE — 99211 OFF/OP EST MAY X REQ PHY/QHP: CPT

## 2021-07-27 RX ORDER — DICLOFENAC SODIUM 75 MG/1
1 TABLET, DELAYED RELEASE ORAL 2 TIMES DAILY PRN
COMMUNITY
Start: 2021-07-18 | End: 2022-10-06

## 2021-07-27 RX ORDER — THIAMINE HCL 100 MG
1 TABLET ORAL DAILY
COMMUNITY

## 2021-07-27 RX ORDER — SENNA PLUS 8.6 MG/1
1 TABLET ORAL 2 TIMES DAILY
Qty: 60 TABLET | Refills: 3 | Status: SHIPPED | OUTPATIENT
Start: 2021-07-27 | End: 2021-08-26

## 2021-07-27 ASSESSMENT — PATIENT HEALTH QUESTIONNAIRE - PHQ9
SUM OF ALL RESPONSES TO PHQ QUESTIONS 1-9: 0
2. FEELING DOWN, DEPRESSED OR HOPELESS: 0
SUM OF ALL RESPONSES TO PHQ QUESTIONS 1-9: 0
SUM OF ALL RESPONSES TO PHQ QUESTIONS 1-9: 0
SUM OF ALL RESPONSES TO PHQ9 QUESTIONS 1 & 2: 0
1. LITTLE INTEREST OR PLEASURE IN DOING THINGS: 0

## 2021-07-27 NOTE — PROGRESS NOTES
MA Rooming Questions  Patient: Sanaz Roberson  MRN: [id number]    Date: 7/27/2021        1. Do you have any new issues?   no         2. Do you need any refills on medications?    no    3. Have you had any imaging done since your last visit? yes - at [facility]    4. Have you been hospitalized or seen in the emergency room since your last visit here?   yes - [facility]    5. Did the patient have a depression screening completed today?  Yes    PHQ-9 Total Score: 0 (7/27/2021 11:09 AM)       PHQ-9 Given to (if applicable):               PHQ-9 Score (if applicable):                     [] Positive     [x]  Negative              Does question #9 need addressed (if applicable)                     [] Yes    []  No               Cynthia Owens CMA

## 2021-08-04 ENCOUNTER — HOSPITAL ENCOUNTER (OUTPATIENT)
Dept: RADIATION ONCOLOGY | Age: 81
Discharge: HOME | End: 2021-08-04
Payer: MEDICARE

## 2021-08-04 VITALS
BODY MASS INDEX: 23.89 KG/M2 | TEMPERATURE: 96.9 F | WEIGHT: 130.6 LBS | RESPIRATION RATE: 16 BRPM | HEART RATE: 87 BPM | SYSTOLIC BLOOD PRESSURE: 129 MMHG | DIASTOLIC BLOOD PRESSURE: 59 MMHG

## 2021-08-04 PROCEDURE — 99214 OFFICE O/P EST MOD 30 MIN: CPT | Performed by: RADIOLOGY

## 2021-08-04 PROCEDURE — 99211 OFF/OP EST MAY X REQ PHY/QHP: CPT

## 2021-08-04 RX ORDER — ACETAMINOPHEN 500 MG
500 TABLET ORAL EVERY 6 HOURS PRN
COMMUNITY

## 2021-08-04 ASSESSMENT — PAIN SCALES - GENERAL: PAINLEVEL_OUTOF10: 0

## 2021-08-04 NOTE — PROGRESS NOTES
[id number] [id number] [facility], [address] [facility]  Phone: [phone]  Fax: [phone]    RADIATION ONCOLOGY FOLLOW UP REPORT    PATIENT NAME:  Miranda Tillman              : 1940  MEDICAL RECORD NO: [id number]    [facility] NO: [id number]        PROVIDER: Gely Hernandez MD      DATE OF SERVICE: 2021     FOLLOW UP PHYSICIANS:   Medical Oncology: Dr. Dorina Price  GI: Dr. 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 She had CT Abd/Pel at [facility] in 2021 showing no evidence of recurrence  In 2021 she had colonoscopy  She has been hospitalized several times in the interval 6 months due to new onset seizures and a fall at home  While in the hospital (May 2021) she was noted to have rectal bleeding and was seen by GI - no internal intervention recommended due to to close interval colonoscopy in 2021  States symptoms have improved wince being on stool softeners    She deniespelvic pain, diarrhea, tenesmus, new masses. No recent weight loss. No chest pain, increased dyspnea, cough, hemoptysis. Pts daughter gives much of the interval history and states she has more trouble with dementia and memory lately.       Past Medical History:   Diagnosis Date    Acid reflux     Anxiety     Bronchitis In Past    Cancer [facility])     colon cancer    Carotid stenosis 11/10/2015    Cerebral embolism with cerebral infarction ([facility] [address] 2015    Seizure X 1, No Residual    Chronic back pain     Depression     History of blood transfusion     History of external beam radiation therapy 2017    5400 cGy pelvis/anal canal in 2017 [facility]. Jeimy Vela [facility]    Hyperlipidemia     Hypertension     Prolonged emergence from general anesthesia     RA (rheumatoid arthritis) ([facility]     \"All Over\"    Seizure ([facility] 09/18/2015    X 1    Sleep apnea     Uses CPAP    Teeth missing     Upper And Lower    Urinary incontinence     UTI (urinary tract infection) In Past    No Current Symptoms    Wears dentures     Full Upper , Partial Lower    Wears glasses     Wears partial dentures     Lower        Past Surgical History:   Procedure Laterality Date    ARTHROCENTESIS / ASPIRATION / INJECTION KNEE  5/11/2021         CAROTID ENDARTERECTOMY Right [id number]    CARPAL TUNNEL RELEASE      COLONOSCOPY  In Past    DENTAL SURGERY      Teeth Extracted In Past    DILATION AND CURETTAGE OF UTERUS  1960's    Prior To Vaginal Hysterectomy    HYSTERECTOMY, VAGINAL  1960's    PORT SURGERY N/A 3/2/2021    PORT REMOVAL performed by Leonel German MD at [address] [place] History   Problem Relation Age of Onset    Stroke Mother     Heart Disease Mother     Arthritis Mother     Diabetes Mother     High Blood Pressure Mother     High Cholesterol Mother     Miscarriages / [place] Mother     Other Father         \"Surgery For Twisted Bowels\"    Heart Disease Sister     Stroke Sister     High Blood Pressure Brother     Heart Disease Brother     Hearing Loss Brother         Heart Attack    Diabetes Sister     High Blood Pressure Sister     Other Sister         Gout    Arthritis Sister     Mental Illness Sister         Schizophrenia    High Blood Pressure Brother     High Blood Pressure Son     Mental Illness Son         Anxiety    Depression Son     Other Son         Pancreatitis, Surgery For Brain Aneurysm    High Blood Pressure Son     Other Son         Gout    Learning Disabilities Son         Autistic, Mentally Retarded    Other Son         Seizures       Social History     Socioeconomic History    Marital status:      Spouse name: Not on file    Number of children: Not on file    Years of education: Not on file    Highest education level: Not on file   Occupational History    Not on file   Tobacco Use    Smoking status: Never Smoker    Smokeless tobacco: Never Used   Vaping Use    Vaping Use: Never used   Substance and Sexual Activity    Alcohol use: No     Alcohol/week: 0.0 standard drinks    Drug use: No    Sexual activity: Never   Other Topics Concern    Not on file   Social History Narrative    Not on file     Social Determinants of Health     Financial Resource Strain:     Difficulty of Paying Living Expenses:    Food Insecurity:     Worried About Running Out of Food in the Last Year:     [address] in the Last Year:    Transportation Needs:     Lack of Transportation (Medical):      Lack of Transportation (Non-Medical):    Physical Activity:     Days of Exercise per Week:     Minutes of Exercise per Session:    Stress:     Feeling of Stress :    Social Connections:     Frequency of Communication with Friends and Family:     Frequency of Social Gatherings with Friends and Family:     Attends Congregational Services:     Active Member of Clubs or Organizations:     Attends Club or Organization Meetings:     Marital Status:    Intimate Partner Violence:     Fear of Current or Ex-Partner:     Emotionally Abused:     Physically Abused:     Sexually Abused:          MEDICATIONS:     Current Outpatient Medications:     acetaminophen (TYLENOL) 500 MG tablet, Take 500 mg by mouth every 6 hours as needed for Pain, Disp: , Rfl:     diclofenac (VOLTAREN) 75 MG EC tablet, Take 1 tablet by mouth 2 times daily as needed, Disp: , Rfl:     Magnesium 500 MG TABS, Take 1 tablet by mouth daily, Disp: , Rfl:     senna (SENOKOT) 8.6 MG tablet, Take 1 tablet by mouth 2 times daily, Disp: 60 tablet, Rfl: 3    dilTIAZem (DILACOR XR) 240 MG extended release capsule, Take 1 capsule by mouth nightly, Disp: 30 capsule, Rfl: 1    ferrous sulfate (IRON 325) 325 (65 Fe) MG tablet, Take 1 tablet by mouth daily (with breakfast) (Patient taking differently: Take 325 mg by mouth every other day ), Disp: 30 tablet, Rfl: 2    levETIRAcetam (KEPPRA) 500 MG tablet, Take 1 tablet by mouth 2 times daily, Disp: 60 tablet, Rfl: 1    donepezil (ARICEPT) 10 MG tablet, Take 10 mg by mouth every morning , Disp: , Rfl:     triamterene-hydroCHLOROthiazide (MAXZIDE-25) 37.5-25 MG per tablet, take 1 tablet by mouth once daily, Disp: , Rfl:     docusate sodium (COLACE) 100 MG capsule, Take 100 mg by mouth nightly 2 tablets, Disp: , Rfl:     atorvastatin (LIPITOR) 20 MG tablet, Take 20 mg by mouth nightly , Disp: , Rfl:     aspirin 81 MG tablet, Take 81 mg by mouth nightly , Disp: , Rfl:     CALCIUM PO, Take by mouth nightly , Disp: , Rfl:     vitamin D3 (COLECALCIFEROL) 400 UNITS TABS, Take 5,000 Units by mouth daily , Disp: , Rfl:     folic acid (FOLVITE) 191 MCG tablet, Take 400 mcg by mouth every morning, Disp: , Rfl:     methotrexate 2.5 MG tablet, Take 2.5 mg by mouth once a week Indications: Take 7 tablets one time a week , Disp: , Rfl:     predniSONE (DELTASONE) 5 MG tablet, Take 5 mg by mouth 2 times daily , Disp: , Rfl:       REVIEW OF SYSTEMS:  Pertinent positive and negatives in History: the remainder of the 14-pt ROS is negative.       EXAMINATION:   Vitals:    08/04/21 1121   BP: (!) 129/59   Pulse: 87   Resp: 16   Temp: 96.9 °F (36.1 °C)     A&O x3, NAD  Sclera anicteric, EOMI  No cervical/SCV LAD - No inguinal LAD  Abd soft, NTND, no HSM  No UE/LE edema  No spinal or other bony tenderness to firm palpation  Nl mood/affect/judgement  Rectal: nl sphincter tone, small non-nodular tissue thickening in posterior anal canal from 5-7 o'clock position (dorsal lithotomy) ~1cm in length      LABORATORY STUDIES:   CBC:   Lab Results   Component Value Date    WBC 5.2 2021    RBC 3.45 2021    RBC 3.24 08/10/2017    HGB 10.7 2021    HCT 32.4 2021    MCV 93.9 2021    MCH 31.0 2021    MCHC 33.0 2021    RDW 15.9 2021     2021    MPV 9.5 2021     CMP:  Lab Results   Component Value Date     2021    K 3.6 2021     2021    CO2 25 2021    BUN 13 2021    CREATININE 0.6 2021    GFRAA >60 2021    LABGLOM >60 2021    GLUCOSE 131 2021    PROT 5.6 2021    LABALBU 4.2 2021    CALCIUM 9.3 2021    BILITOT 0.4 2021    ALKPHOS 104 2021    AST 16 2021    ALT 13 2021     Onc labs:   Lab Results   Component Value Date    CEA 9.7 2021     05/10/2021       Imaging  21        MEDICAL DECISION MAKIN years post-treatment completion  Reviewed surveillance guidelines:  CT Chest/Abd/Pel yearly x3 years, Anoscopy Q6-12 months x 3 years, DAVIS + node palpation Q3-6 months x 5 yrs    RTC 1 year with 6 month f/u per med onc      TIME SPENT   [] 10 - 19 minutes (31370)  [] 20 - 29 minutes (02317)  [x] 30 - 39 minutes (44672)  [] 40 - 47 minutes (34959)    [x] Preparing to see patient and reviewing tests and clinical notes for interim history  [x] Individual interpretation of results  [x] Evaluating patient  [] Communicating results to patient/family/caregiver  [x] Counseling and educating the patient/family/caregiver  [] Discussion or coordination of care with other health care professionals and coordinating care  [] Ordering of unique tests, medications, or procedures  [x] Documentation within the EHR       Electronically signed by Electronically signed by Rober Dutta MD on 2021 at 11:54 AM

## 2021-08-04 NOTE — PROGRESS NOTES
Kristin Moreno  8/4/2021    Patient is seen today for follow up.      Vitals:    08/04/21 1121   BP: (!) 129/59   Pulse: 87   Resp: 16   Temp: 96.9 °F (36.1 °C)             Wt Readings from Last 3 Encounters:   08/04/21 130 lb 9.6 oz (59.2 kg)   07/27/21 132 lb (59.9 kg)   05/24/21 127 lb (57.6 kg)       Pain Assessment  Pain Assessment: 0-10  Pain Level: 0  Patient's Stated Pain Goal: No pain  Multiple Pain Sites: No  OTC Analgesics prn for arthritis pain       Allergies   Allergen Reactions    Cortisone Rash    Codeine      Unsure Of Reaction    Pcn [Penicillins]      Unknown Reaction        Current Outpatient Medications   Medication Sig Dispense Refill    acetaminophen (TYLENOL) 500 MG tablet Take 500 mg by mouth every 6 hours as needed for Pain      diclofenac (VOLTAREN) 75 MG EC tablet Take 1 tablet by mouth 2 times daily as needed      Magnesium 500 MG TABS Take 1 tablet by mouth daily      senna (SENOKOT) 8.6 MG tablet Take 1 tablet by mouth 2 times daily 60 tablet 3    dilTIAZem (DILACOR XR) 240 MG extended release capsule Take 1 capsule by mouth nightly 30 capsule 1    ferrous sulfate (IRON 325) 325 (65 Fe) MG tablet Take 1 tablet by mouth daily (with breakfast) (Patient taking differently: Take 325 mg by mouth every other day ) 30 tablet 2    levETIRAcetam (KEPPRA) 500 MG tablet Take 1 tablet by mouth 2 times daily 60 tablet 1    donepezil (ARICEPT) 10 MG tablet Take 10 mg by mouth every morning       triamterene-hydroCHLOROthiazide (MAXZIDE-25) 37.5-25 MG per tablet take 1 tablet by mouth once daily      docusate sodium (COLACE) 100 MG capsule Take 100 mg by mouth nightly 2 tablets      atorvastatin (LIPITOR) 20 MG tablet Take 20 mg by mouth nightly       aspirin 81 MG tablet Take 81 mg by mouth nightly       CALCIUM PO Take by mouth nightly       vitamin D3 (COLECALCIFEROL) 400 UNITS TABS Take 5,000 Units by mouth daily       folic acid (FOLVITE) 562 MCG tablet Take 400 mcg by mouth every morning      methotrexate 2.5 MG tablet Take 2.5 mg by mouth once a week Indications: Take 7 tablets one time a week       predniSONE (DELTASONE) 5 MG tablet Take 5 mg by mouth 2 times daily        No current facility-administered medications for this encounter. Additional Comments: Pt here for routine follow with Dr. Annetta Olivre. No new complaints or concerns voiced.  Reports still following with GI and Med Onc    Electronically signed by Orlando Joaquin RN on 8/4/2021 at 11:27 AM

## 2021-08-30 ENCOUNTER — NURSE ONLY (OUTPATIENT)
Dept: CARDIOLOGY CLINIC | Age: 81
End: 2021-08-30
Payer: MEDICARE

## 2021-08-30 ENCOUNTER — OFFICE VISIT (OUTPATIENT)
Dept: CARDIOLOGY CLINIC | Age: 81
End: 2021-08-30
Payer: MEDICARE

## 2021-08-30 VITALS
WEIGHT: 140 LBS | SYSTOLIC BLOOD PRESSURE: 116 MMHG | HEIGHT: 62 IN | BODY MASS INDEX: 25.76 KG/M2 | HEART RATE: 84 BPM | DIASTOLIC BLOOD PRESSURE: 58 MMHG

## 2021-08-30 DIAGNOSIS — I47.1 SVT (SUPRAVENTRICULAR TACHYCARDIA) (HCC): ICD-10-CM

## 2021-08-30 DIAGNOSIS — I65.23 BILATERAL CAROTID ARTERY STENOSIS: Chronic | ICD-10-CM

## 2021-08-30 DIAGNOSIS — R56.9 SEIZURE (HCC): ICD-10-CM

## 2021-08-30 DIAGNOSIS — R56.9 PARTIAL SEIZURES (HCC): ICD-10-CM

## 2021-08-30 DIAGNOSIS — I69.359 CVA, OLD, HEMIPARESIS (HCC): Primary | Chronic | ICD-10-CM

## 2021-08-30 DIAGNOSIS — I10 ESSENTIAL HYPERTENSION: ICD-10-CM

## 2021-08-30 DIAGNOSIS — I69.359 CVA, OLD, HEMIPARESIS (HCC): Chronic | ICD-10-CM

## 2021-08-30 DIAGNOSIS — I47.1 SVT (SUPRAVENTRICULAR TACHYCARDIA) (HCC): Primary | ICD-10-CM

## 2021-08-30 PROCEDURE — 1036F TOBACCO NON-USER: CPT | Performed by: INTERNAL MEDICINE

## 2021-08-30 PROCEDURE — 1090F PRES/ABSN URINE INCON ASSESS: CPT | Performed by: INTERNAL MEDICINE

## 2021-08-30 PROCEDURE — 1123F ACP DISCUSS/DSCN MKR DOCD: CPT | Performed by: INTERNAL MEDICINE

## 2021-08-30 PROCEDURE — 93228 REMOTE 30 DAY ECG REV/REPORT: CPT | Performed by: INTERNAL MEDICINE

## 2021-08-30 PROCEDURE — 4040F PNEUMOC VAC/ADMIN/RCVD: CPT | Performed by: INTERNAL MEDICINE

## 2021-08-30 PROCEDURE — 99204 OFFICE O/P NEW MOD 45 MIN: CPT | Performed by: INTERNAL MEDICINE

## 2021-08-30 PROCEDURE — G8428 CUR MEDS NOT DOCUMENT: HCPCS | Performed by: INTERNAL MEDICINE

## 2021-08-30 PROCEDURE — G8399 PT W/DXA RESULTS DOCUMENT: HCPCS | Performed by: INTERNAL MEDICINE

## 2021-08-30 PROCEDURE — G8417 CALC BMI ABV UP PARAM F/U: HCPCS | Performed by: INTERNAL MEDICINE

## 2021-08-30 RX ORDER — DILTIAZEM HYDROCHLORIDE 240 MG/1
240 CAPSULE, EXTENDED RELEASE ORAL NIGHTLY
Qty: 90 CAPSULE | Refills: 3 | Status: SHIPPED | OUTPATIENT
Start: 2021-08-30 | End: 2022-09-02

## 2021-08-30 NOTE — LETTER
Danielle Holguin  1940  [id number]    Have you had any Chest Pain that is not new? - No  If Yes DO EKG - How does it feel -    How long does the pain last -      How long have you been having the pain -    Did you take a    And did it relieve the pain -      DO EKG IF: Patient has a Heart Rate above 100 or below 40     CAD (Coronary Artery Disease) patient should have one on file every 6 months        Have you had any Shortness of Breath - No  If Yes - When     Have you had any dizziness - No  If Yes DO ORTHOSTATIC BP - when do you feel dizzy    How long does it last .        Sitting wait 5 minutes do supine (laying down) wait 5 minutes then do standing - log each in \"vitals\" area in Epic   Be sure to ask what symptoms they are having if they get dizzy while completing ortho stats such as: room spinning, nausea, etc.    Have you had any palpitations that are not new? - No  If Yes DO EKG - Do you feel your heart   How long does it last - . Is the patient on any of the following medications -   If Yes DO EKG - Needs done every 6 months    Do you have any edema - swelling in foot    If Yes - CHECK TO SEE IF THE EDEMA IS PITTING  How long have they been having edema -   If Yes - Have they worn compression stockings   If they have worn compression stockings      Vein \"LEG PROBLEM Questionnaire\"  1. Do you have prominent leg veins? No   2. Do you have any skin discoloration? No  3. Do you have any healed or active sores? no  4. Do you have swelling of the legs? No  5. Do you have a family history of varicose veins? No  6. Does your profession involve pro-longed        standing or heavy lifting? No  7. Have you been fighting overweight problems? No  8. Do you have restless legs? No  9. Do you have any night time cramps? Yes  10.  Do you have any of the following in your legs:             When did you have your last labs drawn   Where did you have them done   What doctor ordered     If we do not have these labs you are retrieve these labs for these providers! Do you have a surgery or procedure scheduled in the near future - No  If Yes- DO EKG  If Yes - Who is the surgery with?    Phone number of physician   Fax number of physician   Type of surgery   GIVE THIS INFORMATION TO FABY BERNAL     Ask patient if they want to sign up for MyChart if they are not already signed up     Check to see if we have an E-MAIL on file for the patient     Check medication list thoroughly!!! AND RECONCILE OUTSIDE MEDICATIONS  If dose has changed change the entire order not just the [place] At check out add to every patient's \"wrap up\" the following dot phrase AFTERHOURSEDUCATION and ensure we explain this to our patients

## 2021-08-30 NOTE — ASSESSMENT & PLAN NOTE
History of stroke with some deficits she walks with a walker we will place her on a 30-day monitor to screen her for A. fib carotids were normal on CT scan in April 2021 for now continue aspirin

## 2021-08-30 NOTE — PROGRESS NOTES
CARDIOLOGY  NOTE    Chief Complaint: SVT     HPI:   Kirby Martin is a 68y.o. year old who has Past medical history as noted below. Kirby Martin comes in with her daughter who is her primary caretaker. She has some limited memory and right-sided deficits weakness. She had stroke in 2015 , carotid were normal , she has no known h/o afib but has dementia, walks with a walker , she has not had any falls in last few years . Daughter tells me that Kirby Martin has been under a lot of stress because her son is nonverbal and dependent on her lives with her. Her blood pressure generally well controlled  She had a mechanical fall in May 2021 resulting in left knee injury since then she has recovered during her hospitalization she had episodes of SVT requiring adenosine infusions.   She denies any palpitations or symptoms since then she has been on Cardizem fairly well controlled there is no ankle swelling      Current Outpatient Medications   Medication Sig Dispense Refill    dilTIAZem (DILACOR XR) 240 MG extended release capsule Take 1 capsule by mouth nightly 90 capsule 3    acetaminophen (TYLENOL) 500 MG tablet Take 500 mg by mouth every 6 hours as needed for Pain      diclofenac (VOLTAREN) 75 MG EC tablet Take 1 tablet by mouth 2 times daily as needed      Magnesium 500 MG TABS Take 1 tablet by mouth daily      ferrous sulfate (IRON 325) 325 (65 Fe) MG tablet Take 1 tablet by mouth daily (with breakfast) (Patient taking differently: Take 325 mg by mouth every other day ) 30 tablet 2    levETIRAcetam (KEPPRA) 500 MG tablet Take 1 tablet by mouth 2 times daily 60 tablet 1    donepezil (ARICEPT) 10 MG tablet Take 10 mg by mouth every morning       triamterene-hydroCHLOROthiazide (MAXZIDE-25) 37.5-25 MG per tablet take 1 tablet by mouth once daily      docusate sodium (COLACE) 100 MG capsule Take 100 mg by mouth nightly 2 tablets      atorvastatin (LIPITOR) 20 MG tablet Take 20 mg by mouth nightly       aspirin 81 MG tablet Take 81 mg by mouth nightly       CALCIUM PO Take by mouth nightly       vitamin D3 (COLECALCIFEROL) 400 UNITS TABS Take 5,000 Units by mouth daily       folic acid (FOLVITE) 572 MCG tablet Take 400 mcg by mouth every morning      methotrexate 2.5 MG tablet Take 2.5 mg by mouth once a week Indications: Take 7 tablets one time a week       predniSONE (DELTASONE) 5 MG tablet Take 5 mg by mouth 2 times daily        No current facility-administered medications for this visit.        Allergies:   Cortisone, Codeine, and Pcn [penicillins]    Patient History:  Past Medical History:   Diagnosis Date    Acid reflux     Anxiety     Bronchitis In Past    [facility]     colon cancer    Carotid stenosis 11/10/2015    Cerebral embolism with cerebral infarction ([facility] 09/18/2015    Seizure X 1, No Residual    Chronic back pain     Depression     History of blood transfusion     History of external beam radiation therapy 2017    5400 cGy pelvis/anal canal in 2017 per Dr. Rajni Alvarez at [address] [facility] Hyperlipidemia     Hypertension     Prolonged emergence from general anesthesia     RA (rheumatoid arthritis) ([facility]     \"All Over\"    [facility] 09/18/2015    X 1    Sleep apnea     Uses CPAP    Teeth missing     Upper And Lower    Urinary incontinence     UTI (urinary tract infection) In Past    No Current Symptoms    Wears dentures     Full Upper , Partial Lower    Wears glasses     Wears partial dentures     Lower     Past Surgical History:   Procedure Laterality Date    ARTHROCENTESIS / ASPIRATION / INJECTION KNEE  5/11/2021         CAROTID ENDARTERECTOMY Right [id number]    CARPAL TUNNEL RELEASE      COLONOSCOPY  In Past    DENTAL SURGERY      Teeth Extracted In Past    DILATION AND CURETTAGE OF UTERUS  1960's    Prior To Vaginal Hysterectomy    HYSTERECTOMY, VAGINAL  1960's    PORT SURGERY N/A 3/2/2021    PORT REMOVAL performed by Quan York MD at [place] [address] problems, blood clots or swollen lymph nodes  · Allergic/Immunologic: No nasal congestion or hives    Objective:      Physical Exam:  BP (!) 116/58   Pulse 84   Ht 5' 2\" (1.575 m)   Wt 140 lb (63.5 kg)   BMI 25.61 kg/m²   Wt Readings from Last 3 Encounters:   08/30/21 140 lb (63.5 kg)   08/04/21 130 lb 9.6 oz (59.2 kg)   07/27/21 132 lb (59.9 kg)     Body mass index is 25.61 kg/m². Vitals:    08/30/21 0940   BP: (!) 116/58   Pulse: 84        General Appearance:  No distress, conversant  Constitutional:  Well developed, Well nourished, No acute distress, Non-toxic appearance. HENT:  Normocephalic, Atraumatic, Bilateral external ears normal, Oropharynx moist, No oral exudates, Nose normal. Neck- Normal range of motion, No tenderness, Supple, No stridor,no apical-carotid delay  Eyes:  PERRL, EOMI, Conjunctiva normal, No discharge. Respiratory:  Normal breath sounds, No respiratory distress, No wheezing, No chest tenderness. ,no use of accessory muscles, NO crackles  Cardiovascular: (PMI) apex non displaced,no lifts no thrills,S1 and S2 audible, No added heart sounds, No signs of ankle edema, or volume overload, No evidence of JVD, No crackles  GI:  Bowel sounds normal, Soft, No tenderness, No masses, No gross visceromegaly   :  No costovertebral angle tenderness   Musculoskeletal:  No edema, no tenderness, no deformities.  Back- no tenderness  Integument:  Well hydrated, no rash   Lymphatic:  No lymphadenopathy noted   Neurologic:  Alert & oriented x 3, CN 2-12 normal, right-sided weakness, normal sensory function, no focal deficits noted   Psychiatric:  Speech and behavior appropriate       Medical decision making and Data review:  DATA:  Lab Results   Component Value Date    TROPONINT 0.023 (H) 05/13/2021     BNP:  No results found for: PROBNP  PT/INR:  No results found for: PTINR  No results found for: LABA1C  Lab Results   Component Value Date    CHOL 178 09/21/2015    TRIG 197 (H) 09/21/2015    HDL 63 09/21/2015    LDLDIRECT 95 09/21/2015     Lab Results   Component Value Date    ALT 13 07/20/2021    AST 16 07/20/2021     No results for input(s): WBC, HGB, HCT, MCV, PLT in the last 72 hours. TSH: No results found for: TSH  Lab Results   Component Value Date    AST 16 07/20/2021    ALT 13 07/20/2021    BILITOT 0.4 07/20/2021    ALKPHOS 104 07/20/2021     No results found for: PROBNP  No results found for: LABA1C  Lab Results   Component Value Date    WBC 5.2 07/20/2021    HGB 10.7 (L) 07/20/2021    HCT 32.4 (L) 07/20/2021     07/20/2021     All labs, medications and tests reviewed by myself including data and history from outside source , patient and available family . Assessment & Plan:      1. CVA, old, hemiparesis ([address]    2. Partial seizures ([address]    3. Essential hypertension    4. Seizure ([address]    5. SVT (supraventricular tachycardia) ([address]    6. Bilateral carotid artery stenosis         CVA, old, hemiparesis ([address]   History of stroke with some deficits she walks with a walker we will place her on a 30-day monitor to screen her for A. fib carotids were normal on CT scan in April 2021 for now continue aspirin    SVT (supraventricular tachycardia) ([address]   Noted to have episodes of SVT during hospitalization in April 2021 converted with adenosine for now continue Cardizem fairly well controlled she was evaluated by EPS in April 2020 when she was hospitalized     Dyslipidemia :  All available lab work was reviewed. Patient was advised to repeat lab work before next visit. Necessary orders were placed , instructions given by myself       Counseled extensively and medication compliance urged. We discussed that for the  prevention of ASCVD our  goal is aggressive risk modification. Patient is encouraged to exercise if they can , educated about  brisk walk for 30 minutes  at least 3 to 4 times a week if there are no physical limitations  Various goals were discussed and questions answered.  Continue current medications. Appropriate prescriptions are addressed and refills ordered. Questions answered and patient verbalizes understanding. Call for any problems, questions, or concerns. Greater than 60 % of time spent counseling besides reviewing data and images     Continue all other medications of all above medical condition listed as is. Return in about 6 months (around 2/28/2022). Please note this report has been partially produced using speech recognition software and may contain errors related to that system including errors in grammar, punctuation, and spelling, as well as words and phrases that may be inappropriate. If there are any questions or concerns please feel free to contact the dictating provider for clarification.

## 2021-08-30 NOTE — PROGRESS NOTES
30 days e-cardio monitor placed.  # [id number]. Instructed patient on how to record the event and to call monitoring center at [phone] if any problems arise. Instructed patient to disconnect the lead wires from the electrodes before bathing or showering and reattach them afterwards. Instructed patient that the electrodes should be changed every 3 days or if they no longer adhere to the skin. Patient to mail package after the monitor has ended. Patient verbalized understanding.

## 2021-09-21 NOTE — PROGRESS NOTES
9/23/21    Kwan Pae  1940    Chief Complaint   Patient presents with    Follow-up     Memory isssues. History of Present Illness  Luciana Jiménez is a 68 y.o. female presenting to the [facility] office today in follow-up regarding history of stroke and seizure disorder. At last visit 07/13/21, she was continued on Keppra 500 mg BID and denied any seizure like activity since prior visit. She had seizure episode in the office 04/07/21 with Dr. Guanako Cabrera, described as lightheaded became unresponsive and had rhythmic twitching for approximately 20 seconds, following this episode was less coherent. She was further evaluated on hospital admission where her EEG was normal, no focal slowing or epileptiform discharges seen. 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 Additionally she has undergone cognitive testing which is indicative of MCI, last visit her dose of Aricept was increased to 10 mg daily. Patient was also noted to have a history of VIC but was noncompliant with treatment, it has been stressed to her that we recommend a repeat sleep study, ordering a new device if indicated that we will properly treat VIC as this could likely be contributing to cognitive effects as well as place her at an increased risk of stroke in addition to her other chronic health conditions. On exam today, patient denies any further seizure activity since last visit and is continued on her Keppra 500 mg twice daily, tolerating this medication well. She further admits to tolerating Aricept 10 mg daily, denies any noted side effects or significant changes in memory. She is the primary caregiver for her son who is nonverbal and has history of MRDD.  Patient and I had a long discussion regarding history of sleep apnea, tells me she has not had a machine or followed up with sleep medicine in quite some time, possibly years. Discussed that untreated sleep apnea increases her risk of stroke by 6-7 times and as she is already had prior infarct would like to decrease any modifiable risks factors. Additionally sleep apnea untreated could be contributing to cognitive/memory deficits that were noted on previous cognitive testing. Would like to have her reestablish care with sleep medicine for which she was in agreement with. Patient never followed up with physical therapy following last visit as they wanted her to complete this outpatient. She admits to recurrent falls, currently using a cane around the house and only using her walker for longer visits as she is having difficulty pushing the walker since previous fall.       Current Outpatient Medications   Medication Sig Dispense Refill    RA SENNA 8.6 MG tablet take 1 tablet by mouth twice a day      dilTIAZem (DILACOR XR) 240 MG extended release capsule Take 1 capsule by mouth nightly 90 capsule 3    acetaminophen (TYLENOL) 500 MG tablet Take 500 mg by mouth every 6 hours as needed for Pain      diclofenac (VOLTAREN) 75 MG EC tablet Take 1 tablet by mouth 2 times daily as needed      Magnesium 500 MG TABS Take 1 tablet by mouth daily      ferrous sulfate (IRON 325) 325 (65 Fe) MG tablet Take 1 tablet by mouth daily (with breakfast) (Patient taking differently: Take 325 mg by mouth every other day ) 30 tablet 2    levETIRAcetam (KEPPRA) 500 MG tablet Take 1 tablet by mouth 2 times daily 60 tablet 1    donepezil (ARICEPT) 10 MG tablet Take 10 mg by mouth every morning       triamterene-hydroCHLOROthiazide (MAXZIDE-25) 37.5-25 MG per tablet take 1 tablet by mouth once daily      docusate sodium (COLACE) 100 MG capsule Take 100 mg by mouth nightly 2 tablets      atorvastatin (LIPITOR) 20 MG tablet Take 20 mg by mouth nightly       aspirin 81 MG tablet Take 81 mg by mouth nightly       CALCIUM PO Take by mouth nightly       vitamin D3 apnea in adult  [address]   4. Mild cognitive disorder     5. Recurrent falls  [facility]     Ms. Lexi Hutson presents in follow up regarding multiple neurologic complaints. We will continue on AED, Keppra 500 mg twice daily for control of seizure activity. Patient denies any seizure-like activity since last visit. Daughter will let us know when refills need to be provided. We will continue on secondary stroke prevention including daily aspirin and statin, with history of stroke. Discussion regarding importance of recognizing strokelike symptoms including acute mental status change, slurred speech, facial droop, one-sided weakness would warrant evaluation immediately. With regards to memory prior cognitive testing was indicative of mild cognitive impairment and she will continue on Aricept 10 mg daily, tolerating this medication well. I would like to further pursue follow-up with sleep medicine as she has a history of sleep apnea which is currently untreated, additional mention of REM sleep disorder symptoms. Referral sent to [address] [place] [address] at this time, discussed with patient and her daughter that once we get this addressed would then reassess for any progression of memory loss and could consider addition of memantine at that time. We further discussed nonpharmacological interventions including staying cognitively, socially and physically active. Patient further reporting recurrent falls, was unable to complete outpatient physical therapy following last visit as she could not provide transportation. Will send new referral for at home assessment with hopes that PT can be completed at her residence. Medication prescribed for the patient were discussed and detailed, discussion included potential risks versus the potential benefits of these medications.   The patient and her daughter were given time to ask questions and answered to the best of my ability, patient

## 2021-09-23 ENCOUNTER — OFFICE VISIT (OUTPATIENT)
Dept: NEUROLOGY | Age: 81
End: 2021-09-23
Payer: MEDICARE

## 2021-09-23 VITALS
HEIGHT: 64 IN | SYSTOLIC BLOOD PRESSURE: 122 MMHG | DIASTOLIC BLOOD PRESSURE: 68 MMHG | OXYGEN SATURATION: 99 % | WEIGHT: 138 LBS | BODY MASS INDEX: 23.56 KG/M2 | HEART RATE: 80 BPM

## 2021-09-23 DIAGNOSIS — Z86.73 HISTORY OF CVA (CEREBROVASCULAR ACCIDENT): ICD-10-CM

## 2021-09-23 DIAGNOSIS — R56.9 SEIZURE (HCC): Primary | ICD-10-CM

## 2021-09-23 DIAGNOSIS — R29.6 RECURRENT FALLS: ICD-10-CM

## 2021-09-23 DIAGNOSIS — F09 MILD COGNITIVE DISORDER: ICD-10-CM

## 2021-09-23 DIAGNOSIS — G47.30 SLEEP APNEA IN ADULT: ICD-10-CM

## 2021-09-23 PROCEDURE — 99214 OFFICE O/P EST MOD 30 MIN: CPT | Performed by: PHYSICIAN ASSISTANT

## 2021-09-23 RX ORDER — NAPROXEN SODIUM 220 MG
TABLET ORAL
COMMUNITY
Start: 2021-08-26 | End: 2022-10-06

## 2021-10-01 ENCOUNTER — TELEPHONE (OUTPATIENT)
Dept: CARDIOLOGY CLINIC | Age: 81
End: 2021-10-01

## 2021-10-01 NOTE — TELEPHONE ENCOUNTER
Patient given results of event monitor. 30-day monitor with 3 patient triggers average heart rate was 82 lowest heart was 42 maximum heart rate is 150 no episodes of atrial fibrillation recorded.  Maximum heart rate 150 8:55 PM no significant arrhythmias or abnormality noted normal 30-day monitor.

## 2021-10-06 ENCOUNTER — HOSPITAL ENCOUNTER (OUTPATIENT)
Dept: SLEEP CENTER | Age: 81
Discharge: HOME | End: 2021-10-06
Payer: MEDICARE

## 2021-10-06 VITALS
BODY MASS INDEX: 25.4 KG/M2 | OXYGEN SATURATION: 95 % | SYSTOLIC BLOOD PRESSURE: 130 MMHG | DIASTOLIC BLOOD PRESSURE: 78 MMHG | WEIGHT: 138 LBS | HEART RATE: 94 BPM | HEIGHT: 62 IN

## 2021-10-06 DIAGNOSIS — G47.33 OSA (OBSTRUCTIVE SLEEP APNEA): ICD-10-CM

## 2021-10-06 DIAGNOSIS — G47.10 HYPERSOMNIA: ICD-10-CM

## 2021-10-06 PROCEDURE — 99211 OFF/OP EST MAY X REQ PHY/QHP: CPT

## 2021-10-06 PROCEDURE — 99204 OFFICE O/P NEW MOD 45 MIN: CPT | Performed by: INTERNAL MEDICINE

## 2021-10-06 ASSESSMENT — SLEEP AND FATIGUE QUESTIONNAIRES
HOW LIKELY ARE YOU TO NOD OFF OR FALL ASLEEP IN A CAR, WHILE STOPPED FOR A FEW MINUTES IN TRAFFIC: 0
ESS TOTAL SCORE: 5
HOW LIKELY ARE YOU TO NOD OFF OR FALL ASLEEP WHILE LYING DOWN TO REST IN THE AFTERNOON WHEN CIRCUMSTANCES PERMIT: 0
HOW LIKELY ARE YOU TO NOD OFF OR FALL ASLEEP WHILE SITTING AND READING: 0
HOW LIKELY ARE YOU TO NOD OFF OR FALL ASLEEP WHILE SITTING AND TALKING TO SOMEONE: 0
HOW LIKELY ARE YOU TO NOD OFF OR FALL ASLEEP WHILE SITTING INACTIVE IN A PUBLIC PLACE: 0
HOW LIKELY ARE YOU TO NOD OFF OR FALL ASLEEP WHILE WATCHING TV: 2
HOW LIKELY ARE YOU TO NOD OFF OR FALL ASLEEP WHEN YOU ARE A PASSENGER IN A CAR FOR AN HOUR WITHOUT A BREAK: 3
HOW LIKELY ARE YOU TO NOD OFF OR FALL ASLEEP WHILE SITTING QUIETLY AFTER LUNCH WITHOUT ALCOHOL: 0

## 2021-10-06 NOTE — CONSULTS
Imani Blevins MD, Melissa Chan MD, Loye Goldmann MD, Bela Chang MD, [facility]      [address], [address] [facility]   PH: [phone]  F: [phone]     Subjective:     Patient ID: Ayala Hope is a 68 y.o. female, referred to the sleep center for   Chief Complaint   Patient presents with    Sleep Apnea   . Referring physician:  Alejandra Ford PA-C    History:has been referred for the VIC. She has been diagnosed with VIC in 2006 at [facility]. She was on a CPAP which she used it for more than 1 year.      Symptoms:   [x]  Snoring                                                                    [x]  Dry Mouth  []  Choking                                                                   []  Morning Headaches  []  Gasping for Air                                                        []  Trouble Falling asleep  [x]  Tired during the daytime                                         []  Trouble Staying Asleep  []  Tired when you wake up                                         []  Weight Gain in Last 5 Years  [x]  Wake up frequently at night                                    []  Weight Loss in Last 5 Years  []  Shortness Of Breath                                               []  Shift Worker  []  Coughing                                                                []  Smoker (Previous or Current)  []  Chest Pain                                                              [x]  Anxiety  []  Trouble keeping your legs still at night                   []  Depression  []  Kicking your legs in your sleep                               []  Insomnia     [] Palpitation  []   Stop breathing      []  Other:     Significant Co-morbidities:  []  Congestive Heart Failure     []  COPD         []  Stroke (Past 30 Days)      []  Supplemental Oxygen Usage       []  Cognitive Impairment      []  Neuromuscular Problems  [x]  Epilepsy/Neurological Disorders           Social History     Socioeconomic History    Marital status:      Spouse name: Not on file    Number of children: Not on file    Years of education: Not on file    Highest education level: Not on file   Occupational History    Not on file   Tobacco Use    Smoking status: Never Smoker    Smokeless tobacco: Never Used   Vaping Use    Vaping Use: Never used   Substance and Sexual Activity    Alcohol use: No     Alcohol/week: 0.0 standard drinks     Comment: 4-6 caffeinated beverages a week    Drug use: No    Sexual activity: Never   Other Topics Concern    Not on file   Social History Narrative    Not on file     Social Determinants of Health     Financial Resource Strain:     Difficulty of Paying Living Expenses:    Food Insecurity:     Worried About Running Out of Food in the Last Year:     Ran Out of Food in the Last Year:    Transportation Needs:     Lack of Transportation (Medical):  Lack of Transportation (Non-Medical):    Physical Activity:     Days of Exercise per Week:     Minutes of Exercise per Session:    Stress:     Feeling of Stress :    Social Connections:     Frequency of Communication with Friends and Family:     Frequency of Social Gatherings with Friends and Family:     Attends Faith Services:     Active Member of Clubs or Organizations:     Attends Club or Organization Meetings:     Marital Status:    Intimate Partner Violence:     Fear of Current or Ex-Partner:     Emotionally Abused:     Physically Abused:     Sexually Abused:        Prior to Admission medications    Medication Sig Start Date End Date Taking?  Authorizing Provider   SHAHAB SENJOSE D 8.6 MG tablet take 1 tablet by mouth twice a day 8/26/21  Yes Historical Provider, MD   dilTIAZem (DILACOR XR) 240 MG extended release capsule Take 1 capsule by mouth nightly 8/30/21  Yes Yuval Mendes MD   acetaminophen (TYLENOL) 500 MG tablet Take 500 mg by mouth every 6 hours as needed for Pain   Yes Historical Provider, MD   diclofenac (VOLTAREN) 75 MG EC tablet Take 1 tablet by mouth 2 times daily as needed 7/18/21  Yes Historical Provider, MD   Magnesium 500 MG TABS Take 1 tablet by mouth daily   Yes Historical Provider, MD   ferrous sulfate (IRON 325) 325 (65 Fe) MG tablet Take 1 tablet by mouth daily (with breakfast)  Patient taking differently: Take 325 mg by mouth every other day  5/13/21  Yes Jessica Rubin MD   levETIRAcetam (KEPPRA) 500 MG tablet Take 1 tablet by mouth 2 times daily 4/9/21  Yes Ascencion Hernandez MD   donepezil (ARICEPT) 10 MG tablet Take 10 mg by mouth every morning  4/6/21  Yes Historical Provider, MD   triamterene-hydroCHLOROthiazide (MAXZIDE-25) 37.5-25 MG per tablet take 1 tablet by mouth once daily 3/16/21  Yes Historical Provider, MD   docusate sodium (COLACE) 100 MG capsule Take 100 mg by mouth nightly 2 tablets   Yes Historical Provider, MD   atorvastatin (LIPITOR) 20 MG tablet Take 20 mg by mouth nightly    Yes Historical Provider, MD   aspirin 81 MG tablet Take 81 mg by mouth nightly    Yes Historical Provider, MD   CALCIUM PO Take by mouth nightly    Yes Historical Provider, MD   vitamin D3 (COLECALCIFEROL) 400 UNITS TABS Take 5,000 Units by mouth daily    Yes Historical Provider, MD   folic acid (FOLVITE) 534 MCG tablet Take 400 mcg by mouth every morning   Yes Historical Provider, MD   methotrexate 2.5 MG tablet Take 2.5 mg by mouth once a week Indications:  Take 7 tablets one time a week    Yes Historical Provider, MD   predniSONE (DELTASONE) 5 MG tablet Take 5 mg by mouth 2 times daily    Yes Historical Provider, MD       Allergies as of 10/06/2021 - Fully Reviewed 10/06/2021   Allergen Reaction Noted    Cortisone Rash     Codeine  11/16/2015    Pcn [penicillins]         Patient Active Problem List   Diagnosis    Cerebral embolism with cerebral infarction ([address]    Partial seizures [facility]    RA (rheumatoid arthritis)  Stroke Mother     Heart Disease Mother     Arthritis Mother     Diabetes Mother     High Blood Pressure Mother     High Cholesterol Mother    Jhon Chance / Matty Mother     Other Father         \"Surgery For Twisted Bowels\"    Heart Disease Sister     Stroke Sister     High Blood Pressure Brother     Heart Disease Brother     Hearing Loss Brother         Heart Attack    Diabetes Sister     High Blood Pressure Sister     Other Sister         Gout    Arthritis Sister     Mental Illness Sister         Schizophrenia    High Blood Pressure Brother     High Blood Pressure Son     Mental Illness Son         Anxiety    Depression Son     Other Son         Pancreatitis, Surgery For Brain Aneurysm    High Blood Pressure Son     Other Son         Gout    Learning Disabilities Son         Autistic, Mentally Retarded    Other Son         Seizures         Objective:   /78   Pulse 94   Ht 5' 2\" (1.575 m)   Wt 138 lb (62.6 kg)   SpO2 95%   BMI 25.24 kg/m²   Body mass index is 25.24 kg/m². Sleep Medicine 10/6/2021   Sitting and reading 0   Watching TV 2   Sitting, inactive in a public place (e.g. a theatre or a meeting) 0   As a passenger in a car for an hour without a break 3   Lying down to rest in the afternoon when circumstances permit 0   Sitting and talking to someone 0   Sitting quietly after a lunch without alcohol 0   In a car, while stopped for a few minutes in traffic 0   Total score 5   Neck circumference 13.5     {MALLAMPATI:3    Vitals:    10/06/21 0945   BP: 130/78   Pulse: 94   SpO2: 95%   Weight: 138 lb (62.6 kg)   Height: 5' 2\" (1.575 m)     Neck circumference: 13.5  Inches  Kendall - Total score: 5    Gen: No distress. Eyes: PERRL. No sclera icterus. No conjunctival injection. ENT: No discharge. Pharynx clear. External appearance of ears and nose normal.  Neck: Trachea midline. No obvious mass. Resp: No accessory muscle use. No crackles. No wheezes.  No rhonchi. No dullness on percussion. CV: Regular rate. Regular rhythm. No murmur or rub. No edema. GI: Non-tender. Non-distended. No hernia. Skin: Warm, dry, normal texture and turgor. No nodule on exposed extremities. Lymph: No cervical LAD. No supraclavicular LAD. M/S: No cyanosis. No clubbing. No joint deformity. Psych: Oriented x 3. No anxiety. Awake. Alert. Intact judgement and insight. Mallampati Airway Classification:   []1 []2 [x]3 []4        Assessment and Plan     Diagnosis:    Problem List        Pulmonary Problems    VIC (obstructive sleep apnea)      She has symptoms of VIC  Advised to go for the sleep study           Relevant Orders    Baseline Diagnostic Sleep Study       Other    Hypersomnia      Advised to go for the sleep study  Loose weight                     Additional Plan:     [x]  Sleep hygiene/ relaxation methods & CBTi principles review with patient   [x]  Avoid supine/back sleep until sleep study   [x]  Driving precautions   [x]  Medical consequences of untreated VIC   [x]  Weight loss recommendations   [x]  Diet recommendations   [x]  Exercise   []  Advised to quit smoking       []  PFT referral   []  Bariatric Program referral      Follow-Up:    No follow-ups on file.     Electronically signed by Real Moreno MD on 10/6/2021 at 10:07 AM

## 2021-10-10 ENCOUNTER — HOSPITAL ENCOUNTER (OUTPATIENT)
Dept: SLEEP CENTER | Age: 81
Discharge: HOME | End: 2021-10-10
Payer: MEDICARE

## 2021-10-10 DIAGNOSIS — G47.33 OSA (OBSTRUCTIVE SLEEP APNEA): ICD-10-CM

## 2021-10-10 PROCEDURE — 95810 POLYSOM 6/> YRS 4/> PARAM: CPT

## 2021-10-11 NOTE — PROGRESS NOTES
10/10/2021  sleep study  for John Sinclair  1940 is complete. Results are pending physician review.     Electronically signed by Governor Raphael on 10/10/2021 at 8:47 PM

## 2021-10-13 LAB — STATUS: NORMAL

## 2021-10-13 PROCEDURE — 95810 POLYSOM 6/> YRS 4/> PARAM: CPT | Performed by: INTERNAL MEDICINE

## 2021-10-21 ENCOUNTER — HOSPITAL ENCOUNTER (OUTPATIENT)
Dept: SLEEP CENTER | Age: 81
Discharge: HOME | End: 2021-10-21
Payer: MEDICARE

## 2021-10-21 DIAGNOSIS — G47.10 HYPERSOMNIA: ICD-10-CM

## 2021-10-21 DIAGNOSIS — G47.33 OSA (OBSTRUCTIVE SLEEP APNEA): ICD-10-CM

## 2021-10-21 PROCEDURE — 99443 PR PHYS/QHP TELEPHONE EVALUATION 21-30 MIN: CPT | Performed by: INTERNAL MEDICINE

## 2021-10-21 PROCEDURE — 9990000010 HC NO CHARGE VISIT

## 2021-10-21 ASSESSMENT — ENCOUNTER SYMPTOMS
ABDOMINAL DISTENTION: 0
BACK PAIN: 0
EYE DISCHARGE: 0
SHORTNESS OF BREATH: 0
EYE ITCHING: 0
ABDOMINAL PAIN: 0
COUGH: 0

## 2021-10-21 NOTE — PROGRESS NOTES
Severa Fresh  1940  Referring Provider: Jeremias Nguyen PA-C    Subjective:     Chief Complaint   Patient presents with    Sleep Apnea     G47.33    2 Week Follow-Up       HPI  Salvatore Roy is a 68 y.o. female is doing a telephone follow up visit. She had a PSG done on 10/10/21 and it showed that she has mild VIC with an AHI of 5.9 and desat to 89%. Her sleep efficiency is 66.4%. She has no loss of weight. She is still sleepy and tired during the day time. Current Outpatient Medications   Medication Sig Dispense Refill    RA SENNA 8.6 MG tablet take 1 tablet by mouth twice a day      dilTIAZem (DILACOR XR) 240 MG extended release capsule Take 1 capsule by mouth nightly 90 capsule 3    acetaminophen (TYLENOL) 500 MG tablet Take 500 mg by mouth every 6 hours as needed for Pain      diclofenac (VOLTAREN) 75 MG EC tablet Take 1 tablet by mouth 2 times daily as needed      Magnesium 500 MG TABS Take 1 tablet by mouth daily      ferrous sulfate (IRON 325) 325 (65 Fe) MG tablet Take 1 tablet by mouth daily (with breakfast) (Patient taking differently: Take 325 mg by mouth every other day ) 30 tablet 2    levETIRAcetam (KEPPRA) 500 MG tablet Take 1 tablet by mouth 2 times daily 60 tablet 1    donepezil (ARICEPT) 10 MG tablet Take 10 mg by mouth every morning       triamterene-hydroCHLOROthiazide (MAXZIDE-25) 37.5-25 MG per tablet take 1 tablet by mouth once daily      docusate sodium (COLACE) 100 MG capsule Take 100 mg by mouth nightly 2 tablets      atorvastatin (LIPITOR) 20 MG tablet Take 20 mg by mouth nightly       aspirin 81 MG tablet Take 81 mg by mouth nightly       CALCIUM PO Take by mouth nightly       vitamin D3 (COLECALCIFEROL) 400 UNITS TABS Take 5,000 Units by mouth daily       folic acid (FOLVITE) 445 MCG tablet Take 400 mcg by mouth every morning      methotrexate 2.5 MG tablet Take 2.5 mg by mouth once a week Indications:  Take 7 tablets one time a week       predniSONE (DELTASONE) 5 MG tablet Take 5 mg by mouth 2 times daily        No current facility-administered medications for this encounter.        Allergies   Allergen Reactions    Cortisone Rash    Codeine      Unsure Of Reaction    Pcn [Penicillins]      Unknown Reaction       Past Medical History:   Diagnosis Date    Acid reflux     Anxiety     Bronchitis In Past    Cancer ([facility]     colon cancer    Carotid stenosis 11/10/2015    Cerebral embolism with cerebral infarction ([facility] [address] [facility] 09/18/2015    Seizure X 1, No Residual    Chronic back pain     Depression     History of blood transfusion     History of external beam radiation therapy 2017    5400 cGy pelvis/anal canal in 2017 per Dr. Shakeel Garza at [address] [facility] Hyperlipidemia     Hypertension     Prolonged emergence from general anesthesia     RA (rheumatoid arthritis) ([facility] [address] [facility]     \"All Over\"    Seizure ([facility] [address] [facility] 09/18/2015    X 1    Sleep apnea     Uses CPAP    Teeth missing     Upper And Lower    Urinary incontinence     UTI (urinary tract infection) In Past    No Current Symptoms    Wears dentures     Full Upper , Partial Lower    Wears glasses     Wears partial dentures     Lower       Past Surgical History:   Procedure Laterality Date    ARTHROCENTESIS / ASPIRATION / INJECTION KNEE  5/11/2021         CAROTID ENDARTERECTOMY Right [id number]    CARPAL TUNNEL RELEASE      COLONOSCOPY  In Past    DENTAL SURGERY      Teeth Extracted In Past    DILATION AND CURETTAGE OF UTERUS  1960's    Prior To Vaginal Hysterectomy    HYSTERECTOMY, VAGINAL  1960's    PORT SURGERY N/A 3/2/2021    PORT REMOVAL performed by Gary Bailon MD at [facility] OR       Social History     Socioeconomic History    Marital status:      Spouse name: Not on file    Number of children: Not on file    Years of education: Not on file    Highest education level: Not on file   Occupational History    Not on file   Tobacco Use    Smoking status: Never Smoker    Smokeless tobacco: Never Used   Vaping Use    Vaping Use: Never used   Substance and Sexual Activity    Alcohol use: No     Alcohol/week: 0.0 standard drinks     Comment: 4-6 caffeinated beverages a week    Drug use: No    Sexual activity: Never   Other Topics Concern    Not on file   Social History Narrative    Not on file     Social Determinants of Health     Financial Resource Strain:     Difficulty of Paying Living Expenses:    Food Insecurity:     Worried About Running Out of Food in the Last Year:     [address] in the Last Year:    Transportation Needs:     Lack of Transportation (Medical):  Lack of Transportation (Non-Medical):    Physical Activity:     Days of Exercise per Week:     Minutes of Exercise per Session:    Stress:     Feeling of Stress :    Social Connections:     Frequency of Communication with Friends and Family:     Frequency of Social Gatherings with Friends and Family:     Attends [employer] Services:     Active Member of Clubs or Organizations:     Attends Club or Organization Meetings:     Marital Status:    Intimate Partner Violence:     Fear of Current or Ex-Partner:     Emotionally Abused:     Physically Abused:     Sexually Abused:        Review of Systems   Constitutional: Positive for fatigue. HENT: Negative for congestion and postnasal drip. Eyes: Negative for discharge and itching. Respiratory: Negative for cough and shortness of breath. Cardiovascular: Negative for chest pain and leg swelling. Gastrointestinal: Negative for abdominal distention and abdominal pain. Endocrine: Negative for cold intolerance and heat intolerance. Genitourinary: Negative for dysuria and enuresis. Musculoskeletal: Negative for arthralgias and back pain. Allergic/Immunologic: Negative for environmental allergies and food allergies. Neurological: Negative for light-headedness and headaches. Hematological: Negative for adenopathy.    Psychiatric/Behavioral: Negative for agitation and behavioral problems. Objective: There were no vitals taken for this visit. There is no height or weight on file to calculate BMI. Sleep Medicine 10/6/2021   Sitting and reading 0   Watching TV 2   Sitting, inactive in a public place (e.g. a theatre or a meeting) 0   As a passenger in a car for an hour without a break 3   Lying down to rest in the afternoon when circumstances permit 0   Sitting and talking to someone 0   Sitting quietly after a lunch without alcohol 0   In a car, while stopped for a few minutes in traffic 0   Total score 5   Neck circumference 13.5       Radiology: none    Assessment and Plan     Problem List        Pulmonary Problems    VIC (obstructive sleep apnea)      She has mild VIC with EDS  Advised to go for the sleep study  Loose weight            Other    Hypersomnia      Advised to go for the CPAP titration study  Sleep hygiene measures                    No follow-ups on file. Follow-Up:    No follow-ups on file. Progress notes sent to the referring Provider    Delia Carty is a 68 y.o. female being evaluated by a Virtual Visit (video visit) encounter to address concerns as mentioned above. A caregiver was present when appropriate. Due to this being a TeleHealth encounter (During JSCTA-60 public health emergency), evaluation of the following organ systems was limited: Vitals/Constitutional/EENT/Resp/CV/GI//MS/Neuro/Skin/Heme-Lymph-Imm. Pursuant to the emergency declaration under the [facility], [address] [place] [address] authority and the [employer] Act, this Virtual Visit was conducted with patient's (and/or legal guardian's) consent, to reduce the patient's risk of exposure to COVID-19 and provide necessary medical care.   The patient (and/or legal guardian) has also been advised to contact this office for worsening conditions or problems, and seek emergency medical treatment and/or call 911 if deemed necessary. Patient identification was verified at the start of the visit: Yes    Total time spent for this encounter: 21 minutes    Services were provided through a video synchronous discussion virtually to substitute for in-person clinic visit. Patient and provider were located at their individual homes. --Prabhakar Massey MD on 10/21/2021 at 11:30 AM    An electronic signature was used to authenticate this note.      Prabhakar Massey MD MD  10/21/2021  11:30 AM

## 2021-12-03 NOTE — PROGRESS NOTES
12/7/21    Campbell Marietta  1940    Chief Complaint   Patient presents with    Seizures     pt states that she has not had any since the last time at her appointment, pt states she is still taking the Isabell Isaak and no concerns at this time       History of Present Illness  Maye Castillo is a 80 y.o. female presenting to the [facility] office today for follow up regarding multiple neurologic complaints. At last visit 09/23/21 she was continued on Keppra 500 mg twice daily for seizure prevention. Continued on secondary stroke prevention with daily ASA 81 mg and statin therapy. She was continued on Aricept 10 mg daily, note to discuss addition of memantine at next visit. She was reportedly having falls, one resulting in hospitalization. She uses a walker and a cane, balance was noted to be impaired on exam, referral for balance therapy was sent. There was also note regarding her VIC and possible REM sleep disorder symptoms wanting to pursue sleep medicine referral. She had PSG completed 10/10/21 indicative of mild sleep apnea, AHI 5.9, desat to 89%, sleep efficiency is 66.4%. Recommended for a CPAP titration study. On exam today, she denies any further seizure activity has continued on her Keppra. She is tolerating Aricept 10 mg daily and they have noticed very mild changes in memory, slightly more repetitive than normal.  She did follow-up with sleep medicine but declined using a CPAP. Had a long conversation regarding patient education that untreated sleep apnea can increase risk of stroke significantly especially as she already has history of stroke. Additionally this could be contributing to memory, eventually she did verbalize understanding and was telling me that she thought that it was important to have a CPAP and so they would contact pulmonology to further pursue obtaining the correct machine.       Current Outpatient Medications   Medication Sig Dispense Refill    levETIRAcetam (KEPPRA) 500 MG tablet Take 1 tablet by mouth 2 times daily 180 tablet 0    memantine (NAMENDA) 5 MG tablet Take (1) 5 mg tab QD x7 days, then 1 BID x7 days; then 2 tabs Q am-1 tab pm x7 days then change to 10mg RX 42 tablet 0    memantine (NAMENDA) 10 MG tablet Take 1 tablet by mouth 2 times daily NOTE TO PHARMACY: DO NOT FILL UNTIL 5 MG RX IS COMPLETED 60 tablet 5    RA SENNA 8.6 MG tablet take 1 tablet by mouth twice a day      dilTIAZem (DILACOR XR) 240 MG extended release capsule Take 1 capsule by mouth nightly 90 capsule 3    acetaminophen (TYLENOL) 500 MG tablet Take 500 mg by mouth every 6 hours as needed for Pain      diclofenac (VOLTAREN) 75 MG EC tablet Take 1 tablet by mouth 2 times daily as needed      Magnesium 500 MG TABS Take 1 tablet by mouth daily      ferrous sulfate (IRON 325) 325 (65 Fe) MG tablet Take 1 tablet by mouth daily (with breakfast) (Patient taking differently: Take 325 mg by mouth every other day ) 30 tablet 2    donepezil (ARICEPT) 10 MG tablet Take 10 mg by mouth every morning       triamterene-hydroCHLOROthiazide (MAXZIDE-25) 37.5-25 MG per tablet take 1 tablet by mouth once daily      docusate sodium (COLACE) 100 MG capsule Take 50 mg by mouth nightly 2 tablets       atorvastatin (LIPITOR) 20 MG tablet Take 20 mg by mouth nightly       aspirin 81 MG tablet Take 81 mg by mouth nightly       CALCIUM PO Take by mouth nightly       vitamin D3 (COLECALCIFEROL) 400 UNITS TABS Take 5,000 Units by mouth daily       folic acid (FOLVITE) 841 MCG tablet Take 400 mcg by mouth every morning      methotrexate 2.5 MG tablet Take 2.5 mg by mouth once a week Indications: Take 7 tablets one time a week       predniSONE (DELTASONE) 5 MG tablet Take 5 mg by mouth 2 times daily        No current facility-administered medications for this visit. Physical Exam:  Also present during visit: daughter.     Mental Status   Orientation: oriented to person, oriented to place, oriented to problem and oriented to time    Mood/affectappropriate mood and appropriate affect   Memory/Other: remote memory intact, fund of knowledge intact and attention span normal, 1/3 words at 5 minute recall; unable to spell TABLE backwards  Language  Language: (normal) language, no dysarthria, (normal) articulation and no dysphasia/aphasia  Cranial Nerves   Eyes: pupils normal size and reactive to light and visual fields appear full   CN III, IV, VI : extraocular muscle strength normal, normal pursuit, no nystagmus and no ptosis   Facial Motor: normal facial motor   CN XII: tongue protrudes midline  Motor/Coordination Exam   Power: 4/5 in all extremities, patient sitting in walker on my exam   Coordination: normal finger-to-nose and forearm rotation intact  Sensory Exam No Bradykinesia, No Dyskindesia, No Tremor, No myoclonus, Normal strength, Normal Tone Normal bulk and Normal tone     Gait and Stance   Gait/Posture: Using a 4 wheel walker with seat for ambulation        BP (!) 146/74 (Site: Left Upper Arm, Position: Sitting, Cuff Size: Medium Adult)   Pulse 100   Ht 5' 2\" (1.575 m)   Wt 137 lb (62.1 kg)   SpO2 98%   BMI 25.06 kg/m²     Assessment and Plan     Diagnosis Orders   1. Partial seizures (HCC)  levETIRAcetam (KEPPRA) 500 MG tablet   2. Mild cognitive disorder  memantine (NAMENDA) 5 MG tablet    memantine (NAMENDA) 10 MG tablet   3. Obstructive sleep apnea     4. Atrial fibrillation, unspecified type ([address]     5. CVA, old, hemiparesis ([address]     6. Recurrent falls       Ms. Stacey Cheng presents in follow up for multiple neurologic complaints. Patient denies any further seizure activity, will continue on Keppra 500 mg p.o. twice daily, says this medication is well tolerated. For history of stroke, will continue on secondary stroke prevention including daily aspirin 81 mg and statin therapy.     Regarding memory, prior cognitive testing indicative of mild cognitive impairment she is continued on Aricept 10 mg daily and tolerating

## 2021-12-07 ENCOUNTER — OFFICE VISIT (OUTPATIENT)
Dept: NEUROLOGY | Age: 81
End: 2021-12-07
Payer: MEDICARE

## 2021-12-07 VITALS
OXYGEN SATURATION: 98 % | WEIGHT: 137 LBS | SYSTOLIC BLOOD PRESSURE: 146 MMHG | HEART RATE: 100 BPM | DIASTOLIC BLOOD PRESSURE: 74 MMHG | BODY MASS INDEX: 25.21 KG/M2 | HEIGHT: 62 IN

## 2021-12-07 DIAGNOSIS — I48.91 ATRIAL FIBRILLATION, UNSPECIFIED TYPE (HCC): ICD-10-CM

## 2021-12-07 DIAGNOSIS — R29.6 RECURRENT FALLS: ICD-10-CM

## 2021-12-07 DIAGNOSIS — R56.9 PARTIAL SEIZURES (HCC): Primary | ICD-10-CM

## 2021-12-07 DIAGNOSIS — I69.359 CVA, OLD, HEMIPARESIS (HCC): Chronic | ICD-10-CM

## 2021-12-07 DIAGNOSIS — F09 MILD COGNITIVE DISORDER: ICD-10-CM

## 2021-12-07 DIAGNOSIS — G47.33 OBSTRUCTIVE SLEEP APNEA: ICD-10-CM

## 2021-12-07 PROCEDURE — 1036F TOBACCO NON-USER: CPT | Performed by: PHYSICIAN ASSISTANT

## 2021-12-07 PROCEDURE — G8417 CALC BMI ABV UP PARAM F/U: HCPCS | Performed by: PHYSICIAN ASSISTANT

## 2021-12-07 PROCEDURE — 1123F ACP DISCUSS/DSCN MKR DOCD: CPT | Performed by: PHYSICIAN ASSISTANT

## 2021-12-07 PROCEDURE — 1090F PRES/ABSN URINE INCON ASSESS: CPT | Performed by: PHYSICIAN ASSISTANT

## 2021-12-07 PROCEDURE — G8427 DOCREV CUR MEDS BY ELIG CLIN: HCPCS | Performed by: PHYSICIAN ASSISTANT

## 2021-12-07 PROCEDURE — 4040F PNEUMOC VAC/ADMIN/RCVD: CPT | Performed by: PHYSICIAN ASSISTANT

## 2021-12-07 PROCEDURE — G8399 PT W/DXA RESULTS DOCUMENT: HCPCS | Performed by: PHYSICIAN ASSISTANT

## 2021-12-07 PROCEDURE — 99214 OFFICE O/P EST MOD 30 MIN: CPT | Performed by: PHYSICIAN ASSISTANT

## 2021-12-07 PROCEDURE — G8484 FLU IMMUNIZE NO ADMIN: HCPCS | Performed by: PHYSICIAN ASSISTANT

## 2021-12-07 RX ORDER — MEMANTINE HYDROCHLORIDE 10 MG/1
10 TABLET ORAL 2 TIMES DAILY
Qty: 60 TABLET | Refills: 5 | Status: SHIPPED | OUTPATIENT
Start: 2021-12-07 | End: 2022-04-20 | Stop reason: SDUPTHER

## 2021-12-07 RX ORDER — MEMANTINE HYDROCHLORIDE 5 MG/1
TABLET ORAL
Qty: 42 TABLET | Refills: 0 | Status: SHIPPED | OUTPATIENT
Start: 2021-12-07 | End: 2022-04-20

## 2021-12-07 RX ORDER — LEVETIRACETAM 500 MG/1
500 TABLET ORAL 2 TIMES DAILY
Qty: 180 TABLET | Refills: 0 | Status: SHIPPED | OUTPATIENT
Start: 2021-12-07 | End: 2022-04-20 | Stop reason: SDUPTHER

## 2021-12-13 ENCOUNTER — TELEPHONE (OUTPATIENT)
Dept: ONCOLOGY | Age: 81
End: 2021-12-13

## 2021-12-13 NOTE — TELEPHONE ENCOUNTER
Someone left a message stating that patient tested positive for COVID and would like someone to call patient @ [phone]

## 2021-12-13 NOTE — TELEPHONE ENCOUNTER
Called patient at [phone] per request. Spoke with patient's daughter, Radha . Daughter informed NN that she tested positive for Covid. Daughter was not feeling well last week and thought she had a head cold/sinus infection. Symptoms worsened so daughter went to Urgent Care and she tested positive for Covid. Daughter states that she cares for her non-verbal brother as well as patient. She noticed on Friday 12/10/2021 that patient had a mild cough. Daughter's nephew went to [place] [id number] to  rapid Covid tests from [email or web address]. Patient took Covid test last night and tested positive. Daughter wanted to keep PCP and Dr. Latha Navarrete in the loop. Next OV and labs already scheduled for January 20th and 27th 2022.

## 2021-12-17 ENCOUNTER — HOSPITAL ENCOUNTER (OUTPATIENT)
Dept: INFUSION THERAPY | Age: 81
Setting detail: INFUSION SERIES
Discharge: HOME | End: 2021-12-17
Payer: MEDICARE

## 2021-12-17 VITALS
HEART RATE: 88 BPM | SYSTOLIC BLOOD PRESSURE: 137 MMHG | DIASTOLIC BLOOD PRESSURE: 82 MMHG | TEMPERATURE: 97.1 F | OXYGEN SATURATION: 100 % | RESPIRATION RATE: 20 BRPM

## 2021-12-17 PROCEDURE — 2500000003 HC RX 250 WO HCPCS: Performed by: NURSE PRACTITIONER

## 2021-12-17 PROCEDURE — 2580000003 HC RX 258: Performed by: NURSE PRACTITIONER

## 2021-12-17 PROCEDURE — 6360000002 HC RX W HCPCS: Performed by: NURSE PRACTITIONER

## 2021-12-17 PROCEDURE — M0245 HC IV INFUSION BAMLANIVIMAB & ETESEVIMAB W/MONITORING: HCPCS

## 2021-12-17 RX ORDER — SODIUM CHLORIDE 0.9 % (FLUSH) 0.9 %
5-40 SYRINGE (ML) INJECTION PRN
Status: DISCONTINUED | OUTPATIENT
Start: 2021-12-17 | End: 2021-12-18 | Stop reason: HOSPADM

## 2021-12-17 RX ADMIN — SODIUM CHLORIDE: 9 INJECTION, SOLUTION INTRAVENOUS at 09:12

## 2021-12-17 NOTE — PROGRESS NOTES
Tolerated infusion well. Reviewed discharge instruction, voiced understanding. Copies of AVS given. Pt discharged home. Pt to exit via wheelchair. Orders Placed This Encounter   Medications    bamlanivimab 700 mg, etesevimab 1,400 mg in sodium chloride 0.9 % 160 mL IVPB     Order Specific Question:   Does this patient qualify for COVID-19 antibody therapy based on criteria for treatment? Answer:    Yes    sodium chloride flush 0.9 % injection 5-40 mL

## 2022-01-20 ENCOUNTER — HOSPITAL ENCOUNTER (OUTPATIENT)
Dept: INFUSION THERAPY | Age: 82
Discharge: HOME | End: 2022-01-20
Payer: MEDICARE

## 2022-01-20 DIAGNOSIS — D64.9 ANEMIA, UNSPECIFIED TYPE: ICD-10-CM

## 2022-01-20 DIAGNOSIS — C21.0 ANAL CANCER (HCC): ICD-10-CM

## 2022-01-20 LAB
ALBUMIN SERPL-MCNC: 3.8 GM/DL (ref 3.4–5)
ALP BLD-CCNC: 126 IU/L (ref 40–129)
ALT SERPL-CCNC: 13 U/L (ref 10–40)
ANION GAP SERPL CALCULATED.3IONS-SCNC: 15 MMOL/L (ref 4–16)
AST SERPL-CCNC: 18 IU/L (ref 15–37)
BASOPHILS ABSOLUTE: 0 K/CU MM
BASOPHILS RELATIVE PERCENT: 0.2 % (ref 0–1)
BILIRUB SERPL-MCNC: 0.5 MG/DL (ref 0–1)
BUN BLDV-MCNC: 13 MG/DL (ref 6–23)
CALCIUM SERPL-MCNC: 9.3 MG/DL (ref 8.3–10.6)
CEA: 11.6 NG/ML
CHLORIDE BLD-SCNC: 99 MMOL/L (ref 99–110)
CO2: 25 MMOL/L (ref 21–32)
CREAT SERPL-MCNC: 0.4 MG/DL (ref 0.6–1.1)
DIFFERENTIAL TYPE: ABNORMAL
EOSINOPHILS ABSOLUTE: 0 K/CU MM
EOSINOPHILS RELATIVE PERCENT: 0.4 % (ref 0–3)
FERRITIN: 90 NG/ML (ref 15–150)
GFR AFRICAN AMERICAN: >60 ML/MIN/1.73M2
GFR NON-AFRICAN AMERICAN: >60 ML/MIN/1.73M2
GLUCOSE BLD-MCNC: 158 MG/DL (ref 70–99)
HCT VFR BLD CALC: 39.7 % (ref 37–47)
HEMOGLOBIN: 13.2 GM/DL (ref 12.5–16)
IRON: 62 UG/DL (ref 37–145)
LYMPHOCYTES ABSOLUTE: 0.9 K/CU MM
LYMPHOCYTES RELATIVE PERCENT: 16.4 % (ref 24–44)
MCH RBC QN AUTO: 31.1 PG (ref 27–31)
MCHC RBC AUTO-ENTMCNC: 33.2 % (ref 32–36)
MCV RBC AUTO: 93.4 FL (ref 78–100)
MONOCYTES ABSOLUTE: 0.4 K/CU MM
MONOCYTES RELATIVE PERCENT: 7 % (ref 0–4)
PCT TRANSFERRIN: 18 % (ref 10–44)
PDW BLD-RTO: 14 % (ref 11.7–14.9)
PLATELET # BLD: 341 K/CU MM (ref 140–440)
PMV BLD AUTO: 9.3 FL (ref 7.5–11.1)
POTASSIUM SERPL-SCNC: 3.8 MMOL/L (ref 3.5–5.1)
RBC # BLD: 4.25 M/CU MM (ref 4.2–5.4)
SEGMENTED NEUTROPHILS ABSOLUTE COUNT: 4.1 K/CU MM
SEGMENTED NEUTROPHILS RELATIVE PERCENT: 76 % (ref 36–66)
SODIUM BLD-SCNC: 139 MMOL/L (ref 135–145)
TOTAL IRON BINDING CAPACITY: 342 UG/DL (ref 250–450)
TOTAL PROTEIN: 6.1 GM/DL (ref 6.4–8.2)
UNSATURATED IRON BINDING CAPACITY: 280 UG/DL (ref 110–370)
WBC # BLD: 5.3 K/CU MM (ref 4–10.5)

## 2022-01-20 PROCEDURE — 82378 CARCINOEMBRYONIC ANTIGEN: CPT

## 2022-01-20 PROCEDURE — 83550 IRON BINDING TEST: CPT

## 2022-01-20 PROCEDURE — 80053 COMPREHEN METABOLIC PANEL: CPT

## 2022-01-20 PROCEDURE — 36415 COLL VENOUS BLD VENIPUNCTURE: CPT

## 2022-01-20 PROCEDURE — 82728 ASSAY OF FERRITIN: CPT

## 2022-01-20 PROCEDURE — 85025 COMPLETE CBC W/AUTO DIFF WBC: CPT

## 2022-01-20 PROCEDURE — 83540 ASSAY OF IRON: CPT

## 2022-01-27 ENCOUNTER — OFFICE VISIT (OUTPATIENT)
Dept: ONCOLOGY | Age: 82
End: 2022-01-27
Payer: MEDICARE

## 2022-01-27 ENCOUNTER — HOSPITAL ENCOUNTER (OUTPATIENT)
Dept: INFUSION THERAPY | Age: 82
Discharge: HOME | End: 2022-01-27

## 2022-01-27 VITALS
OXYGEN SATURATION: 97 % | RESPIRATION RATE: 18 BRPM | BODY MASS INDEX: 26.31 KG/M2 | HEIGHT: 62 IN | HEART RATE: 105 BPM | DIASTOLIC BLOOD PRESSURE: 70 MMHG | SYSTOLIC BLOOD PRESSURE: 142 MMHG | TEMPERATURE: 98.1 F | WEIGHT: 143 LBS

## 2022-01-27 DIAGNOSIS — R56.9 SEIZURE (HCC): ICD-10-CM

## 2022-01-27 DIAGNOSIS — K59.00 CONSTIPATION, UNSPECIFIED CONSTIPATION TYPE: ICD-10-CM

## 2022-01-27 DIAGNOSIS — D64.9 ANEMIA, UNSPECIFIED TYPE: ICD-10-CM

## 2022-01-27 DIAGNOSIS — C21.0 ANAL CANCER (HCC): Primary | ICD-10-CM

## 2022-01-27 DIAGNOSIS — C21.1 MALIGNANT NEOPLASM OF ANAL CANAL (HCC): ICD-10-CM

## 2022-01-27 PROCEDURE — 4040F PNEUMOC VAC/ADMIN/RCVD: CPT | Performed by: NURSE PRACTITIONER

## 2022-01-27 PROCEDURE — 1090F PRES/ABSN URINE INCON ASSESS: CPT | Performed by: NURSE PRACTITIONER

## 2022-01-27 PROCEDURE — G8427 DOCREV CUR MEDS BY ELIG CLIN: HCPCS | Performed by: NURSE PRACTITIONER

## 2022-01-27 PROCEDURE — G8484 FLU IMMUNIZE NO ADMIN: HCPCS | Performed by: NURSE PRACTITIONER

## 2022-01-27 PROCEDURE — G8399 PT W/DXA RESULTS DOCUMENT: HCPCS | Performed by: NURSE PRACTITIONER

## 2022-01-27 PROCEDURE — 99214 OFFICE O/P EST MOD 30 MIN: CPT | Performed by: NURSE PRACTITIONER

## 2022-01-27 PROCEDURE — G8417 CALC BMI ABV UP PARAM F/U: HCPCS | Performed by: NURSE PRACTITIONER

## 2022-01-27 PROCEDURE — 1123F ACP DISCUSS/DSCN MKR DOCD: CPT | Performed by: NURSE PRACTITIONER

## 2022-01-27 PROCEDURE — 1036F TOBACCO NON-USER: CPT | Performed by: NURSE PRACTITIONER

## 2022-01-27 RX ORDER — DOCUSATE SODIUM 100 MG/1
100 CAPSULE, LIQUID FILLED ORAL DAILY PRN
Qty: 30 CAPSULE | Refills: 0 | Status: SHIPPED | OUTPATIENT
Start: 2022-01-27

## 2022-01-27 RX ORDER — SENNA PLUS 8.6 MG/1
1 TABLET ORAL 2 TIMES DAILY
Qty: 60 TABLET | Refills: 11 | Status: SHIPPED | OUTPATIENT
Start: 2022-01-27 | End: 2023-01-27

## 2022-01-27 ASSESSMENT — PATIENT HEALTH QUESTIONNAIRE - PHQ9
SUM OF ALL RESPONSES TO PHQ QUESTIONS 1-9: 0
2. FEELING DOWN, DEPRESSED OR HOPELESS: 0
SUM OF ALL RESPONSES TO PHQ QUESTIONS 1-9: 0
1. LITTLE INTEREST OR PLEASURE IN DOING THINGS: 0
SUM OF ALL RESPONSES TO PHQ QUESTIONS 1-9: 0
SUM OF ALL RESPONSES TO PHQ QUESTIONS 1-9: 0
SUM OF ALL RESPONSES TO PHQ9 QUESTIONS 1 & 2: 0

## 2022-01-27 NOTE — PROGRESS NOTES
Patient Name: Rick Dempsey  Patient : 1940  Patient MRN: [id number]     Primary Oncologist: Duane Nice MD  Referring Provider: Rukhsana Pink PA-C     Date of Service: 2022      Chief Complaint:   Chief Complaint   Patient presents with    Follow-up     She came in for follow-up visit. 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 She has been noticing episodes of blood in stool for a couple of months and no prior colonoscopy until she was seen by Dr. Praneeth Roberson. She has a long history of constipation and has been taking stool softeners. She underwent colonoscopy by Dr. Praneeth Roberson on 2017, which revealed a large friable obstructing mass lesion noted in the anal canal and diverticulosis coli. Pathology report revealed invasive intermediate grade keratinizing squamous cell carcinoma. She is currently  and has four children. She is agreeable to have the HIV test, as recommended by the guidelines. She denied any history of sexually transmitted disease. Bblood test in 2016 revealed white cell 7.1, hemoglobin 11.9, platelet 477. Creatinine was 0.52, AST 17, ALT 12. She was seen by Dr. Elissa Sabillon, who performed the rectal exam.  After having discussion with him, clinically she was felt to have stage T2NxMx.     PET-CT scan in 2017 revealed no evidence of metastatic disease, except for the heterogenous activity in the area of soft tissue fullness in the anal region, compatible with the history of anal cancer. She started concomitant mitomycin/capecitabine and radiation therapy on May 8, 2017. Blood tests in June 2017 revealed white cell 4.4, hemoglobin 9.4, platelet 786. CMP was stable. Dr. Vicki Mcknight temporarily held radiation and Xeloda and eventually restarted again. She completed concomitant chemoradiation therapy on July 3, 2017. We discussed about surveillance and survivorship. I referred her back to see Dr. Akosua Teresa. She will need anoscopy every six to 12 months up to three years. Imaging study will be considered once a year for three years. She gained weight since the last office visits. I reminded to follow-up with Dr. Akosua Teresa for the potential anoscopy. CT chest, abdomen and pelvis in July 2018 showed no evidence of progression of the disease. DEXA scan in December 2018 was wnl. Colonoscopy was in September 2018. Repeat in 3 years as per GI. She is on MTX. Labs in June 2019 were reviewed. CT chest, abdomen and pelvis in July 2019 showed no evidence of metastatic disease. Mammogram in April 2019 was benign. CT chest, abdomen and pelvis in November 2019 were negative for metastatic disease. Labs in January 2020 were stable. CEA improved. Blood test in July 2020 was reviewed. MTX and prednisone were increased due to increasing joint pain related to RA. She has proximal finger joint deformities with nodules. She refused to have flu and Covid vaccine. Labs in January 2021 showed stable CBC and CMP with hemoglobin of 11.8. On July 27, 2021 she came in for follow-up visit. She will have labs today including anemic work-up and CEA level. I recommend to call for the test result. I referred her back to see Dr. Jennifer Leyva for removal of the Mediport. Due to the constipation I recommend to take iron pill every other day.   She was hospitalized in February 2021 due to the seizure. At that time she was off the antiseizure medication she was seen by neurologist who recommended to continue with Keppra indefinitely. Reportedly colonoscopy in February 2021 by Dr. Kadi Dickson was okay. Follow-up study in 2 years was recommended. Presented with her daughter on 1/27/22 for scheduled follow up. We reviewed labs 1/20/22 with CEA elevated to 11.6, Ferritin 90, iron 62, TIBC 342, Transferrin 18. She continues on oral iron every other day. She still has issues with constipation. Using prune juice, Senna BID and colace. Did increase colace to 100 mg daily. Can use miralax PRN. No melena or hematochezia, Continues on Keppra. We will plan to repeat CEA in six week. Consider imaging if continues to increase. I recommend with high-fiber diet. She denies any  nausea, vomiting or diarrhea. No bowel habit changes. No melena or hematuria. Denies any depression. She denies any chest pain, shortness of breath or palpitation. PAST MEDICAL HISTORY:  Hypertension, sleep apnea, rheumatoid arthritis on methotrexate, right carotid endarterectomy on November 18, 2015, and CVA in September 2015. She has a history of partial hysterectomy in 1965 or 1966. Last menstrual period was in 1966. Seizures. FAMILY HISTORY:  No history of malignancy in the family. SOCIAL HISTORY:  No history of smoking. No alcohol or illicit drug use. She is  and has four children. Oncology History   Malignant neoplasm of [address] canal ([facility]   3/8/2017 Initial Diagnosis    Malignant neoplasm of anal canal ([facility]      - 7/3/2017 Radiation    Pelvic IMRT + concurrent systemic therapy  4500 cGy to elective LNs  5400 cGy to primary tumor using SIB-IMRT / 30 fx      Chemotherapy    Concurrent with RT  5-FU + Mitomycin-C         Review of Systems: \"Per interval history; otherwise 10 point ROS is negative. \"     Vital Signs:  BP (!) 142/70 (Site: Left Upper Arm, Position: Sitting, Cuff Size: Medium Adult)   Pulse 105   Temp 98.1 °F (36.7 °C) (Infrared)   Resp 18   Ht 5' 2\" (1.575 m)   Wt 143 lb (64.9 kg)   SpO2 97%   BMI 26.16 kg/m²     Physical Exam:  CONSTITUTIONAL: awake, alert, cooperative, no apparent distress +walker  EYES: pupils equal, round and reactive to light, sclera clear and conjunctiva pallor  ENT: Normocephalic, without obvious abnormality, atraumatic  NECK: supple, symmetrical, no jugular venous distension and no carotid bruits   HEMATOLOGIC/LYMPHATIC: no cervical, supraclavicular or axillary lymphadenopathy   LUNGS: no increased work of breathing and clear to auscultation   CARDIOVASCULAR: regular rate and rhythm, normal S1 and S2, no murmur   ABDOMEN: normal bowel sound, soft, non-distended, non-tender, no masses palpated, no hepatosplenomegaly   MUSCULOSKELETAL: full range of motion noted, tone is normal  NEUROLOGIC: awake, alert, oriented to name, place and time. Motor skills grossly intact. Cranial nerves II through XII grossly intact. SKIN: Normal skin color, texture, turgor and no jaundice. appears intact   EXTREMITIES: no LE edema. Deviation of proximal and distal finger joints to both hands.     Labs:  Hematology:  Lab Results   Component Value Date    WBC 5.3 01/20/2022    RBC 4.25 01/20/2022    HGB 13.2 01/20/2022    HCT 39.7 01/20/2022    MCV 93.4 01/20/2022    MCH 31.1 (H) 01/20/2022    MCHC 33.2 01/20/2022    RDW 14.0 01/20/2022     01/20/2022    MPV 9.3 01/20/2022    BANDSPCT 3 (L) 05/12/2021    SEGSPCT 76.0 (H) 01/20/2022    EOSRELPCT 0.4 01/20/2022    BASOPCT 0.2 01/20/2022    LYMPHOPCT 16.4 (L) 01/20/2022    MONOPCT 7.0 (H) 01/20/2022    BANDABS 0.30 05/12/2021    SEGSABS 4.1 01/20/2022    EOSABS 0.0 01/20/2022    BASOSABS 0.0 01/20/2022    LYMPHSABS 0.9 01/20/2022    MONOSABS 0.4 01/20/2022    DIFFTYPE AUTOMATED DIFFERENTIAL 01/20/2022     Lab Results   Component Value Date    ESR 5 05/10/2021     Chemistry:  Lab Results   Component Value Date     01/20/2022    K 3.8 01/20/2022    CL 99 01/20/2022    CO2 25 01/20/2022    BUN 13 01/20/2022    CREATININE 0.4 (L) 01/20/2022    GLUCOSE 158 (H) 01/20/2022    CALCIUM 9.3 01/20/2022    PROT 6.1 (L) 01/20/2022    LABALBU 3.8 01/20/2022    BILITOT 0.5 01/20/2022    ALKPHOS 126 01/20/2022    AST 18 01/20/2022    ALT 13 01/20/2022    LABGLOM >60 01/20/2022    GFRAA >60 01/20/2022    PHOS 3.3 09/17/2015    MG 1.9 04/07/2021    POCGLU 195 (H) 05/12/2021     Lab Results   Component Value Date     (H) 05/10/2021    HOMOCYSTEINE 10.9 (H) 09/19/2015     Lab Results   Component Value Date    TSHHS 0.643 04/08/2021    T4FREE 1.06 09/16/2015     Immunology:  Lab Results   Component Value Date    PROT 6.1 (L) 01/20/2022    SPEP  05/10/2021     INTERPRETATION - Nonspecific pattern, decreased albumin. No monoclonal gammopathy. RS    ALBUMINELP 2.9 (L) 05/10/2021    LABALPH 0.3 05/10/2021    LABALPH 0.8 05/10/2021    LABBETA 0.9 05/10/2021    GAMGLOB 0.5 05/10/2021     Coagulation Panel:  Lab Results   Component Value Date    PROTIME 11.5 (L) 05/13/2021    INR 0.95 05/13/2021    APTT 65.4 (H) 05/13/2021     Tumor Markers:  Lab Results   Component Value Date    CEA 11.6 01/20/2022      Observations:  PHQ-9 Total Score: 0 (1/27/2022 10:17 AM)        Assessment & Plan:    1. She has clinical stage T2NxMx anal canal carcinoma. PET-CT scan revealed local disease without evidence of metastatic disease. Baseline blood test in March 2017 revealed white cell 10.2, hemoglobin 12.6, platelet 963. CMP was benign with alk phos 146. HIV screening was negative. She completed chemoradiation therapy in July 2017. I advised her to follow up with Dr. Callie Sanchez for possible anoscopy every six to 12 months in the first three years. CT chest abdomen and pelvis in July 2018 was negative. CEA improved. She had colonoscopy in September 2018. CT chest, abdomen and pelvis in July 2019 showed no metastatic disease.  CT chest, abdomen and pelvis in November 2019 was negative for mets. Labs in January 2020 were reviewed. Labs in July 2020 were reviewed. Reportedly colonoscopy in February 2021 was okay. She is agreeable to continue with surveillance. Reviewed iron studies, CBC and CEA of 1/20/22. CEA elevated to 11.6. Plan to repeat in six weeks, consider imaging if remains elevated. 2.  Mild anemia. Anemic work-up today. Advised to call for the test result. I recommend to take iron pill every other day due to constipation. Robb Cortez studies reviewed. Ferritin and Hb improved. 3.   She has a history of rheumatoid arthritis and is on methotrexate. She will follow up with Dr. Marilee Felix. Weight remains stable. 4.   I advised her to keep her other age-appropriate cancer screening up to date. Mammogram in April 2019 was benign. Reportedly mammogram in 2020 was benign. 5.  She has history of seizures. She has been following neurologist.    6.  She has constipation. I recommend high-fiber diet. I gave refill senna and increased colace. Can use miralax as needed      All of her questions have been answered for today. RTC in 3 months or sooner. Recent imaging and labs were reviewed and discussed with the patient.

## 2022-01-27 NOTE — PROGRESS NOTES
MA Rooming Questions  Patient: Ganesh Mendoza  MRN: [id number]    Date: 1/27/2022        1. Do you have any new issues?   no         2. Do you need any refills on medications?    no    3. Have you had any imaging done since your last visit?   no    4. Have you been hospitalized or seen in the emergency room since your last visit here?   no    5. Did the patient have a depression screening completed today?  Yes    No data recorded     PHQ-9 Given to (if applicable):               PHQ-9 Score (if applicable):                     [] Positive     [x]  Negative              Does question #9 need addressed (if applicable)                     [] Yes    []  No               Lenore Ricks MA

## 2022-03-22 ENCOUNTER — OFFICE VISIT (OUTPATIENT)
Dept: CARDIOLOGY CLINIC | Age: 82
End: 2022-03-22
Payer: MEDICARE

## 2022-03-22 VITALS
HEART RATE: 85 BPM | HEIGHT: 64 IN | SYSTOLIC BLOOD PRESSURE: 118 MMHG | BODY MASS INDEX: 25.1 KG/M2 | WEIGHT: 147 LBS | OXYGEN SATURATION: 97 % | DIASTOLIC BLOOD PRESSURE: 70 MMHG

## 2022-03-22 DIAGNOSIS — G81.94 LEFT HEMIPARESIS (HCC): ICD-10-CM

## 2022-03-22 DIAGNOSIS — I47.1 SVT (SUPRAVENTRICULAR TACHYCARDIA) (HCC): ICD-10-CM

## 2022-03-22 DIAGNOSIS — I69.359 CVA, OLD, HEMIPARESIS (HCC): Primary | Chronic | ICD-10-CM

## 2022-03-22 DIAGNOSIS — R26.9 ABNORMALITY OF GAIT: ICD-10-CM

## 2022-03-22 DIAGNOSIS — I65.23 BILATERAL CAROTID ARTERY STENOSIS: Chronic | ICD-10-CM

## 2022-03-22 DIAGNOSIS — G47.30 SLEEP APNEA, UNSPECIFIED TYPE: ICD-10-CM

## 2022-03-22 DIAGNOSIS — R07.2 PRECORDIAL PAIN: ICD-10-CM

## 2022-03-22 DIAGNOSIS — R41.3 MILD MEMORY DISTURBANCE: ICD-10-CM

## 2022-03-22 DIAGNOSIS — I10 ESSENTIAL HYPERTENSION: ICD-10-CM

## 2022-03-22 DIAGNOSIS — R56.9 PARTIAL SEIZURES (HCC): ICD-10-CM

## 2022-03-22 DIAGNOSIS — G47.33 OSA (OBSTRUCTIVE SLEEP APNEA): ICD-10-CM

## 2022-03-22 PROCEDURE — 99214 OFFICE O/P EST MOD 30 MIN: CPT | Performed by: INTERNAL MEDICINE

## 2022-03-22 PROCEDURE — G8417 CALC BMI ABV UP PARAM F/U: HCPCS | Performed by: INTERNAL MEDICINE

## 2022-03-22 PROCEDURE — G8484 FLU IMMUNIZE NO ADMIN: HCPCS | Performed by: INTERNAL MEDICINE

## 2022-03-22 PROCEDURE — G8427 DOCREV CUR MEDS BY ELIG CLIN: HCPCS | Performed by: INTERNAL MEDICINE

## 2022-03-22 PROCEDURE — 1090F PRES/ABSN URINE INCON ASSESS: CPT | Performed by: INTERNAL MEDICINE

## 2022-03-22 PROCEDURE — G8399 PT W/DXA RESULTS DOCUMENT: HCPCS | Performed by: INTERNAL MEDICINE

## 2022-03-22 PROCEDURE — 1036F TOBACCO NON-USER: CPT | Performed by: INTERNAL MEDICINE

## 2022-03-22 PROCEDURE — 4040F PNEUMOC VAC/ADMIN/RCVD: CPT | Performed by: INTERNAL MEDICINE

## 2022-03-22 PROCEDURE — 1123F ACP DISCUSS/DSCN MKR DOCD: CPT | Performed by: INTERNAL MEDICINE

## 2022-03-22 NOTE — PROGRESS NOTES
CARDIOLOGY  NOTE    Chief Complaint: SVT     HPI:   Molina Anaya is a 80y.o. year old who has Past medical history as noted below. Molina Anaya comes in with her daughter who is her primary caretaker. She has some limited memory and right-sided deficits weakness. She had stroke in 2015 , carotid were normal , she has no known h/o afib but has dementia, walks with a walker , she has not had any falls in last few years . Daughter tells me that Molina Anaya has been under a lot of stress because her son is nonverbal and dependent on her lives with her. 30 day monitor showed no afib . She will need knee surgery soon for replacement , she says some times she gets chest tightness   Her blood pressure generally well controlled  She had a mechanical fall in May 2021 resulting in left knee injury since then she has recovered during her hospitalization she had episodes of SVT requiring adenosine infusions.   She denies any palpitations or symptoms since then she has been on Cardizem fairly well controlled there is no ankle swelling      Current Outpatient Medications   Medication Sig Dispense Refill    docusate sodium (COLACE) 100 MG capsule Take 1 capsule by mouth daily as needed for Constipation 30 capsule 0    senna (SENOKOT) 8.6 MG tablet Take 1 tablet by mouth 2 times daily 60 tablet 11    memantine (NAMENDA) 5 MG tablet Take (1) 5 mg tab QD x7 days, then 1 BID x7 days; then 2 tabs Q am-1 tab pm x7 days then change to 10mg RX 42 tablet 0    memantine (NAMENDA) 10 MG tablet Take 1 tablet by mouth 2 times daily NOTE TO PHARMACY: DO NOT FILL UNTIL 5 MG RX IS COMPLETED 60 tablet 5    RA SENNA 8.6 MG tablet take 1 tablet by mouth twice a day      dilTIAZem (DILACOR XR) 240 MG extended release capsule Take 1 capsule by mouth nightly 90 capsule 3    acetaminophen (TYLENOL) 500 MG tablet Take 500 mg by mouth every 6 hours as needed for Pain      diclofenac (VOLTAREN) 75 MG EC tablet Take 1 tablet by mouth 2 times daily as needed      Magnesium 500 MG TABS Take 1 tablet by mouth daily      ferrous sulfate (IRON 325) 325 (65 Fe) MG tablet Take 1 tablet by mouth daily (with breakfast) (Patient taking differently: Take 325 mg by mouth every other day ) 30 tablet 2    donepezil (ARICEPT) 10 MG tablet Take 10 mg by mouth every morning       triamterene-hydroCHLOROthiazide (MAXZIDE-25) 37.5-25 MG per tablet take 1 tablet by mouth once daily      docusate sodium (COLACE) 100 MG capsule Take 50 mg by mouth nightly 2 tablets       atorvastatin (LIPITOR) 20 MG tablet Take 20 mg by mouth nightly       aspirin 81 MG tablet Take 81 mg by mouth nightly       CALCIUM PO Take by mouth nightly       vitamin D3 (COLECALCIFEROL) 400 UNITS TABS Take 5,000 Units by mouth daily       folic acid (FOLVITE) 719 MCG tablet Take 400 mcg by mouth every morning      methotrexate 2.5 MG tablet Take 2.5 mg by mouth once a week Indications: Take 7 tablets one time a week       predniSONE (DELTASONE) 5 MG tablet Take 5 mg by mouth 2 times daily       levETIRAcetam (KEPPRA) 500 MG tablet Take 1 tablet by mouth 2 times daily 180 tablet 0     No current facility-administered medications for this visit.        Allergies:   Cortisone, Codeine, and Pcn [penicillins]    Patient History:  Past Medical History:   Diagnosis Date    Acid reflux     Anxiety     Bronchitis In Past    Cancer ([facility]     colon cancer    Carotid stenosis 11/10/2015    Cerebral embolism with [facility] ([facility] 09/18/2015    Seizure X 1, No Residual    Chronic back pain     Depression     History of blood transfusion     History of external beam radiation therapy 2017    5400 cGy pelvis/anal canal in 2017 per Dr. Sedrick Izquierdo at [address] [facility] Hyperlipidemia     Hypertension     Prolonged emergence from general anesthesia     RA (rheumatoid arthritis) ([facility]     \"All Over\"    Seizure ([facility] 09/18/2015    X 1    Sleep apnea     Uses CPAP    Teeth missing Upper And Lower    Urinary incontinence     UTI (urinary tract infection) In Past    No Current Symptoms    Wears dentures     Full Upper , Partial Lower    Wears glasses     Wears partial dentures     Lower     Past Surgical History:   Procedure Laterality Date    ARTHROCENTESIS / ASPIRATION / INJECTION KNEE  5/11/2021         CAROTID ENDARTERECTOMY Right [id number]    CARPAL TUNNEL RELEASE      COLONOSCOPY  In Past    DENTAL SURGERY      Teeth Extracted In Past    DILATION AND CURETTAGE OF UTERUS  1960's    Prior To Vaginal Hysterectomy    HYSTERECTOMY, VAGINAL  1960's    PORT SURGERY N/A 3/2/2021    PORT REMOVAL performed by Leah Orozco MD at [address] History   Problem Relation Age of Onset    Stroke Mother     Heart Disease Mother     Arthritis Mother     Diabetes Mother     High Blood Pressure Mother     High Cholesterol Mother    [de-identified] / Soila Alanis Mother     Other Father         \"Surgery For Twisted Bowels\"    Heart Disease Sister     Stroke Sister     High Blood Pressure Brother     Heart Disease Brother     Hearing Loss Brother         Heart Attack    Diabetes Sister     High Blood Pressure Sister     Other Sister         Gout    Arthritis Sister     Mental Illness Sister         Schizophrenia    High Blood Pressure Brother     High Blood Pressure Son     Mental Illness Son         Anxiety    Depression Son     Other Son         Pancreatitis, Surgery For Brain Aneurysm    High Blood Pressure Son     Other Son         Gout    Learning Disabilities Son         Autistic, Mentally Retarded    Other Son         Seizures     Social History     Tobacco Use    Smoking status: Never Smoker    Smokeless tobacco: Never Used   Substance Use Topics    Alcohol use: No     Alcohol/week: 0.0 standard drinks     Comment: 4-6 caffeinated beverages a week        Review of Systems:   · Constitutional: No Fever or Weight Loss   · Eyes: No Decreased Vision  · ENT: No Headaches, Hearing Loss or Vertigo  · Cardiovascular: as per note above   · Respiratory: No cough or wheezing and as per note above. · Gastrointestinal: No abdominal pain, appetite loss, blood in stools, constipation, diarrhea or heartburn  · Genitourinary: No dysuria, trouble voiding, or hematuria  · Musculoskeletal:  denies any new  joint aches , swelling  or pain   · Integumentary: No rash or pruritis  · Neurological: History of stroke causing right-sided weakness she walks with a walker  · Psychiatric: No anxiety or depression she is positive for dementia  · Endocrine: No malaise, fatigue or temperature intolerance  · Hematologic/Lymphatic: No bleeding problems, blood clots or swollen lymph nodes  · Allergic/Immunologic: No nasal congestion or hives    Objective:      Physical Exam:  /70   Pulse 85   Ht 5' 4\" (1.626 m)   Wt 147 lb (66.7 kg)   SpO2 97%   BMI 25.23 kg/m²   Wt Readings from Last 3 Encounters:   03/22/22 147 lb (66.7 kg)   01/27/22 143 lb (64.9 kg)   12/07/21 137 lb (62.1 kg)     Body mass index is 25.23 kg/m². Vitals:    03/22/22 1347   BP: 118/70   Pulse: 85   SpO2: 97%        General Appearance:  No distress, conversant  Constitutional:  Well developed, Well nourished, No acute distress, Non-toxic appearance. HENT:  Normocephalic, Atraumatic, Bilateral external ears normal, Oropharynx moist, No oral exudates, Nose normal. Neck- Normal range of motion, No tenderness, Supple, No stridor,no apical-carotid delay  Eyes:  PERRL, EOMI, Conjunctiva normal, No discharge. Respiratory:  Normal breath sounds, No respiratory distress, No wheezing, No chest tenderness. ,no use of accessory muscles, NO crackles  Cardiovascular: (PMI) apex non displaced,no lifts no thrills,S1 and S2 audible, No added heart sounds, No signs of ankle edema, or volume overload, No evidence of JVD, No crackles  GI:  Bowel sounds normal, Soft, No tenderness, No masses, No gross visceromegaly   : No costovertebral angle tenderness   Musculoskeletal:  No edema, no tenderness, no deformities. Back- no tenderness  Integument:  Well hydrated, no rash   Lymphatic:  No lymphadenopathy noted   Neurologic:  Alert & oriented x 3, CN 2-12 normal, right-sided weakness, normal sensory function, no focal deficits noted   Psychiatric:  Speech and behavior appropriate       Medical decision making and Data review:  DATA:  Lab Results   Component Value Date    TROPONINT 0.023 (H) 05/13/2021     BNP:  No results found for: PROBNP  PT/INR:  No results found for: PTINR  No results found for: LABA1C  Lab Results   Component Value Date    CHOL 178 09/21/2015    TRIG 197 (H) 09/21/2015    HDL 63 09/21/2015    LDLDIRECT 95 09/21/2015     Lab Results   Component Value Date    ALT 13 01/20/2022    AST 18 01/20/2022     No results for input(s): WBC, HGB, HCT, MCV, PLT in the last 72 hours. TSH: No results found for: TSH  Lab Results   Component Value Date    AST 18 01/20/2022    ALT 13 01/20/2022    BILITOT 0.5 01/20/2022    ALKPHOS 126 01/20/2022     No results found for: PROBNP  No results found for: LABA1C  Lab Results   Component Value Date    WBC 5.3 01/20/2022    HGB 13.2 01/20/2022    HCT 39.7 01/20/2022     01/20/2022     Event monitor 8/30/21    30-day monitor with 3 patient triggers average heart rate was 82 lowest heart was 42 maximum heart rate is 150 no episodes of atrial fibrillation recorded.  Maximum heart rate 150 8:55 PM no significant arrhythmias or abnormality noted normal 30-day monitor    Echo 5/8/21     Summary   Left ventricular systolic function is normal.   Ejection fraction is visually estimated at 50-55%. No significant valvular abnormalities. No evidence of any pericardial effusion. All labs, medications and tests reviewed by myself including data and history from outside source , patient and available family . Assessment & Plan:      1. CVA, old, hemiparesis ([address]    2.  Bilateral carotid artery stenosis    3. Left hemiparesis ([address]    4. Mild memory disturbance    5. VIC (obstructive sleep apnea)    6. Partial seizures ([address]    7. SVT (supraventricular tachycardia) ([address]    8. Abnormality of gait    9. Essential hypertension    10. Sleep apnea, unspecified type    11. Precordial pain         CVA, old, hemiparesis ([address]   History of stroke with some deficits she walks with a walker 30-day monitor showed no  A. fib carotids were normal on CT scan in April 2021 for now continue aspirin    Pre operative Evaluation  Will get lexiscan       SVT (supraventricular tachycardia) ([address]   Noted to have episodes of SVT during hospitalization in April 2021 converted with adenosine for now continue Cardizem fairly well controlled she was evaluated by EPS in April 2020 when she was hospitalized     Dyslipidemia :  All available lab work was reviewed. Patient was advised to repeat lab work before next visit. Necessary orders were placed , instructions given by myself       Counseled extensively and medication compliance urged. We discussed that for the  prevention of ASCVD our  goal is aggressive risk modification. Patient is encouraged to exercise if they can , educated about  brisk walk for 30 minutes  at least 3 to 4 times a week if there are no physical limitations  Various goals were discussed and questions answered. Continue current medications. Appropriate prescriptions are addressed and refills ordered. Questions answered and patient verbalizes understanding. Call for any problems, questions, or concerns. Greater than 60 % of time spent counseling besides reviewing data and images     Continue all other medications of all above medical condition listed as is. No follow-ups on file.     Please note this report has been partially produced using speech recognition software and may contain errors related to that system including errors in grammar, punctuation, and spelling, as well as words and phrases that may be inappropriate. If there are any questions or concerns please feel free to contact the dictating provider for clarification.

## 2022-03-22 NOTE — PATIENT INSTRUCTIONS
Please be informed that if you contact our office outside of normal business hours the physician on call cannot help with any scheduling or rescheduling issues, procedure instruction questions or any type of medication issue. We advise you for any urgent/emergency that you go to the nearest emergency room! PLEASE CALL OUR OFFICE DURING NORMAL BUSINESS HOURS    Monday - Friday   8 am to 5 pm    [facility] [address]: [phone]    [place]:  [phone]    **It is YOUR responsibilty to bring medication bottles and/or updated medication list to [address] [place] [address]. This will allow us to better serve you and all your healthcare needs**  Please hold on to these instructions the  will call you within 1-9 business days when we receive authorization from your insurance. Nuclear Stress Test    WHAT TO EXPECT:   ? You will need to confirm the test or it could be cancelled. ? This test will take approximately 2 hours: 1 hour in the AM &    1 hour in the PM. You will be given a time by the Technologist after the first part is completed to come back. ? You will be given a medication, through an IV in the hand, this will safely simulate exercise. This IV is also needed to inject the radioactive isotope unless you are able toe walk the treadmill. ? You will receive an injection in the AM & PM before the pictures. ? Using a special camera, you will have one set of pictures of your heart taken in the AM and a set of pictures in the PM.     PREPARATION FOR TEST:  ? Eat a light breakfast such as water or juice and toast.  ? If you are DIABETIC: Eat a normal breakfast with NO CAFFEINE and take your insulin as normal.   ? AVOID ALL FOODS & DRINKS containing CAFFEINE 12 HOURS PRIOR TO THE TEST: Including coffee, Tea, Ruby and other soft drinks even those labeled  caffeine free or decaffeinated.  ?  HOLD THESE MEDICATIONS Persantine & Theophylline (Theodur)  24 hours prior & bring your inhaler with you.

## 2022-03-23 ENCOUNTER — PROCEDURE VISIT (OUTPATIENT)
Dept: CARDIOLOGY CLINIC | Age: 82
End: 2022-03-23
Payer: MEDICARE

## 2022-03-23 DIAGNOSIS — R07.9 CHEST PAIN, UNSPECIFIED TYPE: ICD-10-CM

## 2022-03-23 DIAGNOSIS — R94.31 ABNORMAL EKG: Primary | ICD-10-CM

## 2022-03-23 DIAGNOSIS — R06.02 SOB (SHORTNESS OF BREATH): ICD-10-CM

## 2022-03-23 DIAGNOSIS — I69.359 CVA, OLD, HEMIPARESIS (HCC): Chronic | ICD-10-CM

## 2022-03-23 DIAGNOSIS — G81.94 LEFT HEMIPARESIS (HCC): ICD-10-CM

## 2022-03-23 LAB
LV EF: 72 %
LVEF MODALITY: NORMAL

## 2022-03-23 PROCEDURE — 78452 HT MUSCLE IMAGE SPECT MULT: CPT | Performed by: INTERNAL MEDICINE

## 2022-03-23 PROCEDURE — A9500 TC99M SESTAMIBI: HCPCS | Performed by: INTERNAL MEDICINE

## 2022-03-23 PROCEDURE — 93017 CV STRESS TEST TRACING ONLY: CPT | Performed by: INTERNAL MEDICINE

## 2022-03-23 PROCEDURE — 93016 CV STRESS TEST SUPVJ ONLY: CPT | Performed by: INTERNAL MEDICINE

## 2022-03-23 PROCEDURE — 93018 CV STRESS TEST I&R ONLY: CPT | Performed by: INTERNAL MEDICINE

## 2022-03-24 ENCOUNTER — TELEPHONE (OUTPATIENT)
Dept: CARDIOLOGY CLINIC | Age: 82
End: 2022-03-24

## 2022-03-24 NOTE — TELEPHONE ENCOUNTER
Summary    Supervising physician Dr. Bernardo Puente with arms down    Normal EF 72 % with normal ventricular contractility. No infarct or ischemia    noted. No infarct or ischemia noted. Called patient and the phone was answered but no one was speaking. Will give a call back.

## 2022-03-28 DIAGNOSIS — C21.0 ANAL CANCER (HCC): Primary | ICD-10-CM

## 2022-03-28 NOTE — PROGRESS NOTES
Patient Name: Kay Cox  Patient : 1940  Patient MRN: [id number]     Primary Oncologist: Steph Lynn MD  Referring Provider: Channing Glass PA-C     Date of Service: 2022      Chief Complaint:   No chief complaint on file. She came in for follow-up visit. Patient Active Problem List:     Cerebral embolism with cerebral infarction     Partial seizures (HCC)     RA (rheumatoid arthritis) (HCC)     Left hemiparesis (HCC)     Abnormality of gait     Essential hypertension     Rheumatoid arthritis involving multiple sites with positive rheumatoid factor      Carotid stenosis     CVA, old, hemiparesis ([facility] [address]     Malignant neoplasm of anal canal (HCC)     Anemia, unspecified     Other fatigue       HPI:   Kai Ca is a pleasant 68year-old [facility] American female patient who was referred for evaluation of 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 She has been noticing episodes of blood in stool for a couple of months and no prior colonoscopy until she was seen by Dr. Rajendra High. She has a long history of constipation and has been taking stool softeners. She underwent colonoscopy by Dr. Rajendra High on 2017, which revealed a large friable obstructing mass lesion noted in the anal canal and diverticulosis coli. Pathology report revealed invasive intermediate grade keratinizing squamous cell carcinoma. She is currently  and has four children. She is agreeable to have the HIV test, as recommended by the guidelines. She denied any history of sexually transmitted disease. Bblood test in 2016 revealed white cell 7.1, hemoglobin 11.9, platelet 748. Creatinine was 0.52, AST 17, ALT 12. She was seen by Dr. Lisa Edward, who performed the rectal exam.  After having discussion with him, clinically she was felt to have stage T2NxMx.     PET-CT scan in 2017 revealed no evidence of metastatic disease, except for the heterogenous activity in the area of soft tissue fullness in the anal region, compatible with the history of anal cancer. She started concomitant mitomycin/capecitabine and radiation therapy on May 8, 2017. Blood tests in June 2017 revealed white cell 4.4, hemoglobin 9.4, platelet 660. CMP was stable. Dr. David Everett temporarily held radiation and Xeloda and eventually restarted again. She completed concomitant chemoradiation therapy on July 3, 2017. We discussed about surveillance and survivorship. I referred her back to see Dr. Zack Cabrera. She will need anoscopy every six to 12 months up to three years. Imaging study will be considered once a year for three years. She gained weight since the last office visits. I reminded to follow-up with Dr. Zack Cabrera for the potential anoscopy. CT chest, abdomen and pelvis in July 2018 showed no evidence of progression of the disease. DEXA scan in December 2018 was wnl. Colonoscopy was in September 2018. Repeat in 3 years as per GI. She is on MTX. Labs in June 2019 were reviewed. CT chest, abdomen and pelvis in July 2019 showed no evidence of metastatic disease. Mammogram in April 2019 was benign. CT chest, abdomen and pelvis in November 2019 were negative for metastatic disease. Labs in January 2020 were stable. CEA improved. Blood test in July 2020 was reviewed. MTX and prednisone were increased due to increasing joint pain related to RA. She has proximal finger joint deformities with nodules. She refused to have flu and Covid vaccine. Labs in January 2021 showed stable CBC and CMP with hemoglobin of 11.8. On July 27, 2021 she came in for follow-up visit. She will have labs today including anemic work-up and CEA level. I recommend to call for the test result. I referred her back to see Dr. Willow Rosenbaum for removal of the Mediport. Due to the constipation I recommend to take iron pill every other day.   She was hospitalized in February 2021 concurrent systemic therapy  4500 cGy to elective LNs  5400 cGy to primary tumor using SIB-IMRT / 30 fx      Chemotherapy    Concurrent with RT  5-FU + Mitomycin-C         Review of Systems: \"Per interval history; otherwise 10 point ROS is negative. \"     Vital Signs: There were no vitals taken for this visit. Physical Exam:  CONSTITUTIONAL: awake, alert, cooperative, no apparent distress +walker  EYES: pupils equal, round and reactive to light, sclera clear and conjunctiva pallor  ENT: Normocephalic, without obvious abnormality, atraumatic  NECK: supple, symmetrical, no jugular venous distension and no carotid bruits   HEMATOLOGIC/LYMPHATIC: no cervical, supraclavicular or axillary lymphadenopathy   LUNGS: no increased work of breathing and clear to auscultation   CARDIOVASCULAR: regular rate and rhythm, normal S1 and S2, no murmur   ABDOMEN: normal bowel sound, soft, non-distended, non-tender, no masses palpated, no hepatosplenomegaly   MUSCULOSKELETAL: full range of motion noted, tone is normal  NEUROLOGIC: awake, alert, oriented to name, place and time. Motor skills grossly intact. Cranial nerves II through XII grossly intact. SKIN: Normal skin color, texture, turgor and no jaundice. appears intact   EXTREMITIES: no LE edema. Deviation of proximal and distal finger joints to both hands.     Labs:  Hematology:  Lab Results   Component Value Date    WBC 5.3 01/20/2022    RBC 4.25 01/20/2022    HGB 13.2 01/20/2022    HCT 39.7 01/20/2022    MCV 93.4 01/20/2022    MCH 31.1 (H) 01/20/2022    MCHC 33.2 01/20/2022    RDW 14.0 01/20/2022     01/20/2022    MPV 9.3 01/20/2022    BANDSPCT 3 (L) 05/12/2021    SEGSPCT 76.0 (H) 01/20/2022    EOSRELPCT 0.4 01/20/2022    BASOPCT 0.2 01/20/2022    LYMPHOPCT 16.4 (L) 01/20/2022    MONOPCT 7.0 (H) 01/20/2022    BANDABS 0.30 05/12/2021    SEGSABS 4.1 01/20/2022    EOSABS 0.0 01/20/2022    BASOSABS 0.0 01/20/2022    LYMPHSABS 0.9 01/20/2022    MONOSABS 0.4 01/20/2022 DIFFTYPE AUTOMATED DIFFERENTIAL 01/20/2022     Lab Results   Component Value Date    ESR 5 05/10/2021     Chemistry:  Lab Results   Component Value Date     01/20/2022    K 3.8 01/20/2022    CL 99 01/20/2022    CO2 25 01/20/2022    BUN 13 01/20/2022    CREATININE 0.4 (L) 01/20/2022    GLUCOSE 158 (H) 01/20/2022    CALCIUM 9.3 01/20/2022    PROT 6.1 (L) 01/20/2022    LABALBU 3.8 01/20/2022    BILITOT 0.5 01/20/2022    ALKPHOS 126 01/20/2022    AST 18 01/20/2022    ALT 13 01/20/2022    LABGLOM >60 01/20/2022    GFRAA >60 01/20/2022    PHOS 3.3 09/17/2015    MG 1.9 04/07/2021    POCGLU 195 (H) 05/12/2021     Lab Results   Component Value Date     (H) 05/10/2021    HOMOCYSTEINE 10.9 (H) 09/19/2015     Lab Results   Component Value Date    TSHHS 0.643 04/08/2021    T4FREE 1.06 09/16/2015     Immunology:  Lab Results   Component Value Date    PROT 6.1 (L) 01/20/2022    SPEP  05/10/2021     INTERPRETATION - Nonspecific pattern, decreased albumin. No monoclonal gammopathy. RS    ALBUMINELP 2.9 (L) 05/10/2021    LABALPH 0.3 05/10/2021    LABALPH 0.8 05/10/2021    LABBETA 0.9 05/10/2021    GAMGLOB 0.5 05/10/2021     Coagulation Panel:  Lab Results   Component Value Date    PROTIME 11.5 (L) 05/13/2021    INR 0.95 05/13/2021    APTT 65.4 (H) 05/13/2021     Tumor Markers:  Lab Results   Component Value Date    CEA 11.6 01/20/2022      Observations:  No data recorded        Assessment & Plan:    1. She has clinical stage T2NxMx anal canal carcinoma. PET-CT scan revealed local disease without evidence of metastatic disease. Baseline blood test in March 2017 revealed white cell 10.2, hemoglobin 12.6, platelet 359. CMP was benign with alk phos 146. HIV screening was negative. She completed chemoradiation therapy in July 2017. I advised her to follow up with Dr. Lena Fisher for possible anoscopy every six to 12 months in the first three years. CT chest abdomen and pelvis in July 2018 was negative. CEA improved.   She had colonoscopy in September 2018. CT chest, abdomen and pelvis in July 2019 showed no metastatic disease. CT chest, abdomen and pelvis in November 2019 was negative for mets. Labs in January 2020 were reviewed. Labs in July 2020 were reviewed. Reportedly colonoscopy in February 2021 was okay. She is agreeable to continue with surveillance. Reviewed iron studies, CBC and CEA of 1/20/22. CEA elevated to 11.6. CEA increased to 14.1 on 3/30/22. Plan for CT Chest, abdomen, pelvis with short interval return visit. She is planning for knee replacement and reports this may interfere with her scheduling. 2.  Mild anemia. Anemic work-up today. Advised to call for the test result. I recommend to take iron pill every other day due to constipation. Lisa Dianne studies reviewed. Ferritin and Hb improved. 3.   She has a history of rheumatoid arthritis and is on methotrexate. She will follow up with Dr. Karina Hutson. Weight remains stable. 4.   I advised her to keep her other age-appropriate cancer screening up to date. Mammogram in April 2019 was benign. Reportedly mammogram in 2020 was benign. 5.  She has history of seizures. She has been following neurologist.    6.  She has constipation. I recommend high-fiber diet. I gave refill senna and increased colace. Can use miralax as needed      All of her questions have been answered for today. RTC in 2 weeks or sooner. Recent imaging and labs were reviewed and discussed with the patient.

## 2022-03-30 ENCOUNTER — HOSPITAL ENCOUNTER (OUTPATIENT)
Dept: INFUSION THERAPY | Age: 82
Discharge: HOME | End: 2022-03-30
Payer: MEDICARE

## 2022-03-30 ENCOUNTER — TELEPHONE (OUTPATIENT)
Dept: CARDIOLOGY CLINIC | Age: 82
End: 2022-03-30

## 2022-03-30 DIAGNOSIS — C21.0 ANAL CANCER (HCC): ICD-10-CM

## 2022-03-30 LAB
ALBUMIN SERPL-MCNC: 4.3 GM/DL (ref 3.4–5)
ALP BLD-CCNC: 118 IU/L (ref 40–128)
ALT SERPL-CCNC: 14 U/L (ref 10–40)
ANION GAP SERPL CALCULATED.3IONS-SCNC: 14 MMOL/L (ref 4–16)
AST SERPL-CCNC: 17 IU/L (ref 15–37)
BASOPHILS ABSOLUTE: 0 K/CU MM
BASOPHILS RELATIVE PERCENT: 0.3 % (ref 0–1)
BILIRUB SERPL-MCNC: 0.5 MG/DL (ref 0–1)
BUN BLDV-MCNC: 16 MG/DL (ref 6–23)
CALCIUM SERPL-MCNC: 9 MG/DL (ref 8.3–10.6)
CEA: 14.1 NG/ML
CHLORIDE BLD-SCNC: 101 MMOL/L (ref 99–110)
CO2: 24 MMOL/L (ref 21–32)
CREAT SERPL-MCNC: 0.5 MG/DL (ref 0.6–1.1)
DIFFERENTIAL TYPE: ABNORMAL
EOSINOPHILS ABSOLUTE: 0.1 K/CU MM
EOSINOPHILS RELATIVE PERCENT: 0.8 % (ref 0–3)
GFR AFRICAN AMERICAN: >60 ML/MIN/1.73M2
GFR NON-AFRICAN AMERICAN: >60 ML/MIN/1.73M2
GLUCOSE BLD-MCNC: 118 MG/DL (ref 70–99)
HCT VFR BLD CALC: 39.3 % (ref 37–47)
HEMOGLOBIN: 13.1 GM/DL (ref 12.5–16)
LYMPHOCYTES ABSOLUTE: 0.7 K/CU MM
LYMPHOCYTES RELATIVE PERCENT: 11.5 % (ref 24–44)
MCH RBC QN AUTO: 31.4 PG (ref 27–31)
MCHC RBC AUTO-ENTMCNC: 33.3 % (ref 32–36)
MCV RBC AUTO: 94.2 FL (ref 78–100)
MONOCYTES ABSOLUTE: 0.5 K/CU MM
MONOCYTES RELATIVE PERCENT: 8.5 % (ref 0–4)
PDW BLD-RTO: 14.1 % (ref 11.7–14.9)
PLATELET # BLD: 294 K/CU MM (ref 140–440)
PMV BLD AUTO: 9.3 FL (ref 7.5–11.1)
POTASSIUM SERPL-SCNC: 3.6 MMOL/L (ref 3.5–5.1)
RBC # BLD: 4.17 M/CU MM (ref 4.2–5.4)
SEGMENTED NEUTROPHILS ABSOLUTE COUNT: 4.9 K/CU MM
SEGMENTED NEUTROPHILS RELATIVE PERCENT: 78.9 % (ref 36–66)
SODIUM BLD-SCNC: 139 MMOL/L (ref 135–145)
TOTAL PROTEIN: 6.1 GM/DL (ref 6.4–8.2)
WBC # BLD: 6.2 K/CU MM (ref 4–10.5)

## 2022-03-30 PROCEDURE — 82378 CARCINOEMBRYONIC ANTIGEN: CPT

## 2022-03-30 PROCEDURE — 36415 COLL VENOUS BLD VENIPUNCTURE: CPT

## 2022-03-30 PROCEDURE — 80053 COMPREHEN METABOLIC PANEL: CPT

## 2022-03-30 PROCEDURE — 85025 COMPLETE CBC W/AUTO DIFF WBC: CPT

## 2022-04-04 ENCOUNTER — OFFICE VISIT (OUTPATIENT)
Dept: ONCOLOGY | Age: 82
End: 2022-04-04
Payer: MEDICARE

## 2022-04-04 ENCOUNTER — HOSPITAL ENCOUNTER (OUTPATIENT)
Dept: INFUSION THERAPY | Age: 82
Discharge: HOME | End: 2022-04-04

## 2022-04-04 VITALS
OXYGEN SATURATION: 99 % | HEIGHT: 64 IN | DIASTOLIC BLOOD PRESSURE: 71 MMHG | TEMPERATURE: 97.8 F | HEART RATE: 93 BPM | BODY MASS INDEX: 25.23 KG/M2 | SYSTOLIC BLOOD PRESSURE: 142 MMHG | RESPIRATION RATE: 16 BRPM

## 2022-04-04 DIAGNOSIS — R97.0 ELEVATED CEA: ICD-10-CM

## 2022-04-04 DIAGNOSIS — C21.0 ANAL CANCER (HCC): Primary | ICD-10-CM

## 2022-04-04 PROCEDURE — G8399 PT W/DXA RESULTS DOCUMENT: HCPCS | Performed by: NURSE PRACTITIONER

## 2022-04-04 PROCEDURE — 4040F PNEUMOC VAC/ADMIN/RCVD: CPT | Performed by: NURSE PRACTITIONER

## 2022-04-04 PROCEDURE — 1036F TOBACCO NON-USER: CPT | Performed by: NURSE PRACTITIONER

## 2022-04-04 PROCEDURE — 1123F ACP DISCUSS/DSCN MKR DOCD: CPT | Performed by: NURSE PRACTITIONER

## 2022-04-04 PROCEDURE — 99214 OFFICE O/P EST MOD 30 MIN: CPT | Performed by: NURSE PRACTITIONER

## 2022-04-04 PROCEDURE — 1090F PRES/ABSN URINE INCON ASSESS: CPT | Performed by: NURSE PRACTITIONER

## 2022-04-04 PROCEDURE — G8427 DOCREV CUR MEDS BY ELIG CLIN: HCPCS | Performed by: NURSE PRACTITIONER

## 2022-04-04 PROCEDURE — G8417 CALC BMI ABV UP PARAM F/U: HCPCS | Performed by: NURSE PRACTITIONER

## 2022-04-04 NOTE — PROGRESS NOTES
[facility] Rooming Questions  Patient: Micah Blair  MRN: [id number]    Date: 4/4/2022        1. Do you have any new issues?   no         2. Do you need any refills on medications?    no    3. Have you had any imaging done since your last visit? yes -     4. Have you been hospitalized or seen in the emergency room since your last visit here?   no    5. Did the patient have a depression screening completed today?  No    No data recorded     PHQ-9 Given to (if applicable):               PHQ-9 Score (if applicable):                     [] Positive     []  Negative              Does question #9 need addressed (if applicable)                     [] Yes    []  No               Daljit Alejo [facility]

## 2022-04-05 ENCOUNTER — TELEPHONE (OUTPATIENT)
Dept: ONCOLOGY | Age: 82
End: 2022-04-05

## 2022-04-05 NOTE — TELEPHONE ENCOUNTER
Received VM from patients daughter, Anisha Nick, requesting CT appointment information. Attempted to call her back on [phone] and VM box is full. LVM on [phone] for call back.

## 2022-04-06 ENCOUNTER — TELEPHONE (OUTPATIENT)
Dept: ONCOLOGY | Age: 82
End: 2022-04-06

## 2022-04-06 NOTE — TELEPHONE ENCOUNTER
Spoke with patients daughter, [employer], provided CT appointment aon 4/7/22 at 0800 am at [facility]. Advised of prep and stated understanding.

## 2022-04-07 ENCOUNTER — HOSPITAL ENCOUNTER (OUTPATIENT)
Dept: CT IMAGING | Age: 82
Discharge: HOME | End: 2022-04-07
Payer: MEDICARE

## 2022-04-07 DIAGNOSIS — C21.0 ANAL CANCER (HCC): ICD-10-CM

## 2022-04-07 DIAGNOSIS — R97.0 ELEVATED CEA: ICD-10-CM

## 2022-04-07 PROCEDURE — 2580000003 HC RX 258: Performed by: NURSE PRACTITIONER

## 2022-04-07 PROCEDURE — 6360000004 HC RX CONTRAST MEDICATION: Performed by: NURSE PRACTITIONER

## 2022-04-07 PROCEDURE — 71260 CT THORAX DX C+: CPT

## 2022-04-07 PROCEDURE — 74177 CT ABD & PELVIS W/CONTRAST: CPT

## 2022-04-07 RX ORDER — SODIUM CHLORIDE 0.9 % (FLUSH) 0.9 %
5-40 SYRINGE (ML) INJECTION PRN
Status: DISCONTINUED | OUTPATIENT
Start: 2022-04-07 | End: 2022-04-08 | Stop reason: HOSPADM

## 2022-04-07 RX ADMIN — IOPAMIDOL 75 ML: 755 INJECTION, SOLUTION INTRAVENOUS at 10:33

## 2022-04-07 RX ADMIN — IOHEXOL 50 ML: 240 INJECTION, SOLUTION INTRATHECAL; INTRAVASCULAR; INTRAVENOUS; ORAL at 09:10

## 2022-04-07 RX ADMIN — SODIUM CHLORIDE, PRESERVATIVE FREE 10 ML: 5 INJECTION INTRAVENOUS at 10:31

## 2022-04-12 NOTE — PROGRESS NOTES
Patient Name: Jerene Claude  Patient : 1940  Patient MRN: [id number]     Primary Oncologist: Josefa Sage MD  Referring Provider: Ambrosio Sauer PA-C     Date of Service: 2022      Chief Complaint:   No chief complaint on file. She came in for follow-up visit. Patient Active Problem List:     Cerebral embolism with cerebral infarction     Partial seizures (HCC)     RA (rheumatoid arthritis) (HCC)     Left hemiparesis (HCC)     Abnormality of gait     Essential hypertension     Rheumatoid arthritis involving multiple sites with positive rheumatoid factor      Carotid stenosis     CVA, old, hemiparesis ([facility] [address]     Malignant neoplasm of anal canal (HCC)     Anemia, unspecified     Other fatigue       HPI:   Alyssa Mcdaniel is a [facility] 68[facility] who was referred for evaluation of 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 She has been noticing episodes of blood in stool for a couple of months and no prior colonoscopy until she was seen by Dr. Faiza Salmon. She has a long history of constipation and has been taking stool softeners. She underwent colonoscopy by Dr. Faiza Salmon on 2017, which revealed a large friable obstructing mass lesion noted in the anal canal and diverticulosis coli. Pathology report revealed invasive intermediate grade keratinizing squamous cell carcinoma. She is currently  and has four children. She is agreeable to have the HIV test, as recommended by the guidelines. She denied any history of sexually transmitted disease. Bblood test in 2016 revealed white cell 7.1, hemoglobin 11.9, platelet 919. Creatinine was 0.52, AST 17, ALT 12. She was seen by Dr. Kory Humphrey, who performed the rectal exam.  After having discussion with him, clinically she was felt to have stage T2NxMx.     PET-CT scan in 2017 revealed no evidence of metastatic disease, except for the heterogenous activity in the area of soft tissue fullness in the anal region, compatible with the history of anal cancer. She started concomitant mitomycin/capecitabine and radiation therapy on May 8, 2017. Blood tests in June 2017 revealed white cell 4.4, hemoglobin 9.4, platelet 090. CMP was stable. Dr. Elba Ayala temporarily held radiation and Xeloda and eventually restarted again. She completed concomitant chemoradiation therapy on July 3, 2017. We discussed about surveillance and survivorship. I referred her back to see Dr. Navid Zavala. She will need anoscopy every six to 12 months up to three years. Imaging study will be considered once a year for three years. She gained weight since the last office visits. I reminded to follow-up with Dr. Navid Zavala for the potential anoscopy. CT chest, abdomen and pelvis in July 2018 showed no evidence of progression of the disease. DEXA scan in December 2018 was wnl. Colonoscopy was in September 2018. Repeat in 3 years as per GI. She is on MTX. Labs in June 2019 were reviewed. CT chest, abdomen and pelvis in July 2019 showed no evidence of metastatic disease. Mammogram in April 2019 was benign. CT chest, abdomen and pelvis in November 2019 were negative for metastatic disease. Labs in January 2020 were stable. CEA improved. Blood test in July 2020 was reviewed. MTX and prednisone were increased due to increasing joint pain related to RA. She has proximal finger joint deformities with nodules. She refused to have flu and Covid vaccine. Labs in January 2021 showed stable CBC and CMP with hemoglobin of 11.8. On July 27, 2021 she came in for follow-up visit. She will have labs today including anemic work-up and CEA level. I recommend to call for the test result. I referred her back to see Dr. Robin Bartlett for removal of the Mediport. Due to the constipation I recommend to take iron pill every other day.   She was hospitalized in February 2021 due to the seizure. At that time she was off the antiseizure medication she was seen by neurologist who recommended to continue with Keppra indefinitely. Reportedly colonoscopy in February 2021 by Dr. Ok Valdes was okay. Follow-up study in 2 years was recommended. Presented with her daughter on 1/27/22 for scheduled follow up. We reviewed labs 1/20/22 with CEA elevated to 11.6, Ferritin 90, iron 62, TIBC 342, Transferrin 18. She continues on oral iron every other day. She still has issues with constipation. Using prune juice, Senna BID and colace. Did increase colace to 100 mg daily. Can use miralax PRN. She presented for scheduled follow-up on 4/4/2022. We repeated CEA Level on 3/30/2022 which revealed count of 14.1. She continues to have elevation we will plan for CT chest abdomen pelvis. She has no changes to her bowels. She has chronic constipation for which she manages this with Colace, prune juice, and MiraLAX as needed. She denies abdominal pain, no black bloody or tarry stools. She denies new bone pain, unintentional weight loss. She is planning for left knee replacement this month. She continues on Keppra. We discussed high-fiber diet. Presented for scheduled follow up on 4/12/22. We reviewed 4/7/22 CT chest/abdomen/pelvis which showed:  Small hiatal hernia. Moderate colonic stool. Otherwise, unremarkable CT study. There is no evidence of residual,  recurrent, or metastatic disease within of the chest, abdomen, or pelvis. She is planning for knee replacement 4/27. She had acute pain related to her knee and has been placed on medrol dose pack. She denies nausea, vomiting, diarrhea, melena or hematuria. No depression. She denies chest pain shortness of breath or palpitations. PAST MEDICAL HISTORY:  Hypertension, sleep apnea, rheumatoid arthritis on methotrexate, right carotid endarterectomy on November 18, 2015, and CVA in September 2015.   She has a history of partial hysterectomy in 1965 or 1966. Last menstrual period was in 1966. Seizures. FAMILY HISTORY:  No history of malignancy in the family. SOCIAL HISTORY:  No history of smoking. No alcohol or illicit drug use. She is  and has four children. Oncology History   Malignant neoplasm of anal canal ([facility] [address]   3/8/2017 Initial Diagnosis    Malignant neoplasm of anal canal ([facility] [address]      - 7/3/2017 Radiation    Pelvic IMRT + concurrent systemic therapy  4500 cGy to elective LNs  5400 cGy to primary tumor using SIB-IMRT / 30 fx      Chemotherapy    Concurrent with RT  5-FU + Mitomycin-C         Review of Systems: \"Per interval history; otherwise 10 point ROS is negative. \"     Vital Signs: There were no vitals taken for this visit. Physical Exam:  CONSTITUTIONAL: awake, alert, cooperative, no apparent distress +walker  EYES: pupils equal, round and reactive to light, sclera clear and conjunctiva pallor  ENT: Normocephalic, without obvious abnormality, atraumatic  NECK: supple, symmetrical, no jugular venous distension and no carotid bruits   HEMATOLOGIC/LYMPHATIC: no cervical, supraclavicular or axillary lymphadenopathy   LUNGS: no increased work of breathing and clear to auscultation   CARDIOVASCULAR: regular rate and rhythm, normal S1 and S2, no murmur   ABDOMEN: normal bowel sound, soft, non-distended, non-tender, no masses palpated, no hepatosplenomegaly   MUSCULOSKELETAL: full range of motion noted, tone is normal  NEUROLOGIC: awake, alert, oriented to name, place and time. Motor skills grossly intact. Cranial nerves II through XII grossly intact. SKIN: Normal skin color, texture, turgor and no jaundice. appears intact   EXTREMITIES: trace bilateral LE edema. Deviation of proximal and distal finger joints to both hands.     Labs:  Hematology:  Lab Results   Component Value Date    WBC 6.2 03/30/2022    RBC 4.17 (L) 03/30/2022    HGB 13.1 03/30/2022    HCT 39.3 03/30/2022    MCV 94.2 03/30/2022    MCH 31.4 (H) 03/30/2022    MCHC 33.3 03/30/2022    RDW 14.1 03/30/2022     03/30/2022    MPV 9.3 03/30/2022    BANDSPCT 3 (L) 05/12/2021    SEGSPCT 78.9 (H) 03/30/2022    EOSRELPCT 0.8 03/30/2022    BASOPCT 0.3 03/30/2022    LYMPHOPCT 11.5 (L) 03/30/2022    MONOPCT 8.5 (H) 03/30/2022    BANDABS 0.30 05/12/2021    SEGSABS 4.9 03/30/2022    EOSABS 0.1 03/30/2022    BASOSABS 0.0 03/30/2022    LYMPHSABS 0.7 03/30/2022    MONOSABS 0.5 03/30/2022    DIFFTYPE AUTOMATED DIFFERENTIAL 03/30/2022     Lab Results   Component Value Date    ESR 5 05/10/2021     Chemistry:  Lab Results   Component Value Date     03/30/2022    K 3.6 03/30/2022     03/30/2022    CO2 24 03/30/2022    BUN 16 03/30/2022    CREATININE 0.5 (L) 03/30/2022    GLUCOSE 118 (H) 03/30/2022    CALCIUM 9.0 03/30/2022    PROT 6.1 (L) 03/30/2022    LABALBU 4.3 03/30/2022    BILITOT 0.5 03/30/2022    ALKPHOS 118 03/30/2022    AST 17 03/30/2022    ALT 14 03/30/2022    LABGLOM >60 03/30/2022    GFRAA >60 03/30/2022    PHOS 3.3 09/17/2015    MG 1.9 04/07/2021    POCGLU 195 (H) 05/12/2021     Lab Results   Component Value Date     (H) 05/10/2021    HOMOCYSTEINE 10.9 (H) 09/19/2015     Lab Results   Component Value Date    TSHHS 0.643 04/08/2021    T4FREE 1.06 09/16/2015     Immunology:  Lab Results   Component Value Date    PROT 6.1 (L) 03/30/2022    SPEP  05/10/2021     INTERPRETATION - Nonspecific pattern, decreased albumin. No monoclonal gammopathy. RS    ALBUMINELP 2.9 (L) 05/10/2021    LABALPH 0.3 05/10/2021    LABALPH 0.8 05/10/2021    LABBETA 0.9 05/10/2021    GAMGLOB 0.5 05/10/2021     Coagulation Panel:  Lab Results   Component Value Date    PROTIME 11.5 (L) 05/13/2021    INR 0.95 05/13/2021    APTT 65.4 (H) 05/13/2021     Tumor Markers:  Lab Results   Component Value Date    CEA 14.1 03/30/2022      Observations:  No data recorded        Assessment & Plan:    1. She has clinical stage T2NxMx anal canal carcinoma.   PET-CT scan revealed local disease without evidence of metastatic disease. Baseline blood test in March 2017 revealed white cell 10.2, hemoglobin 12.6, platelet 409. CMP was benign with alk phos 146. HIV screening was negative. She completed chemoradiation therapy in July 2017. I advised her to follow up with Dr. Redd Ramirez for possible anoscopy every six to 12 months in the first three years. CT chest abdomen and pelvis in July 2018 was negative. CEA improved. She had colonoscopy in September 2018. CT chest, abdomen and pelvis in July 2019 showed no metastatic disease. CT chest, abdomen and pelvis in November 2019 was negative for mets. Labs in January 2020 were reviewed. Labs in July 2020 were reviewed. Reportedly colonoscopy in February 2021 was okay. She is agreeable to continue with surveillance. Reviewed iron studies, CBC and CEA of 1/20/22. CEA elevated to 11.6. CEA increased to 14.1 on 3/30/22. CT Chest, abdomen, pelvis 4/22 without evidence of residual, recurrent or metastatic disease. To have repeat CEA with OV in eight weeks. 2.  Mild anemia. Anemic work-up today. Advised to call for the test result. I recommend to take iron pill every other day due to constipation. Floreen Ek studies reviewed. Ferritin and Hb improved. 3.   She has a history of rheumatoid arthritis and is on methotrexate. She will follow up with Dr. Osmar Agrawal. Weight remains stable. 4.   I advised her to keep her other age-appropriate cancer screening up to date. Mammogram in April 2019 was benign. Reportedly mammogram in 2020 was benign. 5.  She has history of seizures. She has been following neurologist.    6.  She has constipation. I recommend high-fiber diet. I gave refill senna and increased colace. Can use miralax as needed      All of her questions have been answered for today. RTC in 2 weeks or sooner. Recent imaging and labs were reviewed and discussed with the patient.

## 2022-04-13 ENCOUNTER — HOSPITAL ENCOUNTER (OUTPATIENT)
Dept: INFUSION THERAPY | Age: 82
Discharge: HOME | End: 2022-04-13

## 2022-04-13 ENCOUNTER — OFFICE VISIT (OUTPATIENT)
Dept: ONCOLOGY | Age: 82
End: 2022-04-13
Payer: MEDICARE

## 2022-04-13 VITALS
HEART RATE: 113 BPM | BODY MASS INDEX: 24.59 KG/M2 | SYSTOLIC BLOOD PRESSURE: 142 MMHG | RESPIRATION RATE: 18 BRPM | TEMPERATURE: 97.4 F | OXYGEN SATURATION: 99 % | DIASTOLIC BLOOD PRESSURE: 67 MMHG | WEIGHT: 144 LBS | HEIGHT: 64 IN

## 2022-04-13 DIAGNOSIS — C21.0 ANAL CANCER (HCC): Primary | ICD-10-CM

## 2022-04-13 PROCEDURE — G8399 PT W/DXA RESULTS DOCUMENT: HCPCS | Performed by: NURSE PRACTITIONER

## 2022-04-13 PROCEDURE — 1090F PRES/ABSN URINE INCON ASSESS: CPT | Performed by: NURSE PRACTITIONER

## 2022-04-13 PROCEDURE — 4040F PNEUMOC VAC/ADMIN/RCVD: CPT | Performed by: NURSE PRACTITIONER

## 2022-04-13 PROCEDURE — G8420 CALC BMI NORM PARAMETERS: HCPCS | Performed by: NURSE PRACTITIONER

## 2022-04-13 PROCEDURE — 1123F ACP DISCUSS/DSCN MKR DOCD: CPT | Performed by: NURSE PRACTITIONER

## 2022-04-13 PROCEDURE — 1036F TOBACCO NON-USER: CPT | Performed by: NURSE PRACTITIONER

## 2022-04-13 PROCEDURE — 99214 OFFICE O/P EST MOD 30 MIN: CPT | Performed by: NURSE PRACTITIONER

## 2022-04-13 PROCEDURE — G8427 DOCREV CUR MEDS BY ELIG CLIN: HCPCS | Performed by: NURSE PRACTITIONER

## 2022-04-13 RX ORDER — METHYLPREDNISOLONE 4 MG/1
4 TABLET ORAL SEE ADMIN INSTRUCTIONS
COMMUNITY
End: 2022-08-11

## 2022-04-13 NOTE — PROGRESS NOTES
MA Rooming Questions  Patient: Silke More  MRN: [id number]    Date: 4/13/2022        1. Do you have any new issues? yes - has knee surgery sched for 4/26         2. Do you need any refills on medications?    no    3. Have you had any imaging done since your last visit? yes - CT    4. Have you been hospitalized or seen in the emergency room since your last visit here?   no    5. Did the patient have a depression screening completed today?  No    No data recorded     PHQ-9 Given to (if applicable):               PHQ-9 Score (if applicable):                     [] Positive     []  Negative              Does question #9 need addressed (if applicable)                     [] Yes    []  No               Jessa Rahman CMA

## 2022-04-20 ENCOUNTER — OFFICE VISIT (OUTPATIENT)
Dept: NEUROLOGY | Age: 82
End: 2022-04-20
Payer: MEDICARE

## 2022-04-20 VITALS
DIASTOLIC BLOOD PRESSURE: 72 MMHG | SYSTOLIC BLOOD PRESSURE: 132 MMHG | BODY MASS INDEX: 24.59 KG/M2 | HEART RATE: 84 BPM | WEIGHT: 144 LBS | HEIGHT: 64 IN | OXYGEN SATURATION: 96 %

## 2022-04-20 DIAGNOSIS — I48.91 ATRIAL FIBRILLATION, UNSPECIFIED TYPE (HCC): ICD-10-CM

## 2022-04-20 DIAGNOSIS — R29.6 RECURRENT FALLS: ICD-10-CM

## 2022-04-20 DIAGNOSIS — I69.359 CVA, OLD, HEMIPARESIS (HCC): ICD-10-CM

## 2022-04-20 DIAGNOSIS — G47.33 OBSTRUCTIVE SLEEP APNEA: ICD-10-CM

## 2022-04-20 DIAGNOSIS — F09 MILD COGNITIVE DISORDER: ICD-10-CM

## 2022-04-20 DIAGNOSIS — R56.9 PARTIAL SEIZURES (HCC): Primary | ICD-10-CM

## 2022-04-20 PROCEDURE — 4040F PNEUMOC VAC/ADMIN/RCVD: CPT | Performed by: NURSE PRACTITIONER

## 2022-04-20 PROCEDURE — G8427 DOCREV CUR MEDS BY ELIG CLIN: HCPCS | Performed by: NURSE PRACTITIONER

## 2022-04-20 PROCEDURE — G8399 PT W/DXA RESULTS DOCUMENT: HCPCS | Performed by: NURSE PRACTITIONER

## 2022-04-20 PROCEDURE — 99215 OFFICE O/P EST HI 40 MIN: CPT | Performed by: NURSE PRACTITIONER

## 2022-04-20 PROCEDURE — 1036F TOBACCO NON-USER: CPT | Performed by: NURSE PRACTITIONER

## 2022-04-20 PROCEDURE — G8420 CALC BMI NORM PARAMETERS: HCPCS | Performed by: NURSE PRACTITIONER

## 2022-04-20 PROCEDURE — 1090F PRES/ABSN URINE INCON ASSESS: CPT | Performed by: NURSE PRACTITIONER

## 2022-04-20 PROCEDURE — 1123F ACP DISCUSS/DSCN MKR DOCD: CPT | Performed by: NURSE PRACTITIONER

## 2022-04-20 RX ORDER — DONEPEZIL HYDROCHLORIDE 10 MG/1
10 TABLET, FILM COATED ORAL EVERY MORNING
Qty: 90 TABLET | Refills: 1 | Status: SHIPPED | OUTPATIENT
Start: 2022-04-20

## 2022-04-20 RX ORDER — LEVETIRACETAM 500 MG/1
500 TABLET ORAL 2 TIMES DAILY
Qty: 180 TABLET | Refills: 3 | Status: SHIPPED | OUTPATIENT
Start: 2022-04-20 | End: 2023-04-15

## 2022-04-20 RX ORDER — POLYETHYLENE GLYCOL 3350 17 G/17G
17 POWDER, FOR SOLUTION ORAL DAILY
COMMUNITY

## 2022-04-20 RX ORDER — MEMANTINE HYDROCHLORIDE 10 MG/1
10 TABLET ORAL 2 TIMES DAILY
Qty: 60 TABLET | Refills: 5 | Status: SHIPPED | OUTPATIENT
Start: 2022-04-20

## 2022-04-20 NOTE — PROGRESS NOTES
4/20/22    Kwan Pae  1940    Chief Complaint   Patient presents with    Seizures     pt states no seizure activity since last visit, no concerns at this time       History of Present Illness  Luciana Jiménez is a 80 y.o. female presenting today for follow-up of: Seizure, history of CVA with hemiparesis and current falls, MCI. Patient was last seen December 2021. Patient remains on Keppra 500 mg twice daily, as of last visit she had reported no breakthrough seizures. She report seizure  In regard to CVA, she remains on aspirin and statin for secondary stroke prevention. And regard to memory, patient remains on Aricept 10 mg daily. She was initiated on memantine titrating to 10 mg twice daily on last visit. Luciana Jiménez feels she is doing well in regard to memory since last visit. Daughter feels she is the same since last visit. No afety concerns. She no longer cooks but is using a wheeled walker awaiting surgery making cooking difficulty. Daughter manages her medications, Luciana Jiménez does own ADLs. Luciana Jiménez is due to have knee replacement next week. Patient does have mild VIC with REM sleep disorder and was started on CPAP for management. A discussion was had on last visit about the importance of compliance with Pap therapy in the setting of seizure, MCI and her history of CVA. She does not use PAP therapy. She did have recent PSG with AHI of 5.9. Current Outpatient Medications   Medication Sig Dispense Refill    methylPREDNISolone (MEDROL DOSEPACK) 4 MG tablet Take 4 mg by mouth See Admin Instructions Take by mouth.       docusate sodium (COLACE) 100 MG capsule Take 1 capsule by mouth daily as needed for Constipation 30 capsule 0    senna (SENOKOT) 8.6 MG tablet Take 1 tablet by mouth 2 times daily 60 tablet 11    levETIRAcetam (KEPPRA) 500 MG tablet Take 1 tablet by mouth 2 times daily 180 tablet 0    memantine (NAMENDA) 5 MG tablet Take (1) 5 mg tab QD x7 days, then 1 BID x7 days; then 2 tabs Q am-1 tab pm x7 days then change to 10mg RX 42 tablet 0    memantine (NAMENDA) 10 MG tablet Take 1 tablet by mouth 2 times daily NOTE TO PHARMACY: DO NOT FILL UNTIL 5 MG RX IS COMPLETED 60 tablet 5    RA SENNA 8.6 MG tablet take 1 tablet by mouth twice a day      dilTIAZem (DILACOR XR) 240 MG extended release capsule Take 1 capsule by mouth nightly 90 capsule 3    acetaminophen (TYLENOL) 500 MG tablet Take 500 mg by mouth every 6 hours as needed for Pain      diclofenac (VOLTAREN) 75 MG EC tablet Take 1 tablet by mouth 2 times daily as needed      Magnesium 500 MG TABS Take 1 tablet by mouth daily      ferrous sulfate (IRON 325) 325 (65 Fe) MG tablet Take 1 tablet by mouth daily (with breakfast) (Patient taking differently: Take 325 mg by mouth every other day ) 30 tablet 2    donepezil (ARICEPT) 10 MG tablet Take 10 mg by mouth every morning       triamterene-hydroCHLOROthiazide (MAXZIDE-25) 37.5-25 MG per tablet take 1 tablet by mouth once daily      docusate sodium (COLACE) 100 MG capsule Take 50 mg by mouth nightly 2 tablets       atorvastatin (LIPITOR) 20 MG tablet Take 20 mg by mouth nightly       aspirin 81 MG tablet Take 81 mg by mouth nightly       CALCIUM PO Take by mouth nightly       vitamin D3 (COLECALCIFEROL) 400 UNITS TABS Take 5,000 Units by mouth daily       folic acid (FOLVITE) 941 MCG tablet Take 400 mcg by mouth every morning      methotrexate 2.5 MG tablet Take 2.5 mg by mouth once a week Indications: Take 7 tablets one time a week       predniSONE (DELTASONE) 5 MG tablet Take 5 mg by mouth 2 times daily        No current facility-administered medications for this visit.        Physical Exam:  Mental Status              Orientation: oriented to person, oriented to place, oriented to problem and oriented to time                        Mood/affectappropriate mood and appropriate affect              Memory/Other: remote memory intact, fund of knowledge intact and attention span normal, 1/3 words at 5 minute recall; unable to spell TABLE backwards. 4/20/2022 did not know date-knew year, incorrect world front and back, would not do serial subtraction, 0/3 word recall. Language  Language: (normal) language, no dysarthria, (normal) articulation and no dysphasia/aphasia  Cranial Nerves              Eyes: pupils normal size and reactive to light and visual fields appear full              CN III, IV, VI : extraocular muscle strength normal, normal pursuit, no nystagmus and no ptosis              Facial Motor: normal facial motor              CN XII: tongue protrudes midline  Motor/Coordination Exam              Power: 4/5 in all extremities, patient sitting in walker on my exam              Coordination: normal finger-to-nose and forearm rotation intact  Sensory Exam No Bradykinesia, No Dyskindesia, No Tremor, No myoclonus, Normal strength right leg, muscle weakness left leg, Normal Tone Normal bulk and Normal tone                Gait and Stance              Gait/Posture: Using a 4 wheel walker with seat for ambulation       /72 (Site: Left Upper Arm, Position: Sitting, Cuff Size: Large Adult)   Pulse 84   Ht 5' 4\" (1.626 m)   Wt 144 lb (65.3 kg)   SpO2 96%   BMI 24.72 kg/m²     Assessment and Plan     Diagnosis Orders   1. Partial seizures (HCC)  levETIRAcetam (KEPPRA) 500 MG tablet   2. Mild cognitive disorder  memantine (NAMENDA) 10 MG tablet   3. Obstructive sleep apnea     4. Atrial fibrillation, unspecified type ([address]     5. CVA, old, hemiparesis ([address]     6. Recurrent falls       Alex Pang is doing well in regards to seizure, she has remained seizure-free with no breakthrough seizures or any seizure-like activity. I will not make any changes to current medication regimen at this time. Seizure precautions discussed including no driving, tub baths, climbing ladders or operating dangerous equipment until event/seizure free for 3 months.   Patient will notify in the event of any breakthrough seizure or seizure-like activity including staring spells, automatisms such as lip smacking, eye blinking, recurring episodes of déjà vu, awakening having bitten tongue during sleep. She will remain on aspirin and statin for secondary stroke prevention. Patient understands the importance of recognizing strokelike symptoms such as acute mental status change, slurred speech, facial droop, one-sided weakness differing from baseline. Patient knows to call 911 immediately in the event that they experience any of these symptoms. As far as memory concerns, no concerns on today's visit, no safety issues. I did discuss with daughter that she may notice a bit more confusion after surgery due to anesthesia. We discussed nonpharmacologic interventions including staying active cognitively, socially, and physically to help slow down the progression of memory loss. Regard to sleep apnea, we discussed the importance of compliance with management of VIC. Although Altagracia's sleep apnea is mild with AHI of 5.9, allergies medical history really does warrant management of her VIC to help reduce her risk of future CVA, worsening memory impairment, breakthrough seizures. Paige Fonseca has made it pretty clear that she does not want to wear a CPAP mask. I am going to refer her to Dr. Ashley Schulte for consultation for dental appliance for management of her mild VIC. Hopefully she will be a candidate and be able to treat her VIC effectively. I will plan on following up with Altagracia again in 6 months, sooner if the need arises. Medications prescribed for the patient were discussed in detail. This included a discussion of the potential risks versus potential benefits of the medications. The patient was given time to ask questions and these were answered to the best of my ability. The patient appeared to understand the information provided. Return in about 6 months (around 10/20/2022).     Ya Mcdowell, TARUN - NP

## 2022-05-15 NOTE — PROGRESS NOTES
Reportedly colonoscopy in February 2021 by Dr. María Hutchison was okay. Follow-up study in 2 years was recommended. We reviewed labs 1/20/22 with CEA elevated to 11.6, Ferritin 90, iron 62, TIBC 342, Transferrin 18. She continues on oral iron every other day. .   We repeated CEA Level on 3/30/2022 which revealed count of 14.1. She continues to have elevation we will plan for CT chest abdomen pelvis. 4/7/22 CT chest/abdomen/pelvis showed:  Small hiatal hernia. Moderate colonic stool. Otherwise, unremarkable CT study. There is no evidence of residual, recurrent, or metastatic disease within of the chest, abdomen, or pelvis. On June 14, 2022 she came in for follow up visit. In June 2022 alk phos was 147, hg 11.8. Ferritin 108. CEA 15.4. I will repeat CBC prior to next office visit. Takes iron pill every other day. She had left knee replacement surgery in April 2022. We discussed the results of the blood test in June 2022 including CEA level. She continues to follow-up with rheumatologist for the rheumatoid arthritis. She is agreeable to have repeat CEA level in 2 months. If the CEA continues to rise, we will repeat imaging study. She has healing surgical scar to the left knee. No sign of infection. She denied any blood in the stool. Family will pay attention to any anal lesion when she is taking a bath. She has chronic pain to right knee. .  Denied any nausea, vomiting or diarrhea. No fever or chills. No chest pain, shortness of breath or palpitation. No headaches or dizzy spell. No melena or hematochezia. Denied any dysuria or hematuria. PAST MEDICAL HISTORY:  Hypertension, sleep apnea, rheumatoid arthritis on methotrexate, right carotid endarterectomy on November 18, 2015, and CVA in September 2015. She has a history of partial hysterectomy in 1965 or 1966. Last menstrual period was in 1966. Seizures. FAMILY HISTORY:  No history of malignancy in the family.        SOCIAL HISTORY:  No history of smoking. No alcohol or illicit drug use. She is  and has four children. Oncology History   Malignant neoplasm of anal canal ([facility] [address]   3/8/2017 Initial Diagnosis    Malignant neoplasm of anal canal ([facility] [address]      - 7/3/2017 Radiation    Pelvic IMRT + concurrent systemic therapy  4500 cGy to elective LNs  5400 cGy to primary tumor using SIB-IMRT / 30 fx      Chemotherapy    Concurrent with RT  5-FU + Mitomycin-C         Review of Systems: \"Per interval history; otherwise 10 point ROS is negative. \"     Vital Signs: There were no vitals taken for this visit. Physical Exam:  CONSTITUTIONAL: awake, alert, cooperative, no apparent distress. On the wheelchair today. EYES: pupils equal, round and reactive to light, sclera clear and positive pallor  ENT: Normocephalic, without obvious abnormality, atraumatic  NECK: supple, symmetrical, no jugular venous distension and no carotid bruits   HEMATOLOGIC/LYMPHATIC: no cervical, supraclavicular or axillary lymphadenopathy   LUNGS: no increased work of breathing and clear to auscultation   CARDIOVASCULAR: regular rate and rhythm, normal S1 and S2, no murmur   ABDOMEN: normal bowel sound, soft, non-distended, non-tender, no palpable masses, no hepatosplenomegaly   MUSCULOSKELETAL: full range of motion noted, tone is normal  NEUROLOGIC: Motor skills grossly intact. CN II - XII grossly intact. SKIN: No jaundice. Dry and warm skin. No ecchymosis. EXTREMITIES: No bilateral LE edema. Deviation of proximal and distal finger joints to both hands.     Labs:  Hematology:  Lab Results   Component Value Date    WBC 6.2 03/30/2022    RBC 4.17 (L) 03/30/2022    HGB 13.1 03/30/2022    HCT 39.3 03/30/2022    MCV 94.2 03/30/2022    MCH 31.4 (H) 03/30/2022    MCHC 33.3 03/30/2022    RDW 14.1 03/30/2022     03/30/2022    MPV 9.3 03/30/2022    BANDSPCT 3 (L) 05/12/2021    SEGSPCT 78.9 (H) 03/30/2022    EOSRELPCT 0.8 03/30/2022    BASOPCT 0.3 03/30/2022    LYMPHOPCT 11.5 (L) 03/30/2022    MONOPCT 8.5 (H) 03/30/2022    BANDABS 0.30 05/12/2021    SEGSABS 4.9 03/30/2022    EOSABS 0.1 03/30/2022    BASOSABS 0.0 03/30/2022    LYMPHSABS 0.7 03/30/2022    MONOSABS 0.5 03/30/2022    DIFFTYPE AUTOMATED DIFFERENTIAL 03/30/2022     Lab Results   Component Value Date    ESR 5 05/10/2021     Chemistry:  Lab Results   Component Value Date     03/30/2022    K 3.6 03/30/2022     03/30/2022    CO2 24 03/30/2022    BUN 16 03/30/2022    CREATININE 0.5 (L) 03/30/2022    GLUCOSE 118 (H) 03/30/2022    CALCIUM 9.0 03/30/2022    PROT 6.1 (L) 03/30/2022    LABALBU 4.3 03/30/2022    BILITOT 0.5 03/30/2022    ALKPHOS 118 03/30/2022    AST 17 03/30/2022    ALT 14 03/30/2022    LABGLOM >60 03/30/2022    GFRAA >60 03/30/2022    PHOS 3.3 09/17/2015    MG 1.9 04/07/2021    POCGLU 195 (H) 05/12/2021     Lab Results   Component Value Date     (H) 05/10/2021    HOMOCYSTEINE 10.9 (H) 09/19/2015     Lab Results   Component Value Date    TSHHS 0.643 04/08/2021    T4FREE 1.06 09/16/2015     Immunology:  Lab Results   Component Value Date    PROT 6.1 (L) 03/30/2022    SPEP  05/10/2021     INTERPRETATION - Nonspecific pattern, decreased albumin. No monoclonal gammopathy. RS    ALBUMINELP 2.9 (L) 05/10/2021    LABALPH 0.3 05/10/2021    LABALPH 0.8 05/10/2021    LABBETA 0.9 05/10/2021    GAMGLOB 0.5 05/10/2021     Coagulation Panel:  Lab Results   Component Value Date    PROTIME 11.5 (L) 05/13/2021    INR 0.95 05/13/2021    APTT 65.4 (H) 05/13/2021     Tumor Markers:  Lab Results   Component Value Date    CEA 14.1 03/30/2022      Observations:  No data recorded     Assessment & Plan:  1. She has clinical stage T2NxMx anal canal carcinoma. PET-CT scan revealed local disease without evidence of metastatic disease. Baseline blood test in March 2017 revealed white cell 10.2, hemoglobin 12.6, platelet 493. CMP was benign with alk phos 146. HIV screening was negative.  She completed chemoradiation

## 2022-06-07 ENCOUNTER — HOSPITAL ENCOUNTER (OUTPATIENT)
Dept: INFUSION THERAPY | Age: 82
Discharge: HOME | End: 2022-06-07
Payer: MEDICARE

## 2022-06-07 DIAGNOSIS — D64.9 ANEMIA, UNSPECIFIED TYPE: ICD-10-CM

## 2022-06-07 DIAGNOSIS — C21.0 ANAL CANCER (HCC): ICD-10-CM

## 2022-06-07 LAB
ALBUMIN SERPL-MCNC: 4.2 GM/DL (ref 3.4–5)
ALP BLD-CCNC: 147 IU/L (ref 40–129)
ALT SERPL-CCNC: 9 U/L (ref 10–40)
ANION GAP SERPL CALCULATED.3IONS-SCNC: 10 MMOL/L (ref 4–16)
AST SERPL-CCNC: 16 IU/L (ref 15–37)
BASOPHILS ABSOLUTE: 0 K/CU MM
BASOPHILS RELATIVE PERCENT: 0.2 % (ref 0–1)
BILIRUB SERPL-MCNC: 0.3 MG/DL (ref 0–1)
BUN BLDV-MCNC: 19 MG/DL (ref 6–23)
CALCIUM SERPL-MCNC: 9.2 MG/DL (ref 8.3–10.6)
CEA: 15.4 NG/ML
CHLORIDE BLD-SCNC: 98 MMOL/L (ref 99–110)
CO2: 28 MMOL/L (ref 21–32)
CREAT SERPL-MCNC: 0.6 MG/DL (ref 0.6–1.1)
DIFFERENTIAL TYPE: ABNORMAL
EOSINOPHILS ABSOLUTE: 0 K/CU MM
EOSINOPHILS RELATIVE PERCENT: 0.4 % (ref 0–3)
FERRITIN: 108 NG/ML (ref 15–150)
GFR AFRICAN AMERICAN: >60 ML/MIN/1.73M2
GFR NON-AFRICAN AMERICAN: >60 ML/MIN/1.73M2
GLUCOSE BLD-MCNC: 122 MG/DL (ref 70–99)
HCT VFR BLD CALC: 37.1 % (ref 37–47)
HEMOGLOBIN: 11.8 GM/DL (ref 12.5–16)
IRON: 215 UG/DL (ref 37–145)
LYMPHOCYTES ABSOLUTE: 0.5 K/CU MM
LYMPHOCYTES RELATIVE PERCENT: 10 % (ref 24–44)
MCH RBC QN AUTO: 31.6 PG (ref 27–31)
MCHC RBC AUTO-ENTMCNC: 31.8 % (ref 32–36)
MCV RBC AUTO: 99.5 FL (ref 78–100)
MONOCYTES ABSOLUTE: 0.3 K/CU MM
MONOCYTES RELATIVE PERCENT: 5.9 % (ref 0–4)
PCT TRANSFERRIN: 66 % (ref 10–44)
PDW BLD-RTO: 14.3 % (ref 11.7–14.9)
PLATELET # BLD: 326 K/CU MM (ref 140–440)
PMV BLD AUTO: 9 FL (ref 7.5–11.1)
POTASSIUM SERPL-SCNC: 4.2 MMOL/L (ref 3.5–5.1)
RBC # BLD: 3.73 M/CU MM (ref 4.2–5.4)
SEGMENTED NEUTROPHILS ABSOLUTE COUNT: 4.3 K/CU MM
SEGMENTED NEUTROPHILS RELATIVE PERCENT: 83.5 % (ref 36–66)
SODIUM BLD-SCNC: 136 MMOL/L (ref 135–145)
TOTAL IRON BINDING CAPACITY: 324 UG/DL (ref 250–450)
TOTAL PROTEIN: 6.2 GM/DL (ref 6.4–8.2)
UNSATURATED IRON BINDING CAPACITY: 109 UG/DL (ref 110–370)
WBC # BLD: 5.1 K/CU MM (ref 4–10.5)

## 2022-06-07 PROCEDURE — 83540 ASSAY OF IRON: CPT

## 2022-06-07 PROCEDURE — 85025 COMPLETE CBC W/AUTO DIFF WBC: CPT

## 2022-06-07 PROCEDURE — 83550 IRON BINDING TEST: CPT

## 2022-06-07 PROCEDURE — 36415 COLL VENOUS BLD VENIPUNCTURE: CPT

## 2022-06-07 PROCEDURE — 82378 CARCINOEMBRYONIC ANTIGEN: CPT

## 2022-06-07 PROCEDURE — 82728 ASSAY OF FERRITIN: CPT

## 2022-06-07 PROCEDURE — 80053 COMPREHEN METABOLIC PANEL: CPT

## 2022-06-14 ENCOUNTER — HOSPITAL ENCOUNTER (OUTPATIENT)
Dept: INFUSION THERAPY | Age: 82
Discharge: HOME | End: 2022-06-14

## 2022-06-14 ENCOUNTER — OFFICE VISIT (OUTPATIENT)
Dept: ONCOLOGY | Age: 82
End: 2022-06-14
Payer: MEDICARE

## 2022-06-14 VITALS
DIASTOLIC BLOOD PRESSURE: 63 MMHG | WEIGHT: 132 LBS | TEMPERATURE: 96 F | RESPIRATION RATE: 16 BRPM | HEIGHT: 64 IN | HEART RATE: 96 BPM | BODY MASS INDEX: 22.53 KG/M2 | SYSTOLIC BLOOD PRESSURE: 133 MMHG | OXYGEN SATURATION: 99 %

## 2022-06-14 DIAGNOSIS — R97.0 ELEVATED CEA: Primary | ICD-10-CM

## 2022-06-14 PROCEDURE — G8420 CALC BMI NORM PARAMETERS: HCPCS | Performed by: INTERNAL MEDICINE

## 2022-06-14 PROCEDURE — G8399 PT W/DXA RESULTS DOCUMENT: HCPCS | Performed by: INTERNAL MEDICINE

## 2022-06-14 PROCEDURE — 99213 OFFICE O/P EST LOW 20 MIN: CPT | Performed by: INTERNAL MEDICINE

## 2022-06-14 PROCEDURE — 1090F PRES/ABSN URINE INCON ASSESS: CPT | Performed by: INTERNAL MEDICINE

## 2022-06-14 PROCEDURE — G8427 DOCREV CUR MEDS BY ELIG CLIN: HCPCS | Performed by: INTERNAL MEDICINE

## 2022-06-14 PROCEDURE — 1036F TOBACCO NON-USER: CPT | Performed by: INTERNAL MEDICINE

## 2022-06-14 PROCEDURE — 1123F ACP DISCUSS/DSCN MKR DOCD: CPT | Performed by: INTERNAL MEDICINE

## 2022-06-14 ASSESSMENT — PATIENT HEALTH QUESTIONNAIRE - PHQ9
SUM OF ALL RESPONSES TO PHQ QUESTIONS 1-9: 0
SUM OF ALL RESPONSES TO PHQ QUESTIONS 1-9: 0
2. FEELING DOWN, DEPRESSED OR HOPELESS: 0
1. LITTLE INTEREST OR PLEASURE IN DOING THINGS: 0
SUM OF ALL RESPONSES TO PHQ9 QUESTIONS 1 & 2: 0
SUM OF ALL RESPONSES TO PHQ QUESTIONS 1-9: 0
SUM OF ALL RESPONSES TO PHQ QUESTIONS 1-9: 0

## 2022-06-14 NOTE — PROGRESS NOTES
MA Rooming Questions  Patient: Juan Saenz  MRN: [id number]    Date: 6/14/2022        1. Do you have any new issues? yes - left knee         2. Do you need any refills on medications?    no    3. Have you had any imaging done since your last visit?   no    4. Have you been hospitalized or seen in the emergency room since your last visit here?   no    5. Did the patient have a depression screening completed today?  Yes    PHQ-9 Total Score: 0 (6/14/2022 11:40 AM)       PHQ-9 Given to (if applicable):               PHQ-9 Score (if applicable):                     [] Positive     []  Negative              Does question #9 need addressed (if applicable)                     [] Yes    []  No               Rosa Muñoz CMA

## 2022-06-14 NOTE — PATIENT INSTRUCTIONS
She has been taking iron pill every other day. I will have repeat CBC and iron study prior to next office visit.

## 2022-07-18 NOTE — PROGRESS NOTES
heterogenous activity in the area of soft tissue fullness in the anal region, compatible with the history of anal cancer. She started concomitant mitomycin/capecitabine and radiation therapy on May 8, 2017. Blood tests in June 2017 revealed white cell 4.4, hemoglobin 9.4, platelet 174. CMP was stable. Dr. Gianfranco Jeff temporarily held radiation and Xeloda and eventually restarted again. She completed concomitant chemoradiation therapy on July 3, 2017. We discussed about surveillance and survivorship. I referred her back to see Dr. Megha Siegel. She will need anoscopy every six to 12 months up to three years. Imaging study will be considered once a year for three years. She gained weight since the last office visit. I reminded to follow-up with Dr. Megha Siegel for the potential anoscopy. CT chest, abdomen and pelvis in July 2018 showed no evidence of progression of the disease. DEXA scan in December 2018 was wnl. Colonoscopy was in September 2018. Repeat in 3 years as per GI. She is on MTX. Labs in June 2019 were reviewed. CT chest, abdomen and pelvis in July 2019 showed no evidence of metastatic disease. Mammogram in April 2019 was benign. CT chest, abdomen and pelvis in November 2019 were negative for metastatic disease. Labs in January 2020 were stable. CEA improved. Blood test in July 2020 was reviewed. MTX and prednisone were increased due to increasing joint pain related to RA. She has proximal finger joint deformities with nodules. She refused to have flu and Covid vaccine. Labs in January 2021 showed stable CBC and CMP with hemoglobin of 11.8. I referred her back to see Dr. Pop Reveles for removal of the Mediport. Due to the constipation I recommend to take iron pill every other day. She was hospitalized in February 2021 due to the seizure. At that time she was off the antiseizure medication she was seen by neurologist who recommended to continue with Keppra indefinitely. Reportedly colonoscopy in February 2021 by Dr. Linda Diamond was okay. Follow-up study in 2 years was recommended. We reviewed labs 1/20/22 with CEA elevated to 11.6, Ferritin 90, iron 62, TIBC 342, Transferrin 18. She continues on oral iron every other day. .   We repeated CEA Level on 3/30/2022 which revealed count of 14.1. She continues to have elevation we will plan for CT chest abdomen pelvis. 4/7/22 CT chest/abdomen/pelvis showed:  Small hiatal hernia. Moderate colonic stool. Otherwise, unremarkable CT study. There is no evidence of residual, recurrent, or metastatic disease within of the chest, abdomen, or pelvis. On June 14, 2022 she came in for follow up visit. In June 2022 alk phos was 147, hg 11.8. Ferritin 108. CEA 15.4. I will repeat CBC prior to next office visit. Takes iron pill every other day. She had left knee replacement surgery in April 2022. We discussed the results of the blood test in June 2022 including CEA level. She continues to follow-up with rheumatologist for the rheumatoid arthritis. She is agreeable to have repeat CEA level in 2 months. If the CEA continues to rise, we will repeat imaging study. She has healing surgical scar to the left knee. No sign of infection. On 8/16/2022 she came in for follow up visit. In 8/2022 CMP grossly unremarkable. Hg 11.8, HCT 36.7, MCV 95.6. Ferritin 76, TIBC 287, Iron 229, saturation 80%. CEA 13.6, lower than before. She was seen by radiation l oncologist recently and reportedly rectal exam was benign. I recommend also to follow-up with GI. Reportedly see schedule for the right knee surgery on August 29, 2022 by Dr. Hoda Aparicio. I recommend to take iron pill every other day. I will recheck CBC and iron study prior to next office visit. She denied any blood in the stool. Family will pay attention to any anal lesion when she is taking a bath. She has chronic pain to right knee. .  Denied any nausea, vomiting or diarrhea.   No fever or chills. No chest pain, shortness of breath or palpitation. No headaches or dizzy spell. No melena or hematochezia. Denied any dysuria or hematuria. PAST MEDICAL HISTORY:  Hypertension, sleep apnea, rheumatoid arthritis on methotrexate, right carotid endarterectomy on November 18, 2015, and CVA in September 2015. She has a history of partial hysterectomy in 1965 or 1966. Last menstrual period was in 1966. Seizures. FAMILY HISTORY:  No history of malignancy in the family. SOCIAL HISTORY:  No history of smoking. No alcohol or illicit drug use. She is  and has four children. Oncology History   Malignant neoplasm of anal canal ([facility] [address]   3/8/2017 Initial Diagnosis    Malignant neoplasm of anal canal ([facility] [address]      - 7/3/2017 Radiation    Pelvic IMRT + concurrent systemic therapy  4500 cGy to elective LNs  5400 cGy to primary tumor using SIB-IMRT / 30 fx      Chemotherapy    Concurrent with RT  5-FU + Mitomycin-C       Review of Systems: \"Per interval history; otherwise 10 point ROS is negative. \"     Vital Signs: There were no vitals taken for this visit. Physical Exam:  CONSTITUTIONAL: awake, alert, cooperative, no apparent distress. On the wheelchair today. EYES: pupils equal, round and reactive to light. Sclera clear and positive pallor  ENT: Normocephalic, without obvious abnormality, atraumatic  NECK: supple, symmetrical, no jugular venous distension and no carotid bruits   HEMATOLOGIC/LYMPHATIC: no cervical, supraclavicular or axillary lymphadenopathy   LUNGS: no increased work of breathing and clear to auscultation   CARDIOVASCULAR: regular rate and rhythm, normal S1 and S2, no murmur   ABDOMEN: normal bowel sound, soft, non-distended, non-tender, no palpable masses, no hepatosplenomegaly. No rebound or guarding. MUSCULOSKELETAL: full range of motion noted, tone is normal  NEUROLOGIC: Motor skills grossly intact. CN II - XII grossly intact. SKIN: No jaundice. Dry and warm skin. Value Date    PROTIME 11.5 (L) 05/13/2021    INR 0.95 05/13/2021    APTT 65.4 (H) 05/13/2021     Tumor Markers:  Lab Results   Component Value Date    CEA 15.4 06/07/2022      Observations:  No data recorded     Assessment & Plan:  1. She has clinical stage T2NxMx anal canal carcinoma. PET-CT scan revealed local disease without evidence of metastatic disease. Baseline blood test in March 2017 revealed white cell 10.2, hemoglobin 12.6, platelet 720. CMP was benign with alk phos 146. HIV screening was negative. She completed chemoradiation therapy in July 2017. I advised her to follow up with Dr. Brittnee Schmidt for possible anoscopy every six to 12 months in the first three years. CT chest abdomen and pelvis in July 2018 was negative. CEA improved. She had colonoscopy in September 2018. CT chest, abdomen and pelvis in July 2019 showed no metastatic disease. CT chest, abdomen and pelvis in November 2019 was negative for mets. Labs in January 2020 were reviewed. Labs in July 2020 were reviewed. Reportedly colonoscopy in February 2021 was okay. Reviewed iron studies, CBC and CEA of 1/20/22. CEA elevated to 11.6. CEA increased to 14.1 on 3/30/22. CT Chest, abdomen, pelvis 4/22 without evidence of residual, recurrent or metastatic disease. CEA on June 2, 2022 was 15.4. Labs in August 2022 showed improvement of the CEA. Clinically she is in remission. She is agreeable to continue with surveillance. I recommend also to follow-up with GI.    2.  Mild anemia. She takes iron pill every other day. I will repeat CBC and iron study prior to next office visit. 3.   She has a history of rheumatoid arthritis and is on methotrexate. She will follow up with Dr. Karla Childs. 4.   I advised her to keep her other age-appropriate cancer screening up to date. Mammogram in April 2019 was benign. Reportedly mammogram in 2020 was benign. 5.  She has history of seizures.   She has been following neurologist.    6.  She has constipation. I recommend high-fiber diet. All of her questions have been answered for today. RTC in 12 weeks or sooner. Recent imaging and labs were reviewed and discussed with the patient.

## 2022-08-09 ENCOUNTER — HOSPITAL ENCOUNTER (OUTPATIENT)
Dept: INFUSION THERAPY | Age: 82
Discharge: HOME | End: 2022-08-09
Payer: MEDICARE

## 2022-08-09 DIAGNOSIS — R97.0 ELEVATED CEA: ICD-10-CM

## 2022-08-09 LAB
ALBUMIN SERPL-MCNC: 4 GM/DL (ref 3.4–5)
ALP BLD-CCNC: 114 IU/L (ref 40–129)
ALT SERPL-CCNC: 10 U/L (ref 10–40)
ANION GAP SERPL CALCULATED.3IONS-SCNC: 12 MMOL/L (ref 4–16)
AST SERPL-CCNC: 16 IU/L (ref 15–37)
BASOPHILS ABSOLUTE: 0 K/CU MM
BASOPHILS RELATIVE PERCENT: 0.4 % (ref 0–1)
BILIRUB SERPL-MCNC: 0.4 MG/DL (ref 0–1)
BUN BLDV-MCNC: 22 MG/DL (ref 6–23)
CALCIUM SERPL-MCNC: 9 MG/DL (ref 8.3–10.6)
CEA: 13.6 NG/ML
CHLORIDE BLD-SCNC: 104 MMOL/L (ref 99–110)
CO2: 26 MMOL/L (ref 21–32)
CREAT SERPL-MCNC: 0.5 MG/DL (ref 0.6–1.1)
DIFFERENTIAL TYPE: ABNORMAL
EOSINOPHILS ABSOLUTE: 0.1 K/CU MM
EOSINOPHILS RELATIVE PERCENT: 1 % (ref 0–3)
FERRITIN: 76 NG/ML (ref 15–150)
GFR AFRICAN AMERICAN: >60 ML/MIN/1.73M2
GFR NON-AFRICAN AMERICAN: >60 ML/MIN/1.73M2
GLUCOSE BLD-MCNC: 176 MG/DL (ref 70–99)
HCT VFR BLD CALC: 36.7 % (ref 37–47)
HEMOGLOBIN: 11.8 GM/DL (ref 12.5–16)
IRON: 229 UG/DL (ref 37–145)
LYMPHOCYTES ABSOLUTE: 0.7 K/CU MM
LYMPHOCYTES RELATIVE PERCENT: 13.9 % (ref 24–44)
MCH RBC QN AUTO: 30.7 PG (ref 27–31)
MCHC RBC AUTO-ENTMCNC: 32.2 % (ref 32–36)
MCV RBC AUTO: 95.6 FL (ref 78–100)
MONOCYTES ABSOLUTE: 0.4 K/CU MM
MONOCYTES RELATIVE PERCENT: 7.5 % (ref 0–4)
PCT TRANSFERRIN: 80 % (ref 10–44)
PDW BLD-RTO: 13.9 % (ref 11.7–14.9)
PLATELET # BLD: 303 K/CU MM (ref 140–440)
PMV BLD AUTO: 9.2 FL (ref 7.5–11.1)
POTASSIUM SERPL-SCNC: 3.9 MMOL/L (ref 3.5–5.1)
RBC # BLD: 3.84 M/CU MM (ref 4.2–5.4)
SEGMENTED NEUTROPHILS ABSOLUTE COUNT: 3.7 K/CU MM
SEGMENTED NEUTROPHILS RELATIVE PERCENT: 77.2 % (ref 36–66)
SODIUM BLD-SCNC: 142 MMOL/L (ref 135–145)
TOTAL IRON BINDING CAPACITY: 287 UG/DL (ref 250–450)
TOTAL PROTEIN: 5.7 GM/DL (ref 6.4–8.2)
UNSATURATED IRON BINDING CAPACITY: 58 UG/DL (ref 110–370)
WBC # BLD: 4.8 K/CU MM (ref 4–10.5)

## 2022-08-09 PROCEDURE — 36415 COLL VENOUS BLD VENIPUNCTURE: CPT

## 2022-08-09 PROCEDURE — 82378 CARCINOEMBRYONIC ANTIGEN: CPT

## 2022-08-09 PROCEDURE — 85025 COMPLETE CBC W/AUTO DIFF WBC: CPT

## 2022-08-09 PROCEDURE — 83540 ASSAY OF IRON: CPT

## 2022-08-09 PROCEDURE — 80053 COMPREHEN METABOLIC PANEL: CPT

## 2022-08-09 PROCEDURE — 83550 IRON BINDING TEST: CPT

## 2022-08-09 PROCEDURE — 82728 ASSAY OF FERRITIN: CPT

## 2022-08-11 ENCOUNTER — HOSPITAL ENCOUNTER (OUTPATIENT)
Dept: RADIATION ONCOLOGY | Age: 82
Discharge: HOME | End: 2022-08-11
Payer: MEDICARE

## 2022-08-11 VITALS
HEART RATE: 85 BPM | BODY MASS INDEX: 23.9 KG/M2 | DIASTOLIC BLOOD PRESSURE: 59 MMHG | TEMPERATURE: 97.8 F | OXYGEN SATURATION: 97 % | SYSTOLIC BLOOD PRESSURE: 129 MMHG | HEIGHT: 64 IN | WEIGHT: 140 LBS

## 2022-08-11 PROCEDURE — 99214 OFFICE O/P EST MOD 30 MIN: CPT | Performed by: RADIOLOGY

## 2022-08-11 ASSESSMENT — PAIN SCALES - GENERAL: PAINLEVEL_OUTOF10: 9

## 2022-08-11 ASSESSMENT — PAIN DESCRIPTION - ORIENTATION: ORIENTATION: RIGHT;LEFT

## 2022-08-11 ASSESSMENT — PAIN DESCRIPTION - LOCATION: LOCATION: KNEE

## 2022-08-11 NOTE — PROGRESS NOTES
[facility] Radiation Oncology  Follow-Up    NAME:  Destini Dolan   : 1940     DIAGNOSIS:   Cancer Staging  Malignant neoplasm of anal canal ([facility] [address]  Staging form: Anus, AJCC 8th Edition  - Clinical stage from 2022: Stage IIA (cT2, cN0, cM0) - Signed by Abigail Chandler MD on 2022       ONCOLOGIC HISTORY:      Oncology History   Malignant neoplasm of anal canal ([facility] [address]   3/8/2017 Initial Diagnosis    Malignant neoplasm of anal canal ([facility])      - 7/3/2017 Radiation    Pelvic IMRT + concurrent systemic therapy  4500 cGy to elective LNs  5400 cGy to primary tumor using SIB-IMRT / 30 fx      Chemotherapy    Concurrent with RT  5-FU + Mitomycin-C     2022 -  Cancer Staged    Staging form: Anus, AJCC 8th Edition  - Clinical stage from 2022: Stage IIA (cT2, cN0, cM0)           CHIEF COMPLAINT:   Routine 1 year follow-up        INTERVAL HISTORY:  Destini Dolan is a 80 y.o. female who presents today for routine scheduled follow-up visit. Since last visit she has done well with no new complaints. She still has some occasional constipation for which she needs a as needed stool softener. Otherwise she denies any rectal bleeding. She has no long-term  complaints. She saw Dr. Denys Kovacs recently and is scheduled for her next colonoscopy sometime in the fall. HISTORY:  Altagracia's home medications, allergies, past medical, surgical, social and family histories were reviewed and updated as appropriate.      Medical History:  Past Medical History:   Diagnosis Date    Acid reflux     Anxiety     Bronchitis In Past    Cancer ([facility] [address]     colon cancer    Carotid stenosis 11/10/2015    Cerebral embolism with cerebral infarction ([facility] [address] 2015    Seizure X 1, No Residual    Chronic back pain     Depression     History of blood transfusion     History of external beam radiation therapy 2017    5400 cGy pelvis/anal canal in 2017 per Dr. Riki Mendez at [facility]    Hyperlipidemia     Hypertension     Prolonged emergence from general file    Highest education level: Not on file   Occupational History    Not on file   Tobacco Use    Smoking status: Never    Smokeless tobacco: Never   Vaping Use    Vaping Use: Never used   Substance and Sexual Activity    Alcohol use: No     Alcohol/week: 0.0 standard drinks     Comment: 4-6 caffeinated beverages a week    Drug use: No    Sexual activity: Never   Other Topics Concern    Not on file   Social History Narrative    Not on file     Social Determinants of Health     Financial Resource Strain: Not on file   Food Insecurity: Not on file   Transportation Needs: Not on file   Physical Activity: Not on file   Stress: Not on file   Social Connections: Not on file   Intimate Partner Violence: Not on file   Housing Stability: Not on file       REVIEW OF SYSTEMS:    CONSTITUTIONAL:  Generally feels well. Appetite good. Energy level stable. HEENT:  No sores in the mouth, sore throat, changes in voice quality. CARDIOVASCULAR:  No chest pain or palpitations. RESPIRATORY:  No cough, SOB or hemoptysis. GI: No N/V/D/C. No blood per rectum. :  No urinary frequency, dysuria or urgency. MUSCULOSKELETAL:   No specific bone or joint aches. No edema. NEUROLOGIC:  Denies HA, dizziness or focal weakness. No sensory changes. PHYSICAL EXAM:    Vitals:    08/11/22 1023   BP: (!) 129/59   Pulse: 85   Temp: 97.8 °F (36.6 °C)   SpO2: 97%       GENERAL: No acute distress. Alert & oriented x3. NECK: No thyromegaly or masses. LYMPH:  No cervical, supraclavicular, infraclavicular or axillary lymphadenopathy. LUNGS:  Clear to auscultation bilaterally. No rales or wheezes. Good respiratory effort. HEART:  Regular rate and rhythm. ABDOMEN: Soft, ND, NT. No masses or hepatosplenomegaly. MUSCULOSKELETAL:  No tenderness to palpation of the spine or pelvis. Full strength bilaterally. EXTREMETIES:  No edema. On rectal examination rectal tone is normal.  The mucosa is smooth.   There there are no palpable lesions. There is a fair amount of firm stool in the rectal vault. IMAGING: NA        LABS:      Lab Results   Component Value Date/Time    WBC 4.8 08/09/2022 11:25 AM    HGB 11.8 08/09/2022 11:25 AM    HCT 36.7 08/09/2022 11:25 AM    MCV 95.6 08/09/2022 11:25 AM     08/09/2022 11:25 AM       Lab Results   Component Value Date/Time    CREATININE 0.5 08/09/2022 11:25 AM       Lab Results   Component Value Date/Time    CEA 13.6 08/09/2022 11:25 AM         ASSESSMENT AND PLAN:    Clinically Mrs. Dane Montiel is doing well with no evidence of recurrent anal cancer. She is now 5 years postdiagnosis and treatment. I will plan to discharge her from my service. She will continue to follow with gastroenterology and with Dr. Steffan Epley, M.D. Radiation Oncology  Director, Division of Integrative Medicine    [facility] AT [address].   [address] [place] [address]  [phone]

## 2022-08-11 NOTE — PROGRESS NOTES
Norm Duck  8/11/2022    Patient is seen today for follow up. Vitals:    08/11/22 1023   BP: (!) 129/59   Pulse: 85   Temp: 97.8 °F (36.6 °C)   SpO2: 97%        Oxygen Therapy  SpO2: 97 %  Pulse Oximetry Type:  Intermittent  Pulse Oximeter Device Mode: Intermittent  Pulse Oximeter Device Location: Left, Finger  O2 Device: None (Room air)    Wt Readings from Last 3 Encounters:   08/11/22 140 lb (63.5 kg)   06/14/22 132 lb (59.9 kg)   04/20/22 144 lb (65.3 kg)       Pain Assessment  Pain Assessment: 0-10  Pain Level: 9  Pain Location: Knee  Pain Orientation: Right, Left  OTC Analgesics       Allergies   Allergen Reactions    Cortisone Rash    Codeine      Unsure Of Reaction    Pcn [Penicillins]      Unknown Reaction        Current Outpatient Medications   Medication Sig Dispense Refill    polyethylene glycol (GLYCOLAX) 17 GM/SCOOP powder Take 17 g by mouth daily      donepezil (ARICEPT) 10 MG tablet Take 1 tablet by mouth every morning 90 tablet 1    levETIRAcetam (KEPPRA) 500 MG tablet Take 1 tablet by mouth 2 times daily 180 tablet 3    memantine (NAMENDA) 10 MG tablet Take 1 tablet by mouth 2 times daily 60 tablet 5    docusate sodium (COLACE) 100 MG capsule Take 1 capsule by mouth daily as needed for Constipation 30 capsule 0    senna (SENOKOT) 8.6 MG tablet Take 1 tablet by mouth 2 times daily 60 tablet 11    RA SENNA 8.6 MG tablet take 1 tablet by mouth twice a day      dilTIAZem (DILACOR XR) 240 MG extended release capsule Take 1 capsule by mouth nightly 90 capsule 3    acetaminophen (TYLENOL) 500 MG tablet Take 500 mg by mouth every 6 hours as needed for Pain      diclofenac (VOLTAREN) 75 MG EC tablet Take 1 tablet by mouth 2 times daily as needed      Magnesium 500 MG TABS Take 1 tablet by mouth daily      triamterene-hydroCHLOROthiazide (MAXZIDE-25) 37.5-25 MG per tablet take 1 tablet by mouth once daily      docusate sodium (COLACE) 100 MG capsule Take 50 mg by mouth nightly 2 tablets atorvastatin (LIPITOR) 20 MG tablet Take 20 mg by mouth nightly       aspirin 81 MG tablet Take 81 mg by mouth nightly       CALCIUM PO Take by mouth nightly       vitamin D3 (COLECALCIFEROL) 400 UNITS TABS Take 5,000 Units by mouth daily       folic acid (FOLVITE) 397 MCG tablet Take 400 mcg by mouth every morning      methotrexate 2.5 MG tablet Take 2.5 mg by mouth once a week Indications: Take 7 tablets one time a week       predniSONE (DELTASONE) 5 MG tablet Take 5 mg by mouth 2 times daily        No current facility-administered medications for this encounter. Additional Comments: Here for 1 year f/u appointment and no new concerns.      Electronically signed by Andrew Boone CMA on 8/11/2022 at 10:27 AM

## 2022-08-16 ENCOUNTER — OFFICE VISIT (OUTPATIENT)
Dept: ONCOLOGY | Age: 82
End: 2022-08-16
Payer: MEDICARE

## 2022-08-16 ENCOUNTER — HOSPITAL ENCOUNTER (OUTPATIENT)
Dept: INFUSION THERAPY | Age: 82
End: 2022-08-16

## 2022-08-16 VITALS
BODY MASS INDEX: 23.73 KG/M2 | RESPIRATION RATE: 18 BRPM | HEART RATE: 102 BPM | HEIGHT: 64 IN | TEMPERATURE: 98.5 F | SYSTOLIC BLOOD PRESSURE: 121 MMHG | WEIGHT: 139 LBS | DIASTOLIC BLOOD PRESSURE: 58 MMHG | OXYGEN SATURATION: 97 %

## 2022-08-16 DIAGNOSIS — C21.0 ANAL CANCER (HCC): Primary | ICD-10-CM

## 2022-08-16 PROCEDURE — G8427 DOCREV CUR MEDS BY ELIG CLIN: HCPCS | Performed by: INTERNAL MEDICINE

## 2022-08-16 PROCEDURE — 99213 OFFICE O/P EST LOW 20 MIN: CPT | Performed by: INTERNAL MEDICINE

## 2022-08-16 PROCEDURE — 1123F ACP DISCUSS/DSCN MKR DOCD: CPT | Performed by: INTERNAL MEDICINE

## 2022-08-16 PROCEDURE — G8420 CALC BMI NORM PARAMETERS: HCPCS | Performed by: INTERNAL MEDICINE

## 2022-08-16 PROCEDURE — 1090F PRES/ABSN URINE INCON ASSESS: CPT | Performed by: INTERNAL MEDICINE

## 2022-08-16 PROCEDURE — G8399 PT W/DXA RESULTS DOCUMENT: HCPCS | Performed by: INTERNAL MEDICINE

## 2022-08-16 PROCEDURE — 1036F TOBACCO NON-USER: CPT | Performed by: INTERNAL MEDICINE

## 2022-08-16 RX ORDER — CELECOXIB 200 MG/1
CAPSULE ORAL
COMMUNITY
Start: 2022-08-12

## 2022-08-16 NOTE — PROGRESS NOTES
MA Rooming Questions  Patient: Campbell Butler  MRN: [id number]    Date: 8/16/2022        1. Do you have any new issues?   no         2. Do you need any refills on medications?    no    3. Have you had any imaging done since your last visit?   no    4. Have you been hospitalized or seen in the emergency room since your last visit here?   no    5. Did the patient have a depression screening completed today?  No    No data recorded     PHQ-9 Given to (if applicable):               PHQ-9 Score (if applicable):                     [] Positive     []  Negative              Does question #9 need addressed (if applicable)                     [] Yes    []  No               Ruchi Ends, CMA

## 2022-09-02 RX ORDER — DILTIAZEM HYDROCHLORIDE 240 MG/1
240 CAPSULE, EXTENDED RELEASE ORAL DAILY
Qty: 90 CAPSULE | Refills: 3 | Status: SHIPPED | OUTPATIENT
Start: 2022-09-02 | End: 2022-10-06 | Stop reason: SDUPTHER

## 2022-09-19 ENCOUNTER — APPOINTMENT (OUTPATIENT)
Dept: CT IMAGING | Age: 82
End: 2022-09-19
Payer: MEDICARE

## 2022-09-19 ENCOUNTER — HOSPITAL ENCOUNTER (EMERGENCY)
Age: 82
Discharge: HOME | End: 2022-09-19
Attending: EMERGENCY MEDICINE
Payer: MEDICARE

## 2022-09-19 VITALS
RESPIRATION RATE: 17 BRPM | WEIGHT: 139 LBS | BODY MASS INDEX: 23.16 KG/M2 | TEMPERATURE: 97.9 F | DIASTOLIC BLOOD PRESSURE: 62 MMHG | SYSTOLIC BLOOD PRESSURE: 115 MMHG | HEIGHT: 65 IN | HEART RATE: 86 BPM | OXYGEN SATURATION: 98 %

## 2022-09-19 DIAGNOSIS — S01.112A LACERATION OF LEFT EYEBROW, INITIAL ENCOUNTER: ICD-10-CM
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
## 2022-09-19 PROCEDURE — 70450 CT HEAD/BRAIN W/O DYE: CPT

## 2022-09-19 PROCEDURE — 99284 EMERGENCY DEPT VISIT MOD MDM: CPT

## 2022-09-19 PROCEDURE — 12011 RPR F/E/E/N/L/M 2.5 CM/<: CPT

## 2022-09-19 PROCEDURE — 72125 CT NECK SPINE W/O DYE: CPT

## 2022-09-19 ASSESSMENT — PAIN SCALES - GENERAL: PAINLEVEL_OUTOF10: 4

## 2022-09-19 ASSESSMENT — PAIN DESCRIPTION - LOCATION: LOCATION: HEAD

## 2022-09-19 ASSESSMENT — LIFESTYLE VARIABLES
HOW MANY STANDARD DRINKS CONTAINING ALCOHOL DO YOU HAVE ON A TYPICAL DAY: PATIENT DOES NOT DRINK
HOW OFTEN DO YOU HAVE A DRINK CONTAINING ALCOHOL: NEVER

## 2022-09-19 ASSESSMENT — PAIN - FUNCTIONAL ASSESSMENT: PAIN_FUNCTIONAL_ASSESSMENT: NONE - DENIES PAIN

## 2022-09-19 NOTE — ED NOTES
Wound care provided per protocol to L eyebrow laceration. Dry dressing applied.       Dm Harris RN  09/19/22 0265

## 2022-09-19 NOTE — ED PROVIDER NOTES
Emergency Department Encounter    Patient: Margie Dumont  MRN: [id number]  : 1940  Date of Evaluation: 2022  ED Provider:  Jacky Oquendo MD    Triage Chief Complaint:   Fall    [place]:  Margie Dumont is a 80 y.o. female that presents with complaint of fall, struck her left forehead and has a small laceration to the lateral eyebrow. Daughter at bedside. Was coming back from the bathroom, daughter heard her fall, thinks that she was reaching for snacks next to the kitchen table, she had taken her hand off of the walker and only had 1 hand on the walker, and fell and hit her head on the corner of the table. No loss of consciousness. She denies pain except for at the site where she struck her head. She is on aspirin. She has a history of stroke, this is why she uses the walker to get around. Daughter is upset because she did not call her for help. Patient did just recently have a right knee replacement, has been healing well. Denies any pain elsewhere. No chest pain or difficulty breathing. No neck or back pain. No focal weakness or numbness in her extremities that is new.     ROS - see HPI, below listed is current ROS at time of my eval:  10 systems reviewed negative except as above    Past Medical History:   Diagnosis Date    Acid reflux     Anxiety     Bronchitis In Past    Cancer ([address]     colon cancer    Carotid stenosis 11/10/2015    Cerebral embolism with cerebral infarction ([facility] 2015    Seizure X 1, No Residual    Chronic back pain     Depression     History of blood transfusion     History of external beam radiation therapy 2017    5400 cGy pelvis/anal canal in 2017 per Dr. Finn Hemp at [facility]    Hyperlipidemia     Hypertension     Prolonged emergence from general anesthesia     RA (rheumatoid arthritis) ([address]     \"All Over\"    Seizure ([facility] 09/18/2015    X 1    Sleep apnea     Uses CPAP    Teeth missing     Upper And Lower    Urinary incontinence     UTI (urinary tract infection) In Past    No Current Symptoms    Wears dentures     Full Upper , Partial Lower    Wears glasses     Wears partial dentures     Lower     Past Surgical History:   Procedure Laterality Date    ARTHROCENTESIS / ASPIRATION / INJECTION KNEE  5/11/2021         CAROTID ENDARTERECTOMY Right [id number]    CARPAL TUNNEL RELEASE      COLONOSCOPY  In Past    DENTAL SURGERY      Teeth Extracted In Past    DILATION AND CURETTAGE OF UTERUS  1960's    Prior To Vaginal Hysterectomy    HYSTERECTOMY, VAGINAL  1960's    PORT SURGERY N/A 3/2/2021    PORT REMOVAL performed by Megha Granados MD at [address] History   Problem Relation Age of Onset    Stroke Mother     Heart Disease Mother     Arthritis Mother     Diabetes Mother     High Blood Pressure Mother     High Cholesterol Mother     [de-identified] / Faythe Clarendon Mother     Other Father         \"Surgery For Twisted Bowels\"    Heart Disease Sister     Stroke Sister     High Blood Pressure Brother     Heart Disease Brother     Hearing Loss Brother         Heart Attack    Diabetes Sister     High Blood Pressure Sister     Other Sister         Gout    Arthritis Sister     Mental Illness Sister         Schizophrenia    High Blood Pressure Brother     High Blood Pressure Son     Mental Illness Son         Anxiety    Depression Son     Other Son         Pancreatitis, Surgery For Brain Aneurysm    High Blood Pressure Son     Other Son         Gout    Learning Disabilities Son         Autistic, Mentally Retarded    Other Son         Seizures     Social History     Socioeconomic History    Marital status:      Spouse name: Not on file    Number of children: Not on file    Years of education: Not on file    Highest education level: Not on file   Occupational History    Not on file   Tobacco Use    Smoking status: Never    Smokeless tobacco: Never   Vaping Use    Vaping Use: Never used   Substance and Sexual Activity    Alcohol use: No     Alcohol/week: 0.0 standard drinks Comment: 4-6 caffeinated beverages a week    Drug use: No    Sexual activity: Never   Other Topics Concern    Not on file   Social History Narrative    Not on file     Social Determinants of Health     Financial Resource Strain: Not on file   Food Insecurity: Not on file   Transportation Needs: Not on file   Physical Activity: Not on file   Stress: Not on file   Social Connections: Not on file   Intimate Partner Violence: Not on file   Housing Stability: Not on file     No current facility-administered medications for this encounter.      Current Outpatient Medications   Medication Sig Dispense Refill    dilTIAZem (DILT-XR) 240 MG extended release capsule Take 1 capsule by mouth daily 90 capsule 3    celecoxib (CELEBREX) 200 MG capsule take 1 capsule by mouth once daily STOP THE DICLOFENAC      polyethylene glycol (GLYCOLAX) 17 GM/SCOOP powder Take 17 g by mouth daily      donepezil (ARICEPT) 10 MG tablet Take 1 tablet by mouth every morning 90 tablet 1    levETIRAcetam (KEPPRA) 500 MG tablet Take 1 tablet by mouth 2 times daily 180 tablet 3    memantine (NAMENDA) 10 MG tablet Take 1 tablet by mouth 2 times daily 60 tablet 5    docusate sodium (COLACE) 100 MG capsule Take 1 capsule by mouth daily as needed for Constipation 30 capsule 0    senna (SENOKOT) 8.6 MG tablet Take 1 tablet by mouth 2 times daily 60 tablet 11    RA SENNA 8.6 MG tablet take 1 tablet by mouth twice a day      acetaminophen (TYLENOL) 500 MG tablet Take 500 mg by mouth every 6 hours as needed for Pain      diclofenac (VOLTAREN) 75 MG EC tablet Take 1 tablet by mouth 2 times daily as needed (Patient not taking: Reported on 8/16/2022)      Magnesium 500 MG TABS Take 1 tablet by mouth daily      triamterene-hydroCHLOROthiazide (MAXZIDE-25) 37.5-25 MG per tablet take 1 tablet by mouth once daily      docusate sodium (COLACE) 100 MG capsule Take 50 mg by mouth nightly 2 tablets       atorvastatin (LIPITOR) 20 MG tablet Take 20 mg by mouth nightly aspirin 81 MG tablet Take 81 mg by mouth nightly       CALCIUM PO Take by mouth nightly       vitamin D3 (COLECALCIFEROL) 400 UNITS TABS Take 5,000 Units by mouth daily       folic acid (FOLVITE) 163 MCG tablet Take 400 mcg by mouth every morning      methotrexate 2.5 MG tablet Take 2.5 mg by mouth once a week Indications: Take 7 tablets one time a week       predniSONE (DELTASONE) 5 MG tablet Take 5 mg by mouth 2 times daily        Allergies   Allergen Reactions    Cortisone Rash    Codeine      Unsure Of Reaction    Pcn [Penicillins]      Unknown Reaction       Nursing Notes Reviewed    Physical Exam:  Triage VS:    ED Triage Vitals [09/19/22 0651]   Enc Vitals Group      /71      Heart Rate 88      Resp 18      Temp 98.5 °F (36.9 °C)      Temp Source Oral      SpO2 98 %      Weight       Height       Head Circumference       Peak Flow       Pain Score       Pain Loc       Pain Edu? Excl. in [address]? My pulse ox interpretation is - normal    General appearance:  No acute distress. Skin:  Warm. Dry.  1 cm very superficial laceration just above the left lateral eyebrow eyebrow. Not gaping, not bleeding. Eye:  Extraocular movements intact. Ears, nose, mouth and throat:  Oral mucosa moist   Neck:  Trachea midline. Extremity:  No swelling. No specific tenderness to palpation except mild pain over right knee- incision site is clean, dry, intact. Normal ROM of all joints. Heart:  Regular rate and rhythm, normal S1 & S2, no extra heart sounds. Perfusion:  intact  Respiratory:  Lungs clear to auscultation bilaterally. Respirations nonlabored. Abdominal:   Soft. Nontender. Non distended. Back:  No C/T/L spine tenderness, no step offs or deformity. Neurological:  Alert and oriented, moves all limbs to command with equal strength. Psychiatric:  Appropriate    I have reviewed and interpreted all of the currently available lab results from this visit (if applicable):   No results found for this visit on 09/19/22. Radiographs (if obtained):  Radiologist's Report Reviewed:  No results found. EKG (if obtained): (All EKG's are interpreted by myself in the absence of a cardiologist)      MDM:  45ZXE with history as above, presents with complaint of fall, not in distress, superficial laceration as above. Plan for CT head and cervical spine, she is not complaining of any other pain, no loss of consciousness, no other injury. CT imaging is unremarkable for any acute abnormality, she is not in distress. I did glue the laceration as it did is slightly, otherwise appears to be stable and doing well, she they are comfortable with plan for discharge home, actually have an appointment with her PCP on Friday for follow-up. Discharged in stable condition at this time, given strict return precautions, accompanied by daughter    Laceration Repair Procedure Note    Indication: Laceration    Procedure: The patient was placed in the appropriate position and anesthesia around the laceration was not performed at the patient's request. The area was then cleansed with Shur-Clens and draped in a sterile fashion. The laceration was closed with Dermabond. There were no additional lacerations requiring repair. The wound area was then dressed with a bandage. Total repaired wound length: 1 cm. Other Items: None    The patient tolerated the procedure well. Complications: None      Clinical Impression:  1. Fall, initial encounter    2.  Laceration of left eyebrow, initial encounter      Disposition referral (if applicable):  Enriqueta Chaney PA-C  [address] [place] [id number] [phone]    Schedule an appointment as soon as possible for a visit       [facility] Emergency Department  [facility] [phone]    If symptoms worsen  Disposition medications (if applicable):  New Prescriptions    No medications on file     ED Provider Disposition Time  DISPOSITION Discharge - Pending Orders Complete 09/19/2022 08:36:17 AM      Comment: Please note this report has been produced using speech recognition software and may contain errors related to that system including errors in grammar, punctuation, and spelling, as well as words and phrases that may be inappropriate. Efforts were made to edit the dictations.        Adriano Case MD  09/19/22 2287

## 2022-09-19 NOTE — ED NOTES
Pt c/o fall just prior to arrival. Pt daughter at bedside. Pt states she was coming back from the bathroom and slipped and fell and hit her head on the corner of the kitchen table. Pt states she did not have any LOC but does have a wound to the left side of her forehead. Pt has some slur to her speech and states this is normal for her due to a previous stroke.       Wallace Najjar, RN  09/19/22 9292

## 2022-10-06 ENCOUNTER — OFFICE VISIT (OUTPATIENT)
Dept: CARDIOLOGY CLINIC | Age: 82
End: 2022-10-06
Payer: MEDICARE

## 2022-10-06 VITALS
HEIGHT: 65 IN | SYSTOLIC BLOOD PRESSURE: 120 MMHG | HEART RATE: 89 BPM | BODY MASS INDEX: 23.13 KG/M2 | DIASTOLIC BLOOD PRESSURE: 72 MMHG | WEIGHT: 138.8 LBS

## 2022-10-06 DIAGNOSIS — I69.359 CVA, OLD, HEMIPARESIS (HCC): ICD-10-CM

## 2022-10-06 DIAGNOSIS — E78.5 DYSLIPIDEMIA: ICD-10-CM

## 2022-10-06 DIAGNOSIS — I47.1 SVT (SUPRAVENTRICULAR TACHYCARDIA) (HCC): ICD-10-CM

## 2022-10-06 DIAGNOSIS — I10 ESSENTIAL HYPERTENSION: Primary | ICD-10-CM

## 2022-10-06 PROCEDURE — 1036F TOBACCO NON-USER: CPT | Performed by: NURSE PRACTITIONER

## 2022-10-06 PROCEDURE — G8399 PT W/DXA RESULTS DOCUMENT: HCPCS | Performed by: NURSE PRACTITIONER

## 2022-10-06 PROCEDURE — G8420 CALC BMI NORM PARAMETERS: HCPCS | Performed by: NURSE PRACTITIONER

## 2022-10-06 PROCEDURE — G8427 DOCREV CUR MEDS BY ELIG CLIN: HCPCS | Performed by: NURSE PRACTITIONER

## 2022-10-06 PROCEDURE — G8484 FLU IMMUNIZE NO ADMIN: HCPCS | Performed by: NURSE PRACTITIONER

## 2022-10-06 PROCEDURE — 99214 OFFICE O/P EST MOD 30 MIN: CPT | Performed by: NURSE PRACTITIONER

## 2022-10-06 PROCEDURE — 1123F ACP DISCUSS/DSCN MKR DOCD: CPT | Performed by: NURSE PRACTITIONER

## 2022-10-06 PROCEDURE — 93000 ELECTROCARDIOGRAM COMPLETE: CPT | Performed by: NURSE PRACTITIONER

## 2022-10-06 PROCEDURE — 1090F PRES/ABSN URINE INCON ASSESS: CPT | Performed by: NURSE PRACTITIONER

## 2022-10-06 RX ORDER — OXYCODONE HYDROCHLORIDE 5 MG/1
TABLET ORAL
COMMUNITY
Start: 2022-08-29

## 2022-10-06 RX ORDER — GABAPENTIN 400 MG/1
CAPSULE ORAL
COMMUNITY
Start: 2022-10-05

## 2022-10-06 RX ORDER — ANORECTAL OINTMENT 15.7; .44; 24; 20.6 G/100G; G/100G; G/100G; G/100G
OINTMENT TOPICAL
COMMUNITY
Start: 2022-09-23

## 2022-10-06 RX ORDER — FERROUS SULFATE 325(65) MG
325 TABLET ORAL
COMMUNITY

## 2022-10-06 RX ORDER — TRAMADOL HYDROCHLORIDE 50 MG/1
TABLET ORAL
COMMUNITY
Start: 2022-09-30

## 2022-10-06 RX ORDER — DILTIAZEM HYDROCHLORIDE 240 MG/1
240 CAPSULE, EXTENDED RELEASE ORAL DAILY
Qty: 90 CAPSULE | Refills: 3 | Status: SHIPPED | OUTPATIENT
Start: 2022-10-06

## 2022-10-06 ASSESSMENT — ENCOUNTER SYMPTOMS
COUGH: 0
BACK PAIN: 1
SHORTNESS OF BREATH: 0

## 2022-10-06 NOTE — PATIENT INSTRUCTIONS
Please be informed that if you contact our office outside of normal business hours the physician on call cannot help with any scheduling or rescheduling issues, procedure instruction questions or any type of medication issue. We advise you for any urgent/emergency that you go to the nearest emergency room! PLEASE CALL OUR OFFICE DURING NORMAL BUSINESS HOURS    Monday - Friday   8 am to 5 pm    [place]: [address]: [phone]    [place]:  [phone]    **It is YOUR responsibilty to bring medication bottles and/or updated medication list to [address] [place] [address].  This will allow us to better serve you and all your healthcare needs**

## 2022-10-06 NOTE — PROGRESS NOTES
[facility]  [facility] [address] [facility],Third [address]  Phone: [phone]    Fax [phone]    Gabrielle Kirby MD, Edilberto Payne MD, [address] [facility], MD, MD Chary Cortez, MD Tatiana Keane MD Lamont Cohn, TARUN Lopez, TARUN Medina, TARUN Moore, TARUN Ricks PA-C     CARDIOLOGY  NOTE    10/6/2022    Sreedhar Ceron (:  1940) is a 80 y.o. female,an established patient with Dr. Tobin Alvarez, here for evaluation of the following chief complaint(s):  6 Month Follow-Up (Pt denies cardiac symptoms)        SUBJECTIVE/OBJECTIVE:    QI Mireles has a Past medical history as noted below. Joe Mireles comes in with her daughter who is her primary caretaker. She was trying to be helpful by putting the ice pack away without her daughter and she fell. She did not lose consciousness and she did get her checked out at the ED. She did not have any significant injuries. She has some limited memory and right-sided deficits weakness. She had stroke in  , carotid were normal , she has no known h/o afib but has dementia, walks with a walker , she has not had any falls in last few years . Daughter tells me that Joe Mirelse has been under a lot of stress because her son is nonverbal and dependent on her lives with her. 30 day monitor showed no afib . She will need knee surgery soon for replacement , she says some times she gets chest tightness   Her blood pressure generally well controlled  She had a mechanical fall in May 2021 resulting in left knee injury since then she has recovered during her hospitalization she had episodes of SVT requiring adenosine infusions. She denies any palpitations or symptoms since then she has been on Cardizem fairly well controlled there is no ankle swelling      Review of Systems   Constitutional:  Negative for fatigue and fever.    Respiratory:  Negative for cough and shortness of breath. Cardiovascular:  Negative for chest pain, palpitations and leg swelling. Musculoskeletal:  Positive for back pain. Negative for arthralgias and gait problem. Knee pain   Neurological:  Negative for dizziness, syncope, weakness, light-headedness and headaches. Vitals:    10/06/22 1039   BP: 120/72   Site: Left Upper Arm   Position: Sitting   Cuff Size: Medium Adult   Weight: 138 lb 12.8 oz (63 kg)   Height: 5' 5\" (1.651 m)       Wt Readings from Last 3 Encounters:   10/06/22 138 lb 12.8 oz (63 kg)   09/19/22 139 lb (63 kg)   08/16/22 139 lb (63 kg)       BP Readings from Last 3 Encounters:   10/06/22 120/72   09/19/22 115/62   08/16/22 (!) 121/58       Prior to Admission medications    Medication Sig Start Date End Date Taking?  Authorizing Provider   gabapentin (NEURONTIN) 400 MG capsule  10/5/22  Yes Historical Provider, MD   CALMOSEPTINE 0.44-20.6 % OINT ointment APPLY TO AFFECTED AREA ON SACRUM TWICE DAILY AS NEEDED 9/23/22  Yes Historical Provider, MD   traMADol (ULTRAM) 50 MG tablet take 1 to 2 tablets by mouth every 6 hours if needed for 7 days 9/30/22  Yes Historical Provider, MD   oxyCODONE (ROXICODONE) 5 MG immediate release tablet take 1-2 tablets by mouth every 4 hours if needed for moderate pain or severe pain for up to 7 days 8/29/22  Yes Historical Provider, MD   ferrous sulfate (IRON 325) 325 (65 Fe) MG tablet Take 325 mg by mouth daily (with breakfast)   Yes Historical Provider, MD   dilTIAZem (DILT-XR) 240 MG extended release capsule Take 1 capsule by mouth daily 9/2/22  Yes Tomas Costa MD   celecoxib (CELEBREX) 200 MG capsule take 1 capsule by mouth once daily STOP THE DICLOFENAC 8/12/22  Yes Historical Provider, MD   polyethylene glycol (GLYCOLAX) 17 GM/SCOOP powder Take 17 g by mouth daily   Yes Historical Provider, MD   donepezil (ARICEPT) 10 MG tablet Take 1 tablet by mouth every morning 4/20/22  Yes Odalys Gresham, APRN - NP   levETIRAcetam (KEPPRA) 500 MG tablet Take 1 tablet by mouth 2 times daily 4/20/22 4/15/23 Yes TARUN Crabtree NP   memantine [facility]) 10 MG tablet Take 1 tablet by mouth 2 times daily 4/20/22  Yes TARUN Crabtree NP   docusate sodium (COLACE) 100 MG capsule Take 1 capsule by mouth daily as needed for Constipation 1/27/22  Yes TARUN Diaz CNP   senna (SENOKOT) 8.6 MG tablet Take 1 tablet by mouth 2 times daily 1/27/22 1/27/23 Yes TARUN Diaz CNP   acetaminophen (TYLENOL) 500 MG tablet Take 500 mg by mouth every 6 hours as needed for Pain   Yes Historical Provider, MD   Magnesium 500 MG TABS Take 1 tablet by mouth daily   Yes Historical Provider, MD   triamterene-hydroCHLOROthiazide (MAXZIDE-25) 37.5-25 MG per tablet take 1 tablet by mouth once daily 3/16/21  Yes Historical Provider, MD   atorvastatin (LIPITOR) 20 MG tablet Take 20 mg by mouth nightly    Yes Historical Provider, MD   aspirin 81 MG tablet Take 81 mg by mouth nightly    Yes Historical Provider, MD   CALCIUM PO Take by mouth nightly    Yes Historical Provider, MD   vitamin D3 (COLECALCIFEROL) 400 UNITS TABS Take 5,000 Units by mouth daily    Yes Historical Provider, MD   folic acid (FOLVITE) 764 MCG tablet Take 400 mcg by mouth every morning   Yes Historical Provider, MD   methotrexate 2.5 MG tablet Take 2.5 mg by mouth once a week Indications:  Take 7 tablets one time a week    Yes Historical Provider, MD   predniSONE (DELTASONE) 5 MG tablet Take 5 mg by mouth 2 times daily    Yes Historical Provider, MD       Physical Exam    Health Maintenance   Topic Date Due    COVID-19 Vaccine (1) Never done    Hepatitis C screen  Never done    DTaP/Tdap/Td vaccine (1 - Tdap) Never done    Shingles vaccine (1 of 2) Never done    Lipids  09/21/2016    Annual Wellness Visit (AWV)  Never done    Flu vaccine (1) 08/01/2022    Depression Screen  06/14/2023    DEXA (modify frequency per FRAX score)  Completed    Pneumococcal 65+ years Vaccine  Completed    Hepatitis A vaccine Aged Out    Hepatitis B vaccine  Aged Out    Hib vaccine  Aged Out    Meningococcal (ACWY) vaccine  Aged Out       Lab Review   Lab Results   Component Value Date/Time    CHOL 178 09/21/2015 06:23 AM    TRIG 197 09/21/2015 06:23 AM    HDL 63 09/21/2015 06:23 AM    LDLDIRECT 95 09/21/2015 06:23 AM        Event monitor 8/30/21  30-day monitor with 3 patient triggers average heart rate was 82 lowest heart was 42 maximum heart rate is 150 no episodes of atrial fibrillation recorded. Maximum heart rate 150 8:55 PM no significant arrhythmias or abnormality noted normal 30-day monitor     Echo 5/8/21   Summary   Left ventricular systolic function is normal.   Ejection fraction is visually estimated at 50-55%. No significant valvular abnormalities. No evidence of any pericardial effusion. NM Stress 3/23/2022   Summary   Supervising physician Dr. Chino Milian . Imaged with arms down   Normal EF 72 % with normal ventricular contractility. No infarct or ischemia noted. No infarct or ischemia noted. ASSESSMENT/PLAN:    CVA, old, hemiparesis ([address]   History of stroke with some deficits she walks with a walker 30-day monitor showed no  A. fib carotids were normal on CT scan in April 2021 for now continue aspirin. Tolerated surgery well. SP fall  Did not lose consciousness. Continue to monitor fr falls at home. SVT (supraventricular tachycardia) ([address]   Noted to have episodes of SVT during hospitalization in April 2021 converted with adenosine for now continue Cardizem fairly well controlled she was evaluated by EPS in April 2020 when she was hospitalized      Dyslipidemia   All available lab work was reviewed. Patient was advised to repeat lab work before next visit. Necessary orders were placed , instructions given by myself   Counseled extensively and medication compliance urged. We discussed that for the  prevention of ASCVD our  goal is aggressive risk modification. Patient is encouraged to exercise if they can , educated about  brisk walk for 30 minutes  at least 3 to 4 times a week if there are no physical limitations        Various goals were discussed and questions answered. Continue current medications. Appropriate prescriptions are addressed and refills ordered. Questions answered and patient verbalizes understanding. Call for any problems, questions, or concerns. Greater than 60 % of time spent counseling besides reviewing data and images     Return in about 6 months (around 4/6/2023). An electronic signature was used to authenticate this note.     Electronically signed by TARUN Harris CNP on 10/6/2022 at 10:52 AM

## 2022-10-10 ENCOUNTER — NURSE ONLY (OUTPATIENT)
Dept: CARDIOLOGY CLINIC | Age: 82
End: 2022-10-10
Payer: MEDICARE

## 2022-10-10 DIAGNOSIS — I10 ESSENTIAL HYPERTENSION: Primary | ICD-10-CM

## 2022-10-10 PROCEDURE — 36415 COLL VENOUS BLD VENIPUNCTURE: CPT | Performed by: NURSE PRACTITIONER

## 2022-10-11 LAB
CHOLESTEROL, TOTAL: 183 MG/DL (ref 0–199)
HDLC SERPL-MCNC: 101 MG/DL (ref 40–60)
LDL CHOLESTEROL CALCULATED: 72 MG/DL
TRIGL SERPL-MCNC: 51 MG/DL (ref 0–150)
VLDLC SERPL CALC-MCNC: 10 MG/DL

## 2022-10-12 ENCOUNTER — TELEPHONE (OUTPATIENT)
Dept: CARDIOLOGY CLINIC | Age: 82
End: 2022-10-12

## 2022-10-12 NOTE — TELEPHONE ENCOUNTER
LDL is improving continue current plan  Spoke to patients daughter about results.  Follow up in april

## 2022-10-18 NOTE — PROGRESS NOTES
Patient Name: Nneka Ravi  Patient : 1940  Patient MRN: [id number]     Primary Oncologist: Abdoulaye Rivers MD  Referring Provider: Charlotte Fontaine PA-C     Date of Service: 2022      Chief Complaint:   Chief Complaint   Patient presents with    Follow-up       She came in for follow-up visit. 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 She has been noticing episodes of blood in stool for a couple of months and no prior colonoscopy until she was seen by Dr. Orlando Ormond. She has a long history of constipation and has been taking stool softeners. She underwent colonoscopy by Dr. Orlando Ormond on 2017, which revealed a large friable obstructing mass lesion noted in the anal canal and diverticulosis coli. Pathology report revealed invasive intermediate grade keratinizing squamous cell carcinoma. She is currently  and has four children. She is agreeable to have the HIV test, as recommended by the guidelines. She denied any history of sexually transmitted disease. Bblood test in 2016 revealed white cell 7.1, hemoglobin 11.9, platelet 182. Creatinine was 0.52, AST 17, ALT 12. She was seen by Dr. Rohit Jerome, who performed the rectal exam.  After having discussion with him, clinically she was felt to have stage T2NxMx.     PET-CT scan in 2017 revealed no evidence of metastatic disease, except for the heterogenous activity in the area of soft tissue fullness in the anal region, compatible with the history of anal cancer. She started concomitant mitomycin/capecitabine and radiation therapy on May 8, 2017. Blood tests in June 2017 revealed white cell 4.4, hemoglobin 9.4, platelet 873. CMP was stable. Dr. Dean Hawkins temporarily held radiation and Xeloda and eventually restarted again. She completed concomitant chemoradiation therapy on July 3, 2017. We discussed about surveillance and survivorship. I referred her back to see Dr. Quinton Fox. She will need anoscopy every six to 12 months up to three years. Imaging study will be considered once a year for three years. She gained weight since the last office visit. I reminded to follow-up with Dr. Quinton Fox for the potential anoscopy. CT chest, abdomen and pelvis in July 2018 showed no evidence of progression of the disease. DEXA scan in December 2018 was wnl. Colonoscopy was in September 2018. Repeat in 3 years as per GI. She is on MTX. Labs in June 2019 were reviewed. CT chest, abdomen and pelvis in July 2019 showed no evidence of metastatic disease. Mammogram in April 2019 was benign. CT chest, abdomen and pelvis in November 2019 were negative for metastatic disease. Labs in January 2020 were stable. CEA improved. Blood test in July 2020 was reviewed. MTX and prednisone were increased due to increasing joint pain related to RA. She has proximal finger joint deformities with nodules. She refused to have flu and Covid vaccine. Labs in January 2021 showed stable CBC and CMP with hemoglobin of 11.8. I referred her back to see Dr. Jennifer Salvador for removal of the Mediport. Due to the constipation I recommend to take iron pill every other day. She was hospitalized in February 2021 due to the seizure.   At that time she was off the antiseizure medication she was seen by neurologist who recommended to continue with Keppra indefinitely. Reportedly colonoscopy in February 2021 by Dr. Patricia Centeno was okay. Follow-up study in 2 years was recommended. We reviewed labs 1/20/22 with CEA elevated to 11.6, Ferritin 90, iron 62, TIBC 342, Transferrin 18. She continues on oral iron every other day. .   We repeated CEA Level on 3/30/2022 which revealed count of 14.1. She continues to have elevation we will plan for CT chest abdomen pelvis. 4/7/22 CT chest/abdomen/pelvis showed:  Small hiatal hernia. Moderate colonic stool. Otherwise, unremarkable CT study. There is no evidence of residual, recurrent, or metastatic disease within of the chest, abdomen, or pelvis. In June 2022 alk phos was 147, hg 11.8. Ferritin 108. CEA 15.4. I will repeat CBC prior to next office visit. Takes iron pill every other day. She had left knee replacement surgery in April 2022. We discussed the results of the blood test in June 2022 including CEA level. She continues to follow-up with rheumatologist for the rheumatoid arthritis. In 8/2022 CMP grossly unremarkable. Hg 11.8, HCT 36.7, MCV 95.6. Ferritin 76, TIBC 287, Iron 229, saturation 80%. CEA 13.6, lower than before. She was seen by radiation l oncologist recently and reportedly rectal exam was benign. 11/17/2022 she came in for follow up visit. 11/2022 CMP stable with random . Ferritin 80, TIBC 274. Hg 11.3, MCV 95.7. CEA 13.1. Reportedly she had colonoscopy on October 12, 2022 by Dr Patricia Centeno and had radiation induced proctitis. She was prescribed steroid. She had right knee surgery on August 29, 2022 by Dr. Mervin Herrera. I recommend to take iron pill every other day. I will recheck CBC and iron study prior to next office visit. She is using walker, we discuss about fall precaution. Denied any nausea, vomiting or diarrhea. No fever or chills. No chest pain, shortness of breath or palpitation. No headaches or dizzy spell. No melena or hematochezia.   Denied any dysuria or hematuria. PAST MEDICAL HISTORY:  Hypertension, sleep apnea, rheumatoid arthritis on methotrexate, right carotid endarterectomy on November 18, 2015, and CVA in September 2015. She has a history of partial hysterectomy in 1965 or 1966. Last menstrual period was in 1966. Seizures. FAMILY HISTORY:  No history of malignancy in the family. SOCIAL HISTORY:  No history of smoking. No alcohol or illicit drug use. She is  and has four children. Oncology History   Malignant neoplasm of anal canal ([facility] [address]   3/8/2017 Initial Diagnosis    Malignant neoplasm of anal canal ([facility] [address]      - 7/3/2017 Radiation    Pelvic IMRT + concurrent systemic therapy  4500 cGy to elective LNs  5400 cGy to primary tumor using SIB-IMRT / 30 fx      Chemotherapy    Concurrent with RT  5-FU + Mitomycin-C     8/11/2022 -  Cancer Staged    Staging form: Anus, AJCC 8th Edition  - Clinical stage from 8/11/2022: Stage IIA (cT2, cN0, cM0)         Review of Systems: \"Per interval history; otherwise 10 point ROS is negative. \"     Vital Signs:  /60 (Site: Right Upper Arm, Position: Sitting, Cuff Size: Medium Adult)   Pulse (!) 110   Temp 97.5 °F (36.4 °C) (Infrared)   Ht 5' 5\" (1.651 m)   Wt 139 lb (63 kg)   SpO2 99%   BMI 23.13 kg/m²     Physical Exam:  CONSTITUTIONAL: awake, alert, cooperative, no apparent distress. On the wheelchair today. EYES: pupils equal, round and reactive to light. Sclera clear and + pallor  ENT: Normocephalic, without obvious abnormality, atraumatic  NECK: supple, symmetrical, no jugular venous distension and no carotid bruits   HEMATOLOGIC/LYMPHATIC: no cervical, supraclavicular or axillary lymphadenopathy   LUNGS: no increased work of breathing and clear to auscultation   CARDIOVASCULAR: regular rate and rhythm, normal S1 and S2, no murmur   ABDOMEN: normal bowel sound, soft, non-distended, non-tender, no palpable masses, no hepatosplenomegaly. No rebound or guarding.   MUSCULOSKELETAL: full range of motion noted, tone is normal  NEUROLOGIC: Motor skills grossly intact. CN II - XII grossly intact. SKIN: No jaundice. Dry and warm skin. No ecchymosis. EXTREMITIES: No bilateral LE edema. Deviation of proximal and distal finger joints to both hands. Labs:  Hematology:  Lab Results   Component Value Date    WBC 4.9 11/10/2022    RBC 3.73 (L) 11/10/2022    HGB 11.3 (L) 11/10/2022    HCT 35.7 (L) 11/10/2022    MCV 95.7 11/10/2022    MCH 30.3 11/10/2022    MCHC 31.7 (L) 11/10/2022    RDW 13.9 11/10/2022     11/10/2022    MPV 9.0 11/10/2022    BANDSPCT 3 (L) 05/12/2021    SEGSPCT 73.3 (H) 11/10/2022    EOSRELPCT 0.8 11/10/2022    BASOPCT 0.0 11/10/2022    LYMPHOPCT 14.6 (L) 11/10/2022    MONOPCT 11.3 (H) 11/10/2022    BANDABS 0.30 05/12/2021    SEGSABS 3.6 11/10/2022    EOSABS 0.0 11/10/2022    BASOSABS 0.0 11/10/2022    LYMPHSABS 0.7 11/10/2022    MONOSABS 0.6 11/10/2022    DIFFTYPE AUTOMATED DIFFERENTIAL 11/10/2022     Lab Results   Component Value Date    ESR 5 05/10/2021     Chemistry:  Lab Results   Component Value Date     11/10/2022    K 3.9 11/10/2022     11/10/2022    CO2 26 11/10/2022    BUN 20 11/10/2022    CREATININE 0.6 11/10/2022    GLUCOSE 140 (H) 11/10/2022    CALCIUM 9.2 11/10/2022    PROT 5.7 (L) 11/10/2022    LABALBU 3.9 11/10/2022    BILITOT 0.3 11/10/2022    ALKPHOS 107 11/10/2022    AST 16 11/10/2022    ALT 12 11/10/2022    LABGLOM >60 11/10/2022    GFRAA >60 08/09/2022    PHOS 3.3 09/17/2015    MG 1.9 04/07/2021    POCGLU 195 (H) 05/12/2021     Lab Results   Component Value Date     (H) 05/10/2021    HOMOCYSTEINE 10.9 (H) 09/19/2015     Lab Results   Component Value Date    TSHHS 0.643 04/08/2021    T4FREE 1.06 09/16/2015     Immunology:  Lab Results   Component Value Date    PROT 5.7 (L) 11/10/2022    SPEP  05/10/2021     INTERPRETATION - Nonspecific pattern, decreased albumin. No monoclonal gammopathy.   RS    ALBUMINELP 2.9 (L) 05/10/2021    LABALPH 0.3 05/10/2021    LABALPH 0.8 05/10/2021    LABBETA 0.9 05/10/2021    GAMGLOB 0.5 05/10/2021     Coagulation Panel:  Lab Results   Component Value Date    PROTIME 11.5 (L) 05/13/2021    INR 0.95 05/13/2021    APTT 65.4 (H) 05/13/2021     Tumor Markers:  Lab Results   Component Value Date    CEA 13.1 11/10/2022      Observations:  PHQ-9 Total Score: 0 (11/17/2022 12:00 PM)     Assessment & Plan:  1. She has clinical stage T2NxMx anal canal carcinoma. PET-CT scan revealed local disease without evidence of metastatic disease. Baseline blood test in March 2017 revealed white cell 10.2, hemoglobin 12.6, platelet 650. CMP was benign with alk phos 146. HIV screening was negative. She completed chemoradiation therapy in July 2017. I advised her to follow up with Dr. Manish Hernandez for possible anoscopy every six to 12 months in the first three years. CT chest abdomen and pelvis in July 2018 was negative. CEA improved. She had colonoscopy in September 2018. CT chest, abdomen and pelvis in July 2019 showed no metastatic disease. CT chest, abdomen and pelvis in November 2019 was negative for mets. Labs in January 2020 were reviewed. Labs in July 2020 were reviewed. Reportedly colonoscopy in February 2021 was okay. Reviewed iron studies, CBC and CEA of 1/20/22. CEA elevated to 11.6. CEA increased to 14.1 on 3/30/22. CT Chest, abdomen, pelvis 4/22 without evidence of residual, recurrent or metastatic disease. CEA on June 2, 2022 was 15.4. Labs in August 2022 showed improvement of the CEA. Labs in November 2022 grossly stable. CEA stable. She has colonoscopy in 10/2022 and has radiation induced proctitis. Clinically she is in remission. She is agreeable to continue with surveillance. I recommend also to follow-up with GI.    2.  Mild anemia. She takes iron pill every other day. 11/2022 ferritin 80, TIBC 274, Hg 11.3. Will repeat CBC and Iron study prior to next OV.     3.   She has a history of rheumatoid arthritis and is on methotrexate. She will follow up with Dr. Tim Jacobs. 4.   I advised her to keep her other age-appropriate cancer screening up to date. Mammogram in April 2019 was benign. Reportedly mammogram in 2020 was benign. 5.  She has history of seizures. She has been following neurologist.    6.  She has constipation. I recommend high-fiber diet. RTC in 6 months sooner. All of her questions have been answered for today. Recent imaging and labs were reviewed and discussed with the patient.

## 2022-11-10 ENCOUNTER — HOSPITAL ENCOUNTER (OUTPATIENT)
Dept: INFUSION THERAPY | Age: 82
Discharge: HOME | End: 2022-11-10
Payer: MEDICARE

## 2022-11-10 DIAGNOSIS — C21.0 ANAL CANCER (HCC): ICD-10-CM

## 2022-11-10 LAB
ALBUMIN SERPL-MCNC: 3.9 GM/DL (ref 3.4–5)
ALP BLD-CCNC: 107 IU/L (ref 40–129)
ALT SERPL-CCNC: 12 U/L (ref 10–40)
ANION GAP SERPL CALCULATED.3IONS-SCNC: 13 MMOL/L (ref 4–16)
AST SERPL-CCNC: 16 IU/L (ref 15–37)
BASOPHILS ABSOLUTE: 0 K/CU MM
BASOPHILS RELATIVE PERCENT: 0 % (ref 0–1)
BILIRUB SERPL-MCNC: 0.3 MG/DL (ref 0–1)
BUN BLDV-MCNC: 20 MG/DL (ref 6–23)
CALCIUM SERPL-MCNC: 9.2 MG/DL (ref 8.3–10.6)
CEA: 13.1 NG/ML
CHLORIDE BLD-SCNC: 103 MMOL/L (ref 99–110)
CO2: 26 MMOL/L (ref 21–32)
CREAT SERPL-MCNC: 0.6 MG/DL (ref 0.6–1.1)
DIFFERENTIAL TYPE: ABNORMAL
EOSINOPHILS ABSOLUTE: 0 K/CU MM
EOSINOPHILS RELATIVE PERCENT: 0.8 % (ref 0–3)
FERRITIN: 80 NG/ML (ref 15–150)
GFR SERPL CREATININE-BSD FRML MDRD: >60 ML/MIN/1.73M2
GLUCOSE BLD-MCNC: 140 MG/DL (ref 70–99)
HCT VFR BLD CALC: 35.7 % (ref 37–47)
HEMOGLOBIN: 11.3 GM/DL (ref 12.5–16)
IRON: 79 UG/DL (ref 37–145)
LYMPHOCYTES ABSOLUTE: 0.7 K/CU MM
LYMPHOCYTES RELATIVE PERCENT: 14.6 % (ref 24–44)
MCH RBC QN AUTO: 30.3 PG (ref 27–31)
MCHC RBC AUTO-ENTMCNC: 31.7 % (ref 32–36)
MCV RBC AUTO: 95.7 FL (ref 78–100)
MONOCYTES ABSOLUTE: 0.6 K/CU MM
MONOCYTES RELATIVE PERCENT: 11.3 % (ref 0–4)
PCT TRANSFERRIN: 29 % (ref 10–44)
PDW BLD-RTO: 13.9 % (ref 11.7–14.9)
PLATELET # BLD: 317 K/CU MM (ref 140–440)
PMV BLD AUTO: 9 FL (ref 7.5–11.1)
POTASSIUM SERPL-SCNC: 3.9 MMOL/L (ref 3.5–5.1)
RBC # BLD: 3.73 M/CU MM (ref 4.2–5.4)
SEGMENTED NEUTROPHILS ABSOLUTE COUNT: 3.6 K/CU MM
SEGMENTED NEUTROPHILS RELATIVE PERCENT: 73.3 % (ref 36–66)
SODIUM BLD-SCNC: 142 MMOL/L (ref 135–145)
TOTAL IRON BINDING CAPACITY: 274 UG/DL (ref 250–450)
TOTAL PROTEIN: 5.7 GM/DL (ref 6.4–8.2)
UNSATURATED IRON BINDING CAPACITY: 195 UG/DL (ref 110–370)
WBC # BLD: 4.9 K/CU MM (ref 4–10.5)

## 2022-11-10 PROCEDURE — 83550 IRON BINDING TEST: CPT

## 2022-11-10 PROCEDURE — 82728 ASSAY OF FERRITIN: CPT

## 2022-11-10 PROCEDURE — 36415 COLL VENOUS BLD VENIPUNCTURE: CPT

## 2022-11-10 PROCEDURE — 82378 CARCINOEMBRYONIC ANTIGEN: CPT

## 2022-11-10 PROCEDURE — 80053 COMPREHEN METABOLIC PANEL: CPT

## 2022-11-10 PROCEDURE — 85025 COMPLETE CBC W/AUTO DIFF WBC: CPT

## 2022-11-10 PROCEDURE — 83540 ASSAY OF IRON: CPT

## 2022-11-11 DIAGNOSIS — F09 MILD COGNITIVE DISORDER: ICD-10-CM

## 2022-11-11 RX ORDER — MEMANTINE HYDROCHLORIDE 10 MG/1
TABLET ORAL
Qty: 60 TABLET | Refills: 5 | Status: SHIPPED | OUTPATIENT
Start: 2022-11-11

## 2022-11-11 NOTE — TELEPHONE ENCOUNTER
Patient is due for a follow up, while we are trying to schedule please send in refills.      Requested Prescriptions     Pending Prescriptions Disp Refills    memantine (NAMENDA) 10 MG tablet [Pharmacy Med Name: MEMANTINE HCL 10 MG TABLET] 60 tablet 5     Sig: take 1 tablet by mouth twice a day

## 2022-11-17 ENCOUNTER — OFFICE VISIT (OUTPATIENT)
Dept: ONCOLOGY | Age: 82
End: 2022-11-17
Payer: MEDICARE

## 2022-11-17 ENCOUNTER — HOSPITAL ENCOUNTER (OUTPATIENT)
Dept: INFUSION THERAPY | Age: 82
Discharge: HOME | End: 2022-11-17
Payer: MEDICARE

## 2022-11-17 VITALS
DIASTOLIC BLOOD PRESSURE: 60 MMHG | SYSTOLIC BLOOD PRESSURE: 113 MMHG | HEART RATE: 110 BPM | HEIGHT: 65 IN | OXYGEN SATURATION: 99 % | TEMPERATURE: 97.5 F | WEIGHT: 139 LBS | BODY MASS INDEX: 23.16 KG/M2

## 2022-11-17 DIAGNOSIS — C21.0 ANAL CANCER (HCC): Primary | ICD-10-CM

## 2022-11-17 DIAGNOSIS — D64.9 ANEMIA, UNSPECIFIED TYPE: ICD-10-CM

## 2022-11-17 PROCEDURE — G8427 DOCREV CUR MEDS BY ELIG CLIN: HCPCS | Performed by: INTERNAL MEDICINE

## 2022-11-17 PROCEDURE — 3074F SYST BP LT 130 MM HG: CPT | Performed by: INTERNAL MEDICINE

## 2022-11-17 PROCEDURE — 1090F PRES/ABSN URINE INCON ASSESS: CPT | Performed by: INTERNAL MEDICINE

## 2022-11-17 PROCEDURE — G8420 CALC BMI NORM PARAMETERS: HCPCS | Performed by: INTERNAL MEDICINE

## 2022-11-17 PROCEDURE — 99213 OFFICE O/P EST LOW 20 MIN: CPT | Performed by: INTERNAL MEDICINE

## 2022-11-17 PROCEDURE — G8484 FLU IMMUNIZE NO ADMIN: HCPCS | Performed by: INTERNAL MEDICINE

## 2022-11-17 PROCEDURE — 99211 OFF/OP EST MAY X REQ PHY/QHP: CPT

## 2022-11-17 PROCEDURE — 1036F TOBACCO NON-USER: CPT | Performed by: INTERNAL MEDICINE

## 2022-11-17 PROCEDURE — G8399 PT W/DXA RESULTS DOCUMENT: HCPCS | Performed by: INTERNAL MEDICINE

## 2022-11-17 PROCEDURE — 3078F DIAST BP <80 MM HG: CPT | Performed by: INTERNAL MEDICINE

## 2022-11-17 PROCEDURE — 1123F ACP DISCUSS/DSCN MKR DOCD: CPT | Performed by: INTERNAL MEDICINE

## 2022-11-17 ASSESSMENT — PATIENT HEALTH QUESTIONNAIRE - PHQ9
SUM OF ALL RESPONSES TO PHQ QUESTIONS 1-9: 0
2. FEELING DOWN, DEPRESSED OR HOPELESS: 0
SUM OF ALL RESPONSES TO PHQ QUESTIONS 1-9: 0
SUM OF ALL RESPONSES TO PHQ9 QUESTIONS 1 & 2: 0
1. LITTLE INTEREST OR PLEASURE IN DOING THINGS: 0

## 2022-11-17 NOTE — PROGRESS NOTES
MA Rooming Questions  Patient: Clyde Chaudhari  MRN: [id number]    Date: 11/17/2022        1. Do you have any new issues?   no         2. Do you need any refills on medications?    no    3. Have you had any imaging done since your last visit? yes - CT scan 9/19 in ED    4. Have you been hospitalized or seen in the emergency room since your last visit here?   yes - [facility] ER    5. Did the patient have a depression screening completed today?  Yes    PHQ-9 Total Score: 0 (11/17/2022 12:00 PM)       PHQ-9 Given to (if applicable):               PHQ-9 Score (if applicable):                     [] Positive     [x]  Negative              Does question #9 need addressed (if applicable)                     [] Yes    []  No               Nallely Zaman CMA

## 2023-01-15 NOTE — ED NOTES
Patient linens changed and patient repositioned to eat dinner. Dinner plate was set up for patient to eat.        Kassi Nava RN  04/07/21 0183 English

## 2023-01-20 RX ORDER — DONEPEZIL HYDROCHLORIDE 10 MG/1
10 TABLET, FILM COATED ORAL EVERY MORNING
Qty: 90 TABLET | Refills: 1 | Status: SHIPPED | OUTPATIENT
Start: 2023-01-20

## 2023-01-20 NOTE — TELEPHONE ENCOUNTER
Patient is due to come in for a follow up on 1/30, patient will need a refill until then.      Requested Prescriptions     Pending Prescriptions Disp Refills    donepezil (ARICEPT) 10 MG tablet [Pharmacy Med Name: DONEPEZIL HCL 10 MG TABLET] 90 tablet 1     Sig: take 1 tablet by mouth every morning
Self

## 2023-01-30 ENCOUNTER — OFFICE VISIT (OUTPATIENT)
Dept: NEUROLOGY | Age: 83
End: 2023-01-30

## 2023-01-30 VITALS
WEIGHT: 149 LBS | SYSTOLIC BLOOD PRESSURE: 110 MMHG | OXYGEN SATURATION: 98 % | HEART RATE: 86 BPM | DIASTOLIC BLOOD PRESSURE: 80 MMHG | BODY MASS INDEX: 24.83 KG/M2 | HEIGHT: 65 IN

## 2023-01-30 DIAGNOSIS — F09 MILD COGNITIVE DISORDER: ICD-10-CM

## 2023-01-30 DIAGNOSIS — I69.359 CVA, OLD, HEMIPARESIS (HCC): ICD-10-CM

## 2023-01-30 DIAGNOSIS — R56.9 PARTIAL SEIZURES (HCC): Primary | ICD-10-CM

## 2023-01-30 NOTE — PROGRESS NOTES
2/7/23    Emmanuel Alfonso  1940    Chief Complaint   Patient presents with    Follow-up     Partial seizures. Med refill       History of Present Illness  Kristin Browning is a [de-identified] y.o. female presenting today for follow-up of: Seizure, history of CVA with hemiparesis and current falls, MCI      She remains on keppra 500 mg twice daily. She has a history of mesial temporal sclerosis. She remains on Aricept 10 mg and Memantine 10 mg twice daily. She takes ASA and statin for secondary stroke prevention. She lives alone, her daughter stays with her every night. She is in PT for recent knee replacement. She has been less active due to recent surgery. She is able to cook however with her limited mobility she is currently not cooking. Her daughter helps manage her medications and finances. There are no safety concerns. Altagracia no longer drives. Current Outpatient Medications   Medication Sig Dispense Refill    donepezil (ARICEPT) 10 MG tablet take 1 tablet by mouth every morning 90 tablet 1    memantine (NAMENDA) 10 MG tablet take 1 tablet by mouth twice a day 60 tablet 5    dilTIAZem (DILT-XR) 240 MG extended release capsule Take 1 capsule by mouth daily 90 capsule 3    gabapentin (NEURONTIN) 400 MG capsule Take 400 mg by mouth 2 times daily as needed.       CALMOSEPTINE 0.44-20.6 % OINT ointment APPLY TO AFFECTED AREA ON SACRUM TWICE DAILY AS NEEDED      traMADol (ULTRAM) 50 MG tablet take 1 to 2 tablets by mouth every 6 hours if needed for 7 days      oxyCODONE (ROXICODONE) 5 MG immediate release tablet take 1-2 tablets by mouth every 4 hours if needed for moderate pain or severe pain for up to 7 days      ferrous sulfate (IRON 325) 325 (65 Fe) MG tablet Take 325 mg by mouth daily (with breakfast)      celecoxib (CELEBREX) 200 MG capsule take 1 capsule by mouth once daily STOP THE DICLOFENAC      polyethylene glycol (GLYCOLAX) 17 GM/SCOOP powder Take 17 g by mouth daily      levETIRAcetam (KEPPRA) 500 MG tablet Take 1 tablet by mouth 2 times daily 180 tablet 3    docusate sodium (COLACE) 100 MG capsule Take 1 capsule by mouth daily as needed for Constipation 30 capsule 0    acetaminophen (TYLENOL) 500 MG tablet Take 500 mg by mouth every 6 hours as needed for Pain      Magnesium 500 MG TABS Take 1 tablet by mouth daily      triamterene-hydroCHLOROthiazide (MAXZIDE-25) 37.5-25 MG per tablet take 1 tablet by mouth once daily      atorvastatin (LIPITOR) 20 MG tablet Take 20 mg by mouth nightly       aspirin 81 MG tablet Take 81 mg by mouth nightly       CALCIUM PO Take by mouth nightly       vitamin D3 (COLECALCIFEROL) 400 UNITS TABS Take 5,000 Units by mouth daily       folic acid (FOLVITE) 083 MCG tablet Take 400 mcg by mouth every morning      methotrexate 2.5 MG tablet Take 2.5 mg by mouth once a week Indications: Take 7 tablets one time a week       predniSONE (DELTASONE) 5 MG tablet Take 5 mg by mouth 2 times daily        No current facility-administered medications for this visit. Physical Exam:  Also present during visit: daughter. Mental Status              Orientation: oriented to person, oriented to place, oriented to problem and oriented to time                        Mood/affectappropriate mood and appropriate affect              Memory/Other: remote memory intact, fund of knowledge intact and attention span normal, 1/3 words at 5 minute recall; unable to spell TABLE backwards. 4/20/2022 did not know date-knew year, incorrect world front and back, would not do serial subtraction, 0/3 word recall.   Language  Language: (normal) language, no dysarthria, (normal) articulation and no dysphasia/aphasia  Cranial Nerves              Eyes: pupils normal size and reactive to light and visual fields appear full              CN III, IV, VI : extraocular muscle strength normal, normal pursuit, no nystagmus and no ptosis              Facial Motor: normal facial motor              CN XII: tongue protrudes midline  Motor/Coordination Exam              Power: 4/5 in all extremities, patient sitting in walker on my exam              Coordination: normal finger-to-nose and forearm rotation intact  Sensory Exam No Bradykinesia, No Dyskindesia, No Tremor, No myoclonus, Normal strength right leg, muscle weakness left leg, Normal Tone Normal bulk and Normal tone                Gait and Stance              Gait/Posture: Using a 4 wheel walker with seat for ambulation      27 Point MMSE Screen:   Orientation (Time): 0/5   Orientation (Place): 3/5   Learning 3 Objects: 3/3   Attention: 3/5   Recall 3 Objects: 2/3   Namin/2   Repitition:    3-Step Command: 1/3   TOTAL: 15/27     /80 (Site: Left Upper Arm, Position: Sitting, Cuff Size: Medium Adult)   Pulse 86   Ht 5' 5\" (1.651 m)   Wt 149 lb (67.6 kg)   SpO2 98%   BMI 24.79 kg/m²     Assessment and Plan     Diagnosis Orders   1. Partial seizures ([address]        2. CVA, old, hemiparesis ([address]        3. Mild cognitive disorder        Alberto Bright was seen in neurological follow up in regards to Seizure, history of CVA with hemiparesis and current falls, MCI. Alebrto Bright is in a wheelchair from recent knee replacement. Her daughter stays with her every night. She remains on Keppra 500 mg twice daily, she denies any breakthrough seizure. She remains on aspirin and statin for secondary stroke prevention, she denies any new stroke/TIA symptoms. She will remain on Aricept and Namenda to help slow the progression of memory loss. We discussed nonpharmacologic interventions including staying active cognitively, socially, and physically to help slow down the progression of memory loss. Return in about 6 months (around 2023).     TARUN Lopez - CNP

## 2023-01-30 NOTE — PATIENT INSTRUCTIONS
Please contact our office around 4/29/2023 to get your follow up appointment made. Our scheduling phone number is [phone]. Thank you!

## 2023-04-17 RX ORDER — NAPROXEN SODIUM 220 MG
TABLET ORAL
Qty: 60 TABLET | Refills: 11 | Status: SHIPPED | OUTPATIENT
Start: 2023-04-17

## 2023-06-26 ENCOUNTER — HOSPITAL ENCOUNTER (OUTPATIENT)
Dept: INFUSION THERAPY | Age: 83
Discharge: HOME | End: 2023-06-26
Payer: MEDICARE

## 2023-06-26 DIAGNOSIS — C21.0 ANAL CANCER (HCC): ICD-10-CM

## 2023-06-26 DIAGNOSIS — D64.9 ANEMIA, UNSPECIFIED TYPE: ICD-10-CM

## 2023-06-26 LAB
ALBUMIN SERPL-MCNC: 4 GM/DL (ref 3.4–5)
ALP BLD-CCNC: 107 IU/L (ref 40–129)
ALT SERPL-CCNC: 22 U/L (ref 10–40)
ANION GAP SERPL CALCULATED.3IONS-SCNC: 11 MMOL/L (ref 4–16)
AST SERPL-CCNC: 16 IU/L (ref 15–37)
BASOPHILS ABSOLUTE: 0 K/CU MM
BASOPHILS RELATIVE PERCENT: 0.1 % (ref 0–1)
BILIRUB SERPL-MCNC: 0.6 MG/DL (ref 0–1)
BUN SERPL-MCNC: 35 MG/DL (ref 6–23)
CALCIUM SERPL-MCNC: 8.9 MG/DL (ref 8.3–10.6)
CEA: 20.2 NG/ML (ref 0–5)
CHLORIDE BLD-SCNC: 101 MMOL/L (ref 99–110)
CO2: 26 MMOL/L (ref 21–32)
CREAT SERPL-MCNC: 0.8 MG/DL (ref 0.6–1.1)
DIFFERENTIAL TYPE: ABNORMAL
EOSINOPHILS ABSOLUTE: 0 K/CU MM
EOSINOPHILS RELATIVE PERCENT: 0.2 % (ref 0–3)
FERRITIN: 205 NG/ML (ref 15–150)
GFR SERPL CREATININE-BSD FRML MDRD: >60 ML/MIN/1.73M2
GLUCOSE SERPL-MCNC: 152 MG/DL (ref 70–99)
HCT VFR BLD CALC: 42.9 % (ref 37–47)
HEMOGLOBIN: 14.1 GM/DL (ref 12.5–16)
IRON: 112 UG/DL (ref 37–145)
LYMPHOCYTES ABSOLUTE: 0.8 K/CU MM
LYMPHOCYTES RELATIVE PERCENT: 6.2 % (ref 24–44)
MCH RBC QN AUTO: 31.5 PG (ref 27–31)
MCHC RBC AUTO-ENTMCNC: 32.9 % (ref 32–36)
MCV RBC AUTO: 96 FL (ref 78–100)
MONOCYTES ABSOLUTE: 0.8 K/CU MM
MONOCYTES RELATIVE PERCENT: 5.9 % (ref 0–4)
PCT TRANSFERRIN: 40 % (ref 10–44)
PDW BLD-RTO: 13.8 % (ref 11.7–14.9)
PLATELET # BLD: 241 K/CU MM (ref 140–440)
PMV BLD AUTO: 9.7 FL (ref 7.5–11.1)
POTASSIUM SERPL-SCNC: 4 MMOL/L (ref 3.5–5.1)
RBC # BLD: 4.47 M/CU MM (ref 4.2–5.4)
SEGMENTED NEUTROPHILS ABSOLUTE COUNT: 11.8 K/CU MM
SEGMENTED NEUTROPHILS RELATIVE PERCENT: 87.6 % (ref 36–66)
SODIUM BLD-SCNC: 138 MMOL/L (ref 135–145)
TOTAL IRON BINDING CAPACITY: 277 UG/DL (ref 250–450)
TOTAL PROTEIN: 5.9 GM/DL (ref 6.4–8.2)
UNSATURATED IRON BINDING CAPACITY: 165 UG/DL (ref 110–370)
WBC # BLD: 13.5 K/CU MM (ref 4–10.5)

## 2023-06-26 PROCEDURE — 80053 COMPREHEN METABOLIC PANEL: CPT

## 2023-06-26 PROCEDURE — 83540 ASSAY OF IRON: CPT

## 2023-06-26 PROCEDURE — 82378 CARCINOEMBRYONIC ANTIGEN: CPT

## 2023-06-26 PROCEDURE — 82728 ASSAY OF FERRITIN: CPT

## 2023-06-26 PROCEDURE — 36415 COLL VENOUS BLD VENIPUNCTURE: CPT

## 2023-06-26 PROCEDURE — 85025 COMPLETE CBC W/AUTO DIFF WBC: CPT

## 2023-06-26 PROCEDURE — 83550 IRON BINDING TEST: CPT

## 2023-06-27 ENCOUNTER — OFFICE VISIT (OUTPATIENT)
Dept: NEUROLOGY | Age: 83
End: 2023-06-27
Payer: MEDICARE

## 2023-06-27 ENCOUNTER — HOSPITAL ENCOUNTER (OUTPATIENT)
Dept: INFUSION THERAPY | Age: 83
Discharge: HOME | End: 2023-06-27
Payer: MEDICARE

## 2023-06-27 ENCOUNTER — OFFICE VISIT (OUTPATIENT)
Dept: ONCOLOGY | Age: 83
End: 2023-06-27
Payer: MEDICARE

## 2023-06-27 VITALS
SYSTOLIC BLOOD PRESSURE: 152 MMHG | HEART RATE: 81 BPM | OXYGEN SATURATION: 99 % | RESPIRATION RATE: 14 BRPM | HEIGHT: 65 IN | BODY MASS INDEX: 24.46 KG/M2 | DIASTOLIC BLOOD PRESSURE: 75 MMHG | TEMPERATURE: 97.6 F

## 2023-06-27 VITALS
HEIGHT: 65 IN | OXYGEN SATURATION: 96 % | DIASTOLIC BLOOD PRESSURE: 70 MMHG | SYSTOLIC BLOOD PRESSURE: 130 MMHG | BODY MASS INDEX: 24.49 KG/M2 | WEIGHT: 147 LBS | HEART RATE: 78 BPM

## 2023-06-27 DIAGNOSIS — R29.898 LEFT ARM WEAKNESS: ICD-10-CM

## 2023-06-27 DIAGNOSIS — R97.0 ELEVATED CEA: Primary | ICD-10-CM

## 2023-06-27 DIAGNOSIS — G47.33 OBSTRUCTIVE SLEEP APNEA: ICD-10-CM

## 2023-06-27 DIAGNOSIS — F09 MILD COGNITIVE DISORDER: ICD-10-CM

## 2023-06-27 DIAGNOSIS — R56.9 PARTIAL SEIZURES (HCC): Primary | ICD-10-CM

## 2023-06-27 DIAGNOSIS — Z86.73 HISTORY OF CVA (CEREBROVASCULAR ACCIDENT): ICD-10-CM

## 2023-06-27 DIAGNOSIS — C21.0 ANAL CANCER (HCC): ICD-10-CM

## 2023-06-27 DIAGNOSIS — I69.359 CVA, OLD, HEMIPARESIS (HCC): ICD-10-CM

## 2023-06-27 PROCEDURE — 3075F SYST BP GE 130 - 139MM HG: CPT | Performed by: NURSE PRACTITIONER

## 2023-06-27 PROCEDURE — 1090F PRES/ABSN URINE INCON ASSESS: CPT | Performed by: INTERNAL MEDICINE

## 2023-06-27 PROCEDURE — 1090F PRES/ABSN URINE INCON ASSESS: CPT | Performed by: NURSE PRACTITIONER

## 2023-06-27 PROCEDURE — G8399 PT W/DXA RESULTS DOCUMENT: HCPCS | Performed by: NURSE PRACTITIONER

## 2023-06-27 PROCEDURE — G8399 PT W/DXA RESULTS DOCUMENT: HCPCS | Performed by: INTERNAL MEDICINE

## 2023-06-27 PROCEDURE — G8420 CALC BMI NORM PARAMETERS: HCPCS | Performed by: INTERNAL MEDICINE

## 2023-06-27 PROCEDURE — 99214 OFFICE O/P EST MOD 30 MIN: CPT | Performed by: NURSE PRACTITIONER

## 2023-06-27 PROCEDURE — 99211 OFF/OP EST MAY X REQ PHY/QHP: CPT

## 2023-06-27 PROCEDURE — 99213 OFFICE O/P EST LOW 20 MIN: CPT | Performed by: INTERNAL MEDICINE

## 2023-06-27 PROCEDURE — 1123F ACP DISCUSS/DSCN MKR DOCD: CPT | Performed by: NURSE PRACTITIONER

## 2023-06-27 PROCEDURE — G8427 DOCREV CUR MEDS BY ELIG CLIN: HCPCS | Performed by: NURSE PRACTITIONER

## 2023-06-27 PROCEDURE — 3078F DIAST BP <80 MM HG: CPT | Performed by: NURSE PRACTITIONER

## 2023-06-27 PROCEDURE — G8427 DOCREV CUR MEDS BY ELIG CLIN: HCPCS | Performed by: INTERNAL MEDICINE

## 2023-06-27 PROCEDURE — 1036F TOBACCO NON-USER: CPT | Performed by: NURSE PRACTITIONER

## 2023-06-27 PROCEDURE — 1123F ACP DISCUSS/DSCN MKR DOCD: CPT | Performed by: INTERNAL MEDICINE

## 2023-06-27 PROCEDURE — 3078F DIAST BP <80 MM HG: CPT | Performed by: INTERNAL MEDICINE

## 2023-06-27 PROCEDURE — G8420 CALC BMI NORM PARAMETERS: HCPCS | Performed by: NURSE PRACTITIONER

## 2023-06-27 PROCEDURE — 3077F SYST BP >= 140 MM HG: CPT | Performed by: INTERNAL MEDICINE

## 2023-06-27 PROCEDURE — 1036F TOBACCO NON-USER: CPT | Performed by: INTERNAL MEDICINE

## 2023-06-27 RX ORDER — MEMANTINE HYDROCHLORIDE 10 MG/1
10 TABLET ORAL 2 TIMES DAILY
Qty: 60 TABLET | Refills: 5 | Status: SHIPPED | OUTPATIENT
Start: 2023-06-27

## 2023-06-27 RX ORDER — DONEPEZIL HYDROCHLORIDE 10 MG/1
10 TABLET, FILM COATED ORAL EVERY MORNING
Qty: 90 TABLET | Refills: 1 | Status: SHIPPED | OUTPATIENT
Start: 2023-06-27

## 2023-06-27 RX ORDER — LEVETIRACETAM 500 MG/1
500 TABLET ORAL 2 TIMES DAILY
Qty: 180 TABLET | Refills: 3 | Status: SHIPPED | OUTPATIENT
Start: 2023-06-27

## 2023-06-27 ASSESSMENT — PATIENT HEALTH QUESTIONNAIRE - PHQ9
1. LITTLE INTEREST OR PLEASURE IN DOING THINGS: 0
SUM OF ALL RESPONSES TO PHQ QUESTIONS 1-9: 0
SUM OF ALL RESPONSES TO PHQ QUESTIONS 1-9: 0
2. FEELING DOWN, DEPRESSED OR HOPELESS: 0
SUM OF ALL RESPONSES TO PHQ QUESTIONS 1-9: 0
SUM OF ALL RESPONSES TO PHQ9 QUESTIONS 1 & 2: 0
SUM OF ALL RESPONSES TO PHQ QUESTIONS 1-9: 0

## 2023-07-05 ENCOUNTER — TELEPHONE (OUTPATIENT)
Dept: ONCOLOGY | Age: 83
End: 2023-07-05

## 2023-07-05 NOTE — TELEPHONE ENCOUNTER
7/5/23 - left message for pt and daughter for the 7/11/23 CT scans at [address] [place] [address] arrival time of 11:00 am and NPO 4 hours prior. I asked pt to call back and confirm the appt at [phone].

## 2023-07-11 ENCOUNTER — HOSPITAL ENCOUNTER (OUTPATIENT)
Dept: CT IMAGING | Age: 83
Discharge: HOME | DRG: 065 | End: 2023-07-11
Payer: MEDICARE

## 2023-07-11 ENCOUNTER — APPOINTMENT (OUTPATIENT)
Dept: GENERAL RADIOLOGY | Age: 83
DRG: 065 | End: 2023-07-11
Attending: EMERGENCY MEDICINE
Payer: MEDICARE

## 2023-07-11 ENCOUNTER — APPOINTMENT (OUTPATIENT)
Dept: CT IMAGING | Age: 83
DRG: 065 | End: 2023-07-11
Payer: MEDICARE

## 2023-07-11 ENCOUNTER — HOSPITAL ENCOUNTER (INPATIENT)
Age: 83
LOS: 2 days | Discharge: NURSING, REHAB OR LONG-TERM CARE | DRG: 065 | End: 2023-07-13
Attending: EMERGENCY MEDICINE | Admitting: STUDENT IN AN ORGANIZED HEALTH CARE EDUCATION/TRAINING PROGRAM
Payer: MEDICARE

## 2023-07-11 DIAGNOSIS — R97.0 ELEVATED CEA: ICD-10-CM

## 2023-07-11 DIAGNOSIS — R53.1 LEFT-SIDED WEAKNESS: Primary | ICD-10-CM

## 2023-07-11 DIAGNOSIS — R53.83 OTHER FATIGUE: ICD-10-CM

## 2023-07-11 DIAGNOSIS — C21.0 ANAL CANCER (HCC): ICD-10-CM

## 2023-07-11 LAB
ALBUMIN SERPL-MCNC: 4.2 GM/DL (ref 3.4–5)
ALP BLD-CCNC: 102 IU/L (ref 40–129)
ALT SERPL-CCNC: 27 U/L (ref 10–40)
ANION GAP SERPL CALCULATED.3IONS-SCNC: 10 MMOL/L (ref 4–16)
AST SERPL-CCNC: 16 IU/L (ref 15–37)
BASOPHILS ABSOLUTE: 0 K/CU MM
BASOPHILS RELATIVE PERCENT: 0.1 % (ref 0–1)
BILIRUB SERPL-MCNC: 0.6 MG/DL (ref 0–1)
BUN SERPL-MCNC: 24 MG/DL (ref 6–23)
CALCIUM SERPL-MCNC: 9.3 MG/DL (ref 8.3–10.6)
CHLORIDE BLD-SCNC: 98 MMOL/L (ref 99–110)
CO2: 26 MMOL/L (ref 21–32)
CREAT SERPL-MCNC: 0.5 MG/DL (ref 0.6–1.1)
DIFFERENTIAL TYPE: ABNORMAL
EOSINOPHILS ABSOLUTE: 0 K/CU MM
EOSINOPHILS RELATIVE PERCENT: 0 % (ref 0–3)
GFR SERPL CREATININE-BSD FRML MDRD: >60 ML/MIN/1.73M2
GLUCOSE BLD-MCNC: 110 MG/DL (ref 70–99)
GLUCOSE SERPL-MCNC: 118 MG/DL (ref 70–99)
HCT VFR BLD CALC: 39.8 % (ref 37–47)
HEMOGLOBIN: 13.2 GM/DL (ref 12.5–16)
IMMATURE NEUTROPHIL %: 0.8 % (ref 0–0.43)
INR BLD: 0.9 INDEX
LYMPHOCYTES ABSOLUTE: 0.6 K/CU MM
LYMPHOCYTES RELATIVE PERCENT: 6.3 % (ref 24–44)
MCH RBC QN AUTO: 31.1 PG (ref 27–31)
MCHC RBC AUTO-ENTMCNC: 33.2 % (ref 32–36)
MCV RBC AUTO: 93.6 FL (ref 78–100)
MONOCYTES ABSOLUTE: 0.8 K/CU MM
MONOCYTES RELATIVE PERCENT: 8.6 % (ref 0–4)
NUCLEATED RBC %: 0 %
PDW BLD-RTO: 13.7 % (ref 11.7–14.9)
PLATELET # BLD: 250 K/CU MM (ref 140–440)
PMV BLD AUTO: 9.5 FL (ref 7.5–11.1)
POTASSIUM SERPL-SCNC: 4.3 MMOL/L (ref 3.5–5.1)
PROTHROMBIN TIME: 12.6 SECONDS (ref 11.7–14.5)
RBC # BLD: 4.25 M/CU MM (ref 4.2–5.4)
SEGMENTED NEUTROPHILS ABSOLUTE COUNT: 7.7 K/CU MM
SEGMENTED NEUTROPHILS RELATIVE PERCENT: 84.2 % (ref 36–66)
SODIUM BLD-SCNC: 134 MMOL/L (ref 135–145)
TOTAL IMMATURE NEUTOROPHIL: 0.07 K/CU MM
TOTAL NUCLEATED RBC: 0 K/CU MM
TOTAL PROTEIN: 5.9 GM/DL (ref 6.4–8.2)
TROPONIN T: <0.01 NG/ML
WBC # BLD: 9.2 K/CU MM (ref 4–10.5)

## 2023-07-11 PROCEDURE — 2580000003 HC RX 258: Performed by: STUDENT IN AN ORGANIZED HEALTH CARE EDUCATION/TRAINING PROGRAM

## 2023-07-11 PROCEDURE — 85025 COMPLETE CBC W/AUTO DIFF WBC: CPT

## 2023-07-11 PROCEDURE — 6360000004 HC RX CONTRAST MEDICATION: Performed by: EMERGENCY MEDICINE

## 2023-07-11 PROCEDURE — 71260 CT THORAX DX C+: CPT

## 2023-07-11 PROCEDURE — 6370000000 HC RX 637 (ALT 250 FOR IP): Performed by: STUDENT IN AN ORGANIZED HEALTH CARE EDUCATION/TRAINING PROGRAM

## 2023-07-11 PROCEDURE — 82962 GLUCOSE BLOOD TEST: CPT

## 2023-07-11 PROCEDURE — 99285 EMERGENCY DEPT VISIT HI MDM: CPT

## 2023-07-11 PROCEDURE — 2500000003 HC RX 250 WO HCPCS: Performed by: INTERNAL MEDICINE

## 2023-07-11 PROCEDURE — 84484 ASSAY OF TROPONIN QUANT: CPT

## 2023-07-11 PROCEDURE — 1200000000 HC SEMI PRIVATE

## 2023-07-11 PROCEDURE — 6370000000 HC RX 637 (ALT 250 FOR IP): Performed by: EMERGENCY MEDICINE

## 2023-07-11 PROCEDURE — 80053 COMPREHEN METABOLIC PANEL: CPT

## 2023-07-11 PROCEDURE — 85610 PROTHROMBIN TIME: CPT

## 2023-07-11 PROCEDURE — 71045 X-RAY EXAM CHEST 1 VIEW: CPT

## 2023-07-11 PROCEDURE — 70450 CT HEAD/BRAIN W/O DYE: CPT

## 2023-07-11 PROCEDURE — 93005 ELECTROCARDIOGRAM TRACING: CPT | Performed by: EMERGENCY MEDICINE

## 2023-07-11 PROCEDURE — 70498 CT ANGIOGRAPHY NECK: CPT

## 2023-07-11 RX ORDER — ACETAMINOPHEN 650 MG/1
650 SUPPOSITORY RECTAL EVERY 6 HOURS PRN
Status: DISCONTINUED | OUTPATIENT
Start: 2023-07-11 | End: 2023-07-13 | Stop reason: HOSPADM

## 2023-07-11 RX ORDER — SODIUM CHLORIDE 9 MG/ML
INJECTION, SOLUTION INTRAVENOUS PRN
Status: DISCONTINUED | OUTPATIENT
Start: 2023-07-11 | End: 2023-07-13 | Stop reason: HOSPADM

## 2023-07-11 RX ORDER — ASPIRIN 81 MG/1
81 TABLET, CHEWABLE ORAL DAILY
Status: DISCONTINUED | OUTPATIENT
Start: 2023-07-12 | End: 2023-07-13 | Stop reason: HOSPADM

## 2023-07-11 RX ORDER — PANTOPRAZOLE SODIUM 40 MG/10ML
40 INJECTION, POWDER, LYOPHILIZED, FOR SOLUTION INTRAVENOUS DAILY
Status: DISCONTINUED | OUTPATIENT
Start: 2023-07-12 | End: 2023-07-13

## 2023-07-11 RX ORDER — MEMANTINE HYDROCHLORIDE 10 MG/1
10 TABLET ORAL 2 TIMES DAILY
Status: DISCONTINUED | OUTPATIENT
Start: 2023-07-11 | End: 2023-07-13 | Stop reason: HOSPADM

## 2023-07-11 RX ORDER — MAGNESIUM SULFATE IN WATER 40 MG/ML
2000 INJECTION, SOLUTION INTRAVENOUS PRN
Status: DISCONTINUED | OUTPATIENT
Start: 2023-07-11 | End: 2023-07-13 | Stop reason: HOSPADM

## 2023-07-11 RX ORDER — CYCLOBENZAPRINE HCL 5 MG
5 TABLET ORAL 3 TIMES DAILY PRN
Status: ON HOLD | COMMUNITY
Start: 2023-05-23

## 2023-07-11 RX ORDER — ONDANSETRON 2 MG/ML
4 INJECTION INTRAMUSCULAR; INTRAVENOUS EVERY 6 HOURS PRN
Status: DISCONTINUED | OUTPATIENT
Start: 2023-07-11 | End: 2023-07-13 | Stop reason: HOSPADM

## 2023-07-11 RX ORDER — DONEPEZIL HYDROCHLORIDE 10 MG/1
10 TABLET, FILM COATED ORAL EVERY MORNING
Status: DISCONTINUED | OUTPATIENT
Start: 2023-07-12 | End: 2023-07-13 | Stop reason: HOSPADM

## 2023-07-11 RX ORDER — SODIUM CHLORIDE 0.9 % (FLUSH) 0.9 %
5-40 SYRINGE (ML) INJECTION PRN
Status: DISCONTINUED | OUTPATIENT
Start: 2023-07-11 | End: 2023-07-13 | Stop reason: HOSPADM

## 2023-07-11 RX ORDER — ONDANSETRON 4 MG/1
4 TABLET, ORALLY DISINTEGRATING ORAL EVERY 8 HOURS PRN
Status: DISCONTINUED | OUTPATIENT
Start: 2023-07-11 | End: 2023-07-13 | Stop reason: HOSPADM

## 2023-07-11 RX ORDER — GABAPENTIN 100 MG/1
100 CAPSULE ORAL DAILY PRN
Status: ON HOLD | COMMUNITY
Start: 2023-05-23

## 2023-07-11 RX ORDER — ENOXAPARIN SODIUM 100 MG/ML
40 INJECTION SUBCUTANEOUS EVERY EVENING
Status: DISCONTINUED | OUTPATIENT
Start: 2023-07-12 | End: 2023-07-13 | Stop reason: HOSPADM

## 2023-07-11 RX ORDER — SODIUM CHLORIDE 0.9 % (FLUSH) 0.9 %
5-40 SYRINGE (ML) INJECTION EVERY 12 HOURS SCHEDULED
Status: DISCONTINUED | OUTPATIENT
Start: 2023-07-11 | End: 2023-07-13 | Stop reason: HOSPADM

## 2023-07-11 RX ORDER — ATORVASTATIN CALCIUM 10 MG/1
20 TABLET, FILM COATED ORAL NIGHTLY
Status: DISCONTINUED | OUTPATIENT
Start: 2023-07-12 | End: 2023-07-13 | Stop reason: HOSPADM

## 2023-07-11 RX ORDER — CALCIUM CARBONATE 500(1250)
500 TABLET ORAL DAILY
Status: DISCONTINUED | OUTPATIENT
Start: 2023-07-11 | End: 2023-07-13 | Stop reason: HOSPADM

## 2023-07-11 RX ORDER — ASPIRIN 81 MG/1
324 TABLET, CHEWABLE ORAL ONCE
Status: COMPLETED | OUTPATIENT
Start: 2023-07-11 | End: 2023-07-11

## 2023-07-11 RX ORDER — POTASSIUM CHLORIDE 7.45 MG/ML
10 INJECTION INTRAVENOUS PRN
Status: DISCONTINUED | OUTPATIENT
Start: 2023-07-11 | End: 2023-07-13 | Stop reason: HOSPADM

## 2023-07-11 RX ORDER — ACETAMINOPHEN 325 MG/1
650 TABLET ORAL EVERY 6 HOURS PRN
Status: DISCONTINUED | OUTPATIENT
Start: 2023-07-11 | End: 2023-07-13 | Stop reason: HOSPADM

## 2023-07-11 RX ORDER — POLYETHYLENE GLYCOL 3350 17 G/17G
17 POWDER, FOR SOLUTION ORAL DAILY PRN
Status: DISCONTINUED | OUTPATIENT
Start: 2023-07-11 | End: 2023-07-13 | Stop reason: HOSPADM

## 2023-07-11 RX ORDER — POTASSIUM CHLORIDE 750 MG/1
10 CAPSULE, EXTENDED RELEASE ORAL DAILY
Status: ON HOLD | COMMUNITY
Start: 2023-05-28

## 2023-07-11 RX ORDER — DILTIAZEM HYDROCHLORIDE 240 MG/1
240 CAPSULE, COATED, EXTENDED RELEASE ORAL DAILY
Status: DISCONTINUED | OUTPATIENT
Start: 2023-07-12 | End: 2023-07-13 | Stop reason: HOSPADM

## 2023-07-11 RX ORDER — FOLIC ACID 1 MG/1
500 TABLET ORAL EVERY MORNING
Status: DISCONTINUED | OUTPATIENT
Start: 2023-07-12 | End: 2023-07-13 | Stop reason: HOSPADM

## 2023-07-11 RX ORDER — GABAPENTIN 400 MG/1
400 CAPSULE ORAL NIGHTLY
Status: DISCONTINUED | OUTPATIENT
Start: 2023-07-11 | End: 2023-07-13 | Stop reason: HOSPADM

## 2023-07-11 RX ORDER — SODIUM CHLORIDE, SODIUM LACTATE, POTASSIUM CHLORIDE, CALCIUM CHLORIDE 600; 310; 30; 20 MG/100ML; MG/100ML; MG/100ML; MG/100ML
INJECTION, SOLUTION INTRAVENOUS CONTINUOUS
Status: ACTIVE | OUTPATIENT
Start: 2023-07-11 | End: 2023-07-12

## 2023-07-11 RX ORDER — LEVETIRACETAM 500 MG/1
500 TABLET ORAL 2 TIMES DAILY
Status: DISCONTINUED | OUTPATIENT
Start: 2023-07-11 | End: 2023-07-13 | Stop reason: HOSPADM

## 2023-07-11 RX ORDER — TRIAMTERENE AND HYDROCHLOROTHIAZIDE 37.5; 25 MG/1; MG/1
1 TABLET ORAL DAILY
Status: DISCONTINUED | OUTPATIENT
Start: 2023-07-12 | End: 2023-07-13 | Stop reason: HOSPADM

## 2023-07-11 RX ORDER — POTASSIUM CHLORIDE 750 MG/1
10 TABLET, FILM COATED, EXTENDED RELEASE ORAL DAILY
Status: DISCONTINUED | OUTPATIENT
Start: 2023-07-12 | End: 2023-07-13 | Stop reason: HOSPADM

## 2023-07-11 RX ADMIN — LEVETIRACETAM 500 MG: 500 TABLET, FILM COATED ORAL at 21:53

## 2023-07-11 RX ADMIN — ASPIRIN 324 MG: 81 TABLET, CHEWABLE ORAL at 19:44

## 2023-07-11 RX ADMIN — SODIUM CHLORIDE, PRESERVATIVE FREE 10 ML: 5 INJECTION INTRAVENOUS at 21:10

## 2023-07-11 RX ADMIN — SODIUM CHLORIDE, POTASSIUM CHLORIDE, SODIUM LACTATE AND CALCIUM CHLORIDE: 600; 310; 30; 20 INJECTION, SOLUTION INTRAVENOUS at 21:10

## 2023-07-11 RX ADMIN — BARIUM SULFATE 450 ML: 1 SUSPENSION ORAL at 16:57

## 2023-07-11 RX ADMIN — GABAPENTIN 400 MG: 400 CAPSULE ORAL at 21:53

## 2023-07-11 RX ADMIN — MEMANTINE 10 MG: 10 TABLET ORAL at 21:53

## 2023-07-11 RX ADMIN — ACETAMINOPHEN 650 MG: 325 TABLET ORAL at 21:53

## 2023-07-11 RX ADMIN — CALCIUM 500 MG: 500 TABLET ORAL at 21:53

## 2023-07-11 RX ADMIN — IOPAMIDOL 75 ML: 755 INJECTION, SOLUTION INTRAVENOUS at 16:54

## 2023-07-11 ASSESSMENT — PAIN - FUNCTIONAL ASSESSMENT: PAIN_FUNCTIONAL_ASSESSMENT: NONE - DENIES PAIN

## 2023-07-11 ASSESSMENT — PAIN DESCRIPTION - PAIN TYPE
TYPE: CHRONIC PAIN
TYPE: CHRONIC PAIN

## 2023-07-11 ASSESSMENT — PAIN DESCRIPTION - ORIENTATION
ORIENTATION: RIGHT;LEFT
ORIENTATION: RIGHT;LEFT

## 2023-07-11 ASSESSMENT — PAIN SCALES - GENERAL
PAINLEVEL_OUTOF10: 3
PAINLEVEL_OUTOF10: 3

## 2023-07-11 ASSESSMENT — PAIN DESCRIPTION - LOCATION
LOCATION: ARM;LEG
LOCATION: HAND;ARM

## 2023-07-11 ASSESSMENT — PAIN DESCRIPTION - DESCRIPTORS
DESCRIPTORS: ACHING
DESCRIPTORS: ACHING

## 2023-07-11 NOTE — ED NOTES
Dr. Sri Lzaaro came to  bedside to update pt and family. This RN assisted pt to and from bedside commode. Pt will need to be AT LEAST a 2 assist if she should get up to the restroom again. This RN placed a purewick on her. Pt noted to have a pressure sore on sacrum. Pt daughter states she has had a pressure sore for quite some time and it was improving but it has gotten worse recently.       Shaniqua Fisher RN  07/11/23 [id number]

## 2023-07-11 NOTE — ED NOTES
ED TO INPATIENT SBAR HANDOFF    Patient Name: Mack Lyons   :  1940  82 y.o. Preferred Name  [facility]  Family/Caregiver Present no   Restraints no   C-SSRS: Risk of Suicide: No Risk  Sitter no   Sepsis Risk Score Sepsis Risk Score: 1      Situation  Chief Complaint   Patient presents with    Extremity Weakness     Left arm and leg x 2 weeks     Brief Description of Patient's Condition: pt to ED from outpatient imaging. Pt had been having weakness to upper and lower extremities on the L side x 2 weeks. Pt has been having difficulty ambulating, transferring and standing. Daughter has been using a gait belt to help her. Pt denies numbness or tingling, has full sensation. Pt daughter Paul Diggs at bedside states that she has been reaching out to her primary doctors and felt that no one would listen to her. Mental Status: oriented  Arrived from: home    Imaging:   CTA HEAD NECK W CONTRAST   Final Result   1. No acute intracranial abnormality on noncontrast CT head. Moderate to   severe chronic microvascular disease within the periventricular white matter. Mild right parietal lobe encephalomalacia. 2. No intracranial large vessel occlusion or aneurysm. 3. No significant cervical carotid or vertebral artery stenosis. The left   vertebral artery terminates in PICA. CT HEAD WO CONTRAST   Final Result   1. No acute intracranial abnormality on noncontrast CT head. Moderate to   severe chronic microvascular disease within the periventricular white matter. Mild right parietal lobe encephalomalacia. 2. No intracranial large vessel occlusion or aneurysm. 3. No significant cervical carotid or vertebral artery stenosis. The left   vertebral artery terminates in PICA. XR CHEST PORTABLE   Final Result   Subjective increased rounded soft tissue density in the retrocardiac region   at the level of the thoracoabdominal junction.   Differential considerations   include hiatal hernia,

## 2023-07-11 NOTE — ED PROVIDER NOTES
Triage Chief Complaint:   Extremity Weakness (Left arm and leg x 2 weeks)    [place]:  Sandra Lopez is a 82 y.o. female that presents with left arm and leg weakness for the past 2 weeks. Patient was in baseline state of health until roughly 2 weeks ago when the above started. Symptoms have been progressively getting worse. Arm weakness is to the point where patient can no longer raise a cup to her mouth to drink. Additionally, patient is too weak now to bear weight. Patient has a history of CVA with no residual deficits. Patient is on aspirin only for anticoagulation. Daughter is concerned that patient may have had a stroke. Patient does have history of anal cancer status post treatment remotely. No current chemotherapy or radiation therapy.     ROS:  General:  No fevers, no chills, no weakness  Eyes:  No recent vison changes, no discharge  ENT:  No sore throat, no nasal congestion, no hearing changes  Cardiovascular:  No chest pain, no palpitations  Respiratory:  No shortness of breath, no cough, no wheezing  Gastrointestinal:  No pain, no nausea, no vomiting, no diarrhea  Musculoskeletal:  No muscle pain, no joint pain  Skin:  No rash, no pruritis, no easy bruising  Neurologic:  No speech problems, no headache, no extremity numbness, no extremity tingling, + extremity weakness  Psychiatric:  No anxiety  Genitourinary:  No dysuria, no hematuria  Endocrine:  No unexpected weight gain, no unexpected weight loss  Extremities:  no edema, no pain    Past Medical History:   Diagnosis Date    Acid reflux     Anxiety     Bronchitis In Past    Cancer ([address])     colon cancer    Carotid stenosis 11/10/2015    Cerebral embolism with cerebral infarction ([address]) 09/18/2015    Seizure X 1, No Residual    Chronic back pain     Depression     History of blood transfusion     History of external beam radiation therapy 2017    5400 cGy pelvis/anal canal in 2017 per Dr. Nghia Preciado at [facility]    Hyperlipidemia     Hypertension

## 2023-07-11 NOTE — ED NOTES
Medication History  [facility]    Patient Name: Richelle Benedict 1940     Medication history has been completed by: Oli Morrow CPhT    Source(s) of information: patient's family and insurance claims     Primary Care Physician: Dale Isaac Dr: [facility]    Allergies as of 07/11/2023 - Fully Reviewed 07/11/2023   Allergen Reaction Noted    Cortisone Rash     Codeine  11/16/2015    Pcn [penicillins]          Prior to Admission medications    Medication Sig Start Date End Date Taking? Authorizing Provider   gabapentin (NEURONTIN) 100 MG capsule Take 1 capsule by mouth daily as needed. 07/11/23 Takes in the morning as needed only 5/23/23   Historical Provider, MD   potassium chloride (MICRO-K) 10 MEQ extended release capsule Take 1 capsule by mouth daily 5/28/23   Historical Provider, MD   cyclobenzaprine (FLEXERIL) 5 MG tablet Take 1 tablet by mouth 3 times daily as needed 5/23/23   Historical Provider, MD   memantine (NAMENDA) 10 MG tablet Take 1 tablet by mouth 2 times daily 6/27/23   Rhea Saldaña APRN - CNP   levETIRAcetam (KEPPRA) 500 MG tablet Take 1 tablet by mouth 2 times daily 6/27/23   Rhea Saldaña APRN - CNP   donepezil (ARICEPT) 10 MG tablet Take 1 tablet by mouth every morning 6/27/23   Rhea Rho APRN - CNP   RA SENNA 8.6 MG tablet take 1 tablet by mouth twice a day  Patient not taking: Reported on 7/11/2023 4/17/23   Carlee Holguin APRN - CNP   dilTIAZem (DILT-XR) 240 MG extended release capsule Take 1 capsule by mouth daily 10/6/22   Claudia Gerardo APRN - CNP   gabapentin (NEURONTIN) 400 MG capsule Take 1 capsule by mouth nightly.  07/11/23 Can take 400 mg in the morning as needed 10/5/22   Historical Provider, MD Bebeto Stevenson 0.44-20.6 % OINT ointment APPLY TO AFFECTED AREA ON SACRUM TWICE DAILY AS NEEDED 9/23/22   Historical Provider, MD   celecoxib (CELEBREX) 200 MG capsule take 1 capsule by mouth once daily STOP THE

## 2023-07-12 ENCOUNTER — APPOINTMENT (OUTPATIENT)
Dept: MRI IMAGING | Age: 83
DRG: 065 | End: 2023-07-12
Payer: MEDICARE

## 2023-07-12 LAB
25(OH)D3 SERPL-MCNC: 37.93 NG/ML
ANION GAP SERPL CALCULATED.3IONS-SCNC: 7 MMOL/L (ref 4–16)
BASOPHILS ABSOLUTE: 0 K/CU MM
BASOPHILS RELATIVE PERCENT: 0.2 % (ref 0–1)
BUN SERPL-MCNC: 14 MG/DL (ref 6–23)
CALCIUM SERPL-MCNC: 8.6 MG/DL (ref 8.3–10.6)
CHLORIDE BLD-SCNC: 103 MMOL/L (ref 99–110)
CHOLEST SERPL-MCNC: 143 MG/DL
CO2: 30 MMOL/L (ref 21–32)
CREAT SERPL-MCNC: 0.4 MG/DL (ref 0.6–1.1)
DIFFERENTIAL TYPE: ABNORMAL
EOSINOPHILS ABSOLUTE: 0 K/CU MM
EOSINOPHILS RELATIVE PERCENT: 0.7 % (ref 0–3)
ESTIMATED AVERAGE GLUCOSE: 160 MG/DL
FOLATE SERPL-MCNC: 14 NG/ML (ref 3.1–17.5)
GFR SERPL CREATININE-BSD FRML MDRD: >60 ML/MIN/1.73M2
GLUCOSE SERPL-MCNC: 84 MG/DL (ref 70–99)
HBA1C MFR BLD: 7.2 % (ref 4.2–6.3)
HCT VFR BLD CALC: 37.5 % (ref 37–47)
HDLC SERPL-MCNC: 79 MG/DL
HEMOGLOBIN: 12 GM/DL (ref 12.5–16)
IMMATURE NEUTROPHIL %: 1.6 % (ref 0–0.43)
LDLC SERPL CALC-MCNC: 48 MG/DL
LV EF: 58 %
LVEF MODALITY: NORMAL
LYMPHOCYTES ABSOLUTE: 0.7 K/CU MM
LYMPHOCYTES RELATIVE PERCENT: 13.3 % (ref 24–44)
MCH RBC QN AUTO: 30.7 PG (ref 27–31)
MCHC RBC AUTO-ENTMCNC: 32 % (ref 32–36)
MCV RBC AUTO: 95.9 FL (ref 78–100)
MONOCYTES ABSOLUTE: 0.4 K/CU MM
MONOCYTES RELATIVE PERCENT: 7.7 % (ref 0–4)
NUCLEATED RBC %: 0 %
PDW BLD-RTO: 13.8 % (ref 11.7–14.9)
PLATELET # BLD: 223 K/CU MM (ref 140–440)
PMV BLD AUTO: 9.3 FL (ref 7.5–11.1)
POTASSIUM SERPL-SCNC: 3.9 MMOL/L (ref 3.5–5.1)
RBC # BLD: 3.91 M/CU MM (ref 4.2–5.4)
SEGMENTED NEUTROPHILS ABSOLUTE COUNT: 4.2 K/CU MM
SEGMENTED NEUTROPHILS RELATIVE PERCENT: 76.5 % (ref 36–66)
SODIUM BLD-SCNC: 140 MMOL/L (ref 135–145)
TOTAL IMMATURE NEUTOROPHIL: 0.09 K/CU MM
TOTAL NUCLEATED RBC: 0 K/CU MM
TRIGL SERPL-MCNC: 82 MG/DL
TSH SERPL DL<=0.005 MIU/L-ACNC: 2.58 UIU/ML (ref 0.27–4.2)
VITAMIN B-12: 844 PG/ML (ref 211–911)
WBC # BLD: 5.5 K/CU MM (ref 4–10.5)

## 2023-07-12 PROCEDURE — A9579 GAD-BASE MR CONTRAST NOS,1ML: HCPCS | Performed by: NURSE PRACTITIONER

## 2023-07-12 PROCEDURE — 97112 NEUROMUSCULAR REEDUCATION: CPT

## 2023-07-12 PROCEDURE — 82607 VITAMIN B-12: CPT

## 2023-07-12 PROCEDURE — 80048 BASIC METABOLIC PNL TOTAL CA: CPT

## 2023-07-12 PROCEDURE — 97530 THERAPEUTIC ACTIVITIES: CPT

## 2023-07-12 PROCEDURE — 83519 RIA NONANTIBODY: CPT

## 2023-07-12 PROCEDURE — 99211 OFF/OP EST MAY X REQ PHY/QHP: CPT

## 2023-07-12 PROCEDURE — 97162 PT EVAL MOD COMPLEX 30 MIN: CPT

## 2023-07-12 PROCEDURE — 92610 EVALUATE SWALLOWING FUNCTION: CPT | Performed by: SPEECH-LANGUAGE PATHOLOGIST

## 2023-07-12 PROCEDURE — 85025 COMPLETE CBC W/AUTO DIFF WBC: CPT

## 2023-07-12 PROCEDURE — 80061 LIPID PANEL: CPT

## 2023-07-12 PROCEDURE — 97166 OT EVAL MOD COMPLEX 45 MIN: CPT

## 2023-07-12 PROCEDURE — 2580000003 HC RX 258: Performed by: STUDENT IN AN ORGANIZED HEALTH CARE EDUCATION/TRAINING PROGRAM

## 2023-07-12 PROCEDURE — 6370000000 HC RX 637 (ALT 250 FOR IP): Performed by: STUDENT IN AN ORGANIZED HEALTH CARE EDUCATION/TRAINING PROGRAM

## 2023-07-12 PROCEDURE — 84443 ASSAY THYROID STIM HORMONE: CPT

## 2023-07-12 PROCEDURE — 36415 COLL VENOUS BLD VENIPUNCTURE: CPT

## 2023-07-12 PROCEDURE — 6370000000 HC RX 637 (ALT 250 FOR IP): Performed by: INTERNAL MEDICINE

## 2023-07-12 PROCEDURE — 86255 FLUORESCENT ANTIBODY SCREEN: CPT

## 2023-07-12 PROCEDURE — 82306 VITAMIN D 25 HYDROXY: CPT

## 2023-07-12 PROCEDURE — 70553 MRI BRAIN STEM W/O & W/DYE: CPT

## 2023-07-12 PROCEDURE — 93306 TTE W/DOPPLER COMPLETE: CPT

## 2023-07-12 PROCEDURE — 6360000004 HC RX CONTRAST MEDICATION: Performed by: NURSE PRACTITIONER

## 2023-07-12 PROCEDURE — 83036 HEMOGLOBIN GLYCOSYLATED A1C: CPT

## 2023-07-12 PROCEDURE — 95819 EEG AWAKE AND ASLEEP: CPT

## 2023-07-12 PROCEDURE — 82746 ASSAY OF FOLIC ACID SERUM: CPT

## 2023-07-12 PROCEDURE — 97535 SELF CARE MNGMENT TRAINING: CPT

## 2023-07-12 PROCEDURE — 6360000002 HC RX W HCPCS: Performed by: STUDENT IN AN ORGANIZED HEALTH CARE EDUCATION/TRAINING PROGRAM

## 2023-07-12 PROCEDURE — 94761 N-INVAS EAR/PLS OXIMETRY MLT: CPT

## 2023-07-12 PROCEDURE — 99223 1ST HOSP IP/OBS HIGH 75: CPT | Performed by: STUDENT IN AN ORGANIZED HEALTH CARE EDUCATION/TRAINING PROGRAM

## 2023-07-12 PROCEDURE — 1200000000 HC SEMI PRIVATE

## 2023-07-12 RX ADMIN — CALCIUM 500 MG: 500 TABLET ORAL at 21:01

## 2023-07-12 RX ADMIN — LEVETIRACETAM 500 MG: 500 TABLET, FILM COATED ORAL at 21:01

## 2023-07-12 RX ADMIN — MEMANTINE 10 MG: 10 TABLET ORAL at 21:41

## 2023-07-12 RX ADMIN — GADOTERIDOL 13 ML: 279.3 INJECTION, SOLUTION INTRAVENOUS at 14:34

## 2023-07-12 RX ADMIN — COLLAGENASE SANTYL: 250 OINTMENT TOPICAL at 15:48

## 2023-07-12 RX ADMIN — ATORVASTATIN CALCIUM 20 MG: 10 TABLET, FILM COATED ORAL at 21:01

## 2023-07-12 RX ADMIN — ENOXAPARIN SODIUM 40 MG: 100 INJECTION SUBCUTANEOUS at 18:39

## 2023-07-12 RX ADMIN — PREDNISONE 15 MG: 5 TABLET ORAL at 09:32

## 2023-07-12 RX ADMIN — TRIAMTERENE AND HYDROCHLOROTHIAZIDE 1 TABLET: 37.5; 25 TABLET ORAL at 09:33

## 2023-07-12 RX ADMIN — LEVETIRACETAM 500 MG: 500 TABLET, FILM COATED ORAL at 09:32

## 2023-07-12 RX ADMIN — MEMANTINE 10 MG: 10 TABLET ORAL at 09:33

## 2023-07-12 RX ADMIN — GABAPENTIN 400 MG: 400 CAPSULE ORAL at 21:01

## 2023-07-12 RX ADMIN — DILTIAZEM HYDROCHLORIDE 240 MG: 240 CAPSULE, COATED, EXTENDED RELEASE ORAL at 09:32

## 2023-07-12 RX ADMIN — ASPIRIN 81 MG: 81 TABLET, CHEWABLE ORAL at 09:33

## 2023-07-12 RX ADMIN — ACETAMINOPHEN 650 MG: 325 TABLET ORAL at 21:00

## 2023-07-12 RX ADMIN — SODIUM CHLORIDE, PRESERVATIVE FREE 10 ML: 5 INJECTION INTRAVENOUS at 21:01

## 2023-07-12 RX ADMIN — SODIUM CHLORIDE, PRESERVATIVE FREE 10 ML: 5 INJECTION INTRAVENOUS at 09:41

## 2023-07-12 RX ADMIN — DONEPEZIL HYDROCHLORIDE 10 MG: 10 TABLET ORAL at 09:33

## 2023-07-12 RX ADMIN — POTASSIUM CHLORIDE 10 MEQ: 750 TABLET, FILM COATED, EXTENDED RELEASE ORAL at 09:33

## 2023-07-12 RX ADMIN — FOLIC ACID 500 MCG: 1 TABLET ORAL at 09:32

## 2023-07-12 ASSESSMENT — PAIN DESCRIPTION - DESCRIPTORS: DESCRIPTORS: ACHING

## 2023-07-12 ASSESSMENT — PAIN DESCRIPTION - ORIENTATION: ORIENTATION: RIGHT;LEFT

## 2023-07-12 ASSESSMENT — PAIN DESCRIPTION - PAIN TYPE: TYPE: CHRONIC PAIN

## 2023-07-12 ASSESSMENT — PAIN SCALES - GENERAL: PAINLEVEL_OUTOF10: 7

## 2023-07-12 ASSESSMENT — PAIN DESCRIPTION - LOCATION: LOCATION: ARM;HAND;LEG

## 2023-07-12 NOTE — PLAN OF CARE
Problem: Discharge Planning  Goal: Discharge to home or other facility with appropriate resources  Outcome: Progressing  Flowsheets (Taken 7/11/2023 2052)  Discharge to home or other facility with appropriate resources:   Identify barriers to discharge with patient and caregiver   Arrange for needed discharge resources and transportation as appropriate   Identify discharge learning needs (meds, wound care, etc)     Problem: Safety - Adult  Goal: Free from fall injury  Outcome: Progressing     Problem: Pain  Goal: Verbalizes/displays adequate comfort level or baseline comfort level  Outcome: Progressing     Problem: Skin/Tissue Integrity  Goal: Absence of new skin breakdown  Description: 1. Monitor for areas of redness and/or skin breakdown  2. Assess vascular access sites hourly  3. Every 4-6 hours minimum:  Change oxygen saturation probe site  4. Every 4-6 hours:  If on nasal continuous positive airway pressure, respiratory therapy assess nares and determine need for appliance change or resting period.   Outcome: Progressing     Problem: ABCDS Injury Assessment  Goal: Absence of physical injury  Outcome: Progressing

## 2023-07-12 NOTE — H&P
XR CHEST PORTABLE   Final Result   Subjective increased rounded soft tissue density in the retrocardiac region   at the level of the thoracoabdominal junction. Differential considerations   include hiatal hernia, gastroesophageal junction mass should be considered. Thoracic aortic CTA and/or IV contrast-enhanced abdomen CT suggested for   further evaluation.              Corinne Harmon MD  07/11/23 8:08 PM

## 2023-07-12 NOTE — CARE COORDINATION
07/12/23 1418   Service Assessment   Patient Orientation Unable to Assess   Primary Caregiver Family   Support Systems Family Members   Patient's Healthcare Decision Maker is: Legal Next of [facility]   PCP Verified by CM Yes   Prior Functional Level Assistance with the following:;Mobility   Current Functional Level Assistance with the following:;Mobility   Can patient return to prior living arrangement Unknown at present   Ability to make needs known: Good   Family able to assist with home care needs: Yes   Would you like for me to discuss the discharge plan with any other family members/significant others, and if so, who? Yes   Financial Resources [employer] Resources None     CM in to see Pt to initiate discharge planning. Pt out of room at MRI. Discharge planning discussed with Pt daughter Orly Santos. Pt from home with support of family. Referral made to Daniella/TC to follow at request of Orly Santos.   CM following

## 2023-07-12 NOTE — CARE COORDINATION
MCG criteria for Stroke R/O, NIHSS>2 reviewed at this time, criteria supports Inpatient Admission. CHIP,RN/CM

## 2023-07-12 NOTE — CONSULTS
[facility] Continence Nurse  Consult Note       Pamela Baptiste  AGE: 82 y.o.    GENDER: female  : 1940  TODAY'S DATE:  2023    Subjective:     Reason for  Evaluation and Assessment:  wound care evalNoemi Baptiste is a 82 y.o. female referred by:   [x] Physician  [] Nursing  [] Other:     Wound Identification:  Wound Type: pressure  Contributing Factors: chronic pressure, decreased mobility, and malnutrition        PAST MEDICAL HISTORY        Diagnosis Date    Acid reflux     Anxiety     Bronchitis In Past    Cancer ([address])     colon cancer    Carotid stenosis 11/10/2015    Cerebral embolism with cerebral infarction ([address]) 2015    Seizure X 1, No Residual    Chronic back pain     Depression     History of blood transfusion     History of external beam radiation therapy 2017    5400 cGy pelvis/anal canal in 2017 per Dr. Mia Guidry at [facility]    Hyperlipidemia     Hypertension     Prolonged emergence from general anesthesia     RA (rheumatoid arthritis) ([address])     \"All Over\"    Seizure ([address]) 09/18/2015    X 1    Sleep apnea     Uses CPAP    Teeth missing     Upper And Lower    Urinary incontinence     UTI (urinary tract infection) In Past    No Current Symptoms    Wears dentures     Full Upper , Partial Lower    Wears glasses     Wears partial dentures     Lower       PAST SURGICAL HISTORY    Past Surgical History:   Procedure Laterality Date    ARTHROCENTESIS / ASPIRATION / INJECTION KNEE  2021         CAROTID ENDARTERECTOMY Right 2015    CARPAL TUNNEL RELEASE      COLONOSCOPY  In Past    DENTAL SURGERY      Teeth Extracted In Past    DILATION AND CURETTAGE OF UTERUS  's    Prior To Vaginal Hysterectomy    HYSTERECTOMY, VAGINAL  's    JOINT REPLACEMENT      PORT SURGERY N/A 2021    PORT REMOVAL performed by Brenda Ly MD at [address] [place] [address] 7    Family History   Problem Relation Age of Onset    Stroke Mother     Heart Disease Mother     Arthritis
[facility] ACUTE CARE OCCUPATIONAL THERAPY EVALUATION    Mack Lyons, 1940, 3026/3026-A, 7/12/2023      Discharge Recommendation: Swingbed     History:  [place]:  The primary encounter diagnosis was Left-sided weakness. A diagnosis of Other fatigue was also pertinent to this visit.   Past Medical History:   Diagnosis Date    Acid reflux     Anxiety     Bronchitis In Past    Cancer ([address])     colon cancer    Carotid stenosis 11/10/2015    Cerebral embolism with cerebral infarction ([address]) 09/18/2015    Seizure X 1, No Residual    Chronic back pain     Depression     History of blood transfusion     History of external beam radiation therapy 2017    5400 cGy pelvis/anal canal in 2017 per Dr. Heike Amin at [facility]    Hyperlipidemia     Hypertension     Prolonged emergence from general anesthesia     RA (rheumatoid arthritis) ([address])     \"All Over\"    Seizure ([address]) 09/18/2015    X 1    Sleep apnea     Uses CPAP    Teeth missing     Upper And Lower    Urinary incontinence     UTI (urinary tract infection) In Past    No Current Symptoms    Wears dentures     Full Upper , Partial Lower    Wears glasses     Wears partial dentures     Lower         Subjective:  Patient states: \"I usually use a walker\"  Pain: denied   Communication with other providers: RN approved roberto, PT, handoff to RN   Restrictions: general precautions, fall risk  Daughter at bedside    Home Setup/Prior level of function:  Social/Functional History  Lives With: Son  Type of Home: House  Home Layout: One level  Home Access: Stairs to enter without rails  Entrance Stairs - Number of Steps: 1  Bathroom Shower/Tub: Tub/Shower unit  Bathroom Toilet: Handicap height  Bathroom Equipment: Shower chair, Grab bars in shower, 3-in-1 commode  Home Equipment: [facility], [address] [facility]  Receives Help From: Family  ADL Assistance: Needs assistance  Homemaking Assistance: Needs assistance (family)  Ambulation Assistance: Needs assistance
[facility] ACUTE CARE PHYSICAL THERAPY EVALUATION  Debbie Rocha, 1940, 3026/3026-A, 7/12/2023    History  [place]:  The primary encounter diagnosis was Left-sided weakness. A diagnosis of Other fatigue was also pertinent to this visit. Patient  has a past medical history of Acid reflux, Anxiety, Bronchitis, Cancer ([address]), Carotid stenosis, Cerebral embolism with cerebral infarction ([address]), Chronic back pain, Depression, History of blood transfusion, History of external beam radiation therapy, Hyperlipidemia, Hypertension, Prolonged emergence from general anesthesia, RA (rheumatoid arthritis) ([address]), Seizure ([address]), Sleep apnea, Teeth missing, Urinary incontinence, UTI (urinary tract infection), Wears dentures, Wears glasses, and Wears partial dentures. Patient  has a past surgical history that includes Carpal tunnel release; Dental surgery; Colonoscopy (In Past); Hysterectomy, vaginal (1960's); Dilation and curettage of uterus (1960's); Carotid endarterectomy (Right, 11/18/2015); [address] Surgery (N/A, 03/02/2021); ARTHROCENTESIS / ASPIRATION / INJECTION KNEE (05/11/2021); and joint replacement. Subjective:    Patient states: \"Let's try again. \"      Pain:  denies pain.       Communication with other providers:  Handoff to RN, OT    Restrictions: general precautions, fall risk    Home Setup/Prior level of function  Social/Functional History  Lives With: Son  Type of Home: House  Home Layout: One level  Home Access: Stairs to enter without rails  Entrance Stairs - Number of Steps: 1  Bathroom Shower/Tub: Tub/Shower unit  Bathroom Toilet: Handicap height  Bathroom Equipment: Shower chair, Grab bars in shower, 3-in-1 commode  Home Equipment: Froylan Scales, [facility], [address], rob Mathews  Receives Help From: Family  ADL Assistance: Needs assistance  Homemaking Assistance: Needs assistance (family)  Ambulation Assistance: Needs assistance (typically [place] w/ cane, WC in past few weeks)  Transfer
extremities are equally weak. Left sided weakness worsening over months likely secondary to subacute stroke cannot fully exclude seizure with Justin's paralysis, myasthenia gravis given progressive weakening during the course of the day. Neuroimaging as above  MRI brain w/wo contrast pending  Echocardiogram completed pending final read  Routine EEG completed pending final read  Continue Keppra 500 mg twice daily  MG panel ordered  Continue aspirin, atorvastatin for secondary stroke prevention  PT/OT/ST as recommended  Continue Aricept, Namenda for cognitive decline  A1C 7.2, TSH 2.580, LDL 48  Further recommendations pending completion of neurodiagnostics. Patient was discussed with attending neurologist Dr. Ani Palacios      Thank you for allowing us to participate in the care of your patient. If there are any questions regarding evaluation please feel free to contact us. mEili Carr, TARUN - CNP, 7/12/2023     Attending Addendum:    I have discussed this patient with the mid-level provider. I have personally seen and examined the patient and reviewed the diagnostic testing and any changes in the history or physical exam are documented above. I agree with the plan of care as documented. I have evaluated/treated an acute/chronic illness/injury that poses threat to life or bodily function and/or Chronic illness with severe exacerbation, or treatment of side effect in this encounter. I have performed a substantive portion of this shared visit, with more than 50% of the time spent in face-to-face and in direct patient care, including obtaining critical elements of the history, performing a physical exam, reviewing laboratory and radiological data, medical decision-making, coordinating patient care, discussing the plan of care with the patient, and documentation. The above was discussed and reviewed with the CEZAR. I had a long conversation with the patients daughter at bedside.  Patient with left sided

## 2023-07-13 ENCOUNTER — HOSPITAL ENCOUNTER (INPATIENT)
Age: 83
DRG: 057 | End: 2023-07-13
Attending: PHYSICAL MEDICINE & REHABILITATION | Admitting: PHYSICAL MEDICINE & REHABILITATION
Payer: MEDICARE

## 2023-07-13 VITALS
RESPIRATION RATE: 16 BRPM | HEART RATE: 75 BPM | DIASTOLIC BLOOD PRESSURE: 59 MMHG | TEMPERATURE: 99 F | SYSTOLIC BLOOD PRESSURE: 116 MMHG | BODY MASS INDEX: 23.64 KG/M2 | WEIGHT: 138.5 LBS | HEIGHT: 64 IN | OXYGEN SATURATION: 97 %

## 2023-07-13 DIAGNOSIS — I69.354 HEMIPARESIS OF LEFT NONDOMINANT SIDE AS LATE EFFECT OF CEREBRAL INFARCTION (HCC): Primary | ICD-10-CM

## 2023-07-13 PROBLEM — F41.8 DEPRESSION WITH ANXIETY: Status: ACTIVE | Noted: 2023-07-13

## 2023-07-13 PROBLEM — I63.9 DYSARTHRIA DUE TO ACUTE STROKE (HCC): Status: ACTIVE | Noted: 2023-07-13

## 2023-07-13 PROBLEM — R47.1 DYSARTHRIA DUE TO ACUTE STROKE (HCC): Status: ACTIVE | Noted: 2023-07-13

## 2023-07-13 PROBLEM — I63.9 ACUTE CVA (CEREBROVASCULAR ACCIDENT) (HCC): Status: ACTIVE | Noted: 2023-07-13

## 2023-07-13 PROBLEM — G40.909 SEIZURE DISORDER (HCC): Status: ACTIVE | Noted: 2021-04-07

## 2023-07-13 PROBLEM — E78.2 MIXED HYPERLIPIDEMIA: Status: ACTIVE | Noted: 2023-07-13

## 2023-07-13 LAB
EKG ATRIAL RATE: 67 BPM
EKG DIAGNOSIS: NORMAL
EKG P AXIS: 38 DEGREES
EKG P-R INTERVAL: 218 MS
EKG Q-T INTERVAL: 418 MS
EKG QRS DURATION: 80 MS
EKG QTC CALCULATION (BAZETT): 441 MS
EKG R AXIS: -3 DEGREES
EKG T AXIS: 62 DEGREES
EKG VENTRICULAR RATE: 67 BPM
GLUCOSE BLD-MCNC: 168 MG/DL (ref 70–99)
GLUCOSE BLD-MCNC: 192 MG/DL (ref 70–99)

## 2023-07-13 PROCEDURE — 6370000000 HC RX 637 (ALT 250 FOR IP): Performed by: INTERNAL MEDICINE

## 2023-07-13 PROCEDURE — 82962 GLUCOSE BLOOD TEST: CPT

## 2023-07-13 PROCEDURE — 6360000002 HC RX W HCPCS: Performed by: STUDENT IN AN ORGANIZED HEALTH CARE EDUCATION/TRAINING PROGRAM

## 2023-07-13 PROCEDURE — 2580000003 HC RX 258: Performed by: INTERNAL MEDICINE

## 2023-07-13 PROCEDURE — C9113 INJ PANTOPRAZOLE SODIUM, VIA: HCPCS | Performed by: STUDENT IN AN ORGANIZED HEALTH CARE EDUCATION/TRAINING PROGRAM

## 2023-07-13 PROCEDURE — 94761 N-INVAS EAR/PLS OXIMETRY MLT: CPT

## 2023-07-13 PROCEDURE — 99223 1ST HOSP IP/OBS HIGH 75: CPT | Performed by: PHYSICAL MEDICINE & REHABILITATION

## 2023-07-13 PROCEDURE — 6370000000 HC RX 637 (ALT 250 FOR IP): Performed by: STUDENT IN AN ORGANIZED HEALTH CARE EDUCATION/TRAINING PROGRAM

## 2023-07-13 PROCEDURE — 95816 EEG AWAKE AND DROWSY: CPT | Performed by: STUDENT IN AN ORGANIZED HEALTH CARE EDUCATION/TRAINING PROGRAM

## 2023-07-13 PROCEDURE — 6370000000 HC RX 637 (ALT 250 FOR IP): Performed by: PHYSICAL MEDICINE & REHABILITATION

## 2023-07-13 PROCEDURE — 99232 SBSQ HOSP IP/OBS MODERATE 35: CPT | Performed by: NURSE PRACTITIONER

## 2023-07-13 PROCEDURE — 1280000000 HC REHAB R&B

## 2023-07-13 PROCEDURE — 93010 ELECTROCARDIOGRAM REPORT: CPT | Performed by: INTERNAL MEDICINE

## 2023-07-13 PROCEDURE — 2580000003 HC RX 258: Performed by: STUDENT IN AN ORGANIZED HEALTH CARE EDUCATION/TRAINING PROGRAM

## 2023-07-13 PROCEDURE — 6360000002 HC RX W HCPCS: Performed by: INTERNAL MEDICINE

## 2023-07-13 RX ORDER — MEMANTINE HYDROCHLORIDE 10 MG/1
10 TABLET ORAL 2 TIMES DAILY
Status: CANCELLED | OUTPATIENT
Start: 2023-07-13

## 2023-07-13 RX ORDER — TRIAMTERENE AND HYDROCHLOROTHIAZIDE 37.5; 25 MG/1; MG/1
1 TABLET ORAL DAILY
Status: CANCELLED | OUTPATIENT
Start: 2023-07-14

## 2023-07-13 RX ORDER — DILTIAZEM HYDROCHLORIDE 240 MG/1
240 CAPSULE, COATED, EXTENDED RELEASE ORAL DAILY
Status: CANCELLED | OUTPATIENT
Start: 2023-07-14

## 2023-07-13 RX ORDER — ATORVASTATIN CALCIUM 10 MG/1
20 TABLET, FILM COATED ORAL NIGHTLY
Status: DISCONTINUED | OUTPATIENT
Start: 2023-07-13 | End: 2023-08-04 | Stop reason: HOSPADM

## 2023-07-13 RX ORDER — ONDANSETRON 4 MG/1
4 TABLET, ORALLY DISINTEGRATING ORAL EVERY 8 HOURS PRN
Status: CANCELLED | OUTPATIENT
Start: 2023-07-13

## 2023-07-13 RX ORDER — ONDANSETRON 4 MG/1
4 TABLET, ORALLY DISINTEGRATING ORAL EVERY 8 HOURS PRN
Status: DISCONTINUED | OUTPATIENT
Start: 2023-07-13 | End: 2023-08-04 | Stop reason: HOSPADM

## 2023-07-13 RX ORDER — FOLIC ACID 1 MG/1
500 TABLET ORAL EVERY MORNING
Status: CANCELLED | OUTPATIENT
Start: 2023-07-14

## 2023-07-13 RX ORDER — ACETAMINOPHEN 325 MG/1
650 TABLET ORAL EVERY 4 HOURS PRN
Status: CANCELLED | OUTPATIENT
Start: 2023-07-13

## 2023-07-13 RX ORDER — GABAPENTIN 400 MG/1
400 CAPSULE ORAL NIGHTLY
Status: CANCELLED | OUTPATIENT
Start: 2023-07-13

## 2023-07-13 RX ORDER — LEVETIRACETAM 500 MG/1
500 TABLET ORAL 2 TIMES DAILY
Status: CANCELLED | OUTPATIENT
Start: 2023-07-13

## 2023-07-13 RX ORDER — DILTIAZEM HYDROCHLORIDE 240 MG/1
240 CAPSULE, COATED, EXTENDED RELEASE ORAL DAILY
Status: DISCONTINUED | OUTPATIENT
Start: 2023-07-14 | End: 2023-08-04 | Stop reason: HOSPADM

## 2023-07-13 RX ORDER — ONDANSETRON 2 MG/ML
4 INJECTION INTRAMUSCULAR; INTRAVENOUS EVERY 6 HOURS PRN
Status: DISCONTINUED | OUTPATIENT
Start: 2023-07-13 | End: 2023-08-03

## 2023-07-13 RX ORDER — MEMANTINE HYDROCHLORIDE 10 MG/1
10 TABLET ORAL 2 TIMES DAILY
Status: DISCONTINUED | OUTPATIENT
Start: 2023-07-13 | End: 2023-08-04 | Stop reason: HOSPADM

## 2023-07-13 RX ORDER — ACETAMINOPHEN 325 MG/1
650 TABLET ORAL EVERY 4 HOURS PRN
Status: DISCONTINUED | OUTPATIENT
Start: 2023-07-13 | End: 2023-07-14

## 2023-07-13 RX ORDER — POLYETHYLENE GLYCOL 3350 17 G/17G
17 POWDER, FOR SOLUTION ORAL DAILY PRN
Status: CANCELLED | OUTPATIENT
Start: 2023-07-13

## 2023-07-13 RX ORDER — POTASSIUM CHLORIDE 750 MG/1
10 TABLET, FILM COATED, EXTENDED RELEASE ORAL DAILY
Status: DISCONTINUED | OUTPATIENT
Start: 2023-07-14 | End: 2023-08-04 | Stop reason: HOSPADM

## 2023-07-13 RX ORDER — DONEPEZIL HYDROCHLORIDE 10 MG/1
10 TABLET, FILM COATED ORAL EVERY MORNING
Status: CANCELLED | OUTPATIENT
Start: 2023-07-14

## 2023-07-13 RX ORDER — ONDANSETRON 2 MG/ML
4 INJECTION INTRAMUSCULAR; INTRAVENOUS EVERY 6 HOURS PRN
Status: CANCELLED | OUTPATIENT
Start: 2023-07-13

## 2023-07-13 RX ORDER — FOLIC ACID 1 MG/1
500 TABLET ORAL EVERY MORNING
Status: DISCONTINUED | OUTPATIENT
Start: 2023-07-14 | End: 2023-08-04 | Stop reason: HOSPADM

## 2023-07-13 RX ORDER — SODIUM CHLORIDE 0.9 % (FLUSH) 0.9 %
5-40 SYRINGE (ML) INJECTION PRN
Status: CANCELLED | OUTPATIENT
Start: 2023-07-13

## 2023-07-13 RX ORDER — GABAPENTIN 400 MG/1
400 CAPSULE ORAL NIGHTLY
Status: DISCONTINUED | OUTPATIENT
Start: 2023-07-13 | End: 2023-08-04 | Stop reason: HOSPADM

## 2023-07-13 RX ORDER — ENOXAPARIN SODIUM 100 MG/ML
40 INJECTION SUBCUTANEOUS EVERY EVENING
Status: DISCONTINUED | OUTPATIENT
Start: 2023-07-13 | End: 2023-07-21

## 2023-07-13 RX ORDER — PANTOPRAZOLE SODIUM 40 MG/10ML
40 INJECTION, POWDER, LYOPHILIZED, FOR SOLUTION INTRAVENOUS DAILY
Status: CANCELLED | OUTPATIENT
Start: 2023-07-14

## 2023-07-13 RX ORDER — ACETAMINOPHEN 325 MG/1
650 TABLET ORAL EVERY 6 HOURS PRN
Status: DISCONTINUED | OUTPATIENT
Start: 2023-07-13 | End: 2023-07-13 | Stop reason: SDUPTHER

## 2023-07-13 RX ORDER — POLYETHYLENE GLYCOL 3350 17 G/17G
17 POWDER, FOR SOLUTION ORAL DAILY PRN
Status: DISCONTINUED | OUTPATIENT
Start: 2023-07-13 | End: 2023-08-04 | Stop reason: HOSPADM

## 2023-07-13 RX ORDER — CALCIUM CARBONATE 500(1250)
500 TABLET ORAL DAILY
Status: DISCONTINUED | OUTPATIENT
Start: 2023-07-13 | End: 2023-08-04 | Stop reason: HOSPADM

## 2023-07-13 RX ORDER — ACETAMINOPHEN 325 MG/1
650 TABLET ORAL EVERY 6 HOURS PRN
Status: CANCELLED | OUTPATIENT
Start: 2023-07-13

## 2023-07-13 RX ORDER — PANTOPRAZOLE SODIUM 40 MG/1
40 TABLET, DELAYED RELEASE ORAL
Status: DISCONTINUED | OUTPATIENT
Start: 2023-07-14 | End: 2023-07-13 | Stop reason: HOSPADM

## 2023-07-13 RX ORDER — LEVETIRACETAM 500 MG/1
500 TABLET ORAL 2 TIMES DAILY
Status: DISCONTINUED | OUTPATIENT
Start: 2023-07-13 | End: 2023-08-04 | Stop reason: HOSPADM

## 2023-07-13 RX ORDER — ACETAMINOPHEN 650 MG/1
650 SUPPOSITORY RECTAL EVERY 6 HOURS PRN
Status: DISCONTINUED | OUTPATIENT
Start: 2023-07-13 | End: 2023-07-13

## 2023-07-13 RX ORDER — ASPIRIN 81 MG/1
81 TABLET, CHEWABLE ORAL DAILY
Status: CANCELLED | OUTPATIENT
Start: 2023-07-14

## 2023-07-13 RX ORDER — SODIUM CHLORIDE 0.9 % (FLUSH) 0.9 %
5-40 SYRINGE (ML) INJECTION EVERY 12 HOURS SCHEDULED
Status: DISCONTINUED | OUTPATIENT
Start: 2023-07-13 | End: 2023-07-20

## 2023-07-13 RX ORDER — CALCIUM CARBONATE 500(1250)
500 TABLET ORAL DAILY
Status: CANCELLED | OUTPATIENT
Start: 2023-07-13

## 2023-07-13 RX ORDER — ASPIRIN 81 MG/1
81 TABLET, CHEWABLE ORAL DAILY
Status: DISCONTINUED | OUTPATIENT
Start: 2023-07-14 | End: 2023-08-04 | Stop reason: HOSPADM

## 2023-07-13 RX ORDER — SODIUM CHLORIDE 0.9 % (FLUSH) 0.9 %
5-40 SYRINGE (ML) INJECTION EVERY 12 HOURS SCHEDULED
Status: CANCELLED | OUTPATIENT
Start: 2023-07-13

## 2023-07-13 RX ORDER — POTASSIUM CHLORIDE 750 MG/1
10 TABLET, FILM COATED, EXTENDED RELEASE ORAL DAILY
Status: CANCELLED | OUTPATIENT
Start: 2023-07-14

## 2023-07-13 RX ORDER — SODIUM CHLORIDE 9 MG/ML
INJECTION, SOLUTION INTRAVENOUS PRN
Status: CANCELLED | OUTPATIENT
Start: 2023-07-13

## 2023-07-13 RX ORDER — TRIAMTERENE AND HYDROCHLOROTHIAZIDE 37.5; 25 MG/1; MG/1
1 TABLET ORAL DAILY
Status: DISCONTINUED | OUTPATIENT
Start: 2023-07-14 | End: 2023-08-04 | Stop reason: HOSPADM

## 2023-07-13 RX ORDER — SODIUM CHLORIDE 0.9 % (FLUSH) 0.9 %
5-40 SYRINGE (ML) INJECTION PRN
Status: DISCONTINUED | OUTPATIENT
Start: 2023-07-13 | End: 2023-07-20

## 2023-07-13 RX ORDER — DONEPEZIL HYDROCHLORIDE 10 MG/1
10 TABLET, FILM COATED ORAL EVERY MORNING
Status: DISCONTINUED | OUTPATIENT
Start: 2023-07-14 | End: 2023-08-04 | Stop reason: HOSPADM

## 2023-07-13 RX ORDER — ATORVASTATIN CALCIUM 10 MG/1
20 TABLET, FILM COATED ORAL NIGHTLY
Status: CANCELLED | OUTPATIENT
Start: 2023-07-13

## 2023-07-13 RX ORDER — SODIUM CHLORIDE 9 MG/ML
INJECTION, SOLUTION INTRAVENOUS PRN
Status: DISCONTINUED | OUTPATIENT
Start: 2023-07-13 | End: 2023-07-20

## 2023-07-13 RX ORDER — ENOXAPARIN SODIUM 100 MG/ML
40 INJECTION SUBCUTANEOUS EVERY EVENING
Status: CANCELLED | OUTPATIENT
Start: 2023-07-13

## 2023-07-13 RX ORDER — ACETAMINOPHEN 650 MG/1
650 SUPPOSITORY RECTAL EVERY 6 HOURS PRN
Status: CANCELLED | OUTPATIENT
Start: 2023-07-13

## 2023-07-13 RX ADMIN — SODIUM CHLORIDE, PRESERVATIVE FREE 10 ML: 5 INJECTION INTRAVENOUS at 21:15

## 2023-07-13 RX ADMIN — PANTOPRAZOLE SODIUM 40 MG: 40 INJECTION, POWDER, FOR SOLUTION INTRAVENOUS at 09:01

## 2023-07-13 RX ADMIN — LEVETIRACETAM 500 MG: 500 TABLET, FILM COATED ORAL at 09:01

## 2023-07-13 RX ADMIN — COLLAGENASE SANTYL: 250 OINTMENT TOPICAL at 13:14

## 2023-07-13 RX ADMIN — SODIUM CHLORIDE, PRESERVATIVE FREE 10 ML: 5 INJECTION INTRAVENOUS at 21:09

## 2023-07-13 RX ADMIN — DONEPEZIL HYDROCHLORIDE 10 MG: 10 TABLET ORAL at 09:01

## 2023-07-13 RX ADMIN — MEMANTINE 10 MG: 10 TABLET ORAL at 21:09

## 2023-07-13 RX ADMIN — ASPIRIN 81 MG: 81 TABLET, CHEWABLE ORAL at 09:01

## 2023-07-13 RX ADMIN — LEVETIRACETAM 500 MG: 500 TABLET, FILM COATED ORAL at 21:09

## 2023-07-13 RX ADMIN — GABAPENTIN 400 MG: 400 CAPSULE ORAL at 21:09

## 2023-07-13 RX ADMIN — DILTIAZEM HYDROCHLORIDE 240 MG: 240 CAPSULE, COATED, EXTENDED RELEASE ORAL at 09:00

## 2023-07-13 RX ADMIN — FOLIC ACID 500 MCG: 1 TABLET ORAL at 09:01

## 2023-07-13 RX ADMIN — ACETAMINOPHEN 650 MG: 325 TABLET ORAL at 21:14

## 2023-07-13 RX ADMIN — POTASSIUM CHLORIDE 10 MEQ: 750 TABLET, FILM COATED, EXTENDED RELEASE ORAL at 09:01

## 2023-07-13 RX ADMIN — ENOXAPARIN SODIUM 40 MG: 100 INJECTION SUBCUTANEOUS at 22:03

## 2023-07-13 RX ADMIN — SODIUM CHLORIDE, PRESERVATIVE FREE 10 ML: 5 INJECTION INTRAVENOUS at 09:02

## 2023-07-13 RX ADMIN — CALCIUM 500 MG: 500 TABLET ORAL at 21:09

## 2023-07-13 RX ADMIN — PREDNISONE 15 MG: 5 TABLET ORAL at 09:00

## 2023-07-13 RX ADMIN — TRIAMTERENE AND HYDROCHLOROTHIAZIDE 1 TABLET: 37.5; 25 TABLET ORAL at 09:00

## 2023-07-13 RX ADMIN — MEMANTINE 10 MG: 10 TABLET ORAL at 09:01

## 2023-07-13 RX ADMIN — ATORVASTATIN CALCIUM 20 MG: 10 TABLET, FILM COATED ORAL at 21:08

## 2023-07-13 ASSESSMENT — PAIN - FUNCTIONAL ASSESSMENT
PAIN_FUNCTIONAL_ASSESSMENT: PREVENTS OR INTERFERES SOME ACTIVE ACTIVITIES AND ADLS
PAIN_FUNCTIONAL_ASSESSMENT: PREVENTS OR INTERFERES SOME ACTIVE ACTIVITIES AND ADLS

## 2023-07-13 ASSESSMENT — PAIN DESCRIPTION - LOCATION
LOCATION: LEG
LOCATION: KNEE
LOCATION: KNEE
LOCATION: LEG

## 2023-07-13 ASSESSMENT — PAIN SCALES - GENERAL
PAINLEVEL_OUTOF10: 2
PAINLEVEL_OUTOF10: 5
PAINLEVEL_OUTOF10: 2

## 2023-07-13 ASSESSMENT — PAIN DESCRIPTION - DESCRIPTORS
DESCRIPTORS: ACHING

## 2023-07-13 ASSESSMENT — PAIN DESCRIPTION - ORIENTATION
ORIENTATION: LEFT

## 2023-07-13 ASSESSMENT — PAIN DESCRIPTION - PAIN TYPE
TYPE: CHRONIC PAIN

## 2023-07-13 ASSESSMENT — PAIN DESCRIPTION - ONSET
ONSET: ON-GOING
ONSET: ON-GOING

## 2023-07-13 ASSESSMENT — PAIN DESCRIPTION - FREQUENCY
FREQUENCY: INTERMITTENT
FREQUENCY: INTERMITTENT

## 2023-07-13 NOTE — CARE COORDINATION
Met with patient and daughter at bedside and discussed ARU. Explained to patient the required 3 hours of therapy a day. Also explained the average length of stay is 11 days, could be longer or shorter depending on recommendations of therapy and Dr. Violeta Norris. Patient expresses her understanding and states she's agreeable to admit to ARU. Per patient and daughter plan is for patient to return home at discharge from ARU. Patient meets criteria and is approved to come to ARU. Patient able to admit once medically stable and after ARU Medical Director and  sign the pre-admission screen (PAS). Notified MD and CM of approval.     No rapid COVID needed.

## 2023-07-13 NOTE — PROGRESS NOTES
ARU Admission Assessment - [facility]      A Complete drug regimen review was completed for this patient this date. [x]  No clinically significant medication issue was identified   []  Yes, a clinically significant medication issue was identified     []  Adverse Drug Event:    []  Allergy:    []  Side Effect:    []  Ineffective Therapy:    []  Drug interaction:     []  Duplicate Therapy:    []  Untreated Indication:    []  Non-adherence:    []  Other:  Nursing contacted the physician:       Date:    07/13/2023            Time:1830    Actions recommended by physician were completed:   Date:                 Time:  Action(s) Taken:             []  New Physician Order Received    []  Issue Noted by Physician; However No Action Required    []  Other:        Ethnicity  \"Are you of , /a, or Citizen of the [place] origin? \"  Check all that apply:  [x] A. No, not of , /a, or [place] Origin  [] B.  Yes, [place] American, Chicano/a  [] C.  Yes, [address] [place] [address]  [] D.  Yes, [place]  [] E.  Yes, another , , or Citizen of the [place] origin  [] X. Patient unable to respond    Race  \"What is your race? \"  Check all that apply:  [] A. White  [x] B. Black or   [] C. American [place] or [place] Native  [] D.  [place]  [] E. [place]  [] F. Citizen of [place]  [] G. [place]  [] [place] Sanchez  [] I. [place]  [] J.  Other   [] K.   [] L. Polish or [place]  [] M. Jamaican  [] N. Other [address] [place] [address]  [] X. Patient unable to respond    Language  A. \"What is your preferred language? \"English    B. \"Do you need or want an  to communicate with a doctor or health care staff? \"  Check only one:  [x] 0. No  [] 1. Yes  [] 9. Unable to determine    Transportation  \"In the past 6-12 months, has lack of transportation kept you from medical appointments, meetings, work, or from getting things needed for daily living? \"  Check all that apply:  [] A.  Yes, it has kept me lonely or isolated from those around you? \"  [x] 0. Never  [] 1. Rarely  [] 2. Sometimes  [] 3. Often  [] 4. Always  [] 8. Patient unable to respond    Pain Effect on Sleep  \"Over the past 5 days, how much of the time has pain made it hard for you to sleep at night? \"  []  0. Does not apply - I have not had any pain or hurting in the past 5 days  []  1. Rarely or not at all  [x]  2. Occasionally  []  3. Frequently  []  4. Almost constantly  []  8. Unable to answer  **If the patient answers \"0. Does not apply\" to this question, skip the next two \"Pain\" questions**      Pain Interference with Therapy Activities  \"Over the past 5 days, how often have you limited your participation in rehabilitation therapy sessions due to pain? \"  []  0. Does not apply - I have not received rehabilitation therapy in the past 5 days  [x]  1. Rarely or not at all  []  2. Occasionally  []  3. Frequently  []  4. Almost constantly  []  8. Unable to answer    Pain Interference with Day-to-Day Activities: \"Over the past 5 days, how often have you limited your day-to-day activities (excluding rehabilitation therapy session)? \"  []  1. Rarely or not at all  [x]  2. Occasionally  []  3. Frequently  []  4. Almost constantly  []  8.   Unable to answer

## 2023-07-13 NOTE — PROGRESS NOTES
4 Eyes Skin Assessment     NAME:  Michaele Klinefelter OF BIRTH:  1940  MEDICAL RECORD NUMBER:  [id number]    The patient is being assessed for  Admission    I agree that at least one RN has performed a thorough Head to Toe Skin Assessment on the patient. ALL assessment sites listed below have been assessed. Areas assessed by both nurses:    Head, Face, Ears, Shoulders, Back, Chest, Arms, Elbows, Hands, Sacrum. Buttock, Coccyx, Ischium, Legs. Feet and Heels, and Under Medical Devices         Does the Patient have a Wound? Yes wound(s) were present on assessment. LDA wound assessment was Initiated and completed by RN has open area on Right side of buttock and small reddened area on posterior leg.   Cream applied       Amando Prevention initiated by RN: Yes  Wound Care Orders initiated by RN: Yes    Pressure Injury (Stage 3,4, Unstageable, DTI, NWPT, and Complex wounds) if present, place Wound referral order by RN under : No    New Ostomies, if present place, Ostomy referral order under : No     Nurse 1 eSignature: Electronically signed by Federica Larkin RN on 7/13/23 at 6:33 PM EDT    **SHARE this note so that the co-signing nurse can place an eSignature**    Nurse 2 eSignature: Electronically signed by Whitley Morris RN on 7/13/23 at 7:46 PM EDT

## 2023-07-13 NOTE — H&P
Sriram Granado    : 1940  [facility]  [facility]N: [id number]              History and physical    Date of face-to-face exam: 2023. Time of face-to-face exam: [phone]. Admitting diagnosis: Acute CVA ([facility] 1.1)    Comorbid diagnoses impacting rehabilitation: Left hemiparesis, dysarthria, gait disturbance, seizure disorder, essential hypertension, SVT, depression with anxiety, mixed hyperlipidemia, rheumatoid arthritis. Chief complaint: Generalized weakness, dizziness with standing and arthralgias. History of present illness: The patient is an 51-year-old right-hand-dominant female with a history of prior stroke, rheumatoid arthritis, hypertension, SVT and depression with anxiety. In the days/weeks prior to coming to our ED on 2023 the patient and her family had noted increasing weakness of her limbs and difficulty standing. Her left side seem to be most impacted. There had been no fever, chills or other signs of infection. There has been no trauma to her head, syncope or gross change in bowel or bladder habits. In our ED she was found to be generally weak with more weakness in the left arm and leg. She was dysarthric and hypertensive. Additionally she suffered a tonic-clonic seizure. Brain imaging initially and with follow-up MRI have not shown any infarction, hemorrhage or edema. She has been continued on antiplatelet therapies and her antiepileptic medication while receiving antiarrhythmics and blood pressure medications. She has had arthralgias interfere with limb movements in addition to her weakness from her stroke. She has been too weak to do her own toileting, transfers and self-care and cannot return directly home. She requires inpatient rehabilitation to address these issues    Review of systems: General weakness, poor appetite and some urinary urgency. Infrequent bowel movements. No choking or coughing.   The remainder of their review of systems was negative except as generalized nature of her weakness. Recommendation: Acute inpatient rehabilitation with occupational and physical therapy 180 minutes 5 out of every 7 days. Will address basic and  advancing mobility with self-care instruction and adaptive equipment training. Caregiver education will be offered. Expected length of stay  prior to a supervised level of function for discharge home with a walker and [facility] OT/PT is 2-1/2 weeks. Additional recommendation:    Acute CVA with left hemiparesis: Patient requires daily occupational and physical therapy with speech-language pathology. Due to her significant weakness, arthralgias and poor insight, she is at risk for falling with injury during standing ADLs and mobility activities. Therefore, we must provide her adaptive equipment training with strengthening exercises, balance reeducation and instruction on using adaptive equipment to safely care for self. She requires aggressive pulmonary hygiene measures while we cautiously treat her pain and provide DVT prophylaxis. Nutritional support and bowel and bladder monitoring with retraining. Caregiver education should be useful. Outpatient follow-up with her PCP following rehab. DVT prophylaxis: Lovenox 40 mg subcu daily. I must monitor her hemoglobin and platelet count periodically as using this medicine places her at risk for spontaneous hemorrhage or GI bleeding. Initiating GI prophylaxis with a PPI. Weightbearing activities will be pursued daily. Seizure disorder: Continuing Keppra 500 mg twice daily. Treating her in a calm and consistent environment. Sleep regulation while trying to avoid exhaustion. Essential hypertension: Continuing Cardizem and Maxide to control her systolic blood pressure. Vital signs are checked at rest and with activity. Target systolic blood pressure is 120-140. Periodic check of her chemistries. SVT: Continuing Cardizem for rate control.   Daily weights to detect any decompensation of CHF.  Monitoring her for angina. Baby aspirin for antiplatelet therapy and a statin for heart disease. Depression with anxiety: Sleep regulation, treatment in a calm consistent environment and memory augmentation with the Aricept and Namenda. Written and verbal instructions when possible. Involving family and the rehabilitation process is much as possible. Rheumatoid arthritis: Continuing her baseline prednisone dose of 15 mg daily. Monitoring her chemistries and leukocytosis periodically. Offering diathermy and progressive mobilization. I personally performed a history and physical on this patient within 24 hours of admission to the rehab unit. I have reviewed the preadmission screening and concur with its findings without change. A detailed plan of care will be established by hospital day 4 and I attest the patient is appropriate for inpatient rehabilitation at this time. I have compared the patient's current functional status noted during my history and physical with that of the preadmission screen and I have found no significant differences.

## 2023-07-13 NOTE — PROCEDURES
ROUTINE ELECTROENCEPHALOGRAM    Identifying Information:  Name: Nicole Pereira  MRN: [id number]  : 1940  Interpreting Physician: Adal Gómez DO  Referring Provider: TARUN Carlton CNP  Date of EE23  Procedure Location: Inpatient     Clinical History:  Nicole Pereira is a 82 y.o. female with concerns for seizure like activity. Current Medications:    collagenase   Topical Daily    aspirin  81 mg Oral Daily    atorvastatin  20 mg Oral Nightly    calcium elemental  500 mg Oral Daily    dilTIAZem  240 mg Oral Daily    donepezil  10 mg Oral QAM    folic acid  209 mcg Oral QAM    gabapentin  400 mg Oral Nightly    levETIRAcetam  500 mg Oral BID    memantine  10 mg Oral BID    potassium chloride  10 mEq Oral Daily    predniSONE  15 mg Oral Daily    triamterene-hydroCHLOROthiazide  1 tablet Oral Daily    sodium chloride flush  5-40 mL IntraVENous 2 times per day    enoxaparin  40 mg SubCUTAneous QPM    pantoprazole  40 mg IntraVENous Daily        Indication:  Rule out seizure/seizure disorder     Technical Summary:  28 channels of EEG were recorded in a digital format on a patient who was reported to be awake and drowsy during the recording. The patient was not sleep deprived prior to the EEG. The PDR consisted of well-developed, well-regulated 8-9 Hz alpha activity, maximal over the posterior head regions and reactive to eye opening and closure. Photic stimulation was performed and did not produce any abnormalities. During the recording stage II sleep was not seen, but drowsiness did occur. The EKG lead revealed no rhythm abnormalities. EEG Interpretation: This EEG was within normal limits for a patient of this age in the awake and drowsy states. No focal, lateralizing, or epileptiform features were seen during the recording. Clinical correlation is recommended.     Adal Gómez DO  Epileptologist  2023 10:00 AM

## 2023-07-13 NOTE — PLAN OF CARE
Problem: Discharge Planning  Goal: Discharge to home or other facility with appropriate resources  Outcome: Progressing  Flowsheets (Taken 7/12/2023 2055)  Discharge to home or other facility with appropriate resources:   Identify barriers to discharge with patient and caregiver   Arrange for needed discharge resources and transportation as appropriate   Identify discharge learning needs (meds, wound care, etc)     Problem: Safety - Adult  Goal: Free from fall injury  Outcome: Progressing     Problem: Pain  Goal: Verbalizes/displays adequate comfort level or baseline comfort level  Outcome: Progressing     Problem: Skin/Tissue Integrity  Goal: Absence of new skin breakdown  Description: 1. Monitor for areas of redness and/or skin breakdown  2. Assess vascular access sites hourly  3. Every 4-6 hours minimum:  Change oxygen saturation probe site  4. Every 4-6 hours:  If on nasal continuous positive airway pressure, respiratory therapy assess nares and determine need for appliance change or resting period.   Outcome: Progressing     Problem: ABCDS Injury Assessment  Goal: Absence of physical injury  Outcome: Progressing     Problem: Chronic Conditions and Co-morbidities  Goal: Patient's chronic conditions and co-morbidity symptoms are monitored and maintained or improved  Outcome: Progressing  Flowsheets (Taken 7/12/2023 2055)  Care Plan - Patient's Chronic Conditions and Co-Morbidity Symptoms are Monitored and Maintained or Improved:   Monitor and assess patient's chronic conditions and comorbid symptoms for stability, deterioration, or improvement   Collaborate with multidisciplinary team to address chronic and comorbid conditions and prevent exacerbation or deterioration   Update acute care plan with appropriate goals if chronic or comorbid symptoms are exacerbated and prevent overall improvement and discharge

## 2023-07-13 NOTE — DISCHARGE SUMMARY
WO CONTRAST  1. No acute intracranial abnormality on noncontrast CT head. Moderate to  severe chronic microvascular disease within the periventricular white matter. Mild right parietal lobe encephalomalacia. 2. No intracranial large vessel occlusion or aneurysm. 3. No significant cervical carotid or vertebral artery stenosis. The left  vertebral artery terminates in PICA. Additional Information: Patient seen and examined day of discharge.  For more information regarding patient's care please contact [address] [place] [address] records [phone]    Discharge Time of 40 minutes    Electronically signed by Surinder Zheng MD on 7/13/2023 at 4:14 PM

## 2023-07-13 NOTE — PLAN OF CARE
and Education  Precautions   [] Weight Bearing Precautions   [] Swallowing Precautions   [] Monitoring of Risks of Complications   [] DVT Prophylaxis    [] Fluid/electrolyte/Nutrition Management    [x] Complete education with patient/family with understanding demonstrated for          in-room safety with transfers to bed, toilet, wheelchair, shower as well as                bathroom activities and hygiene. [] Adjustment   [] Other:   Nursing interventions may include bowel/bladder training, education for medical assistive devices, medication education, O2 saturation management, energy conservation, stress management techniques, fall prevention, alarms protocol, seating and positioning, skin/wound care, pressure relief instruction,dressing changes,  infection protection, DVT prophylaxis, and/or assistance with in room safety with transfers to bed, toilet, wheelchair, shower as well as bathroom activities and hygiene. Patient/caregiver education for:   [] Disease/sustained injury/management      [] Medication Use   [] Surgical intervention   [] Safety/Precautions   [] Body mechanics and or joint protection   [] Health maintenance         PHYSICAL THERAPY:  Goals:                               Long Term Goals  Time Frame for Long Term Goals : 10 tx days:  Long Term Goal 1: Pt will complete rolling R/L with bed rail with SBA-Sup. Long Term Goal 2: Pt will complete scooting and sup<->sit using bed features with Mod A. Long Term Goal 3: Pt will complete squat pivor transfers with Mod A. Long Term Goal 4: Pt will complete sit<->stand and stand turn transfers with Max A  Long Term Goal 5: Pt will propel manual w/c 50 ft with turns over level surface with Min A. These goals were reviewed with this patient at the time of assessment and Manual Burnham is in agreement. Plan of Care: Pt to be seen 5 days per week for a minimum of 60 minutes for 10 days.                 Current Treatment Recommendations: Strengthening, ROM, Balance training, Functional mobility training, Transfer training, Cognitive/Perceptual training, Endurance training, Wheelchair mobility training, Neuromuscular re-education, Cognitive reorientation, Home exercise program, Safety education & training, Patient/Caregiver education & training, Equipment evaluation, education, & procurement, Positioning, Therapeutic activities, Pain management community reintegration,animal assisted therapy, and concurrent/group therapy.     PT IRF-ORI scores and goals for initial assessment:   Bed Mobility:   Sit to Lying  Assistance Needed: Substantial/maximal assistance  Comment: Max A (required lifting of BLE and some assistance to lower upper body)  CARE Score: 2  Discharge Goal: Partial/moderate assistance  Roll Left and Right  Assistance Needed: Partial/moderate assistance  Comment: Min A to roll to L with bed rail, Mod A to roll to R with RUE pulling onto bed rail  CARE Score: 3  Discharge Goal: Supervision or touching assistance  Lying to Sitting on Side of Bed  Assistance Needed: Substantial/maximal assistance  Comment: required lifting of upper body and LLE and assist with forward scooting of hips to complete upright sitting at EOB; mild L lateral trunk lean evident in static sitting  CARE Score: 2  Discharge Goal: Partial/moderate assistance    Transfers:    Sit to Stand  Comment: attempted in // bars and pt unable to fully stand even with 2-person assist due to profound, generalized body weakness and L hemiparesis  Reason if not Attempted: Not attempted due to medical condition or safety concerns  CARE Score: 88  Discharge Goal: Substantial/maximal assistance  Chair/Bed-to-Chair Transfer  Assistance Needed: Dependent  Comment: Max A x 2 using squat pivot technique leading with R side  CARE Score: 1  Discharge Goal: Partial/moderate assistance     Car Transfer  Reason if not Attempted: Not attempted due to medical condition or safety concerns  CARE Score: 88  Discharge assistance in obtaining recommended equipment and other services, and coordination with insurance during ARU stay. Patient Goals:  Return to maximum level of independent function. Nutrition goal: Patient will consume at least 50-75% at meals during stay     Activities Prior to Admit:      Active : No  Mode of Transportation: Car  Occupation: Retired  Leisure & Hobbies: TV, no pets, dtr sets up meds and manages finances. Pt dtr manages groceries. Occasional outings to [employer]. Lots of surgeries with knee replacements and limited outings. Intensity of Therapy  Julian Irene will be seen a minimum of 3 hours of therapy per day/a minimum of 5 out of 7 days per week. [] In this rare instance due to the nature of this patient's medical involvement, this patient will be seen 15 hours per week (900 minutes within a 7 day period). Treatments may include therapeutic exercises, gait training, neuromuscular re-ed, transfer training, community reintegration, bed mobility, w/c mobility and training, self care, home mgmt, cognitive training, energy conservation,dysphagia tx, speech/language/communication therapy, group therapy, and patient/family education. In addition, dietician/nutritionist may monitor calorie count as well as intake and collaboratively work with SLP on dietary upgrades. Neuropsychology/Psychology may evaluate and provide necessary support. Group therapy as appropriate to facilitate improved endurance, STR, COORD, function, safety, transfers, awareness and insight into deficits, problem solving, memory, and social interaction and engagement.     Medical issues being managed closely and that require 24 hour availability of a physician:   [x] Swallowing Precautions                                     [] Weight bearing precautions   [] Wound Care                             [x] Infection Prevention   [x] DVT Prophylaxis/assessment              [x] Monitoring for complications    [x] Fall

## 2023-07-14 PROCEDURE — 97530 THERAPEUTIC ACTIVITIES: CPT

## 2023-07-14 PROCEDURE — 2580000003 HC RX 258: Performed by: INTERNAL MEDICINE

## 2023-07-14 PROCEDURE — 1280000000 HC REHAB R&B

## 2023-07-14 PROCEDURE — 92523 SPEECH SOUND LANG COMPREHEN: CPT

## 2023-07-14 PROCEDURE — 6370000000 HC RX 637 (ALT 250 FOR IP): Performed by: INTERNAL MEDICINE

## 2023-07-14 PROCEDURE — 97535 SELF CARE MNGMENT TRAINING: CPT

## 2023-07-14 PROCEDURE — 97163 PT EVAL HIGH COMPLEX 45 MIN: CPT

## 2023-07-14 PROCEDURE — 6360000002 HC RX W HCPCS: Performed by: INTERNAL MEDICINE

## 2023-07-14 PROCEDURE — 99232 SBSQ HOSP IP/OBS MODERATE 35: CPT | Performed by: PHYSICAL MEDICINE & REHABILITATION

## 2023-07-14 PROCEDURE — 97166 OT EVAL MOD COMPLEX 45 MIN: CPT

## 2023-07-14 RX ORDER — ACETAMINOPHEN 325 MG/1
650 TABLET ORAL
Status: DISCONTINUED | OUTPATIENT
Start: 2023-07-14 | End: 2023-08-04

## 2023-07-14 RX ADMIN — MEMANTINE 10 MG: 10 TABLET ORAL at 21:00

## 2023-07-14 RX ADMIN — ATORVASTATIN CALCIUM 20 MG: 10 TABLET, FILM COATED ORAL at 21:00

## 2023-07-14 RX ADMIN — DONEPEZIL HYDROCHLORIDE 10 MG: 10 TABLET ORAL at 09:25

## 2023-07-14 RX ADMIN — SODIUM CHLORIDE, PRESERVATIVE FREE 10 ML: 5 INJECTION INTRAVENOUS at 12:30

## 2023-07-14 RX ADMIN — ENOXAPARIN SODIUM 40 MG: 100 INJECTION SUBCUTANEOUS at 18:12

## 2023-07-14 RX ADMIN — LEVETIRACETAM 500 MG: 500 TABLET, FILM COATED ORAL at 09:25

## 2023-07-14 RX ADMIN — DILTIAZEM HYDROCHLORIDE 240 MG: 240 CAPSULE, COATED, EXTENDED RELEASE ORAL at 09:25

## 2023-07-14 RX ADMIN — POTASSIUM CHLORIDE 10 MEQ: 750 TABLET, EXTENDED RELEASE ORAL at 09:28

## 2023-07-14 RX ADMIN — ASPIRIN 81 MG: 81 TABLET, CHEWABLE ORAL at 09:25

## 2023-07-14 RX ADMIN — TRIAMTERENE AND HYDROCHLOROTHIAZIDE 1 TABLET: 37.5; 25 TABLET ORAL at 09:24

## 2023-07-14 RX ADMIN — SODIUM CHLORIDE, PRESERVATIVE FREE 10 ML: 5 INJECTION INTRAVENOUS at 21:01

## 2023-07-14 RX ADMIN — FOLIC ACID 500 MCG: 1 TABLET ORAL at 09:25

## 2023-07-14 RX ADMIN — PREDNISONE 15 MG: 5 TABLET ORAL at 09:24

## 2023-07-14 RX ADMIN — MEMANTINE 10 MG: 10 TABLET ORAL at 09:25

## 2023-07-14 RX ADMIN — CALCIUM 500 MG: 500 TABLET ORAL at 21:00

## 2023-07-14 RX ADMIN — LEVETIRACETAM 500 MG: 500 TABLET, FILM COATED ORAL at 21:00

## 2023-07-14 RX ADMIN — GABAPENTIN 400 MG: 400 CAPSULE ORAL at 21:00

## 2023-07-14 RX ADMIN — COLLAGENASE SANTYL: 250 OINTMENT TOPICAL at 12:30

## 2023-07-14 ASSESSMENT — PAIN SCALES - GENERAL: PAINLEVEL_OUTOF10: 0

## 2023-07-14 NOTE — PROGRESS NOTES
Deacon Gastelum    : 1940  Acct #: [de-identified]  MRN: [id number]              PM&R Progress Note      Admitting diagnosis: Acute CVA ([facility])     Comorbid diagnoses impacting rehabilitation: Left hemiparesis, dysarthria, gait disturbance, seizure disorder, essential hypertension, SVT, depression with anxiety, mixed hyperlipidemia, rheumatoid arthritis. Chief complaint: Generalized arthralgias and left-sided weakness. Prior (baseline) level of function: Independent. Current level of function:         Current  IRF-ORI and Goals:   Occupational Therapy:    Short Term Goals  Time Frame for Short Term Goals: STGs=LTGs :   Long Term Goals  Time Frame for Long Term Goals : 12-14 days or until d/c. Long Term Goal 1: Pt will complete self-feeding with setup assist.  Long Term Goal 2: Pt will complete oral hygiene and grooming tasks with setup assist.  Long Term Goal 3: Pt will complete total body bathing with AE PRN and Mod A. Long Term Goal 4: Pt will complete UB dressing with Min A. Long Term Goal 5: Pt will complete LB dressing with AE PRN and Mod A. Additional Goals?: Yes  Long Term Goal 6: Pt will doff/don footwear with AE PRN and Min A. Long Term Goal 7: Pt will complete toileting with Mod A. Long Term Goal 8: Pt will complete functional transfers (bed, chair, shower, toilet) with DME PRN and Mod A. Long Term Goal 9: Pt will perform therex/therax to facilitate an increase in strength/endurance with emphasis on dynamic sitting balance/tolerance > 15 mins with SBA. :                                       Eating: Eating  Assistance Needed: Partial/moderate assistance  Comment: Per report of dinner, Pt requires occasional Min A since she has been here to grasp the utensils to eat.   CARE Score: 3  Discharge Goal: Set-up or clean-up assistance       Oral Hygiene: Oral Hygiene  Assistance Needed: Partial/moderate assistance  Comment: Per report of daughter, Pt requires Min A to hold toothbrush at time.  CARE Score: 3  Discharge Goal: Set-up or clean-up assistance    UB/LB Bathing: Shower/Bathe Self  Assistance Needed: Dependent  Comment: Pt able to wash at the sink in the w/c with Max verbal cues for initiation and sequencing. Pt able to use RUE to wash chest, abdomen, LUE including L axillary region as well as the top of the thighs. OT washed Pt's RUE and below the knee on BLEs. Pt requires 2 person assist to wash buttocks at bed level rolling. CARE Score: 1  Discharge Goal: Partial/moderate assistance    UB Dressing: Upper Body Dressing  Assistance Needed: Substantial/maximal assistance  Comment: Pt with fatigue and difficult time following verbal cues therfore this therapist assisted Pt with threading LUE and head into shirt and assists with pulling shirt down. CARE Score: 2  Discharge Goal: Partial/moderate assistance         LB Dressing: Lower Body Dressing  Assistance Needed: Dependent  Comment: Therapist threads pants, Pt manages up to thighs and 2 person assist in stance to manage over buttocks. CARE Score: 1  Discharge Goal: Partial/moderate assistance    Donning and [place] Footwear: Putting On/Taking Off Footwear  Assistance Needed: Dependent  Comment: Pt unable to doff/don hospital socks with therapist completing. CARE Score: 1  Discharge Goal: Partial/moderate assistance      Toiletin [address] needed: Dependent  Comment: Pt with incontinent BM at bed level requiring 2 person assist for hygiene as one person needed to help Pt to remain rolled to the side and other to complete the hygiene. CARE Score: 1  Discharge Goal: Partial/moderate assistance      Toilet Transfers: Toilet Transfer  Comment: Pt with significant fatigue and transfers progressively got worse throughout session, therefore a toilet transfer was not seen as safe to trial during session.   Reason if not Attempted: Not attempted due to medical condition or safety concerns  CARE Score: 88  Discharge Goal: Aricept and Namenda. Written and verbal instructions when possible. Involving family and the rehabilitation process is much as possible. Rheumatoid arthritis: Continuing her baseline prednisone dose of 15 mg daily. Monitoring her chemistries and leukocytosis periodically. Offering diathermy and progressive mobilization. Her pains are getting in the way of teaching compensation strategies with her right arm and leg.

## 2023-07-14 NOTE — CARE COORDINATION
Case Management Admission Note      Patient:Altagracia Lott      HJP:[birth year]  AVM:[id number]  Rehab Dx/Hx: Cerebral infarction, unspecified [I63.9]  Acute CVA (cerebrovascular accident) [facility]) [I63.9]    Chief Complaint:   Past Medical History:   Diagnosis Date    Acid reflux     Anxiety     Bronchitis In Past    Cancer ([address])     colon cancer    Carotid stenosis 11/10/2015    Cerebral embolism with cerebral infarction ([address]) 09/18/2015    Seizure X 1, No Residual    Chronic back pain     Depression     History of blood transfusion     History of external beam radiation therapy 2017    5400 cGy pelvis/anal canal in 2017 per Dr. Anita Diop at [facility]    Hyperlipidemia     Hypertension     Prolonged emergence from general anesthesia     RA (rheumatoid arthritis) ([address])     \"All Over\"    Seizure ([address]) 09/18/2015    X 1    Sleep apnea     Uses CPAP    Teeth missing     Upper And Lower    Urinary incontinence     UTI (urinary tract infection) In Past    No Current Symptoms    Wears dentures     Full Upper , Partial Lower    Wears glasses     Wears partial dentures     Lower     Past Surgical History:   Procedure Laterality Date    ARTHROCENTESIS / ASPIRATION / INJECTION KNEE  05/11/2021         CAROTID ENDARTERECTOMY Right 11/18/2015    CARPAL TUNNEL RELEASE      COLONOSCOPY  In Past    DENTAL SURGERY      Teeth Extracted In Past    DILATION AND CURETTAGE OF UTERUS  1960's    Prior To Vaginal Hysterectomy    HYSTERECTOMY, VAGINAL  1960's    JOINT REPLACEMENT      PORT SURGERY N/A 03/02/2021    PORT REMOVAL performed by Sol Balderas MD at [facility] OR     Allergies   Allergen Reactions    Cortisone Rash    Codeine      Unsure Of Reaction    Pcn [Penicillins]      Unknown Reaction     Precautions: falls and skin    Date of Admit: 7/13/2023  Room #: 1012/1012-A      Current functional status at time of admit:        Home Living/DME Available:                             IADL Hx:                       Spouse: n/a  Family:

## 2023-07-14 NOTE — PROGRESS NOTES
Comprehensive Nutrition Assessment    Type and Reason for Visit:  Initial, Consult (oral nutrition supplement)    Nutrition Recommendations/Plan:   Continue regular diet   Will offer standard oral nutrition supplement and trial wound healing when available  Assist with meals as needed, encourage intake  Will continue to follow up during stay     Malnutrition Assessment:  Malnutrition Status:  Insufficient data (07/14/23 1532)    Context:  Acute Illness       Nutrition Assessment:    Admit to acute rehab unit with weakness, CVA. Currently able to remain on regular diet with recent meal intake %. Patient not available on attempted visits today. Noted hx pressure injuries, followed by wound care. Plan to offer oral nutrition supplement for additonal nutrients during stay. Will follow at moderate nutrition risk at this time with increased needs. Nutrition Related Findings:    receiving care/therapy on visits,  hx RA, dementia, constipation  BM today   meds noted: oscal, folvite, KCl, prednisone Wound Type: Pressure Injury, Unstageable, Stage II       Current Nutrition Intake & Therapies:    Average Meal Intake: %  Average Supplements Intake: None Ordered  ADULT DIET; Regular    Anthropometric Measures:  Height: 5' 4\" (162.6 cm)  Ideal Body Weight (IBW): 120 lbs (55 kg)       Current Body Weight: 140 lb 6.9 oz (63.7 kg), 117 % IBW.  Weight Source: Bed Scale  Current BMI (kg/m2): 24.1  Usual Body Weight: 138 lb 14.2 oz (63 kg) ( 2022)  % Weight Change (Calculated): 1.1  Weight Adjustment For: No Adjustment                 BMI Categories: Normal Weight (BMI 22.0 to 24.9) age over 72    Estimated Daily Nutrient Needs:  Energy Requirements Based On: Kcal/kg  Weight Used for Energy Requirements: Current  Energy (kcal/day): 4327-9553 (25-27 mel/kg)  Weight Used for Protein Requirements: Current  Protein (g/day): 76-89 (1.2-1.4 g/kg)  Method Used for Fluid Requirements: 1 ml/kcal  Fluid (ml/day):

## 2023-07-14 NOTE — PROGRESS NOTES
IV TO PO EVALUATION  -Patients considered for IV to PO conversion should meet all of the   following inclusion criteria and none of the exclusion criteria. MEDICATION(S) CONSIDERED FOR CONVERSION:  -Pantoprazole    EXCLUSION CRITERIA:  -Criteria that the patient is NOT a candidate for conversion. .. [] Infections requiring IV therapy  [] Recent therapeutic failure on oral formulation  [] Meningitis  [] Osteomyelitis  [] Other  [] Patients with unreliable response to oral medication  [] Nausea and/or vomiting or diarrhea within previous 24 hours  [] Malabsorption disorders  [] Motility disorders of GI tract including ileus or bowel obstruction  [] Patients who cannot use oral route  [] Painful oral ulcerations  [] Unable to swallow  [] NPO  [] Clinical deterioration requiring vasopressor therapy for blood pressure support  [] Active bleeding (Proton Pump Inhibitors/H2 receptor blockers only)  [x] NO EXCLUSION CRITERIA NOTED      INCLUSION CRITERIA:   -Criteria to initiate medication route switch. .. [x] No exclusion criteria met as described above  [x] IV therapy for >24 hours, afebrile, improving trend in WBC (antibiotics only)  [x] Tolerating oral diet, may include  Clear liquids >1000 mL/day  Tube feeds without high residuals (<150 mL) at least 40 mL/hr  [x] Tolerating oral medications  [] No seizures for 72 hours (antiepileptic medications only)    Please note, patients may be given suppositories when available for ordered product if no contraindications exist (ie. .. rectal surgery or infection). Oral solutions/suspensions may be considered for patients with a functioning enteral tube not on continuous suction, if available.     MEDICATION(S) TO BE CONVERTED:  -Pantoprazole 40 mg IV daily converted to pantoprazole 40 mg by mouth daily    Santino Craft, PharmD, [facility], BCPS  7/13/2023 3:48 PM
Physician Progress Note      PATIENT:               Allene Boxer  CSN #:                  [id number]  :                       1940  ADMIT DATE:       2023 2:04 PM  [address] [facility] DATE:        2023 6:03 PM  RESPONDING  PROVIDER #:        Fer Ogden MD          QUERY TEXT:    Pt admitted with left sided weakness. Noted documentation of likely subacute   stroke on 23 in progress note by Gregg Joshi NP, Neurology consultant. If   possible, please document in progress notes and discharge summary:    The medical record reflects the following:  Risk Factors: previous stroke, seizure disorder  Clinical Indicators: Pt presented to the ED with progressive left-sided   weakness for the past 2 months. Ambulates with a walker, recently have been   requiring increased assistance with ambulation, no falls. Also endorsed   intermittent dysphagia. MRI brain is negative for acute process. Gregg Joshi NP,   neuro consultant indicates pt weakness is progressive during the course of   the day. EEG was unremarkable.   Treatment: Neuro consult, EEG, MRI, labs, Keppra    Thank you,  Tami Scott, RN  [phone]  Options provided:  -- Left sided weakness due to subacute stroke confirmed present on admission  -- Left sided weakness due to seizure with Justin's paralysis confirmed present   on admission  -- Left sided weakness due to myasthenia gravis confirmed present on admission  -- Other - I will add my own diagnosis  -- Disagree - Not applicable / Not valid  -- Disagree - Clinically unable to determine / Unknown  -- Refer to Clinical Documentation Reviewer    PROVIDER RESPONSE TEXT:    Left sided weakness likely due to subacute stroke, present on admission    Query created by: Arleth Tavares on 2023 3:51 PM      Electronically signed by:  Fer Ogden MD 2023 7:50 AM
Routine Inpatient EEG completed 07/12/2023. EEG is now awaiting interpretation. Physician has been notified via [address] [place].
SLP ALL NOTES  Facility/Department: [facility] 3E   CLINICAL BEDSIDE SWALLOW EVALUATION    NAME: Lisa Sotomayor  : 1940  MRN: [id number]    ADMISSION DATE: 2023  ADMITTING DIAGNOSIS: has Cerebral embolism with cerebral infarction [facility]); Partial seizures ([address]); RA (rheumatoid arthritis) ([address]); Left hemiparesis ([address]); Abnormality of gait; Essential hypertension; Rheumatoid arthritis involving multiple sites with positive rheumatoid factor ([address]); Carotid stenosis; CVA, old, hemiparesis ([address]); Malignant neoplasm of anal canal ([address]); Anemia, unspecified; Other fatigue; Seizure ([address]); Effusion, left knee; Effusion of left knee; Injury of left knee; SVT (supraventricular tachycardia) ([address]); Mild memory disturbance; Sleep apnea; VIC (obstructive sleep apnea); Hypersomnia; Precordial pain; and Left-sided weakness on their problem list.  ONSET DATE: this admission    IMPRESSIONS AND RECOMMENDATIONS:   Lisa Sotomayor was referred for a bedside swallow evaluation after being admitted to [facility] with left sided weakness that began two weeks prior to admission. Imaging revealed a clear CT of head and, per radiologist, XR of chest revealed \"differential considerations include hiatal hernia, gastroesophageal junction, mass should be considered. \" No dysphagia diagnosis know prior to this admission. Medical hx includes colon cancer, cerebral embolism, HLD, HTN, RA, acid reflux, anxiety, bronchitis, and carotid stenosis. Pt was seen for evaluation seated upright in bed, alert, cooperative, and pleasant. Baseline vocal quality was weak. Oral mechanism examination WFL with no signs of asymmetry. Voluntary cough was strong and clear. Pt was presented with PO trials of ice chips, thin liquids via tsp/cup/straw, puree, regular solids. Oral stage WFL characterized by adequate bolus acceptance, labial seal, mastication, and trace oral residue. Noted pt used backward head tilt to facilitate a-p transit intermittently.  Pharyngeal stage
w/wo contrast:  IMPRESSION:  No acute intracranial abnormality    Echocardiogram:   Summary   Left ventricular systolic function is normal.   Ejection fraction is visually estimated at 55-60%. Sclerotic, but non-stenotic aortic valve. No evidence of any pericardial effusion. Negative bubble study; no evidence of intracardiac shunt. All imaging was personally reviewed   ASSESSMENT/PLAN:   29-year-old female presenting with 2-month history of worsening weakness most noticeable in the left upper extremity. Today patient is alert and oriented x4. Face is symmetric, tongue is midline. Vision is intact, patient denies headache. Speech is clear, language is fluent. She is noted to have significant proximal weakness of the upper extremities with left drift. She is able to lift the left upper extremity to antigravity but is unable to maintain that position for long. She is having weakness in her  strength of the left hand. Bilateral lower extremities are equally weak. Left sided weakness worsening over months likely secondary to subacute stroke cannot fully exclude seizure with Justin's paralysis, myasthenia gravis given progressive weakening during the course of the day. Neuroimaging as above  MRI brain w/wo contrast as above with no acute findings however evidence of evolving right parietal lobe stroke/encephalomalacia  Echocardiogram as above  Routine EEG no evidence for seizure  Continue Keppra 500 mg twice daily  MG panel ordered  Continue aspirin, atorvastatin for secondary stroke prevention  PT/OT/ST as recommended  Plan for ARU at discharge  Continue Aricept, Namenda for cognitive decline  A1C 7.2, TSH 2.580, LDL 48  Recommend Melatonin 3 mg at bedtime if continued difficulty with sleep. No further recommendations, we will sign off at this time. Patient was discussed with attending neurologist Dr. Juan Diego Stock      Thank you for allowing us to participate in the care of your patient.   If there
Nucleated RBC 0.0 K/CU MM    Total Immature Neutrophil 0.09 K/CU MM    Immature Neutrophil % 1.6 (H) 0 - 0.43 %   Basic Metabolic Panel w/ Reflex to MG    Collection Time: 07/12/23  6:41 AM   Result Value Ref Range    Sodium 140 135 - 145 MMOL/L    Potassium 3.9 3.5 - 5.1 MMOL/L    Chloride 103 99 - 110 mMol/L    CO2 30 21 - 32 MMOL/L    Anion Gap 7 4 - 16    BUN 14 6 - 23 MG/DL    Creatinine 0.4 (L) 0.6 - 1.1 MG/DL    Est, Glom Filt Rate >60 >60 mL/min/1.73m2    Glucose 84 70 - 99 MG/DL    Calcium 8.6 8.3 - 10.6 MG/DL           Electronically signed by Lazara Bryant MD on 7/12/2023 at 1:50 PM      Comment: Please note this report has been produced using speech recognition software and may contain errors related to that system including errors in grammar, punctuation, and spelling, as well as words and phrases that may be inappropriate. If there are any questions or concerns please feel free to contact the dictating provider for clarification.

## 2023-07-14 NOTE — PROGRESS NOTES
Occupational Therapy                              [facility] OCCUPATIONAL THERAPY EVALUATION    Chart Review:  Past Medical History:   Diagnosis Date    Acid reflux     Anxiety     Bronchitis In Past    Cancer ([address])     colon cancer    Carotid stenosis 11/10/2015    Cerebral embolism with cerebral infarction ([address]) 09/18/2015    Seizure X 1, No Residual    Chronic back pain     Depression     History of blood transfusion     History of external beam radiation therapy 2017    5400 cGy pelvis/anal canal in 2017 per Dr. Tonia Little at [facility]    Hyperlipidemia     Hypertension     Prolonged emergence from general anesthesia     RA (rheumatoid arthritis) ([address])     \"All Over\"    Seizure ([address]) 09/18/2015    X 1    Sleep apnea     Uses CPAP    Teeth missing     Upper And Lower    Urinary incontinence     UTI (urinary tract infection) In Past    No Current Symptoms    Wears dentures     Full Upper , Partial Lower    Wears glasses     Wears partial dentures     Lower     Past Surgical History:   Procedure Laterality Date    ARTHROCENTESIS / ASPIRATION / INJECTION KNEE  05/11/2021         CAROTID ENDARTERECTOMY Right 11/18/2015    CARPAL TUNNEL RELEASE      COLONOSCOPY  In Past    DENTAL SURGERY      Teeth Extracted In Past    DILATION AND CURETTAGE OF UTERUS  1960's    Prior To Vaginal Hysterectomy    HYSTERECTOMY, VAGINAL  1960's    JOINT REPLACEMENT      PORT SURGERY N/A 03/02/2021    PORT REMOVAL performed by Erika Neely MD at [address] History:  Social/Functional History  Lives With: Son (Pt lives with her son who is nonverbal (she was his primary caregiver, but he does have an aide as well).  Pt's daughter has been staying the night with Pt every night since before April 2023.)  Type of Home: House  Home Layout: One level  Home Access: Stairs to enter without rails  Entrance Stairs - Number of Steps: 1  Bathroom Shower/Tub: Tub/Shower unit  Bathroom Toilet: Standard  Bathroom Equipment: Grab bars in shower, Footwear: Putting On/Taking Off Footwear  Assistance Needed: Dependent  Comment: Pt unable to doff/don hospital socks with therapist completing. CARE Score: 1  Discharge Goal: Partial/moderate assistance      Toiletin [address] needed: Dependent  Comment: Pt with incontinent BM at bed level requiring 2 person assist for hygiene as one person needed to help Pt to remain rolled to the side and other to complete the hygiene. CARE Score: 1  Discharge Goal: Partial/moderate assistance      Bed Mobility:    Bed mobility  Rolling to Left: Maximum assistance  Rolling to Right: Minimal assistance  Supine to Sit: Maximum assistance (Assist for BLEs as well as trunk.)  Sit to Supine: Moderate assistance (Assist for BLEs.)    Transfers:    Transfers  Sit Pivot Transfers: Moderate assistance, 2 Person assistance  Sit to stand: Maximum assistance  Stand to sit: Maximum assistance           Toilet Transfer  Comment: Pt with significant fatigue and transfers progressively got worse throughout session, therefore a toilet transfer was not seen as safe to trial during session. Reason if not Attempted: Not attempted due to medical condition or safety concerns  CARE Score: 88  Discharge Goal: Partial/moderate assistance    Functional Mobility:    Balance  Sitting Balance: Minimal assistance (Occasional Min A as Pt would lean to the R or posteriorly.)  Standing Balance: Maximum assistance  Standing Balance  Time: Twice < 30 secs  Activity: ADL  Functional Mobility  Functional - Mobility Device: Wheelchair  Activity: To/from bathroom  Assist Level: Dependent/Total     Cognition:  Cognition  Overall Cognitive Status: Exceptions  Arousal/Alertness: Appropriate responses to stimuli  Following Commands:  Follows one step commands with repetition, Follows one step commands with increased time  Attention Span: Difficulty dividing attention  Memory: Decreased recall of recent events, Decreased short term memory  Safety

## 2023-07-14 NOTE — PROGRESS NOTES
Physical Therapy    [facility] ARU PHYSICAL THERAPY EVALUATION    Chart Review:  Past Medical History:   Diagnosis Date    Acid reflux     Anxiety     Bronchitis In Past    Cancer ([address])     colon cancer    Carotid stenosis 11/10/2015    Cerebral embolism with cerebral infarction ([address]) 09/18/2015    Seizure X 1, No Residual    Chronic back pain     Depression     History of blood transfusion     History of external beam radiation therapy 2017    5400 cGy pelvis/anal canal in 2017 per Dr. Teofilo Sosa at [facility]    Hyperlipidemia     Hypertension     Prolonged emergence from general anesthesia     RA (rheumatoid arthritis) ([address])     \"All Over\"    Seizure ([address]) 09/18/2015    X 1    Sleep apnea     Uses CPAP    Teeth missing     Upper And Lower    Urinary incontinence     UTI (urinary tract infection) In Past    No Current Symptoms    Wears dentures     Full Upper , Partial Lower    Wears glasses     Wears partial dentures     Lower     Past Surgical History:   Procedure Laterality Date    ARTHROCENTESIS / ASPIRATION / INJECTION KNEE  05/11/2021         CAROTID ENDARTERECTOMY Right 11/18/2015    CARPAL TUNNEL RELEASE      COLONOSCOPY  In Past    DENTAL SURGERY      Teeth Extracted In Past    DILATION AND CURETTAGE OF UTERUS  1960's    Prior To Vaginal Hysterectomy    HYSTERECTOMY, VAGINAL  1960's    JOINT REPLACEMENT      PORT SURGERY N/A 03/02/2021    PORT REMOVAL performed by Robert Lu MD at [address] [place] [address] Nw History: >10  Social History:  Social/Functional History  Lives With: Son (Pt lives with her son who is nonverbal (she was his primary caregiver, but he does have an aide as well).  Pt's daughter has been staying the night with Pt every night since before April 2023.)  Type of Home: House  Home Layout: One level  Home Access: Stairs to enter without rails  Entrance Stairs - Number of Steps: 1  Bathroom Shower/Tub: Tub/Shower unit  Bathroom Toilet: Standard  Bathroom Equipment: Grab bars in shower, 3-in-1 commode, with Mod A. Long Term Goal 4: Pt will complete sit<->stand and stand turn transfers with Max A  Long Term Goal 5: Pt will propel manual w/c 50 ft with turns over level surface with Min A.   Plan:    Requires PT Follow-Up: Yes  Pt will be seen at least 60 minutes per day for a minimum of 5 days per week, plus group therapy as appropriate  Physcial Therapy Plan  Current Treatment Recommendations: Strengthening, ROM, Balance training, Functional mobility training, Transfer training, Cognitive/Perceptual training, Endurance training, Wheelchair mobility training, Neuromuscular re-education, Cognitive reorientation, Home exercise program, Safety education & training, Patient/Caregiver education & training, Equipment evaluation, education, & procurement, Positioning, Therapeutic activities, Pain management    PT Individual Minutes  Time In: 1300  Time Out: 1400  Minutes: 60        Timed Code Treatment Minutes: 45 Minutes    Number of Minutes/Billable Intervention      PT Evaluation 15   Gait Training    Therapeutic Exercise    Neuro Re-Ed    Therapeutic Activity 45   Wheelchair Propulsion    Group    Other:    TOTAL 60       Electronically signed by:    Luis E Dumont, PT  7/14/2023, 17:00

## 2023-07-14 NOTE — PROGRESS NOTES
Facility/Department: [facility] ARU  Initial Speech/Language/Cognitive Assessment    NAME: Jia Rubio  : 1940   MRN: [id number]  ADMISSION DATE: 2023  ADMITTING DIAGNOSIS: has Cerebral embolism with cerebral infarction [facility]); Partial seizures ([address]); RA (rheumatoid arthritis) ([address]); Left hemiparesis ([address]); Gait disturbance; Essential hypertension; Rheumatoid arthritis involving multiple sites with positive rheumatoid factor ([address]); Carotid stenosis; CVA, old, hemiparesis ([address]); Malignant neoplasm of anal canal ([address]); Anemia, unspecified; Other fatigue; Seizure disorder ([address]); Effusion, left knee; Effusion of left knee; Injury of left knee; SVT (supraventricular tachycardia) ([address]); Mild memory disturbance; Sleep apnea; VIC (obstructive sleep apnea); Hypersomnia; Precordial pain; Hemiparesis of left nondominant side as late effect of cerebral infarction ([address]); Acute CVA (cerebrovascular accident) ([address]); Depression with anxiety; Mixed hyperlipidemia; and Dysarthria due to acute stroke [facility]) on their problem list.  DATE ONSET: 23    Date of Eval: 2023   Evaluating Therapist: CIRILO Way    RECENT RESULTS  CT OF HEAD/MRI:  FINDINGS:  INTRACRANIAL STRUCTURES/VENTRICLES:  There is no acute infarct. No mass  effect or midline shift. No evidence of an acute intracranial hemorrhage. The ventricles and sulci are normal in size and configuration. The  sellar/suprasellar regions appear unremarkable. The normal signal voids  within the major intracranial vessels appear maintained. No abnormal focus  of enhancement is seen within the brain. ORBITS: The visualized portion of the orbits demonstrate no acute abnormality. SINUSES: The visualized paranasal sinuses and mastoid air cells demonstrate  no acute abnormality. BONES/SOFT TISSUES: The bone marrow signal intensity appears normal. The soft  tissues demonstrate no acute abnormality. IMPRESSION:  No acute intracranial abnormality.   Neuro via singing. Auditory Comprehension  Comprehension: Within Functional Limits (R UE weakness for command following--very weak and difficult to move beyond shoulder. OT reports WFL and PT noted fatigue as well--progressive fatigue on non affected arm??? Dtr reports this is variable thoughout the day.)    Reading Comprehension  Reading Status: Exceptions to [facility]  Paragraph Impairment Severity: Minimal (Some L inattn--improves with cues.)  Interfering Components: Left neglect  Effective Techniques: Large print; Tactile/visual cueing;Prescription glasses/contact lenses    Expression  Primary Mode of Expression: Verbal    Verbal Expression  Verbal Expression: Within functional limits    Written Expression  Dominant Hand: Right (Difficulty holding pen and moving arm to paper--reduced legibility with large marker)         Pragmatics/Social Functioning  Pragmatics: Exceptions to [facility]  Affect: WFL  Eye Contact: WFL  Monotone: Mild  Topic Maintenance: WFL    Cognition:      Orientation  Overall Orientation Status:  (Not fully oriented to month/year)  Attention  Attention: Within Functional Limits  Memory  Memory: Exceptions to [facility]  Short-term Memory:  (Pt on Aricept--dtr has had to manage more recently with meds and finances.)  Safety/Judgment  Safety/Judgment: Exceptions to [facility] (Reviewed use of call button)  Flexibility of Thought: Mild    Additional Assessments:  Voice Evaluation  Vocal Quality: Exceptions to [facility]  Breath Support: Inadequate for speech  Breathy: Moderate  Weak: Moderate  Vocal Intensity: Moderately decreased  Maximum Phonation Time: 3 secs with vocal tremor and laryngeal tension-able to alleviate with singing wtih increased flow but persisting tremor. S/Z Ratio: 3:3  Neuro f/u would be beneficial to f/u as this appears to be more progressive in nature based on observations during eval and report from dtr.           Prognosis:  Speech Therapy Prognosis  Prognosis: Guarded  Prognosis Considerations: Family/Community Support;Participation Level;Previous Level of Function;Severity of Impairments    Education:  Patient Education Response: Verbalizes understanding          Therapy Time:   Individual Concurrent Group Co-treatment   Time In 1440         Time Out 1540         Minutes 60                 Electronically signed by MAj Cristobal CCC-SLP on 7/14/2023 at 6:47 PM

## 2023-07-15 LAB
ANION GAP SERPL CALCULATED.3IONS-SCNC: 9 MMOL/L (ref 4–16)
BUN SERPL-MCNC: 24 MG/DL (ref 6–23)
CALCIUM SERPL-MCNC: 9.2 MG/DL (ref 8.3–10.6)
CHLORIDE BLD-SCNC: 101 MMOL/L (ref 99–110)
CO2: 26 MMOL/L (ref 21–32)
CREAT SERPL-MCNC: 0.5 MG/DL (ref 0.6–1.1)
EKG ATRIAL RATE: 88 BPM
EKG DIAGNOSIS: NORMAL
EKG P AXIS: 53 DEGREES
EKG P-R INTERVAL: 174 MS
EKG Q-T INTERVAL: 384 MS
EKG QRS DURATION: 92 MS
EKG QTC CALCULATION (BAZETT): 464 MS
EKG R AXIS: 23 DEGREES
EKG T AXIS: 85 DEGREES
EKG VENTRICULAR RATE: 88 BPM
GFR SERPL CREATININE-BSD FRML MDRD: >60 ML/MIN/1.73M2
GLUCOSE SERPL-MCNC: 126 MG/DL (ref 70–99)
HCT VFR BLD CALC: 38.2 % (ref 37–47)
HEMOGLOBIN: 12.2 GM/DL (ref 12.5–16)
MCH RBC QN AUTO: 30.8 PG (ref 27–31)
MCHC RBC AUTO-ENTMCNC: 31.9 % (ref 32–36)
MCV RBC AUTO: 96.5 FL (ref 78–100)
PDW BLD-RTO: 13.7 % (ref 11.7–14.9)
PLATELET # BLD: 241 K/CU MM (ref 140–440)
PMV BLD AUTO: 9.3 FL (ref 7.5–11.1)
POTASSIUM SERPL-SCNC: 3.4 MMOL/L (ref 3.5–5.1)
RBC # BLD: 3.96 M/CU MM (ref 4.2–5.4)
SODIUM BLD-SCNC: 136 MMOL/L (ref 135–145)
TROPONIN T: <0.01 NG/ML
WBC # BLD: 8.3 K/CU MM (ref 4–10.5)

## 2023-07-15 PROCEDURE — 80048 BASIC METABOLIC PNL TOTAL CA: CPT

## 2023-07-15 PROCEDURE — 97110 THERAPEUTIC EXERCISES: CPT

## 2023-07-15 PROCEDURE — 36415 COLL VENOUS BLD VENIPUNCTURE: CPT

## 2023-07-15 PROCEDURE — 97530 THERAPEUTIC ACTIVITIES: CPT

## 2023-07-15 PROCEDURE — 2580000003 HC RX 258: Performed by: INTERNAL MEDICINE

## 2023-07-15 PROCEDURE — 6360000002 HC RX W HCPCS: Performed by: INTERNAL MEDICINE

## 2023-07-15 PROCEDURE — 6370000000 HC RX 637 (ALT 250 FOR IP): Performed by: INTERNAL MEDICINE

## 2023-07-15 PROCEDURE — 84484 ASSAY OF TROPONIN QUANT: CPT

## 2023-07-15 PROCEDURE — 1280000000 HC REHAB R&B

## 2023-07-15 PROCEDURE — 93005 ELECTROCARDIOGRAM TRACING: CPT | Performed by: PHYSICAL MEDICINE & REHABILITATION

## 2023-07-15 PROCEDURE — 94761 N-INVAS EAR/PLS OXIMETRY MLT: CPT

## 2023-07-15 PROCEDURE — 85027 COMPLETE CBC AUTOMATED: CPT

## 2023-07-15 PROCEDURE — 6370000000 HC RX 637 (ALT 250 FOR IP): Performed by: PHYSICAL MEDICINE & REHABILITATION

## 2023-07-15 RX ADMIN — COLLAGENASE SANTYL: 250 OINTMENT TOPICAL at 15:36

## 2023-07-15 RX ADMIN — ASPIRIN 81 MG: 81 TABLET, CHEWABLE ORAL at 10:11

## 2023-07-15 RX ADMIN — LEVETIRACETAM 500 MG: 500 TABLET, FILM COATED ORAL at 21:17

## 2023-07-15 RX ADMIN — CALCIUM 500 MG: 500 TABLET ORAL at 21:19

## 2023-07-15 RX ADMIN — PREDNISONE 15 MG: 5 TABLET ORAL at 10:11

## 2023-07-15 RX ADMIN — FOLIC ACID 500 MCG: 1 TABLET ORAL at 10:09

## 2023-07-15 RX ADMIN — SODIUM CHLORIDE, PRESERVATIVE FREE 10 ML: 5 INJECTION INTRAVENOUS at 10:13

## 2023-07-15 RX ADMIN — POTASSIUM CHLORIDE 10 MEQ: 750 TABLET, EXTENDED RELEASE ORAL at 10:11

## 2023-07-15 RX ADMIN — TRIAMTERENE AND HYDROCHLOROTHIAZIDE 1 TABLET: 37.5; 25 TABLET ORAL at 10:10

## 2023-07-15 RX ADMIN — LEVETIRACETAM 500 MG: 500 TABLET, FILM COATED ORAL at 10:09

## 2023-07-15 RX ADMIN — ACETAMINOPHEN 650 MG: 325 TABLET ORAL at 10:10

## 2023-07-15 RX ADMIN — ENOXAPARIN SODIUM 40 MG: 100 INJECTION SUBCUTANEOUS at 17:32

## 2023-07-15 RX ADMIN — GABAPENTIN 400 MG: 400 CAPSULE ORAL at 21:19

## 2023-07-15 RX ADMIN — ACETAMINOPHEN 650 MG: 325 TABLET ORAL at 17:30

## 2023-07-15 RX ADMIN — MEMANTINE 10 MG: 10 TABLET ORAL at 21:19

## 2023-07-15 RX ADMIN — MEMANTINE 10 MG: 10 TABLET ORAL at 10:09

## 2023-07-15 RX ADMIN — ACETAMINOPHEN 650 MG: 325 TABLET ORAL at 06:26

## 2023-07-15 RX ADMIN — DILTIAZEM HYDROCHLORIDE 240 MG: 240 CAPSULE, COATED, EXTENDED RELEASE ORAL at 10:11

## 2023-07-15 RX ADMIN — ATORVASTATIN CALCIUM 20 MG: 10 TABLET, FILM COATED ORAL at 21:19

## 2023-07-15 RX ADMIN — ACETAMINOPHEN 650 MG: 325 TABLET ORAL at 21:17

## 2023-07-15 RX ADMIN — SODIUM CHLORIDE, PRESERVATIVE FREE 10 ML: 5 INJECTION INTRAVENOUS at 21:19

## 2023-07-15 RX ADMIN — DONEPEZIL HYDROCHLORIDE 10 MG: 10 TABLET ORAL at 10:09

## 2023-07-15 ASSESSMENT — PAIN - FUNCTIONAL ASSESSMENT
PAIN_FUNCTIONAL_ASSESSMENT: PREVENTS OR INTERFERES SOME ACTIVE ACTIVITIES AND ADLS
PAIN_FUNCTIONAL_ASSESSMENT: PREVENTS OR INTERFERES SOME ACTIVE ACTIVITIES AND ADLS
PAIN_FUNCTIONAL_ASSESSMENT: ACTIVITIES ARE NOT PREVENTED

## 2023-07-15 ASSESSMENT — PAIN DESCRIPTION - LOCATION
LOCATION: CHEST
LOCATION: KNEE;LEG
LOCATION: LEG

## 2023-07-15 ASSESSMENT — PAIN SCALES - GENERAL
PAINLEVEL_OUTOF10: 0
PAINLEVEL_OUTOF10: 6
PAINLEVEL_OUTOF10: 5
PAINLEVEL_OUTOF10: 2

## 2023-07-15 ASSESSMENT — PAIN DESCRIPTION - DESCRIPTORS
DESCRIPTORS: ACHING
DESCRIPTORS: ACHING
DESCRIPTORS: DISCOMFORT

## 2023-07-15 ASSESSMENT — PAIN DESCRIPTION - ORIENTATION
ORIENTATION: LEFT
ORIENTATION: LEFT
ORIENTATION: LEFT;RIGHT

## 2023-07-15 ASSESSMENT — PAIN DESCRIPTION - FREQUENCY
FREQUENCY: INTERMITTENT

## 2023-07-15 ASSESSMENT — PAIN DESCRIPTION - PAIN TYPE
TYPE: CHRONIC PAIN
TYPE: CHRONIC PAIN

## 2023-07-15 ASSESSMENT — PAIN DESCRIPTION - ONSET
ONSET: ON-GOING

## 2023-07-15 NOTE — FLOWSHEET NOTE
[x] daily progress note       [] discharge       Patient Name:  Mack Lyons   :  1940 MRN: [id number]  Room:  [address] [place] [address] Date of Admission: 2023  Rehabilitation Diagnosis:   Cerebral infarction, unspecified [I63.9]  Acute CVA (cerebrovascular accident) [facility]) [I63.9]       Date 7/15/2023       Day of ARU Week:  3   Time IN/OUT [id number]   Individual Tx Minutes    Group Tx Minutes    Co-Treat Minutes 90 w/ OT   Concurrent Tx Minutes    TOTAL Tx Time Mins 90   Variance Time    Variance Time []   Refusal due to:     []   Medical hold/reason:    []   Illness   []   Off Unit for test/procedure  []   Extra time needed to complete task  []   Therapeutic need  []   Other (specify):   Restrictions Restrictions/Precautions  Restrictions/Precautions: Fall Risk, General Precautions  Position Activity Restriction  Other position/activity restrictions: IV access LUE, L hemiparesis   Communication with other providers: [x]   OK to see per nursing:   See Below   []   Spoke with team member regarding:      Subjective observations and cognitive status: Pt was laying in bed at beginning of session. Pt daughter present in room. Pt reported that she had some tightness in her L chest and pain when lifting her arm. Nursing made aware and came down to take full set of vitals and EKG w/ BP: 120/60. Therapists were given okay by nursing to continue with session but to monitor s/s.       Pain level/location:    6/10        Discharge recommendations  Anticipated discharge date:  TBD  Destination: []home alone   []home alone with assist PRN     [] home w/ family      [] Continuous supervision  []SNF    [] Assisted living     [] Other:   Continued therapy: []HHC PT  []OUTPATIENT  PT   [] No Further PT  Equipment needs: TBD     A cotreat was completed this date with  [x] PT    [x] OT   [] ST      This pt requires simultaneous intervention of 2 skilled therapists to safely complete tasks to address complexity of deficits defined in

## 2023-07-15 NOTE — PROGRESS NOTES
Occupational Therapy  Physical Rehabilitation: OCCUPATIONAL THERAPY     [x] daily progress note       [] discharge       Patient Name:  Selin Alvarez   :  1940 MRN: [id number]  Room:  [address] [place], [place] [address] Date of Admission: 2023  Rehabilitation Diagnosis:   Cerebral infarction, unspecified [I63.9]  Acute CVA (cerebrovascular accident) [facility]) [I63.9]       Date 7/15/2023       Day of ARU Week:  3   Time IN/OUT [id number]   Individual Tx Minutes    Group Tx Minutes    Co-Treat Minutes 80  A cotreat was completed this date with  [x] PT    [] OT   [] ST      This pt requires simultaneous intervention of 2 skilled therapists to safely complete tasks to address complexity of deficits defined in the goals including:  []Attention   [] Safety    []Problem Solving    []Sequencing     []Initiation    []Processing      []Recall of learned tasks  [] Communication  [] Self Feeding   []ADL's    [x]UE Function    [x]Motor planning   [x]Balance  []Energy Conservation   []Verbal cues   [] Tactile Cues  [x]Barriers     [x]Transfers   []Device Use  Pt's response to cotreat: Pleasant response  Progress toward goals:     Concurrent Tx Minutes    TOTAL Tx Time Mins 90   Variance Time    Variance Time []   Refusal due to:     []   Medical hold/reason:    []   Illness   []   Off Unit for test/procedure  []   Extra time needed to complete task  []   Therapeutic need  []   Other (specify):   Restrictions Restrictions/Precautions  Restrictions/Precautions: Fall Risk, General Precautions  Position Activity Restriction  Other position/activity restrictions: IV access LUE, L hemiparesis   Communication with other providers: [x]   OK to see per nursing:     [x]   Spoke with team member regarding:     Subjective observations and cognitive status: Upon arrival to room pt. Supine in bed with daughter present in room Complaining of chest pain. Retrieved and spoke with nursing. Using ordered EKG and labs.  Nursing retrieved set of vitals and EKG, all Goals: STGs=LTGs:  Long Term Goals  Time Frame for Long Term Goals : 12-14 days or until d/c. Long Term Goal 1: Pt will complete self-feeding with setup assist.  Long Term Goal 2: Pt will complete oral hygiene and grooming tasks with setup assist.  Long Term Goal 3: Pt will complete total body bathing with AE PRN and Mod A. Long Term Goal 4: Pt will complete UB dressing with Min A. Long Term Goal 5: Pt will complete LB dressing with AE PRN and Mod A. Additional Goals?: Yes  Long Term Goal 6: Pt will doff/don footwear with AE PRN and Min A. Long Term Goal 7: Pt will complete toileting with Mod A. Long Term Goal 8: Pt will complete functional transfers (bed, chair, shower, toilet) with DME PRN and Mod A. Long Term Goal 9: Pt will perform therex/therax to facilitate an increase in strength/endurance with emphasis on dynamic sitting balance/tolerance > 15 mins with SBA.:        Plan of Care                                                                              Times per week: 5 days per week for a minimum of 60 minutes/day plus group as appropriate for 60 minutes.   Treatment to include Occupational Therapy Plan  Current Treatment Recommendations: Strengthening, ROM, Balance training, Functional mobility training, Endurance training, Neuromuscular re-education, Cognitive reorientation, Pain management, Safety education & training, Patient/Caregiver education & training, Equipment evaluation, education, & procurement, Positioning, Self-Care / ADL, Cognitive/Perceptual training, Coordination training    Electronically signed by   ISAAC Aguayo,  7/15/2023, 8:32 AM

## 2023-07-15 NOTE — PLAN OF CARE
Problem: Discharge Planning  Goal: Discharge to home or other facility with appropriate resources  Outcome: Progressing     Problem: Safety - Adult  Goal: Free from fall injury  Outcome: Progressing     Problem: Skin/Tissue Integrity  Goal: Absence of new skin breakdown  Description: 1. Monitor for areas of redness and/or skin breakdown  2. Assess vascular access sites hourly  3. Every 4-6 hours minimum:  Change oxygen saturation probe site  4. Every 4-6 hours:  If on nasal continuous positive airway pressure, respiratory therapy assess nares and determine need for appliance change or resting period.   Outcome: Progressing     Problem: ABCDS Injury Assessment  Goal: Absence of physical injury  Outcome: Progressing     Problem: Pain  Goal: Verbalizes/displays adequate comfort level or baseline comfort level  Outcome: Progressing  Flowsheets  Taken 7/15/2023 1047  Verbalizes/displays adequate comfort level or baseline comfort level:   Encourage patient to monitor pain and request assistance   Assess pain using appropriate pain scale   Administer analgesics based on type and severity of pain and evaluate response   Implement non-pharmacological measures as appropriate and evaluate response   Consider cultural and social influences on pain and pain management   Notify Licensed Independent Practitioner if interventions unsuccessful or patient reports new pain  Taken 7/15/2023 1006  Verbalizes/displays adequate comfort level or baseline comfort level:   Encourage patient to monitor pain and request assistance   Assess pain using appropriate pain scale   Administer analgesics based on type and severity of pain and evaluate response   Implement non-pharmacological measures as appropriate and evaluate response   Consider cultural and social influences on pain and pain management   Notify Licensed Independent Practitioner if interventions unsuccessful or patient reports new pain     Problem: Chronic Conditions and Co-morbidities  Goal: Patient's chronic conditions and co-morbidity symptoms are monitored and maintained or improved  Outcome: Progressing     Problem: Nutrition Deficit:  Goal: Optimize nutritional status  Outcome: Progressing

## 2023-07-16 VITALS
SYSTOLIC BLOOD PRESSURE: 127 MMHG | WEIGHT: 143.52 LBS | HEIGHT: 64 IN | BODY MASS INDEX: 24.5 KG/M2 | DIASTOLIC BLOOD PRESSURE: 63 MMHG | OXYGEN SATURATION: 95 % | TEMPERATURE: 98.2 F | HEART RATE: 87 BPM | RESPIRATION RATE: 18 BRPM

## 2023-07-16 PROCEDURE — 2580000003 HC RX 258: Performed by: INTERNAL MEDICINE

## 2023-07-16 PROCEDURE — 94761 N-INVAS EAR/PLS OXIMETRY MLT: CPT

## 2023-07-16 PROCEDURE — 6370000000 HC RX 637 (ALT 250 FOR IP): Performed by: PHYSICAL MEDICINE & REHABILITATION

## 2023-07-16 PROCEDURE — 1280000000 HC REHAB R&B

## 2023-07-16 PROCEDURE — 6370000000 HC RX 637 (ALT 250 FOR IP): Performed by: INTERNAL MEDICINE

## 2023-07-16 PROCEDURE — 6360000002 HC RX W HCPCS: Performed by: INTERNAL MEDICINE

## 2023-07-16 RX ADMIN — MEMANTINE 10 MG: 10 TABLET ORAL at 08:38

## 2023-07-16 RX ADMIN — MEMANTINE 10 MG: 10 TABLET ORAL at 19:46

## 2023-07-16 RX ADMIN — LEVETIRACETAM 500 MG: 500 TABLET, FILM COATED ORAL at 19:47

## 2023-07-16 RX ADMIN — GABAPENTIN 400 MG: 400 CAPSULE ORAL at 19:46

## 2023-07-16 RX ADMIN — POTASSIUM CHLORIDE 10 MEQ: 750 TABLET, EXTENDED RELEASE ORAL at 08:38

## 2023-07-16 RX ADMIN — ACETAMINOPHEN 650 MG: 325 TABLET ORAL at 11:51

## 2023-07-16 RX ADMIN — ACETAMINOPHEN 650 MG: 325 TABLET ORAL at 17:36

## 2023-07-16 RX ADMIN — ENOXAPARIN SODIUM 40 MG: 100 INJECTION SUBCUTANEOUS at 17:37

## 2023-07-16 RX ADMIN — ASPIRIN 81 MG: 81 TABLET, CHEWABLE ORAL at 08:38

## 2023-07-16 RX ADMIN — ATORVASTATIN CALCIUM 20 MG: 10 TABLET, FILM COATED ORAL at 19:46

## 2023-07-16 RX ADMIN — ACETAMINOPHEN 650 MG: 325 TABLET ORAL at 19:47

## 2023-07-16 RX ADMIN — LEVETIRACETAM 500 MG: 500 TABLET, FILM COATED ORAL at 08:38

## 2023-07-16 RX ADMIN — SODIUM CHLORIDE, PRESERVATIVE FREE 10 ML: 5 INJECTION INTRAVENOUS at 19:52

## 2023-07-16 RX ADMIN — PREDNISONE 15 MG: 5 TABLET ORAL at 08:38

## 2023-07-16 RX ADMIN — FOLIC ACID 500 MCG: 1 TABLET ORAL at 08:37

## 2023-07-16 RX ADMIN — ACETAMINOPHEN 650 MG: 325 TABLET ORAL at 06:07

## 2023-07-16 RX ADMIN — SODIUM CHLORIDE, PRESERVATIVE FREE 10 ML: 5 INJECTION INTRAVENOUS at 08:41

## 2023-07-16 RX ADMIN — TRIAMTERENE AND HYDROCHLOROTHIAZIDE 1 TABLET: 37.5; 25 TABLET ORAL at 08:38

## 2023-07-16 RX ADMIN — CALCIUM 500 MG: 500 TABLET ORAL at 21:27

## 2023-07-16 RX ADMIN — DONEPEZIL HYDROCHLORIDE 10 MG: 10 TABLET ORAL at 08:38

## 2023-07-16 RX ADMIN — COLLAGENASE SANTYL: 250 OINTMENT TOPICAL at 08:38

## 2023-07-16 RX ADMIN — DILTIAZEM HYDROCHLORIDE 240 MG: 240 CAPSULE, COATED, EXTENDED RELEASE ORAL at 08:38

## 2023-07-16 ASSESSMENT — PAIN DESCRIPTION - DESCRIPTORS
DESCRIPTORS: DISCOMFORT
DESCRIPTORS: DISCOMFORT

## 2023-07-16 ASSESSMENT — PAIN SCALES - GENERAL
PAINLEVEL_OUTOF10: 0
PAINLEVEL_OUTOF10: 8
PAINLEVEL_OUTOF10: 2

## 2023-07-16 ASSESSMENT — PAIN DESCRIPTION - ONSET
ONSET: ON-GOING
ONSET: ON-GOING

## 2023-07-16 ASSESSMENT — PAIN DESCRIPTION - ORIENTATION
ORIENTATION: LEFT
ORIENTATION: LEFT

## 2023-07-16 ASSESSMENT — PAIN DESCRIPTION - FREQUENCY
FREQUENCY: CONTINUOUS
FREQUENCY: INTERMITTENT

## 2023-07-16 ASSESSMENT — PAIN - FUNCTIONAL ASSESSMENT
PAIN_FUNCTIONAL_ASSESSMENT: ACTIVITIES ARE NOT PREVENTED
PAIN_FUNCTIONAL_ASSESSMENT: ACTIVITIES ARE NOT PREVENTED

## 2023-07-16 ASSESSMENT — PAIN DESCRIPTION - LOCATION
LOCATION: LEG
LOCATION: LEG

## 2023-07-16 NOTE — PLAN OF CARE
Problem: Discharge Planning  Goal: Discharge to home or other facility with appropriate resources  7/15/2023 2232 by Claude Dixon RN  Outcome: Progressing  7/15/2023 9036 by Lanis Boas, RN  Outcome: Progressing     Problem: Discharge Planning  Goal: Discharge to home or other facility with appropriate resources  7/15/2023 2232 by Claude Dixon RN  Outcome: Progressing  7/15/2023 6883 by Lanis Boas, RN  Outcome: Progressing     Problem: Safety - Adult  Goal: Free from fall injury  7/15/2023 2232 by Claude Dixon RN  Outcome: Progressing  7/15/2023 4470 by Lanis Boas, RN  Outcome: Progressing     Problem: Skin/Tissue Integrity  Goal: Absence of new skin breakdown  Description: 1. Monitor for areas of redness and/or skin breakdown  2. Assess vascular access sites hourly  3. Every 4-6 hours minimum:  Change oxygen saturation probe site  4. Every 4-6 hours:  If on nasal continuous positive airway pressure, respiratory therapy assess nares and determine need for appliance change or resting period.   7/15/2023 2232 by Claude Dixon RN  Outcome: Progressing  7/15/2023 4690 by Lanis Boas, RN  Outcome: Progressing     Problem: ABCDS Injury Assessment  Goal: Absence of physical injury  7/15/2023 2232 by Claude Dixon RN  Outcome: Progressing  7/15/2023 4670 by Lanis Boas, RN  Outcome: Progressing     Problem: Pain  Goal: Verbalizes/displays adequate comfort level or baseline comfort level  7/15/2023 2232 by Claude Dixon RN  Outcome: Progressing  7/15/2023 0034 by Lanis Boas, RN  Outcome: Progressing  Flowsheets  Taken 7/15/2023 1047  Verbalizes/displays adequate comfort level or baseline comfort level:   Encourage patient to monitor pain and request assistance   Assess pain using appropriate pain scale   Administer analgesics based on type and severity of pain and evaluate response   Implement non-pharmacological measures as appropriate and evaluate response

## 2023-07-17 ENCOUNTER — CARE COORDINATION (OUTPATIENT)
Dept: MEDSURG UNIT | Age: 83
End: 2023-07-17

## 2023-07-17 LAB
ACHR BIND AB SER-SCNC: 0 NMOL/L (ref 0–0.4)
ACHR BLOCK AB/ACHR TOTAL SFR SER: 12 % (ref 0–26)
EKG ATRIAL RATE: 88 BPM
EKG DIAGNOSIS: NORMAL
EKG P AXIS: 53 DEGREES
EKG P-R INTERVAL: 174 MS
EKG Q-T INTERVAL: 384 MS
EKG QRS DURATION: 92 MS
EKG QTC CALCULATION (BAZETT): 464 MS
EKG R AXIS: 23 DEGREES
EKG T AXIS: 85 DEGREES
EKG VENTRICULAR RATE: 88 BPM
STRIA MUS IGG SER QL IF: NORMAL
TITIN ANTIBODY: <0.09 IV (ref 0–0.45)

## 2023-07-17 PROCEDURE — 94664 DEMO&/EVAL PT USE INHALER: CPT

## 2023-07-17 PROCEDURE — 2580000003 HC RX 258: Performed by: INTERNAL MEDICINE

## 2023-07-17 PROCEDURE — 6360000002 HC RX W HCPCS: Performed by: INTERNAL MEDICINE

## 2023-07-17 PROCEDURE — 97535 SELF CARE MNGMENT TRAINING: CPT

## 2023-07-17 PROCEDURE — 97530 THERAPEUTIC ACTIVITIES: CPT

## 2023-07-17 PROCEDURE — 99233 SBSQ HOSP IP/OBS HIGH 50: CPT | Performed by: PHYSICAL MEDICINE & REHABILITATION

## 2023-07-17 PROCEDURE — 6370000000 HC RX 637 (ALT 250 FOR IP): Performed by: PHYSICAL MEDICINE & REHABILITATION

## 2023-07-17 PROCEDURE — 6370000000 HC RX 637 (ALT 250 FOR IP): Performed by: INTERNAL MEDICINE

## 2023-07-17 PROCEDURE — 94150 VITAL CAPACITY TEST: CPT

## 2023-07-17 PROCEDURE — 94761 N-INVAS EAR/PLS OXIMETRY MLT: CPT

## 2023-07-17 PROCEDURE — 93010 ELECTROCARDIOGRAM REPORT: CPT | Performed by: INTERNAL MEDICINE

## 2023-07-17 PROCEDURE — 97542 WHEELCHAIR MNGMENT TRAINING: CPT

## 2023-07-17 PROCEDURE — 97110 THERAPEUTIC EXERCISES: CPT

## 2023-07-17 PROCEDURE — 1280000000 HC REHAB R&B

## 2023-07-17 RX ADMIN — ACETAMINOPHEN 650 MG: 325 TABLET ORAL at 06:01

## 2023-07-17 RX ADMIN — DONEPEZIL HYDROCHLORIDE 10 MG: 10 TABLET ORAL at 09:05

## 2023-07-17 RX ADMIN — GABAPENTIN 400 MG: 400 CAPSULE ORAL at 20:48

## 2023-07-17 RX ADMIN — COLLAGENASE SANTYL: 250 OINTMENT TOPICAL at 09:09

## 2023-07-17 RX ADMIN — ACETAMINOPHEN 650 MG: 325 TABLET ORAL at 20:48

## 2023-07-17 RX ADMIN — DILTIAZEM HYDROCHLORIDE 240 MG: 240 CAPSULE, COATED, EXTENDED RELEASE ORAL at 09:05

## 2023-07-17 RX ADMIN — POTASSIUM CHLORIDE 10 MEQ: 750 TABLET, EXTENDED RELEASE ORAL at 09:11

## 2023-07-17 RX ADMIN — PREDNISONE 15 MG: 5 TABLET ORAL at 09:04

## 2023-07-17 RX ADMIN — SODIUM CHLORIDE, PRESERVATIVE FREE 10 ML: 5 INJECTION INTRAVENOUS at 09:04

## 2023-07-17 RX ADMIN — ATORVASTATIN CALCIUM 20 MG: 10 TABLET, FILM COATED ORAL at 20:48

## 2023-07-17 RX ADMIN — SODIUM CHLORIDE, PRESERVATIVE FREE 10 ML: 5 INJECTION INTRAVENOUS at 20:52

## 2023-07-17 RX ADMIN — ASPIRIN 81 MG: 81 TABLET, CHEWABLE ORAL at 09:05

## 2023-07-17 RX ADMIN — LEVETIRACETAM 500 MG: 500 TABLET, FILM COATED ORAL at 20:48

## 2023-07-17 RX ADMIN — CALCIUM 500 MG: 500 TABLET ORAL at 20:48

## 2023-07-17 RX ADMIN — LEVETIRACETAM 500 MG: 500 TABLET, FILM COATED ORAL at 09:04

## 2023-07-17 RX ADMIN — ENOXAPARIN SODIUM 40 MG: 100 INJECTION SUBCUTANEOUS at 16:58

## 2023-07-17 RX ADMIN — ACETAMINOPHEN 650 MG: 325 TABLET ORAL at 16:58

## 2023-07-17 RX ADMIN — ACETAMINOPHEN 650 MG: 325 TABLET ORAL at 11:58

## 2023-07-17 RX ADMIN — FOLIC ACID 500 MCG: 1 TABLET ORAL at 09:04

## 2023-07-17 RX ADMIN — MEMANTINE 10 MG: 10 TABLET ORAL at 09:05

## 2023-07-17 RX ADMIN — TRIAMTERENE AND HYDROCHLOROTHIAZIDE 1 TABLET: 37.5; 25 TABLET ORAL at 09:05

## 2023-07-17 RX ADMIN — MEMANTINE 10 MG: 10 TABLET ORAL at 20:48

## 2023-07-17 ASSESSMENT — PAIN SCALES - GENERAL
PAINLEVEL_OUTOF10: 0
PAINLEVEL_OUTOF10: 1
PAINLEVEL_OUTOF10: 2
PAINLEVEL_OUTOF10: 0

## 2023-07-17 ASSESSMENT — PAIN - FUNCTIONAL ASSESSMENT
PAIN_FUNCTIONAL_ASSESSMENT: ACTIVITIES ARE NOT PREVENTED
PAIN_FUNCTIONAL_ASSESSMENT: PREVENTS OR INTERFERES SOME ACTIVE ACTIVITIES AND ADLS
PAIN_FUNCTIONAL_ASSESSMENT: PREVENTS OR INTERFERES SOME ACTIVE ACTIVITIES AND ADLS

## 2023-07-17 ASSESSMENT — PAIN DESCRIPTION - DESCRIPTORS
DESCRIPTORS: ACHING
DESCRIPTORS: DISCOMFORT
DESCRIPTORS: ACHING

## 2023-07-17 ASSESSMENT — PAIN DESCRIPTION - LOCATION
LOCATION: GENERALIZED
LOCATION: LEG
LOCATION: GENERALIZED
LOCATION: GENERALIZED

## 2023-07-17 ASSESSMENT — PAIN DESCRIPTION - ORIENTATION
ORIENTATION: OTHER (COMMENT)
ORIENTATION: LEFT

## 2023-07-17 NOTE — PATIENT CARE CONFERENCE
ACUTE REHAB TEAM CONFERENCE SUMMARY   [facility]    NAME: Deacon Gastelum  : 1940 ADMIT DATE: 2023    Rehab Admitting Dx:Cerebral infarction, unspecified [I63.9]  Acute CVA (cerebrovascular accident) ([address]) [I63.9]  Patient Comorbid Conditions: Active Hospital Problems    Diagnosis Date Noted    Acute CVA (cerebrovascular accident) ([address]) [I63.9] 2023    Depression with anxiety [F41.8] 2023    Mixed hyperlipidemia [E78.2] 2023    Dysarthria due to acute stroke ([address]) [I63.9, R47.1] 2023    Hemiparesis of left nondominant side as late effect of cerebral infarction [facility]) [I69.354] 2023    Seizure disorder ([address]) [G40.909] 2021    Gait disturbance [R26.9] 2015     Date: 2023    CASE MANAGEMENT  Current issues/needs regarding patient and family discharge status:   Family may not have realistic goals. Patient will need mod A at times at discharge.  level  Patient and family goal:   Discharge home at her baseline or better. PHYSICAL THERAPY (Updated in QI)        Long Term Goals  Time Frame for Long Term Goals : 10 tx days:  Long Term Goal 1: Pt will complete rolling R/L with bed rail with SBA-Sup. Long Term Goal 2: Pt will complete scooting and sup<->sit using bed features with Mod A. Long Term Goal 3: Pt will complete squat pivot transfers with Mod A. Long Term Goal 4: Pt will complete sit<->stand and stand turn transfers with Max A  Long Term Goal 5: Pt will propel manual w/c 50 ft with turns over level surface with Min A. Impairments/deficits, barriers:     Body Structures, Functions, Activity Limitations Requiring Skilled Therapeutic Intervention: Decreased functional mobility , Decreased ADL status, Decreased ROM, Decreased strength, Decreased cognition, Decreased endurance, Decreased balance, Decreased sensation, Decreased coordination, Decreased fine motor control, Decreased posture, Increased pain     Therapy Prognosis: Pleasant    Justification for Continued Stay  Based on my medical assessment of the patient and review of information from the interdisciplinary team as part of this weekly team conference, the patient continues to meet the following criteria for IRF level of care: The patient requires active and ongoing intervention of multiple therapy disciplines  The patient requires and intensive rehabilitation therapy program  The patient requires continued physician supervision by a rehabilitation physician  The patient requires 24 hours rehab nursing care  The patient requires an intensive and coordinated interdisciplinary team approach to the delivery of rehabilitative care. Assessment/Plan   [x]  The patient is making good progression towards their long term goals and is actively participating in and has a reasonable expectation to continue to benefit from the intensive rehabilitation therapy program   []  The estimated discharge date has been changed from initial team conference due to:   []  The estimated discharge destination has been changed from initial team conference due to:         Ongoing tx following discharge: [address]    []OUTPATIENT     [] No Further Treatment     [x] Family/Caregiver Training  []  Transitional Living Arrangement   [] Home Assessment (date  )     [] Family Conference   []  Therapeutic Pass       []  Other: (specify)    Estimated Discharge Date: 8/5/23    Estimated Discharge Destination: []home alone   []home alone with assist prn  [x]Continuous supervision []Return home with s/o/spouse/family   [] Assisted living    []SNF     Team members participating in today's conference.     [x] Be Muniz, Medical Director      [] NICOLE YiT,         [] Mike Bravo, RN Nurse Manager     [x]  Paola Beltran, PT  []  Marbella Clayton PT       [] Cora Carney OT   [x] Slava Handy OT  [] Jean Paul Pinto OT     [x]  Paula Harkins, SLP    []  Chrystal Aschoff, SLP

## 2023-07-17 NOTE — PROGRESS NOTES
Physical Therapy    [x] daily progress note       [] discharge       Patient Name:  Anne Beach   :  1940 MRN: [id number]  Room:  [address] [place] [address] Date of Admission: 2023  Rehabilitation Diagnosis:   Cerebral infarction, unspecified [I63.9]  Acute CVA (cerebrovascular accident) ([address]) [I63.9]       Date 2023       Day of ARU Week:  5   Time IN//1100   Individual Tx Minutes    Group Tx Minutes    Co-Treat Minutes 80  A cotreat was completed this date with  [] PT    [x] OT   [] ST      This pt requires simultaneous intervention of 2 skilled therapists to safely complete tasks to address complexity of deficits defined in the goals including:  [x]Attention   [x] Safety    [x]Problem Solving    [x]Sequencing     [x]Initiation    [x]Processing      []Recall of learned tasks  [] Communication  [] Self Feeding   [x]ADL's    [x]UE Function    [x]Motor planning   [x]Balance  []Energy Conservation   [x]Verbal cues   [x] Tactile Cues  []Barriers     [x]Transfers   [x]Device Use   Concurrent Tx Minutes    TOTAL Tx Time Mins 90   Variance Time    Variance Time []   Refusal due to:     []   Medical hold/reason:    []   Illness   []   Off Unit for test/procedure  []   Extra time needed to complete task  []   Therapeutic need  []   Other (specify):   Restrictions Restrictions/Precautions  Restrictions/Precautions: Fall Risk, General Precautions  Position Activity Restriction  Other position/activity restrictions: IV access LUE, L hemiparesis   Interdisciplinary communication [x]   Cleared for therapy per nursing     []   RN notified about issues during session  []   RN updated on pt performance  []   Spoke with   []   Spoke with OT  []   Spoke with MD  []   Other:    Subjective observations and cognitive status: Pt in bed with Purewick external catheter in use, unaware of bowel incontinence, daughter present in room and supportive.     Pain level/location: None indicated    Discharge recommendations minutes/day plus group as appropriate for 60 minutes.   Treatment to include Current Treatment Recommendations: Strengthening, ROM, Balance training, Functional mobility training, Transfer training, Cognitive/Perceptual training, Endurance training, Wheelchair mobility training, Neuromuscular re-education, Cognitive reorientation, Home exercise program, Safety education & training, Patient/Caregiver education & training, Equipment evaluation, education, & procurement, Positioning, Therapeutic activities, Pain management    Electronically signed by   Marina Roth PT  7/17/2023, 4:57 PM

## 2023-07-17 NOTE — PLAN OF CARE
Problem: Discharge Planning  Goal: Discharge to home or other facility with appropriate resources  7/16/2023 2229 by Kira Riggins RN  Outcome: Progressing  7/16/2023 1052 by Ursula Rodriguez RN  Outcome: Progressing     Problem: Safety - Adult  Goal: Free from fall injury  7/16/2023 2229 by Kira Riggins RN  Outcome: Progressing  7/16/2023 1052 by Ursula Rodriguez RN  Outcome: Progressing     Problem: Skin/Tissue Integrity  Goal: Absence of new skin breakdown  Description: 1. Monitor for areas of redness and/or skin breakdown  2. Assess vascular access sites hourly  3. Every 4-6 hours minimum:  Change oxygen saturation probe site  4. Every 4-6 hours:  If on nasal continuous positive airway pressure, respiratory therapy assess nares and determine need for appliance change or resting period.   7/16/2023 2229 by Kira Riggins RN  Outcome: Progressing  7/16/2023 1052 by Ursula Rodriguez RN  Outcome: Progressing     Problem: ABCDS Injury Assessment  Goal: Absence of physical injury  7/16/2023 2229 by Kira Riggins RN  Outcome: Progressing  7/16/2023 1052 by Ursula Rodriguez RN  Outcome: Progressing     Problem: Pain  Goal: Verbalizes/displays adequate comfort level or baseline comfort level  7/16/2023 2229 by Kira Riggins RN  Outcome: Progressing  7/16/2023 1052 by Ursula Rodriguez RN  Outcome: Progressing     Problem: Chronic Conditions and Co-morbidities  Goal: Patient's chronic conditions and co-morbidity symptoms are monitored and maintained or improved  7/16/2023 2229 by Kira Riggins RN  Outcome: Progressing  7/16/2023 1052 by Ursula Rodriguez RN  Outcome: Progressing     Problem: Nutrition Deficit:  Goal: Optimize nutritional status  7/16/2023 2229 by Kira Riggins RN  Outcome: Progressing  7/16/2023 1052 by Ursula Rodriguez RN  Outcome: Progressing

## 2023-07-17 NOTE — PROGRESS NOTES
Occupational Therapy    Physical Rehabilitation: OCCUPATIONAL THERAPY     [x] daily progress note       [] discharge       Patient Name:  Deneice Paget   :  1940 MRN: [id number]  Room:  [address] [place] [address] Date of Admission: 2023  Rehabilitation Diagnosis:   Cerebral infarction, unspecified [I63.9]  Acute CVA (cerebrovascular accident) ([address]) [I63.9]       Date 2023       Day of ARU Week:  5   Time IN/OUT 0930/1100   Individual Tx Minutes    Group Tx Minutes    Co-Treat Minutes 80  A cotreat was completed this date with  [x] PT    [] OT   [] ST      This pt requires simultaneous intervention of 2 skilled therapists to safely complete tasks to address complexity of deficits defined in the goals including:  [x]Attention   [x] Safety    [x]Problem Solving    [x]Sequencing     [x]Initiation    [x]Processing      [x]Recall of learned tasks  [] Communication  [] Self Feeding   [x]ADL's    [x]UE Function    [x]Motor planning   [x]Balance  []Energy Conservation   [x]Verbal cues   [x] Tactile Cues  []Barriers     [x]Transfers   [x]Device Use     Concurrent Tx Minutes    TOTAL Tx Time Mins 90   Variance Time    Variance Time []   Refusal due to:     []   Medical hold/reason:    []   Illness   []   Off Unit for test/procedure  []   Extra time needed to complete task  []   Therapeutic need  []   Other (specify):   Restrictions Restrictions/Precautions: Fall Risk, General Precautions         Communication with other providers: [x]   OK to see per nursing:     []   Spoke with team member regarding:      Subjective observations and cognitive status: Upon entrance, Pt resting in bed with daughter present in the room. Bc [place] in place and Pt with evident incontinent BM. Pt with fatigue wanting to close eyes at times.       Pain level/location:    /10       Location: Pt does not state any pain during session   Discharge recommendations  Anticipated discharge date:  TBD  Destination: []home alone   []home alone w assist prn poor eccentric control as well as positioning of hips. Squat-Pivot: Max A x 2  Assistive device required for transfer:   Eleno Blood stedy, w/c      Functional Mobility:    Assistance:  Pt attempted to propel w/c using RUE and RLE at the same time, however Pt unable to divide attention to perform. Therapist encouraged Pt to propel w/c with RUE only and Pt could perform inconsistently but required therapist to steer as Pt would run into wall, unable to also focus on direction. Therapists then attempted to try to facilitate backward/forward propulsion of w/c with RLE only, however Pt unable to push off with heel consistently to propel forward. Pt was inconsistent but able at times to propel backwards pushing off with toes of RLE. Mobility was performed to gauge next steps in order to facilitate increased independence for w/c propulsion. Device:   []   Rolling Walker     []   Standard Aura Cristina [x]   Wheelchair        []   [employer]       []   4-Wheeled Aura Cristina         []   Cardiac Aura Cristina       []   Other:        Additional Therapeutic activities/exercises completed this date:     [x]   ADL Training   [x]   Balance/Postural training     [x]   Bed/Transfer Training   [x]   Endurance Training   []   Neuromuscular Re-ed   []   Nu-step:  Time:        Level:         #Steps:       []   Rebounder:    []  Seated     []  Standing        []   Supine Ther Ex (reps/sets):     []   Seated Ther Ex (reps/sets):     []   Standing Ther Ex (reps/sets):     []   Other:      Comments: All intervention performed to increase pt's strength, endurance, ax tolerance, initiation, sequencing, command following, and balance in prep for increased I c ADL/IADLs and functional transfers/mobility.      Patient/Caregiver Education and Training:   []   [employer] Equipment Use  [x]   Bed Mobility/Transfer Technique/Safety  []   Energy Conservation Tips  []   Family training  [x]   Postural Awareness  [x]   Safety During Functional Activities  []   Reinforced

## 2023-07-17 NOTE — CARE COORDINATION
Provided stroke education folder. Discussed stroke risk factors, community services and stroke support group with pt and family member. Made pt aware that I can be contacted for any additional questions regarding stroke and left my contact information.

## 2023-07-18 ENCOUNTER — APPOINTMENT (OUTPATIENT)
Dept: MRI IMAGING | Age: 83
DRG: 057 | End: 2023-07-18
Attending: PHYSICAL MEDICINE & REHABILITATION
Payer: MEDICARE

## 2023-07-18 ENCOUNTER — TELEPHONE (OUTPATIENT)
Dept: ONCOLOGY | Age: 83
End: 2023-07-18

## 2023-07-18 PROCEDURE — 72141 MRI NECK SPINE W/O DYE: CPT

## 2023-07-18 PROCEDURE — 6360000002 HC RX W HCPCS: Performed by: INTERNAL MEDICINE

## 2023-07-18 PROCEDURE — 1280000000 HC REHAB R&B

## 2023-07-18 PROCEDURE — 94150 VITAL CAPACITY TEST: CPT

## 2023-07-18 PROCEDURE — 6370000000 HC RX 637 (ALT 250 FOR IP): Performed by: INTERNAL MEDICINE

## 2023-07-18 PROCEDURE — 97535 SELF CARE MNGMENT TRAINING: CPT

## 2023-07-18 PROCEDURE — 99233 SBSQ HOSP IP/OBS HIGH 50: CPT | Performed by: NURSE PRACTITIONER

## 2023-07-18 PROCEDURE — 97530 THERAPEUTIC ACTIVITIES: CPT

## 2023-07-18 PROCEDURE — 97130 THER IVNTJ EA ADDL 15 MIN: CPT

## 2023-07-18 PROCEDURE — 6370000000 HC RX 637 (ALT 250 FOR IP): Performed by: PHYSICAL MEDICINE & REHABILITATION

## 2023-07-18 PROCEDURE — 97129 THER IVNTJ 1ST 15 MIN: CPT

## 2023-07-18 PROCEDURE — 94761 N-INVAS EAR/PLS OXIMETRY MLT: CPT

## 2023-07-18 PROCEDURE — 2580000003 HC RX 258: Performed by: INTERNAL MEDICINE

## 2023-07-18 RX ADMIN — PREDNISONE 15 MG: 5 TABLET ORAL at 09:19

## 2023-07-18 RX ADMIN — ASPIRIN 81 MG: 81 TABLET, CHEWABLE ORAL at 09:19

## 2023-07-18 RX ADMIN — ACETAMINOPHEN 650 MG: 325 TABLET ORAL at 20:24

## 2023-07-18 RX ADMIN — ATORVASTATIN CALCIUM 20 MG: 10 TABLET, FILM COATED ORAL at 20:25

## 2023-07-18 RX ADMIN — FOLIC ACID 500 MCG: 1 TABLET ORAL at 09:19

## 2023-07-18 RX ADMIN — ACETAMINOPHEN 650 MG: 325 TABLET ORAL at 05:49

## 2023-07-18 RX ADMIN — SODIUM CHLORIDE, PRESERVATIVE FREE 10 ML: 5 INJECTION INTRAVENOUS at 20:25

## 2023-07-18 RX ADMIN — TRIAMTERENE AND HYDROCHLOROTHIAZIDE 1 TABLET: 37.5; 25 TABLET ORAL at 09:19

## 2023-07-18 RX ADMIN — ACETAMINOPHEN 650 MG: 325 TABLET ORAL at 09:48

## 2023-07-18 RX ADMIN — DONEPEZIL HYDROCHLORIDE 10 MG: 10 TABLET ORAL at 09:19

## 2023-07-18 RX ADMIN — DILTIAZEM HYDROCHLORIDE 240 MG: 240 CAPSULE, COATED, EXTENDED RELEASE ORAL at 09:19

## 2023-07-18 RX ADMIN — GABAPENTIN 400 MG: 400 CAPSULE ORAL at 20:24

## 2023-07-18 RX ADMIN — ACETAMINOPHEN 650 MG: 325 TABLET ORAL at 18:59

## 2023-07-18 RX ADMIN — COLLAGENASE SANTYL: 250 OINTMENT TOPICAL at 10:10

## 2023-07-18 RX ADMIN — CALCIUM 500 MG: 500 TABLET ORAL at 20:24

## 2023-07-18 RX ADMIN — MEMANTINE 10 MG: 10 TABLET ORAL at 20:24

## 2023-07-18 RX ADMIN — SODIUM CHLORIDE, PRESERVATIVE FREE 10 ML: 5 INJECTION INTRAVENOUS at 09:29

## 2023-07-18 RX ADMIN — MEMANTINE 10 MG: 10 TABLET ORAL at 09:19

## 2023-07-18 RX ADMIN — ENOXAPARIN SODIUM 40 MG: 100 INJECTION SUBCUTANEOUS at 19:00

## 2023-07-18 RX ADMIN — POTASSIUM CHLORIDE 10 MEQ: 750 TABLET, EXTENDED RELEASE ORAL at 09:27

## 2023-07-18 RX ADMIN — LEVETIRACETAM 500 MG: 500 TABLET, FILM COATED ORAL at 20:24

## 2023-07-18 RX ADMIN — LEVETIRACETAM 500 MG: 500 TABLET, FILM COATED ORAL at 09:19

## 2023-07-18 ASSESSMENT — PAIN SCALES - GENERAL
PAINLEVEL_OUTOF10: 5
PAINLEVEL_OUTOF10: 4
PAINLEVEL_OUTOF10: 0
PAINLEVEL_OUTOF10: 3
PAINLEVEL_OUTOF10: 4
PAINLEVEL_OUTOF10: 4
PAINLEVEL_OUTOF10: 0

## 2023-07-18 ASSESSMENT — PAIN DESCRIPTION - ONSET
ONSET: ON-GOING
ONSET: ON-GOING

## 2023-07-18 ASSESSMENT — PAIN DESCRIPTION - ORIENTATION
ORIENTATION: LEFT
ORIENTATION: LEFT
ORIENTATION: RIGHT;LEFT
ORIENTATION: LEFT;RIGHT

## 2023-07-18 ASSESSMENT — PAIN DESCRIPTION - LOCATION
LOCATION: OTHER (COMMENT)
LOCATION: LEG
LOCATION: LEG
LOCATION: KNEE

## 2023-07-18 ASSESSMENT — PAIN DESCRIPTION - PAIN TYPE
TYPE: ACUTE PAIN
TYPE: ACUTE PAIN

## 2023-07-18 ASSESSMENT — PAIN DESCRIPTION - FREQUENCY
FREQUENCY: INTERMITTENT
FREQUENCY: INTERMITTENT

## 2023-07-18 ASSESSMENT — PAIN DESCRIPTION - DESCRIPTORS
DESCRIPTORS: ACHING
DESCRIPTORS: ACHING
DESCRIPTORS: DISCOMFORT;DULL

## 2023-07-18 NOTE — PROGRESS NOTES
Location: [facility]   Discharge recommendations  Anticipated discharge date:  TBD  Destination: []home alone   []home alone w assist prn   [] home w/ family    [x] Continuous supervision/assist       []SNF    [] Assisted living     [] Other:   Continued therapy: [x]HHC OT  []OUTPATIENT  OT   [] No Further OT  Equipment needs: None at this time as Pt has BSC and TTB at home currently. ADLs:    Eating: Eating  Assistance Needed: Partial/moderate assistance  Comment: Pt uses built up handle on utensil to eat Tajik toast and oatmeal with RUE. Pt with R hand coordination deficits with ~ 25-30% spillage. Pt requires assist grasping orange juice cup and bringing to mouth as Pt does not have the  strength to bring to her mouth without spilling. CARE Score: 3  Discharge Goal: Set-up or clean-up assistance       UB Dressing: Upper Body Dressing  Assistance Needed: Substantial/maximal assistance  Comment: Pt requires Max A to don a pajama top as Pt able to slightly assist with getting RUE in the sleeve and pulling down. CARE Score: 2  Discharge Goal: Partial/moderate assistance         LB Dressing: Lower Body Dressing  Assistance Needed: Dependent  Comment: 3-person assist, Therapist threads depends and pajama pants onto Pt's BLEs and Pt pulls up to thighs with extended time attempting to use her LUE with difficulty grasping the pants. Pt pulls up using yoana stedy with 2-person Max A and a third person is necessary to manage over buttocks. CARE Score: 1  Discharge Goal: Partial/moderate assistance      Toiletin [address] needed: Dependent  Comment: Pt requires 2-3 person assist to manage clothing. Pt is able to scoot forward on toilet with Max A and reaches back with RUE to wipe a little bit after BM but ultimately requires therapist to complete for thoroughness. CARE Score: 1  Discharge Goal: Partial/moderate assistance      Toilet Transfers:     Toilet Transfer  Assistance needed: date.  The patient is making poor-fair progress toward established goals as evidenced by QI scores. Ongoing deficits are observed in the areas of  functional transfers/balance/mobility, strength, ROM, coordination, cognition, endurance, and ADLs and continued focus on this is recommended. Treatment/Activity Tolerance:   [x] Tolerated treatment with no adverse effects    [] Patient limited by fatigue  [] Patient limited by pain   [] Patient limited by medical complications:    [] Adverse reaction to Tx:   [] Significant change in status    Safety:       []  bed alarm set    [x]  chair alarm set    []  Pt refused alarms                []  Telesitter activated      [x]  Gait belt used during tx session      [x]other:   Pt left supervised in w/c with daughter present in room. Number of Minutes/Billable Intervention  Therapeutic Exercise    ADL Self-care 66   Neuro Re-Ed    Therapeutic Activity    Group    Other:    TOTAL 66       Social History  Social/Functional History  Lives With: Son (Pt lives with her son who is nonverbal (she was his primary caregiver, but he does have an aide as well).  Pt's daughter has been staying the night with Pt every night since before April 2023.)  Type of Home: House  Home Layout: One level  Home Access: Stairs to enter without rails  Entrance Stairs - Number of Steps: 1  Bathroom Shower/Tub: Tub/Shower unit  Bathroom Toilet: Standard  Bathroom Equipment: Grab bars in shower, 3-in-1 commode, Tub transfer bench, Toilet raiser (Pt has been loaned a TTB from senior services.)  Bathroom Accessibility: Walker accessible  Home Equipment: Alon Arroyo, [address], Rollator, Walker, rolling  Has the patient had two or more falls in the past year or any fall with injury in the past year?: Yes (> 10 falls in last year)  Receives Help From: Family  ADL Assistance: Needs assistance  Homemaking Assistance: Needs assistance  Meal Prep Responsibility: No  Laundry Responsibility: No  Cleaning Responsibility: No  Bill Paying/Finance Responsibility: No  Shopping Responsibility: No  Dependent Care Responsibility: No  Health Care Management: No  Ambulation Assistance: Needs assistance (Pt was CGA with 2WW, however has been primarily using w/c for ~ the last month.)  Transfer Assistance: Needs assistance  Active : No  Patient's  Info: Daughter, natividad  Mode of Transportation: Car  Education: Did not graduate HS--Grew up in [place]  Occupation: Retired  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 Pt dtr manages groceries. Occasional outings to [employer]. Lots of surgeries with knee replacements and limited outings. Additional Comments: Pt sleeps in regular flat bed. Pt is R hand dominant. Objective                                                                                    Goals:  (Update in navigator)  Short Term Goals  Time Frame for Short Term Goals: STGs=LTGs:  Long Term Goals  Time Frame for Long Term Goals : 12-14 days or until d/c. Long Term Goal 1: Pt will complete self-feeding with setup assist.  Long Term Goal 2: Pt will complete oral hygiene and grooming tasks with setup assist.  Long Term Goal 3: Pt will complete total body bathing with AE PRN and Mod A. Long Term Goal 4: Pt will complete UB dressing with Min A. Long Term Goal 5: Pt will complete LB dressing with AE PRN and Mod A. Additional Goals?: Yes  Long Term Goal 6: Pt will doff/don footwear with AE PRN and Min A. Long Term Goal 7: Pt will complete toileting with Mod A. Long Term Goal 8: Pt will complete functional transfers (bed, chair, shower, toilet) with DME PRN and Mod A.   Long Term Goal 9: Pt will perform therex/therax to facilitate an increase in strength/endurance with emphasis on dynamic sitting balance/tolerance > 15 mins with SBA.:        Plan of Care                                                                              Times per week: 5 days

## 2023-07-18 NOTE — PROGRESS NOTES
Neurology Service Progress Note  [facility]   Patient Name: Deneice Paget   : 1940           Subjective:   CC: left side weakness, worsening generalized weakness  Patient was seen previously and ARU has asked neurology to re evaluate the patient due to concerns for worsening generalized weakness. Today patient is resting in bed, daughter is at bedside. She is awake an alert. She does have increased work of breathing on exam. Left upper and lower extremity remain weak today. Right upper and lower extremity are similar to previous exam. She is able to lift right arm of the bed and hold to antigravity. She is able to move from nose to finger with delay. Patient is noted to be hyper reflexic in the upper extremities.       Past Medical History        Past Medical History:   Diagnosis Date    Acid reflux      Anxiety      Bronchitis In Past    Cancer ([address])       colon cancer    Carotid stenosis 11/10/2015    Cerebral embolism with cerebral infarction ([address]) 2015     Seizure X 1, No Residual    Chronic back pain      Depression      History of blood transfusion      History of external beam radiation therapy 2017     5400 cGy pelvis/anal canal in 2017 per Dr. Tracy Slater at [facility]    Hyperlipidemia      Hypertension      Prolonged emergence from general anesthesia      RA (rheumatoid arthritis) ([address])       \"All Over\"    Seizure ([address]) 09/18/2015     X 1    Sleep apnea       Uses CPAP    Teeth missing       Upper And Lower    Urinary incontinence      UTI (urinary tract infection) In Past     No Current Symptoms    Wears dentures       Full Upper , Partial Lower    Wears glasses      Wears partial dentures       Lower       :   Past Surgical History         Past Surgical History:   Procedure Laterality Date    ARTHROCENTESIS / ASPIRATION / INJECTION KNEE   2021          CAROTID ENDARTERECTOMY Right 2015    CARPAL TUNNEL RELEASE        COLONOSCOPY   In Past    DENTAL SURGERY

## 2023-07-18 NOTE — TELEPHONE ENCOUNTER
Attempted to call patient's daughter @ [phone] to notify that provider will be consulted to see patient while IP; however, no answer. VM left with this RN direct number should any further questions or needs present.

## 2023-07-18 NOTE — PROGRESS NOTES
Premorbid Conditions  [] Impulsivity     [] Other      Education/Interventions used this date: spoke w dtr with stimulation activities suggested. Pt with very tight incoord respiratory support for communication. Improves with easy onset voicing cues. .    []   Progress was updated and reviewed in Rehab[facility]er with patient and/or family this         date. [x] Attending Care Conference for pt this date. See Team Patient Care Conference Note for updates.     Interventions used this date:  [] Speech/Language Treatment       [] Dysphagia Treatment     [x] Cognitive Skill Development  [] Instruction in HEP     [] Group Therapy  Other:         Assessment / Impression                                                          Treatment/Activity Tolerance:   [] Tolerated Treatment well:     [x] Patient limited by fatigue/pain:       [] Patient limited by medical complications:    [] Adverse Reaction to Tx:   [] Significant change in status:      Electronically Signed by  Hien Ford, SLP, MA, CCC-SLP    7/18/2023  4:14 PM

## 2023-07-18 NOTE — PLAN OF CARE
Problem: Safety - Adult  Goal: Free from fall injury  7/18/2023 1109 by Jason Tucker RN  Outcome: Progressing  7/17/2023 2127 by Manuel Jeronimo RN  Outcome: Progressing     Problem: Skin/Tissue Integrity  Goal: Absence of new skin breakdown  Description: 1. Monitor for areas of redness and/or skin breakdown  2. Assess vascular access sites hourly  3. Every 4-6 hours minimum:  Change oxygen saturation probe site  4. Every 4-6 hours:  If on nasal continuous positive airway pressure, respiratory therapy assess nares and determine need for appliance change or resting period.   7/17/2023 2127 by Manuel Jeronimo, RN  Outcome: Progressing

## 2023-07-18 NOTE — CARE COORDINATION
Patient reviewed at today's care conference. The family hopes for increased independence and discharge home. Per therapy patient will need continuous assistance at discharge. Transfers are currently max A x2-3. Patient is uncoordinated and weak with decreased problem solving and retention. Patient may need a hospital bed at discharge. [facility] pt/ot/RN/HHA. Caregiver training requested.

## 2023-07-18 NOTE — PROGRESS NOTES
Physical Therapy    [x] daily progress note       [] discharge       Patient Name:  Mecca Burt   :  1940 MRN: [id number]  Room:  [address] [place], [place] [address] Date of Admission: 2023  Rehabilitation Diagnosis:   Cerebral infarction, unspecified [I63.9]  Acute CVA (cerebrovascular accident) ([address]) [I63.9]       Date 2023       Day of ARU Week:  6   Time IN//900  900/1006   Individual Tx Minutes 30   Group Tx Minutes    Co-Treat Minutes 77  A cotreat was completed this date with  [] PT    [x] OT   [] ST      This pt requires simultaneous intervention of 2 skilled therapists to safely complete tasks to address complexity of deficits defined in the goals including:  []Attention   [x] Safety    []Problem Solving    [x]Sequencing     []Initiation    [x]Processing      []Recall of learned tasks  [] Communication  [] Self Feeding   [x]ADL's    [x]UE Function    [x]Motor planning   [x]Balance  [x]Energy Conservation   [x]Verbal cues   [x] Tactile Cues  []Barriers     [x]Transfers   [x]Device Use   Concurrent Tx Minutes    TOTAL Tx Time Mins 96   Variance Time +6   Variance Time []   Refusal due to:     []   Medical hold/reason:    []   Illness   []   Off Unit for test/procedure  [x]   Extra time needed to complete tasks and caregiver education   []   Therapeutic need  []   Other (specify):   Restrictions Restrictions/Precautions  Restrictions/Precautions: Fall Risk, General Precautions  Position Activity Restriction  Other position/activity restrictions: IV access LUE, L hemiparesis   Interdisciplinary communication [x]   Cleared for therapy per nursing     []   RN notified about issues during session  []   RN updated on pt performance  []   Spoke with   []   Spoke with OT  []   Spoke with MD  []   Other:    Subjective observations and cognitive status: Pt in bed, alert, Purewick external catheter in use, initiated self-feeding with R hand but with apparent coordination and strength deficits.     Pain Cognitive/Perceptual training, Endurance training, Wheelchair mobility training, Neuromuscular re-education, Cognitive reorientation, Home exercise program, Safety education & training, Patient/Caregiver education & training, Equipment evaluation, education, & procurement, Positioning, Therapeutic activities, Pain management    Electronically signed by   Jeffy Barger, PT  7/18/2023, 12:12 PM

## 2023-07-18 NOTE — TELEPHONE ENCOUNTER
Patient daughter called to advise patient is now in patient at [facility]. She would like to know if Dr. Christine Schafer or Beryle Dan can visit with the patient there are review ct scans and order any other labs that may be needed. If can't visit please call.

## 2023-07-19 ENCOUNTER — APPOINTMENT (OUTPATIENT)
Dept: GENERAL RADIOLOGY | Age: 83
DRG: 057 | End: 2023-07-19
Attending: PHYSICAL MEDICINE & REHABILITATION
Payer: MEDICARE

## 2023-07-19 PROCEDURE — 97530 THERAPEUTIC ACTIVITIES: CPT

## 2023-07-19 PROCEDURE — 99211 OFF/OP EST MAY X REQ PHY/QHP: CPT

## 2023-07-19 PROCEDURE — 99233 SBSQ HOSP IP/OBS HIGH 50: CPT | Performed by: NURSE PRACTITIONER

## 2023-07-19 PROCEDURE — 6370000000 HC RX 637 (ALT 250 FOR IP): Performed by: PHYSICAL MEDICINE & REHABILITATION

## 2023-07-19 PROCEDURE — 71045 X-RAY EXAM CHEST 1 VIEW: CPT

## 2023-07-19 PROCEDURE — 94150 VITAL CAPACITY TEST: CPT

## 2023-07-19 PROCEDURE — 2580000003 HC RX 258: Performed by: INTERNAL MEDICINE

## 2023-07-19 PROCEDURE — 6370000000 HC RX 637 (ALT 250 FOR IP): Performed by: INTERNAL MEDICINE

## 2023-07-19 PROCEDURE — 97535 SELF CARE MNGMENT TRAINING: CPT

## 2023-07-19 PROCEDURE — 97112 NEUROMUSCULAR REEDUCATION: CPT

## 2023-07-19 PROCEDURE — 6360000002 HC RX W HCPCS: Performed by: INTERNAL MEDICINE

## 2023-07-19 PROCEDURE — 94761 N-INVAS EAR/PLS OXIMETRY MLT: CPT

## 2023-07-19 PROCEDURE — 1280000000 HC REHAB R&B

## 2023-07-19 RX ADMIN — ATORVASTATIN CALCIUM 20 MG: 10 TABLET, FILM COATED ORAL at 20:36

## 2023-07-19 RX ADMIN — POTASSIUM CHLORIDE 10 MEQ: 750 TABLET, EXTENDED RELEASE ORAL at 07:58

## 2023-07-19 RX ADMIN — FOLIC ACID 500 MCG: 1 TABLET ORAL at 08:00

## 2023-07-19 RX ADMIN — COLLAGENASE SANTYL: 250 OINTMENT TOPICAL at 08:05

## 2023-07-19 RX ADMIN — CALCIUM 500 MG: 500 TABLET ORAL at 20:36

## 2023-07-19 RX ADMIN — DILTIAZEM HYDROCHLORIDE 240 MG: 240 CAPSULE, COATED, EXTENDED RELEASE ORAL at 08:00

## 2023-07-19 RX ADMIN — LEVETIRACETAM 500 MG: 500 TABLET, FILM COATED ORAL at 20:36

## 2023-07-19 RX ADMIN — ASPIRIN 81 MG: 81 TABLET, CHEWABLE ORAL at 07:58

## 2023-07-19 RX ADMIN — SODIUM CHLORIDE, PRESERVATIVE FREE 10 ML: 5 INJECTION INTRAVENOUS at 20:39

## 2023-07-19 RX ADMIN — ACETAMINOPHEN 650 MG: 325 TABLET ORAL at 20:36

## 2023-07-19 RX ADMIN — DONEPEZIL HYDROCHLORIDE 10 MG: 10 TABLET ORAL at 07:59

## 2023-07-19 RX ADMIN — ACETAMINOPHEN 650 MG: 325 TABLET ORAL at 17:11

## 2023-07-19 RX ADMIN — MEMANTINE 10 MG: 10 TABLET ORAL at 08:00

## 2023-07-19 RX ADMIN — SODIUM CHLORIDE, PRESERVATIVE FREE 10 ML: 5 INJECTION INTRAVENOUS at 07:58

## 2023-07-19 RX ADMIN — ACETAMINOPHEN 650 MG: 325 TABLET ORAL at 05:32

## 2023-07-19 RX ADMIN — PREDNISONE 15 MG: 5 TABLET ORAL at 07:58

## 2023-07-19 RX ADMIN — LEVETIRACETAM 500 MG: 500 TABLET, FILM COATED ORAL at 07:58

## 2023-07-19 RX ADMIN — GABAPENTIN 400 MG: 400 CAPSULE ORAL at 20:36

## 2023-07-19 RX ADMIN — MEMANTINE 10 MG: 10 TABLET ORAL at 20:35

## 2023-07-19 RX ADMIN — TRIAMTERENE AND HYDROCHLOROTHIAZIDE 1 TABLET: 37.5; 25 TABLET ORAL at 08:00

## 2023-07-19 RX ADMIN — ENOXAPARIN SODIUM 40 MG: 100 INJECTION SUBCUTANEOUS at 17:13

## 2023-07-19 RX ADMIN — ACETAMINOPHEN 650 MG: 325 TABLET ORAL at 12:29

## 2023-07-19 ASSESSMENT — PAIN SCALES - GENERAL
PAINLEVEL_OUTOF10: 0

## 2023-07-19 NOTE — PROGRESS NOTES
Physical Rehabilitation: OCCUPATIONAL THERAPY     [x] daily progress note       [] discharge       Patient Name:  Corinne Alatorre   :  1940 MRN: [id number]  Room:  [address] [place] [address] Date of Admission: 2023  Rehabilitation Diagnosis:   Cerebral infarction, unspecified [I63.9]  Acute CVA (cerebrovascular accident) ([address]) [I63.9]       Date 2023       Day of ARU Week:  7   Time IN/OUT 1255/1325   Individual Tx Minutes 30   Group Tx Minutes    Co-Treat Minutes    Concurrent Tx Minutes    TOTAL Tx Time Mins 30   Variance Time    Variance Time []   Refusal due to:     []   Medical hold/reason:    []   Illness   []   Off Unit for test/procedure  []   Extra time needed to complete task  []   Therapeutic need  []   Other (specify):   Restrictions Restrictions/Precautions: Fall Risk, General Precautions         Communication with other providers: [x]   OK to see per nursing:     []   Spoke with team member regarding:      Subjective observations and cognitive status: Patient lying in supine having purewick placed by nursing tech, pleasant and agreeable to therapy      Pain level/location:    /10       Location: per patient no pain noted   Discharge recommendations  Anticipated discharge date:    Destination: []home alone   []home alone w assist prn   [x] home w/ family    [] Continuous supervision       []SNF    [] Assisted living     [] Other:   Continued therapy: [x]HHC OT  []OUTPATIENT  OT   [] No Further OT  Equipment needs: None      Additional Therapeutic activities/exercises completed this date:     []   ADL Training   []   Balance/Postural training     []   Bed/Transfer Training   []   Endurance Training   [x]   Neuromuscular Re-ed Patient instructed in fine motor and gross motor coordination tasks while in high fowlers bed level with small and large cones hitting taped target on bedside table, patient completes each exercises one set of 6 using both RUE and LUE, patient instructed in  [facility], grasp and

## 2023-07-19 NOTE — PROGRESS NOTES
The Neurologist mentioned increased work of breathing during her evaluation. Then PT and OT mentioned it to me as well. I called Dr Meli Lincoln and he ordered a portable CXR.

## 2023-07-19 NOTE — PROGRESS NOTES
Physical Therapy    [x] daily progress note       [] discharge       Patient Name:  Altagracia Yadav   :  1940 MRN: [id number]  Room:  [address] [place] [address] Date of Admission: 2023  Rehabilitation Diagnosis:   Cerebral infarction, unspecified [I63.9]  Acute CVA (cerebrovascular accident) ([address]) [I63.9]       Date 2023       Day of ARU Week:  7   Time IN//1037   Individual Tx Minutes    Group Tx Minutes    Co-Treat Minutes 61  A cotreat was completed this date with  [] PT    [x] OT   [] ST      This pt requires simultaneous intervention of 2 skilled therapists to safely complete tasks to address complexity of deficits defined in the goals including:  [x]Attention   [x] Safety    []Problem Solving    [x]Sequencing     []Initiation    [x]Processing      []Recall of learned tasks  [] Communication  [] Self Feeding   [x]ADL's    [x]UE Function    [x]Motor planning   [x]Balance  [x]Energy Conservation   [x]Verbal cues   [x] Tactile Cues  []Barriers     [x]Transfers   []Device Use   Concurrent Tx Minutes    TOTAL Tx Time Mins 60   Variance Time    Variance Time []   Refusal due to:     []   Medical hold/reason:    []   Illness   []   Off Unit for test/procedure  []   Extra time needed to complete task  []   Therapeutic need  []   Other (specify):   Restrictions Restrictions/Precautions  Restrictions/Precautions: Fall Risk, General Precautions  Position Activity Restriction  Other position/activity restrictions: IV access LUE, L hemiparesis   Interdisciplinary communication [x]   Cleared for therapy per nursing     [x]   RN notified about issues during session (wound status in gluteal area and significant changes related to breathing and profound weakness towards the end of tx session)   []   RN updated on pt performance  []   Spoke with   []   Spoke with OT  []   Spoke with MD  []   Other:    Subjective observations and cognitive status: Pt resting in bed, touching incentive spirometer, Charlotte Reddy external catheter in use, had poor awareness of bowel incontinence. Pain level/location: None reported at rest    Discharge recommendations  Anticipated discharge date:   08/05/2023  Destination: []home alone   []home alone with assist PRN     [x] home w/ family      [x] Continuous supervision  []SNF    [] Assisted living     [] Other:  Continued therapy: [x]HHC PT  []OUTPATIENT PT   [] No Further PT  []SNF PT  Caregiver training recommended: [x]Yes  [] No   Equipment needs:  hospital bed; has manual w/c and transport chair   Sandra Lopez was evaluated today and a DME order was entered for a semi-electric hospital bed because she requires assistance for positioning needs not possible in an ordinary bed. Patient requires frequent and immediate positioning changes for relief of pain and care needs related to medical diagnoses. Patient needs variability of bed height requirements to perform patient transfers and for ADL. Current body Weight - Scale: 138 lb 7.2 oz (62.8 kg). The need for this equipment was discussed with the patient and she understands and is in agreement.       PT IRF-ORI scores and goals for discharge assessment:   Roll Left and Right  Assistance Needed: Substantial/maximal assistance  Comment: Max A to roll to R side with bed rail; rolling to L varied from Min A->Max A with bed rail today; pt with more difficulty reaching, positioning, and sustaining R hand grasp  CARE Score: 2  Discharge Goal: Supervision or touching assistance    Sit to Lying  Assistance Needed: Dependent  Comment: required Max A x 2 today due to profound, generalized body weakness and tachypnea  CARE Score: 1  Discharge Goal: Partial/moderate assistance    Lying to Sitting on Side of Bed  Assistance Needed: Dependent  Comment: required Max A + 2nd person assist providing additional holding of upper body in static sitting due to worsening weakness resulting in poor trunk control  CARE Score: 1  Discharge Goal: Partial/moderate mayra. Lots of surgeries with knee replacements and limited outings. Additional Comments: Pt sleeps in regular flat bed. Pt is R hand dominant. Objective                                                                                    Goals:  (Update in navigator)   : Long Term Goals  Time Frame for Long Term Goals : 10 tx days:  Long Term Goal 1: Pt will complete rolling R/L with bed rail with SBA-Sup. Long Term Goal 2: Pt will complete scooting and sup<->sit using bed features with Mod A. Long Term Goal 3: Pt will complete squat pivot transfers with Mod A. Long Term Goal 4: Pt will complete sit<->stand and stand turn transfers with Max A  Long Term Goal 5: Pt will propel manual w/c 50 ft with turns over level surface with Min A.:        Plan of Care                                                                              Times per week: 5 days per week for a minimum of 60 minutes/day plus group as appropriate for 60 minutes.   Treatment to include Current Treatment Recommendations: Strengthening, ROM, Balance training, Functional mobility training, Transfer training, Cognitive/Perceptual training, Endurance training, Wheelchair mobility training, Neuromuscular re-education, Cognitive reorientation, Home exercise program, Safety education & training, Patient/Caregiver education & training, Equipment evaluation, education, & procurement, Positioning, Therapeutic activities, Pain management    Electronically signed by   Charlaine Cheadle, PT  7/19/2023, 12:45 PM

## 2023-07-19 NOTE — PROGRESS NOTES
Corinne Alatorre    : 1940  Acct #: [de-identified]  MRN: [id number]              PM&R Progress Note      Admitting diagnosis: ***    Comorbid diagnoses impacting rehabilitation: ***    Chief complaint: ***    Prior (baseline) level of function: Independent. Current level of function:         Current  IRF-ORI and Goals:   Occupational Therapy:    Short Term Goals  Time Frame for Short Term Goals: STGs=LTGs :   Long Term Goals  Time Frame for Long Term Goals : 12-14 days or until d/c. Long Term Goal 1: Pt will complete self-feeding with setup assist.  Long Term Goal 2: Pt will complete oral hygiene and grooming tasks with setup assist.  Long Term Goal 3: Pt will complete total body bathing with AE PRN and Mod A. Long Term Goal 4: Pt will complete UB dressing with Min A. Long Term Goal 5: Pt will complete LB dressing with AE PRN and Mod A. Additional Goals?: Yes  Long Term Goal 6: Pt will doff/don footwear with AE PRN and Min A. Long Term Goal 7: Pt will complete toileting with Mod A. Long Term Goal 8: Pt will complete functional transfers (bed, chair, shower, toilet) with DME PRN and Mod A. Long Term Goal 9: Pt will perform therex/therax to facilitate an increase in strength/endurance with emphasis on dynamic sitting balance/tolerance > 15 mins with SBA. :                                       Eating: Eating  Assistance Needed: Partial/moderate assistance  Comment: Pt uses built up handle on utensil to eat [place] toast and oatmeal with RUE. Pt with R hand coordination deficits with ~ 25-30% spillage. Pt requires assist grasping orange juice cup and bringing to mouth as Pt does not have the  strength to bring to her mouth without spilling. CARE Score: 3  Discharge Goal: Set-up or clean-up assistance       Oral Hygiene: Oral Hygiene  Assistance Needed: Supervision or touching assistance  Comment: Sitting EOB, Pt attempted to open toothpaste but required therapist to start the lid so she could finish.

## 2023-07-19 NOTE — DISCHARGE INSTRUCTIONS
Your information:  Name: Deacon Gastelum  : 1940    Your instructions:    Pt is discharging home with [facility] [address] [place], [address] [place] [address]  [phone]  A representative from [facility] will contact you at home to schedule your home care needs. Pt is discharging to home with [address] ,[place] [address]  [phone]  A representative from [facility] will contact you at home to schedule your home care needs      What to do after you leave the hospital:    Recommended diet: {diet:34480}    Recommended activity: {discharge activity:66646}        The following personal items were collected during your admission and were returned to you:    Belongings  Dental Appliances: Uppers, Lowers, Partials, At home, At bedside  Vision - Corrective Lenses: Eyeglasses  Hearing Aid: None  Clothing: Pants, Shirt, Socks, At bedside  Jewelry: None  Electronic Devices: None  Weapons (Notify Protective Services/Security): None  Home Medications: None  Valuables Given To: Patient  Provide Name(s) of Who Valuable(s) Were Given To: daughter    Information obtained by:  By signing below, I understand that if any problems occur once I leave the hospital I am to contact ***. I understand and acknowledge receipt of the instructions indicated above.

## 2023-07-19 NOTE — PROGRESS NOTES
Comprehensive Nutrition Assessment    Type and Reason for Visit:  Reassess    Nutrition Recommendations/Plan:   Continue regular diet as tolerates   Will continue to offer oral nutrition supplement as patient will accept  Will continue to follow up during stay     Malnutrition Assessment:  Malnutrition Status:  Insufficient data (07/14/23 1532)    Context:  Acute Illness       Nutrition Assessment:    Remains on regular diet with standard oral nutrition supplement offered with meals. Meal intake per po data % with reasonable meal orders. Will continue to follow at moderate nutrition risk at this time with increased needs. Nutrition Related Findings:    receiving care on visit Wound Type: Pressure Injury, Unstageable, Stage II       Current Nutrition Intake & Therapies:    Average Meal Intake: %  Average Supplements Intake: Unable to assess  ADULT DIET; Regular  ADULT ORAL NUTRITION SUPPLEMENT; Breakfast, Dinner; Standard High Calorie/High Protein Oral Supplement    Anthropometric Measures:  Height: 5' 4\" (162.6 cm)  Ideal Body Weight (IBW): 120 lbs (55 kg)    Admission Body Weight: 140 lb 6.9 oz (63.7 kg)  Current Body Weight: 141 lb 12.1 oz (64.3 kg), 117 % IBW.  Weight Source: Bed Scale  Current BMI (kg/m2): 24.3  Usual Body Weight: 138 lb 14.2 oz (63 kg) ( 2022)  % Weight Change (Calculated): 1.1  Weight Adjustment For: No Adjustment                 BMI Categories: Normal Weight (BMI 22.0 to 24.9) age over 72    Estimated Daily Nutrient Needs:  Energy Requirements Based On: Kcal/kg  Weight Used for Energy Requirements: Current  Energy (kcal/day): 6733-8372 (25-27 mel/kg)  Weight Used for Protein Requirements: Current  Protein (g/day): 76-89 (1.2-1.4 g/kg)  Method Used for Fluid Requirements: 1 ml/kcal  Fluid (ml/day): 1098-0943    Nutrition Diagnosis:   Predicted inadequate energy intake related to increase demand for energy/nutrients as evidenced by wounds    Nutrition Interventions:   Food

## 2023-07-19 NOTE — PROGRESS NOTES
Neurology Service Progress Note  [facility]   Patient Name: Deneice Paget   : 1940           Subjective:   CC: left side weakness, worsening generalized weakness  Chart was reviewed in detail, patient was seen and assessed. Adeola Gordon is alert and oriented today. She continues to have increased work of breathing today. Patients daughter shares that her breathing was so poor this morning that her therapy was cancelled. MRI c spine was completed and does note  degenerative changes with narrowing most severe at C 4-5 as well as cord signal changes. Feel that patients loss of dexterity and  strength in the right is likely secondary to myelopathy. Reviewed MRI with patients daughter.     Past Medical History        Past Medical History:   Diagnosis Date    Acid reflux      Anxiety      Bronchitis In Past    Cancer ([address])       colon cancer    Carotid stenosis 11/10/2015    Cerebral embolism with cerebral infarction ([address]) 2015     Seizure X 1, No Residual    Chronic back pain      Depression      History of blood transfusion      History of external beam radiation therapy 2017     5400 cGy pelvis/anal canal in 2017 per Dr. Tracy Slater at [facility]    Hyperlipidemia      Hypertension      Prolonged emergence from general anesthesia      RA (rheumatoid arthritis) ([address])       \"All Over\"    Seizure ([address]) 09/18/2015     X 1    Sleep apnea       Uses CPAP    Teeth missing       Upper And Lower    Urinary incontinence      UTI (urinary tract infection) In Past     No Current Symptoms    Wears dentures       Full Upper , Partial Lower    Wears glasses      Wears partial dentures       Lower       :   Past Surgical History         Past Surgical History:   Procedure Laterality Date    ARTHROCENTESIS / ASPIRATION / INJECTION KNEE   2021          CAROTID ENDARTERECTOMY Right 2015    CARPAL TUNNEL RELEASE        COLONOSCOPY   In Past    DENTAL SURGERY         Teeth Extracted In Past    DILATION AND w/o contrast/CTA head and neck:  IMPRESSION:  1. No acute intracranial abnormality on noncontrast CT head. Moderate to  severe chronic microvascular disease within the periventricular white matter. Mild right parietal lobe encephalomalacia. 2. No intracranial large vessel occlusion or aneurysm. 3. No significant cervical carotid or vertebral artery stenosis. The left  vertebral artery terminates in PICA. Routine EEG:  EEG Interpretation: This EEG was within normal limits for a patient of this age in the awake and drowsy states. No focal, lateralizing, or epileptiform features were seen during the recording. MRI brain w/wo contrast:  IMPRESSION:  No acute intracranial abnormality     Echocardiogram:   Summary   Left ventricular systolic function is normal.   Ejection fraction is visually estimated at 55-60%. Sclerotic, but non-stenotic aortic valve. No evidence of any pericardial effusion. Negative bubble study; no evidence of intracardiac shunt. MRI C spine:      IMPRESSION:  Degenerative changes with pannus formation behind the C2 vertebral body  causing some moderate stenosis of the thecal sac. There is abnormal signal  in the cervical spinal cord in the posterior left aspect of the cord that is  likely related to myelomalacia from the stenosis. Demyelination could be  considered. Disc and osteophytes narrow the neural foramina and cause stenosis of the  thecal sac throughout the cervical region as discussed above. All imaging was personally reviewed   ASSESSMENT/PLAN:   53-year-old female presenting with 2-month history of worsening weakness most noticeable in the left upper extremity. Today patient is alert and oriented x4. Face is symmetric, tongue is midline. Vision is intact, patient denies headache. Speech is clear, language is fluent. She is noted to have significant proximal weakness of the upper extremities with left drift.   She is able to lift the left upper

## 2023-07-19 NOTE — PROGRESS NOTES
Occupational Therapy    Physical Rehabilitation: OCCUPATIONAL THERAPY     [x] daily progress note       [] discharge       Patient Name:  Robbin Choudhury   :  1940 MRN: [id number]  Room:  [address] [place] [address] Date of Admission: 2023  Rehabilitation Diagnosis:   Cerebral infarction, unspecified [I63.9]  Acute CVA (cerebrovascular accident) [facility]) [I63.9]       Date 2023       Day of ARU Week:  7   Time IN/OUT 0712/6396 8189/8369 9549/4646   Individual Tx Minutes 7 + 23   Group Tx Minutes    Co-Treat Minutes 61  A cotreat was completed this date with  [x] PT    [] OT   [] ST      This pt requires simultaneous intervention of 2 skilled therapists to safely complete tasks to address complexity of deficits defined in the goals including:  [x]Attention   [x] Safety    []Problem Solving    [x]Sequencing     []Initiation    [x]Processing      []Recall of learned tasks  [] Communication  [] Self Feeding   [x]ADL's    [x]UE Function    [x]Motor planning   [x]Balance  [x]Energy Conservation   [x]Verbal cues   [x] Tactile Cues  []Barriers     [x]Transfers   []Device Use   Concurrent Tx Minutes    TOTAL Tx Time Mins 90   Variance Time    Variance Time []   Refusal due to:     []   Medical hold/reason:    []   Illness   []   Off Unit for test/procedure  []   Extra time needed to complete task  []   Therapeutic need  []   Other (specify):   Restrictions Restrictions/Precautions: Fall Risk, General Precautions         Communication with other providers: [x]   OK to see per nursing:     []   Spoke with team member regarding:      Subjective observations and cognitive status: 1st: Upon entrance, Pt resting in bed with Purewick external catheter in place. This OT asks Pt if she has been cleaned up recently and if she feels she has had an accident and Pt answers she has been cleaned up. Pt's daughter then walks in room and states Pt has not been cleaned up since ~ 0500.  Therapist checked and Pt with incontinent BM that she injury in the past year?: Yes (> 10 falls in last year)  Receives Help From: Family  ADL Assistance: Needs assistance  Homemaking Assistance: Needs assistance  Meal Prep Responsibility: No  Laundry Responsibility: No  Cleaning Responsibility: No  Bill Paying/Finance Responsibility: No  Shopping Responsibility: No  Dependent Care Responsibility: No  Health Care Management: No  Ambulation Assistance: Needs assistance (Pt was CGA with 2WW, however has been primarily using w/c for ~ the last month.)  Transfer Assistance: Needs assistance  Active : No  Patient's  Info: Daughter, natividad  Mode of Transportation: Car  Education: Did not graduate HS--Grew up in [place]  Occupation: Retired  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 Pt dtr manages groceries. Occasional outings to [place]. Lots of surgeries with knee replacements and limited outings. Additional Comments: Pt sleeps in regular flat bed. Pt is R hand dominant. Objective                                                                                    Goals:  (Update in navigator)  Short Term Goals  Time Frame for Short Term Goals: STGs=LTGs:  Long Term Goals  Time Frame for Long Term Goals : 12-14 days or until d/c. Long Term Goal 1: Pt will complete self-feeding with setup assist.  Long Term Goal 2: Pt will complete oral hygiene and grooming tasks with setup assist.  Long Term Goal 3: Pt will complete total body bathing with AE PRN and Mod A. Long Term Goal 4: Pt will complete UB dressing with Min A. Long Term Goal 5: Pt will complete LB dressing with AE PRN and Mod A. Additional Goals?: Yes  Long Term Goal 6: Pt will doff/don footwear with AE PRN and Min A. Long Term Goal 7: Pt will complete toileting with Mod A. Long Term Goal 8: Pt will complete functional transfers (bed, chair, shower, toilet) with DME PRN and Mod A.   Long Term Goal 9: Pt will perform therex/therax to facilitate an increase in strength/endurance with emphasis on dynamic sitting balance/tolerance > 15 mins with SBA.:        Plan of Care                                                                              Times per week: 5 days per week for a minimum of 60 minutes/day plus group as appropriate for 60 minutes.   Treatment to include Occupational Therapy Plan  Current Treatment Recommendations: Strengthening, ROM, Balance training, Functional mobility training, Endurance training, Neuromuscular re-education, Cognitive reorientation, Pain management, Safety education & training, Patient/Caregiver education & training, Equipment evaluation, education, & procurement, Positioning, Self-Care / ADL, Cognitive/Perceptual training, Coordination training    Electronically signed by   AVE Davey, OTR/L #[id number]  7/19/2023, 2:50 PM

## 2023-07-19 NOTE — CARE COORDINATION
Case mgt met with patient and daughter Rachel Jaramillo in room. The family is prepared to provide continuous assistance of one person at discharge. They've arranged a schedule. They are able to provide mod A. Will restart [facility] services at discharge on 8/5.

## 2023-07-20 LAB
ANION GAP SERPL CALCULATED.3IONS-SCNC: 12 MMOL/L (ref 4–16)
BUN SERPL-MCNC: 33 MG/DL (ref 6–23)
CALCIUM SERPL-MCNC: 9 MG/DL (ref 8.3–10.6)
CHLORIDE BLD-SCNC: 98 MMOL/L (ref 99–110)
CO2: 25 MMOL/L (ref 21–32)
CREAT SERPL-MCNC: 0.5 MG/DL (ref 0.6–1.1)
D DIMER: 0.55 UG/ML (FEU)
GFR SERPL CREATININE-BSD FRML MDRD: >60 ML/MIN/1.73M2
GLUCOSE SERPL-MCNC: 265 MG/DL (ref 70–99)
POTASSIUM SERPL-SCNC: 3.7 MMOL/L (ref 3.5–5.1)
SODIUM BLD-SCNC: 135 MMOL/L (ref 135–145)

## 2023-07-20 PROCEDURE — 6360000002 HC RX W HCPCS: Performed by: INTERNAL MEDICINE

## 2023-07-20 PROCEDURE — 36415 COLL VENOUS BLD VENIPUNCTURE: CPT

## 2023-07-20 PROCEDURE — 97530 THERAPEUTIC ACTIVITIES: CPT

## 2023-07-20 PROCEDURE — 80048 BASIC METABOLIC PNL TOTAL CA: CPT

## 2023-07-20 PROCEDURE — 97542 WHEELCHAIR MNGMENT TRAINING: CPT

## 2023-07-20 PROCEDURE — 6370000000 HC RX 637 (ALT 250 FOR IP): Performed by: PHYSICAL MEDICINE & REHABILITATION

## 2023-07-20 PROCEDURE — 94660 CPAP INITIATION&MGMT: CPT

## 2023-07-20 PROCEDURE — 94150 VITAL CAPACITY TEST: CPT

## 2023-07-20 PROCEDURE — 6370000000 HC RX 637 (ALT 250 FOR IP): Performed by: INTERNAL MEDICINE

## 2023-07-20 PROCEDURE — 94761 N-INVAS EAR/PLS OXIMETRY MLT: CPT

## 2023-07-20 PROCEDURE — 1280000000 HC REHAB R&B

## 2023-07-20 PROCEDURE — 97110 THERAPEUTIC EXERCISES: CPT

## 2023-07-20 PROCEDURE — 94640 AIRWAY INHALATION TREATMENT: CPT

## 2023-07-20 PROCEDURE — 85379 FIBRIN DEGRADATION QUANT: CPT

## 2023-07-20 PROCEDURE — 97112 NEUROMUSCULAR REEDUCATION: CPT

## 2023-07-20 RX ORDER — IPRATROPIUM BROMIDE AND ALBUTEROL SULFATE 2.5; .5 MG/3ML; MG/3ML
1 SOLUTION RESPIRATORY (INHALATION)
Status: DISCONTINUED | OUTPATIENT
Start: 2023-07-20 | End: 2023-07-21

## 2023-07-20 RX ADMIN — LEVETIRACETAM 500 MG: 500 TABLET, FILM COATED ORAL at 08:47

## 2023-07-20 RX ADMIN — ACETAMINOPHEN 650 MG: 325 TABLET ORAL at 05:33

## 2023-07-20 RX ADMIN — DONEPEZIL HYDROCHLORIDE 10 MG: 10 TABLET ORAL at 08:47

## 2023-07-20 RX ADMIN — GABAPENTIN 400 MG: 400 CAPSULE ORAL at 21:18

## 2023-07-20 RX ADMIN — ACETAMINOPHEN 650 MG: 325 TABLET ORAL at 12:09

## 2023-07-20 RX ADMIN — ENOXAPARIN SODIUM 40 MG: 100 INJECTION SUBCUTANEOUS at 17:46

## 2023-07-20 RX ADMIN — ASPIRIN 81 MG: 81 TABLET, CHEWABLE ORAL at 08:47

## 2023-07-20 RX ADMIN — MEMANTINE 10 MG: 10 TABLET ORAL at 08:47

## 2023-07-20 RX ADMIN — MEMANTINE 10 MG: 10 TABLET ORAL at 21:18

## 2023-07-20 RX ADMIN — TRIAMTERENE AND HYDROCHLOROTHIAZIDE 1 TABLET: 37.5; 25 TABLET ORAL at 08:47

## 2023-07-20 RX ADMIN — ACETAMINOPHEN 650 MG: 325 TABLET ORAL at 21:18

## 2023-07-20 RX ADMIN — COLLAGENASE SANTYL: 250 OINTMENT TOPICAL at 08:46

## 2023-07-20 RX ADMIN — DILTIAZEM HYDROCHLORIDE 240 MG: 240 CAPSULE, COATED, EXTENDED RELEASE ORAL at 08:46

## 2023-07-20 RX ADMIN — IPRATROPIUM BROMIDE AND ALBUTEROL SULFATE 1 DOSE: 2.5; .5 SOLUTION RESPIRATORY (INHALATION) at 16:17

## 2023-07-20 RX ADMIN — FOLIC ACID 500 MCG: 1 TABLET ORAL at 08:47

## 2023-07-20 RX ADMIN — IPRATROPIUM BROMIDE AND ALBUTEROL SULFATE 1 DOSE: 2.5; .5 SOLUTION RESPIRATORY (INHALATION) at 20:15

## 2023-07-20 RX ADMIN — LEVETIRACETAM 500 MG: 500 TABLET, FILM COATED ORAL at 21:18

## 2023-07-20 RX ADMIN — PREDNISONE 15 MG: 5 TABLET ORAL at 08:47

## 2023-07-20 RX ADMIN — ATORVASTATIN CALCIUM 20 MG: 10 TABLET, FILM COATED ORAL at 21:18

## 2023-07-20 RX ADMIN — ACETAMINOPHEN 650 MG: 325 TABLET ORAL at 17:45

## 2023-07-20 RX ADMIN — CALCIUM 500 MG: 500 TABLET ORAL at 21:18

## 2023-07-20 RX ADMIN — POTASSIUM CHLORIDE 10 MEQ: 750 TABLET, EXTENDED RELEASE ORAL at 08:47

## 2023-07-20 ASSESSMENT — PAIN DESCRIPTION - DESCRIPTORS
DESCRIPTORS: DISCOMFORT
DESCRIPTORS: ACHING

## 2023-07-20 ASSESSMENT — PAIN SCALES - GENERAL
PAINLEVEL_OUTOF10: 4
PAINLEVEL_OUTOF10: 0
PAINLEVEL_OUTOF10: 3
PAINLEVEL_OUTOF10: 0

## 2023-07-20 ASSESSMENT — PAIN DESCRIPTION - ONSET: ONSET: ON-GOING

## 2023-07-20 ASSESSMENT — PAIN DESCRIPTION - LOCATION
LOCATION: ARM
LOCATION: LEG

## 2023-07-20 ASSESSMENT — PAIN DESCRIPTION - PAIN TYPE: TYPE: ACUTE PAIN

## 2023-07-20 ASSESSMENT — PAIN DESCRIPTION - FREQUENCY: FREQUENCY: INTERMITTENT

## 2023-07-20 ASSESSMENT — PAIN DESCRIPTION - ORIENTATION
ORIENTATION: LEFT
ORIENTATION: LEFT

## 2023-07-20 NOTE — PROGRESS NOTES
Physical Therapy    [x] daily progress note       [] discharge       Patient Name:  Altagracia Yadav   :  1940 MRN: [id number]  Room:  [address] [place] [address] Date of Admission: 2023  Rehabilitation Diagnosis:   Cerebral infarction, unspecified [I63.9]  Acute CVA (cerebrovascular accident) ([address]) [I63.9]       Date 2023       Day of ARU Week:  1   Time IN/OUT 1100/1130   Individual Tx Minutes 30   Group Tx Minutes    Co-Treat Minutes    Concurrent Tx Minutes    TOTAL Tx Time Mins 30   Variance Time    Variance Time []   Refusal due to:     []   Medical hold/reason:    []   Illness   []   Off Unit for test/procedure  []   Extra time needed to complete task  []   Therapeutic need  []   Other (specify):   Restrictions Restrictions/Precautions  Restrictions/Precautions: Fall Risk, General Precautions  Position Activity Restriction  Other position/activity restrictions: IV access LUE, L hemiparesis   Interdisciplinary communication [x]   Cleared for therapy per nursing     []   RN notified about issues during session  []   RN updated on pt performance  []   Spoke with   []   Spoke with OT  []   Spoke with MD  []   Other:    Subjective observations and cognitive status: Pt in w/c, appeared exhausted with shallow and fast breathing and increased use of accessory breathing muscles. Pain level/location: None reported   Discharge recommendations  Anticipated discharge date:   2023  Destination: []home alone   []home alone with assist PRN     [x] home w/ family      [x] Continuous supervision  []SNF    [] Assisted living     [] Other:  Continued therapy: [x]HHC PT  []OUTPATIENT PT   [] No Further PT  []SNF PT  Caregiver training recommended: [x]Yes  [] No   Equipment needs:  hospital bed; has manual w/c and transport chair   Altagracia Yadav was evaluated today and a DME order was entered for a semi-electric hospital bed because she requires assistance for positioning needs not possible in an ordinary bed. fatigue  [] Patient limited by pain   [x] Patient limited by medical complications: dyspnea/tachypnea    [] Adverse reaction to Tx:   [] Significant change in status    Safety:       []  bed alarm set    [x]  chair alarm set    []  Pt refused alarms                []  Telesitter activated      [x]  Gait belt used during tx session      [x]other: daughter present in room     Number of Minutes/Billable Intervention  Gait Training    Therapeutic Exercise    Neuro Re-Ed    Therapeutic Activity 10   Wheelchair Propulsion 20   Group    Other:    TOTAL 30     Social History  Social/Functional History  Lives With: Son (Pt lives with her son who is nonverbal (she was his primary caregiver, but he does have an aide as well).  Pt's daughter has been staying the night with Pt every night since before April 2023.)  Type of Home: House  Home Layout: One level  Home Access: Stairs to enter without rails  Entrance Stairs - Number of Steps: 1  Bathroom Shower/Tub: Tub/Shower unit  Bathroom Toilet: Standard  Bathroom Equipment: Grab bars in shower, 3-in-1 commode, Tub transfer bench, Toilet raiser (Pt has been loaned a TTB from senior services.)  Bathroom Accessibility: Tereza Vogt accessible  Home Equipment: [place], [address], [address] [place] [address]  Has the patient had two or more falls in the past year or any fall with injury in the past year?: Yes (> 10 falls in last year)  Receives Help From: Family  ADL Assistance: Needs assistance  Homemaking Assistance: Needs assistance  Meal Prep Responsibility: No  Laundry Responsibility: No  Cleaning Responsibility: No  Bill Paying/Finance Responsibility: No  Shopping Responsibility: No  Dependent Care Responsibility: No  Health Care Management: No  Ambulation Assistance: Needs assistance (Pt was CGA with 2WW, however has been primarily using w/c for ~ the last month.)  Transfer Assistance: Needs assistance  Active : No  Patient's  Info: natividad Doyle  Mode of

## 2023-07-20 NOTE — PROGRESS NOTES
Stacey Lynn    : 1940  Acct #: [de-identified]  MRN: [id number]              PM&R Progress Note      Admitting diagnosis: ***    Comorbid diagnoses impacting rehabilitation: ***    Chief complaint: ***    Prior (baseline) level of function: Independent. Current level of function:         Current  IRF-ORI and Goals:   Occupational Therapy:    Short Term Goals  Time Frame for Short Term Goals: STGs=LTGs :   Long Term Goals  Time Frame for Long Term Goals : 12-14 days or until d/c. Long Term Goal 1: Pt will complete self-feeding with setup assist.  Long Term Goal 2: Pt will complete oral hygiene and grooming tasks with setup assist.  Long Term Goal 3: Pt will complete total body bathing with AE PRN and Mod A. Long Term Goal 4: Pt will complete UB dressing with Min A. Long Term Goal 5: Pt will complete LB dressing with AE PRN and Mod A. Additional Goals?: Yes  Long Term Goal 6: Pt will doff/don footwear with AE PRN and Min A. Long Term Goal 7: Pt will complete toileting with Mod A. Long Term Goal 8: Pt will complete functional transfers (bed, chair, shower, toilet) with DME PRN and Mod A. Long Term Goal 9: Pt will perform therex/therax to facilitate an increase in strength/endurance with emphasis on dynamic sitting balance/tolerance > 15 mins with SBA. :                                       Eating: Eating  Assistance Needed: Partial/moderate assistance  Comment: Pt was able to initiate meal after setup and used her R hand primarily to eat a grilled cheese with OT encouraging Pt to utilize her L hand as well to increase independence with bilateral use of her hands. This OT placed a foam built up handle on Pt's spoon and Pt was able to grasp and bring soup with noodles to her mouth without spillage ~ 75% of the time. This OT had also placed Dycem under the soup bowl. Pt requires therapist position both of Pt's hand on her cup and Pt then able to bring up to her mouth to take a drink from the straw.  Pt

## 2023-07-20 NOTE — PROGRESS NOTES
Sandra Lopez    : 1940  Acct #: [de-identified]  MRN: [id number]              PM&R Progress Note      Admitting diagnosis: ***    Comorbid diagnoses impacting rehabilitation: ***    Chief complaint: ***    Prior (baseline) level of function: Independent. Current level of function:         Current  IRF-ORI and Goals:   Occupational Therapy:    Short Term Goals  Time Frame for Short Term Goals: STGs=LTGs :   Long Term Goals  Time Frame for Long Term Goals : 12-14 days or until d/c. Long Term Goal 1: Pt will complete self-feeding with setup assist.  Long Term Goal 2: Pt will complete oral hygiene and grooming tasks with setup assist.  Long Term Goal 3: Pt will complete total body bathing with AE PRN and Mod A. Long Term Goal 4: Pt will complete UB dressing with Min A. Long Term Goal 5: Pt will complete LB dressing with AE PRN and Mod A. Additional Goals?: Yes  Long Term Goal 6: Pt will doff/don footwear with AE PRN and Min A. Long Term Goal 7: Pt will complete toileting with Mod A. Long Term Goal 8: Pt will complete functional transfers (bed, chair, shower, toilet) with DME PRN and Mod A. Long Term Goal 9: Pt will perform therex/therax to facilitate an increase in strength/endurance with emphasis on dynamic sitting balance/tolerance > 15 mins with SBA. :                                       Eating: Eating  Assistance Needed: Partial/moderate assistance  Comment: Pt was able to initiate meal after setup and used her R hand primarily to eat a grilled cheese with OT encouraging Pt to utilize her L hand as well to increase independence with bilateral use of her hands. This OT placed a foam built up handle on Pt's spoon and Pt was able to grasp and bring soup with noodles to her mouth without spillage ~ 75% of the time. This OT had also placed Dycem under the soup bowl. Pt requires therapist position both of Pt's hand on her cup and Pt then able to bring up to her mouth to take a drink from the straw.  Pt Needed: Dependent  Comment: Max A x 2 with use of Dewane John  Reason if not Attempted: Not attempted due to medical condition or safety concerns  CARE Score: 1  Discharge Goal: Substantial/maximal assistance  Chair/Bed-to-Chair Transfer  Assistance Needed: Dependent  Comment: Max A + SBA of 2nd person to safely perform squat pivot transfer leading with R side  CARE Score: 1  Discharge Goal: Partial/moderate assistance     Car Transfer  Reason if not Attempted: Not attempted due to medical condition or safety concerns  CARE Score: 88  Discharge Goal: Substantial/maximal assistance    Ambulation:    Walking Ability  Does the Patient Walk?: No     Walk 10 Feet  Comment: limited potential to progressing to this mobility task, however pre-gait activities will be part of the PT POC  Reason if not Attempted: Not attempted due to medical condition or safety concerns  CARE Score: 88  Discharge Goal: Not Attempted     Walk 50 Feet with Two Turns  Reason if not Attempted: Not attempted due to medical condition or safety concerns  CARE Score: 88  Discharge Goal: Not Attempted     Walk 150 Feet  Reason if not Attempted: Not attempted due to medical condition or safety concerns  CARE Score: 88  Discharge Goal: Not Attempted     Walking 10 Feet on Uneven Surfaces  Reason if not Attempted: Not attempted due to medical condition or safety concerns  CARE Score: 88  Discharge Goal: Not Attempted     1 Step (Curb)  Reason if not Attempted: Not attempted due to medical condition or safety concerns  CARE Score: 88  Discharge Goal: Not Attempted     4 Steps  Reason if not Attempted: Not attempted due to medical condition or safety concerns  CARE Score: 88  Discharge Goal: Not Attempted     12 Steps  Reason if not Attempted: Not attempted due to medical condition or safety concerns  CARE Score: 88  Discharge Goal: Not Attempted       Wheelchair:  w/c Ability: Wheelchair Ability  Uses a Wheelchair and/or Scooter?: Yes  Wheel 50 Feet with

## 2023-07-20 NOTE — CONSULTS
[address], [address] [place] [address]                                  CONSULTATION    PATIENT NAME: Alida Garcia                      :        1940  MED REC NO:   [id number]                          ROOM:       2972  ACCOUNT NO:   [de-identified]                           ADMIT DATE: 2023  PROVIDER:     Chris Vargas MD    CONSULT DATE:  2023    HISTORY OF PRESENT ILLNESS:  The patient is an 82-year-old lady with  multiple medical problems including hypertension; hyperlipidemia;  obstructive sleep apnea, on CPAP; gastroesophageal reflux disease;  history of colon cancer, who initially was admitted to the hospital with  complaints of progressive left-sided weakness. She was found to have  acute CVA with left hemiparesis. The patient improved and subsequently  was transferred to the acute rehab unit for rehabilitation. While in  the rehab unit, the patient was having some shortness of breath on  exertion. She denies any significant cough. She denies any fever or  chills. She denies any nausea or vomiting. She denies any chest pains. PAST MEDICAL HISTORY:  Significant for hypertension; hyperlipidemia;  bronchitis; gastroesophageal reflux disease; obstructive sleep apnea. on  CPAP. PAST SURGICAL HISTORY:  Remarkable for carotid endarterectomy, carpal  tunnel release surgery, colonoscopy, dilatation and curettage of the  uterus, hysterectomy. FAMILY HISTORY:  Reveals that her mother had diabetes,  hypercholesterolemia, stroke, arthritis, hypertension, heart disease. SOCIAL HISTORY:  Reveals that she is a nonsmoker. No history of alcohol  or drug abuse. MEDICATIONS:  Her medications were reviewed. ALLERGIES:  She is allergic to CORTISONE, CODEINE, and PENICILLIN.     REVIEW OF SYSTEMS:  A 10-to-14-point review of systems was reviewed and  is negative except for what is mentioned in the history of

## 2023-07-20 NOTE — PROGRESS NOTES
Physical Rehabilitation: OCCUPATIONAL THERAPY     [x] daily progress note       [] discharge       Patient Name:  Hilda Menchaca   :  1940 MRN: [id number]  Room:  [address] [place] [address] Date of Admission: 2023  Rehabilitation Diagnosis:   Cerebral infarction, unspecified [I63.9]  Acute CVA (cerebrovascular accident) ([address]) [I63.9]       Date 2023       Day of ARU Week:  1   Time IN//1000; 1000/1100   Individual Tx Minutes 30   Group Tx Minutes    Co-Treat Minutes 61 A cotreat was completed this date with  [x] PT    [] OT   [] ST      This pt requires simultaneous intervention of 2 skilled therapists to safely complete tasks to address complexity of deficits defined in the goals including:  []Attention   [x] Safety    []Problem Solving    []Sequencing     [x]Initiation    []Processing      []Recall of learned tasks  [] Communication  [] Self Feeding   []ADL's    []UE Function    []Motor planning   []Balance  []Energy Conservation   []Verbal cues   [] Tactile Cues  []Barriers     [x]Transfers   []Device Use  Pt's response to cotreat: Fair   Progress toward goals: Fair     Concurrent Tx Minutes    TOTAL Tx Time Mins 90   Variance Time    Variance Time []   Refusal due to:     []   Medical hold/reason:    []   Illness   []   Off Unit for test/procedure  []   Extra time needed to complete task  []   Therapeutic need  []   Other (specify):   Restrictions Restrictions/Precautions: Fall Risk, General Precautions         Communication with other providers: [x]   OK to see per nursing:     []   Spoke with team member regarding:      Subjective observations and cognitive status: Patient sitting up in bed in high fowlers, pleasant and agreeable to therapy patient with wheezing and SOB throughout session     Pain level/location:    /10       Location: c/o pain L bicep area    Discharge recommendations  Anticipated discharge date:    Destination: []home alone   []home alone w assist prn   [x] home w/ family    [] No  Patient's  Info: Daughter, natividad  Mode of Transportation: Car  Education: Did not graduate HS--Grew up in [place]  Occupation: Retired  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 Pt dtr manages groceries. Occasional outings to [employer]. Lots of surgeries with knee replacements and limited outings. Additional Comments: Pt sleeps in regular flat bed. Pt is R hand dominant. Objective                                                                                    Goals:  (Update in navigator)  Short Term Goals  Time Frame for Short Term Goals: STGs=LTGs:  Long Term Goals  Time Frame for Long Term Goals : 12-14 days or until d/c. Long Term Goal 1: Pt will complete self-feeding with setup assist.  Long Term Goal 2: Pt will complete oral hygiene and grooming tasks with setup assist.  Long Term Goal 3: Pt will complete total body bathing with AE PRN and Mod A. Long Term Goal 4: Pt will complete UB dressing with Min A. Long Term Goal 5: Pt will complete LB dressing with AE PRN and Mod A. Additional Goals?: Yes  Long Term Goal 6: Pt will doff/don footwear with AE PRN and Min A. Long Term Goal 7: Pt will complete toileting with Mod A. Long Term Goal 8: Pt will complete functional transfers (bed, chair, shower, toilet) with DME PRN and Mod A. Long Term Goal 9: Pt will perform therex/therax to facilitate an increase in strength/endurance with emphasis on dynamic sitting balance/tolerance > 15 mins with SBA.:        Plan of Care                                                                              Times per week: 5 days per week for a minimum of 60 minutes/day plus group as appropriate for 60 minutes.   Treatment to include Occupational Therapy Plan  Current Treatment Recommendations: Strengthening, ROM, Balance training, Functional mobility training, Endurance training, Neuromuscular re-education, Cognitive reorientation, Pain management,

## 2023-07-20 NOTE — PROGRESS NOTES
Spoke with Carter Bowman. at [address] [place] [address] whom advised she will have the technician contact me at [phone]. Advised mattress is not inflating. Reached out to primary nurse.

## 2023-07-20 NOTE — PROGRESS NOTES
Physical Therapy    [x] daily progress note       [] discharge       Patient Name:  Corinne Alatorre   :  1940 MRN: [id number]  Room:  [address] [place] [address] Date of Admission: 2023  Rehabilitation Diagnosis:   Cerebral infarction, unspecified [I63.9]  Acute CVA (cerebrovascular accident) ([address]) [I63.9]       Date 2023       Day of ARU Week:  1   Time IN/OUT 4704-3999   Individual Tx Minutes    Group Tx Minutes    Co-Treat Minutes 60 min  A cotreat was completed this date with  [] PT    [x] OT   [] ST      This pt requires simultaneous intervention of 2 skilled therapists to safely complete tasks to address complexity of deficits defined in the goals including:  [x]Attention   [x] Safety    [x]Problem Solving    [x]Sequencing     [x]Initiation    []Processing      [x]Recall of learned tasks  [] Communication  [] Self Feeding   []ADL's    [x]UE Function    [x]Motor planning   [x]Balance  [x]Energy Conservation   [x]Verbal cues   [x] Tactile Cues  []Barriers     [x]Transfers   []Device Use  Pt's response to cotreat: fair  Progress toward goals: fair    Concurrent Tx Minutes    TOTAL Tx Time Mins    Variance Time    Variance Time []   Refusal due to:     []   Medical hold/reason:    []   Illness   []   Off Unit for test/procedure  []   Extra time needed to complete task  []   Therapeutic need  []   Other (specify):   Restrictions Restrictions/Precautions  Restrictions/Precautions: Fall Risk, General Precautions  Position Activity Restriction  Other position/activity restrictions: IV access LUE, L hemiparesis   Communication with other providers: []   OK to see per nursing:     []   Spoke with team member regarding:      Subjective observations and cognitive status: Pt laying in bed initially.  She is agreeable to therapy     Pain level/location:  3  /10       Location:  left foot   Discharge recommendations  Anticipated discharge date:  2023  Destination: []home alone   []home alone with assist PRN     [x] home

## 2023-07-21 ENCOUNTER — APPOINTMENT (OUTPATIENT)
Dept: NUCLEAR MEDICINE | Age: 83
DRG: 057 | End: 2023-07-21
Attending: PHYSICAL MEDICINE & REHABILITATION
Payer: MEDICARE

## 2023-07-21 ENCOUNTER — APPOINTMENT (OUTPATIENT)
Dept: GENERAL RADIOLOGY | Age: 83
DRG: 057 | End: 2023-07-21
Attending: PHYSICAL MEDICINE & REHABILITATION
Payer: MEDICARE

## 2023-07-21 ENCOUNTER — APPOINTMENT (OUTPATIENT)
Dept: ULTRASOUND IMAGING | Age: 83
DRG: 057 | End: 2023-07-21
Attending: PHYSICAL MEDICINE & REHABILITATION
Payer: MEDICARE

## 2023-07-21 LAB
ALBUMIN SERPL-MCNC: 3.1 GM/DL (ref 3.4–5)
ALP BLD-CCNC: 75 IU/L (ref 40–128)
ALT SERPL-CCNC: 18 U/L (ref 10–40)
ANION GAP SERPL CALCULATED.3IONS-SCNC: 11 MMOL/L (ref 4–16)
AST SERPL-CCNC: 14 IU/L (ref 15–37)
BILIRUB SERPL-MCNC: 0.3 MG/DL (ref 0–1)
BUN SERPL-MCNC: 24 MG/DL (ref 6–23)
CALCIUM SERPL-MCNC: 9.2 MG/DL (ref 8.3–10.6)
CHLORIDE BLD-SCNC: 100 MMOL/L (ref 99–110)
CO2: 28 MMOL/L (ref 21–32)
CREAT SERPL-MCNC: 0.4 MG/DL (ref 0.6–1.1)
GFR SERPL CREATININE-BSD FRML MDRD: >60 ML/MIN/1.73M2
GLUCOSE SERPL-MCNC: 105 MG/DL (ref 70–99)
MAGNESIUM: 1.7 MG/DL (ref 1.8–2.4)
PHOSPHORUS: 3.4 MG/DL (ref 2.5–4.9)
POTASSIUM SERPL-SCNC: 3.9 MMOL/L (ref 3.5–5.1)
PRO-BNP: 80.96 PG/ML
PROCALCITONIN SERPL-MCNC: 0.16 NG/ML
SODIUM BLD-SCNC: 139 MMOL/L (ref 135–145)
TOTAL PROTEIN: 5.2 GM/DL (ref 6.4–8.2)

## 2023-07-21 PROCEDURE — 6370000000 HC RX 637 (ALT 250 FOR IP): Performed by: PHYSICAL MEDICINE & REHABILITATION

## 2023-07-21 PROCEDURE — 94150 VITAL CAPACITY TEST: CPT

## 2023-07-21 PROCEDURE — 6370000000 HC RX 637 (ALT 250 FOR IP): Performed by: INTERNAL MEDICINE

## 2023-07-21 PROCEDURE — 1280000000 HC REHAB R&B

## 2023-07-21 PROCEDURE — 84100 ASSAY OF PHOSPHORUS: CPT

## 2023-07-21 PROCEDURE — 83880 ASSAY OF NATRIURETIC PEPTIDE: CPT

## 2023-07-21 PROCEDURE — 80053 COMPREHEN METABOLIC PANEL: CPT

## 2023-07-21 PROCEDURE — 93970 EXTREMITY STUDY: CPT

## 2023-07-21 PROCEDURE — 97535 SELF CARE MNGMENT TRAINING: CPT

## 2023-07-21 PROCEDURE — 78582 LUNG VENTILAT&PERFUS IMAGING: CPT

## 2023-07-21 PROCEDURE — 94761 N-INVAS EAR/PLS OXIMETRY MLT: CPT

## 2023-07-21 PROCEDURE — A9539 TC99M PENTETATE: HCPCS | Performed by: INTERNAL MEDICINE

## 2023-07-21 PROCEDURE — 97530 THERAPEUTIC ACTIVITIES: CPT

## 2023-07-21 PROCEDURE — 36415 COLL VENOUS BLD VENIPUNCTURE: CPT

## 2023-07-21 PROCEDURE — 71046 X-RAY EXAM CHEST 2 VIEWS: CPT

## 2023-07-21 PROCEDURE — 94640 AIRWAY INHALATION TREATMENT: CPT

## 2023-07-21 PROCEDURE — A9540 TC99M MAA: HCPCS | Performed by: INTERNAL MEDICINE

## 2023-07-21 PROCEDURE — 84145 PROCALCITONIN (PCT): CPT

## 2023-07-21 PROCEDURE — 83735 ASSAY OF MAGNESIUM: CPT

## 2023-07-21 PROCEDURE — 3430000000 HC RX DIAGNOSTIC RADIOPHARMACEUTICAL: Performed by: INTERNAL MEDICINE

## 2023-07-21 PROCEDURE — 6360000002 HC RX W HCPCS: Performed by: INTERNAL MEDICINE

## 2023-07-21 RX ORDER — LANOLIN ALCOHOL/MO/W.PET/CERES
400 CREAM (GRAM) TOPICAL DAILY
Status: DISCONTINUED | OUTPATIENT
Start: 2023-07-21 | End: 2023-08-04 | Stop reason: HOSPADM

## 2023-07-21 RX ORDER — IPRATROPIUM BROMIDE AND ALBUTEROL SULFATE 2.5; .5 MG/3ML; MG/3ML
1 SOLUTION RESPIRATORY (INHALATION) 2 TIMES DAILY
Status: DISCONTINUED | OUTPATIENT
Start: 2023-07-21 | End: 2023-07-21

## 2023-07-21 RX ORDER — IPRATROPIUM BROMIDE AND ALBUTEROL SULFATE 2.5; .5 MG/3ML; MG/3ML
1 SOLUTION RESPIRATORY (INHALATION)
Status: DISCONTINUED | OUTPATIENT
Start: 2023-07-21 | End: 2023-07-25

## 2023-07-21 RX ORDER — MAGNESIUM SULFATE IN WATER 40 MG/ML
2000 INJECTION, SOLUTION INTRAVENOUS ONCE
Status: DISCONTINUED | OUTPATIENT
Start: 2023-07-21 | End: 2023-07-21

## 2023-07-21 RX ORDER — KIT FOR THE PREPARATION OF TECHNETIUM TC 99M PENTETATE 20 MG/1
30 INJECTION, POWDER, LYOPHILIZED, FOR SOLUTION INTRAVENOUS; RESPIRATORY (INHALATION)
Status: COMPLETED | OUTPATIENT
Start: 2023-07-21 | End: 2023-07-21

## 2023-07-21 RX ORDER — ENOXAPARIN SODIUM 100 MG/ML
60 INJECTION SUBCUTANEOUS 2 TIMES DAILY
Status: DISCONTINUED | OUTPATIENT
Start: 2023-07-21 | End: 2023-08-04

## 2023-07-21 RX ORDER — ALBUTEROL SULFATE 90 UG/1
2 AEROSOL, METERED RESPIRATORY (INHALATION) EVERY 4 HOURS PRN
Status: DISCONTINUED | OUTPATIENT
Start: 2023-07-21 | End: 2023-08-04 | Stop reason: HOSPADM

## 2023-07-21 RX ADMIN — ATORVASTATIN CALCIUM 20 MG: 10 TABLET, FILM COATED ORAL at 21:03

## 2023-07-21 RX ADMIN — PREDNISONE 15 MG: 5 TABLET ORAL at 11:34

## 2023-07-21 RX ADMIN — DONEPEZIL HYDROCHLORIDE 10 MG: 10 TABLET ORAL at 11:32

## 2023-07-21 RX ADMIN — COLLAGENASE SANTYL: 250 OINTMENT TOPICAL at 14:37

## 2023-07-21 RX ADMIN — ASPIRIN 81 MG: 81 TABLET, CHEWABLE ORAL at 11:36

## 2023-07-21 RX ADMIN — CALCIUM 500 MG: 500 TABLET ORAL at 21:03

## 2023-07-21 RX ADMIN — ACETAMINOPHEN 650 MG: 325 TABLET ORAL at 18:45

## 2023-07-21 RX ADMIN — IPRATROPIUM BROMIDE AND ALBUTEROL SULFATE 1 DOSE: 2.5; .5 SOLUTION RESPIRATORY (INHALATION) at 20:29

## 2023-07-21 RX ADMIN — ACETAMINOPHEN 650 MG: 325 TABLET ORAL at 06:19

## 2023-07-21 RX ADMIN — MEMANTINE 10 MG: 10 TABLET ORAL at 11:35

## 2023-07-21 RX ADMIN — MEMANTINE 10 MG: 10 TABLET ORAL at 21:03

## 2023-07-21 RX ADMIN — IPRATROPIUM BROMIDE AND ALBUTEROL SULFATE 1 DOSE: 2.5; .5 SOLUTION RESPIRATORY (INHALATION) at 15:31

## 2023-07-21 RX ADMIN — FOLIC ACID 500 MCG: 1 TABLET ORAL at 11:32

## 2023-07-21 RX ADMIN — Medication 5 MILLICURIE: at 17:11

## 2023-07-21 RX ADMIN — IPRATROPIUM BROMIDE AND ALBUTEROL SULFATE 1 DOSE: 2.5; .5 SOLUTION RESPIRATORY (INHALATION) at 11:00

## 2023-07-21 RX ADMIN — LEVETIRACETAM 500 MG: 500 TABLET, FILM COATED ORAL at 11:32

## 2023-07-21 RX ADMIN — GABAPENTIN 400 MG: 400 CAPSULE ORAL at 21:03

## 2023-07-21 RX ADMIN — TRIAMTERENE AND HYDROCHLOROTHIAZIDE 1 TABLET: 37.5; 25 TABLET ORAL at 11:33

## 2023-07-21 RX ADMIN — ENOXAPARIN SODIUM 60 MG: 100 INJECTION SUBCUTANEOUS at 21:10

## 2023-07-21 RX ADMIN — POTASSIUM CHLORIDE 10 MEQ: 750 TABLET, EXTENDED RELEASE ORAL at 11:31

## 2023-07-21 RX ADMIN — DILTIAZEM HYDROCHLORIDE 240 MG: 240 CAPSULE, COATED, EXTENDED RELEASE ORAL at 11:35

## 2023-07-21 RX ADMIN — MAGNESIUM OXIDE 400 MG (241.3 MG MAGNESIUM) TABLET 400 MG: TABLET at 14:08

## 2023-07-21 RX ADMIN — LEVETIRACETAM 500 MG: 500 TABLET, FILM COATED ORAL at 21:03

## 2023-07-21 RX ADMIN — ACETAMINOPHEN 650 MG: 325 TABLET ORAL at 11:33

## 2023-07-21 RX ADMIN — Medication 30 MILLICURIE: at 16:45

## 2023-07-21 RX ADMIN — ACETAMINOPHEN 650 MG: 325 TABLET ORAL at 21:03

## 2023-07-21 ASSESSMENT — PAIN DESCRIPTION - ONSET: ONSET: ON-GOING

## 2023-07-21 ASSESSMENT — PAIN DESCRIPTION - LOCATION
LOCATION: LEG

## 2023-07-21 ASSESSMENT — PAIN DESCRIPTION - ORIENTATION
ORIENTATION: LEFT

## 2023-07-21 ASSESSMENT — PAIN - FUNCTIONAL ASSESSMENT
PAIN_FUNCTIONAL_ASSESSMENT: PREVENTS OR INTERFERES SOME ACTIVE ACTIVITIES AND ADLS
PAIN_FUNCTIONAL_ASSESSMENT: ACTIVITIES ARE NOT PREVENTED

## 2023-07-21 ASSESSMENT — PAIN SCALES - GENERAL
PAINLEVEL_OUTOF10: 4
PAINLEVEL_OUTOF10: 5
PAINLEVEL_OUTOF10: 1
PAINLEVEL_OUTOF10: 5
PAINLEVEL_OUTOF10: 0

## 2023-07-21 ASSESSMENT — PAIN DESCRIPTION - FREQUENCY: FREQUENCY: INTERMITTENT

## 2023-07-21 ASSESSMENT — PAIN DESCRIPTION - DESCRIPTORS
DESCRIPTORS: ACHING
DESCRIPTORS: DISCOMFORT

## 2023-07-21 NOTE — PROGRESS NOTES
Occupational Therapy    Physical Rehabilitation: OCCUPATIONAL THERAPY     [x] daily progress note       [] discharge       Patient Name:  Britany Orellana   :  1940 MRN: [id number]  Room:  [address] [place] [address] Date of Admission: 2023  Rehabilitation Diagnosis:   Cerebral infarction, unspecified [I63.9]  Acute CVA (cerebrovascular accident) ([address]) [I63.9]       Date 2023       Day of ARU Week:  2   Time IN//900;1000/1100   Individual Tx Minutes 30   Group Tx Minutes    Co-Treat Minutes 61  A cotreat was completed this date with  [x] PT    [] OT   [] ST      This pt requires simultaneous intervention of 2 skilled therapists to safely complete tasks to address complexity of deficits defined in the goals including:  []Attention   [] Safety    [x]Problem Solving    [x]Sequencing     []Initiation    [x]Processing      []Recall of learned tasks  [] Communication  [] Self Feeding   [x]ADL's    [x]UE Function    [x]Motor planning   [x]Balance  []Energy Conservation   [x]Verbal cues   [x] Tactile Cues  []Barriers     [x]Transfers   []Device Use     Concurrent Tx Minutes    TOTAL Tx Time Mins 90   Variance Time    Variance Time []   Refusal due to:     []   Medical hold/reason:    []   Illness   []   Off Unit for test/procedure  []   Extra time needed to complete task  []   Therapeutic need  []   Other (specify):   Restrictions Restrictions/Precautions: Fall Risk, General Precautions         Communication with other providers: []   OK to see per nursing:     []   Spoke with team member regarding:      Subjective observations and cognitive status: Pt in low- semi fowlers upon therapist arrival sleeping but easily awaken.  Therapist set pt up in bed for breakfast.      Pain level/location:   5 /10       Location: LLE    Discharge recommendations  Anticipated discharge date:    Destination: []home alone   []home alone w assist prn   [x] home w/ family    [] Continuous supervision       []SNF    [] Assisted living to enter without rails  Entrance Stairs - Number of Steps: 1  Bathroom Shower/Tub: Tub/Shower unit  Bathroom Toilet: Standard  Bathroom Equipment: Grab bars in shower, 3-in-1 commode, Tub transfer bench, Toilet raiser (Pt has been loaned a TTB from senior services.)  Bathroom Accessibility: Juleen Counter accessible  Home Equipment: Sheila Garcia, [address], Rollator, Walker, rolling  Has the patient had two or more falls in the past year or any fall with injury in the past year?: Yes (> 10 falls in last year)  Receives Help From: Family  ADL Assistance: Needs assistance  Homemaking Assistance: Needs assistance  Meal Prep Responsibility: No  Laundry Responsibility: No  Cleaning Responsibility: No  Bill Paying/Finance Responsibility: No  Shopping Responsibility: No  Dependent Care Responsibility: No  Health Care Management: No  Ambulation Assistance: Needs assistance (Pt was CGA with 2WW, however has been primarily using w/c for ~ the last month.)  Transfer Assistance: Needs assistance  Active : No  Patient's  Info: Daughter, natividad  Mode of Transportation: Car  Education: Did not graduate HS--Grew up in [place]  Occupation: Retired  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 Pt dtr manages groceries. Occasional outings to [employer]. Lots of surgeries with knee replacements and limited outings. Additional Comments: Pt sleeps in regular flat bed. Pt is R hand dominant. Objective                                                                                    Goals:  (Update in navigator)  Short Term Goals  Time Frame for Short Term Goals: STGs=LTGs:  Long Term Goals  Time Frame for Long Term Goals : 12-14 days or until d/c.   Long Term Goal 1: Pt will complete self-feeding with setup assist.  Long Term Goal 2: Pt will complete oral hygiene and grooming tasks with setup assist.  Long Term Goal 3: Pt will complete total body bathing with AE PRN and Mod

## 2023-07-21 NOTE — PROGRESS NOTES
Report from [facility]. She will pull his PiCC line and place a peripheral IV and see is he still needs the Provena then bring him to room 1014.

## 2023-07-21 NOTE — PROGRESS NOTES
Physical Therapy    [x] daily progress note       [] discharge       Patient Name:  Nicole Headings   :  1940 MRN: [id number]  Room:  [address] [place] [address] Date of Admission: 2023  Rehabilitation Diagnosis:   Cerebral infarction, unspecified [I63.9]  Acute CVA (cerebrovascular accident) ([address]) [I63.9]       Date 2023       Day of ARU Week:  2   Time IN/OUT 1000/1100  1300/1330   Individual Tx Minutes 30   Group Tx Minutes    Co-Treat Minutes 61  A cotreat was completed this date with  [] PT    [x] OT   [] ST      This pt requires simultaneous intervention of 2 skilled therapists to safely complete tasks to address complexity of deficits defined in the goals including:  []Attention   [x] Safety    []Problem Solving    [x]Sequencing     []Initiation    [x]Processing      []Recall of learned tasks  [] Communication  [] Self Feeding   [x]ADL's    [x]UE Function    [x]Motor planning   [x]Balance  [x]Energy Conservation   [x]Verbal cues   [x] Tactile Cues  []Barriers     [x]Transfers   []Device Use   Concurrent Tx Minutes    TOTAL Tx Time Mins 90   Variance Time    Variance Time []   Refusal due to:     []   Medical hold/reason:    []   Illness   []   Off Unit for test/procedure  []   Extra time needed to complete task  []   Therapeutic need  []   Other (specify):   Restrictions Restrictions/Precautions  Restrictions/Precautions: Fall Risk, General Precautions  Position Activity Restriction  Other position/activity restrictions: IV access LUE, L hemiparesis   Interdisciplinary communication [x]   Cleared for therapy per nursing     []   RN notified about issues during session  []   RN updated on pt performance  []   Spoke with   []   Spoke with OT  []   Spoke with MD  []   Other:    Subjective observations and cognitive status: (AM) Pt resting in bed with multiple pillows supporting different parts of her body, breathing in room air, Purewick external catheter in place, asks daughter if she ate breakfast Strengthening, ROM, Balance training, Functional mobility training, Transfer training, Cognitive/Perceptual training, Endurance training, Wheelchair mobility training, Neuromuscular re-education, Cognitive reorientation, Home exercise program, Safety education & training, Patient/Caregiver education & training, Equipment evaluation, education, & procurement, Positioning, Therapeutic activities, Pain management    Electronically signed by   Stuart Ojeda PT  7/21/2023, 1:37 PM

## 2023-07-21 NOTE — PROGRESS NOTES
07/21/23 0911   Encounter Summary   Encounter Overview/Reason  Spiritual/Emotional Needs   Service Provided For: Patient and family together   Referral/Consult From: [address] Children;Family members   Last Encounter  07/21/23  ([employer] prayer and spiritual support.  Patient's daughter states that her mother is feeling better and is thankful for all the prayers.)   Complexity of Encounter Low   Begin Time 0900   End Time  0913   Total Time Calculated 13 min   Encounter    Type Follow up   Crisis   Type Family Care   Spiritual/Emotional needs   Type Spiritual Support   Assessment/Intervention/Outcome   Assessment Hopeful;Peaceful   Intervention Active listening;Discussed meaning/purpose;Empowerment;Prayer (assurance of)/[place];Sustaining Presence/Ministry of presence   Outcome Expressed Gratitude;Encouraged   Plan and Referrals   Plan/Referrals Continue Support (comment)  (as needed)

## 2023-07-21 NOTE — PROGRESS NOTES
Pulmonary and Critical Care  Progress Note    Subjective: The patient has improved. Med neb helped her. Shortness of breath has improved  Chest pain none  Addressing respiratory complaints Patient is negative for  hemoptysis and cyanosis  CONSTITUTIONAL:  negative for fevers and chills      Past Medical History:     has a past medical history of Acid reflux, Anxiety, Bronchitis, Cancer ([address]), Carotid stenosis, Cerebral embolism with cerebral infarction ([address]), Chronic back pain, Depression, History of blood transfusion, History of external beam radiation therapy, Hyperlipidemia, Hypertension, Prolonged emergence from general anesthesia, RA (rheumatoid arthritis) ([address]), Seizure ([address]), Sleep apnea, Teeth missing, Urinary incontinence, UTI (urinary tract infection), Wears dentures, Wears glasses, and Wears partial dentures. has a past surgical history that includes Carpal tunnel release; Dental surgery; Colonoscopy (In Past); Hysterectomy, vaginal (1960's); Dilation and curettage of uterus (1960's); Carotid endarterectomy (Right, 11/18/2015); [address] Surgery (N/A, 03/02/2021); ARTHROCENTESIS / ASPIRATION / INJECTION KNEE (05/11/2021); and joint replacement. reports that she has never smoked. She has never used smokeless tobacco. She reports that she does not drink alcohol and does not use drugs. Family history:  family history includes Arthritis in her mother and sister; Depression in her son; Diabetes in her mother and sister; Hearing Loss in her brother; Heart Disease in her brother, mother, and sister; High Blood Pressure in her brother, brother, mother, sister, son, and son; High Cholesterol in her mother; Learning Disabilities in her son; Mental Illness in her sister and son; [place] / [place] in her mother; Other in her father, sister, son, son, and son; Stroke in her mother and sister.     Allergies   Allergen Reactions    Cortisone Rash    Codeine      Unsure Of Reaction    Pcn [Penicillins]

## 2023-07-22 PROCEDURE — 6370000000 HC RX 637 (ALT 250 FOR IP): Performed by: PHYSICAL MEDICINE & REHABILITATION

## 2023-07-22 PROCEDURE — 94150 VITAL CAPACITY TEST: CPT

## 2023-07-22 PROCEDURE — 94640 AIRWAY INHALATION TREATMENT: CPT

## 2023-07-22 PROCEDURE — 6370000000 HC RX 637 (ALT 250 FOR IP): Performed by: INTERNAL MEDICINE

## 2023-07-22 PROCEDURE — 1280000000 HC REHAB R&B

## 2023-07-22 PROCEDURE — 94761 N-INVAS EAR/PLS OXIMETRY MLT: CPT

## 2023-07-22 PROCEDURE — 6360000002 HC RX W HCPCS: Performed by: INTERNAL MEDICINE

## 2023-07-22 RX ADMIN — ATORVASTATIN CALCIUM 20 MG: 10 TABLET, FILM COATED ORAL at 22:06

## 2023-07-22 RX ADMIN — ACETAMINOPHEN 650 MG: 325 TABLET ORAL at 10:03

## 2023-07-22 RX ADMIN — TRIAMTERENE AND HYDROCHLOROTHIAZIDE 1 TABLET: 37.5; 25 TABLET ORAL at 09:53

## 2023-07-22 RX ADMIN — POTASSIUM CHLORIDE 10 MEQ: 750 TABLET, EXTENDED RELEASE ORAL at 09:51

## 2023-07-22 RX ADMIN — MAGNESIUM OXIDE 400 MG (241.3 MG MAGNESIUM) TABLET 400 MG: TABLET at 09:53

## 2023-07-22 RX ADMIN — ACETAMINOPHEN 650 MG: 325 TABLET ORAL at 06:28

## 2023-07-22 RX ADMIN — LEVETIRACETAM 500 MG: 500 TABLET, FILM COATED ORAL at 09:51

## 2023-07-22 RX ADMIN — MEMANTINE 10 MG: 10 TABLET ORAL at 22:06

## 2023-07-22 RX ADMIN — MEMANTINE 10 MG: 10 TABLET ORAL at 09:54

## 2023-07-22 RX ADMIN — IPRATROPIUM BROMIDE AND ALBUTEROL SULFATE 1 DOSE: 2.5; .5 SOLUTION RESPIRATORY (INHALATION) at 20:15

## 2023-07-22 RX ADMIN — GABAPENTIN 400 MG: 400 CAPSULE ORAL at 22:05

## 2023-07-22 RX ADMIN — LEVETIRACETAM 500 MG: 500 TABLET, FILM COATED ORAL at 22:06

## 2023-07-22 RX ADMIN — DONEPEZIL HYDROCHLORIDE 10 MG: 10 TABLET ORAL at 09:52

## 2023-07-22 RX ADMIN — ACETAMINOPHEN 650 MG: 325 TABLET ORAL at 22:05

## 2023-07-22 RX ADMIN — PREDNISONE 15 MG: 5 TABLET ORAL at 09:54

## 2023-07-22 RX ADMIN — IPRATROPIUM BROMIDE AND ALBUTEROL SULFATE 1 DOSE: 2.5; .5 SOLUTION RESPIRATORY (INHALATION) at 09:02

## 2023-07-22 RX ADMIN — DILTIAZEM HYDROCHLORIDE 240 MG: 240 CAPSULE, COATED, EXTENDED RELEASE ORAL at 09:53

## 2023-07-22 RX ADMIN — CALCIUM 500 MG: 500 TABLET ORAL at 22:06

## 2023-07-22 RX ADMIN — ENOXAPARIN SODIUM 60 MG: 100 INJECTION SUBCUTANEOUS at 09:54

## 2023-07-22 RX ADMIN — ENOXAPARIN SODIUM 60 MG: 100 INJECTION SUBCUTANEOUS at 22:07

## 2023-07-22 RX ADMIN — IPRATROPIUM BROMIDE AND ALBUTEROL SULFATE 1 DOSE: 2.5; .5 SOLUTION RESPIRATORY (INHALATION) at 12:11

## 2023-07-22 RX ADMIN — ACETAMINOPHEN 650 MG: 325 TABLET ORAL at 17:35

## 2023-07-22 RX ADMIN — COLLAGENASE SANTYL: 250 OINTMENT TOPICAL at 22:06

## 2023-07-22 RX ADMIN — IPRATROPIUM BROMIDE AND ALBUTEROL SULFATE 1 DOSE: 2.5; .5 SOLUTION RESPIRATORY (INHALATION) at 15:52

## 2023-07-22 RX ADMIN — FOLIC ACID 500 MCG: 1 TABLET ORAL at 09:53

## 2023-07-22 RX ADMIN — ASPIRIN 81 MG: 81 TABLET, CHEWABLE ORAL at 09:53

## 2023-07-22 ASSESSMENT — PAIN DESCRIPTION - ORIENTATION
ORIENTATION: OTHER (COMMENT)
ORIENTATION: LEFT
ORIENTATION: LEFT

## 2023-07-22 ASSESSMENT — PAIN SCALES - GENERAL
PAINLEVEL_OUTOF10: 0
PAINLEVEL_OUTOF10: 0
PAINLEVEL_OUTOF10: 5
PAINLEVEL_OUTOF10: 7

## 2023-07-22 ASSESSMENT — PAIN DESCRIPTION - PAIN TYPE: TYPE: ACUTE PAIN

## 2023-07-22 ASSESSMENT — PAIN - FUNCTIONAL ASSESSMENT
PAIN_FUNCTIONAL_ASSESSMENT: ACTIVITIES ARE NOT PREVENTED

## 2023-07-22 ASSESSMENT — PAIN DESCRIPTION - LOCATION
LOCATION: LEG
LOCATION: GENERALIZED
LOCATION: LEG

## 2023-07-22 ASSESSMENT — PAIN DESCRIPTION - ONSET: ONSET: ON-GOING

## 2023-07-22 ASSESSMENT — PAIN DESCRIPTION - DESCRIPTORS
DESCRIPTORS: DISCOMFORT

## 2023-07-22 ASSESSMENT — PAIN DESCRIPTION - FREQUENCY
FREQUENCY: INTERMITTENT
FREQUENCY: INTERMITTENT

## 2023-07-22 NOTE — PROGRESS NOTES
Pulmonary and Critical Care  Progress Note    Subjective: The patient is better. Duplex leg scan pos for DVT. V/Q scan:Low prob. Shortness of breath has improved  Chest pain none  Addressing respiratory complaints Patient is negative for  hemoptysis and cyanosis  CONSTITUTIONAL:  negative for fevers and chills      Past Medical History:     has a past medical history of Acid reflux, Anxiety, Bronchitis, Cancer ([address]), Carotid stenosis, Cerebral embolism with cerebral infarction ([address]), Chronic back pain, Depression, History of blood transfusion, History of external beam radiation therapy, Hyperlipidemia, Hypertension, Prolonged emergence from general anesthesia, RA (rheumatoid arthritis) ([address]), Seizure ([address]), Sleep apnea, Teeth missing, Urinary incontinence, UTI (urinary tract infection), Wears dentures, Wears glasses, and Wears partial dentures. has a past surgical history that includes Carpal tunnel release; Dental surgery; Colonoscopy (In Past); Hysterectomy, vaginal (1960's); Dilation and curettage of uterus (1960's); Carotid endarterectomy (Right, 11/18/2015); [address] Surgery (N/A, 03/02/2021); ARTHROCENTESIS / ASPIRATION / INJECTION KNEE (05/11/2021); and joint replacement. reports that she has never smoked. She has never used smokeless tobacco. She reports that she does not drink alcohol and does not use drugs. Family history:  family history includes Arthritis in her mother and sister; Depression in her son; Diabetes in her mother and sister; Hearing Loss in her brother; Heart Disease in her brother, mother, and sister; High Blood Pressure in her brother, brother, mother, sister, son, and son; High Cholesterol in her mother; Learning Disabilities in her son; Mental Illness in her sister and son; [place] / [place] in her mother; Other in her father, sister, son, son, and son; Stroke in her mother and sister.     Allergies   Allergen Reactions    Cortisone Rash    Codeine      Unsure Of Reaction

## 2023-07-22 NOTE — PLAN OF CARE
Problem: Discharge Planning  Goal: Discharge to home or other facility with appropriate resources  Outcome: Progressing     Problem: Safety - Adult  Goal: Free from fall injury  Outcome: Progressing     Problem: Skin/Tissue Integrity  Goal: Absence of new skin breakdown  Description: 1. Monitor for areas of redness and/or skin breakdown  2. Assess vascular access sites hourly  3. Every 4-6 hours minimum:  Change oxygen saturation probe site  4. Every 4-6 hours:  If on nasal continuous positive airway pressure, respiratory therapy assess nares and determine need for appliance change or resting period. Outcome: Progressing     Problem: ABCDS Injury Assessment  Goal: Absence of physical injury  Outcome: Progressing     Problem: Pain  Goal: Verbalizes/displays adequate comfort level or baseline comfort level  Outcome: Progressing     Problem: Skin/Tissue Integrity  Goal: Absence of new skin breakdown  Description: 1. Monitor for areas of redness and/or skin breakdown  2. Assess vascular access sites hourly  3. Every 4-6 hours minimum:  Change oxygen saturation probe site  4. Every 4-6 hours:  If on nasal continuous positive airway pressure, respiratory therapy assess nares and determine need for appliance change or resting period.   Outcome: Progressing     Problem: ABCDS Injury Assessment  Goal: Absence of physical injury  Outcome: Progressing     Problem: Pain  Goal: Verbalizes/displays adequate comfort level or baseline comfort level  Outcome: Progressing     Problem: Chronic Conditions and Co-morbidities  Goal: Patient's chronic conditions and co-morbidity symptoms are monitored and maintained or improved  Outcome: Progressing     Problem: Nutrition Deficit:  Goal: Optimize nutritional status  Outcome: Progressing

## 2023-07-22 NOTE — PROGRESS NOTES
Manual Kimberly    : 1940  Acct #: [de-identified]  MRN: [id number]              PM&R Progress Note      Admitting diagnosis: ***    Comorbid diagnoses impacting rehabilitation: ***    Chief complaint: ***    Prior (baseline) level of function: Independent. Current level of function:         Current  IRF-ORI and Goals:   Occupational Therapy:    Short Term Goals  Time Frame for Short Term Goals: STGs=LTGs :   Long Term Goals  Time Frame for Long Term Goals : 12-14 days or until d/c. Long Term Goal 1: Pt will complete self-feeding with setup assist.  Long Term Goal 2: Pt will complete oral hygiene and grooming tasks with setup assist.  Long Term Goal 3: Pt will complete total body bathing with AE PRN and Mod A. Long Term Goal 4: Pt will complete UB dressing with Min A. Long Term Goal 5: Pt will complete LB dressing with AE PRN and Mod A. Additional Goals?: Yes  Long Term Goal 6: Pt will doff/don footwear with AE PRN and Min A. Long Term Goal 7: Pt will complete toileting with Mod A. Long Term Goal 8: Pt will complete functional transfers (bed, chair, shower, toilet) with DME PRN and Mod A. Long Term Goal 9: Pt will perform therex/therax to facilitate an increase in strength/endurance with emphasis on dynamic sitting balance/tolerance > 15 mins with SBA. :                                       Eating: Eating  Assistance Needed: Partial/moderate assistance  Comment: Min A to turn plate throughout meal to increase ROM and ind when feeding self. Pt required s/u c utensil in hand c built up handle. Pt able to feed self once s/u c utensils and food cut up into bite size pieces. CARE Score: 3  Discharge Goal: Set-up or clean-up assistance       Oral Hygiene: Oral Hygiene  Assistance Needed: Partial/moderate assistance  Comment: Min A to keep cup in hand when rinsing mouth out c mouth wash. Pt able to rinse and spit into cup.   CARE Score: 3  Discharge Goal: Set-up or clean-up assistance    UB/LB Bathing:

## 2023-07-22 NOTE — PLAN OF CARE
Problem: Discharge Planning  Goal: Discharge to home or other facility with appropriate resources  7/22/2023 1231 by eTjal Willett RN  Outcome: Progressing  7/21/2023 2332 by Lara Hi RN  Outcome: Progressing     Problem: Safety - Adult  Goal: Free from fall injury  7/22/2023 1231 by Tejal Willett RN  Outcome: Progressing  7/21/2023 2332 by Lara Hi RN  Outcome: Progressing     Problem: Skin/Tissue Integrity  Goal: Absence of new skin breakdown  Description: 1. Monitor for areas of redness and/or skin breakdown  2. Assess vascular access sites hourly  3. Every 4-6 hours minimum:  Change oxygen saturation probe site  4. Every 4-6 hours:  If on nasal continuous positive airway pressure, respiratory therapy assess nares and determine need for appliance change or resting period.   7/22/2023 1231 by Tejal Willett RN  Outcome: Progressing  7/21/2023 2332 by Lara Hi RN  Outcome: Progressing     Problem: ABCDS Injury Assessment  Goal: Absence of physical injury  7/22/2023 1231 by Tejal Willett RN  Outcome: Progressing  7/21/2023 2332 by Lara Hi RN  Outcome: Progressing     Problem: Pain  Goal: Verbalizes/displays adequate comfort level or baseline comfort level  7/22/2023 1231 by Tejal Willett RN  Outcome: Progressing  7/21/2023 2332 by Lara Hi RN  Outcome: Progressing     Problem: Chronic Conditions and Co-morbidities  Goal: Patient's chronic conditions and co-morbidity symptoms are monitored and maintained or improved  7/22/2023 1231 by Tejal Willett RN  Outcome: Progressing  7/21/2023 2332 by Lara Hi RN  Outcome: Progressing     Problem: Nutrition Deficit:  Goal: Optimize nutritional status  7/22/2023 1231 by Tejal Willett RN  Outcome: Progressing  7/21/2023 2332 by Lara Hi RN  Outcome: Progressing

## 2023-07-23 VITALS
HEIGHT: 64 IN | HEART RATE: 92 BPM | WEIGHT: 153 LBS | SYSTOLIC BLOOD PRESSURE: 120 MMHG | DIASTOLIC BLOOD PRESSURE: 66 MMHG | BODY MASS INDEX: 26.12 KG/M2 | OXYGEN SATURATION: 97 % | RESPIRATION RATE: 16 BRPM | TEMPERATURE: 98.2 F

## 2023-07-23 PROCEDURE — 6370000000 HC RX 637 (ALT 250 FOR IP): Performed by: INTERNAL MEDICINE

## 2023-07-23 PROCEDURE — 6370000000 HC RX 637 (ALT 250 FOR IP): Performed by: PHYSICAL MEDICINE & REHABILITATION

## 2023-07-23 PROCEDURE — 1280000000 HC REHAB R&B

## 2023-07-23 PROCEDURE — 94761 N-INVAS EAR/PLS OXIMETRY MLT: CPT

## 2023-07-23 PROCEDURE — 94640 AIRWAY INHALATION TREATMENT: CPT

## 2023-07-23 PROCEDURE — 6360000002 HC RX W HCPCS: Performed by: INTERNAL MEDICINE

## 2023-07-23 RX ADMIN — ACETAMINOPHEN 650 MG: 325 TABLET ORAL at 11:12

## 2023-07-23 RX ADMIN — ACETAMINOPHEN 650 MG: 325 TABLET ORAL at 06:20

## 2023-07-23 RX ADMIN — MEMANTINE 10 MG: 10 TABLET ORAL at 20:56

## 2023-07-23 RX ADMIN — POTASSIUM CHLORIDE 10 MEQ: 750 TABLET, EXTENDED RELEASE ORAL at 09:29

## 2023-07-23 RX ADMIN — ACETAMINOPHEN 650 MG: 325 TABLET ORAL at 20:57

## 2023-07-23 RX ADMIN — CALCIUM 500 MG: 500 TABLET ORAL at 20:56

## 2023-07-23 RX ADMIN — COLLAGENASE SANTYL: 250 OINTMENT TOPICAL at 09:30

## 2023-07-23 RX ADMIN — IPRATROPIUM BROMIDE AND ALBUTEROL SULFATE 1 DOSE: 2.5; .5 SOLUTION RESPIRATORY (INHALATION) at 08:05

## 2023-07-23 RX ADMIN — ENOXAPARIN SODIUM 60 MG: 100 INJECTION SUBCUTANEOUS at 09:27

## 2023-07-23 RX ADMIN — LEVETIRACETAM 500 MG: 500 TABLET, FILM COATED ORAL at 09:29

## 2023-07-23 RX ADMIN — LEVETIRACETAM 500 MG: 500 TABLET, FILM COATED ORAL at 20:57

## 2023-07-23 RX ADMIN — TRIAMTERENE AND HYDROCHLOROTHIAZIDE 1 TABLET: 37.5; 25 TABLET ORAL at 09:28

## 2023-07-23 RX ADMIN — PREDNISONE 15 MG: 5 TABLET ORAL at 09:28

## 2023-07-23 RX ADMIN — IPRATROPIUM BROMIDE AND ALBUTEROL SULFATE 1 DOSE: 2.5; .5 SOLUTION RESPIRATORY (INHALATION) at 11:35

## 2023-07-23 RX ADMIN — DILTIAZEM HYDROCHLORIDE 240 MG: 240 CAPSULE, COATED, EXTENDED RELEASE ORAL at 09:28

## 2023-07-23 RX ADMIN — ATORVASTATIN CALCIUM 20 MG: 10 TABLET, FILM COATED ORAL at 20:56

## 2023-07-23 RX ADMIN — GABAPENTIN 400 MG: 400 CAPSULE ORAL at 20:56

## 2023-07-23 RX ADMIN — DONEPEZIL HYDROCHLORIDE 10 MG: 10 TABLET ORAL at 09:28

## 2023-07-23 RX ADMIN — ASPIRIN 81 MG: 81 TABLET, CHEWABLE ORAL at 09:28

## 2023-07-23 RX ADMIN — IPRATROPIUM BROMIDE AND ALBUTEROL SULFATE 1 DOSE: 2.5; .5 SOLUTION RESPIRATORY (INHALATION) at 15:34

## 2023-07-23 RX ADMIN — MEMANTINE 10 MG: 10 TABLET ORAL at 09:28

## 2023-07-23 RX ADMIN — MAGNESIUM OXIDE 400 MG (241.3 MG MAGNESIUM) TABLET 400 MG: TABLET at 09:29

## 2023-07-23 RX ADMIN — ENOXAPARIN SODIUM 60 MG: 100 INJECTION SUBCUTANEOUS at 21:02

## 2023-07-23 RX ADMIN — FOLIC ACID 500 MCG: 1 TABLET ORAL at 09:29

## 2023-07-23 RX ADMIN — IPRATROPIUM BROMIDE AND ALBUTEROL SULFATE 1 DOSE: 2.5; .5 SOLUTION RESPIRATORY (INHALATION) at 21:56

## 2023-07-23 RX ADMIN — ACETAMINOPHEN 650 MG: 325 TABLET ORAL at 17:31

## 2023-07-23 ASSESSMENT — PAIN DESCRIPTION - DESCRIPTORS
DESCRIPTORS: DISCOMFORT
DESCRIPTORS: DISCOMFORT
DESCRIPTORS: ACHING

## 2023-07-23 ASSESSMENT — PAIN DESCRIPTION - FREQUENCY
FREQUENCY: INTERMITTENT
FREQUENCY: INTERMITTENT

## 2023-07-23 ASSESSMENT — PAIN DESCRIPTION - ONSET: ONSET: ON-GOING

## 2023-07-23 ASSESSMENT — PAIN DESCRIPTION - LOCATION
LOCATION: LEG
LOCATION: HAND
LOCATION: GENERALIZED

## 2023-07-23 ASSESSMENT — PAIN DESCRIPTION - ORIENTATION
ORIENTATION: OTHER (COMMENT)
ORIENTATION: LEFT
ORIENTATION: LEFT

## 2023-07-23 ASSESSMENT — PAIN DESCRIPTION - PAIN TYPE
TYPE: ACUTE PAIN
TYPE: ACUTE PAIN

## 2023-07-23 ASSESSMENT — PAIN SCALES - GENERAL
PAINLEVEL_OUTOF10: 4
PAINLEVEL_OUTOF10: 4
PAINLEVEL_OUTOF10: 5

## 2023-07-23 NOTE — PROGRESS NOTES
B/P 129/71, , Resp 22, Temp 97.7, oxygen saturation 96% on room air this triggers a MEWS of a 3. Patient feels short of breath, she stating that it feels like she just got done running. This nurse did give her diltiazem 240mg with her morning medications. Her lung sounds are diminished. Dr. Rhys Lr notified.

## 2023-07-23 NOTE — PLAN OF CARE
Problem: Discharge Planning  Goal: Discharge to home or other facility with appropriate resources  7/22/2023 2256 by Jena Hamman, RN  Outcome: Progressing  7/22/2023 1231 by Jaleel Mendoza RN  Outcome: Progressing     Problem: Safety - Adult  Goal: Free from fall injury  7/22/2023 2256 by Jena Hamman, RN  Outcome: Progressing  7/22/2023 1231 by Jaleel Mendoza RN  Outcome: Progressing     Problem: Skin/Tissue Integrity  Goal: Absence of new skin breakdown  Description: 1. Monitor for areas of redness and/or skin breakdown  2. Assess vascular access sites hourly  3. Every 4-6 hours minimum:  Change oxygen saturation probe site  4. Every 4-6 hours:  If on nasal continuous positive airway pressure, respiratory therapy assess nares and determine need for appliance change or resting period.   7/22/2023 2256 by Jena Hamman, RN  Outcome: Progressing  7/22/2023 1231 by Jaleel Mendoza RN  Outcome: Progressing     Problem: ABCDS Injury Assessment  Goal: Absence of physical injury  7/22/2023 2256 by Jena Hamman, RN  Outcome: Progressing  7/22/2023 1231 by Jaleel Mendoza RN  Outcome: Progressing     Problem: Pain  Goal: Verbalizes/displays adequate comfort level or baseline comfort level  7/22/2023 2256 by Jena Hamman, RN  Outcome: Progressing  7/22/2023 1231 by Jaleel Mendoza RN  Outcome: Progressing     Problem: Chronic Conditions and Co-morbidities  Goal: Patient's chronic conditions and co-morbidity symptoms are monitored and maintained or improved  7/22/2023 2256 by Jena Hamman, RN  Outcome: Progressing  7/22/2023 1231 by Jaleel Mendoza RN  Outcome: Progressing     Problem: Nutrition Deficit:  Goal: Optimize nutritional status  7/22/2023 2256 by Jena Hamman, RN  Outcome: Progressing  7/22/2023 1231 by Jaleel Mendoza RN  Outcome: Progressing

## 2023-07-23 NOTE — PLAN OF CARE
Problem: Discharge Planning  Goal: Discharge to home or other facility with appropriate resources  7/23/2023 1038 by Alessandro Santos RN  Outcome: Progressing  7/22/2023 2256 by Patrice Keating RN  Outcome: Progressing     Problem: Safety - Adult  Goal: Free from fall injury  7/23/2023 1038 by Alessandro Santos RN  Outcome: Progressing  7/22/2023 2256 by Patrice Keating RN  Outcome: Progressing     Problem: Skin/Tissue Integrity  Goal: Absence of new skin breakdown  Description: 1. Monitor for areas of redness and/or skin breakdown  2. Assess vascular access sites hourly  3. Every 4-6 hours minimum:  Change oxygen saturation probe site  4. Every 4-6 hours:  If on nasal continuous positive airway pressure, respiratory therapy assess nares and determine need for appliance change or resting period.   7/23/2023 1038 by Alessandro Santos RN  Outcome: Progressing  7/22/2023 2256 by Patrice Keating RN  Outcome: Progressing     Problem: ABCDS Injury Assessment  Goal: Absence of physical injury  7/23/2023 1038 by Alessandro Santos RN  Outcome: Progressing  7/22/2023 2256 by Patrice Keating RN  Outcome: Progressing     Problem: Pain  Goal: Verbalizes/displays adequate comfort level or baseline comfort level  7/23/2023 1038 by Alessandro Santos RN  Outcome: Progressing  7/22/2023 2256 by Patrice Keating RN  Outcome: Progressing     Problem: Chronic Conditions and Co-morbidities  Goal: Patient's chronic conditions and co-morbidity symptoms are monitored and maintained or improved  7/22/2023 2256 by Patrice Keating RN  Outcome: Progressing     Problem: Nutrition Deficit:  Goal: Optimize nutritional status  7/22/2023 2256 by Patrice Keating RN  Outcome: Progressing

## 2023-07-24 PROCEDURE — 94150 VITAL CAPACITY TEST: CPT

## 2023-07-24 PROCEDURE — 6370000000 HC RX 637 (ALT 250 FOR IP): Performed by: PHYSICAL MEDICINE & REHABILITATION

## 2023-07-24 PROCEDURE — 94761 N-INVAS EAR/PLS OXIMETRY MLT: CPT

## 2023-07-24 PROCEDURE — 94640 AIRWAY INHALATION TREATMENT: CPT

## 2023-07-24 PROCEDURE — 6370000000 HC RX 637 (ALT 250 FOR IP): Performed by: INTERNAL MEDICINE

## 2023-07-24 PROCEDURE — 6360000002 HC RX W HCPCS: Performed by: INTERNAL MEDICINE

## 2023-07-24 PROCEDURE — 1280000000 HC REHAB R&B

## 2023-07-24 PROCEDURE — 94660 CPAP INITIATION&MGMT: CPT

## 2023-07-24 PROCEDURE — 97530 THERAPEUTIC ACTIVITIES: CPT

## 2023-07-24 PROCEDURE — 97535 SELF CARE MNGMENT TRAINING: CPT

## 2023-07-24 RX ADMIN — PREDNISONE 15 MG: 5 TABLET ORAL at 09:53

## 2023-07-24 RX ADMIN — ENOXAPARIN SODIUM 60 MG: 100 INJECTION SUBCUTANEOUS at 22:11

## 2023-07-24 RX ADMIN — ACETAMINOPHEN 650 MG: 325 TABLET ORAL at 12:11

## 2023-07-24 RX ADMIN — MAGNESIUM OXIDE 400 MG (241.3 MG MAGNESIUM) TABLET 400 MG: TABLET at 09:53

## 2023-07-24 RX ADMIN — POTASSIUM CHLORIDE 10 MEQ: 750 TABLET, EXTENDED RELEASE ORAL at 09:53

## 2023-07-24 RX ADMIN — ASPIRIN 81 MG: 81 TABLET, CHEWABLE ORAL at 09:53

## 2023-07-24 RX ADMIN — GABAPENTIN 400 MG: 400 CAPSULE ORAL at 21:26

## 2023-07-24 RX ADMIN — ACETAMINOPHEN 650 MG: 325 TABLET ORAL at 21:26

## 2023-07-24 RX ADMIN — CALCIUM 500 MG: 500 TABLET ORAL at 21:26

## 2023-07-24 RX ADMIN — IPRATROPIUM BROMIDE AND ALBUTEROL SULFATE 1 DOSE: 2.5; .5 SOLUTION RESPIRATORY (INHALATION) at 20:50

## 2023-07-24 RX ADMIN — MEMANTINE 10 MG: 10 TABLET ORAL at 09:52

## 2023-07-24 RX ADMIN — IPRATROPIUM BROMIDE AND ALBUTEROL SULFATE 1 DOSE: 2.5; .5 SOLUTION RESPIRATORY (INHALATION) at 07:28

## 2023-07-24 RX ADMIN — DONEPEZIL HYDROCHLORIDE 10 MG: 10 TABLET ORAL at 09:52

## 2023-07-24 RX ADMIN — DILTIAZEM HYDROCHLORIDE 240 MG: 240 CAPSULE, COATED, EXTENDED RELEASE ORAL at 09:53

## 2023-07-24 RX ADMIN — ACETAMINOPHEN 650 MG: 325 TABLET ORAL at 17:31

## 2023-07-24 RX ADMIN — MEMANTINE 10 MG: 10 TABLET ORAL at 21:26

## 2023-07-24 RX ADMIN — IPRATROPIUM BROMIDE AND ALBUTEROL SULFATE 1 DOSE: 2.5; .5 SOLUTION RESPIRATORY (INHALATION) at 11:16

## 2023-07-24 RX ADMIN — ATORVASTATIN CALCIUM 20 MG: 10 TABLET, FILM COATED ORAL at 21:26

## 2023-07-24 RX ADMIN — ENOXAPARIN SODIUM 60 MG: 100 INJECTION SUBCUTANEOUS at 10:41

## 2023-07-24 RX ADMIN — LEVETIRACETAM 500 MG: 500 TABLET, FILM COATED ORAL at 21:26

## 2023-07-24 RX ADMIN — FOLIC ACID 500 MCG: 1 TABLET ORAL at 09:51

## 2023-07-24 RX ADMIN — LEVETIRACETAM 500 MG: 500 TABLET, FILM COATED ORAL at 09:51

## 2023-07-24 RX ADMIN — ACETAMINOPHEN 650 MG: 325 TABLET ORAL at 05:38

## 2023-07-24 RX ADMIN — TRIAMTERENE AND HYDROCHLOROTHIAZIDE 1 TABLET: 37.5; 25 TABLET ORAL at 09:52

## 2023-07-24 RX ADMIN — COLLAGENASE SANTYL: 250 OINTMENT TOPICAL at 10:37

## 2023-07-24 RX ADMIN — IPRATROPIUM BROMIDE AND ALBUTEROL SULFATE 1 DOSE: 2.5; .5 SOLUTION RESPIRATORY (INHALATION) at 15:15

## 2023-07-24 ASSESSMENT — PAIN DESCRIPTION - ORIENTATION
ORIENTATION: LEFT
ORIENTATION: LEFT
ORIENTATION: RIGHT

## 2023-07-24 ASSESSMENT — PAIN - FUNCTIONAL ASSESSMENT: PAIN_FUNCTIONAL_ASSESSMENT: PREVENTS OR INTERFERES SOME ACTIVE ACTIVITIES AND ADLS

## 2023-07-24 ASSESSMENT — PAIN DESCRIPTION - ONSET: ONSET: ON-GOING

## 2023-07-24 ASSESSMENT — PAIN SCALES - GENERAL
PAINLEVEL_OUTOF10: 0
PAINLEVEL_OUTOF10: 5
PAINLEVEL_OUTOF10: 0
PAINLEVEL_OUTOF10: 1
PAINLEVEL_OUTOF10: 2

## 2023-07-24 ASSESSMENT — PAIN DESCRIPTION - DESCRIPTORS
DESCRIPTORS: ACHING
DESCRIPTORS: ACHING

## 2023-07-24 ASSESSMENT — PAIN DESCRIPTION - FREQUENCY: FREQUENCY: INTERMITTENT

## 2023-07-24 ASSESSMENT — PAIN DESCRIPTION - LOCATION
LOCATION: LEG;HIP
LOCATION: LEG;HIP
LOCATION: ARM

## 2023-07-24 ASSESSMENT — PAIN DESCRIPTION - PAIN TYPE: TYPE: ACUTE PAIN

## 2023-07-24 NOTE — PATIENT CARE CONFERENCE
ACUTE REHAB TEAM CONFERENCE SUMMARY   [facility]    NAME: Flakita Brown  : 1940 ADMIT DATE: 2023    Rehab Admitting Dx:Cerebral infarction, unspecified [I63.9]  Acute CVA (cerebrovascular accident) ([address]) [I63.9]  Patient Comorbid Conditions: Active Hospital Problems    Diagnosis Date Noted    Acute CVA (cerebrovascular accident) ([address]) [I63.9] 2023    Depression with anxiety [F41.8] 2023    Mixed hyperlipidemia [E78.2] 2023    Dysarthria due to acute stroke ([address]) [I63.9, R47.1] 2023    Hemiparesis of left nondominant side as late effect of cerebral infarction [facility]) [I69.354] 2023    Seizure disorder ([address]) [G40.909] 2021    Gait disturbance [R26.9] 2015     Date: 2023    CASE MANAGEMENT  Current issues/needs regarding patient and family discharge status: Dc 1-level w/family. 1 GAIL. Hosp bed recommended. [address] pt/ot/RN/HHA - active with [facility]. Flakita Brown was evaluated today and a DME order was entered for a semi-electric hospital bed because she requires assistance for positioning needs not possible in an ordinary bed. Patient requires frequent and immediate positioning changes for relief of pain and care needs related to medical diagnoses. Patient needs variability of bed height requirements to perform patient transfers and for ADL. Current body Weight - Scale: 138 lb 7.2 oz (62.8 kg). The need for this equipment was discussed with the patient and she understands and is in agreement. Flakita Brown requires a patient lift for the transfer between bed and a w/c, commode, and chair. Without the use of the lift, the patient would be bed confined. Patient and family goal: Return home with family. PHYSICAL THERAPY (Updated in QI)        Long Term Goals  Time Frame for Long Term Goals : 10 tx days:  Long Term Goal 1: Pt will complete rolling R/L with bed rail with SBA-Sup.   Long Term Goal 2: Pt will complete scooting

## 2023-07-24 NOTE — PROGRESS NOTES
[] Continuous supervision       []SNF    [] Assisted living     [] Other:   Continued therapy: [x]HHC OT  []OUTPATIENT  OT   [] No Further OT  Equipment needs: none       ADLs:    Eating: Eating  Assistance Needed: Setup or clean-up assistance  Comment: Pt s/u c bite size pieces and utensil in R hand with built up handle. Pt uses L UE to assist small pieces of food (eggs) onto spoon. CARE Score: 5  Discharge Goal: Set-up or clean-up assistance       UB/LB Bathing: Shower/Bathe Self  Assistance Needed: Substantial/maximal assistance  Comment: Max assist to completed DLE washing along with majority of R side of body for thoroughness.   CARE Score: 2  Discharge Goal: Partial/moderate assistance    UB Dressing: Upper Body Dressing  Assistance Needed: Substantial/maximal assistance  Comment: Assist to thread arms into garment and pull gown up on R arm/shoulder  CARE Score: 2  Discharge Goal: Partial/moderate assistance         Donning and [place] Footwear: Putting On/Taking Off Footwear  Assistance Needed: Dependent  Comment: Dependent to don/doff footwear  CARE Score: 1  Discharge Goal: Partial/moderate assistance      Bed Mobility:  See PT notes           Transfers:    See PT notes    Additional Therapeutic activities/exercises completed this date:     [x]   ADL Training   []   Balance/Postural training     [x]   Bed/Transfer Training   []   Endurance Training   []   Neuromuscular Re-ed   []   Nu-step:  Time:        Level:         #Steps:       []   Rebounder:    []  Seated     []  Standing        []   Supine Ther Ex (reps/sets):     []   Seated Ther Ex (reps/sets):     []   Standing Ther Ex (reps/sets):     []   Other:      Comments:      Patient/Caregiver Education and Training:   []   [employer] Equipment Use  []   Bed Mobility/Transfer Technique/Safety  []   Energy Conservation Tips  [x]   Family training  []   Postural Awareness  []   Safety During Functional Activities  []   Reinforced Patient's Precautions   [] Progress was updated and reviewed in Rehabtracker with patient and/or family this         date. Treatment Plan for Next Session: POC to cinue as tolerated per pt    Treatment/Activity Tolerance:   [x] Tolerated treatment with no adverse effects    [] Patient limited by fatigue  [] Patient limited by pain   [] Patient limited by medical complications:    [] Adverse reaction to Tx:   [] Significant change in status    Safety:       []  bed alarm set    [x]  chair alarm set    []  Pt refused alarms                []  Telesitter activated      [x]  Gait belt used during tx session      []other:       Number of Minutes/Billable Intervention  Therapeutic Exercise    ADL Self-care 30+75   Neuro Re-Ed    Therapeutic Activity    Group    Other:    TOTAL 105       Social History  Social/Functional History  Lives With: Son (Pt lives with her son who is nonverbal (she was his primary caregiver, but he does have an aide as well).  Pt's daughter has been staying the night with Pt every night since before April 2023.)  Type of Home: House  Home Layout: One level  Home Access: Stairs to enter without rails  Entrance Stairs - Number of Steps: 1  Bathroom Shower/Tub: Tub/Shower unit  Bathroom Toilet: Standard  Bathroom Equipment: Grab bars in shower, 3-in-1 commode, Tub transfer bench, Toilet raiser (Pt has been loaned a TTB from senior services.)  Bathroom Accessibility: [facility] accessible  Home Equipment: Ronit Miguel, [address], [address] [place] [address], [facility]  Has the patient had two or more falls in the past year or any fall with injury in the past year?: Yes (> 10 falls in last year)  Receives Help From: Family  ADL Assistance: Needs assistance  Homemaking Assistance: Needs assistance  Meal Prep Responsibility: No  Laundry Responsibility: No  Cleaning Responsibility: No  Bill Paying/Finance Responsibility: No  Shopping Responsibility: No  Dependent Care Responsibility: No  Health Care Management: No  Ambulation Assistance: Needs

## 2023-07-24 NOTE — PROGRESS NOTES
(she was his primary caregiver, but he does have an aide as well). Pt's daughter has been staying the night with Pt every night since before April 2023.)  Type of Home: House  Home Layout: One level  Home Access: Stairs to enter without rails  Entrance Stairs - Number of Steps: 1  Bathroom Shower/Tub: Tub/Shower unit  Bathroom Toilet: Standard  Bathroom Equipment: Grab bars in shower, 3-in-1 commode, Tub transfer bench, Toilet raiser (Pt has been loaned a TTB from senior services.)  Bathroom Accessibility: [facility] accessible  Home Equipment: Michelle [place], [address], [address], [place], rolling  Has the patient had two or more falls in the past year or any fall with injury in the past year?: Yes (> 10 falls in last year)  Receives Help From: Family  ADL Assistance: Needs assistance  Homemaking Assistance: Needs assistance  Meal Prep Responsibility: No  Laundry Responsibility: No  Cleaning Responsibility: No  Bill Paying/Finance Responsibility: No  Shopping Responsibility: No  Dependent Care Responsibility: No  Health Care Management: No  Ambulation Assistance: Needs assistance (Pt was CGA with 2WW, however has been primarily using w/c for ~ the last month.)  Transfer Assistance: Needs assistance  Active : No  Patient's  Info: [place]  Mode of Transportation: Car  Education: Did not graduate HS--Grew up in [place]  Occupation: Retired  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 Pt dtr manages groceries. Occasional outings to [employer]. Lots of surgeries with knee replacements and limited outings. Additional Comments: Pt sleeps in regular flat bed. Pt is R hand dominant. Objective                                                                                    Goals:  (Update in navigator)   : Long Term Goals  Time Frame for Long Term Goals : 10 tx days:  Long Term Goal 1: Pt will complete rolling R/L with bed rail with SBA-Sup.   Long Term Goal 2: Pt will complete scooting and sup<->sit using bed features with Mod A. Long Term Goal 3: Pt will complete squat pivot transfers with Mod A. Long Term Goal 4: Pt will complete sit<->stand and stand turn transfers with Max A  Long Term Goal 5: Pt will propel manual w/c 50 ft with turns over level surface with Min A.:        Plan of Care                                                                              Times per week: 5 days per week for a minimum of 60 minutes/day plus group as appropriate for 60 minutes.   Treatment to include Current Treatment Recommendations: Strengthening, ROM, Balance training, Functional mobility training, Transfer training, Cognitive/Perceptual training, Endurance training, Wheelchair mobility training, Neuromuscular re-education, Cognitive reorientation, Home exercise program, Safety education & training, Patient/Caregiver education & training, Equipment evaluation, education, & procurement, Positioning, Therapeutic activities, Pain management    Electronically signed by   Luis E Dumont, PT  7/24/2023, 2:43 PM

## 2023-07-24 NOTE — PROGRESS NOTES
Pt to be switched tomorrow to [employer] per Dr. Rory Olivarez if patient able to generate adequate PIF maneuver.

## 2023-07-24 NOTE — PROGRESS NOTES
Pulmonary and Critical Care  Progress Note    Subjective: The patient is feeling better. Shortness of breath has improved  Chest pain none  Addressing respiratory complaints Patient is negative for  hemoptysis and cyanosis  CONSTITUTIONAL:  negative for fevers and chills      Past Medical History:     has a past medical history of Acid reflux, Anxiety, Bronchitis, Cancer ([address]), Carotid stenosis, Cerebral embolism with cerebral infarction ([address]), Chronic back pain, Depression, History of blood transfusion, History of external beam radiation therapy, Hyperlipidemia, Hypertension, Prolonged emergence from general anesthesia, RA (rheumatoid arthritis) ([address]), Seizure ([address]), Sleep apnea, Teeth missing, Urinary incontinence, UTI (urinary tract infection), Wears dentures, Wears glasses, and Wears partial dentures. has a past surgical history that includes Carpal tunnel release; Dental surgery; Colonoscopy (In Past); Hysterectomy, vaginal (1960's); Dilation and curettage of uterus (1960's); Carotid endarterectomy (Right, 11/18/2015); [address] Surgery (N/A, 03/02/2021); ARTHROCENTESIS / ASPIRATION / INJECTION KNEE (05/11/2021); and joint replacement. reports that she has never smoked. She has never used smokeless tobacco. She reports that she does not drink alcohol and does not use drugs. Family history:  family history includes Arthritis in her mother and sister; Depression in her son; Diabetes in her mother and sister; Hearing Loss in her brother; Heart Disease in her brother, mother, and sister; High Blood Pressure in her brother, brother, mother, sister, son, and son; High Cholesterol in her mother; Learning Disabilities in her son; Mental Illness in her sister and son; Sj Jacob / Deferiet in her mother; Other in her father, sister, son, son, and son; Stroke in her mother and sister.     Allergies   Allergen Reactions    Cortisone Rash    Codeine      Unsure Of Reaction    Pcn [Penicillins]      Unknown Reaction nondominant side as late effect of cerebral infarction (HCC)    Acute CVA (cerebrovascular accident) ([address])    Depression with anxiety    Mixed hyperlipidemia    Dysarthria due to acute stroke ([address])       Plan:   1. Overall the patient has improved. 2. Check labs in am.  3. Inc. Activity. 4. Discussed with the family.    Michael Silverman MD   7/24/2023  10:24 AM

## 2023-07-25 LAB
ALBUMIN SERPL-MCNC: 3.3 GM/DL (ref 3.4–5)
ALP BLD-CCNC: 80 IU/L (ref 40–129)
ALT SERPL-CCNC: 16 U/L (ref 10–40)
ANION GAP SERPL CALCULATED.3IONS-SCNC: 10 MMOL/L (ref 4–16)
ANISOCYTOSIS: ABNORMAL
AST SERPL-CCNC: 18 IU/L (ref 15–37)
BANDED NEUTROPHILS ABSOLUTE COUNT: 1.18 K/CU MM
BANDED NEUTROPHILS RELATIVE PERCENT: 10 % (ref 5–11)
BILIRUB SERPL-MCNC: 0.3 MG/DL (ref 0–1)
BUN SERPL-MCNC: 32 MG/DL (ref 6–23)
CALCIUM SERPL-MCNC: 9.5 MG/DL (ref 8.3–10.6)
CHLORIDE BLD-SCNC: 99 MMOL/L (ref 99–110)
CO2: 25 MMOL/L (ref 21–32)
CREAT SERPL-MCNC: 0.4 MG/DL (ref 0.6–1.1)
DIFFERENTIAL TYPE: ABNORMAL
EOSINOPHILS ABSOLUTE: 0.2 K/CU MM
EOSINOPHILS RELATIVE PERCENT: 2 % (ref 0–3)
GFR SERPL CREATININE-BSD FRML MDRD: >60 ML/MIN/1.73M2
GLUCOSE SERPL-MCNC: 121 MG/DL (ref 70–99)
HCT VFR BLD CALC: 34.3 % (ref 37–47)
HEMOGLOBIN: 10.1 GM/DL (ref 12.5–16)
LYMPHOCYTES ABSOLUTE: 0.8 K/CU MM
LYMPHOCYTES RELATIVE PERCENT: 7 % (ref 24–44)
MCH RBC QN AUTO: 30.8 PG (ref 27–31)
MCHC RBC AUTO-ENTMCNC: 29.4 % (ref 32–36)
MCV RBC AUTO: 104.6 FL (ref 78–100)
METAMYELOCYTES ABSOLUTE COUNT: 0.24 K/CU MM
METAMYELOCYTES PERCENT: 2 %
MONOCYTES ABSOLUTE: 0.9 K/CU MM
MONOCYTES RELATIVE PERCENT: 8 % (ref 0–4)
NUCLEATED RED BLOOD CELLS: 3
PDW BLD-RTO: 15.9 % (ref 11.7–14.9)
PLATELET # BLD: 321 K/CU MM (ref 140–440)
PMV BLD AUTO: 9.7 FL (ref 7.5–11.1)
POTASSIUM SERPL-SCNC: 4.2 MMOL/L (ref 3.5–5.1)
RBC # BLD: 3.28 M/CU MM (ref 4.2–5.4)
RBC # BLD: ABNORMAL 10*6/UL
SEGMENTED NEUTROPHILS ABSOLUTE COUNT: 8.5 K/CU MM
SEGMENTED NEUTROPHILS RELATIVE PERCENT: 71 % (ref 36–66)
SODIUM BLD-SCNC: 134 MMOL/L (ref 135–145)
TOTAL PROTEIN: 5.7 GM/DL (ref 6.4–8.2)
WBC # BLD: 11.8 K/CU MM (ref 4–10.5)
WBC # BLD: ABNORMAL 10*3/UL

## 2023-07-25 PROCEDURE — 6370000000 HC RX 637 (ALT 250 FOR IP): Performed by: INTERNAL MEDICINE

## 2023-07-25 PROCEDURE — 97530 THERAPEUTIC ACTIVITIES: CPT

## 2023-07-25 PROCEDURE — 85027 COMPLETE CBC AUTOMATED: CPT

## 2023-07-25 PROCEDURE — 80053 COMPREHEN METABOLIC PANEL: CPT

## 2023-07-25 PROCEDURE — 85007 BL SMEAR W/DIFF WBC COUNT: CPT

## 2023-07-25 PROCEDURE — 94660 CPAP INITIATION&MGMT: CPT

## 2023-07-25 PROCEDURE — 1280000000 HC REHAB R&B

## 2023-07-25 PROCEDURE — 97542 WHEELCHAIR MNGMENT TRAINING: CPT

## 2023-07-25 PROCEDURE — 6360000002 HC RX W HCPCS: Performed by: INTERNAL MEDICINE

## 2023-07-25 PROCEDURE — 97110 THERAPEUTIC EXERCISES: CPT

## 2023-07-25 PROCEDURE — 6370000000 HC RX 637 (ALT 250 FOR IP): Performed by: PHYSICAL MEDICINE & REHABILITATION

## 2023-07-25 PROCEDURE — 97535 SELF CARE MNGMENT TRAINING: CPT

## 2023-07-25 PROCEDURE — 94150 VITAL CAPACITY TEST: CPT

## 2023-07-25 PROCEDURE — 94640 AIRWAY INHALATION TREATMENT: CPT

## 2023-07-25 PROCEDURE — 36415 COLL VENOUS BLD VENIPUNCTURE: CPT

## 2023-07-25 PROCEDURE — 94761 N-INVAS EAR/PLS OXIMETRY MLT: CPT

## 2023-07-25 RX ORDER — IPRATROPIUM BROMIDE AND ALBUTEROL SULFATE 2.5; .5 MG/3ML; MG/3ML
1 SOLUTION RESPIRATORY (INHALATION) EVERY 4 HOURS PRN
Status: DISCONTINUED | OUTPATIENT
Start: 2023-07-25 | End: 2023-08-04 | Stop reason: HOSPADM

## 2023-07-25 RX ADMIN — LEVETIRACETAM 500 MG: 500 TABLET, FILM COATED ORAL at 08:48

## 2023-07-25 RX ADMIN — TRIAMTERENE AND HYDROCHLOROTHIAZIDE 1 TABLET: 37.5; 25 TABLET ORAL at 08:48

## 2023-07-25 RX ADMIN — ASPIRIN 81 MG: 81 TABLET, CHEWABLE ORAL at 08:48

## 2023-07-25 RX ADMIN — ALBUTEROL SULFATE 2 PUFF: 90 AEROSOL, METERED RESPIRATORY (INHALATION) at 12:39

## 2023-07-25 RX ADMIN — MAGNESIUM OXIDE 400 MG (241.3 MG MAGNESIUM) TABLET 400 MG: TABLET at 08:48

## 2023-07-25 RX ADMIN — FOLIC ACID 500 MCG: 1 TABLET ORAL at 08:48

## 2023-07-25 RX ADMIN — TIOTROPIUM BROMIDE AND OLODATEROL 2 PUFF: 3.124; 2.736 SPRAY, METERED RESPIRATORY (INHALATION) at 09:26

## 2023-07-25 RX ADMIN — CALCIUM 500 MG: 500 TABLET ORAL at 21:49

## 2023-07-25 RX ADMIN — GABAPENTIN 400 MG: 400 CAPSULE ORAL at 21:49

## 2023-07-25 RX ADMIN — ALBUTEROL SULFATE 2 PUFF: 90 AEROSOL, METERED RESPIRATORY (INHALATION) at 09:25

## 2023-07-25 RX ADMIN — DILTIAZEM HYDROCHLORIDE 240 MG: 240 CAPSULE, COATED, EXTENDED RELEASE ORAL at 08:48

## 2023-07-25 RX ADMIN — POTASSIUM CHLORIDE 10 MEQ: 750 TABLET, EXTENDED RELEASE ORAL at 08:48

## 2023-07-25 RX ADMIN — ATORVASTATIN CALCIUM 20 MG: 10 TABLET, FILM COATED ORAL at 21:49

## 2023-07-25 RX ADMIN — ACETAMINOPHEN 650 MG: 325 TABLET ORAL at 06:16

## 2023-07-25 RX ADMIN — LEVETIRACETAM 500 MG: 500 TABLET, FILM COATED ORAL at 21:49

## 2023-07-25 RX ADMIN — DONEPEZIL HYDROCHLORIDE 10 MG: 10 TABLET ORAL at 08:48

## 2023-07-25 RX ADMIN — ACETAMINOPHEN 650 MG: 325 TABLET ORAL at 21:49

## 2023-07-25 RX ADMIN — COLLAGENASE SANTYL: 250 OINTMENT TOPICAL at 09:00

## 2023-07-25 RX ADMIN — MEMANTINE 10 MG: 10 TABLET ORAL at 21:49

## 2023-07-25 RX ADMIN — ENOXAPARIN SODIUM 60 MG: 100 INJECTION SUBCUTANEOUS at 21:55

## 2023-07-25 RX ADMIN — ENOXAPARIN SODIUM 60 MG: 100 INJECTION SUBCUTANEOUS at 08:47

## 2023-07-25 RX ADMIN — PREDNISONE 15 MG: 5 TABLET ORAL at 08:48

## 2023-07-25 RX ADMIN — MEMANTINE 10 MG: 10 TABLET ORAL at 08:48

## 2023-07-25 RX ADMIN — ACETAMINOPHEN 650 MG: 325 TABLET ORAL at 17:17

## 2023-07-25 RX ADMIN — ACETAMINOPHEN 650 MG: 325 TABLET ORAL at 10:58

## 2023-07-25 ASSESSMENT — PAIN SCALES - GENERAL
PAINLEVEL_OUTOF10: 0
PAINLEVEL_OUTOF10: 3
PAINLEVEL_OUTOF10: 3
PAINLEVEL_OUTOF10: 0
PAINLEVEL_OUTOF10: 3
PAINLEVEL_OUTOF10: 3
PAINLEVEL_OUTOF10: 0
PAINLEVEL_OUTOF10: 2
PAINLEVEL_OUTOF10: 3
PAINLEVEL_OUTOF10: 6

## 2023-07-25 ASSESSMENT — PAIN DESCRIPTION - ONSET
ONSET: ON-GOING
ONSET: ON-GOING

## 2023-07-25 ASSESSMENT — PAIN DESCRIPTION - ORIENTATION
ORIENTATION: LEFT

## 2023-07-25 ASSESSMENT — PAIN DESCRIPTION - DESCRIPTORS
DESCRIPTORS: ACHING

## 2023-07-25 ASSESSMENT — PAIN DESCRIPTION - LOCATION
LOCATION: LEG

## 2023-07-25 ASSESSMENT — PAIN DESCRIPTION - FREQUENCY
FREQUENCY: INTERMITTENT
FREQUENCY: INTERMITTENT

## 2023-07-25 ASSESSMENT — PAIN DESCRIPTION - PAIN TYPE
TYPE: ACUTE PAIN
TYPE: ACUTE PAIN

## 2023-07-25 ASSESSMENT — PAIN SCALES - WONG BAKER
WONGBAKER_NUMERICALRESPONSE: 0
WONGBAKER_NUMERICALRESPONSE: 2
WONGBAKER_NUMERICALRESPONSE: 2

## 2023-07-25 NOTE — PROGRESS NOTES
PIF maneuver, patient achieved a 70 L/Min, on the DPI. Per note Sara Pickler will be ordered.   Duoneb will be changed to PRN

## 2023-07-25 NOTE — PROGRESS NOTES
Physical Therapy    [x] daily progress note       [] discharge       Patient Name:  Altagracia Yadav   :  1940 MRN: [id number]  Room:  [address] [place] [address] Date of Admission: 2023  Rehabilitation Diagnosis:   Cerebral infarction, unspecified [I63.9]  Acute CVA (cerebrovascular accident) ([address]) [I63.9]       Date 2023       Day of ARU Week:  6   Time IN/OUT 1100/1204  1304/1338   Individual Tx Minutes 64+34   Group Tx Minutes    Co-Treat Minutes    Concurrent Tx Minutes    TOTAL Tx Time Mins 98   Variance Time +8   Variance Time []   Refusal due to:     []   Medical hold/reason:    []   Illness   []   Off Unit for test/procedure  [x]   Extra time needed to complete tasks and caregiver education   []   Therapeutic need  []   Other (specify):   Restrictions Restrictions/Precautions  Restrictions/Precautions: Fall Risk, General Precautions  Position Activity Restriction  Other position/activity restrictions: IV access LUE, L hemiparesis   Interdisciplinary communication [x]   Cleared for therapy per nursing     []   RN notified about issues during session  []   RN updated on pt performance  []   Spoke with   []   Spoke with OT  []   Spoke with MD  [x]   Other: educated PCT about w/c positioning for toilet transfer technique and assisting pt in partial stand     Subjective observations and cognitive status: (AM) Pt resting in bed with Purewick external catheter in use, unable to recall if she ate breakfast that she had to ask her daughter if she did. Daughter present during tx session and has brought in pt's manual w/c from home as requested. (PM) Pt resting in bed. Daughter states that 2 [facility] staff attempted to assist pt in toilet transfer but was unsuccessful as pt was too tired/weak requiring transfer to bed to address toileting need.     Pain level/location: (AM) none reported (PM) neck (did not rate)    Discharge recommendations  Anticipated discharge date:  2023  Destination: []home alone to see R hand positioning/placement of thumb against spacer of w/c spokes and assist with propulsion as pt with poor hand grasp to use w/c rim only; provided with VCs to also use RLE to correct veering to the L side but motor strength was inconsistent; pt provided with sequential cues throughout due to poor coordination and divided attention to use RUE and RLE simultaneously; forward propulsion distance was inconsistent due to impaired strength and endurance; pt required extra time and effort and encouragement to perform this mobility task due to poor endurance; pt required additional assist to position LLE on w/c footrest with heel loop prior to propulsion; pt was able to unlock R hand brake with R hand and L hand brake with L hand with increased effort  CARE Score: 2  Discharge Goal: Substantial/maximal assistance    Additional Therapeutic activities/exercises completed this date:     []   Nu-step:  Time:        Level:         #Steps:       []   Rebounder:    []  Seated     []  Standing        [x]   Balance training: static sitting at EOB with LUE support on bed rail and RUE on bed with Min A as another person assisted pt with donning of shoes prior to bed->w/c transfer        []   Postural training    [x]   Supine ther ex (reps/sets):  R/L ankle PF/DF x 10 reps x 2 sets (rolled towel positioned under each Tendon of Achilles to facilitate increased ankle ROM) , R/L quad sets (x5 sec hold on R; inconsistent quads contraction on L and unable to sustain with muscle tapping and verbal cues provided to facilitate contraction) x 10 reps, R/L knee flexion/extension over slide board (PROM on LLE) x 10 reps, R/L hip abd/add over slide board x 10 reps (pt able to actively abd L hip ~40% of full ROM  but required PROM to add);  Bobath exercise on BUE towards elbow flexion/extension with target towards ceiling x 10 reps and then towards mouth x 10 reps    []   Seated ther ex (reps/sets):     []   Standing ther ex (reps/sets):

## 2023-07-25 NOTE — PROGRESS NOTES
assist to completed DLE washing along with majority of R side of body for thoroughness. CARE Score: 2  Discharge Goal: Partial/moderate assistance    UB Dressing: Upper Body Dressing  Assistance Needed: Substantial/maximal assistance  Comment: Min A to thread arms into garment and pull gown up on R arm/shoulder  CARE Score: 2  Discharge Goal: Partial/moderate assistance         LB Dressing: Lower Body Dressing  Assistance Needed: Dependent  Comment: Max x2 to stand and manage LBG over hips. Pt dependent to thread BLE into LBG  CARE Score: 1  Discharge Goal: Partial/moderate assistance    Donning and [place] Footwear: Putting On/Taking Off Footwear  Assistance Needed: Dependent  Comment: Dependent to don/doff footwear  CARE Score: 1  Discharge Goal: Partial/moderate assistance      [address] needed: Dependent  Comment: Pt requires 2-person assist at bed level to clean up incontinent BM by technique of rolling to the R side. Pt is unable to remove hand from bed rail to assist this date as Pt with increased fatigue and difficulty breathing versus previous sessions. CARE Score: 1  Discharge Goal: Partial/moderate assistance      Toilet Transfers: Toilet Transfer  Assistance needed: Dependent  Comment: Max A x 2 using squat pivot for w/c<->toilet with BSC frame  Reason if not Attempted: Not attempted due to medical condition or safety concerns  CARE Score: 1  Discharge Goal: Partial/moderate assistance    Physical Therapy:         Long Term Goals  Time Frame for Long Term Goals : 10 tx days:  Long Term Goal 1: Pt will complete rolling R/L with bed rail with SBA-Sup. Long Term Goal 2: Pt will complete scooting and sup<->sit using bed features with Mod A. Long Term Goal 3: Pt will complete squat pivot transfers with Mod A.   Long Term Goal 4: Pt will complete sit<->stand and stand turn transfers with Max A  Long Term Goal 5: Pt will propel manual w/c 50 ft with turns over level surface with Two Turns  Reason if not Attempted: Not attempted due to medical condition or safety concerns  CARE Score: 88  Discharge Goal: Not Attempted     Walk 150 Feet  Reason if not Attempted: Not attempted due to medical condition or safety concerns  CARE Score: 88  Discharge Goal: Not Attempted     Walking 10 Feet on Uneven Surfaces  Reason if not Attempted: Not attempted due to medical condition or safety concerns  CARE Score: 88  Discharge Goal: Not Attempted     1 Step (Curb)  Reason if not Attempted: Not attempted due to medical condition or safety concerns  CARE Score: 88  Discharge Goal: Not Attempted     4 Steps  Reason if not Attempted: Not attempted due to medical condition or safety concerns  CARE Score: 88  Discharge Goal: Not Attempted     12 Steps  Reason if not Attempted: Not attempted due to medical condition or safety concerns  CARE Score: 88  Discharge Goal: Not Attempted       Wheelchair:  w/c Ability: Wheelchair Ability  Uses a Wheelchair and/or Scooter?: Yes  Wheel 50 Feet with Two Turns  Assistance Needed: Substantial/maximal assistance  Comment: pt propelled 20 ft using R hand with Mod A to stay on straight path/correct veering; R armrest had to be taken off for pt to be able to to see R hand positioning/placement of thumb against spacer of w/c spokes and assist with propulsion as pt with poor hand grasp to use w/c rim only; attempted use of RLE only and was unsuccessful; pt also with poor coordination and divided attention to use RUE and RLE simultaneously; pt required extra time and effort and encouragement to perform this mobility task due to poor endurance  CARE Score: 2  Discharge Goal: Partial/moderate assistance  Wheel 150 Feet  Assistance Needed: Substantial/maximal assistance  Comment: pt propelled 20 ft using R hand with Mod A to stay on straight path/correct veering; R armrest had to be taken off for pt to be able to to see R hand positioning/placement of thumb against spacer of w/c

## 2023-07-25 NOTE — CARE COORDINATION
LSW met with patient and daughter and provided written communication following Care Conference. LSW informed patient of recommendations for Hospital Bed, [employer], [address] PT, OT, and Nursing. Patient and daughter verbalized understanding. Whiteboard updated. Patient provided with list of Medicare participating [address] in the geographic area of the patient served. Patient selected [facility] and was provided with a comparative data handout from [place]. The patient (and/or Family) was educated on the quality outcomes for each provider. Patient (and/or Family) demonstrated understanding. Per patient/family request, referral will be made closer to discharge. D/C Plan:  Estimated Date: Aug 4  DME:  Hospital Bed, [facility] (Agency TBD)  HHC:  PT, OT, Nursing [facility])  To:   Home with family (may need transport)

## 2023-07-25 NOTE — PROGRESS NOTES
Occupational Therapy    Physical Rehabilitation: OCCUPATIONAL THERAPY     [x] daily progress note       [] discharge       Patient Name:  Anne Beach   :  1940 MRN: [id number]  Room:  [address] [place] [address] Date of Admission: 2023  Rehabilitation Diagnosis:   Cerebral infarction, unspecified [I63.9]  Acute CVA (cerebrovascular accident) ([address]) [I63.9]       Date 2023       Day of ARU Week:  6   Time IN/OUT 0834/1004   Individual Tx Minutes 90   Group Tx Minutes    Co-Treat Minutes    Concurrent Tx Minutes    TOTAL Tx Time Mins 90   Variance Time    Variance Time []   Refusal due to:     []   Medical hold/reason:    []   Illness   []   Off Unit for test/procedure  []   Extra time needed to complete task  []   Therapeutic need  []   Other (specify):   Restrictions Restrictions/Precautions: Fall Risk, General Precautions         Communication with other providers: [x]   OK to see per nursing:     []   Spoke with team member regarding:      Subjective observations and cognitive status: Pt in high patton's position with CPAP and Purewick external catheter in place. Pt's daughter in the room stating Pt was asleep when breakfast originally came so she is just now setting her up with meal.     OT had discussion with Pt's daughter regarding bed level ADLs as daughter was saying her mother usually completes them seated at the sink. This OT educated daughter that at this time Pt is able to be more independent and safe completing ADLs at the bed level and daughter states understanding. Daughter was unhappy that Pt has not been given leg exercises and expresses this multiple times throughout session. This OT educated daughter the importance of gaining strength and independence through function. This OT was also able to give Pt an example with UB bathing, showing daughter that Pt was able to move her LUE much more during function rather than when asked to perform an exercise. Daughter states understanding.  This OT finances. Pt dtr manages groceries. Occasional outings to [employer]. Lots of surgeries with knee replacements and limited outings. Additional Comments: Pt sleeps in regular flat bed. Pt is R hand dominant. Objective                                                                                    Goals:  (Update in navigator)  Short Term Goals  Time Frame for Short Term Goals: STGs=LTGs:  Long Term Goals  Time Frame for Long Term Goals : 12-14 days or until d/c. Long Term Goal 1: Pt will complete self-feeding with setup assist.  Long Term Goal 2: Pt will complete oral hygiene and grooming tasks with setup assist.  Long Term Goal 3: Pt will complete total body bathing with AE PRN and Mod A. Long Term Goal 4: Pt will complete UB dressing with Min A. Long Term Goal 5: Pt will complete LB dressing with AE PRN and Mod A. Additional Goals?: Yes  Long Term Goal 6: Pt will doff/don footwear with AE PRN and Min A. Long Term Goal 7: Pt will complete toileting with Mod A. Long Term Goal 8: Pt will complete functional transfers (bed, chair, shower, toilet) with DME PRN and Mod A. Long Term Goal 9: Pt will perform therex/therax to facilitate an increase in strength/endurance with emphasis on dynamic sitting balance/tolerance > 15 mins with SBA.:        Plan of Care                                                                              Times per week: 5 days per week for a minimum of 60 minutes/day plus group as appropriate for 60 minutes.   Treatment to include Occupational Therapy Plan  Current Treatment Recommendations: Strengthening, ROM, Balance training, Functional mobility training, Endurance training, Neuromuscular re-education, Cognitive reorientation, Pain management, Safety education & training, Patient/Caregiver education & training, Equipment evaluation, education, & procurement, Positioning, Self-Care / ADL, Cognitive/Perceptual training, Coordination training    Electronically signed by   Jose Akins

## 2023-07-25 NOTE — PROGRESS NOTES
Comprehensive Nutrition Assessment    Type and Reason for Visit:  Reassess    Nutrition Recommendations/Plan:   Continue regular diet with standard oral nutrition supplement  Will continue to follow up during stay      Malnutrition Assessment:  Malnutrition Status:  Insufficient data (07/14/23 1532)    Context:  Acute Illness       Nutrition Assessment:    Meal intake at recent meals %. Remains on regular diet with standard oral nutrition supplement. Consistent intake per po data. Will follow at low nutrition risk at this time. Nutrition Related Findings:    not available on visit,  meds noted: methotrexate, folvite, prednisone, KCl, mag-ox   BM today Wound Type: Pressure Injury, Unstageable, Stage II       Current Nutrition Intake & Therapies:    Average Meal Intake: %  Average Supplements Intake: Unable to assess  ADULT DIET; Regular  ADULT ORAL NUTRITION SUPPLEMENT; Breakfast, Dinner; Standard High Calorie/High Protein Oral Supplement    Anthropometric Measures:  Height: 5' 4\" (162.6 cm)  Ideal Body Weight (IBW): 120 lbs (55 kg)    Admission Body Weight: 140 lb 6.9 oz (63.7 kg)  Current Body Weight: 150 lb 12.7 oz (68.4 kg), 117 % IBW.  Weight Source: Bed Scale  Current BMI (kg/m2): 25.9  Usual Body Weight: 138 lb 14.2 oz (63 kg) ( 2022)  % Weight Change (Calculated): 1.1  Weight Adjustment For: No Adjustment                 BMI Categories: Normal Weight (BMI 22.0 to 24.9) age over 72    Estimated Daily Nutrient Needs:  Energy Requirements Based On: Kcal/kg  Weight Used for Energy Requirements: Current  Energy (kcal/day): 3601-5216 (25-27 mel/kg)  Weight Used for Protein Requirements: Current  Protein (g/day): 76-89 (1.2-1.4 g/kg)  Method Used for Fluid Requirements: 1 ml/kcal  Fluid (ml/day): 8190-1812    Nutrition Diagnosis:   Predicted inadequate energy intake related to increase demand for energy/nutrients as evidenced by wounds    Nutrition Interventions:   Food and/or Nutrient Delivery: Continue Current Diet, Continue Oral Nutrition Supplement  Nutrition Education/Counseling: No recommendation at this time  Coordination of Nutrition Care: Continue to monitor while inpatient       Goals:  Previous Goal Met: Progressing toward Goal(s)  Goals: PO intake 75% or greater, by next RD assessment       Nutrition Monitoring and Evaluation:   Behavioral-Environmental Outcomes: None Identified  Food/Nutrient Intake Outcomes: Food and Nutrient Intake, Supplement Intake  Physical Signs/Symptoms Outcomes: Biochemical Data, Meal Time Behavior, Skin, Weight    Discharge Planning:    Continue Oral Nutrition Supplement     Dwight Khoury, [address] [facility],   Contact: [phone]

## 2023-07-26 PROCEDURE — 94660 CPAP INITIATION&MGMT: CPT

## 2023-07-26 PROCEDURE — 51798 US URINE CAPACITY MEASURE: CPT

## 2023-07-26 PROCEDURE — 94640 AIRWAY INHALATION TREATMENT: CPT

## 2023-07-26 PROCEDURE — 6370000000 HC RX 637 (ALT 250 FOR IP): Performed by: PHYSICAL MEDICINE & REHABILITATION

## 2023-07-26 PROCEDURE — 94761 N-INVAS EAR/PLS OXIMETRY MLT: CPT

## 2023-07-26 PROCEDURE — 97530 THERAPEUTIC ACTIVITIES: CPT

## 2023-07-26 PROCEDURE — 6360000002 HC RX W HCPCS: Performed by: INTERNAL MEDICINE

## 2023-07-26 PROCEDURE — 1280000000 HC REHAB R&B

## 2023-07-26 PROCEDURE — 2700000000 HC OXYGEN THERAPY PER DAY

## 2023-07-26 PROCEDURE — 97110 THERAPEUTIC EXERCISES: CPT

## 2023-07-26 PROCEDURE — 6370000000 HC RX 637 (ALT 250 FOR IP): Performed by: INTERNAL MEDICINE

## 2023-07-26 PROCEDURE — 97535 SELF CARE MNGMENT TRAINING: CPT

## 2023-07-26 PROCEDURE — 94664 DEMO&/EVAL PT USE INHALER: CPT

## 2023-07-26 PROCEDURE — 99211 OFF/OP EST MAY X REQ PHY/QHP: CPT

## 2023-07-26 PROCEDURE — 94150 VITAL CAPACITY TEST: CPT

## 2023-07-26 RX ADMIN — DILTIAZEM HYDROCHLORIDE 240 MG: 240 CAPSULE, COATED, EXTENDED RELEASE ORAL at 10:28

## 2023-07-26 RX ADMIN — METOPROLOL TARTRATE 12.5 MG: 25 TABLET, FILM COATED ORAL at 15:33

## 2023-07-26 RX ADMIN — TRIAMTERENE AND HYDROCHLOROTHIAZIDE 1 TABLET: 37.5; 25 TABLET ORAL at 10:27

## 2023-07-26 RX ADMIN — FOLIC ACID 500 MCG: 1 TABLET ORAL at 10:27

## 2023-07-26 RX ADMIN — ACETAMINOPHEN 650 MG: 325 TABLET ORAL at 17:58

## 2023-07-26 RX ADMIN — POTASSIUM CHLORIDE 10 MEQ: 750 TABLET, EXTENDED RELEASE ORAL at 10:41

## 2023-07-26 RX ADMIN — MEMANTINE 10 MG: 10 TABLET ORAL at 10:27

## 2023-07-26 RX ADMIN — PREDNISONE 15 MG: 5 TABLET ORAL at 10:27

## 2023-07-26 RX ADMIN — ALBUTEROL SULFATE 2 PUFF: 90 AEROSOL, METERED RESPIRATORY (INHALATION) at 19:48

## 2023-07-26 RX ADMIN — DONEPEZIL HYDROCHLORIDE 10 MG: 10 TABLET ORAL at 10:28

## 2023-07-26 RX ADMIN — ALBUTEROL SULFATE 2 PUFF: 90 AEROSOL, METERED RESPIRATORY (INHALATION) at 15:45

## 2023-07-26 RX ADMIN — ACETAMINOPHEN 650 MG: 325 TABLET ORAL at 20:44

## 2023-07-26 RX ADMIN — MAGNESIUM OXIDE 400 MG (241.3 MG MAGNESIUM) TABLET 400 MG: TABLET at 10:28

## 2023-07-26 RX ADMIN — COLLAGENASE SANTYL: 250 OINTMENT TOPICAL at 15:02

## 2023-07-26 RX ADMIN — ATORVASTATIN CALCIUM 20 MG: 10 TABLET, FILM COATED ORAL at 20:44

## 2023-07-26 RX ADMIN — LEVETIRACETAM 500 MG: 500 TABLET, FILM COATED ORAL at 10:28

## 2023-07-26 RX ADMIN — ASPIRIN 81 MG: 81 TABLET, CHEWABLE ORAL at 10:27

## 2023-07-26 RX ADMIN — MEMANTINE 10 MG: 10 TABLET ORAL at 20:43

## 2023-07-26 RX ADMIN — ENOXAPARIN SODIUM 60 MG: 100 INJECTION SUBCUTANEOUS at 10:41

## 2023-07-26 RX ADMIN — ENOXAPARIN SODIUM 60 MG: 100 INJECTION SUBCUTANEOUS at 20:52

## 2023-07-26 RX ADMIN — LEVETIRACETAM 500 MG: 500 TABLET, FILM COATED ORAL at 20:44

## 2023-07-26 RX ADMIN — CALCIUM 500 MG: 500 TABLET ORAL at 20:44

## 2023-07-26 RX ADMIN — GABAPENTIN 400 MG: 400 CAPSULE ORAL at 20:43

## 2023-07-26 RX ADMIN — TIOTROPIUM BROMIDE AND OLODATEROL 2 PUFF: 3.124; 2.736 SPRAY, METERED RESPIRATORY (INHALATION) at 07:39

## 2023-07-26 RX ADMIN — METOPROLOL TARTRATE 12.5 MG: 25 TABLET, FILM COATED ORAL at 20:46

## 2023-07-26 RX ADMIN — ACETAMINOPHEN 650 MG: 325 TABLET ORAL at 06:23

## 2023-07-26 RX ADMIN — ACETAMINOPHEN 650 MG: 325 TABLET ORAL at 10:27

## 2023-07-26 ASSESSMENT — PAIN DESCRIPTION - DESCRIPTORS
DESCRIPTORS: ACHING

## 2023-07-26 ASSESSMENT — PAIN SCALES - GENERAL
PAINLEVEL_OUTOF10: 2
PAINLEVEL_OUTOF10: 0
PAINLEVEL_OUTOF10: 4
PAINLEVEL_OUTOF10: 0
PAINLEVEL_OUTOF10: 2

## 2023-07-26 ASSESSMENT — PAIN - FUNCTIONAL ASSESSMENT: PAIN_FUNCTIONAL_ASSESSMENT: PREVENTS OR INTERFERES SOME ACTIVE ACTIVITIES AND ADLS

## 2023-07-26 ASSESSMENT — PAIN DESCRIPTION - LOCATION
LOCATION: KNEE
LOCATION: LEG
LOCATION: KNEE

## 2023-07-26 ASSESSMENT — PAIN DESCRIPTION - ORIENTATION
ORIENTATION: LEFT

## 2023-07-26 ASSESSMENT — PAIN DESCRIPTION - PAIN TYPE: TYPE: ACUTE PAIN

## 2023-07-26 ASSESSMENT — PAIN DESCRIPTION - ONSET: ONSET: ON-GOING

## 2023-07-26 ASSESSMENT — PAIN DESCRIPTION - FREQUENCY: FREQUENCY: INTERMITTENT

## 2023-07-26 NOTE — PROGRESS NOTES
Occupational Therapy    Physical Rehabilitation: OCCUPATIONAL THERAPY     [x] daily progress note       [] discharge       Patient Name:  Britany Orellana   :  1940 MRN: [id number]  Room:  [address] [place] [address] Date of Admission: 2023  Rehabilitation Diagnosis:   Cerebral infarction, unspecified [I63.9]  Acute CVA (cerebrovascular accident) ([address]) [I63.9]       Date 2023       Day of ARU Week:  7   Time IN/OUT 0830/1000   Individual Tx Minutes 90   Group Tx Minutes    Co-Treat Minutes    Concurrent Tx Minutes    TOTAL Tx Time Mins 90   Variance Time    Variance Time []   Refusal due to:     []   Medical hold/reason:    []   Illness   []   Off Unit for test/procedure  []   Extra time needed to complete task  []   Therapeutic need  []   Other (specify):   Restrictions Restrictions/Precautions: Fall Risk, General Precautions         Communication with other providers: [x]   OK to see per nursing:     []   Spoke with team member regarding:      Subjective observations and cognitive status: Upon entrance, Pt eating breakfast with daughter present in the room, pleasant and agreeable to caregiver training. Daughter stating at the end of the session she feels comfortable with how she did with the [facility] transfers and with bathing/hygiene at bed level. Pain level/location:    /10       Location: Pt states no pain   Discharge recommendations  Anticipated discharge date:  23  Destination: []home alone   []home alone w assist prn   [x] home w/ family    [] Continuous supervision       []SNF    [] Assisted living     [] Other:   Continued therapy: [x]HHC OT  []OUTPATIENT  OT   [] No Further OT  Equipment needs: Britany Orellana requires a drop arm bedside commode due to being unable to use the toilet within the home and confined to a single room and one level of the home. This patient requires a side transfer provided by a drop arm commode. ADLs:    Toilet Transfers:    Toilet Transfer  Assistance needed:

## 2023-07-26 NOTE — PROGRESS NOTES
Pulmonary and Critical Care  Progress Note    Subjective: The patient is feeling better. Sitting in the chair. Shortness of breath better. Chest pain none  Addressing respiratory complaints Patient is negative for  hemoptysis and cyanosis  CONSTITUTIONAL:  negative for fevers and chills      Past Medical History:     has a past medical history of Acid reflux, Anxiety, Bronchitis, Cancer ([address]), Carotid stenosis, Cerebral embolism with cerebral infarction ([address]), Chronic back pain, Depression, History of blood transfusion, History of external beam radiation therapy, Hyperlipidemia, Hypertension, Prolonged emergence from general anesthesia, RA (rheumatoid arthritis) ([address]), Seizure ([address]), Sleep apnea, Teeth missing, Urinary incontinence, UTI (urinary tract infection), Wears dentures, Wears glasses, and Wears partial dentures. has a past surgical history that includes Carpal tunnel release; Dental surgery; Colonoscopy (In Past); Hysterectomy, vaginal (1960's); Dilation and curettage of uterus (1960's); Carotid endarterectomy (Right, 11/18/2015); [address] Surgery (N/A, 03/02/2021); ARTHROCENTESIS / ASPIRATION / INJECTION KNEE (05/11/2021); and joint replacement. reports that she has never smoked. She has never used smokeless tobacco. She reports that she does not drink alcohol and does not use drugs. Family history:  family history includes Arthritis in her mother and sister; Depression in her son; Diabetes in her mother and sister; Hearing Loss in her brother; Heart Disease in her brother, mother, and sister; High Blood Pressure in her brother, brother, mother, sister, son, and son; High Cholesterol in her mother; Learning Disabilities in her son; Mental Illness in her sister and son; Miscarriages / [place] in her mother; Other in her father, sister, son, son, and son; Stroke in her mother and sister.     Allergies   Allergen Reactions    Cortisone Rash    Codeine      Unsure Of Reaction    Pcn [Penicillins]

## 2023-07-26 NOTE — FLOWSHEET NOTE
areas of strength, balance, endurance, and safety and continued focus on this is recommended. Treatment/Activity Tolerance:   [] Tolerated treatment with no adverse effects    [x] Patient limited by fatigue  [] Patient limited by pain   [] Patient limited by medical complications:    [] Adverse reaction to Tx:   [] Significant change in status    Safety:       [x]  bed alarm set    []  chair alarm set    []  Pt refused alarms                []  Telesitter activated      [x]  Gait belt used during tx session      [x]other: pt left in semi-patton's position in bed with call light at end of treatment. Number of Minutes/Billable Intervention  Gait Training    Therapeutic Exercise 30   Neuro Re-Ed    Therapeutic Activity 60   Wheelchair Propulsion    Group    Other:    TOTAL 90         Social History  Social/Functional History  Lives With: Son (Pt lives with her son who is nonverbal (she was his primary caregiver, but he does have an aide as well).  Pt's daughter has been staying the night with Pt every night since before April 2023.)  Type of Home: House  Home Layout: One level  Home Access: Stairs to enter without rails  Entrance Stairs - Number of Steps: 1  Bathroom Shower/Tub: Tub/Shower unit  Bathroom Toilet: Standard  Bathroom Equipment: Grab bars in shower, 3-in-1 commode, Tub transfer bench, Toilet raiser (Pt has been loaned a TTB from senior services.)  Bathroom Accessibility: [facility] accessible  Home Equipment: [employer] [place], [address], Rollator, Walker, rolling  Has the patient had two or more falls in the past year or any fall with injury in the past year?: Yes (> 10 falls in last year)  Receives Help From: Family  ADL Assistance: Needs assistance  Homemaking Assistance: Needs assistance  Meal Prep Responsibility: No  Laundry Responsibility: No  Cleaning Responsibility: No  Bill Paying/Finance Responsibility: No  Shopping Responsibility: No  Dependent Care Responsibility: No  Health Care Management: Derek, [id number]  7/26/2023, 12:23 PM

## 2023-07-26 NOTE — CARE COORDINATION
Left message for [facility] to request the size, make, model of the Hospital Bed. Awaiting a return call.

## 2023-07-26 NOTE — PROGRESS NOTES
Anne Beach    : 1940  Acct #: [de-identified]  MRN: [id number]              PM&R Progress Note      Admitting diagnosis: ***    Comorbid diagnoses impacting rehabilitation: ***    Chief complaint: ***    Prior (baseline) level of function: [place]. Current level of function:         Current  IRF-ORI and Goals:   Occupational Therapy:    Short Term Goals  Time Frame for Short Term Goals: STGs=LTGs :   Long Term Goals  Time Frame for Long Term Goals : 12-14 days or until d/c. Long Term Goal 1: Pt will complete self-feeding with setup assist.  Long Term Goal 2: Pt will complete oral hygiene and grooming tasks with setup assist.  Long Term Goal 3: Pt will complete total body bathing with AE PRN and Mod A. Long Term Goal 4: Pt will complete UB dressing with Min A. Long Term Goal 5: Pt will complete LB dressing with AE PRN and Mod A. Additional Goals?: Yes  Long Term Goal 6: Pt will doff/don footwear with AE PRN and Min A. Long Term Goal 7: Pt will complete toileting with Mod A. Long Term Goal 8: Pt will complete functional transfers (bed, chair, shower, toilet) with DME PRN and Mod A. Long Term Goal 9: Pt will perform therex/therax to facilitate an increase in strength/endurance with emphasis on dynamic sitting balance/tolerance > 15 mins with SBA. :                                       Eating: Eating  Assistance Needed: Setup or clean-up assistance  Comment: Setup assist to cut up [place] toast and prepare with butter and syrup as well as prepare cream of wheat. Once setup with built up handle on utensil Pt able to feed self. Pt also uses both hands to drink water and ensure without assist.  CARE Score: 5  Discharge Goal: Set-up or clean-up assistance       Oral Hygiene: Oral Hygiene  Assistance Needed: Partial/moderate assistance  Comment: Min A to keep cup in hand when rinsing mouth out c mouth wash. Pt able to rinse and spit into cup.   CARE Score: 3  Discharge Goal: Set-up or clean-up Steps  Reason if not Attempted: Not attempted due to medical condition or safety concerns  CARE Score: 88  Discharge Goal: Not Attempted     12 Steps  Reason if not Attempted: Not attempted due to medical condition or safety concerns  CARE Score: 88  Discharge Goal: Not Attempted       Wheelchair:  w/c Ability: Wheelchair Ability  Uses a Wheelchair and/or Scooter?: Yes  Wheel 50 Feet with Two Turns  Assistance Needed: Substantial/maximal assistance  Comment: pt propelled 15 ft using R hand with Mod A-Max A to stay on straight path/correct veering; R armrest had to be taken off for pt to be able to to see R hand positioning/placement of thumb against spacer of w/c spokes and assist with propulsion as pt with poor hand grasp to use w/c rim only; provided with VCs to also use RLE to correct veering to the L side but motor strength was inconsistent; pt provided with sequential cues throughout due to poor coordination and divided attention to use RUE and RLE simultaneously; forward propulsion distance was inconsistent due to impaired strength and endurance; pt required extra time and effort and encouragement to perform this mobility task due to poor endurance; pt required additional assist to position LLE on w/c footrest with heel loop prior to propulsion; pt was able to unlock R hand brake with R hand and L hand brake with L hand with increased effort  CARE Score: 2  Discharge Goal: Partial/moderate assistance  Wheel 150 Feet  Assistance Needed: Substantial/maximal assistance  Comment: pt propelled 15 ft using R hand with Mod A-Max A to stay on straight path/correct veering; R armrest had to be taken off for pt to be able to to see R hand positioning/placement of thumb against spacer of w/c spokes and assist with propulsion as pt with poor hand grasp to use w/c rim only; provided with VCs to also use RLE to correct veering to the L side but motor strength was inconsistent; pt provided with sequential cues throughout due

## 2023-07-27 PROCEDURE — 94150 VITAL CAPACITY TEST: CPT

## 2023-07-27 PROCEDURE — 6360000002 HC RX W HCPCS: Performed by: INTERNAL MEDICINE

## 2023-07-27 PROCEDURE — 97530 THERAPEUTIC ACTIVITIES: CPT

## 2023-07-27 PROCEDURE — 99223 1ST HOSP IP/OBS HIGH 75: CPT | Performed by: INTERNAL MEDICINE

## 2023-07-27 PROCEDURE — 94761 N-INVAS EAR/PLS OXIMETRY MLT: CPT

## 2023-07-27 PROCEDURE — 1280000000 HC REHAB R&B

## 2023-07-27 PROCEDURE — 97535 SELF CARE MNGMENT TRAINING: CPT

## 2023-07-27 PROCEDURE — 94640 AIRWAY INHALATION TREATMENT: CPT

## 2023-07-27 PROCEDURE — 97112 NEUROMUSCULAR REEDUCATION: CPT

## 2023-07-27 PROCEDURE — 51798 US URINE CAPACITY MEASURE: CPT

## 2023-07-27 PROCEDURE — 6370000000 HC RX 637 (ALT 250 FOR IP): Performed by: INTERNAL MEDICINE

## 2023-07-27 PROCEDURE — 94660 CPAP INITIATION&MGMT: CPT

## 2023-07-27 PROCEDURE — APPNB60 APP NON BILLABLE TIME 46-60 MINS: Performed by: PHYSICIAN ASSISTANT

## 2023-07-27 PROCEDURE — 6370000000 HC RX 637 (ALT 250 FOR IP): Performed by: PHYSICAL MEDICINE & REHABILITATION

## 2023-07-27 RX ADMIN — CALCIUM 500 MG: 500 TABLET ORAL at 22:17

## 2023-07-27 RX ADMIN — LEVETIRACETAM 500 MG: 500 TABLET, FILM COATED ORAL at 08:26

## 2023-07-27 RX ADMIN — METOPROLOL TARTRATE 12.5 MG: 25 TABLET, FILM COATED ORAL at 22:17

## 2023-07-27 RX ADMIN — DILTIAZEM HYDROCHLORIDE 240 MG: 240 CAPSULE, COATED, EXTENDED RELEASE ORAL at 08:26

## 2023-07-27 RX ADMIN — ENOXAPARIN SODIUM 60 MG: 100 INJECTION SUBCUTANEOUS at 08:27

## 2023-07-27 RX ADMIN — METOPROLOL TARTRATE 12.5 MG: 25 TABLET, FILM COATED ORAL at 08:26

## 2023-07-27 RX ADMIN — MEMANTINE 10 MG: 10 TABLET ORAL at 08:26

## 2023-07-27 RX ADMIN — LEVETIRACETAM 500 MG: 500 TABLET, FILM COATED ORAL at 22:16

## 2023-07-27 RX ADMIN — ACETAMINOPHEN 650 MG: 325 TABLET ORAL at 05:44

## 2023-07-27 RX ADMIN — ATORVASTATIN CALCIUM 20 MG: 10 TABLET, FILM COATED ORAL at 22:17

## 2023-07-27 RX ADMIN — ENOXAPARIN SODIUM 60 MG: 100 INJECTION SUBCUTANEOUS at 22:47

## 2023-07-27 RX ADMIN — TRIAMTERENE AND HYDROCHLOROTHIAZIDE 1 TABLET: 37.5; 25 TABLET ORAL at 08:26

## 2023-07-27 RX ADMIN — TIOTROPIUM BROMIDE AND OLODATEROL 2 PUFF: 3.124; 2.736 SPRAY, METERED RESPIRATORY (INHALATION) at 07:42

## 2023-07-27 RX ADMIN — ASPIRIN 81 MG: 81 TABLET, CHEWABLE ORAL at 08:26

## 2023-07-27 RX ADMIN — PREDNISONE 15 MG: 5 TABLET ORAL at 08:25

## 2023-07-27 RX ADMIN — ALBUTEROL SULFATE 2 PUFF: 90 AEROSOL, METERED RESPIRATORY (INHALATION) at 21:25

## 2023-07-27 RX ADMIN — FOLIC ACID 500 MCG: 1 TABLET ORAL at 08:25

## 2023-07-27 RX ADMIN — COLLAGENASE SANTYL: 250 OINTMENT TOPICAL at 10:04

## 2023-07-27 RX ADMIN — GABAPENTIN 400 MG: 400 CAPSULE ORAL at 22:16

## 2023-07-27 RX ADMIN — ACETAMINOPHEN 650 MG: 325 TABLET ORAL at 17:18

## 2023-07-27 RX ADMIN — ALBUTEROL SULFATE 2 PUFF: 90 AEROSOL, METERED RESPIRATORY (INHALATION) at 13:48

## 2023-07-27 RX ADMIN — ACETAMINOPHEN 650 MG: 325 TABLET ORAL at 22:17

## 2023-07-27 RX ADMIN — ACETAMINOPHEN 650 MG: 325 TABLET ORAL at 11:37

## 2023-07-27 RX ADMIN — MEMANTINE 10 MG: 10 TABLET ORAL at 22:17

## 2023-07-27 RX ADMIN — MAGNESIUM OXIDE 400 MG (241.3 MG MAGNESIUM) TABLET 400 MG: TABLET at 08:27

## 2023-07-27 RX ADMIN — POTASSIUM CHLORIDE 10 MEQ: 750 TABLET, EXTENDED RELEASE ORAL at 08:26

## 2023-07-27 RX ADMIN — DONEPEZIL HYDROCHLORIDE 10 MG: 10 TABLET ORAL at 08:26

## 2023-07-27 ASSESSMENT — PAIN DESCRIPTION - ONSET: ONSET: ON-GOING

## 2023-07-27 ASSESSMENT — PAIN DESCRIPTION - LOCATION: LOCATION: GENERALIZED

## 2023-07-27 ASSESSMENT — PAIN SCALES - GENERAL
PAINLEVEL_OUTOF10: 0
PAINLEVEL_OUTOF10: 3
PAINLEVEL_OUTOF10: 0
PAINLEVEL_OUTOF10: 0

## 2023-07-27 ASSESSMENT — PAIN DESCRIPTION - FREQUENCY: FREQUENCY: INTERMITTENT

## 2023-07-27 ASSESSMENT — PAIN DESCRIPTION - ORIENTATION: ORIENTATION: RIGHT;LEFT

## 2023-07-27 ASSESSMENT — PAIN - FUNCTIONAL ASSESSMENT: PAIN_FUNCTIONAL_ASSESSMENT: PREVENTS OR INTERFERES SOME ACTIVE ACTIVITIES AND ADLS

## 2023-07-27 ASSESSMENT — PAIN DESCRIPTION - DESCRIPTORS: DESCRIPTORS: ACHING

## 2023-07-27 ASSESSMENT — PAIN DESCRIPTION - PAIN TYPE: TYPE: CHRONIC PAIN

## 2023-07-27 NOTE — CONSULTS
licensed caregiver. XR CHEST PORTABLE   Final Result   Findings consistent with central and perihilar bronchitis/pneumonitis or   atypical pneumonia accentuated by low lung volumes. Otherwise,   radiographically nonacute chest.      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 Demyelination could be   considered. Disc and osteophytes narrow the neural foramina and cause stenosis of the   thecal sac throughout the cervical region as discussed above. Assessment/Plan:    L Popliteal DVT  -no concern for inherited or acquired thrombophilia, likely r/t prolonged immobility of L extremity d/t weakness.   -tolerating treatment dose Lovenox  -given family concern for falls/bleeding would recommend to continue LMWH and transition to Eliquis 5mg BID on discharge, omitting loading dose. -Recommend 3 months of treatment   -No contraindication to exercise and PT on L leg, encourage activity to decrease symptoms/prevent post thrombotic venous insufficiency. 2. History of Anal Cancer S/P Chemoradiation  -2017, follows with Dr Rosangela Nicholson  -Recent CEA was elevated but this is non-specific. CT CAP July 2023 with no evidence of disease. Will continue with surveillance    Care for pulmonary complaints and CVA per primary and specialty teams. Encouraged pt to use IS, remain OOB as able. Assessment and plan of care was developed in coordination with attending Dr. Marion Garsia    I have independently evaluated and examined this patient today. I have reviewed radiologic and biochemical tests on this patient.  Management Plan is developed mutually with Pepe Millard NIKOLAS Short. I have reviewed above note and agree with assessment and plan    Adriana Lechuga PA-C    Thank you for allowing me to participate in the care of this very pleasant patient. Labs, rationale, and plan of care all discussed in depth with patient and questions and concerns addressed.

## 2023-07-27 NOTE — PROGRESS NOTES
Mary Saldana    : 1940  Acct #: [de-identified]  MRN: [id number]              PM&R Progress Note      Admitting diagnosis: ***    Comorbid diagnoses impacting rehabilitation: ***    Chief complaint: ***    Prior (baseline) level of function: Independent. Current level of function:         Current  IRF-ORI and Goals:   Occupational Therapy:    Short Term Goals  Time Frame for Short Term Goals: STGs=LTGs :   Long Term Goals  Time Frame for Long Term Goals : 12-14 days or until d/c. Long Term Goal 1: Pt will complete self-feeding with setup assist.  Long Term Goal 2: Pt will complete oral hygiene and grooming tasks with setup assist.  Long Term Goal 3: Pt will complete total body bathing with AE PRN and Mod A. Long Term Goal 4: Pt will complete UB dressing with Min A. Long Term Goal 5: Pt will complete LB dressing with AE PRN and Mod A. Additional Goals?: Yes  Long Term Goal 6: Pt will doff/don footwear with AE PRN and Min A. Long Term Goal 7: Pt will complete toileting with Mod A. Long Term Goal 8: Pt will complete functional transfers (bed, chair, shower, toilet) with DME PRN and Mod A. Long Term Goal 9: Pt will perform therex/therax to facilitate an increase in strength/endurance with emphasis on dynamic sitting balance/tolerance > 15 mins with SBA. :                                       Eating: Eating  Assistance Needed: Setup or clean-up assistance  Comment: Setup assist to eat Malay toast, eggs, and cream of wheat as well as to drink OJ. Pt was setup with built up utensils as well as Dycem underneath plate and bowl. Pt able to incorporate L hand during meal including using hand to ensure food gets completely in the mouth, to tilt the bowl, and to bring cup to mouth. CARE Score: 5  Discharge Goal: Set-up or clean-up assistance       Oral Hygiene: Oral Hygiene  Assistance Needed: Partial/moderate assistance  Comment: Min A to keep cup in hand when rinsing mouth out c mouth wash.  Pt able to rinse Transfer  Reason if not Attempted: Not attempted due to medical condition or safety concerns  CARE Score: 88  Discharge Goal: Substantial/maximal assistance    Ambulation:    Walking Ability  Does the Patient Walk?: No     Walk 10 Feet  Comment: limited potential to progressing to this mobility task, however pre-gait activities will be part of the PT POC  Reason if not Attempted: Not attempted due to medical condition or safety concerns  CARE Score: 88  Discharge Goal: Not Attempted     Walk 50 Feet with Two Turns  Reason if not Attempted: Not attempted due to medical condition or safety concerns  CARE Score: 88  Discharge Goal: Not Attempted     Walk 150 Feet  Reason if not Attempted: Not attempted due to medical condition or safety concerns  CARE Score: 88  Discharge Goal: Not Attempted     Walking 10 Feet on Uneven Surfaces  Reason if not Attempted: Not attempted due to medical condition or safety concerns  CARE Score: 88  Discharge Goal: Not Attempted     1 Step (Curb)  Reason if not Attempted: Not attempted due to medical condition or safety concerns  CARE Score: 88  Discharge Goal: Not Attempted     4 Steps  Reason if not Attempted: Not attempted due to medical condition or safety concerns  CARE Score: 88  Discharge Goal: Not Attempted     12 Steps  Reason if not Attempted: Not attempted due to medical condition or safety concerns  CARE Score: 88  Discharge Goal: Not Attempted       Wheelchair:  w/c Ability: Wheelchair Ability  Uses a Wheelchair and/or Scooter?: Yes  Wheel 50 Feet with Two Turns  Assistance Needed: Substantial/maximal assistance  Comment: pt propelled 15 ft using R hand with Mod A-Max A to stay on straight path/correct veering; R armrest had to be taken off for pt to be able to to see R hand positioning/placement of thumb against spacer of w/c spokes and assist with propulsion as pt with poor hand grasp to use w/c rim only; provided with VCs to also use RLE to correct veering to the L side but

## 2023-07-27 NOTE — PROGRESS NOTES
07/27/23 0253   NIV Type   $NIV $Daily Charge   NIV Started/Stopped On   Equipment Type Autopap   Mode CPAP   Mask Type Nasal pillows   Mask Size Medium   Bonnet size Medium   Assessment   Respirations 16   SpO2 98 %   Comfort Level Good   Using Accessory Muscles No   Mask Compliance Good   Settings/Measurements   CPAP/EPAP 9.1 cmH2O   Patient's Home Machine No

## 2023-07-27 NOTE — PROGRESS NOTES
Pulmonary and Critical Care  Progress Note    Subjective: The patient is better. Having OT. Shortness of breath better. Chest pain none. Addressing respiratory complaints Patient is negative for  hemoptysis and cyanosis  CONSTITUTIONAL:  negative for fevers and chills      Past Medical History:     has a past medical history of Acid reflux, Anxiety, Bronchitis, Cancer ([address]), Carotid stenosis, Cerebral embolism with cerebral infarction ([address]), Chronic back pain, Depression, History of blood transfusion, History of external beam radiation therapy, Hyperlipidemia, Hypertension, Prolonged emergence from general anesthesia, RA (rheumatoid arthritis) ([address]), Seizure ([address]), Sleep apnea, Teeth missing, Urinary incontinence, UTI (urinary tract infection), Wears dentures, Wears glasses, and Wears partial dentures. has a past surgical history that includes Carpal tunnel release; Dental surgery; Colonoscopy (In Past); Hysterectomy, vaginal (1960's); Dilation and curettage of uterus (1960's); Carotid endarterectomy (Right, 11/18/2015); [address] Surgery (N/A, 03/02/2021); ARTHROCENTESIS / ASPIRATION / INJECTION KNEE (05/11/2021); and joint replacement. reports that she has never smoked. She has never used smokeless tobacco. She reports that she does not drink alcohol and does not use drugs. Family history:  family history includes Arthritis in her mother and sister; Depression in her son; Diabetes in her mother and sister; Hearing Loss in her brother; Heart Disease in her brother, mother, and sister; High Blood Pressure in her brother, brother, mother, sister, son, and son; High Cholesterol in her mother; Learning Disabilities in her son; Mental Illness in her sister and son; [place] in her mother; Other in her father, sister, son, son, and son; Stroke in her mother and sister.     Allergies   Allergen Reactions    Cortisone Rash    Codeine      Unsure Of Reaction    Pcn [Penicillins]      Unknown Reaction

## 2023-07-27 NOTE — PLAN OF CARE
Problem: Safety - Adult  Goal: Free from fall injury  7/27/2023 1029 by Jean Pierre Ferrell RN  Outcome: Progressing  7/26/2023 2054 by Chey Light  Outcome: Progressing     Problem: Skin/Tissue Integrity  Goal: Absence of new skin breakdown  Description: 1. Monitor for areas of redness and/or skin breakdown  2. Assess vascular access sites hourly  3. Every 4-6 hours minimum:  Change oxygen saturation probe site  4. Every 4-6 hours:  If on nasal continuous positive airway pressure, respiratory therapy assess nares and determine need for appliance change or resting period.   7/27/2023 1029 by Jean Pierre Ferrell RN  Outcome: Progressing  7/26/2023 2054 by Chey Light  Outcome: Progressing

## 2023-07-27 NOTE — PROGRESS NOTES
07/26/23 2245   NIV Type   NIV Started/Stopped On   Equipment Type Autopap   Mode CPAP   Mask Type Nasal pillows   Mask Size Medium   Bonnet size Medium   Assessment   Pulse 90   Respirations 16   SpO2 94 %   Level of Consciousness 0   Comfort Level Good   Using Accessory Muscles No   Mask Compliance Good   Settings/Measurements   CPAP/EPAP 4 cmH2O   FiO2  21 %   Patient's Home Machine No

## 2023-07-27 NOTE — PROGRESS NOTES
Physical Therapy    [x] daily progress note       [] discharge       Patient Name:  Hilda Menchaca   :  1940 MRN: [id number]  Room:  [address] [place] [address] Date of Admission: 2023  Rehabilitation Diagnosis:   Cerebral infarction, unspecified [I63.9]  Acute CVA (cerebrovascular accident) ([address]) [I63.9]       Date 2023       Day of ARU Week:  1   Time IN/OUT 1300/1437   Individual Tx Minutes 80   Group Tx Minutes    Co-Treat Minutes    Concurrent Tx Minutes    TOTAL Tx Time Mins 97   Variance Time +7   Variance Time []   Refusal due to:     []   Medical hold/reason:    []   Illness   []   Off Unit for test/procedure  [x]   Extra time needed to complete tasks and caregiver education/training   []   Therapeutic need  []   Other (specify):   Restrictions Restrictions/Precautions  Restrictions/Precautions: Fall Risk, General Precautions  Position Activity Restriction  Other position/activity restrictions: IV access LUE, L hemiparesis   Interdisciplinary communication [x]   Cleared for therapy per nursing     [x]   RN notified about issues during session (request for inhaler from respiratory therapist and request for dressing change due to bowel and bladder incontinence)   []   RN updated on pt performance  []   Spoke with   []   Spoke with OT  []   Spoke with MD  []   Other:    Subjective observations and cognitive status: Pt in high patton's position, doing self-feeding with R hand using fork with built-up handle     Pain level/location: None reported   Discharge recommendations  Anticipated discharge date:   2023  Destination: []home alone   []home alone with assist PRN     [x] home w/ family      [] Continuous supervision  []SNF    [] Assisted living     [] Other:  Continued therapy: [x]HHC PT  []OUTPATIENT PT   [] No Further PT  []SNF PT  Caregiver training recommended: [x]Yes-ongoing  [] No   Equipment needs:  hospital bed and Rylee lift; has manual w/c and transport chair   Hilda Menchaca was Transportation: Car  Education: Did not graduate HS--Grew up in [place]  Occupation: Retired  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 Pt dtr manages groceries. Occasional outings to [employer]. Lots of surgeries with knee replacements and limited outings. Additional Comments: Pt sleeps in regular flat bed. Pt is R hand dominant. Objective                                                                                    Goals:  (Update in navigator)   : Long Term Goals  Time Frame for Long Term Goals : 10 tx days:  Long Term Goal 1: Pt will complete rolling R/L with bed rail with SBA-Sup. Long Term Goal 2: Pt will complete scooting and sup<->sit using bed features with Mod A. Long Term Goal 3: Pt will complete squat pivot transfers with Mod A. Long Term Goal 4: Pt will complete sit<->stand and stand turn transfers with Max A  Long Term Goal 5: Pt will propel manual w/c 50 ft with turns over level surface with Min A.:        Plan of Care                                                                              Times per week: 5 days per week for a minimum of 60 minutes/day plus group as appropriate for 60 minutes.   Treatment to include Current Treatment Recommendations: Strengthening, ROM, Balance training, Functional mobility training, Transfer training, Cognitive/Perceptual training, Endurance training, Wheelchair mobility training, Neuromuscular re-education, Cognitive reorientation, Home exercise program, Safety education & training, Patient/Caregiver education & training, Equipment evaluation, education, & procurement, Positioning, Therapeutic activities, Pain management    Electronically signed by   Esau Conner, PT  7/27/2023, 3:55 PM

## 2023-07-27 NOTE — PROGRESS NOTES
: No  Patient's  Info: Daughter, natividad  Mode of Transportation: Car  Education: Did not graduate HS--Grew up in [place]  Occupation: Retired  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 Pt dtr manages groceries. Occasional outings [place] [employer]. Lots of surgeries with knee replacements and limited outings. Additional Comments: Pt sleeps in regular flat bed. Pt is R hand dominant. Objective                                                                                    Goals:  (Update in navigator)  Short Term Goals  Time Frame for Short Term Goals: STGs=LTGs:  Long Term Goals  Time Frame for Long Term Goals : 12-14 days or until d/c. Long Term Goal 1: Pt will complete self-feeding with setup assist.  Long Term Goal 2: Pt will complete oral hygiene and grooming tasks with setup assist.  Long Term Goal 3: Pt will complete total body bathing with AE PRN and Mod A. Long Term Goal 4: Pt will complete UB dressing with Min A. Long Term Goal 5: Pt will complete LB dressing with AE PRN and Mod A. Additional Goals?: Yes  Long Term Goal 6: Pt will doff/don footwear with AE PRN and Min A. Long Term Goal 7: Pt will complete toileting with Mod A. Long Term Goal 8: Pt will complete functional transfers (bed, chair, shower, toilet) with DME PRN and Mod A. Long Term Goal 9: Pt will perform therex/therax to facilitate an increase in strength/endurance with emphasis on dynamic sitting balance/tolerance > 15 mins with SBA.:        Plan of Care                                                                              Times per week: 5 days per week for a minimum of 60 minutes/day plus group as appropriate for 60 minutes.   Treatment to include Occupational Therapy Plan  Current Treatment Recommendations: Strengthening, ROM, Balance training, Functional mobility training, Endurance training, Neuromuscular re-education, Cognitive reorientation, Pain management, Safety education & training, Patient/Caregiver education & training, Equipment evaluation, education, & procurement, Positioning, Self-Care / ADL, Cognitive/Perceptual training, Coordination training    Electronically signed by   AVE Segundo, OTR/L #[id number]  7/27/2023, 11:28 AM

## 2023-07-27 NOTE — PROGRESS NOTES
Manual Kimberly    : 1940  Acct #: [de-identified]  MRN: [id number]              PM&R Progress Note      Admitting diagnosis: ***    Comorbid diagnoses impacting rehabilitation: ***    Chief complaint: ***    Prior (baseline) level of function: Independent. Current level of function:         Current  IRF-ORI and Goals:   Occupational Therapy:    Short Term Goals  Time Frame for Short Term Goals: STGs=LTGs :   Long Term Goals  Time Frame for Long Term Goals : 12-14 days or until d/c. Long Term Goal 1: Pt will complete self-feeding with setup assist.  Long Term Goal 2: Pt will complete oral hygiene and grooming tasks with setup assist.  Long Term Goal 3: Pt will complete total body bathing with AE PRN and Mod A. Long Term Goal 4: Pt will complete UB dressing with Min A. Long Term Goal 5: Pt will complete LB dressing with AE PRN and Mod A. Additional Goals?: Yes  Long Term Goal 6: Pt will doff/don footwear with AE PRN and Min A. Long Term Goal 7: Pt will complete toileting with Mod A. Long Term Goal 8: Pt will complete functional transfers (bed, chair, shower, toilet) with DME PRN and Mod A. Long Term Goal 9: Pt will perform therex/therax to facilitate an increase in strength/endurance with emphasis on dynamic sitting balance/tolerance > 15 mins with SBA. :                                       Eating: Eating  Assistance Needed: Setup or clean-up assistance  Comment: Setup assist to cut up Sinhala toast and prepare with butter and syrup as well as prepare cream of wheat. Once setup with built up handle on utensil Pt able to feed self. Pt also uses both hands to drink water and ensure without assist.  CARE Score: 5  Discharge Goal: Set-up or clean-up assistance       Oral Hygiene: Oral Hygiene  Assistance Needed: Partial/moderate assistance  Comment: Min A to keep cup in hand when rinsing mouth out c mouth wash. Pt able to rinse and spit into cup.   CARE Score: 3  Discharge Goal: Set-up or clean-up safety concerns  CARE Score: 1  Discharge Goal: Partial/moderate assistance    Physical Therapy:         Long Term Goals  Time Frame for Long Term Goals : 10 tx days:  Long Term Goal 1: Pt will complete rolling R/L with bed rail with SBA-Sup. Long Term Goal 2: Pt will complete scooting and sup<->sit using bed features with Mod A. Long Term Goal 3: Pt will complete squat pivot transfers with Mod A. Long Term Goal 4: Pt will complete sit<->stand and stand turn transfers with Max A  Long Term Goal 5: Pt will propel manual w/c 50 ft with turns over level surface with Min A.      Bed Mobility:   Sit to Lying  Assistance Needed: Dependent  Comment: Max A + 2nd person assist with bed rails; required 2-person assist to scoot to Anne Bence to achieve appropriate body positioning in bed  CARE Score: 1  Discharge Goal: Partial/moderate assistance  Roll Left and Right  Assistance Needed: Substantial/maximal assistance  Comment: Mod A to roll to L with bed rail; remains to be Max A to roll to R using R hand on bed rail  CARE Score: 2  Discharge Goal: Supervision or touching assistance  Lying to Sitting on Side of Bed  Assistance Needed: Partial/moderate assistance  Comment: Mod A (required lifting of upper body) using bed features; transfer training performed towards L side of hospital bed, required verbal and tactile cues on proper technique to position and use leg  to assist LLE OOB and to eventually take it off using R hand primarily but was able to also involve use of L hand with encouragement and sequential cues due to physical and cognitive deficits. Pt required constant holding of upper body as pt performed scooting of hips forward and required verbal and tactile cues to eventually position L hand on bed rail to assist with static sitting balance at EOB.   CARE Score: 3  Discharge Goal: Partial/moderate assistance    Transfers:    Sit to Stand  Assistance Needed: Dependent  Comment: pt unable to fully stand even with

## 2023-07-27 NOTE — PROGRESS NOTES
Nutrition Education    Family requested visit to discuss healthy meals and concern with potential for weight gain. Patient has good appetite and eats well at meals. Encouraged to consider cardiac diet, focus on lean protein and fruits and vegetables. Provided copy of meal plan for family. At home family often prepares meals and brings over to patient. Daughter with plan to have family members review heart healthy meal plan to provide healthy meals with no added salt. Patient receiving standard high calorie oral nutrition supplement, will modify to low calorie, high protein at this time. Noted to  ambassadors to encourage patient to order fruits and vegetables with meals. Educated on cardiac diet   Learners: Patient  Readiness: Eager  Method: Explanation and Handout  Response: Verbalizes Understanding  Contact name and number provided.     Betzaida Lagunas RD, LD  Contact Number: [phone]

## 2023-07-27 NOTE — PROGRESS NOTES
07/27/23 0253   NIV Type   $NIV $Daily Charge   NIV Started/Stopped On   Equipment Type Autopap   Mode CPAP   Mask Type Nasal pillows   Mask Size Medium   Bonnet size Medium   Assessment   Respirations 16   SpO2 98 %   Comfort Level Good   Using Accessory Muscles No   Mask Compliance Good   Skin Protection for O2 Device No

## 2023-07-28 PROCEDURE — 97535 SELF CARE MNGMENT TRAINING: CPT

## 2023-07-28 PROCEDURE — 94761 N-INVAS EAR/PLS OXIMETRY MLT: CPT

## 2023-07-28 PROCEDURE — 94660 CPAP INITIATION&MGMT: CPT

## 2023-07-28 PROCEDURE — 6370000000 HC RX 637 (ALT 250 FOR IP): Performed by: PHYSICAL MEDICINE & REHABILITATION

## 2023-07-28 PROCEDURE — 6360000002 HC RX W HCPCS: Performed by: INTERNAL MEDICINE

## 2023-07-28 PROCEDURE — 6370000000 HC RX 637 (ALT 250 FOR IP): Performed by: INTERNAL MEDICINE

## 2023-07-28 PROCEDURE — 99232 SBSQ HOSP IP/OBS MODERATE 35: CPT | Performed by: INTERNAL MEDICINE

## 2023-07-28 PROCEDURE — 1280000000 HC REHAB R&B

## 2023-07-28 PROCEDURE — 97530 THERAPEUTIC ACTIVITIES: CPT

## 2023-07-28 PROCEDURE — 94150 VITAL CAPACITY TEST: CPT

## 2023-07-28 PROCEDURE — 94640 AIRWAY INHALATION TREATMENT: CPT

## 2023-07-28 RX ADMIN — TIOTROPIUM BROMIDE AND OLODATEROL 2 PUFF: 3.124; 2.736 SPRAY, METERED RESPIRATORY (INHALATION) at 07:36

## 2023-07-28 RX ADMIN — POTASSIUM CHLORIDE 10 MEQ: 750 TABLET, EXTENDED RELEASE ORAL at 09:51

## 2023-07-28 RX ADMIN — GABAPENTIN 400 MG: 400 CAPSULE ORAL at 21:32

## 2023-07-28 RX ADMIN — MAGNESIUM OXIDE 400 MG (241.3 MG MAGNESIUM) TABLET 400 MG: TABLET at 09:47

## 2023-07-28 RX ADMIN — DILTIAZEM HYDROCHLORIDE 240 MG: 240 CAPSULE, COATED, EXTENDED RELEASE ORAL at 09:46

## 2023-07-28 RX ADMIN — ACETAMINOPHEN 650 MG: 325 TABLET ORAL at 18:42

## 2023-07-28 RX ADMIN — ENOXAPARIN SODIUM 60 MG: 100 INJECTION SUBCUTANEOUS at 21:32

## 2023-07-28 RX ADMIN — ACETAMINOPHEN 650 MG: 325 TABLET ORAL at 06:11

## 2023-07-28 RX ADMIN — METOPROLOL TARTRATE 12.5 MG: 25 TABLET, FILM COATED ORAL at 09:46

## 2023-07-28 RX ADMIN — ENOXAPARIN SODIUM 60 MG: 100 INJECTION SUBCUTANEOUS at 09:51

## 2023-07-28 RX ADMIN — LEVETIRACETAM 500 MG: 500 TABLET, FILM COATED ORAL at 21:32

## 2023-07-28 RX ADMIN — PREDNISONE 15 MG: 5 TABLET ORAL at 09:46

## 2023-07-28 RX ADMIN — ACETAMINOPHEN 650 MG: 325 TABLET ORAL at 09:47

## 2023-07-28 RX ADMIN — ASPIRIN 81 MG: 81 TABLET, CHEWABLE ORAL at 09:46

## 2023-07-28 RX ADMIN — MEMANTINE 10 MG: 10 TABLET ORAL at 09:47

## 2023-07-28 RX ADMIN — ATORVASTATIN CALCIUM 20 MG: 10 TABLET, FILM COATED ORAL at 21:32

## 2023-07-28 RX ADMIN — FOLIC ACID 500 MCG: 1 TABLET ORAL at 09:47

## 2023-07-28 RX ADMIN — METOPROLOL TARTRATE 12.5 MG: 25 TABLET, FILM COATED ORAL at 21:33

## 2023-07-28 RX ADMIN — MEMANTINE 10 MG: 10 TABLET ORAL at 21:32

## 2023-07-28 RX ADMIN — ACETAMINOPHEN 650 MG: 325 TABLET ORAL at 23:04

## 2023-07-28 RX ADMIN — TRIAMTERENE AND HYDROCHLOROTHIAZIDE 1 TABLET: 37.5; 25 TABLET ORAL at 09:46

## 2023-07-28 RX ADMIN — LEVETIRACETAM 500 MG: 500 TABLET, FILM COATED ORAL at 09:46

## 2023-07-28 RX ADMIN — CALCIUM 500 MG: 500 TABLET ORAL at 21:32

## 2023-07-28 RX ADMIN — DONEPEZIL HYDROCHLORIDE 10 MG: 10 TABLET ORAL at 09:47

## 2023-07-28 ASSESSMENT — PAIN DESCRIPTION - LOCATION: LOCATION: LEG

## 2023-07-28 ASSESSMENT — PAIN - FUNCTIONAL ASSESSMENT: PAIN_FUNCTIONAL_ASSESSMENT: PREVENTS OR INTERFERES SOME ACTIVE ACTIVITIES AND ADLS

## 2023-07-28 ASSESSMENT — PAIN SCALES - GENERAL
PAINLEVEL_OUTOF10: 5
PAINLEVEL_OUTOF10: 0

## 2023-07-28 ASSESSMENT — PAIN DESCRIPTION - DESCRIPTORS: DESCRIPTORS: ACHING

## 2023-07-28 ASSESSMENT — PAIN DESCRIPTION - ORIENTATION: ORIENTATION: LEFT

## 2023-07-28 NOTE — CARE COORDINATION
Case mgt met with patient's daughter Faisal Boone in room. Reviewed that hospital bed has rails and we're adding a protective mattress. Confirmed aparna & drop arm BSC need. They're interested in a TTB - case mgt will discuss with Moses. If appropriate for patient they'd like to use med assist.  Reviewed that patient's insurance - medicare doesn't cover incontinence supplies. Reviewed DME delivery process. Provided Faisal Boone with a copy of the patient's aenta card and recommended she call to check for an OTC benefit - details provided.

## 2023-07-28 NOTE — PROGRESS NOTES
With assistance from daughter x 's 2 total  max assist from wheel Chair to [facility]- Pt was in incont of urine.

## 2023-07-28 NOTE — CARE COORDINATION
Clance Balding requires an alternating pressure pad and pump to be used over the existing mattress of the bed due to limited mobility and she cannot independently make changes in body position significant enough to alleviate pressure. The patient has an unstageable pressure ulcer on her right buttock. Patient has fecal or urinary incontinence. The severity of these conditions supports the need for pressure reducing support surface.

## 2023-07-28 NOTE — PROGRESS NOTES
Occupational Therapy    Physical Rehabilitation: OCCUPATIONAL THERAPY     [x] daily progress note       [] discharge       Patient Name:  Hilda Menchaca   :  1940 MRN: [id number]  Room:  [address] [place] [address] Date of Admission: 2023  Rehabilitation Diagnosis:   Cerebral infarction, unspecified [I63.9]  Acute CVA (cerebrovascular accident) ([address]) [I63.9]       Date 2023       Day of ARU Week:  2   Time IN/OUT 0900/1035   Individual Tx Minutes 95   Group Tx Minutes    Co-Treat Minutes    Concurrent Tx Minutes    TOTAL Tx Time Mins 95   Variance Time +5 (communicated to Anni PT)   Variance Time []   Refusal due to:     []   Medical hold/reason:    []   Illness   []   Off Unit for test/procedure  []   Extra time needed to complete task  []   Therapeutic need  []   Other (specify):   Restrictions Restrictions/Precautions: Fall Risk, General Precautions         Communication with other providers: [x]   OK to see per nursing:     []   Spoke with team member regarding:      Subjective observations and cognitive status: Upon entrance Pt had just finished breakfast and pleasant and agreeable to therapy. OT encouraging further practice with transfers, however daughter requesting to perform bathing/dressing. These areas have been addressed in pieces, therefore daughter wants to \"put them all together\". Pain level/location:    /10       Location: Pt states no pain   Discharge recommendations  Anticipated discharge date:  23  Destination: []home alone   []home alone w assist prn   [x] home w/ family    [] Continuous supervision       []SNF    [] Assisted living     [] Other:   Continued therapy: [x]HHC OT  []OUTPATIENT  OT   [] No Further OT  Equipment needs: Hilda Menchaca requires a drop arm bedside commode due to being unable to use the toilet within the home and confined to a single room and one level of the home. This patient requires a side transfer provided by a drop arm commode.         ADLs:    UB/LB Conservation Tips  [x]   Family training - Pt's daughter engaged in CGT with bed level bathing. Caregiver with excellent carryover requiring Min verbal cues to ensure safety (I.e. placing table and basin next to her before facilitating rolling in bed that way she does not have to leave the Pt on her side to get needed items). This OT also educated Pt's daughter on proper positioning of herself as well as the Pt during bathing activities to increase safety. []   Postural Awareness  [x]   Safety During Functional Activities  []   Reinforced Patient's Precautions   []   Progress was updated and reviewed in Rehabtracker with patient and/or family this         date. Treatment Plan for Next Session: Continue OT POC      Assessment: This pt demonstrated a positive response to today's treatment as evidenced by excellent caregiver ADL carryover. The patient is making good progress toward established goals as evidenced by QI scores. Ongoing deficits are observed in the areas of  functional transfers/balance/mobility, strength, ROM, coordination, cognition, endurance, and ADLs and continued focus on this is recommended. Treatment/Activity Tolerance:   [x] Tolerated treatment with no adverse effects    [] Patient limited by fatigue  [] Patient limited by pain   [] Patient limited by medical complications:    [] Adverse reaction to Tx:   [] Significant change in status    Safety:       [x]  bed alarm set    []  chair alarm set    []  Pt refused alarms                []  Telesitter activated      []  Gait belt used during tx session      []other:       Number of Minutes/Billable Intervention  Therapeutic Exercise    ADL Self-care 95   Neuro Re-Ed    Therapeutic Activity    Group    Other:    TOTAL 95       Social History  Social/Functional History  Lives With: Son (Pt lives with her son who is nonverbal (she was his primary caregiver, but he does have an aide as well).  Pt's daughter has been staying the night with Pt

## 2023-07-28 NOTE — PLAN OF CARE
Problem: Discharge Planning  Goal: Discharge to home or other facility with appropriate resources  7/28/2023 1029 by Socrates Chaney LPN  Outcome: Progressing  7/28/2023 0006 by Bruce Roca RN  Outcome: Progressing     Problem: Safety - Adult  Goal: Free from fall injury  7/28/2023 0006 by Bruce Roca RN  Outcome: Progressing     Problem: Skin/Tissue Integrity  Goal: Absence of new skin breakdown  Description: 1. Monitor for areas of redness and/or skin breakdown  2. Assess vascular access sites hourly  3. Every 4-6 hours minimum:  Change oxygen saturation probe site  4. Every 4-6 hours:  If on nasal continuous positive airway pressure, respiratory therapy assess nares and determine need for appliance change or resting period.   7/28/2023 0006 by Bruce Roca RN  Outcome: Progressing     Problem: ABCDS Injury Assessment  Goal: Absence of physical injury  7/28/2023 0006 by Bruce Roca RN  Outcome: Progressing

## 2023-07-28 NOTE — CARE COORDINATION
Provided patient's daughter with the manual for the Hospital Bed that will be ordered for the patient at discharge.

## 2023-07-28 NOTE — PLAN OF CARE
Problem: Discharge Planning  Goal: Discharge to home or other facility with appropriate resources  Outcome: Progressing     Problem: Safety - Adult  Goal: Free from fall injury  7/28/2023 0006 by Juana Massey RN  Outcome: Progressing  7/27/2023 1029 by Juan Diego Burnett RN  Outcome: Progressing     Problem: Skin/Tissue Integrity  Goal: Absence of new skin breakdown  Description: 1. Monitor for areas of redness and/or skin breakdown  2. Assess vascular access sites hourly  3. Every 4-6 hours minimum:  Change oxygen saturation probe site  4. Every 4-6 hours:  If on nasal continuous positive airway pressure, respiratory therapy assess nares and determine need for appliance change or resting period.   7/28/2023 0006 by Juana Massey RN  Outcome: Progressing  7/27/2023 1029 by Juan Diego Burnett RN  Outcome: Progressing     Problem: ABCDS Injury Assessment  Goal: Absence of physical injury  Outcome: Progressing

## 2023-07-28 NOTE — PROGRESS NOTES
Physical Therapy    [x] daily progress note       [] discharge       Patient Name:  Robbin Choudhury   :  1940 MRN: [id number]  Room:  [address] [place] [address] Date of Admission: 2023  Rehabilitation Diagnosis:   Cerebral infarction, unspecified [I63.9]  Acute CVA (cerebrovascular accident) ([address]) [I63.9]       Date 2023       Day of ARU Week:  2   Time IN/OUT 1304/1400  1435/1512   Individual Tx Minutes 56+37   Group Tx Minutes    Co-Treat Minutes    Concurrent Tx Minutes    TOTAL Tx Time Mins 93   Variance Time +3   Variance Time []   Refusal due to:     []   Medical hold/reason:    []   Illness   []   Off Unit for test/procedure  [x]   Extra time needed to complete tasks and caregiver/staff education to   []   Therapeutic need  []   Other (specify):   Restrictions Restrictions/Precautions  Restrictions/Precautions: Fall Risk, General Precautions  Position Activity Restriction  Other position/activity restrictions: IV access LUE, L hemiparesis   Interdisciplinary communication [x]   Cleared for therapy per nursing     []   RN notified about issues during session  []   RN updated on pt performance  []   Spoke with   []   Spoke with OT  []   Spoke with MD  [x]   Other: encouraged PCT to observe transfers training with pt's daughter to safely assist pt and caregiver    Subjective observations and cognitive status: (PM1) Pt resting in bed and daughter confirms that pt has not eaten lunch yet as she was asleep. Daughter present for continued caregiver education/training.   (PM2) Pt resting in recliner and ate lunch. Daughter present for continued caregiver education/training.     Pain level/location: None reported at rest in bed and in recliner     Discharge recommendations  Anticipated discharge date:  2023  Destination: []home alone   []home alone with assist PRN     [x] home w/ family      [] Continuous supervision  []SNF    [] Assisted living     [] Other:  Continued therapy: [x]C PT lives with her son who is nonverbal (she was his primary caregiver, but he does have an aide as well). Pt's daughter has been staying the night with Pt every night since before April 2023.)  Type of Home: House  Home Layout: One level  Home Access: Stairs to enter without rails  Entrance Stairs - Number of Steps: 1  Bathroom Shower/Tub: Tub/Shower unit  Bathroom Toilet: Standard  Bathroom Equipment: Grab bars in shower, 3-in-1 commode, Tub transfer bench, Toilet raiser (Pt has been loaned a TTB from senior services.)  Bathroom Accessibility: [facility] accessible  Home Equipment: Erminio Armando, [address], [address], Walker, rolling  Has the patient had two or more falls in the past year or any fall with injury in the past year?: Yes (> 10 falls in last year)  Receives Help From: Family  ADL Assistance: Needs assistance  Homemaking Assistance: Needs assistance  Meal Prep Responsibility: No  Laundry Responsibility: No  Cleaning Responsibility: No  Bill Paying/Finance Responsibility: No  Shopping Responsibility: No  Dependent Care Responsibility: No  Health Care Management: No  Ambulation Assistance: Needs assistance (Pt was CGA with 2WW, however has been primarily using w/c for ~ the last month.)  Transfer Assistance: Needs assistance  Active : No  Patient's  Info: [place]  Mode of Transportation: Car  Education: Did not graduate HS--Grew up in [place]  Occupation: Retired  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 Pt dtr manages groceries. Occasional outings to Religious. Lots of surgeries with knee replacements and limited outings. Additional Comments: Pt sleeps in regular flat bed. Pt is R hand dominant. Objective                                                                                    Goals:  (Update in navigator)   :   Long Term Goals  Time Frame for Long Term Goals : 10 tx days:  Long Term Goal 1: Pt will complete rolling R/L

## 2023-07-28 NOTE — PROGRESS NOTES
Hematology/Oncology   Progress Note    Patient Name:  Real Chowdary  Patient :  1940  Patient MRN:  [id number]     Primary Oncologist: Kashif Edwards MD  PCP: Ronald Pa     Date of Service: 2023     HPI:   Real Chowdary is a pleasant 82 y.o. female with a history of anal cancer s/p chemoradiation  (2017) with SHELL who initially presented with left upper and lower extremity weakness secondary to subacute CVA and was subsequently diagnosed with non-occlusive L popliteal DVT. We are consulted for further recommendation on management. Pt was seen and examined with daughter at bedside. She has c/o pain behind L knee for several days. She is currently tolerating treatment dose lovenox with no s/s of bleeding. She has no personal or family history of VTE or arterial thrombosis. Recent imaging confirmed no evidence of malignancy recurrence. Her L sided weakness persists. Family is concerned that she is regressing in rehab instead of progressing and that her functional status was better on admission than now. She is c/o SOB which is somewhat improved with inhaled treatment. VQ scan was low risk. Does note tachycardia and hand tremor after albuterol, advised that this is an expected side effect. Daughter is concerned about her being on a blood thinner due to bleeding risk. Currently no evidence of bleeding, no recent bleeding events. There is a concern for fall risk however she has good social support and no past history of fall. Discussed risk vs benefit of AC in distal DVT. Discussed recommendation for treatment for 3 months only without loading doses. They are agreeable. 2023 VL Nonocclusive DVT within the left popliteal vein. No evidence of right lower extremity DVT. 2023 VQ scan low prob for PE.    2023 she is resting, no acute distress. No acute events overnight. Awake and alert. No distress. On LMWH as inpatient.   Daughter expressed concern about risk of fall

## 2023-07-29 PROCEDURE — 94664 DEMO&/EVAL PT USE INHALER: CPT

## 2023-07-29 PROCEDURE — 6370000000 HC RX 637 (ALT 250 FOR IP): Performed by: INTERNAL MEDICINE

## 2023-07-29 PROCEDURE — 94150 VITAL CAPACITY TEST: CPT

## 2023-07-29 PROCEDURE — 94640 AIRWAY INHALATION TREATMENT: CPT

## 2023-07-29 PROCEDURE — 94761 N-INVAS EAR/PLS OXIMETRY MLT: CPT

## 2023-07-29 PROCEDURE — 1280000000 HC REHAB R&B

## 2023-07-29 PROCEDURE — 6360000002 HC RX W HCPCS: Performed by: INTERNAL MEDICINE

## 2023-07-29 PROCEDURE — 6370000000 HC RX 637 (ALT 250 FOR IP): Performed by: PHYSICAL MEDICINE & REHABILITATION

## 2023-07-29 RX ADMIN — PREDNISONE 15 MG: 5 TABLET ORAL at 09:54

## 2023-07-29 RX ADMIN — FOLIC ACID 500 MCG: 1 TABLET ORAL at 09:55

## 2023-07-29 RX ADMIN — GABAPENTIN 400 MG: 400 CAPSULE ORAL at 21:01

## 2023-07-29 RX ADMIN — ASPIRIN 81 MG: 81 TABLET, CHEWABLE ORAL at 09:55

## 2023-07-29 RX ADMIN — ATORVASTATIN CALCIUM 20 MG: 10 TABLET, FILM COATED ORAL at 21:01

## 2023-07-29 RX ADMIN — ENOXAPARIN SODIUM 60 MG: 100 INJECTION SUBCUTANEOUS at 09:53

## 2023-07-29 RX ADMIN — POTASSIUM CHLORIDE 10 MEQ: 750 TABLET, EXTENDED RELEASE ORAL at 09:54

## 2023-07-29 RX ADMIN — LEVETIRACETAM 500 MG: 500 TABLET, FILM COATED ORAL at 09:54

## 2023-07-29 RX ADMIN — ENOXAPARIN SODIUM 60 MG: 100 INJECTION SUBCUTANEOUS at 21:01

## 2023-07-29 RX ADMIN — COLLAGENASE SANTYL: 250 OINTMENT TOPICAL at 09:56

## 2023-07-29 RX ADMIN — ALBUTEROL SULFATE 2 PUFF: 90 AEROSOL, METERED RESPIRATORY (INHALATION) at 19:37

## 2023-07-29 RX ADMIN — CALCIUM 500 MG: 500 TABLET ORAL at 21:01

## 2023-07-29 RX ADMIN — ALBUTEROL SULFATE 2 PUFF: 90 AEROSOL, METERED RESPIRATORY (INHALATION) at 07:58

## 2023-07-29 RX ADMIN — MEMANTINE 10 MG: 10 TABLET ORAL at 21:02

## 2023-07-29 RX ADMIN — MAGNESIUM OXIDE 400 MG (241.3 MG MAGNESIUM) TABLET 400 MG: TABLET at 09:54

## 2023-07-29 RX ADMIN — ACETAMINOPHEN 650 MG: 325 TABLET ORAL at 06:35

## 2023-07-29 RX ADMIN — DONEPEZIL HYDROCHLORIDE 10 MG: 10 TABLET ORAL at 09:58

## 2023-07-29 RX ADMIN — LEVETIRACETAM 500 MG: 500 TABLET, FILM COATED ORAL at 21:02

## 2023-07-29 RX ADMIN — ACETAMINOPHEN 650 MG: 325 TABLET ORAL at 16:54

## 2023-07-29 RX ADMIN — ACETAMINOPHEN 650 MG: 325 TABLET ORAL at 21:02

## 2023-07-29 RX ADMIN — TRIAMTERENE AND HYDROCHLOROTHIAZIDE 1 TABLET: 37.5; 25 TABLET ORAL at 09:54

## 2023-07-29 RX ADMIN — TIOTROPIUM BROMIDE AND OLODATEROL 2 PUFF: 3.124; 2.736 SPRAY, METERED RESPIRATORY (INHALATION) at 07:59

## 2023-07-29 RX ADMIN — METOPROLOL TARTRATE 12.5 MG: 25 TABLET, FILM COATED ORAL at 09:54

## 2023-07-29 RX ADMIN — METOPROLOL TARTRATE 12.5 MG: 25 TABLET, FILM COATED ORAL at 21:02

## 2023-07-29 RX ADMIN — MEMANTINE 10 MG: 10 TABLET ORAL at 09:54

## 2023-07-29 RX ADMIN — DILTIAZEM HYDROCHLORIDE 240 MG: 240 CAPSULE, COATED, EXTENDED RELEASE ORAL at 09:54

## 2023-07-29 RX ADMIN — ACETAMINOPHEN 650 MG: 325 TABLET ORAL at 12:02

## 2023-07-29 ASSESSMENT — PAIN SCALES - GENERAL
PAINLEVEL_OUTOF10: 0

## 2023-07-29 NOTE — PROGRESS NOTES
Pulmonary and Critical Care  Progress Note    Subjective: The patient is comfortable in bed. On RA. Shortness of breath has improved. Chest pain none  Addressing respiratory complaints Patient is negative for  hemoptysis and cyanosis  CONSTITUTIONAL:  negative for fevers and chills      Past Medical History:     has a past medical history of Acid reflux, Anxiety, Bronchitis, Cancer ([address]), Carotid stenosis, Cerebral embolism with cerebral infarction ([address]), Chronic back pain, Depression, History of blood transfusion, History of external beam radiation therapy, Hyperlipidemia, Hypertension, Prolonged emergence from general anesthesia, RA (rheumatoid arthritis) ([address]), Seizure ([address]), Sleep apnea, Teeth missing, Urinary incontinence, UTI (urinary tract infection), Wears dentures, Wears glasses, and Wears partial dentures. has a past surgical history that includes Carpal tunnel release; Dental surgery; Colonoscopy (In Past); Hysterectomy, vaginal (1960's); Dilation and curettage of uterus (1960's); Carotid endarterectomy (Right, 11/18/2015); [address] Surgery (N/A, 03/02/2021); ARTHROCENTESIS / ASPIRATION / INJECTION KNEE (05/11/2021); and joint replacement. reports that she has never smoked. She has never used smokeless tobacco. She reports that she does not drink alcohol and does not use drugs. Family history:  family history includes Arthritis in her mother and sister; Depression in her son; Diabetes in her mother and sister; Hearing Loss in her brother; Heart Disease in her brother, mother, and sister; High Blood Pressure in her brother, brother, mother, sister, son, and son; High Cholesterol in her mother; Learning Disabilities in her son; Mental Illness in her sister and son; Gar [place] / [place] in her mother; Other in her father, sister, son, son, and son; Stroke in her mother and sister.     Allergies   Allergen Reactions    Cortisone Rash    Codeine      Unsure Of Reaction    Pcn [Penicillins]

## 2023-07-29 NOTE — PROGRESS NOTES
Rehab Therapy Aide Weekend Update    7/29/2023  [place]    Pt was mobilized this weekend under the guidance of PT/OT. Time In: 2:25  Time Out: 3:00  Restrictions: Position Activity Restriction  Other position/activity restrictions: IV access LUE, L hemiparesis  [x] Ok to see per NSG this date. [x]  Gait belt used during activity and non-slip footwear        Pt was received [x] Out of bed  [] From Chair  Patient complaints: [x] No Complaints   [] Pain: (Location and level)   [] Nursing was advised  [] Nursing provided pain meds  Mobilization: [] Wheelchair level  [] Rolling Walker  [] 4 Wheeled walker   [] Cane   [] No Device     Activity Completed: Two-person squat-pivot from bedside to recliner, assisting patient's daughter. Patient Comments: Patient seemed very fatigued; didn't say much but did smile when transfer was completed    [] Pt taken to the toilet. [] Pt assisted with hygiene     [] Pt assisted with clothing    Activity Tolerance:   [] Tolerated activity with no adverse effects    [x] Patient limited by fatigue  [] Patient limited by pain (location)   [] Patient limited by medical complications: Dizziness, SOB, Anxiety, Other (specify)        []  Nursing was notified and activity was stopped. [] Adverse reaction to activity:   [] Significant change in status:    Safety upon return to room:    []  bed alarm set    [x]  chair alarm set (daughter in room)    []  Telesitter activated     [] Call light within reach     []Assisted in setting pt up with meal tray. Communication with Nursing or therapy:  [facility] and daughter will need an assist when returned to bed.     [place], [place] Therapy Aide  3:05 PM 7/29/2023

## 2023-07-29 NOTE — PLAN OF CARE
Problem: Discharge Planning  Goal: Discharge to home or other facility with appropriate resources  7/28/2023 2157 by Sara Nick RN  Outcome: Progressing  7/28/2023 1029 by Aki Sanz LPN  Outcome: Progressing     Problem: Safety - Adult  Goal: Free from fall injury  Outcome: Progressing     Problem: Skin/Tissue Integrity  Goal: Absence of new skin breakdown  Description: 1. Monitor for areas of redness and/or skin breakdown  2. Assess vascular access sites hourly  3. Every 4-6 hours minimum:  Change oxygen saturation probe site  4. Every 4-6 hours:  If on nasal continuous positive airway pressure, respiratory therapy assess nares and determine need for appliance change or resting period.   Outcome: Progressing     Problem: ABCDS Injury Assessment  Goal: Absence of physical injury  Outcome: Progressing     Problem: Pain  Goal: Verbalizes/displays adequate comfort level or baseline comfort level  Outcome: Progressing     Problem: Chronic Conditions and Co-morbidities  Goal: Patient's chronic conditions and co-morbidity symptoms are monitored and maintained or improved  Outcome: Progressing     Problem: Nutrition Deficit:  Goal: Optimize nutritional status  Outcome: Progressing

## 2023-07-29 NOTE — PROGRESS NOTES
Debbie Rocha    : 1940  Acct #: [de-identified]  MRN: [id number]              PM&R Progress Note      Admitting diagnosis: ***    Comorbid diagnoses impacting rehabilitation: ***    Chief complaint: ***    Prior (baseline) level of function: [place]. Current level of function:         Current  IRF-ORI and Goals:   Occupational Therapy:    Short Term Goals  Time Frame for Short Term Goals: STGs=LTGs :   Long Term Goals  Time Frame for Long Term Goals : 12-14 days or until d/c. Long Term Goal 1: Pt will complete self-feeding with setup assist.  Long Term Goal 2: Pt will complete oral hygiene and grooming tasks with setup assist.  Long Term Goal 3: Pt will complete total body bathing with AE PRN and Mod A. Long Term Goal 4: Pt will complete UB dressing with Min A. Long Term Goal 5: Pt will complete LB dressing with AE PRN and Mod A. Additional Goals?: Yes  Long Term Goal 6: Pt will doff/don footwear with AE PRN and Min A. Long Term Goal 7: Pt will complete toileting with Mod A. Long Term Goal 8: Pt will complete functional transfers (bed, chair, shower, toilet) with DME PRN and Mod A. Long Term Goal 9: Pt will perform therex/therax to facilitate an increase in strength/endurance with emphasis on dynamic sitting balance/tolerance > 15 mins with SBA. :                                       Eating: Eating  Assistance Needed: Setup or clean-up assistance  Comment: Setup assist to eat Armenian toast, eggs, and cream of wheat as well as to drink OJ. Pt was setup with built up utensils as well as Dycem underneath plate and bowl. Pt able to incorporate L hand during meal including using hand to ensure food gets completely in the mouth, to tilt the bowl, and to bring cup to mouth. CARE Score: 5  Discharge Goal: Set-up or clean-up assistance       Oral Hygiene: Oral Hygiene  Assistance Needed: Partial/moderate assistance  Comment: Min A to keep cup in hand when rinsing mouth out c mouth wash.  Pt able to rinse took the role of providing main/power lift today using special transfer belt under PT's supervision following proper body mechanics and stabilization of pt's LLE); caregiver educated on instructing 2nd person to assist at home on technique to stabilize [facility] against bed during transfer process; caregiver educated on recommended positioning of BSC prior to transfer and succeeding sequential cues to safely complete transfer  Reason if not Attempted: Not attempted due to medical condition or safety concerns  CARE Score: 1  Discharge Goal: Partial/moderate assistance    Physical Therapy:         Long Term Goals  Time Frame for Long Term Goals : 10 tx days:  Long Term Goal 1: Pt will complete rolling R/L with bed rail with SBA-Sup. Long Term Goal 2: Pt will complete scooting and sup<->sit using bed features with Mod A. Long Term Goal 3: Pt will complete squat pivot transfers with Mod A. Long Term Goal 4: Pt will complete sit<->stand and stand turn transfers with Max A  Long Term Goal 5: Pt will propel manual w/c 50 ft with turns over level surface with Min A.      Bed Mobility:   Sit to Lying  Assistance Needed: Dependent  Comment: required lifting of BLE and lowering of upper body to bed; required 2-person assist to scoot to [facility] to achieve appropriate body positioning in bed  CARE Score: 1  Discharge Goal: Partial/moderate assistance  Roll Left and Right  Assistance Needed: Substantial/maximal assistance  Comment: Mod A to roll to L with bed rail; remains to be Max A to roll to R using R hand on bed rail  CARE Score: 2  Discharge Goal: Supervision or touching assistance  Lying to Sitting on Side of Bed  Assistance Needed: Partial/moderate assistance  Comment:  Mod A with use of bed features and leg  to assist LLE; required sequential cues throughout and extra time and effort to complete this transfer; pt required constant holding of trunk as pt scooted hips forward and complete upright sitting at EOB with LUE

## 2023-07-30 VITALS
OXYGEN SATURATION: 94 % | HEART RATE: 110 BPM | HEIGHT: 64 IN | SYSTOLIC BLOOD PRESSURE: 138 MMHG | TEMPERATURE: 97.7 F | RESPIRATION RATE: 14 BRPM | DIASTOLIC BLOOD PRESSURE: 82 MMHG | WEIGHT: 157.41 LBS | BODY MASS INDEX: 26.87 KG/M2

## 2023-07-30 PROCEDURE — 6360000002 HC RX W HCPCS: Performed by: INTERNAL MEDICINE

## 2023-07-30 PROCEDURE — 6370000000 HC RX 637 (ALT 250 FOR IP): Performed by: PHYSICAL MEDICINE & REHABILITATION

## 2023-07-30 PROCEDURE — 6370000000 HC RX 637 (ALT 250 FOR IP): Performed by: INTERNAL MEDICINE

## 2023-07-30 PROCEDURE — 94660 CPAP INITIATION&MGMT: CPT

## 2023-07-30 PROCEDURE — 94150 VITAL CAPACITY TEST: CPT

## 2023-07-30 PROCEDURE — 94640 AIRWAY INHALATION TREATMENT: CPT

## 2023-07-30 PROCEDURE — 1280000000 HC REHAB R&B

## 2023-07-30 RX ADMIN — TIOTROPIUM BROMIDE AND OLODATEROL 2 PUFF: 3.124; 2.736 SPRAY, METERED RESPIRATORY (INHALATION) at 08:27

## 2023-07-30 RX ADMIN — ACETAMINOPHEN 650 MG: 325 TABLET ORAL at 22:00

## 2023-07-30 RX ADMIN — PREDNISONE 15 MG: 5 TABLET ORAL at 07:59

## 2023-07-30 RX ADMIN — CALCIUM 500 MG: 500 TABLET ORAL at 22:00

## 2023-07-30 RX ADMIN — METOPROLOL TARTRATE 12.5 MG: 25 TABLET, FILM COATED ORAL at 22:01

## 2023-07-30 RX ADMIN — ALBUTEROL SULFATE 2 PUFF: 90 AEROSOL, METERED RESPIRATORY (INHALATION) at 16:22

## 2023-07-30 RX ADMIN — METOPROLOL TARTRATE 12.5 MG: 25 TABLET, FILM COATED ORAL at 07:59

## 2023-07-30 RX ADMIN — FOLIC ACID 500 MCG: 1 TABLET ORAL at 07:59

## 2023-07-30 RX ADMIN — ACETAMINOPHEN 650 MG: 325 TABLET ORAL at 12:55

## 2023-07-30 RX ADMIN — DONEPEZIL HYDROCHLORIDE 10 MG: 10 TABLET ORAL at 07:59

## 2023-07-30 RX ADMIN — ENOXAPARIN SODIUM 60 MG: 100 INJECTION SUBCUTANEOUS at 22:01

## 2023-07-30 RX ADMIN — LEVETIRACETAM 500 MG: 500 TABLET, FILM COATED ORAL at 22:00

## 2023-07-30 RX ADMIN — ASPIRIN 81 MG: 81 TABLET, CHEWABLE ORAL at 07:59

## 2023-07-30 RX ADMIN — DILTIAZEM HYDROCHLORIDE 240 MG: 240 CAPSULE, COATED, EXTENDED RELEASE ORAL at 07:59

## 2023-07-30 RX ADMIN — ATORVASTATIN CALCIUM 20 MG: 10 TABLET, FILM COATED ORAL at 22:01

## 2023-07-30 RX ADMIN — MEMANTINE 10 MG: 10 TABLET ORAL at 22:00

## 2023-07-30 RX ADMIN — GABAPENTIN 400 MG: 400 CAPSULE ORAL at 22:00

## 2023-07-30 RX ADMIN — TRIAMTERENE AND HYDROCHLOROTHIAZIDE 1 TABLET: 37.5; 25 TABLET ORAL at 07:59

## 2023-07-30 RX ADMIN — POTASSIUM CHLORIDE 10 MEQ: 750 TABLET, EXTENDED RELEASE ORAL at 07:59

## 2023-07-30 RX ADMIN — ENOXAPARIN SODIUM 60 MG: 100 INJECTION SUBCUTANEOUS at 07:58

## 2023-07-30 RX ADMIN — LEVETIRACETAM 500 MG: 500 TABLET, FILM COATED ORAL at 08:05

## 2023-07-30 RX ADMIN — MEMANTINE 10 MG: 10 TABLET ORAL at 07:59

## 2023-07-30 RX ADMIN — ACETAMINOPHEN 650 MG: 325 TABLET ORAL at 05:39

## 2023-07-30 RX ADMIN — MAGNESIUM OXIDE 400 MG (241.3 MG MAGNESIUM) TABLET 400 MG: TABLET at 07:59

## 2023-07-30 RX ADMIN — COLLAGENASE SANTYL: 250 OINTMENT TOPICAL at 08:06

## 2023-07-30 RX ADMIN — ACETAMINOPHEN 650 MG: 325 TABLET ORAL at 17:27

## 2023-07-30 ASSESSMENT — PAIN DESCRIPTION - ORIENTATION: ORIENTATION: RIGHT;LEFT

## 2023-07-30 ASSESSMENT — PAIN - FUNCTIONAL ASSESSMENT: PAIN_FUNCTIONAL_ASSESSMENT: PREVENTS OR INTERFERES SOME ACTIVE ACTIVITIES AND ADLS

## 2023-07-30 ASSESSMENT — PAIN DESCRIPTION - LOCATION: LOCATION: LEG

## 2023-07-30 ASSESSMENT — PAIN DESCRIPTION - DESCRIPTORS: DESCRIPTORS: ACHING

## 2023-07-30 ASSESSMENT — PAIN SCALES - GENERAL
PAINLEVEL_OUTOF10: 3
PAINLEVEL_OUTOF10: 0

## 2023-07-30 NOTE — PROGRESS NOTES
Pulmonary and Critical Care  Progress Note    Subjective: The patient is feeling better. On RA. Shortness of breath better. Chest pain none. Addressing respiratory complaints Patient is negative for  hemoptysis and cyanosis  CONSTITUTIONAL:  negative for fevers and chills      Past Medical History:     has a past medical history of Acid reflux, Anxiety, Bronchitis, Cancer ([address]), Carotid stenosis, Cerebral embolism with cerebral infarction ([address]), Chronic back pain, Depression, History of blood transfusion, History of external beam radiation therapy, Hyperlipidemia, Hypertension, Prolonged emergence from general anesthesia, RA (rheumatoid arthritis) ([address]), Seizure ([address]), Sleep apnea, Teeth missing, Urinary incontinence, UTI (urinary tract infection), Wears dentures, Wears glasses, and Wears partial dentures. has a past surgical history that includes Carpal tunnel release; Dental surgery; Colonoscopy (In Past); Hysterectomy, vaginal (1960's); Dilation and curettage of uterus (1960's); Carotid endarterectomy (Right, 11/18/2015); [facility] Surgery (N/A, 03/02/2021); ARTHROCENTESIS / ASPIRATION / INJECTION KNEE (05/11/2021); and joint replacement. reports that she has never smoked. She has never used smokeless tobacco. She reports that she does not drink alcohol and does not use drugs. Family history:  family history includes Arthritis in her mother and sister; Depression in her son; Diabetes in her mother and sister; Hearing Loss in her brother; Heart Disease in her brother, mother, and sister; High Blood Pressure in her brother, brother, mother, sister, son, and son; High Cholesterol in her mother; Learning Disabilities in her son; Mental Illness in her sister and son; Cloteal Bruins / [place] in her mother; Other in her father, sister, son, son, and son; Stroke in her mother and sister.     Allergies   Allergen Reactions    Cortisone Rash    Codeine      Unsure Of Reaction    Pcn [Penicillins]      Unknown

## 2023-07-30 NOTE — PLAN OF CARE
Problem: Discharge Planning  Goal: Discharge to home or other facility with appropriate resources  7/30/2023 0922 by Lubna Munoz LPN  Outcome: Progressing  7/29/2023 2209 by Dilshad Sharma RN  Outcome: Progressing     Problem: Safety - Adult  Goal: Free from fall injury  7/30/2023 0922 by Lubna Munoz LPN  Outcome: Progressing  7/29/2023 2209 by Dilshad Sharma RN  Outcome: Progressing     Problem: Skin/Tissue Integrity  Goal: Absence of new skin breakdown  Description: 1. Monitor for areas of redness and/or skin breakdown  2. Assess vascular access sites hourly  3. Every 4-6 hours minimum:  Change oxygen saturation probe site  4. Every 4-6 hours:  If on nasal continuous positive airway pressure, respiratory therapy assess nares and determine need for appliance change or resting period.   7/30/2023 5698 by Lubna Munoz LPN  Outcome: Progressing  7/29/2023 2209 by Dilshad Sharma RN  Outcome: Progressing     Problem: ABCDS Injury Assessment  Goal: Absence of physical injury  7/30/2023 3113 by Lubna Munoz LPN  Outcome: Progressing  7/29/2023 2209 by Dilshad Sharma RN  Outcome: Progressing     Problem: Pain  Goal: Verbalizes/displays adequate comfort level or baseline comfort level  7/30/2023 0922 by Lubna Munoz LPN  Outcome: Progressing  7/29/2023 2209 by Dilshad Sharma RN  Outcome: Progressing     Problem: Chronic Conditions and Co-morbidities  Goal: Patient's chronic conditions and co-morbidity symptoms are monitored and maintained or improved  7/30/2023 0922 by Lubna Munoz LPN  Outcome: Progressing  7/29/2023 2209 by Dilshad Sharma RN  Outcome: Progressing     Problem: Nutrition Deficit:  Goal: Optimize nutritional status  7/30/2023 0922 by Lubna Munoz LPN  Outcome: Progressing  7/29/2023 2209 by Dilshad Sharma RN  Outcome: Progressing

## 2023-07-30 NOTE — PLAN OF CARE
Problem: Discharge Planning  Goal: Discharge to home or other facility with appropriate resources  Outcome: Progressing     Problem: Safety - Adult  Goal: Free from fall injury  Outcome: Progressing     Problem: Skin/Tissue Integrity  Goal: Absence of new skin breakdown  Description: 1. Monitor for areas of redness and/or skin breakdown  2. Assess vascular access sites hourly  3. Every 4-6 hours minimum:  Change oxygen saturation probe site  4. Every 4-6 hours:  If on nasal continuous positive airway pressure, respiratory therapy assess nares and determine need for appliance change or resting period.   Outcome: Progressing     Problem: ABCDS Injury Assessment  Goal: Absence of physical injury  Outcome: Progressing     Problem: Pain  Goal: Verbalizes/displays adequate comfort level or baseline comfort level  Outcome: Progressing     Problem: Chronic Conditions and Co-morbidities  Goal: Patient's chronic conditions and co-morbidity symptoms are monitored and maintained or improved  Outcome: Progressing     Problem: Nutrition Deficit:  Goal: Optimize nutritional status  Outcome: Progressing

## 2023-07-30 NOTE — PROGRESS NOTES
07/29/23 2215   NIV Type   NIV Started/Stopped On   Mode Auto-PAP  (pt placed on autopap at this time)   Mask Type Nasal pillows

## 2023-07-31 PROCEDURE — 6370000000 HC RX 637 (ALT 250 FOR IP): Performed by: INTERNAL MEDICINE

## 2023-07-31 PROCEDURE — 94761 N-INVAS EAR/PLS OXIMETRY MLT: CPT

## 2023-07-31 PROCEDURE — 97110 THERAPEUTIC EXERCISES: CPT

## 2023-07-31 PROCEDURE — 1280000000 HC REHAB R&B

## 2023-07-31 PROCEDURE — 97535 SELF CARE MNGMENT TRAINING: CPT

## 2023-07-31 PROCEDURE — 99231 SBSQ HOSP IP/OBS SF/LOW 25: CPT | Performed by: INTERNAL MEDICINE

## 2023-07-31 PROCEDURE — 6360000002 HC RX W HCPCS: Performed by: INTERNAL MEDICINE

## 2023-07-31 PROCEDURE — 97530 THERAPEUTIC ACTIVITIES: CPT

## 2023-07-31 PROCEDURE — 6370000000 HC RX 637 (ALT 250 FOR IP): Performed by: PHYSICAL MEDICINE & REHABILITATION

## 2023-07-31 PROCEDURE — 94640 AIRWAY INHALATION TREATMENT: CPT

## 2023-07-31 PROCEDURE — 6360000002 HC RX W HCPCS: Performed by: PHYSICAL MEDICINE & REHABILITATION

## 2023-07-31 RX ADMIN — COLLAGENASE SANTYL: 250 OINTMENT TOPICAL at 12:25

## 2023-07-31 RX ADMIN — LEVETIRACETAM 500 MG: 500 TABLET, FILM COATED ORAL at 21:00

## 2023-07-31 RX ADMIN — ENOXAPARIN SODIUM 60 MG: 100 INJECTION SUBCUTANEOUS at 09:50

## 2023-07-31 RX ADMIN — POTASSIUM CHLORIDE 10 MEQ: 750 TABLET, EXTENDED RELEASE ORAL at 09:50

## 2023-07-31 RX ADMIN — METHOTREXATE 15 MG: 2.5 TABLET ORAL at 09:49

## 2023-07-31 RX ADMIN — ACETAMINOPHEN 650 MG: 325 TABLET ORAL at 21:00

## 2023-07-31 RX ADMIN — LEVETIRACETAM 500 MG: 500 TABLET, FILM COATED ORAL at 09:48

## 2023-07-31 RX ADMIN — PREDNISONE 15 MG: 5 TABLET ORAL at 09:48

## 2023-07-31 RX ADMIN — ATORVASTATIN CALCIUM 20 MG: 10 TABLET, FILM COATED ORAL at 21:00

## 2023-07-31 RX ADMIN — ACETAMINOPHEN 650 MG: 325 TABLET ORAL at 12:25

## 2023-07-31 RX ADMIN — FOLIC ACID 500 MCG: 1 TABLET ORAL at 09:48

## 2023-07-31 RX ADMIN — MEMANTINE 10 MG: 10 TABLET ORAL at 21:00

## 2023-07-31 RX ADMIN — ENOXAPARIN SODIUM 60 MG: 100 INJECTION SUBCUTANEOUS at 21:02

## 2023-07-31 RX ADMIN — ACETAMINOPHEN 650 MG: 325 TABLET ORAL at 06:17

## 2023-07-31 RX ADMIN — TRIAMTERENE AND HYDROCHLOROTHIAZIDE 1 TABLET: 37.5; 25 TABLET ORAL at 09:48

## 2023-07-31 RX ADMIN — IPRATROPIUM BROMIDE AND ALBUTEROL SULFATE 1 DOSE: .5; 3 SOLUTION RESPIRATORY (INHALATION) at 15:27

## 2023-07-31 RX ADMIN — CALCIUM 500 MG: 500 TABLET ORAL at 21:00

## 2023-07-31 RX ADMIN — TIOTROPIUM BROMIDE AND OLODATEROL 2 PUFF: 3.124; 2.736 SPRAY, METERED RESPIRATORY (INHALATION) at 08:48

## 2023-07-31 RX ADMIN — DILTIAZEM HYDROCHLORIDE 240 MG: 240 CAPSULE, COATED, EXTENDED RELEASE ORAL at 09:48

## 2023-07-31 RX ADMIN — METOPROLOL TARTRATE 12.5 MG: 25 TABLET, FILM COATED ORAL at 09:49

## 2023-07-31 RX ADMIN — MAGNESIUM OXIDE 400 MG (241.3 MG MAGNESIUM) TABLET 400 MG: TABLET at 09:48

## 2023-07-31 RX ADMIN — GABAPENTIN 400 MG: 400 CAPSULE ORAL at 20:59

## 2023-07-31 RX ADMIN — METOPROLOL TARTRATE 12.5 MG: 25 TABLET, FILM COATED ORAL at 20:59

## 2023-07-31 RX ADMIN — DONEPEZIL HYDROCHLORIDE 10 MG: 10 TABLET ORAL at 09:48

## 2023-07-31 RX ADMIN — IPRATROPIUM BROMIDE AND ALBUTEROL SULFATE 1 DOSE: .5; 3 SOLUTION RESPIRATORY (INHALATION) at 21:07

## 2023-07-31 RX ADMIN — ASPIRIN 81 MG: 81 TABLET, CHEWABLE ORAL at 09:48

## 2023-07-31 RX ADMIN — MEMANTINE 10 MG: 10 TABLET ORAL at 09:48

## 2023-07-31 RX ADMIN — ACETAMINOPHEN 650 MG: 325 TABLET ORAL at 17:22

## 2023-07-31 NOTE — PROGRESS NOTES
Physical Therapy    [x] daily progress note       [] discharge       Patient Name:  Robbin Choudhury   :  1940 MRN: [id number]  Room:  [address] [place], [place] [address] Date of Admission: 2023  Rehabilitation Diagnosis:   Cerebral infarction, unspecified [I63.9]  Acute CVA (cerebrovascular accident) ([address]) [I63.9]       Date 2023       Day of ARU Week:  5   Time IN/OUT 1330/1500   Individual Tx Minutes 90   Group Tx Minutes    Co-Treat Minutes    Concurrent Tx Minutes    TOTAL Tx Time Mins 90   Variance Time    Variance Time []   Refusal due to:     []   Medical hold/reason:    []   Illness   []   Off Unit for test/procedure  []   Extra time needed to complete task  []   Therapeutic need  []   Other (specify):   Restrictions Restrictions/Precautions  Restrictions/Precautions: Fall Risk, General Precautions  Position Activity Restriction  Other position/activity restrictions: IV access LUE, L hemiparesis   Interdisciplinary communication [x]   Cleared for therapy per nursing     []   RN notified about issues during session  []   RN updated on pt performance  []   Spoke with   []   Spoke with OT  []   Spoke with MD  []   Other:    Subjective observations and cognitive status: Pt resting in bed, ate some lunch but still was not able to finish it despite rescheduling her PT session, had some bladder incontinence in depends and eventually bowel incontinence when assisted to roll to L side. Daughter presented and assisted with pt's care.     Pain level/location: None reported at rest    Discharge recommendations  Anticipated discharge date:  2023  Destination: []home alone   []home alone with assist PRN     [x] home w/ family      [] Continuous supervision  []SNF    [] Assisted living     [] Other:  Continued therapy: [x]HHC PT  []OUTPATIENT PT   [] No Further PT  []SNF PT  Caregiver training recommended: [x]Yes-ongoing  [] No   Equipment needs:  hospital bed, alternating pressure pad and pump, and Rylee lift; pt

## 2023-07-31 NOTE — PROGRESS NOTES
Patient's daughter requested for her to have a breathing treatment. At 2230 respiratory was called. The therapist states she will be here in a few. 0050 called respiratory three times. No answer the first two times. Answered on the third time and states I forgot. Is the patient sleeping? This nurse advised that respiratory should come regardless.

## 2023-07-31 NOTE — PROGRESS NOTES
Hematology/Oncology   Progress Note    Patient Name:  Sriram Granado  Patient :  1940  Patient MRN:  [id number]     Primary Oncologist: Chin Benjamin MD  PCP: Lorena Jackson     Date of Service: 2023     HPI:   Sriram Granado is a pleasant 82 y.o. female with a history of anal cancer s/p chemoradiation  (2017) with SHELL who initially presented with left upper and lower extremity weakness secondary to subacute CVA and was subsequently diagnosed with non-occlusive L popliteal DVT. We are consulted for further recommendation on management. Pt was seen and examined with daughter at bedside. She has c/o pain behind L knee for several days. She is currently tolerating treatment dose lovenox with no s/s of bleeding. She has no personal or family history of VTE or arterial thrombosis. Recent imaging confirmed no evidence of malignancy recurrence. Her L sided weakness persists. Family is concerned that she is regressing in rehab instead of progressing and that her functional status was better on admission than now. She is c/o SOB which is somewhat improved with inhaled treatment. VQ scan was low risk. Does note tachycardia and hand tremor after albuterol, advised that this is an expected side effect. Daughter is concerned about her being on a blood thinner due to bleeding risk. Currently no evidence of bleeding, no recent bleeding events. There is a concern for fall risk however she has good social support and no past history of fall. Discussed risk vs benefit of AC in distal DVT. Discussed recommendation for treatment for 3 months only without loading doses. They are agreeable. 2023 VL Nonocclusive DVT within the left popliteal vein. No evidence of right lower extremity DVT. 2023 VQ scan low prob for PE.    2023 Awake and alert. Daughter said that she is doing better. No acute distress. She is on LMWH as inpatient.   Daughter expressed concern about risk of fall at home

## 2023-07-31 NOTE — PATIENT CARE CONFERENCE
bed->w/c->recliner for caregiver education on technique/use of the said equipment at home if there is only 1 caregiver present to assist and/or pt is weaker  CARE Score: 1  Discharge Goal: Partial/moderate assistance    Toilet Transfer  Assistance needed: Dependent  Comment: Max A x 2 for hospital bed->drop arm BSC leading with R side (daughter took the role of providing main/power lift today using special transfer belt under PT's supervision following proper body mechanics and stabilization of pt's LLE); caregiver educated on instructing 2nd person to assist at home on technique to stabilize [facility] against bed during transfer process; caregiver educated on recommended positioning of BSC prior to transfer and succeeding sequential cues to safely complete transfer  CARE Score: 1    Car Transfer  Comment: attempted transfer in car using sliding board with 2-3 person assist and was unsuccessful due to pt's profound weakness, poor sitting balance, and poor L hip ROM (pain was aggravated upon initiation of passive assistance by PT with the attempt of lifting it in the car)  Reason if not Attempted: Not attempted due to medical condition or safety concerns  CARE Score: 88  Discharge Goal: Substantial/maximal assistance    Ambulation:    Walking Ability  Does the Patient Walk?: No     Walk 10 Feet  Comment: limited potential to progressing to this mobility task, however pre-gait activities will be part of the PT POC  Reason if not Attempted: Not attempted due to medical condition or safety concerns  CARE Score: 88  Discharge Goal: Not Attempted     Walk 50 Feet with Two Turns  Reason if not Attempted: Not attempted due to medical condition or safety concerns  CARE Score: 88  Discharge Goal: Not Attempted     Walk 150 Feet  Reason if not Attempted: Not attempted due to medical condition or safety concerns  CARE Score: 88  Discharge Goal: Not Attempted     Walking 10 Feet on Uneven Surfaces  Reason if not Attempted: Not reduced assist  [] Pt will improve ADL's with use of adaptive equipment with reduced assist  [] Pt will improve pain mgmt for maximum participation in tx program  [] Pt will improve communication to get basic needs met on unit  [] Pt will improve swallowing for safe diet advancement with use of strategies  []  Plan for discharge to home. []  Overall team goal being targeted this week: ***     Patient Strengths: {Patient Strengths:[id number]}    Justification for Continued Stay  Based on my medical assessment of the patient and review of information from the interdisciplinary team as part of this weekly team conference, the patient continues to meet the following criteria for IRF level of care: The patient requires active and ongoing intervention of multiple therapy disciplines  The patient requires and intensive rehabilitation therapy program  The patient requires continued physician supervision by a rehabilitation physician  The patient requires 24 hours rehab nursing care  The patient requires an intensive and coordinated interdisciplinary team approach to the delivery of rehabilitative care.      Assessment/Plan   []  The patient is making good progression towards their long term goals and is actively participating in and has a reasonable expectation to continue to benefit from the intensive rehabilitation therapy program   []  The estimated discharge date has been changed from initial team conference due to:   []  The estimated discharge destination has been changed from initial team conference due to:         Ongoing tx following discharge: []HHC     []OUTPATIENT     [] No Further Treatment     [] Family/Caregiver Training  []  Transitional Living Arrangement   [] Home Assessment (date  )     [] Family Conference   []  Therapeutic Pass       []  Other: (specify)    Estimated Discharge Date: ***    Estimated Discharge Destination: []home alone   []home alone with assist prn  []Continuous supervision []Return

## 2023-07-31 NOTE — CARE COORDINATION
DME orders faxed to [address] [place] [address] - in network provider. Hospital bed, lift, drop arm BSC ordered. Daughter Jacky Vidales listed as contact for delivery. 1230:  case mgt checked in with patient and her daughter, Jacky Vidales. No dc concerns at this time.   Jacky Vidales has already been contacted by [address] [place] [address].

## 2023-07-31 NOTE — PROGRESS NOTES
caregiver, but he does have an aide as well). Pt's daughter has been staying the night with Pt every night since before April 2023.)  Type of Home: House  Home Layout: One level  Home Access: Stairs to enter without rails  Entrance Stairs - Number of Steps: 1  Bathroom Shower/Tub: Tub/Shower unit  Bathroom Toilet: Standard  Bathroom Equipment: Grab bars in shower, 3-in-1 commode, Tub transfer bench, Toilet raiser (Pt has been loaned a TTB from senior services.)  Bathroom Accessibility: [employer] accessible  Home Equipment: Rachel Massrula, [address], [address] [facility], [place], [facility]  Has the patient had two or more falls in the past year or any fall with injury in the past year?: Yes (> 10 falls in last year)  Receives Help From: Family  ADL Assistance: Needs assistance  Homemaking Assistance: Needs assistance  Meal Prep Responsibility: No  Laundry Responsibility: No  Cleaning Responsibility: No  Bill Paying/Finance Responsibility: No  Shopping Responsibility: No  Dependent Care Responsibility: No  Health Care Management: No  Ambulation Assistance: Needs assistance (Pt was CGA with 2WW, however has been primarily using w/c for ~ the last month.)  Transfer Assistance: Needs assistance  Active : No  Patient's  Info: [place]  Mode of Transportation: Car  Education: Did not graduate HS--Grew up in [place]  Occupation: Retired  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 Pt dtr manages groceries. Occasional outings to [employer]. Lots of surgeries with knee replacements and limited outings. Additional Comments: Pt sleeps in regular flat bed. Pt is R hand dominant. Objective                                                                                    Goals:  (Update in navigator)  Short Term Goals  Time Frame for Short Term Goals: STGs=LTGs:  Long Term Goals  Time Frame for Long Term Goals : 12-14 days or until d/c.   Long Term Goal 1: Pt will complete

## 2023-08-01 ENCOUNTER — APPOINTMENT (OUTPATIENT)
Dept: ULTRASOUND IMAGING | Age: 83
DRG: 057 | End: 2023-08-01
Attending: PHYSICAL MEDICINE & REHABILITATION
Payer: MEDICARE

## 2023-08-01 PROCEDURE — 1280000000 HC REHAB R&B

## 2023-08-01 PROCEDURE — 6370000000 HC RX 637 (ALT 250 FOR IP): Performed by: INTERNAL MEDICINE

## 2023-08-01 PROCEDURE — 6370000000 HC RX 637 (ALT 250 FOR IP): Performed by: PHYSICAL MEDICINE & REHABILITATION

## 2023-08-01 PROCEDURE — 94640 AIRWAY INHALATION TREATMENT: CPT

## 2023-08-01 PROCEDURE — 94150 VITAL CAPACITY TEST: CPT

## 2023-08-01 PROCEDURE — 99232 SBSQ HOSP IP/OBS MODERATE 35: CPT | Performed by: PHYSICIAN ASSISTANT

## 2023-08-01 PROCEDURE — 6360000002 HC RX W HCPCS: Performed by: INTERNAL MEDICINE

## 2023-08-01 PROCEDURE — 94664 DEMO&/EVAL PT USE INHALER: CPT

## 2023-08-01 PROCEDURE — 97535 SELF CARE MNGMENT TRAINING: CPT

## 2023-08-01 PROCEDURE — 97542 WHEELCHAIR MNGMENT TRAINING: CPT

## 2023-08-01 PROCEDURE — 97530 THERAPEUTIC ACTIVITIES: CPT

## 2023-08-01 PROCEDURE — 97110 THERAPEUTIC EXERCISES: CPT

## 2023-08-01 PROCEDURE — 94761 N-INVAS EAR/PLS OXIMETRY MLT: CPT

## 2023-08-01 RX ADMIN — ACETAMINOPHEN 650 MG: 325 TABLET ORAL at 05:47

## 2023-08-01 RX ADMIN — LEVETIRACETAM 500 MG: 500 TABLET, FILM COATED ORAL at 08:49

## 2023-08-01 RX ADMIN — ASPIRIN 81 MG: 81 TABLET, CHEWABLE ORAL at 08:48

## 2023-08-01 RX ADMIN — CALCIUM 500 MG: 500 TABLET ORAL at 21:22

## 2023-08-01 RX ADMIN — LEVETIRACETAM 500 MG: 500 TABLET, FILM COATED ORAL at 21:22

## 2023-08-01 RX ADMIN — DILTIAZEM HYDROCHLORIDE 240 MG: 240 CAPSULE, COATED, EXTENDED RELEASE ORAL at 08:48

## 2023-08-01 RX ADMIN — ACETAMINOPHEN 650 MG: 325 TABLET ORAL at 10:56

## 2023-08-01 RX ADMIN — TIOTROPIUM BROMIDE AND OLODATEROL 2 PUFF: 3.124; 2.736 SPRAY, METERED RESPIRATORY (INHALATION) at 08:05

## 2023-08-01 RX ADMIN — METOPROLOL TARTRATE 12.5 MG: 25 TABLET, FILM COATED ORAL at 08:49

## 2023-08-01 RX ADMIN — MAGNESIUM OXIDE 400 MG (241.3 MG MAGNESIUM) TABLET 400 MG: TABLET at 08:48

## 2023-08-01 RX ADMIN — PREDNISONE 15 MG: 5 TABLET ORAL at 08:48

## 2023-08-01 RX ADMIN — TRIAMTERENE AND HYDROCHLOROTHIAZIDE 1 TABLET: 37.5; 25 TABLET ORAL at 08:48

## 2023-08-01 RX ADMIN — ALBUTEROL SULFATE 2 PUFF: 90 AEROSOL, METERED RESPIRATORY (INHALATION) at 13:06

## 2023-08-01 RX ADMIN — MEMANTINE 10 MG: 10 TABLET ORAL at 08:48

## 2023-08-01 RX ADMIN — ACETAMINOPHEN 650 MG: 325 TABLET ORAL at 21:22

## 2023-08-01 RX ADMIN — ALBUTEROL SULFATE 2 PUFF: 90 AEROSOL, METERED RESPIRATORY (INHALATION) at 18:37

## 2023-08-01 RX ADMIN — ENOXAPARIN SODIUM 60 MG: 100 INJECTION SUBCUTANEOUS at 21:25

## 2023-08-01 RX ADMIN — ATORVASTATIN CALCIUM 20 MG: 10 TABLET, FILM COATED ORAL at 21:22

## 2023-08-01 RX ADMIN — GABAPENTIN 400 MG: 400 CAPSULE ORAL at 21:22

## 2023-08-01 RX ADMIN — FOLIC ACID 500 MCG: 1 TABLET ORAL at 08:48

## 2023-08-01 RX ADMIN — COLLAGENASE SANTYL: 250 OINTMENT TOPICAL at 08:52

## 2023-08-01 RX ADMIN — ACETAMINOPHEN 650 MG: 325 TABLET ORAL at 17:12

## 2023-08-01 RX ADMIN — DONEPEZIL HYDROCHLORIDE 10 MG: 10 TABLET ORAL at 08:48

## 2023-08-01 RX ADMIN — MEMANTINE 10 MG: 10 TABLET ORAL at 21:22

## 2023-08-01 RX ADMIN — POTASSIUM CHLORIDE 10 MEQ: 750 TABLET, EXTENDED RELEASE ORAL at 08:49

## 2023-08-01 RX ADMIN — METOPROLOL TARTRATE 12.5 MG: 25 TABLET, FILM COATED ORAL at 21:22

## 2023-08-01 RX ADMIN — ENOXAPARIN SODIUM 60 MG: 100 INJECTION SUBCUTANEOUS at 08:48

## 2023-08-01 ASSESSMENT — PAIN SCALES - WONG BAKER: WONGBAKER_NUMERICALRESPONSE: 0

## 2023-08-01 ASSESSMENT — PAIN DESCRIPTION - LOCATION: LOCATION: KNEE

## 2023-08-01 ASSESSMENT — PAIN DESCRIPTION - ORIENTATION: ORIENTATION: LEFT

## 2023-08-01 ASSESSMENT — PAIN SCALES - GENERAL
PAINLEVEL_OUTOF10: 0

## 2023-08-01 ASSESSMENT — PAIN - FUNCTIONAL ASSESSMENT: PAIN_FUNCTIONAL_ASSESSMENT: PREVENTS OR INTERFERES SOME ACTIVE ACTIVITIES AND ADLS

## 2023-08-01 ASSESSMENT — PAIN DESCRIPTION - DESCRIPTORS: DESCRIPTORS: ACHING

## 2023-08-01 NOTE — PROGRESS NOTES
Comprehensive Nutrition Assessment    Type and Reason for Visit:  Reassess    Nutrition Recommendations/Plan:   Continue regular diet at this time   Will continue to offer oral nutrition supplement during stay  Assist with meals as needed  Will continue to follow up during stay      Malnutrition Assessment:  Malnutrition Status:  Insufficient data (07/14/23 1532)    Context:  Acute Illness       Nutrition Assessment:    Meal intake remains % during stay. Remains on regular diet, reasonable to continue liberalized diet at this time. Oral nutrition supplements sent with meals. Reasonable intake at this time. Will continue to follow at low nutrition risk. Nutrition Related Findings:    not available on visit,  meds noted: methotrexate, folvite, prednisone, KCl, mag-ox   BM today Wound Type: Pressure Injury, Unstageable, Stage II       Current Nutrition Intake & Therapies:    Average Meal Intake: %  Average Supplements Intake: %  ADULT DIET; Regular  ADULT ORAL NUTRITION SUPPLEMENT; Breakfast, Dinner; Low Calorie/High Protein Oral Supplement    Anthropometric Measures:  Height: 5' 4\" (162.6 cm)  Ideal Body Weight (IBW): 120 lbs (55 kg)    Admission Body Weight: 140 lb 6.9 oz (63.7 kg)  Current Body Weight: 143 lb 15.4 oz (65.3 kg), 117 % IBW.  Weight Source: Bed Scale  Current BMI (kg/m2): 24.7  Usual Body Weight: 138 lb 14.2 oz (63 kg) ( 2022)  % Weight Change (Calculated): 1.1  Weight Adjustment For: No Adjustment                 BMI Categories: Normal Weight (BMI 22.0 to 24.9) age over 72    Estimated Daily Nutrient Needs:  Energy Requirements Based On: Kcal/kg  Weight Used for Energy Requirements: Current  Energy (kcal/day): 7908-7071 (25-27 mel/kg)  Weight Used for Protein Requirements: Current  Protein (g/day): 76-89 (1.2-1.4 g/kg)  Method Used for Fluid Requirements: 1 ml/kcal  Fluid (ml/day): 0231-5397    Nutrition Diagnosis:   Predicted inadequate energy intake related to increase demand

## 2023-08-01 NOTE — PROGRESS NOTES
mobility training, Neuromuscular re-education, Cognitive reorientation, Home exercise program, Safety education & training, Patient/Caregiver education & training, Equipment evaluation, education, & procurement, Positioning, Therapeutic activities, Pain management    Electronically signed by   Lynette June PT,   8/1/2023, 1:51 PM

## 2023-08-01 NOTE — PROGRESS NOTES
Pulmonary and Critical Care  Progress Note    Subjective: The patient is feeling better. On RA. Shortness of breath none. Chest pain none. Addressing respiratory complaints Patient is negative for  hemoptysis and cyanosis  CONSTITUTIONAL:  negative for fevers and chills      Past Medical History:     has a past medical history of Acid reflux, Anxiety, Bronchitis, Cancer ([address]), Carotid stenosis, Cerebral embolism with cerebral infarction ([address]), Chronic back pain, Depression, History of blood transfusion, History of external beam radiation therapy, Hyperlipidemia, Hypertension, Prolonged emergence from general anesthesia, RA (rheumatoid arthritis) ([address]), Seizure ([address]), Sleep apnea, Teeth missing, Urinary incontinence, UTI (urinary tract infection), Wears dentures, Wears glasses, and Wears partial dentures. has a past surgical history that includes Carpal tunnel release; Dental surgery; Colonoscopy (In Past); Hysterectomy, vaginal (1960's); Dilation and curettage of uterus (1960's); Carotid endarterectomy (Right, 11/18/2015); [address] Surgery (N/A, 03/02/2021); ARTHROCENTESIS / ASPIRATION / INJECTION KNEE (05/11/2021); and joint replacement. reports that she has never smoked. She has never used smokeless tobacco. She reports that she does not drink alcohol and does not use drugs. Family history:  family history includes Arthritis in her mother and sister; Depression in her son; Diabetes in her mother and sister; Hearing Loss in her brother; Heart Disease in her brother, mother, and sister; High Blood Pressure in her brother, brother, mother, sister, son, and son; High Cholesterol in her mother; Learning Disabilities in her son; Mental Illness in her sister and son; [place] in her mother; Other in her father, sister, son, son, and son; Stroke in her mother and sister.     Allergies   Allergen Reactions    Cortisone Rash    Codeine      Unsure Of Reaction    Pcn [Penicillins]      Unknown Reaction

## 2023-08-01 NOTE — PROGRESS NOTES
This nurse assisted patients daughter with changing and repositioning her. Patient tolerated well. Will continue to monitor. 1 pair

## 2023-08-01 NOTE — PROGRESS NOTES
due to medical condition or safety concerns  CARE Score: 88  Discharge Goal: Not Attempted     Walk 150 Feet  Reason if not Attempted: Not attempted due to medical condition or safety concerns  CARE Score: 88  Discharge Goal: Not Attempted     Walking 10 Feet on Uneven Surfaces  Reason if not Attempted: Not attempted due to medical condition or safety concerns  CARE Score: 88  Discharge Goal: Not Attempted     1 Step (Curb)  Reason if not Attempted: Not attempted due to medical condition or safety concerns  CARE Score: 88  Discharge Goal: Not Attempted     4 Steps  Reason if not Attempted: Not attempted due to medical condition or safety concerns  CARE Score: 88  Discharge Goal: Not Attempted     12 Steps  Reason if not Attempted: Not attempted due to medical condition or safety concerns  CARE Score: 88  Discharge Goal: Not Attempted       Wheelchair:  w/c Ability: Wheelchair Ability  Uses a Wheelchair and/or Scooter?: Yes  Wheel 50 Feet with Two Turns  Assistance Needed: Substantial/maximal assistance  Comment: pt propelled 15 ft using R hand with Mod A-Max A to stay on straight path/correct veering; R armrest had to be taken off for pt to be able to to see R hand positioning/placement of thumb against spacer of w/c spokes and assist with propulsion as pt with poor hand grasp to use w/c rim only; provided with VCs to also use RLE to correct veering to the L side but motor strength was inconsistent; pt provided with sequential cues throughout due to poor coordination and divided attention to use RUE and RLE simultaneously; forward propulsion distance was inconsistent due to impaired strength and endurance; pt required extra time and effort and encouragement to perform this mobility task due to poor endurance; pt required additional assist to position LLE on w/c footrest with heel loop prior to propulsion; pt was able to unlock R hand brake with R hand and L hand brake with L hand with increased effort  CARE Score:

## 2023-08-01 NOTE — PROGRESS NOTES
Occupational Therapy    Physical Rehabilitation: OCCUPATIONAL THERAPY     [x] daily progress note       [] discharge       Patient Name:  Julian Irene   :  1940 MRN: [id number]  Room:  [address] [place] [address] Date of Admission: 2023  Rehabilitation Diagnosis:   Cerebral infarction, unspecified [I63.9]  Acute CVA (cerebrovascular accident) ([address]) [I63.9]       Date 2023       Day of ARU Week:  6   Time IN/OUT    Individual Tx Minutes 90   Group Tx Minutes    Co-Treat Minutes    Concurrent Tx Minutes    TOTAL Tx Time Mins 90   Variance Time    Variance Time []   Refusal due to:     []   Medical hold/reason:    []   Illness   []   Off Unit for test/procedure  []   Extra time needed to complete task  []   Therapeutic need  []   Other (specify):   Restrictions Restrictions/Precautions: Fall Risk, General Precautions         Communication with other providers: [x]   OK to see per nursing:     []   Spoke with team member regarding:      Subjective observations and cognitive status: Upon entrance, Pt's daughter setting Pt up with toothbrush and toothpaste. Pt pleasant and agreeable to therapy session. Pt's daughter requesting exercise this date for the session as she wants to make an \"exercise binder\" and learn how to do all of them to teach Pt's other caregivers. Pain level/location:    /10       Location: Pt denies pain but does grimace at times throughout session during bed mobility   Discharge recommendations  Anticipated discharge date:  23  Destination: []home alone   []home alone w assist prn   [x] home w/ family    [] Continuous supervision       []SNF    [] Assisted living     [] Other:   Continued therapy: [x]HHC OT  []OUTPATIENT  OT   [] No Further OT  Equipment needs: Julian Irene requires a drop arm bedside commode due to being unable to use the toilet within the home and confined to a single room and one level of the home.   This patient requires a side transfer provided by a drop arm

## 2023-08-01 NOTE — PROGRESS NOTES
Hematology/Oncology   Progress Note    Patient Name:  Stacey Lynn  Patient :  1940  Patient MRN:  [id number]     Primary Oncologist: Julia Hickman MD  PCP: Jaylene Ramirez     Date of Service: 2023     HPI:   Stacey Lynn is a pleasant 82 y.o. female with a history of anal cancer s/p chemoradiation  (2017) with SHELL who initially presented with left upper and lower extremity weakness secondary to subacute CVA and was subsequently diagnosed with non-occlusive L popliteal DVT. We are consulted for further recommendation on management. Pt was seen and examined with daughter at bedside. She has c/o pain behind L knee for several days. She is currently tolerating treatment dose lovenox with no s/s of bleeding. She has no personal or family history of VTE or arterial thrombosis. Recent imaging confirmed no evidence of malignancy recurrence. Her L sided weakness persists. Family is concerned that she is regressing in rehab instead of progressing and that her functional status was better on admission than now. She is c/o SOB which is somewhat improved with inhaled treatment. VQ scan was low risk. Does note tachycardia and hand tremor after albuterol, advised that this is an expected side effect. Daughter is concerned about her being on a blood thinner due to bleeding risk. Currently no evidence of bleeding, no recent bleeding events. There is a concern for fall risk however she has good social support and no past history of fall. Discussed risk vs benefit of AC in distal DVT. Discussed recommendation for treatment for 3 months only without loading doses. They are agreeable. 2023 VL Nonocclusive DVT within the left popliteal vein. No evidence of right lower extremity DVT. 2023 VQ scan low prob for PE.    2023  Awake and alert. No daughter at bedside today. No acute distress. She is on LMWH as inpatient.   Daughter expressed concern about risk of fall at home and on blood

## 2023-08-02 ENCOUNTER — APPOINTMENT (OUTPATIENT)
Dept: ULTRASOUND IMAGING | Age: 83
DRG: 057 | End: 2023-08-02
Attending: PHYSICAL MEDICINE & REHABILITATION
Payer: MEDICARE

## 2023-08-02 PROCEDURE — 97535 SELF CARE MNGMENT TRAINING: CPT

## 2023-08-02 PROCEDURE — 6370000000 HC RX 637 (ALT 250 FOR IP): Performed by: INTERNAL MEDICINE

## 2023-08-02 PROCEDURE — 94660 CPAP INITIATION&MGMT: CPT

## 2023-08-02 PROCEDURE — 93971 EXTREMITY STUDY: CPT

## 2023-08-02 PROCEDURE — 97542 WHEELCHAIR MNGMENT TRAINING: CPT

## 2023-08-02 PROCEDURE — 97110 THERAPEUTIC EXERCISES: CPT

## 2023-08-02 PROCEDURE — 94640 AIRWAY INHALATION TREATMENT: CPT

## 2023-08-02 PROCEDURE — 99211 OFF/OP EST MAY X REQ PHY/QHP: CPT

## 2023-08-02 PROCEDURE — 94761 N-INVAS EAR/PLS OXIMETRY MLT: CPT

## 2023-08-02 PROCEDURE — 6360000002 HC RX W HCPCS: Performed by: INTERNAL MEDICINE

## 2023-08-02 PROCEDURE — 6370000000 HC RX 637 (ALT 250 FOR IP): Performed by: PHYSICAL MEDICINE & REHABILITATION

## 2023-08-02 PROCEDURE — 1280000000 HC REHAB R&B

## 2023-08-02 PROCEDURE — 99232 SBSQ HOSP IP/OBS MODERATE 35: CPT | Performed by: INTERNAL MEDICINE

## 2023-08-02 PROCEDURE — 97530 THERAPEUTIC ACTIVITIES: CPT

## 2023-08-02 RX ADMIN — MEMANTINE 10 MG: 10 TABLET ORAL at 08:47

## 2023-08-02 RX ADMIN — GABAPENTIN 400 MG: 400 CAPSULE ORAL at 21:22

## 2023-08-02 RX ADMIN — COLLAGENASE SANTYL: 250 OINTMENT TOPICAL at 08:48

## 2023-08-02 RX ADMIN — PREDNISONE 15 MG: 5 TABLET ORAL at 08:47

## 2023-08-02 RX ADMIN — TRIAMTERENE AND HYDROCHLOROTHIAZIDE 1 TABLET: 37.5; 25 TABLET ORAL at 08:47

## 2023-08-02 RX ADMIN — ACETAMINOPHEN 650 MG: 325 TABLET ORAL at 21:21

## 2023-08-02 RX ADMIN — MAGNESIUM OXIDE 400 MG (241.3 MG MAGNESIUM) TABLET 400 MG: TABLET at 08:47

## 2023-08-02 RX ADMIN — ACETAMINOPHEN 650 MG: 325 TABLET ORAL at 16:58

## 2023-08-02 RX ADMIN — ACETAMINOPHEN 650 MG: 325 TABLET ORAL at 11:06

## 2023-08-02 RX ADMIN — DONEPEZIL HYDROCHLORIDE 10 MG: 10 TABLET ORAL at 08:46

## 2023-08-02 RX ADMIN — ATORVASTATIN CALCIUM 20 MG: 10 TABLET, FILM COATED ORAL at 21:22

## 2023-08-02 RX ADMIN — METOPROLOL TARTRATE 12.5 MG: 25 TABLET, FILM COATED ORAL at 21:23

## 2023-08-02 RX ADMIN — ALBUTEROL SULFATE 2 PUFF: 90 AEROSOL, METERED RESPIRATORY (INHALATION) at 19:45

## 2023-08-02 RX ADMIN — POTASSIUM CHLORIDE 10 MEQ: 750 TABLET, EXTENDED RELEASE ORAL at 08:47

## 2023-08-02 RX ADMIN — ACETAMINOPHEN 650 MG: 325 TABLET ORAL at 05:51

## 2023-08-02 RX ADMIN — TIOTROPIUM BROMIDE AND OLODATEROL 2 PUFF: 3.124; 2.736 SPRAY, METERED RESPIRATORY (INHALATION) at 07:48

## 2023-08-02 RX ADMIN — ASPIRIN 81 MG: 81 TABLET, CHEWABLE ORAL at 08:47

## 2023-08-02 RX ADMIN — DILTIAZEM HYDROCHLORIDE 240 MG: 240 CAPSULE, COATED, EXTENDED RELEASE ORAL at 08:47

## 2023-08-02 RX ADMIN — LEVETIRACETAM 500 MG: 500 TABLET, FILM COATED ORAL at 21:22

## 2023-08-02 RX ADMIN — LEVETIRACETAM 500 MG: 500 TABLET, FILM COATED ORAL at 08:47

## 2023-08-02 RX ADMIN — FOLIC ACID 500 MCG: 1 TABLET ORAL at 08:48

## 2023-08-02 RX ADMIN — METOPROLOL TARTRATE 12.5 MG: 25 TABLET, FILM COATED ORAL at 08:47

## 2023-08-02 RX ADMIN — CALCIUM 500 MG: 500 TABLET ORAL at 21:22

## 2023-08-02 RX ADMIN — ENOXAPARIN SODIUM 60 MG: 100 INJECTION SUBCUTANEOUS at 08:48

## 2023-08-02 RX ADMIN — MEMANTINE 10 MG: 10 TABLET ORAL at 21:22

## 2023-08-02 RX ADMIN — ENOXAPARIN SODIUM 60 MG: 100 INJECTION SUBCUTANEOUS at 21:27

## 2023-08-02 ASSESSMENT — PAIN SCALES - GENERAL
PAINLEVEL_OUTOF10: 0

## 2023-08-02 ASSESSMENT — PAIN DESCRIPTION - ORIENTATION: ORIENTATION: OTHER (COMMENT)

## 2023-08-02 ASSESSMENT — PAIN - FUNCTIONAL ASSESSMENT: PAIN_FUNCTIONAL_ASSESSMENT: ACTIVITIES ARE NOT PREVENTED

## 2023-08-02 ASSESSMENT — PAIN DESCRIPTION - DESCRIPTORS: DESCRIPTORS: DISCOMFORT

## 2023-08-02 ASSESSMENT — PAIN DESCRIPTION - LOCATION: LOCATION: GENERALIZED

## 2023-08-02 NOTE — PROGRESS NOTES
Flakita Brown    : 1940  Acct #: [de-identified]  MRN: [id number]              PM&R Progress Note      Admitting diagnosis: ***    Comorbid diagnoses impacting rehabilitation: ***    Chief complaint: ***    Prior (baseline) level of function: Independent. Current level of function:         Current  IRF-ORI and Goals:   Occupational Therapy:    Short Term Goals  Time Frame for Short Term Goals: STGs=LTGs :   Long Term Goals  Time Frame for Long Term Goals : 12-14 days or until d/c. Long Term Goal 1: Pt will complete self-feeding with setup assist.  Long Term Goal 2: Pt will complete oral hygiene and grooming tasks with setup assist.  Long Term Goal 3: Pt will complete total body bathing with AE PRN and Mod A. Long Term Goal 4: Pt will complete UB dressing with Min A. Long Term Goal 5: Pt will complete LB dressing with AE PRN and Mod A. Additional Goals?: Yes  Long Term Goal 6: Pt will doff/don footwear with AE PRN and Min A. Long Term Goal 7: Pt will complete toileting with Mod A. Long Term Goal 8: Pt will complete functional transfers (bed, chair, shower, toilet) with DME PRN and Mod A. Long Term Goal 9: Pt will perform therex/therax to facilitate an increase in strength/endurance with emphasis on dynamic sitting balance/tolerance > 15 mins with SBA. :                                       Eating: Eating  Assistance Needed: Setup or clean-up assistance  Comment: Pt's daughter set Pt up with cream of wheat with built up utensil and Dycem under the bowl. Pt able to use BUEs when self feeding as well as when bringing cup to mouth without physical assist.  CARE Score: 5  Discharge Goal: Set-up or clean-up assistance       Oral Hygiene: Oral Hygiene  Assistance Needed: Setup or clean-up assistance  Comment: Setup assist with built up handle on toothbrush to perform oral hygiene at bed level.   CARE Score: 5  Discharge Goal: Set-up or clean-up assistance    UB/LB Bathing: Shower/Bathe Self  Assistance

## 2023-08-02 NOTE — PROGRESS NOTES
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 Overall the patient has improved. 2. Inc. Activity. 3. Discussed with the family. 4. Duplex leg scan today.   Cynthia Chicas MD   8/2/2023  10:27 AM

## 2023-08-02 NOTE — CARE COORDINATION
Spoke with patient's daughter. She stated that she called the patient's secondary insurance and discussed the out of network benefit with them. She reported that she called [facility] and left a message. Patient's daughter requested DME order for hospital bed, pressure pad, patient lift, and drop arm BSC be sent to [facility]. Order was faxed.

## 2023-08-02 NOTE — CONSULTS
Stroke Mother     Heart Disease Mother     Arthritis Mother     Diabetes Mother     High Blood Pressure Mother     High Cholesterol Mother     Miscarriages / [place] Mother     Other Father         \"Surgery For Twisted Bowels\"    Heart Disease Sister     Stroke Sister     High Blood Pressure Brother     Heart Disease Brother     Hearing Loss Brother         Heart Attack    Diabetes Sister     High Blood Pressure Sister     Other Sister         Gout    Arthritis Sister     Mental Illness Sister         Schizophrenia    High Blood Pressure Brother     High Blood Pressure Son     Mental Illness Son         Anxiety    Depression Son     Other Son         Pancreatitis, Surgery For Brain Aneurysm    High Blood Pressure Son     Other Son         Gout    Learning Disabilities Son         Autistic, Mentally Retarded    Other Son         Seizures       SOCIAL HISTORY    Social History     Tobacco Use    Smoking status: Never    Smokeless tobacco: Never   Vaping Use    Vaping Use: Never used   Substance Use Topics    Alcohol use: No     Alcohol/week: 0.0 standard drinks     Comment: 4-6 caffeinated beverages a week    Drug use: No       ALLERGIES    Allergies   Allergen Reactions    Cortisone Rash    Codeine      Unsure Of Reaction    Pcn [Penicillins]      Unknown Reaction       MEDICATIONS    No current facility-administered medications on file prior to encounter. Current Outpatient Medications on File Prior to Encounter   Medication Sig Dispense Refill    gabapentin (NEURONTIN) 100 MG capsule Take 1 capsule by mouth daily as needed.  07/11/23 Takes in the morning as needed only      potassium chloride (MICRO-K) 10 MEQ extended release capsule Take 1 capsule by mouth daily      cyclobenzaprine (FLEXERIL) 5 MG tablet Take 1 tablet by mouth 3 times daily as needed      memantine (NAMENDA) 10 MG tablet Take 1 tablet by mouth 2 times daily 60 tablet 5    levETIRAcetam (KEPPRA) 500 MG tablet Take 1 tablet by mouth 2 times

## 2023-08-02 NOTE — PROGRESS NOTES
Occupational Therapy    Physical Rehabilitation: OCCUPATIONAL THERAPY     [x] daily progress note       [] discharge       Patient Name:  Dona Beltran   :  1940 MRN: [id number]  Room:  [address] [place] [address] Date of Admission: 2023  Rehabilitation Diagnosis:   Cerebral infarction, unspecified [I63.9]  Acute CVA (cerebrovascular accident) ([address]) [I63.9]       Date 2023       Day of ARU Week:  7   Time IN/OUT 0920/1100   Individual Tx Minutes 100   Group Tx Minutes    Co-Treat Minutes    Concurrent Tx Minutes    TOTAL Tx Time Mins 100   Variance Time +10 (communicated to Madhav Larson PTA)   Variance Time []   Refusal due to:     []   Medical hold/reason:    []   Illness   []   Off Unit for test/procedure  [x]   Extra time needed to complete task  []   Therapeutic need  []   Other (specify):   Restrictions Restrictions/Precautions: Fall Risk, General Precautions         Communication with other providers: [x]   OK to see per nursing:     []   Spoke with team member regarding:      Subjective observations and cognitive status: Upon entrance, Pt's daughter setting her up with cream of wheat. Pt pleasant and agreeable to session and daughter agreeable to continue CGT. Pain level/location:    /10       Location: \"A little bit in the (Left) knee\"   Discharge recommendations  Anticipated discharge date:  23  Destination: []home alone   []home alone w assist prn   [x] home w/ family    [] Continuous supervision       []SNF    [] Assisted living     [] Other:   Continued therapy: [x]HHC OT  []OUTPATIENT  OT   [] No Further OT  Equipment needs: Dona Beltran requires a drop arm bedside commode due to being unable to use the toilet within the home and confined to a single room and one level of the home. This patient requires a side transfer provided by a drop arm commode.         ADLs:    Eating: Eating  Assistance Needed: Setup or clean-up assistance  Comment: Pt's daughter set Pt up with cream of wheat with built up

## 2023-08-02 NOTE — PROGRESS NOTES
Hematology/Oncology   Progress Note    Patient Name:  Alida Garcia  Patient :  1940  Patient MRN:  [id number]     Primary Oncologist: Homero Hoffman MD  PCP: Jazmin Becker     Date of Service: 2023     HPI:   Alida Garcia is a pleasant 82 y.o. female with a history of anal cancer s/p chemoradiation  (2017) with SHELL who initially presented with left upper and lower extremity weakness secondary to subacute CVA and was subsequently diagnosed with non-occlusive L popliteal DVT. We are consulted for further recommendation on management. Pt was seen and examined with daughter at bedside. She has c/o pain behind L knee for several days. She is currently tolerating treatment dose lovenox with no s/s of bleeding. She has no personal or family history of VTE or arterial thrombosis. Recent imaging confirmed no evidence of malignancy recurrence. Her L sided weakness persists. Family is concerned that she is regressing in rehab instead of progressing and that her functional status was better on admission than now. She is c/o SOB which is somewhat improved with inhaled treatment. VQ scan was low risk. Does note tachycardia and hand tremor after albuterol, advised that this is an expected side effect. Daughter is concerned about her being on a blood thinner due to bleeding risk. Currently no evidence of bleeding, no recent bleeding events. There is a concern for fall risk however she has good social support and no past history of fall. Discussed risk vs benefit of AC in distal DVT. Discussed recommendation for treatment for 3 months only without loading doses. They are agreeable. 2023 VL Nonocclusive DVT within the left popliteal vein. No evidence of right lower extremity DVT. 2023 VQ scan low prob for PE. She is on LMWH as inpatient. Daughter expressed concern about risk of fall at home and on blood thinner. 2023. Awake and alert. No acute distress.    She will have VL

## 2023-08-02 NOTE — FLOWSHEET NOTE
[x] daily progress note       [] discharge       Patient Name:  Pamela Baptiste   :  1940 MRN: [id number]  Room:  [address] [place], [place] [address] Date of Admission: 2023  Rehabilitation Diagnosis:   Cerebral infarction, unspecified [I63.9]  Acute CVA (cerebrovascular accident) ([address]) [I63.9]       Date 2023       Day of ARU Week:  7   Time IN/OUT 1300-x (pt eating lunch)  8665-1938   Individual Tx Minutes 90   TOTAL Tx Time Mins 90   Restrictions Restrictions/Precautions  Restrictions/Precautions: Fall Risk, General Precautions  Position Activity Restriction  Other position/activity restrictions: IV access LUE, L hemiparesis   Communication with other providers: [x]   OK to see per nursing:     [x]   Spoke with ori Butts) that patient's sacral bandage was wet d/t urine and needed to be changed. Subjective observations and cognitive status: 1st PM attempt: pt seen sitting up in w/c upon entering room. Pt's dtr present. Pt still eating lunch. Will return at 1400. PM session: pt seen sitting up in recliner at beginning of treatment. Pt agreeable to therapy. Pt's dtr present. Pain level/location: 0 /10        Discharge recommendations  Anticipated discharge date:  2023  Destination: []home alone   []home alone with assist PRN     [x] home w/ family      [] Continuous supervision  []SNF    [] Assisted living     [] Other:  Continued therapy: [x]HHC PT  []OUTPATIENT PT   [] No Further PT  []SNF PT  Caregiver training recommended: [x]Yes-ongoing  [] No   Equipment needs:  hospital bed, alternating pressure pad and pump, and Rylee lift; pt will need transportation service      Pamela Baptiste was evaluated today and a DME order was entered for a semi-electric hospital bed because she requires assistance for positioning needs not possible in an ordinary bed. Patient requires frequent and immediate positioning changes for relief of pain and care needs related to medical diagnoses.   Patient needs variability of bed

## 2023-08-02 NOTE — PLAN OF CARE
Problem: Discharge Planning  Goal: Discharge to home or other facility with appropriate resources  8/1/2023 2107 by Paulo Quinteros LPN  Outcome: Progressing  8/1/2023 1530 by Lisseth Zapata LPN  Outcome: Progressing     Problem: Safety - Adult  Goal: Free from fall injury  8/1/2023 2107 by Paulo Quinteros LPN  Outcome: Progressing  8/1/2023 1530 by Lisseth Zapata LPN  Outcome: Progressing     Problem: Skin/Tissue Integrity  Goal: Absence of new skin breakdown  Description: 1. Monitor for areas of redness and/or skin breakdown  2. Assess vascular access sites hourly  3. Every 4-6 hours minimum:  Change oxygen saturation probe site  4. Every 4-6 hours:  If on nasal continuous positive airway pressure, respiratory therapy assess nares and determine need for appliance change or resting period.   8/1/2023 2107 by Paulo Quinteros LPN  Outcome: Progressing  8/1/2023 1530 by Lisseth Zapata LPN  Outcome: Progressing     Problem: ABCDS Injury Assessment  Goal: Absence of physical injury  8/1/2023 2107 by Paulo Quinteros LPN  Outcome: Progressing  8/1/2023 1530 by Lisseth Zapata LPN  Outcome: Progressing     Problem: Pain  Goal: Verbalizes/displays adequate comfort level or baseline comfort level  8/1/2023 2107 by Paulo Quinteros LPN  Outcome: Progressing  8/1/2023 1530 by Lisseth Zapata LPN  Outcome: Progressing     Problem: Chronic Conditions and Co-morbidities  Goal: Patient's chronic conditions and co-morbidity symptoms are monitored and maintained or improved  8/1/2023 2107 by Paulo Quinteros LPN  Outcome: Progressing  8/1/2023 1530 by Lisseth Zapata LPN  Outcome: Progressing     Problem: Nutrition Deficit:  Goal: Optimize nutritional status  8/1/2023 2107 by Paulo Quinteros LPN  Outcome: Progressing  Flowsheets (Taken 8/1/2023 1803 by Malia Cespedes, SONIA, LD)  Nutrient intake appropriate for improving, restoring, or maintaining nutritional needs:   Monitor oral intake, labs, and treatment plans   Recommend

## 2023-08-02 NOTE — PROGRESS NOTES
attempted due to medical condition or safety concerns  CARE Score: 1  Discharge Goal: Partial/moderate assistance    Physical Therapy:         Long Term Goals  Time Frame for Long Term Goals : 10 tx days:  Long Term Goal 1: Pt will complete rolling R/L with bed rail with SBA-Sup. Long Term Goal 2: Pt will complete scooting and sup<->sit using bed features with Mod A. Long Term Goal 3: Pt will complete squat pivot transfers with Mod A. Long Term Goal 4: Pt will complete sit<->stand and stand turn transfers with Max A  Long Term Goal 5: Pt will propel manual w/c 50 ft with turns over level surface with Min A.      Bed Mobility:   Sit to Lying  Assistance Needed: Substantial/maximal assistance  Comment: Max A (required lifting of BLE and holding of upper body to lower to [facility]) with bed rails  CARE Score: 2  Discharge Goal: Partial/moderate assistance  Roll Left and Right  Assistance Needed: Substantial/maximal assistance  Comment: Mod A to roll to L with bed rail; remains to be Max A to roll to R using R hand on bed rail  CARE Score: 2  Discharge Goal: Supervision or touching assistance  Lying to Sitting on Side of Bed  Assistance Needed: Partial/moderate assistance  Comment:  Mod A (required lifting of upper body) with use of bed features and leg  to assist LLE and some on RLE to clear R foot OOB; pt required constant Min A on trunk to be able to scoot hips forward and complete upright sitting at EOB with LUE support on bed rail  CARE Score: 3  Discharge Goal: Partial/moderate assistance    Transfers:    Sit to Stand  Assistance Needed: Dependent  Comment: pt can only tolerate partial stands with Max A to readjust w/c cushion by 2nd person  Reason if not Attempted: Not attempted due to medical condition or safety concerns  CARE Score: 88  Discharge Goal: Substantial/maximal assistance  Chair/Bed-to-Chair Transfer  Assistance Needed: Dependent  Comment: Max A X 2 using squat pivot technique for bed<->w/c with

## 2023-08-03 LAB
ALBUMIN SERPL-MCNC: 3.4 GM/DL (ref 3.4–5)
ALP BLD-CCNC: 88 IU/L (ref 40–128)
ALT SERPL-CCNC: 39 U/L (ref 10–40)
ANION GAP SERPL CALCULATED.3IONS-SCNC: 9 MMOL/L (ref 4–16)
AST SERPL-CCNC: 29 IU/L (ref 15–37)
BILIRUB SERPL-MCNC: 0.3 MG/DL (ref 0–1)
BUN SERPL-MCNC: 29 MG/DL (ref 6–23)
CALCIUM SERPL-MCNC: 9.5 MG/DL (ref 8.3–10.6)
CHLORIDE BLD-SCNC: 97 MMOL/L (ref 99–110)
CO2: 29 MMOL/L (ref 21–32)
CREAT SERPL-MCNC: 0.5 MG/DL (ref 0.6–1.1)
GFR SERPL CREATININE-BSD FRML MDRD: >60 ML/MIN/1.73M2
GLUCOSE BLD-MCNC: 144 MG/DL (ref 70–99)
GLUCOSE SERPL-MCNC: 238 MG/DL (ref 70–99)
HCT VFR BLD CALC: 35.2 % (ref 37–47)
HEMOGLOBIN: 10.7 GM/DL (ref 12.5–16)
MCH RBC QN AUTO: 30.7 PG (ref 27–31)
MCHC RBC AUTO-ENTMCNC: 30.4 % (ref 32–36)
MCV RBC AUTO: 100.9 FL (ref 78–100)
PDW BLD-RTO: 15.6 % (ref 11.7–14.9)
PLATELET # BLD: 391 K/CU MM (ref 140–440)
PMV BLD AUTO: 9.6 FL (ref 7.5–11.1)
POTASSIUM SERPL-SCNC: 4 MMOL/L (ref 3.5–5.1)
RBC # BLD: 3.49 M/CU MM (ref 4.2–5.4)
SODIUM BLD-SCNC: 135 MMOL/L (ref 135–145)
TOTAL PROTEIN: 5.7 GM/DL (ref 6.4–8.2)
TROPONIN T: <0.01 NG/ML
WBC # BLD: 14.4 K/CU MM (ref 4–10.5)

## 2023-08-03 PROCEDURE — 82962 GLUCOSE BLOOD TEST: CPT

## 2023-08-03 PROCEDURE — 97530 THERAPEUTIC ACTIVITIES: CPT

## 2023-08-03 PROCEDURE — 6370000000 HC RX 637 (ALT 250 FOR IP): Performed by: PHYSICAL MEDICINE & REHABILITATION

## 2023-08-03 PROCEDURE — 84484 ASSAY OF TROPONIN QUANT: CPT

## 2023-08-03 PROCEDURE — 97535 SELF CARE MNGMENT TRAINING: CPT

## 2023-08-03 PROCEDURE — 99232 SBSQ HOSP IP/OBS MODERATE 35: CPT | Performed by: INTERNAL MEDICINE

## 2023-08-03 PROCEDURE — 80053 COMPREHEN METABOLIC PANEL: CPT

## 2023-08-03 PROCEDURE — 6370000000 HC RX 637 (ALT 250 FOR IP): Performed by: INTERNAL MEDICINE

## 2023-08-03 PROCEDURE — 94664 DEMO&/EVAL PT USE INHALER: CPT

## 2023-08-03 PROCEDURE — 94640 AIRWAY INHALATION TREATMENT: CPT

## 2023-08-03 PROCEDURE — 80177 DRUG SCRN QUAN LEVETIRACETAM: CPT

## 2023-08-03 PROCEDURE — 94761 N-INVAS EAR/PLS OXIMETRY MLT: CPT

## 2023-08-03 PROCEDURE — 97542 WHEELCHAIR MNGMENT TRAINING: CPT

## 2023-08-03 PROCEDURE — 6360000002 HC RX W HCPCS: Performed by: INTERNAL MEDICINE

## 2023-08-03 PROCEDURE — 85027 COMPLETE CBC AUTOMATED: CPT

## 2023-08-03 PROCEDURE — 1280000000 HC REHAB R&B

## 2023-08-03 PROCEDURE — 93005 ELECTROCARDIOGRAM TRACING: CPT | Performed by: PHYSICAL MEDICINE & REHABILITATION

## 2023-08-03 PROCEDURE — 94150 VITAL CAPACITY TEST: CPT

## 2023-08-03 PROCEDURE — 94660 CPAP INITIATION&MGMT: CPT

## 2023-08-03 PROCEDURE — 36415 COLL VENOUS BLD VENIPUNCTURE: CPT

## 2023-08-03 RX ADMIN — DILTIAZEM HYDROCHLORIDE 240 MG: 240 CAPSULE, COATED, EXTENDED RELEASE ORAL at 08:46

## 2023-08-03 RX ADMIN — ENOXAPARIN SODIUM 60 MG: 100 INJECTION SUBCUTANEOUS at 22:26

## 2023-08-03 RX ADMIN — LEVETIRACETAM 500 MG: 500 TABLET, FILM COATED ORAL at 22:23

## 2023-08-03 RX ADMIN — GABAPENTIN 400 MG: 400 CAPSULE ORAL at 22:22

## 2023-08-03 RX ADMIN — DONEPEZIL HYDROCHLORIDE 10 MG: 10 TABLET ORAL at 08:47

## 2023-08-03 RX ADMIN — PREDNISONE 15 MG: 5 TABLET ORAL at 08:50

## 2023-08-03 RX ADMIN — POTASSIUM CHLORIDE 10 MEQ: 750 TABLET, EXTENDED RELEASE ORAL at 08:46

## 2023-08-03 RX ADMIN — LEVETIRACETAM 500 MG: 500 TABLET, FILM COATED ORAL at 08:47

## 2023-08-03 RX ADMIN — MEMANTINE 10 MG: 10 TABLET ORAL at 22:23

## 2023-08-03 RX ADMIN — ALBUTEROL SULFATE 2 PUFF: 90 AEROSOL, METERED RESPIRATORY (INHALATION) at 08:33

## 2023-08-03 RX ADMIN — TRIAMTERENE AND HYDROCHLOROTHIAZIDE 1 TABLET: 37.5; 25 TABLET ORAL at 08:46

## 2023-08-03 RX ADMIN — COLLAGENASE SANTYL: 250 OINTMENT TOPICAL at 13:59

## 2023-08-03 RX ADMIN — ENOXAPARIN SODIUM 60 MG: 100 INJECTION SUBCUTANEOUS at 08:47

## 2023-08-03 RX ADMIN — FOLIC ACID 500 MCG: 1 TABLET ORAL at 08:46

## 2023-08-03 RX ADMIN — MAGNESIUM OXIDE 400 MG (241.3 MG MAGNESIUM) TABLET 400 MG: TABLET at 08:47

## 2023-08-03 RX ADMIN — ACETAMINOPHEN 650 MG: 325 TABLET ORAL at 06:02

## 2023-08-03 RX ADMIN — ACETAMINOPHEN 650 MG: 325 TABLET ORAL at 17:55

## 2023-08-03 RX ADMIN — ACETAMINOPHEN 650 MG: 325 TABLET ORAL at 10:17

## 2023-08-03 RX ADMIN — METOPROLOL TARTRATE 12.5 MG: 25 TABLET, FILM COATED ORAL at 08:47

## 2023-08-03 RX ADMIN — ALBUTEROL SULFATE 2 PUFF: 90 AEROSOL, METERED RESPIRATORY (INHALATION) at 15:33

## 2023-08-03 RX ADMIN — TIOTROPIUM BROMIDE AND OLODATEROL 2 PUFF: 3.124; 2.736 SPRAY, METERED RESPIRATORY (INHALATION) at 08:34

## 2023-08-03 RX ADMIN — METOPROLOL TARTRATE 12.5 MG: 25 TABLET, FILM COATED ORAL at 22:26

## 2023-08-03 RX ADMIN — ACETAMINOPHEN 650 MG: 325 TABLET ORAL at 22:22

## 2023-08-03 RX ADMIN — ATORVASTATIN CALCIUM 20 MG: 10 TABLET, FILM COATED ORAL at 22:22

## 2023-08-03 RX ADMIN — ASPIRIN 81 MG: 81 TABLET, CHEWABLE ORAL at 08:47

## 2023-08-03 RX ADMIN — CALCIUM 500 MG: 500 TABLET ORAL at 22:24

## 2023-08-03 RX ADMIN — MEMANTINE 10 MG: 10 TABLET ORAL at 08:47

## 2023-08-03 ASSESSMENT — PAIN DESCRIPTION - ORIENTATION: ORIENTATION: OTHER (COMMENT)

## 2023-08-03 ASSESSMENT — PAIN DESCRIPTION - DESCRIPTORS: DESCRIPTORS: OTHER (COMMENT)

## 2023-08-03 ASSESSMENT — PAIN DESCRIPTION - LOCATION: LOCATION: GENERALIZED

## 2023-08-03 ASSESSMENT — PAIN - FUNCTIONAL ASSESSMENT: PAIN_FUNCTIONAL_ASSESSMENT: ACTIVITIES ARE NOT PREVENTED

## 2023-08-03 ASSESSMENT — PAIN SCALES - GENERAL: PAINLEVEL_OUTOF10: 0

## 2023-08-03 NOTE — PLAN OF CARE
Problem: Discharge Planning  Goal: Discharge to home or other facility with appropriate resources  8/3/2023 1146 by Byron Rosado LPN  Outcome: Progressing  8/2/2023 2236 by Star Mott RN  Outcome: Progressing     Problem: Safety - Adult  Goal: Free from fall injury  8/3/2023 1146 by Byron Rosado LPN  Outcome: Progressing  8/2/2023 2236 by Star Mott RN  Outcome: Progressing     Problem: Skin/Tissue Integrity  Goal: Absence of new skin breakdown  Description: 1. Monitor for areas of redness and/or skin breakdown  2. Assess vascular access sites hourly  3. Every 4-6 hours minimum:  Change oxygen saturation probe site  4. Every 4-6 hours:  If on nasal continuous positive airway pressure, respiratory therapy assess nares and determine need for appliance change or resting period.   8/3/2023 1146 by Byron Rosado LPN  Outcome: Progressing  8/2/2023 2236 by Star Mott RN  Outcome: Progressing     Problem: ABCDS Injury Assessment  Goal: Absence of physical injury  8/3/2023 1146 by Byron Rosado LPN  Outcome: Progressing  8/2/2023 2236 by Star Mott RN  Outcome: Progressing     Problem: Pain  Goal: Verbalizes/displays adequate comfort level or baseline comfort level  8/3/2023 1146 by Byron Rosado LPN  Outcome: Progressing  8/2/2023 2236 by Star Mott RN  Outcome: Progressing     Problem: Chronic Conditions and Co-morbidities  Goal: Patient's chronic conditions and co-morbidity symptoms are monitored and maintained or improved  8/3/2023 1146 by Byron Rosado LPN  Outcome: Progressing  8/2/2023 2236 by Star Mott RN  Outcome: Progressing     Problem: Nutrition Deficit:  Goal: Optimize nutritional status  8/3/2023 1146 by Byron Rosado LPN  Outcome: Progressing  8/2/2023 2236 by Star Mott RN  Outcome: Progressing

## 2023-08-03 NOTE — PROGRESS NOTES
GLUCOSE 121 (H) 07/25/2023    CALCIUM 9.5 07/25/2023    LABALBU 3.3 (L) 07/25/2023    BILITOT 0.3 07/25/2023    ALKPHOS 80 07/25/2023    AST 18 07/25/2023    ALT 16 07/25/2023    LABGLOM >60 07/25/2023    GFRAA >60 08/09/2022    PHOS 3.4 07/21/2023    MG 1.7 (L) 07/21/2023    POCGLU 168 (H) 07/13/2023     Lab Results   Component Value Date    ALKPHOS 80 07/25/2023    AST 18 07/25/2023     No results found for: URICACID  Lab Results   Component Value Date     (H) 05/10/2021     Lab Results   Component Value Date    IRON 112 06/26/2023    TIBC 277 06/26/2023    FERRITIN 205 (H) 06/26/2023     Imaging:  VL DUP LOWER EXTREMITY VENOUS LEFT   Final Result   No evidence of DVT in the left lower extremity. XR CHEST (2 VW)   Final Result   No acute cardiopulmonary process. NM LUNG VENT/PERFUSION (VQ)   Final Result   Very low probability for pulmonary embolism. VL DUP LOWER EXTREMITY VENOUS BILATERAL   Final Result   Nonocclusive DVT within the left popliteal vein. No evidence of right lower extremity DVT. The findings were sent to the Radiology Results [address] at 2:59   pm on 7/21/2023 to be communicated to a licensed caregiver. XR CHEST PORTABLE   Final Result   Findings consistent with central and perihilar bronchitis/pneumonitis or   atypical pneumonia accentuated by low lung volumes. Otherwise,   radiographically nonacute chest.      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 Demyelination could be   considered.       Disc and osteophytes narrow the neural foramina and cause stenosis of the   thecal sac

## 2023-08-03 NOTE — PROGRESS NOTES
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 Pt dtr manages groceries. Occasional outings to [employer]. Lots of surgeries with knee replacements and limited outings. Additional Comments: Pt sleeps in regular flat bed. Pt is R hand dominant. Objective                                                                                    Goals:  (Update in navigator)  Short Term Goals  Time Frame for Short Term Goals: STGs=LTGs:  Long Term Goals  Time Frame for Long Term Goals : 12-14 days or until d/c. Long Term Goal 1: Pt will complete self-feeding with setup assist. MET  Long Term Goal 2: Pt will complete oral hygiene and grooming tasks with setup assist. MET  Long Term Goal 3: Pt will complete total body bathing with AE PRN and Mod A. MET   Long Term Goal 4: Pt will complete UB dressing with Min A.  NOT MET   Long Term Goal 5: Pt will complete LB dressing with AE PRN and Mod A. NOT MET   Additional Goals?: Yes  Long Term Goal 6: Pt will doff/don footwear with AE PRN and Min A. NOT MET   Long Term Goal 7: Pt will complete toileting with Mod A. NOT MET   Long Term Goal 8: Pt will complete functional transfers (bed, chair, shower, toilet) with DME PRN and Mod A. NOT MET   Long Term Goal 9: Pt will perform therex/therax to facilitate an increase in strength/endurance with emphasis on dynamic sitting balance/tolerance > 15 mins with SBA.:    NOT MET     Plan of Care                                                                              Times per week: 5 days per week for a minimum of 60 minutes/day plus group as appropriate for 60 minutes.   Treatment to include Occupational Therapy Plan  Current Treatment Recommendations: Strengthening, ROM, Balance training, Functional

## 2023-08-03 NOTE — PLAN OF CARE
Problem: Discharge Planning  Goal: Discharge to home or other facility with appropriate resources  8/2/2023 2236 by Chloe Dhaliwal RN  Outcome: Progressing  8/2/2023 1430 by Melly Asencio LPN  Outcome: Progressing     Problem: Safety - Adult  Goal: Free from fall injury  8/2/2023 2236 by Chloe Dhaliwal RN  Outcome: Progressing  8/2/2023 1430 by Melly Asencio LPN  Outcome: Progressing     Problem: Skin/Tissue Integrity  Goal: Absence of new skin breakdown  Description: 1. Monitor for areas of redness and/or skin breakdown  2. Assess vascular access sites hourly  3. Every 4-6 hours minimum:  Change oxygen saturation probe site  4. Every 4-6 hours:  If on nasal continuous positive airway pressure, respiratory therapy assess nares and determine need for appliance change or resting period.   8/2/2023 2236 by Chloe Dhaliwal RN  Outcome: Progressing  8/2/2023 1430 by Melly Asencio LPN  Outcome: Progressing     Problem: ABCDS Injury Assessment  Goal: Absence of physical injury  8/2/2023 2236 by Chloe Dhaliwal RN  Outcome: Progressing  8/2/2023 1430 by Melly Asencio LPN  Outcome: Progressing

## 2023-08-03 NOTE — PROGRESS NOTES
Physical Therapy    [x] daily progress note       [x] discharge       Patient Name:  Jia Rubio   :  1940 MRN: [id number]  Room:  [address] [place] [address] Date of Admission: 2023  Rehabilitation Diagnosis:   Cerebral infarction, unspecified [I63.9]  Acute CVA (cerebrovascular accident) ([address]) [I63.9]       Date 8/3/2023       Day of ARU Week:  1   Time IN/OUT 1103/1203  1304/1350   Individual Tx Minutes 60+46   Group Tx Minutes    Co-Treat Minutes    Concurrent Tx Minutes    TOTAL Tx Time Mins 106   Variance Time +16   Variance Time []   Refusal due to:     []   Medical hold/reason:    []   Illness   []   Off Unit for test/procedure  [x]   Extra time needed to complete tasks  []   Therapeutic need  []   Other (specify):   Restrictions Restrictions/Precautions  Restrictions/Precautions: Fall Risk, General Precautions  Position Activity Restriction  Other position/activity restrictions: IV access LUE, L hemiparesis   Interdisciplinary communication [x]   Cleared for therapy per nursing     []   RN notified about issues during session  []   RN updated on pt performance  []   Spoke with   []   Spoke with OT  []   Spoke with MD  []   Other:    Subjective observations and cognitive status: (AM) Pt using bedpan with daughter present in room, had BM, alert. (PM) Pt in w/c, finishing lunch, daughter present in room.     Pain level/location: None reported    Discharge recommendations  Anticipated discharge date:  2023  Destination: []home alone   []home alone with assist PRN     [x] home w/ family      [] Continuous supervision  []SNF    [] Assisted living     [] Other:  Continued therapy: [x]HHC PT  []OUTPATIENT PT   [] No Further PT  []SNF PT  Caregiver training recommended: [x]Yes-ongoing  [] No   Equipment needs:  hospital bed, alternating pressure pad and pump, and Rylee lift; pt will need transportation service      Jia Rubio was evaluated today and a DME order was entered for a semi-electric

## 2023-08-04 VITALS
TEMPERATURE: 97.6 F | SYSTOLIC BLOOD PRESSURE: 104 MMHG | BODY MASS INDEX: 25.27 KG/M2 | HEART RATE: 82 BPM | HEIGHT: 64 IN | RESPIRATION RATE: 16 BRPM | WEIGHT: 148 LBS | OXYGEN SATURATION: 93 % | DIASTOLIC BLOOD PRESSURE: 66 MMHG

## 2023-08-04 LAB
EKG ATRIAL RATE: 84 BPM
EKG DIAGNOSIS: NORMAL
EKG P AXIS: 66 DEGREES
EKG P-R INTERVAL: 182 MS
EKG Q-T INTERVAL: 390 MS
EKG QRS DURATION: 90 MS
EKG QTC CALCULATION (BAZETT): 460 MS
EKG R AXIS: 46 DEGREES
EKG T AXIS: 81 DEGREES
EKG VENTRICULAR RATE: 84 BPM
LEVETIRACETAM SERPL-MCNC: 26 UG/ML (ref 10–40)

## 2023-08-04 PROCEDURE — 6370000000 HC RX 637 (ALT 250 FOR IP): Performed by: INTERNAL MEDICINE

## 2023-08-04 PROCEDURE — 94010 BREATHING CAPACITY TEST: CPT

## 2023-08-04 PROCEDURE — 6370000000 HC RX 637 (ALT 250 FOR IP): Performed by: PHYSICAL MEDICINE & REHABILITATION

## 2023-08-04 PROCEDURE — 99232 SBSQ HOSP IP/OBS MODERATE 35: CPT | Performed by: PHYSICIAN ASSISTANT

## 2023-08-04 PROCEDURE — 94150 VITAL CAPACITY TEST: CPT

## 2023-08-04 PROCEDURE — 94761 N-INVAS EAR/PLS OXIMETRY MLT: CPT

## 2023-08-04 PROCEDURE — 94640 AIRWAY INHALATION TREATMENT: CPT

## 2023-08-04 PROCEDURE — 6360000002 HC RX W HCPCS: Performed by: INTERNAL MEDICINE

## 2023-08-04 RX ORDER — ENOXAPARIN SODIUM 100 MG/ML
40 INJECTION SUBCUTANEOUS DAILY
Status: DISCONTINUED | OUTPATIENT
Start: 2023-08-04 | End: 2023-08-04

## 2023-08-04 RX ORDER — ACETAMINOPHEN 325 MG/1
650 TABLET ORAL EVERY 6 HOURS PRN
Status: DISCONTINUED | OUTPATIENT
Start: 2023-08-04 | End: 2023-08-04 | Stop reason: HOSPADM

## 2023-08-04 RX ADMIN — FOLIC ACID 500 MCG: 1 TABLET ORAL at 12:01

## 2023-08-04 RX ADMIN — LEVETIRACETAM 500 MG: 500 TABLET, FILM COATED ORAL at 11:58

## 2023-08-04 RX ADMIN — DONEPEZIL HYDROCHLORIDE 10 MG: 10 TABLET ORAL at 11:57

## 2023-08-04 RX ADMIN — MEMANTINE 10 MG: 10 TABLET ORAL at 12:02

## 2023-08-04 RX ADMIN — DILTIAZEM HYDROCHLORIDE 240 MG: 240 CAPSULE, COATED, EXTENDED RELEASE ORAL at 11:57

## 2023-08-04 RX ADMIN — MAGNESIUM OXIDE 400 MG (241.3 MG MAGNESIUM) TABLET 400 MG: TABLET at 12:24

## 2023-08-04 RX ADMIN — ASPIRIN 81 MG: 81 TABLET, CHEWABLE ORAL at 11:59

## 2023-08-04 RX ADMIN — TRIAMTERENE AND HYDROCHLOROTHIAZIDE 1 TABLET: 37.5; 25 TABLET ORAL at 12:01

## 2023-08-04 RX ADMIN — ACETAMINOPHEN 650 MG: 325 TABLET ORAL at 11:58

## 2023-08-04 RX ADMIN — PREDNISONE 15 MG: 5 TABLET ORAL at 11:58

## 2023-08-04 RX ADMIN — METOPROLOL TARTRATE 12.5 MG: 25 TABLET, FILM COATED ORAL at 11:57

## 2023-08-04 RX ADMIN — ENOXAPARIN SODIUM 40 MG: 100 INJECTION SUBCUTANEOUS at 12:02

## 2023-08-04 RX ADMIN — ALBUTEROL SULFATE 2 PUFF: 90 AEROSOL, METERED RESPIRATORY (INHALATION) at 08:09

## 2023-08-04 RX ADMIN — ACETAMINOPHEN 650 MG: 325 TABLET ORAL at 06:25

## 2023-08-04 RX ADMIN — POTASSIUM CHLORIDE 10 MEQ: 750 TABLET, EXTENDED RELEASE ORAL at 12:24

## 2023-08-04 RX ADMIN — TIOTROPIUM BROMIDE AND OLODATEROL 2 PUFF: 3.124; 2.736 SPRAY, METERED RESPIRATORY (INHALATION) at 08:10

## 2023-08-04 RX ADMIN — ALBUTEROL SULFATE 2 PUFF: 90 AEROSOL, METERED RESPIRATORY (INHALATION) at 16:15

## 2023-08-04 ASSESSMENT — PULMONARY FUNCTION TESTS
PIF_VALUE: 65
FEV1 (%PREDICTED): 52
POST BRONCHODILATOR FEV1/FVC: 100

## 2023-08-04 ASSESSMENT — COPD QUESTIONNAIRES
GOLD_GROUP: GROUP B
QUESTION5_HOMEACTIVITIES: 2
QUESTION8_ENERGYLEVEL: 4
QUESTION7_SLEEPQUALITY: 2
QUESTION2_CHESTPHLEGM: 1
QUESTION1_COUGHFREQUENCY: 2
TOTAL_EXACERBATIONS_PASTYEAR: 0
CAT_TOTALSCORE: 17
QUESTION4_WALKINCLINE: 2
QUESTION6_LEAVINGHOUSE: 1
QUESTION3_CHESTTIGHTNESS: 3

## 2023-08-04 ASSESSMENT — PAIN DESCRIPTION - LOCATION: LOCATION: LEG

## 2023-08-04 ASSESSMENT — PAIN - FUNCTIONAL ASSESSMENT: PAIN_FUNCTIONAL_ASSESSMENT: PREVENTS OR INTERFERES SOME ACTIVE ACTIVITIES AND ADLS

## 2023-08-04 ASSESSMENT — PAIN SCALES - GENERAL: PAINLEVEL_OUTOF10: 1

## 2023-08-04 ASSESSMENT — PAIN DESCRIPTION - ORIENTATION: ORIENTATION: LEFT

## 2023-08-04 ASSESSMENT — PAIN DESCRIPTION - DESCRIPTORS: DESCRIPTORS: ACHING

## 2023-08-04 NOTE — PROGRESS NOTES
08/04/23 1519   Spirometry Assessment   FEV1 (%PRED) 52   Post Bronchodilator FEV/   COPD Exacerbations in last year 0   PIF 65 L/min   COPD Assessment (CAT Score)   Cough Assessment 2   Phlegm Assessment 1   Chest tightness 3   Walking on an incline 2   Home Activities 2   Confident Leaving The Home 1   Sleeping Soundly 2   Have Energy 4   Assessment Score 17   $RT COPD Assessment Yes   GOLD Staging   Group Group B

## 2023-08-04 NOTE — PROGRESS NOTES
ARU Discharge Assessment - [facility]    Transportation  \"In the past 6-12 months, has lack of transportation kept you from medical appointments, meetings, work, or from getting things needed for daily living? \"Check all that apply:  [] A.  Yes, it has kept me from medical appointments or from getting my medications  [] B.  Yes, it has kept me from non-medical meetings, appointments, work, or from getting things that I need  [x] C.  No  [] X. Patient unable to respond    Provision of Current Reconciled Medication List to Subsequent Provider at Discharge     [] No, current reconciled medication list not provided to the subsequent provider. NO if D/C home with Outpatient or no services   [] Yes, current reconciled medication list provided to the subsequent provider. YES if D/C to Acute hospital, SNF, home with home health  (**We MUST Select route of transmission below**)      [] Via Electronic Health Record   [] [facility] ID  [] Verbal (e.g. in person, telephone, video conferencing)  [x] Paper-based (e.g. fax, copies, printouts)   [] Other Methods (e.g. texting, email, CDs)    Provision of Current Reconciled Medication List to Patient at Discharge  [] No, current reconciled medication list not provided to the patient, family and/or caregiver. NO If D/C to Acute hospital, SNF, home with home health   [] Yes, current reconciled medication list provided to the patient, family and/or caregiver.   YES if D/C home with Outpatient or no services   (**WE MUST Select route of transmission below**)     [] Via Electronic Health Record (e.g., electronic access to patient portal)   [] Via [employer] Organization  [] Verbal (e.g. in person, telephone, video conferencing)  [x] Paper-based (e.g. fax, copies, printouts)   [] Other Methods (e.g. texting, email, CDs)    Health Literacy  \"How often do you need to have someone help you when you read instructions, pamphlets,

## 2023-08-04 NOTE — PROGRESS NOTES
Pulmonary and Critical Care  Progress Note    Subjective: The patient is feeling better. On RA. Shortness of breath better. Chest pain none. Addressing respiratory complaints Patient is negative for  hemoptysis and cyanosis  CONSTITUTIONAL:  negative for fevers and chills      Past Medical History:     has a past medical history of Acid reflux, Anxiety, Bronchitis, Cancer ([address]), Carotid stenosis, Cerebral embolism with cerebral infarction ([address]), Chronic back pain, Depression, History of blood transfusion, History of external beam radiation therapy, Hyperlipidemia, Hypertension, Prolonged emergence from general anesthesia, RA (rheumatoid arthritis) ([address]), Seizure ([address]), Sleep apnea, Teeth missing, Urinary incontinence, UTI (urinary tract infection), Wears dentures, Wears glasses, and Wears partial dentures. has a past surgical history that includes Carpal tunnel release; Dental surgery; Colonoscopy (In Past); Hysterectomy, vaginal (1960's); Dilation and curettage of uterus (1960's); Carotid endarterectomy (Right, 11/18/2015); [address] Surgery (N/A, 03/02/2021); ARTHROCENTESIS / ASPIRATION / INJECTION KNEE (05/11/2021); and joint replacement. reports that she has never smoked. She has never used smokeless tobacco. She reports that she does not drink alcohol and does not use drugs. Family history:  family history includes Arthritis in her mother and sister; Depression in her son; Diabetes in her mother and sister; Hearing Loss in her brother; Heart Disease in her brother, mother, and sister; High Blood Pressure in her brother, brother, mother, sister, son, and son; High Cholesterol in her mother; Learning Disabilities in her son; Mental Illness in her sister and son; Jeremiah Dill / Kittrell in her mother; Other in her father, sister, son, son, and son; Stroke in her mother and sister.     Allergies   Allergen Reactions    Cortisone Rash    Codeine      Unsure Of Reaction    Pcn [Penicillins]      Unknown

## 2023-08-04 NOTE — CARE COORDINATION
Case mgt met with patient and daughter Ashley Caceres in room. DME delivery is 9-1 today. Patricia toscano dc transport be set for 1700.     [facility] transport home today @1700          [facility]  Discharge Summary    D/C Date: 8/4/23    Patient discharged to: home with family    Transported by: [facility]    Referrals made to: [place] [id number]  [address], [facility], [facility]    Additional information:     Caregiver training:  extensive with dtr Ashley Shtamiko    Discharge BIMS completed:  [x]

## 2023-08-04 NOTE — CARE COORDINATION
Patient's daughter Rachel Jaramillo is interested in changing the DME provider from [facility] (in network) to [address] [place] [address] (out of network)  Case mgt shared cost concerns with Rachel Jaramillo. Rachel Jaramillo will make some calls on her own regarding costs and let case mgt know her decision.

## 2023-08-04 NOTE — PROGRESS NOTES
Brief Neurology Note:  Spoke with the patient and her daughter to answer questions about antiplatelet. Recommend aspirin and statin at this time. Patient has been on [facility] for DVT however due to risk of falls at home plan is to continue with antiplatelet and statin alone for secondary stroke prevention. Recommend outpatient follow up which the patients daughter is scheduling today. Answered all questions to the patient and her daughters satisfaction.      [facility], APRN - [facility]  Neurology

## 2023-08-16 ENCOUNTER — HOSPITAL ENCOUNTER (OUTPATIENT)
Dept: WOUND CARE | Age: 83
Discharge: HOME | End: 2023-08-16
Payer: MEDICARE

## 2023-08-16 VITALS
RESPIRATION RATE: 16 BRPM | TEMPERATURE: 97.4 F | DIASTOLIC BLOOD PRESSURE: 82 MMHG | HEART RATE: 72 BPM | SYSTOLIC BLOOD PRESSURE: 147 MMHG

## 2023-08-16 DIAGNOSIS — L89.312 PRESSURE INJURY OF RIGHT BUTTOCK, STAGE 2 (HCC): ICD-10-CM

## 2023-08-16 DIAGNOSIS — Z79.899 IMMUNOSUPPRESSION DUE TO DRUG THERAPY (HCC): ICD-10-CM

## 2023-08-16 DIAGNOSIS — M05.79 RHEUMATOID ARTHRITIS INVOLVING MULTIPLE SITES WITH POSITIVE RHEUMATOID FACTOR (HCC): Primary | ICD-10-CM

## 2023-08-16 DIAGNOSIS — N39.45 CONTINUOUS LEAKAGE OF URINE: ICD-10-CM

## 2023-08-16 DIAGNOSIS — D84.821 IMMUNOSUPPRESSION DUE TO DRUG THERAPY (HCC): ICD-10-CM

## 2023-08-16 DIAGNOSIS — R26.9 GAIT DISTURBANCE: ICD-10-CM

## 2023-08-16 PROCEDURE — 99214 OFFICE O/P EST MOD 30 MIN: CPT

## 2023-08-16 PROCEDURE — 11042 DBRDMT SUBQ TIS 1ST 20SQCM/<: CPT

## 2023-08-16 ASSESSMENT — PAIN SCALES - GENERAL: PAINLEVEL_OUTOF10: 0

## 2023-08-16 NOTE — PROGRESS NOTES
to top ring and stir with tongue depressor. (Given)  Change Daily and PRN for urinary incontinence       []   Nurse Visit   Follow Up Instructions: At the [address] [place] [address] in 1 week   Primary Wound Care Provider: Kvng Poe CNP   Call  for any questions or concerns.   Central [phone] for imaging lab work         Treatment Note      Written Patient Dismissal Instructions Given            Electronically signed by TARUN Brito CNP on 2023 at 11:06 AM

## 2023-08-16 NOTE — DISCHARGE INSTRUCTIONS
PHYSICIAN ORDERS AND DISCHARGE INSTRUCTIONS    Wound cleansing:     Do not scrub or use excessive force. Wash hands with soap and water before and after dressing changes. Prior to applying a clean dressing, cleanse wound with normal saline,               wound cleanser, or mild soap and water. Ask the physician or nurse before getting the wound(s) wet in a shower    Daily Wound management:   Keep weight off wounds and reposition every 2 hours. Avoid standing for long periods of time. Apply wraps/stockings in AM and remove at bedtime. If swelling is present, elevate legs to the level of the heart or above for              30 minutes 4-5 times a day and/or when sitting. When taking antibiotics take entire prescription as ordered by              physician do not stop taking until medicine is all gone. Wound Care Notes:  Rx: Kelsey to find out about purewick and wheelchair. Reliable medical equipment to do home visit. Orders for this week:  23    FAX ORDERS TO [facility]! []  Paint toes with betadine       Right Buttock: Wash with soap and water, pat dry. Apply desitin, clobetasol, stimulen, and lidocaine  to wound bed and buttocks. Mix tube of desitin with specimen cup given (it has lidocaine, clobetasol, and stimulen in it). Fill to top ring and stir with tongue depressor. (Given)  Change Daily and PRN for urinary incontinence       []   Nurse Visit   Follow Up Instructions: At the [address] [place] [address] in 1 week   Primary Wound Care Provider: Kvng Poe CNP   Call  for any questions or concerns.   Central [phone] for imaging lab work

## 2023-08-22 ENCOUNTER — HOSPITAL ENCOUNTER (OUTPATIENT)
Dept: WOUND CARE | Age: 83
Discharge: HOME | End: 2023-08-22
Payer: MEDICARE

## 2023-08-22 VITALS
TEMPERATURE: 97.5 F | RESPIRATION RATE: 14 BRPM | HEART RATE: 65 BPM | SYSTOLIC BLOOD PRESSURE: 120 MMHG | DIASTOLIC BLOOD PRESSURE: 69 MMHG

## 2023-08-22 DIAGNOSIS — L89.312 PRESSURE INJURY OF RIGHT BUTTOCK, STAGE 2 (HCC): ICD-10-CM

## 2023-08-22 DIAGNOSIS — N39.45 CONTINUOUS LEAKAGE OF URINE: ICD-10-CM

## 2023-08-22 DIAGNOSIS — Z79.899 IMMUNOSUPPRESSION DUE TO DRUG THERAPY (HCC): Primary | ICD-10-CM

## 2023-08-22 DIAGNOSIS — D84.821 IMMUNOSUPPRESSION DUE TO DRUG THERAPY (HCC): Primary | ICD-10-CM

## 2023-08-22 DIAGNOSIS — R47.1 DYSARTHRIA DUE TO ACUTE STROKE (HCC): ICD-10-CM

## 2023-08-22 DIAGNOSIS — I63.9 DYSARTHRIA DUE TO ACUTE STROKE (HCC): ICD-10-CM

## 2023-08-22 PROCEDURE — 11042 DBRDMT SUBQ TIS 1ST 20SQCM/<: CPT

## 2023-08-22 RX ORDER — LIDOCAINE 40 MG/G
CREAM TOPICAL ONCE
OUTPATIENT
Start: 2023-08-22 | End: 2023-08-22

## 2023-08-22 RX ORDER — SODIUM CHLOR/HYPOCHLOROUS ACID 0.033 %
SOLUTION, IRRIGATION IRRIGATION ONCE
OUTPATIENT
Start: 2023-08-22 | End: 2023-08-22

## 2023-08-22 RX ORDER — LIDOCAINE 50 MG/G
OINTMENT TOPICAL ONCE
OUTPATIENT
Start: 2023-08-22 | End: 2023-08-22

## 2023-08-22 RX ORDER — BACITRACIN ZINC AND POLYMYXIN B SULFATE 500; 1000 [USP'U]/G; [USP'U]/G
OINTMENT TOPICAL ONCE
OUTPATIENT
Start: 2023-08-22 | End: 2023-08-22

## 2023-08-22 RX ORDER — LIDOCAINE HYDROCHLORIDE 40 MG/ML
SOLUTION TOPICAL ONCE
OUTPATIENT
Start: 2023-08-22 | End: 2023-08-22

## 2023-08-22 RX ORDER — GENTAMICIN SULFATE 1 MG/G
OINTMENT TOPICAL ONCE
OUTPATIENT
Start: 2023-08-22 | End: 2023-08-22

## 2023-08-22 RX ORDER — GINSENG 100 MG
CAPSULE ORAL ONCE
OUTPATIENT
Start: 2023-08-22 | End: 2023-08-22

## 2023-08-22 RX ORDER — IBUPROFEN 200 MG
TABLET ORAL ONCE
OUTPATIENT
Start: 2023-08-22 | End: 2023-08-22

## 2023-08-22 ASSESSMENT — PAIN - FUNCTIONAL ASSESSMENT: PAIN_FUNCTIONAL_ASSESSMENT: ACTIVITIES ARE NOT PREVENTED

## 2023-08-22 ASSESSMENT — PAIN SCALES - GENERAL: PAINLEVEL_OUTOF10: 0

## 2023-08-22 ASSESSMENT — PAIN DESCRIPTION - FREQUENCY: FREQUENCY: INTERMITTENT

## 2023-08-22 ASSESSMENT — PAIN DESCRIPTION - ONSET: ONSET: ON-GOING

## 2023-08-22 ASSESSMENT — PAIN DESCRIPTION - DESCRIPTORS: DESCRIPTORS: TENDER

## 2023-08-22 ASSESSMENT — PAIN DESCRIPTION - LOCATION: LOCATION: BUTTOCKS

## 2023-08-22 ASSESSMENT — PAIN DESCRIPTION - PAIN TYPE: TYPE: CHRONIC PAIN

## 2023-08-22 ASSESSMENT — PAIN DESCRIPTION - ORIENTATION: ORIENTATION: MID

## 2023-08-22 NOTE — PROGRESS NOTES
a wheel chair delivered from [facility]) DME just before hospitalization for a recent stroke. Since just coming home physical therapy verbalizes this does not not fit her. Working with [employer] on the wheelchair. Also looking into the [employer] system to better manage urinary incontinence. The patients records were reviewed and discussed. Time was given for questions . All questions were answered to the patients satisfaction. A total time of 20 minutes was spent with at least 50% related to face to face counseling and coordination of care. Plan:     Discharge instructions:    Discharge Instructions         PHYSICIAN ORDERS AND DISCHARGE INSTRUCTIONS     Wound cleansing:              Do not scrub or use excessive force. Wash hands with soap and water before and after dressing changes. Prior to applying a clean dressing, cleanse wound with normal saline,               wound cleanser, or mild soap and water. Ask the physician or nurse before getting the wound(s) wet in a shower     Daily Wound management:             Keep weight off wounds and reposition every 2 hours. Avoid standing for long periods of time. Apply wraps/stockings in AM and remove at bedtime. If swelling is present, elevate legs to the level of the heart or above for              30 minutes 4-5 times a day and/or when sitting. When taking antibiotics take entire prescription as ordered by              physician do not stop taking until medicine is all gone. Wound Care Notes:  Rx: Kelsey to find out about purewick and wheelchair. Reliable medical equipment to do home visit. Orders for this week:  8/22/23     FAX ORDERS TO [facility]! []  Paint toes with betadine                 Right Buttock: Wash with soap and water, pat dry.    Apply desitin, clobetasol, stimulen, and

## 2023-08-22 NOTE — DISCHARGE INSTRUCTIONS
PHYSICIAN ORDERS AND DISCHARGE INSTRUCTIONS     Wound cleansing:              Do not scrub or use excessive force. Wash hands with soap and water before and after dressing changes. Prior to applying a clean dressing, cleanse wound with normal saline,               wound cleanser, or mild soap and water. Ask the physician or nurse before getting the wound(s) wet in a shower     Daily Wound management:             Keep weight off wounds and reposition every 2 hours. Avoid standing for long periods of time. Apply wraps/stockings in AM and remove at bedtime. If swelling is present, elevate legs to the level of the heart or above for              30 minutes 4-5 times a day and/or when sitting. When taking antibiotics take entire prescription as ordered by              physician do not stop taking until medicine is all gone. Wound Care Notes:  Rx: Kelsey to find out about purewick and wheelchair. Reliable medical equipment to do home visit. Orders for this week:  23     FAX ORDERS TO [facility]! []  Paint toes with betadine                 Right Buttock: Wash with soap and water, pat dry. Apply desitin, clobetasol, stimulen, and lidocaine  to wound bed and buttocks. Change Daily and PRN for urinary incontinence         []   Nurse Visit   Follow Up Instructions: At the [address] [place] [address] in 1 week   Primary Wound Care Provider: Hortencia Reese CNP   Call  for any questions or concerns.   Central [phone] for imaging lab work

## 2023-08-29 ENCOUNTER — HOSPITAL ENCOUNTER (OUTPATIENT)
Dept: WOUND CARE | Age: 83
Discharge: HOME | End: 2023-08-29
Payer: MEDICARE

## 2023-08-29 DIAGNOSIS — N39.45 CONTINUOUS LEAKAGE OF URINE: ICD-10-CM

## 2023-08-29 DIAGNOSIS — I63.9 DYSARTHRIA DUE TO ACUTE STROKE (HCC): ICD-10-CM

## 2023-08-29 DIAGNOSIS — D84.821 IMMUNOSUPPRESSION DUE TO DRUG THERAPY (HCC): ICD-10-CM

## 2023-08-29 DIAGNOSIS — R47.1 DYSARTHRIA DUE TO ACUTE STROKE (HCC): ICD-10-CM

## 2023-08-29 DIAGNOSIS — L89.312 PRESSURE INJURY OF RIGHT BUTTOCK, STAGE 2 (HCC): Primary | ICD-10-CM

## 2023-08-29 DIAGNOSIS — Z79.899 IMMUNOSUPPRESSION DUE TO DRUG THERAPY (HCC): ICD-10-CM

## 2023-08-29 DIAGNOSIS — R26.9 GAIT DISTURBANCE: ICD-10-CM

## 2023-08-29 DIAGNOSIS — M05.79 RHEUMATOID ARTHRITIS INVOLVING MULTIPLE SITES WITH POSITIVE RHEUMATOID FACTOR (HCC): ICD-10-CM

## 2023-08-29 PROCEDURE — 11042 DBRDMT SUBQ TIS 1ST 20SQCM/<: CPT | Performed by: NURSE PRACTITIONER

## 2023-08-29 PROCEDURE — 6370000000 HC RX 637 (ALT 250 FOR IP): Performed by: NURSE PRACTITIONER

## 2023-08-29 PROCEDURE — 11042 DBRDMT SUBQ TIS 1ST 20SQCM/<: CPT

## 2023-08-29 RX ORDER — SODIUM CHLOR/HYPOCHLOROUS ACID 0.033 %
SOLUTION, IRRIGATION IRRIGATION ONCE
OUTPATIENT
Start: 2023-08-29 | End: 2023-08-29

## 2023-08-29 RX ORDER — IBUPROFEN 200 MG
TABLET ORAL ONCE
OUTPATIENT
Start: 2023-08-29 | End: 2023-08-29

## 2023-08-29 RX ORDER — LIDOCAINE HYDROCHLORIDE 40 MG/ML
SOLUTION TOPICAL ONCE
OUTPATIENT
Start: 2023-08-29 | End: 2023-08-29

## 2023-08-29 RX ORDER — LIDOCAINE 50 MG/G
OINTMENT TOPICAL ONCE
Status: COMPLETED | OUTPATIENT
Start: 2023-08-29 | End: 2023-08-29

## 2023-08-29 RX ORDER — BACITRACIN ZINC AND POLYMYXIN B SULFATE 500; 1000 [USP'U]/G; [USP'U]/G
OINTMENT TOPICAL ONCE
OUTPATIENT
Start: 2023-08-29 | End: 2023-08-29

## 2023-08-29 RX ORDER — LIDOCAINE 50 MG/G
OINTMENT TOPICAL ONCE
OUTPATIENT
Start: 2023-08-29 | End: 2023-08-29

## 2023-08-29 RX ORDER — LIDOCAINE 40 MG/G
CREAM TOPICAL ONCE
OUTPATIENT
Start: 2023-08-29 | End: 2023-08-29

## 2023-08-29 RX ORDER — GENTAMICIN SULFATE 1 MG/G
OINTMENT TOPICAL ONCE
OUTPATIENT
Start: 2023-08-29 | End: 2023-08-29

## 2023-08-29 RX ORDER — GINSENG 100 MG
CAPSULE ORAL ONCE
OUTPATIENT
Start: 2023-08-29 | End: 2023-08-29

## 2023-08-29 RX ADMIN — LIDOCAINE: 50 OINTMENT TOPICAL at 10:33

## 2023-08-29 NOTE — PROGRESS NOTES
Wound Care Center Progress Visit      Jia Rubio  AGE: 82 y.o. GENDER: female  : 1940  EPISODE DATE:  2023   Referred by: Kenzie Martinez     Subjective:     CHIEF COMPLAINT  WOUND   Problem List Items Addressed This Visit          Circulatory    Dysarthria due to acute stroke [facility])    Relevant Orders    Initiate Outpatient Wound Care Protocol       Other    Gait disturbance    Relevant Orders    Initiate Outpatient Wound Care Protocol    Rheumatoid arthritis involving multiple sites with positive rheumatoid factor ([address])    Relevant Orders    Initiate Outpatient Wound Care Protocol    WD-Pressure injury of right buttock, stage 2 ([address]) - Primary    Relevant Orders    Initiate Outpatient Wound Care Protocol    Continuous leakage of urine    Relevant Orders    Initiate Outpatient Wound Care Protocol    Immunosuppression due to drug therapy [facility])    Relevant Orders    Initiate Outpatient Wound Care Protocol     Chief Complaint   Patient presents with    Wound Check        HISTORY of PRESENT ILLNESS      Jia Rubio is a 82 y.o. 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 The condition is of moderate severity. The ulcers are recurrent in nature and have been present at varying degrees of severity for > than one year at initial visit to the wound clinic 23. The underlying cause is thought to be pressure and moisture from urinary incontinence. The patients care to date has included santyl. The patient has significant underlying medical conditions as below. Kenzie Martinez is PCP.     Wound Pain Timing/Severity: waxing and waning, moderate  Quality of pain: aching, tender, pressure  Severity of pain:  3 / 10   Modifying Factors: chronic pressure, decreased mobility, shear force, immunosuppression, incontinence of stool, and incontinence of urine  Associated Signs/Symptoms: erythema, drainage, and

## 2023-08-29 NOTE — DISCHARGE INSTRUCTIONS
PHYSICIAN ORDERS AND DISCHARGE INSTRUCTIONS     Wound cleansing:              Do not scrub or use excessive force. Wash hands with soap and water before and after dressing changes. Prior to applying a clean dressing, cleanse wound with normal saline,               wound cleanser, or mild soap and water. Ask the physician or nurse before getting the wound(s) wet in a shower     Daily Wound management:             Keep weight off wounds and reposition every 2 hours. Avoid standing for long periods of time. Apply wraps/stockings in AM and remove at bedtime. If swelling is present, elevate legs to the level of the heart or above for              30 minutes 4-5 times a day and/or when sitting. When taking antibiotics take entire prescription as ordered by              physician do not stop taking until medicine is all gone. Wound Care Notes:  Rx: Kelsey to find out about purewick and wheelchair. Reliable medical equipment to do home visit. Orders for this week:  23     FAX ORDERS TO [facility]! []  Paint toes with betadine                 Right Buttock: Wash with soap and water, pat dry. Apply desitin, clobetasol, stimulen, and lidocaine  to wound bed and buttocks. Change Daily and PRN for urinary incontinence         []   Nurse Visit   Follow Up Instructions: At the [address] [place] [address] in 1 week   Primary Wound Care Provider: Kasia Cho CNP   Call  for any questions or concerns.   Central [phone] for imaging lab work

## 2023-08-29 NOTE — PLAN OF CARE
Problem: Chronic Conditions and Co-morbidities  Goal: Patient's chronic conditions and co-morbidity symptoms are monitored and maintained or improved  8/29/2023 1028 by Shelby Rawls RN  Outcome: Progressing  8/29/2023 1016 by Nicolle Leija RN  Outcome: Progressing     Problem: Wound:  Goal: Will show signs of wound healing; wound closure and no evidence of infection  Description: Will show signs of wound healing; wound closure and no evidence of infection  8/29/2023 1028 by Shelby Rawls RN  Outcome: Progressing  8/29/2023 1016 by Nicolle Leija RN  Outcome: Progressing

## 2023-08-30 ENCOUNTER — CARE COORDINATION (OUTPATIENT)
Dept: MEDSURG UNIT | Age: 83
End: 2023-08-30

## 2023-09-07 PROBLEM — F41.1 GAD (GENERALIZED ANXIETY DISORDER): Status: ACTIVE | Noted: 2023-09-07

## 2023-09-12 ENCOUNTER — HOSPITAL ENCOUNTER (OUTPATIENT)
Dept: WOUND CARE | Age: 83
Discharge: HOME | End: 2023-09-12
Payer: MEDICARE

## 2023-09-12 DIAGNOSIS — Z79.899 IMMUNOSUPPRESSION DUE TO DRUG THERAPY (HCC): ICD-10-CM

## 2023-09-12 DIAGNOSIS — L89.312 PRESSURE INJURY OF RIGHT BUTTOCK, STAGE 2 (HCC): Primary | ICD-10-CM

## 2023-09-12 DIAGNOSIS — D84.821 IMMUNOSUPPRESSION DUE TO DRUG THERAPY (HCC): ICD-10-CM

## 2023-09-12 DIAGNOSIS — M05.79 RHEUMATOID ARTHRITIS INVOLVING MULTIPLE SITES WITH POSITIVE RHEUMATOID FACTOR (HCC): ICD-10-CM

## 2023-09-12 DIAGNOSIS — I63.9 DYSARTHRIA DUE TO ACUTE STROKE (HCC): ICD-10-CM

## 2023-09-12 DIAGNOSIS — L89.892 PRESSURE INJURY OF TOE OF LEFT FOOT, STAGE 2 (HCC): ICD-10-CM

## 2023-09-12 DIAGNOSIS — R47.1 DYSARTHRIA DUE TO ACUTE STROKE (HCC): ICD-10-CM

## 2023-09-12 DIAGNOSIS — N39.45 CONTINUOUS LEAKAGE OF URINE: ICD-10-CM

## 2023-09-12 DIAGNOSIS — R26.9 GAIT DISTURBANCE: ICD-10-CM

## 2023-09-12 PROCEDURE — 11042 DBRDMT SUBQ TIS 1ST 20SQCM/<: CPT

## 2023-09-12 PROCEDURE — 99213 OFFICE O/P EST LOW 20 MIN: CPT | Performed by: NURSE PRACTITIONER

## 2023-09-12 PROCEDURE — 11042 DBRDMT SUBQ TIS 1ST 20SQCM/<: CPT | Performed by: NURSE PRACTITIONER

## 2023-09-12 RX ORDER — GINSENG 100 MG
CAPSULE ORAL ONCE
OUTPATIENT
Start: 2023-09-12 | End: 2023-09-12

## 2023-09-12 RX ORDER — GENTAMICIN SULFATE 1 MG/G
OINTMENT TOPICAL ONCE
OUTPATIENT
Start: 2023-09-12 | End: 2023-09-12

## 2023-09-12 RX ORDER — BACITRACIN ZINC AND POLYMYXIN B SULFATE 500; 1000 [USP'U]/G; [USP'U]/G
OINTMENT TOPICAL ONCE
OUTPATIENT
Start: 2023-09-12 | End: 2023-09-12

## 2023-09-12 RX ORDER — GENTAMICIN SULFATE 1 MG/G
OINTMENT TOPICAL ONCE
Status: CANCELLED | OUTPATIENT
Start: 2023-09-12 | End: 2023-09-12

## 2023-09-12 RX ORDER — LIDOCAINE 40 MG/G
CREAM TOPICAL ONCE
OUTPATIENT
Start: 2023-09-12 | End: 2023-09-12

## 2023-09-12 RX ORDER — LIDOCAINE 40 MG/G
CREAM TOPICAL ONCE
Status: CANCELLED | OUTPATIENT
Start: 2023-09-12 | End: 2023-09-12

## 2023-09-12 RX ORDER — LIDOCAINE 50 MG/G
OINTMENT TOPICAL ONCE
Status: CANCELLED | OUTPATIENT
Start: 2023-09-12 | End: 2023-09-12

## 2023-09-12 RX ORDER — IBUPROFEN 200 MG
TABLET ORAL ONCE
Status: CANCELLED | OUTPATIENT
Start: 2023-09-12 | End: 2023-09-12

## 2023-09-12 RX ORDER — BACITRACIN ZINC AND POLYMYXIN B SULFATE 500; 1000 [USP'U]/G; [USP'U]/G
OINTMENT TOPICAL ONCE
Status: CANCELLED | OUTPATIENT
Start: 2023-09-12 | End: 2023-09-12

## 2023-09-12 RX ORDER — IBUPROFEN 200 MG
TABLET ORAL ONCE
OUTPATIENT
Start: 2023-09-12 | End: 2023-09-12

## 2023-09-12 RX ORDER — SODIUM CHLOR/HYPOCHLOROUS ACID 0.033 %
SOLUTION, IRRIGATION IRRIGATION ONCE
OUTPATIENT
Start: 2023-09-12 | End: 2023-09-12

## 2023-09-12 RX ORDER — CLOBETASOL PROPIONATE 0.5 MG/G
OINTMENT TOPICAL ONCE
OUTPATIENT
Start: 2023-09-12 | End: 2023-09-12

## 2023-09-12 RX ORDER — LIDOCAINE HYDROCHLORIDE 40 MG/ML
SOLUTION TOPICAL ONCE
Status: CANCELLED | OUTPATIENT
Start: 2023-09-12 | End: 2023-09-12

## 2023-09-12 RX ORDER — LIDOCAINE HYDROCHLORIDE 40 MG/ML
SOLUTION TOPICAL ONCE
OUTPATIENT
Start: 2023-09-12 | End: 2023-09-12

## 2023-09-12 RX ORDER — GINSENG 100 MG
CAPSULE ORAL ONCE
Status: CANCELLED | OUTPATIENT
Start: 2023-09-12 | End: 2023-09-12

## 2023-09-12 RX ORDER — LIDOCAINE 50 MG/G
OINTMENT TOPICAL ONCE
OUTPATIENT
Start: 2023-09-12 | End: 2023-09-12

## 2023-09-12 RX ORDER — BETAMETHASONE DIPROPIONATE 0.05 %
OINTMENT (GRAM) TOPICAL ONCE
OUTPATIENT
Start: 2023-09-12 | End: 2023-09-12

## 2023-09-12 RX ORDER — SODIUM CHLOR/HYPOCHLOROUS ACID 0.033 %
SOLUTION, IRRIGATION IRRIGATION ONCE
Status: CANCELLED | OUTPATIENT
Start: 2023-09-12 | End: 2023-09-12

## 2023-09-12 NOTE — PROGRESS NOTES
Wound Care Center Progress Visit      Pamela Baptiste  AGE: 82 y.o. GENDER: female  : 1940  EPISODE DATE:  2023   Referred by: Mauricio Lozano     Subjective:     CHIEF COMPLAINT  WOUND   Problem List Items Addressed This Visit          Circulatory    Dysarthria due to acute stroke [facility])    Relevant Orders    Initiate Outpatient Wound Care Protocol       Other    Gait disturbance    Relevant Orders    Initiate Outpatient Wound Care Protocol    Rheumatoid arthritis involving multiple sites with positive rheumatoid factor ([address])    Relevant Orders    Initiate Outpatient Wound Care Protocol    WD-Pressure injury of right buttock, stage 2 ([address]) - Primary    Relevant Orders    Initiate Outpatient Wound Care Protocol    Continuous leakage of urine    Relevant Orders    Initiate Outpatient Wound Care Protocol    Immunosuppression due to drug therapy [facility])    Relevant Orders    Initiate Outpatient Wound Care Protocol    WD-Pressure injury of toe of left foot, stage 2 [facility])     Chief Complaint   Patient presents with    Wound Check        HISTORY of PRESENT ILLNESS      Pamela Baptiste is a 82 y.o. 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 The condition is of moderate severity. The ulcers are recurrent in nature and have been present at varying degrees of severity for > than one year at initial visit to the wound clinic 23. The underlying cause is thought to be pressure and moisture from urinary incontinence. The patients care to date has included santyl. The patient has significant underlying medical conditions as below. Mauricio Lozano is PCP.     Wound Pain Timing/Severity: waxing and waning, moderate  Quality of pain: aching, tender, pressure  Severity of pain:  3 / 10   Modifying Factors: chronic pressure, decreased mobility, shear force, immunosuppression, incontinence of stool, and incontinence of

## 2023-09-12 NOTE — DISCHARGE INSTRUCTIONS
PHYSICIAN ORDERS AND DISCHARGE INSTRUCTIONS     Wound cleansing:              Do not scrub or use excessive force. Wash hands with soap and water before and after dressing changes. Prior to applying a clean dressing, cleanse wound with normal saline,               wound cleanser, or mild soap and water. Ask the physician or nurse before getting the wound(s) wet in a shower     Daily Wound management:             Keep weight off wounds and reposition every 2 hours. Avoid standing for long periods of time. Apply wraps/stockings in AM and remove at bedtime. If swelling is present, elevate legs to the level of the heart or above for              30 minutes 4-5 times a day and/or when sitting. When taking antibiotics take entire prescription as ordered by              physician do not stop taking until medicine is all gone. Wound Care Notes:  Rx: Kelsey to find out about purewick and wheelchair. Reliable medical equipment to do home visit. Orders for this week: 23     FAX ORDERS TO [facility]! []  Paint toes with betadine                 Left Great Toe - Wash with soap and water, pat dry. Apply Regenecare (Lidocaine) & Anasept spray dampened elizabeth to wound bed. Cover with Ca alginate. Wrap loosely with conform and coban to ankle (enclose toes). Right Buttock: Wash with soap and water, pat dry. Apply desitin, clobetasol, stimulen, and lidocaine to wound bed and buttocks. Change Daily and PRN for urinary incontinence         []   Nurse Visit   Follow Up Instructions: At the [address] [place] [address] in 1 week   Primary Wound Care Provider: Hortencia Reese CNP   Call  for any questions or concerns.   Central [phone] for imaging lab work

## 2023-09-19 ENCOUNTER — HOSPITAL ENCOUNTER (OUTPATIENT)
Dept: WOUND CARE | Age: 83
Discharge: HOME | End: 2023-09-19
Payer: MEDICARE

## 2023-09-19 VITALS — TEMPERATURE: 98 F | RESPIRATION RATE: 16 BRPM

## 2023-09-19 DIAGNOSIS — Z79.899 IMMUNOSUPPRESSION DUE TO DRUG THERAPY (HCC): ICD-10-CM

## 2023-09-19 DIAGNOSIS — L89.312 PRESSURE INJURY OF RIGHT BUTTOCK, STAGE 2 (HCC): Primary | ICD-10-CM

## 2023-09-19 DIAGNOSIS — M05.79 RHEUMATOID ARTHRITIS INVOLVING MULTIPLE SITES WITH POSITIVE RHEUMATOID FACTOR (HCC): ICD-10-CM

## 2023-09-19 DIAGNOSIS — D84.821 IMMUNOSUPPRESSION DUE TO DRUG THERAPY (HCC): ICD-10-CM

## 2023-09-19 DIAGNOSIS — I63.9 DYSARTHRIA DUE TO ACUTE STROKE (HCC): ICD-10-CM

## 2023-09-19 DIAGNOSIS — R26.9 GAIT DISTURBANCE: ICD-10-CM

## 2023-09-19 DIAGNOSIS — N39.45 CONTINUOUS LEAKAGE OF URINE: ICD-10-CM

## 2023-09-19 DIAGNOSIS — R47.1 DYSARTHRIA DUE TO ACUTE STROKE (HCC): ICD-10-CM

## 2023-09-19 DIAGNOSIS — L89.892 PRESSURE INJURY OF TOE OF LEFT FOOT, STAGE 2 (HCC): ICD-10-CM

## 2023-09-19 PROCEDURE — 6370000000 HC RX 637 (ALT 250 FOR IP): Performed by: NURSE PRACTITIONER

## 2023-09-19 PROCEDURE — 11719 TRIM NAIL(S) ANY NUMBER: CPT

## 2023-09-19 PROCEDURE — 11042 DBRDMT SUBQ TIS 1ST 20SQCM/<: CPT | Performed by: NURSE PRACTITIONER

## 2023-09-19 PROCEDURE — 11042 DBRDMT SUBQ TIS 1ST 20SQCM/<: CPT

## 2023-09-19 RX ORDER — LIDOCAINE 50 MG/G
OINTMENT TOPICAL ONCE
Status: COMPLETED | OUTPATIENT
Start: 2023-09-19 | End: 2023-09-19

## 2023-09-19 RX ORDER — CLOBETASOL PROPIONATE 0.5 MG/G
OINTMENT TOPICAL ONCE
Status: COMPLETED | OUTPATIENT
Start: 2023-09-19 | End: 2023-09-19

## 2023-09-19 RX ORDER — LIDOCAINE 50 MG/G
OINTMENT TOPICAL ONCE
OUTPATIENT
Start: 2023-09-19 | End: 2023-09-19

## 2023-09-19 RX ORDER — GINSENG 100 MG
CAPSULE ORAL ONCE
OUTPATIENT
Start: 2023-09-19 | End: 2023-09-19

## 2023-09-19 RX ORDER — CLOBETASOL PROPIONATE 0.5 MG/G
OINTMENT TOPICAL ONCE
OUTPATIENT
Start: 2023-09-19 | End: 2023-09-19

## 2023-09-19 RX ORDER — LIDOCAINE 40 MG/G
CREAM TOPICAL ONCE
OUTPATIENT
Start: 2023-09-19 | End: 2023-09-19

## 2023-09-19 RX ORDER — BETAMETHASONE DIPROPIONATE 0.05 %
OINTMENT (GRAM) TOPICAL ONCE
OUTPATIENT
Start: 2023-09-19 | End: 2023-09-19

## 2023-09-19 RX ORDER — IBUPROFEN 200 MG
TABLET ORAL ONCE
OUTPATIENT
Start: 2023-09-19 | End: 2023-09-19

## 2023-09-19 RX ORDER — GENTAMICIN SULFATE 1 MG/G
OINTMENT TOPICAL ONCE
OUTPATIENT
Start: 2023-09-19 | End: 2023-09-19

## 2023-09-19 RX ORDER — LIDOCAINE HYDROCHLORIDE 40 MG/ML
SOLUTION TOPICAL ONCE
OUTPATIENT
Start: 2023-09-19 | End: 2023-09-19

## 2023-09-19 RX ORDER — SODIUM CHLOR/HYPOCHLOROUS ACID 0.033 %
SOLUTION, IRRIGATION IRRIGATION ONCE
OUTPATIENT
Start: 2023-09-19 | End: 2023-09-19

## 2023-09-19 RX ORDER — TRIAMCINOLONE ACETONIDE 1 MG/G
OINTMENT TOPICAL ONCE
OUTPATIENT
Start: 2023-09-19 | End: 2023-09-19

## 2023-09-19 RX ORDER — BACITRACIN ZINC AND POLYMYXIN B SULFATE 500; 1000 [USP'U]/G; [USP'U]/G
OINTMENT TOPICAL ONCE
OUTPATIENT
Start: 2023-09-19 | End: 2023-09-19

## 2023-09-19 RX ADMIN — LIDOCAINE: 50 OINTMENT TOPICAL at 10:21

## 2023-09-19 RX ADMIN — CLOBETASOL PROPIONATE: 0.5 OINTMENT TOPICAL at 10:20

## 2023-09-19 NOTE — PATIENT INSTRUCTIONS
PHYSICIAN ORDERS AND DISCHARGE INSTRUCTIONS     Wound cleansing:              Do not scrub or use excessive force. Wash hands with soap and water before and after dressing changes. Prior to applying a clean dressing, cleanse wound with normal saline,               wound cleanser, or mild soap and water. Ask the physician or nurse before getting the wound(s) wet in a shower     Daily Wound management:             Keep weight off wounds and reposition every 2 hours. Avoid standing for long periods of time. Apply wraps/stockings in AM and remove at bedtime. If swelling is present, elevate legs to the level of the heart or above for              30 minutes 4-5 times a day and/or when sitting. When taking antibiotics take entire prescription as ordered by              physician do not stop taking until medicine is all gone. Wound Care Notes:  Rx: Kelsey to find out about purewick and wheelchair. Reliable medical equipment to do home visit. Orders for this week: 09/19/23     FAX ORDERS TO [facility]! []  Paint toes with betadine                 Left Great Toe - Wash with soap and water, pat dry. Apply Regenecare (Lidocaine) & Anasept spray dampened elizabeth to wound bed. Cover with Ca alginate. Wrap loosely with conform and coban to ankle (enclose toes). Leave in place for 1 week. Right Great Toe - Wash with soap and water, pat dry. Apply Betadine and Stimulen to the wound bed. Cover with Ca alginate. Wrap loosely with conform and coban to ankle (enclose toes). Leave in place for 1 week. Right Buttock: Wash with soap and water, pat dry. Apply desitin, clobetasol, stimulen, and lidocaine to wound bed and buttocks.    Change Daily and PRN for urinary incontinence      Paint toenails with betadine today in clinic (9/19/23) due to trimming

## 2023-09-19 NOTE — PROGRESS NOTES
Wound Care Center Progress Visit      Britany Orellana  AGE: 82 y.o. GENDER: female  : 1940  EPISODE DATE:  2023   Referred by: Latosha Ambrzi     Subjective:     CHIEF COMPLAINT  WOUND   Problem List Items Addressed This Visit          Circulatory    Dysarthria due to acute stroke [facility])    Relevant Orders    Initiate Outpatient Wound Care Protocol       Other    Gait disturbance    Relevant Orders    Initiate Outpatient Wound Care Protocol    Rheumatoid arthritis involving multiple sites with positive rheumatoid factor ([address])    Relevant Orders    Initiate Outpatient Wound Care Protocol    WD-Pressure injury of right buttock, stage 2 ([address]) - Primary    Relevant Orders    Initiate Outpatient Wound Care Protocol    Continuous leakage of urine    Relevant Orders    Initiate Outpatient Wound Care Protocol    Immunosuppression due to drug therapy [facility])    Relevant Orders    Initiate Outpatient Wound Care Protocol    WD-Pressure injury of toe of left foot, stage 2 [facility])     Chief Complaint   Patient presents with    Wound Check        HISTORY of PRESENT ILLNESS      Britany Orellana is a 82 y.o. 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 The condition is of moderate severity. The ulcers are recurrent in nature and have been present at varying degrees of severity for > than one year at initial visit to the wound clinic 23. The underlying cause is thought to be pressure and moisture from urinary incontinence. The patients care to date has included santyl. The patient has significant underlying medical conditions as below. Latosha Ambriz is PCP.     Wound Pain Timing/Severity: waxing and waning, moderate  Quality of pain: aching, tender, pressure  Severity of pain:  3 / 10   Modifying Factors: chronic pressure, decreased mobility, shear force, immunosuppression, incontinence of stool, and incontinence of

## 2023-09-21 RX ORDER — CLOBETASOL PROPIONATE 0.5 MG/G
CREAM TOPICAL
Qty: 60 G | Refills: 1 | Status: SHIPPED | OUTPATIENT
Start: 2023-09-21

## 2023-09-21 RX ORDER — LIDOCAINE 50 MG/G
OINTMENT TOPICAL
Qty: 50 G | Refills: 1 | Status: SHIPPED | OUTPATIENT
Start: 2023-09-21

## 2023-09-26 ENCOUNTER — HOSPITAL ENCOUNTER (OUTPATIENT)
Dept: WOUND CARE | Age: 83
Discharge: HOME | End: 2023-09-26
Payer: MEDICARE

## 2023-09-26 VITALS
HEART RATE: 65 BPM | DIASTOLIC BLOOD PRESSURE: 70 MMHG | TEMPERATURE: 97.6 F | RESPIRATION RATE: 16 BRPM | SYSTOLIC BLOOD PRESSURE: 122 MMHG

## 2023-09-26 DIAGNOSIS — I63.9 DYSARTHRIA DUE TO ACUTE STROKE (HCC): ICD-10-CM

## 2023-09-26 DIAGNOSIS — M05.79 RHEUMATOID ARTHRITIS INVOLVING MULTIPLE SITES WITH POSITIVE RHEUMATOID FACTOR (HCC): ICD-10-CM

## 2023-09-26 DIAGNOSIS — L89.312 PRESSURE INJURY OF RIGHT BUTTOCK, STAGE 2 (HCC): Primary | ICD-10-CM

## 2023-09-26 DIAGNOSIS — R26.9 GAIT DISTURBANCE: ICD-10-CM

## 2023-09-26 DIAGNOSIS — N39.45 CONTINUOUS LEAKAGE OF URINE: ICD-10-CM

## 2023-09-26 DIAGNOSIS — R47.1 DYSARTHRIA DUE TO ACUTE STROKE (HCC): ICD-10-CM

## 2023-09-26 DIAGNOSIS — Z79.899 IMMUNOSUPPRESSION DUE TO DRUG THERAPY (HCC): ICD-10-CM

## 2023-09-26 DIAGNOSIS — D84.821 IMMUNOSUPPRESSION DUE TO DRUG THERAPY (HCC): ICD-10-CM

## 2023-09-26 PROCEDURE — 11042 DBRDMT SUBQ TIS 1ST 20SQCM/<: CPT | Performed by: NURSE PRACTITIONER

## 2023-09-26 PROCEDURE — 11042 DBRDMT SUBQ TIS 1ST 20SQCM/<: CPT

## 2023-09-26 RX ORDER — SODIUM CHLOR/HYPOCHLOROUS ACID 0.033 %
SOLUTION, IRRIGATION IRRIGATION ONCE
OUTPATIENT
Start: 2023-09-26 | End: 2023-09-26

## 2023-09-26 RX ORDER — LIDOCAINE 40 MG/G
CREAM TOPICAL ONCE
OUTPATIENT
Start: 2023-09-26 | End: 2023-09-26

## 2023-09-26 RX ORDER — GINSENG 100 MG
CAPSULE ORAL ONCE
OUTPATIENT
Start: 2023-09-26 | End: 2023-09-26

## 2023-09-26 RX ORDER — BETAMETHASONE DIPROPIONATE 0.05 %
OINTMENT (GRAM) TOPICAL ONCE
OUTPATIENT
Start: 2023-09-26 | End: 2023-09-26

## 2023-09-26 RX ORDER — LIDOCAINE 50 MG/G
OINTMENT TOPICAL ONCE
OUTPATIENT
Start: 2023-09-26 | End: 2023-09-26

## 2023-09-26 RX ORDER — CLOBETASOL PROPIONATE 0.5 MG/G
OINTMENT TOPICAL ONCE
OUTPATIENT
Start: 2023-09-26 | End: 2023-09-26

## 2023-09-26 RX ORDER — IBUPROFEN 200 MG
TABLET ORAL ONCE
OUTPATIENT
Start: 2023-09-26 | End: 2023-09-26

## 2023-09-26 RX ORDER — TRIAMCINOLONE ACETONIDE 1 MG/G
OINTMENT TOPICAL ONCE
OUTPATIENT
Start: 2023-09-26 | End: 2023-09-26

## 2023-09-26 RX ORDER — GENTAMICIN SULFATE 1 MG/G
OINTMENT TOPICAL ONCE
OUTPATIENT
Start: 2023-09-26 | End: 2023-09-26

## 2023-09-26 RX ORDER — BACITRACIN ZINC AND POLYMYXIN B SULFATE 500; 1000 [USP'U]/G; [USP'U]/G
OINTMENT TOPICAL ONCE
OUTPATIENT
Start: 2023-09-26 | End: 2023-09-26

## 2023-09-26 RX ORDER — LIDOCAINE HYDROCHLORIDE 40 MG/ML
SOLUTION TOPICAL ONCE
OUTPATIENT
Start: 2023-09-26 | End: 2023-09-26

## 2023-09-26 NOTE — PATIENT INSTRUCTIONS
PHYSICIAN ORDERS AND DISCHARGE INSTRUCTIONS     Wound cleansing:              Do not scrub or use excessive force. Wash hands with soap and water before and after dressing changes. Prior to applying a clean dressing, cleanse wound with normal saline,               wound cleanser, or mild soap and water. Ask the physician or nurse before getting the wound(s) wet in a shower     Daily Wound management:             Keep weight off wounds and reposition every 2 hours. Avoid standing for long periods of time. Apply wraps/stockings in AM and remove at bedtime. If swelling is present, elevate legs to the level of the heart or above for              30 minutes 4-5 times a day and/or when sitting. When taking antibiotics take entire prescription as ordered by              physician do not stop taking until medicine is all gone. Wound Care Notes:  Rx: Kelsey to find out about purewick and wheelchair. Reliable medical equipment to do home visit. Orders for this week: 23     FAX ORDERS TO [facility]! []  Paint toes with betadine                Right Great Toe and Right Buttock wounds - Wash with soap and water, pat dry. Apply Anasept spray dampened elizabeth to wound beds (layer into buttock wound). Cover with calcium alginate. Wrap Toe loosely with conform and coban to ankle (enclose toes). Silicone border to Right Buttock   Leave dressings in place for 3 days. []   Nurse Visit   Follow Up Instructions: At the [address] [place] [address] in 1 week   Primary Wound Care Provider: Harriet Stewart CNP   Call  for any questions or concerns.   Central [phone] for imaging lab work

## 2023-09-27 ENCOUNTER — OFFICE VISIT (OUTPATIENT)
Dept: NEUROLOGY | Age: 83
End: 2023-09-27
Payer: MEDICARE

## 2023-09-27 VITALS
WEIGHT: 148 LBS | HEIGHT: 64 IN | BODY MASS INDEX: 25.27 KG/M2 | OXYGEN SATURATION: 99 % | SYSTOLIC BLOOD PRESSURE: 124 MMHG | DIASTOLIC BLOOD PRESSURE: 70 MMHG | HEART RATE: 78 BPM | RESPIRATION RATE: 14 BRPM

## 2023-09-27 DIAGNOSIS — F01.A0 MILD VASCULAR DEMENTIA WITHOUT BEHAVIORAL DISTURBANCE, PSYCHOTIC DISTURBANCE, MOOD DISTURBANCE, OR ANXIETY (HCC): Primary | ICD-10-CM

## 2023-09-27 DIAGNOSIS — G95.89 MYELOMALACIA OF CERVICAL CORD (HCC): ICD-10-CM

## 2023-09-27 DIAGNOSIS — R56.9 PARTIAL SEIZURES (HCC): ICD-10-CM

## 2023-09-27 DIAGNOSIS — I63.429 CEREBRAL INFARCTION DUE TO EMBOLISM OF ANTERIOR CEREBRAL ARTERY, UNSPECIFIED BLOOD VESSEL LATERALITY (HCC): ICD-10-CM

## 2023-09-27 PROCEDURE — G8399 PT W/DXA RESULTS DOCUMENT: HCPCS | Performed by: NURSE PRACTITIONER

## 2023-09-27 PROCEDURE — G8427 DOCREV CUR MEDS BY ELIG CLIN: HCPCS | Performed by: NURSE PRACTITIONER

## 2023-09-27 PROCEDURE — 3074F SYST BP LT 130 MM HG: CPT | Performed by: NURSE PRACTITIONER

## 2023-09-27 PROCEDURE — 1036F TOBACCO NON-USER: CPT | Performed by: NURSE PRACTITIONER

## 2023-09-27 PROCEDURE — 1123F ACP DISCUSS/DSCN MKR DOCD: CPT | Performed by: NURSE PRACTITIONER

## 2023-09-27 PROCEDURE — 99214 OFFICE O/P EST MOD 30 MIN: CPT | Performed by: NURSE PRACTITIONER

## 2023-09-27 PROCEDURE — G8419 CALC BMI OUT NRM PARAM NOF/U: HCPCS | Performed by: NURSE PRACTITIONER

## 2023-09-27 PROCEDURE — 1090F PRES/ABSN URINE INCON ASSESS: CPT | Performed by: NURSE PRACTITIONER

## 2023-09-27 PROCEDURE — 3078F DIAST BP <80 MM HG: CPT | Performed by: NURSE PRACTITIONER

## 2023-09-27 RX ORDER — DONEPEZIL HYDROCHLORIDE 10 MG/1
10 TABLET, FILM COATED ORAL EVERY MORNING
Qty: 90 TABLET | Refills: 1 | Status: SHIPPED | OUTPATIENT
Start: 2023-09-27

## 2023-09-27 RX ORDER — MEMANTINE HYDROCHLORIDE 10 MG/1
10 TABLET ORAL 2 TIMES DAILY
Qty: 60 TABLET | Refills: 5 | Status: SHIPPED | OUTPATIENT
Start: 2023-09-27

## 2023-09-27 RX ORDER — LEVETIRACETAM 500 MG/1
500 TABLET ORAL 2 TIMES DAILY
Qty: 180 TABLET | Refills: 3 | Status: SHIPPED | OUTPATIENT
Start: 2023-09-27

## 2023-09-27 NOTE — PROGRESS NOTES
9/27/23    Nicole Headings  1940    Chief Complaint   Patient presents with    3 Month Follow-Up     [facility]    Fall     Recent fall occurred at home with patient landing on her buttocks the day before yesterday. No bruising reported. History of Present Illness  Reyes Grass is a 82 y.o. female presenting today for hospital follow-up on 7/12/2023 evaluated by Dr. Ace Sheikh for left-sided weakness x2 months. She is known to our service and a patient of Dr. Ian Ochoa. She had an area of evolving stroke and right parietal lobe thought to be consistent with her symptoms that started 2 months ago. EEG was completed to ensure no breakthrough seizure activity. It was normal.  CTA without significant stenosis, echocardiogram without stroke or symptoms. MG panel was negative. MRI C-spine showed degenerative changes with narrowing most severe at C4-5 as well as cord signal changes felt to be the cause of her right-sided loss of dexterity and  strength. In regards to her cervical myelomalacia, she was not felt to be a good surgical candidate. She has a history of stroke, HLD, HTN, VIC, seizure, RA. She is maintained on Keppra 500 mg twice daily. He continues Aricept and Namenda to help slow the progression of memory loss. She is on aspirin, atorvastatin for secondary stroke prevention. She is accompanied by her daughter today. She lives at home with her son and her daughter stays the night with him. Reyes Grass is currently participating in PT and OT for her left-sided weakness. She has not had any new stroke symptoms or breakthrough seizure activity. Her memory remains stable, she is in a wheelchair today.   Current Outpatient Medications   Medication Sig Dispense Refill    memantine (NAMENDA) 10 MG tablet Take 1 tablet by mouth 2 times daily 60 tablet 5    levETIRAcetam (KEPPRA) 500 MG tablet Take 1 tablet by mouth 2 times daily 180 tablet 3    donepezil (ARICEPT) 10 MG tablet Take 1 tablet by mouth every

## 2023-10-03 ENCOUNTER — HOSPITAL ENCOUNTER (OUTPATIENT)
Dept: WOUND CARE | Age: 83
Discharge: HOME | End: 2023-10-03
Payer: MEDICARE

## 2023-10-03 VITALS
TEMPERATURE: 97.9 F | RESPIRATION RATE: 14 BRPM | DIASTOLIC BLOOD PRESSURE: 77 MMHG | SYSTOLIC BLOOD PRESSURE: 139 MMHG | HEART RATE: 85 BPM

## 2023-10-03 DIAGNOSIS — R26.9 GAIT DISTURBANCE: ICD-10-CM

## 2023-10-03 DIAGNOSIS — N39.45 CONTINUOUS LEAKAGE OF URINE: ICD-10-CM

## 2023-10-03 DIAGNOSIS — R47.1 DYSARTHRIA DUE TO ACUTE STROKE (HCC): ICD-10-CM

## 2023-10-03 DIAGNOSIS — Z79.899 IMMUNOSUPPRESSION DUE TO DRUG THERAPY (HCC): ICD-10-CM

## 2023-10-03 DIAGNOSIS — I63.9 DYSARTHRIA DUE TO ACUTE STROKE (HCC): ICD-10-CM

## 2023-10-03 DIAGNOSIS — D84.821 IMMUNOSUPPRESSION DUE TO DRUG THERAPY (HCC): ICD-10-CM

## 2023-10-03 DIAGNOSIS — M05.79 RHEUMATOID ARTHRITIS INVOLVING MULTIPLE SITES WITH POSITIVE RHEUMATOID FACTOR (HCC): ICD-10-CM

## 2023-10-03 DIAGNOSIS — L89.312 PRESSURE INJURY OF RIGHT BUTTOCK, STAGE 2 (HCC): Primary | ICD-10-CM

## 2023-10-03 PROCEDURE — 11042 DBRDMT SUBQ TIS 1ST 20SQCM/<: CPT | Performed by: NURSE PRACTITIONER

## 2023-10-03 PROCEDURE — 11042 DBRDMT SUBQ TIS 1ST 20SQCM/<: CPT

## 2023-10-03 PROCEDURE — 6370000000 HC RX 637 (ALT 250 FOR IP): Performed by: NURSE PRACTITIONER

## 2023-10-03 RX ORDER — LIDOCAINE 50 MG/G
OINTMENT TOPICAL ONCE
OUTPATIENT
Start: 2023-10-03 | End: 2023-10-03

## 2023-10-03 RX ORDER — IBUPROFEN 200 MG
TABLET ORAL ONCE
OUTPATIENT
Start: 2023-10-03 | End: 2023-10-03

## 2023-10-03 RX ORDER — CLOBETASOL PROPIONATE 0.5 MG/G
OINTMENT TOPICAL ONCE
OUTPATIENT
Start: 2023-10-03 | End: 2023-10-03

## 2023-10-03 RX ORDER — BACITRACIN ZINC AND POLYMYXIN B SULFATE 500; 1000 [USP'U]/G; [USP'U]/G
OINTMENT TOPICAL ONCE
OUTPATIENT
Start: 2023-10-03 | End: 2023-10-03

## 2023-10-03 RX ORDER — GENTAMICIN SULFATE 1 MG/G
OINTMENT TOPICAL ONCE
OUTPATIENT
Start: 2023-10-03 | End: 2023-10-03

## 2023-10-03 RX ORDER — BETAMETHASONE DIPROPIONATE 0.05 %
OINTMENT (GRAM) TOPICAL ONCE
OUTPATIENT
Start: 2023-10-03 | End: 2023-10-03

## 2023-10-03 RX ORDER — TRIAMCINOLONE ACETONIDE 1 MG/G
OINTMENT TOPICAL ONCE
OUTPATIENT
Start: 2023-10-03 | End: 2023-10-03

## 2023-10-03 RX ORDER — LIDOCAINE 40 MG/G
CREAM TOPICAL ONCE
OUTPATIENT
Start: 2023-10-03 | End: 2023-10-03

## 2023-10-03 RX ORDER — GINSENG 100 MG
CAPSULE ORAL ONCE
OUTPATIENT
Start: 2023-10-03 | End: 2023-10-03

## 2023-10-03 RX ORDER — GENTAMICIN SULFATE 1 MG/G
OINTMENT TOPICAL ONCE
Status: COMPLETED | OUTPATIENT
Start: 2023-10-03 | End: 2023-10-03

## 2023-10-03 RX ORDER — SODIUM CHLOR/HYPOCHLOROUS ACID 0.033 %
SOLUTION, IRRIGATION IRRIGATION ONCE
OUTPATIENT
Start: 2023-10-03 | End: 2023-10-03

## 2023-10-03 RX ORDER — LIDOCAINE HYDROCHLORIDE 40 MG/ML
SOLUTION TOPICAL ONCE
OUTPATIENT
Start: 2023-10-03 | End: 2023-10-03

## 2023-10-03 RX ADMIN — GENTAMICIN SULFATE: 1 OINTMENT TOPICAL at 10:18

## 2023-10-03 ASSESSMENT — PAIN DESCRIPTION - PAIN TYPE: TYPE: CHRONIC PAIN

## 2023-10-03 ASSESSMENT — PAIN DESCRIPTION - ONSET: ONSET: ON-GOING

## 2023-10-03 ASSESSMENT — PAIN DESCRIPTION - FREQUENCY: FREQUENCY: INTERMITTENT

## 2023-10-03 ASSESSMENT — PAIN DESCRIPTION - DESCRIPTORS: DESCRIPTORS: THROBBING;TENDER

## 2023-10-03 ASSESSMENT — PAIN SCALES - GENERAL: PAINLEVEL_OUTOF10: 4

## 2023-10-03 ASSESSMENT — PAIN DESCRIPTION - LOCATION: LOCATION: BUTTOCKS

## 2023-10-03 ASSESSMENT — PAIN DESCRIPTION - ORIENTATION: ORIENTATION: MID

## 2023-10-03 ASSESSMENT — PAIN - FUNCTIONAL ASSESSMENT: PAIN_FUNCTIONAL_ASSESSMENT: ACTIVITIES ARE NOT PREVENTED

## 2023-10-03 NOTE — PLAN OF CARE
Demographics info for supply order 10/3/23. Electronically signed by Rosalind Phillip RN on 10/3/2023 at 11:33 AM      Patient Info: [address] [id number] [phone]  : 1940  Age: 82 y.o. Gender: female  Todays Date: 10/3/2023    Insurance Info:    Plan: MEDICARE PART A AND B  Coverage: MEDICARE  Effective Date: 2007  Group Number: [unfilled]  Subscriber Number: [id number] - (Medicare)    Payer/Plan Subscr  Sex Relation Sub. Ins. ID Effective Group Num   1. MEDICARE - [place]* RENEA CASTLE 1940 Female Self [id number]                                    [address] [address]   2.  Adelaide Mayorga 1940 Female Self [id number]                                    [address] [address]

## 2023-10-03 NOTE — PROGRESS NOTES
09/27/23 124/70   09/26/23 122/70        PHYSICAL EXAM  General Appearance:   Alert, cooperative, no distress   Head:   Normocephalic, without obvious abnormality, atraumatic    Eyes:   PERRL, conjunctiva/corneas clear    Ears:   Normal external appearance, hearing grossly intact    Nose:  Nares normal, mucosa normal, no drainage    Throat:  Lips, mucosa, and tongue normal    Neck:  Supple, trachea midline    Respiratory:   Equal chest rise, respirations unlabored, no wheezing   Cardiovascular:   Regular rate and rhythm    Abdomen, GI:   Soft, non-tender, no rebound or guarding    Musculoskeletal:  Atraumatic, no cyanosis or edema    Neurologic:  Nonfocal, strength & sensation grossly intact   Psychiatric: Oriented x3, grossly appropriate judgement and insight      Dermatologic exam: Visual inspection of the periwound reveals the skin to be normal in turgor and texture  Wound exam: see wound description below in procedure note    Assessment:       Jia Rubio  appears to have a non-healing wound of the buttocks. The etiology of the wound is felt to be pressure and and moisture . There are multiple complicating factors including chronic pressure, decreased mobility, shear force, immunosuppression, incontinence of stool, and incontinence of urine. A comprehensive wound management program would be helpful to heal this wound. Assessments completed include fall risk and nutritional, functional,and psychological status. At this time appropriate care would include: periodic debridement and wound care as below.      Problem List Items Addressed This Visit          Circulatory    Dysarthria due to acute stroke [facility])    Relevant Orders    Initiate Outpatient Wound Care Protocol       Other    Gait disturbance    Relevant Orders    Initiate Outpatient Wound Care Protocol    Rheumatoid arthritis involving multiple sites with positive rheumatoid factor [facility])    Relevant Orders    Initiate Outpatient Wound Care Protocol

## 2023-10-10 ENCOUNTER — HOSPITAL ENCOUNTER (OUTPATIENT)
Dept: WOUND CARE | Age: 83
Discharge: HOME | End: 2023-10-10
Payer: MEDICARE

## 2023-10-10 VITALS
TEMPERATURE: 96 F | RESPIRATION RATE: 18 BRPM | SYSTOLIC BLOOD PRESSURE: 147 MMHG | DIASTOLIC BLOOD PRESSURE: 67 MMHG | HEART RATE: 69 BPM

## 2023-10-10 DIAGNOSIS — N39.45 CONTINUOUS LEAKAGE OF URINE: ICD-10-CM

## 2023-10-10 DIAGNOSIS — D84.821 IMMUNOSUPPRESSION DUE TO DRUG THERAPY (HCC): ICD-10-CM

## 2023-10-10 DIAGNOSIS — R47.1 DYSARTHRIA DUE TO ACUTE STROKE (HCC): ICD-10-CM

## 2023-10-10 DIAGNOSIS — M05.79 RHEUMATOID ARTHRITIS INVOLVING MULTIPLE SITES WITH POSITIVE RHEUMATOID FACTOR (HCC): ICD-10-CM

## 2023-10-10 DIAGNOSIS — L89.312 PRESSURE INJURY OF RIGHT BUTTOCK, STAGE 2 (HCC): Primary | ICD-10-CM

## 2023-10-10 DIAGNOSIS — I63.9 DYSARTHRIA DUE TO ACUTE STROKE (HCC): ICD-10-CM

## 2023-10-10 DIAGNOSIS — R26.9 GAIT DISTURBANCE: ICD-10-CM

## 2023-10-10 DIAGNOSIS — Z79.899 IMMUNOSUPPRESSION DUE TO DRUG THERAPY (HCC): ICD-10-CM

## 2023-10-10 PROCEDURE — 11042 DBRDMT SUBQ TIS 1ST 20SQCM/<: CPT

## 2023-10-10 PROCEDURE — 6370000000 HC RX 637 (ALT 250 FOR IP): Performed by: NURSE PRACTITIONER

## 2023-10-10 PROCEDURE — 11042 DBRDMT SUBQ TIS 1ST 20SQCM/<: CPT | Performed by: NURSE PRACTITIONER

## 2023-10-10 RX ORDER — GENTAMICIN SULFATE 1 MG/G
OINTMENT TOPICAL ONCE
Status: COMPLETED | OUTPATIENT
Start: 2023-10-10 | End: 2023-10-10

## 2023-10-10 RX ORDER — TRIAMCINOLONE ACETONIDE 1 MG/G
OINTMENT TOPICAL ONCE
OUTPATIENT
Start: 2023-10-10 | End: 2023-10-10

## 2023-10-10 RX ORDER — BACITRACIN ZINC AND POLYMYXIN B SULFATE 500; 1000 [USP'U]/G; [USP'U]/G
OINTMENT TOPICAL ONCE
OUTPATIENT
Start: 2023-10-10 | End: 2023-10-10

## 2023-10-10 RX ORDER — CLOBETASOL PROPIONATE 0.5 MG/G
OINTMENT TOPICAL ONCE
OUTPATIENT
Start: 2023-10-10 | End: 2023-10-10

## 2023-10-10 RX ORDER — GENTAMICIN SULFATE 1 MG/G
OINTMENT TOPICAL ONCE
OUTPATIENT
Start: 2023-10-10 | End: 2023-10-10

## 2023-10-10 RX ORDER — GINSENG 100 MG
CAPSULE ORAL ONCE
OUTPATIENT
Start: 2023-10-10 | End: 2023-10-10

## 2023-10-10 RX ORDER — IBUPROFEN 200 MG
TABLET ORAL ONCE
OUTPATIENT
Start: 2023-10-10 | End: 2023-10-10

## 2023-10-10 RX ORDER — SODIUM CHLOR/HYPOCHLOROUS ACID 0.033 %
SOLUTION, IRRIGATION IRRIGATION ONCE
OUTPATIENT
Start: 2023-10-10 | End: 2023-10-10

## 2023-10-10 RX ORDER — LIDOCAINE 40 MG/G
CREAM TOPICAL ONCE
OUTPATIENT
Start: 2023-10-10 | End: 2023-10-10

## 2023-10-10 RX ORDER — LIDOCAINE 50 MG/G
OINTMENT TOPICAL ONCE
OUTPATIENT
Start: 2023-10-10 | End: 2023-10-10

## 2023-10-10 RX ORDER — BETAMETHASONE DIPROPIONATE 0.05 %
OINTMENT (GRAM) TOPICAL ONCE
OUTPATIENT
Start: 2023-10-10 | End: 2023-10-10

## 2023-10-10 RX ORDER — LIDOCAINE HYDROCHLORIDE 40 MG/ML
SOLUTION TOPICAL ONCE
OUTPATIENT
Start: 2023-10-10 | End: 2023-10-10

## 2023-10-10 RX ADMIN — GENTAMICIN SULFATE: 1 OINTMENT TOPICAL at 10:13

## 2023-10-10 ASSESSMENT — PAIN DESCRIPTION - DESCRIPTORS: DESCRIPTORS: THROBBING

## 2023-10-10 ASSESSMENT — PAIN DESCRIPTION - ORIENTATION: ORIENTATION: MID

## 2023-10-10 ASSESSMENT — PAIN - FUNCTIONAL ASSESSMENT: PAIN_FUNCTIONAL_ASSESSMENT: PREVENTS OR INTERFERES SOME ACTIVE ACTIVITIES AND ADLS

## 2023-10-10 ASSESSMENT — PAIN DESCRIPTION - LOCATION: LOCATION: BUTTOCKS

## 2023-10-10 ASSESSMENT — PAIN SCALES - GENERAL: PAINLEVEL_OUTOF10: 4

## 2023-10-10 NOTE — PATIENT INSTRUCTIONS
PHYSICIAN ORDERS AND DISCHARGE INSTRUCTIONS     Wound cleansing:              Do not scrub or use excessive force. Wash hands with soap and water before and after dressing changes. Prior to applying a clean dressing, cleanse wound with normal saline,               wound cleanser, or mild soap and water. Ask the physician or nurse before getting the wound(s) wet in a shower     Daily Wound management:             Keep weight off wounds and reposition every 2 hours. Avoid standing for long periods of time. Apply wraps/stockings in AM and remove at bedtime. If swelling is present, elevate legs to the level of the heart or above for              30 minutes 4-5 times a day and/or when sitting. When taking antibiotics take entire prescription as ordered by              physician do not stop taking until medicine is all gone. Wound Care Notes:  Rx: Kelsey to find out about purewick and wheelchair. Reliable medical equipment to do home visit. Orders for this week: 10/10/2023     FAX ORDERS TO [facility]! Please restart care. Change on : Order Supplies      []  Paint toes with betadine                Right Buttock wounds - Wash with soap and water, pat dry. Apply Gentamicin and whole pieces of double elizabeth to wound bed secured with mastisol and steristrips   Cover with calcium alginate, Silicone border and Tegaderm secured by mastisol. Leave dressing in place for 3 days. Pad newly healed right great toe wound with bandaid or wear sock. []   Nurse Visit   Follow Up Instructions: At the [address] [place] [address] in 1 week   Primary Wound Care Provider: Greyson Colón CNP   Call  for any questions or concerns.   Central [phone] for imaging lab work

## 2023-10-10 NOTE — PROGRESS NOTES
Post-Procedure Surface Area (cm^2) 0.4 cm^2 10/10/23 1004   Post-Procedure Volume (cm^3) 0.04 cm^3 10/10/23 1004   Distance Tunneling (cm) 0 cm 10/03/23 0954   Tunneling Position ___ O'Clock 0 10/03/23 0954   Undermining Starts ___ O'Clock 0 10/03/23 0954   Undermining Ends___ O'Clock 0 10/03/23 0954   Undermining Maxium Distance (cm) 0 10/03/23 0954   Wound Assessment Granulation tissue;Slough 10/03/23 1002   Drainage Amount Moderate (25-50%) 10/03/23 1002   Drainage Description Serosanguinous 10/03/23 1002   Odor None 10/03/23 0954   Malka-wound Assessment Fragile;Dry/flaky 10/03/23 0954   Margins Defined edges 10/03/23 0954   Wound Thickness Description not for Pressure Injury Full thickness 10/03/23 0954   Number of days: 90     Total  Area  Debrided:  0.4 sq cm     Bleeding:  Minimal    Hemostasis Achieved:  by pressure    Procedural Pain:  0  / 10     Post Procedural Pain:  0 / 10     Response to treatment:  Well tolerated by patient. Status of wound progress and description from last visit:  Stable. Wound debrided today, regimen discussed and established with patient, will follow up next week. Discussed pressure reduction and moisture management to include skin care and incontinence care. [address] in place. Had a wheel chair delivered from [facility] DME just before hospitalization for a recent stroke. Since just coming home physical therapy verbalizes this does not not fit her. Working with [employer] on the wheelchair. Also looking into the [employer] system to better manage urinary incontinence, information provided to the daughter. The patients records were reviewed and discussed. Time was given for questions . All questions were answered to the patients satisfaction. A total time of 20 minutes was spent with at least 50% related to face to face counseling and coordination of care.     Mallory Au requires the assistance of a standard wheelchair to successfully complete daily living

## 2023-10-16 ENCOUNTER — OFFICE VISIT (OUTPATIENT)
Dept: CARDIOLOGY CLINIC | Age: 83
End: 2023-10-16
Payer: MEDICARE

## 2023-10-16 VITALS
HEIGHT: 64 IN | WEIGHT: 149 LBS | HEART RATE: 93 BPM | BODY MASS INDEX: 25.44 KG/M2 | DIASTOLIC BLOOD PRESSURE: 78 MMHG | OXYGEN SATURATION: 88 % | SYSTOLIC BLOOD PRESSURE: 138 MMHG

## 2023-10-16 DIAGNOSIS — G47.33 OSA (OBSTRUCTIVE SLEEP APNEA): ICD-10-CM

## 2023-10-16 DIAGNOSIS — R26.9 GAIT DISTURBANCE: ICD-10-CM

## 2023-10-16 DIAGNOSIS — M05.79 RHEUMATOID ARTHRITIS INVOLVING MULTIPLE SITES WITH POSITIVE RHEUMATOID FACTOR (HCC): ICD-10-CM

## 2023-10-16 DIAGNOSIS — I69.359 CVA, OLD, HEMIPARESIS (HCC): Chronic | ICD-10-CM

## 2023-10-16 DIAGNOSIS — I63.429 CEREBRAL INFARCTION DUE TO EMBOLISM OF ANTERIOR CEREBRAL ARTERY, UNSPECIFIED BLOOD VESSEL LATERALITY (HCC): ICD-10-CM

## 2023-10-16 DIAGNOSIS — G40.909 SEIZURE DISORDER (HCC): ICD-10-CM

## 2023-10-16 DIAGNOSIS — L89.892 PRESSURE INJURY OF TOE OF LEFT FOOT, STAGE 2 (HCC): ICD-10-CM

## 2023-10-16 DIAGNOSIS — I10 ESSENTIAL HYPERTENSION: ICD-10-CM

## 2023-10-16 DIAGNOSIS — I73.9 PAD (PERIPHERAL ARTERY DISEASE) (HCC): Primary | ICD-10-CM

## 2023-10-16 DIAGNOSIS — E78.2 MIXED HYPERLIPIDEMIA: ICD-10-CM

## 2023-10-16 DIAGNOSIS — I69.354 HEMIPARESIS OF LEFT NONDOMINANT SIDE AS LATE EFFECT OF CEREBRAL INFARCTION (HCC): ICD-10-CM

## 2023-10-16 DIAGNOSIS — I65.23 BILATERAL CAROTID ARTERY STENOSIS: Chronic | ICD-10-CM

## 2023-10-16 DIAGNOSIS — I47.10 SVT (SUPRAVENTRICULAR TACHYCARDIA): ICD-10-CM

## 2023-10-16 DIAGNOSIS — R56.9 PARTIAL SEIZURES (HCC): ICD-10-CM

## 2023-10-16 DIAGNOSIS — G81.94 LEFT HEMIPARESIS (HCC): ICD-10-CM

## 2023-10-16 DIAGNOSIS — I63.9 ACUTE CVA (CEREBROVASCULAR ACCIDENT) (HCC): ICD-10-CM

## 2023-10-16 PROCEDURE — G8427 DOCREV CUR MEDS BY ELIG CLIN: HCPCS | Performed by: INTERNAL MEDICINE

## 2023-10-16 PROCEDURE — 1090F PRES/ABSN URINE INCON ASSESS: CPT | Performed by: INTERNAL MEDICINE

## 2023-10-16 PROCEDURE — G8484 FLU IMMUNIZE NO ADMIN: HCPCS | Performed by: INTERNAL MEDICINE

## 2023-10-16 PROCEDURE — 3075F SYST BP GE 130 - 139MM HG: CPT | Performed by: INTERNAL MEDICINE

## 2023-10-16 PROCEDURE — G8399 PT W/DXA RESULTS DOCUMENT: HCPCS | Performed by: INTERNAL MEDICINE

## 2023-10-16 PROCEDURE — 1123F ACP DISCUSS/DSCN MKR DOCD: CPT | Performed by: INTERNAL MEDICINE

## 2023-10-16 PROCEDURE — 3078F DIAST BP <80 MM HG: CPT | Performed by: INTERNAL MEDICINE

## 2023-10-16 PROCEDURE — 99214 OFFICE O/P EST MOD 30 MIN: CPT | Performed by: INTERNAL MEDICINE

## 2023-10-16 PROCEDURE — G8419 CALC BMI OUT NRM PARAM NOF/U: HCPCS | Performed by: INTERNAL MEDICINE

## 2023-10-16 PROCEDURE — 1036F TOBACCO NON-USER: CPT | Performed by: INTERNAL MEDICINE

## 2023-10-16 NOTE — PROGRESS NOTES
including data and history from outside source , patient and available family . Assessment & Plan:      1. PAD (peripheral artery disease) ([address])    2. CVA, old, hemiparesis ([address])    3. Bilateral carotid artery stenosis    4. 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 Left hemiparesis ([address])    6. Partial seizures ([address])    7. Gait disturbance    8. Essential hypertension    9. Rheumatoid arthritis involving multiple sites with positive rheumatoid factor (HCC)    10. Seizure disorder ([address])    11. SVT (supraventricular tachycardia)    12. VIC (obstructive sleep apnea)    13. Hemiparesis of left nondominant side as late effect of cerebral infarction ([address])    14. Acute CVA (cerebrovascular accident) ([address])    15. Mixed hyperlipidemia    16. WD-Pressure injury of toe of left foot, stage 2 ([address])           CVA, old, hemiparesis ([address])   History of stroke with left  hemiplegia  30-day monitor in March 2022 showed no  A. fib carotids were normal on CT scan in April 2021 for now continue aspirin  Carotid were done in 2019   Daughter says neurologist believes she has hd more strokes then on MRI    She is on  aspirin 81 mg sees neurology       SVT (supraventricular tachycardia) ([address])   Noted to have episodes of SVT during hospitalization in April 2021 converted with adenosine for now continue Cardizem 240 mg daily  fairly well controlled she was evaluated by EPS in April 2020 when she was hospitalized, stress test in 2021 showed no ischemia  She is on metoprolol 25  mg bid H/o DVT    H/o DVT   Nonocclusive seen on ultrasound in rehab but it is cleared not on anticoagulation now        PAD    Right foot toe ulcer getting wound care and has significant PAD   Get arterial doppler    Dyslipidemia :  All available lab work was reviewed. Patient was advised to repeat lab work before next visit.  Necessary orders were placed , instructions given by myself

## 2023-10-17 ENCOUNTER — HOSPITAL ENCOUNTER (OUTPATIENT)
Dept: WOUND CARE | Age: 83
Discharge: HOME | End: 2023-10-17
Payer: MEDICARE

## 2023-10-17 VITALS
DIASTOLIC BLOOD PRESSURE: 71 MMHG | TEMPERATURE: 97.7 F | SYSTOLIC BLOOD PRESSURE: 121 MMHG | HEART RATE: 96 BPM | RESPIRATION RATE: 18 BRPM

## 2023-10-17 DIAGNOSIS — D84.821 IMMUNOSUPPRESSION DUE TO DRUG THERAPY (HCC): ICD-10-CM

## 2023-10-17 DIAGNOSIS — N39.45 CONTINUOUS LEAKAGE OF URINE: ICD-10-CM

## 2023-10-17 DIAGNOSIS — R26.9 GAIT DISTURBANCE: ICD-10-CM

## 2023-10-17 DIAGNOSIS — R47.1 DYSARTHRIA DUE TO ACUTE STROKE (HCC): ICD-10-CM

## 2023-10-17 DIAGNOSIS — Z79.899 IMMUNOSUPPRESSION DUE TO DRUG THERAPY (HCC): ICD-10-CM

## 2023-10-17 DIAGNOSIS — I63.9 DYSARTHRIA DUE TO ACUTE STROKE (HCC): ICD-10-CM

## 2023-10-17 DIAGNOSIS — L89.312 PRESSURE INJURY OF RIGHT BUTTOCK, STAGE 2 (HCC): Primary | ICD-10-CM

## 2023-10-17 DIAGNOSIS — M05.79 RHEUMATOID ARTHRITIS INVOLVING MULTIPLE SITES WITH POSITIVE RHEUMATOID FACTOR (HCC): ICD-10-CM

## 2023-10-17 PROCEDURE — 6370000000 HC RX 637 (ALT 250 FOR IP): Performed by: NURSE PRACTITIONER

## 2023-10-17 PROCEDURE — 11042 DBRDMT SUBQ TIS 1ST 20SQCM/<: CPT

## 2023-10-17 PROCEDURE — 11042 DBRDMT SUBQ TIS 1ST 20SQCM/<: CPT | Performed by: NURSE PRACTITIONER

## 2023-10-17 RX ORDER — GENTAMICIN SULFATE 1 MG/G
OINTMENT TOPICAL ONCE
Status: COMPLETED | OUTPATIENT
Start: 2023-10-17 | End: 2023-10-17

## 2023-10-17 RX ORDER — LIDOCAINE 40 MG/G
CREAM TOPICAL ONCE
OUTPATIENT
Start: 2023-10-17 | End: 2023-10-17

## 2023-10-17 RX ORDER — BACITRACIN ZINC AND POLYMYXIN B SULFATE 500; 1000 [USP'U]/G; [USP'U]/G
OINTMENT TOPICAL ONCE
OUTPATIENT
Start: 2023-10-17 | End: 2023-10-17

## 2023-10-17 RX ORDER — TRIAMCINOLONE ACETONIDE 1 MG/G
OINTMENT TOPICAL ONCE
OUTPATIENT
Start: 2023-10-17 | End: 2023-10-17

## 2023-10-17 RX ORDER — SODIUM CHLOR/HYPOCHLOROUS ACID 0.033 %
SOLUTION, IRRIGATION IRRIGATION ONCE
OUTPATIENT
Start: 2023-10-17 | End: 2023-10-17

## 2023-10-17 RX ORDER — GENTAMICIN SULFATE 1 MG/G
OINTMENT TOPICAL
Qty: 30 G | Refills: 2 | Status: SHIPPED | OUTPATIENT
Start: 2023-10-17 | End: 2023-10-24

## 2023-10-17 RX ORDER — GINSENG 100 MG
CAPSULE ORAL ONCE
OUTPATIENT
Start: 2023-10-17 | End: 2023-10-17

## 2023-10-17 RX ORDER — GENTAMICIN SULFATE 1 MG/G
OINTMENT TOPICAL ONCE
OUTPATIENT
Start: 2023-10-17 | End: 2023-10-17

## 2023-10-17 RX ORDER — LIDOCAINE HYDROCHLORIDE 40 MG/ML
SOLUTION TOPICAL ONCE
OUTPATIENT
Start: 2023-10-17 | End: 2023-10-17

## 2023-10-17 RX ORDER — BETAMETHASONE DIPROPIONATE 0.05 %
OINTMENT (GRAM) TOPICAL ONCE
OUTPATIENT
Start: 2023-10-17 | End: 2023-10-17

## 2023-10-17 RX ORDER — IBUPROFEN 200 MG
TABLET ORAL ONCE
OUTPATIENT
Start: 2023-10-17 | End: 2023-10-17

## 2023-10-17 RX ORDER — CLOBETASOL PROPIONATE 0.5 MG/G
OINTMENT TOPICAL ONCE
OUTPATIENT
Start: 2023-10-17 | End: 2023-10-17

## 2023-10-17 RX ORDER — LIDOCAINE 50 MG/G
OINTMENT TOPICAL ONCE
OUTPATIENT
Start: 2023-10-17 | End: 2023-10-17

## 2023-10-17 RX ADMIN — GENTAMICIN SULFATE: 1 OINTMENT TOPICAL at 10:12

## 2023-10-17 ASSESSMENT — PAIN SCALES - GENERAL: PAINLEVEL_OUTOF10: 0

## 2023-10-17 NOTE — PROGRESS NOTES
Multilayer Compression Wrap   (Not Unna) Below the Knee    NAME:  Rhett Thomas OF BIRTH:  1940  MEDICAL RECORD NUMBER:  [id number]  DATE:  10/17/2023    Multilayer compression wrap: Removed old Multilayer wrap if indicated and wash leg with mild soap/water. Applied moisturizing agent to dry skin as needed. Applied primary and secondary dressing as ordered. Applied multilayered dressing below the knee to right lower leg. Applied multilayered dressing below the knee to left lower leg. Instructed patient/caregiver not to remove dressing and to keep it clean and dry. Instructed patient/caregiver on complications to report to provider, such as pain, numbness in toes, heavy drainage, and slippage of dressing. Instructed patient on purpose of compression dressing and on activity and exercise recommendations.       Electronically signed by Carolyn Saavedra LPN on 60/09/1273 at 10:11 AM
Drug use: No       ALLERGIES    Allergies   Allergen Reactions    Cortisone Rash    Codeine      Unsure Of Reaction    Pcn [Penicillins]      Unknown Reaction       MEDICATIONS    Current Outpatient Medications on File Prior to Encounter   Medication Sig Dispense Refill    memantine (NAMENDA) 10 MG tablet Take 1 tablet by mouth 2 times daily 60 tablet 5    levETIRAcetam (KEPPRA) 500 MG tablet Take 1 tablet by mouth 2 times daily 180 tablet 3    donepezil (ARICEPT) 10 MG tablet Take 1 tablet by mouth every morning 90 tablet 1    clobetasol (TEMOVATE) 0.05 % cream Apply topically with each dressing change 60 g 1    lidocaine (XYLOCAINE) 5 % ointment Apply topically as needed 50 g 1    SYMBICORT 80-4.5 MCG/ACT AERO Inhale 2 puffs into the lungs 2 times daily      potassium chloride (MICRO-K) 10 MEQ extended release capsule Take 1 capsule by mouth daily      metoprolol tartrate (LOPRESSOR) 25 MG tablet Take 0.5 tablets by mouth 2 times daily 60 tablet 0    dilTIAZem (DILT-XR) 240 MG extended release capsule Take 1 capsule by mouth daily 90 capsule 3    gabapentin (NEURONTIN) 400 MG capsule Take 1 capsule by mouth nightly. 07/11/23 Can take 400 mg in the morning as needed      CALMOSEPTINE 0.44-20.6 % OINT ointment APPLY TO AFFECTED AREA ON SACRUM TWICE DAILY AS NEEDED (Patient not taking: Reported on 9/27/2023)      acetaminophen (TYLENOL) 500 MG tablet Take 2 tablets by mouth every 6 hours as needed for Pain      Magnesium 500 MG TABS Take 1 tablet by mouth daily OTC      triamterene-hydroCHLOROthiazide (MAXZIDE-25) 37.5-25 MG per tablet take 1 tablet by mouth once daily      atorvastatin (LIPITOR) 20 MG tablet Take 1 tablet by mouth daily      aspirin 81 MG tablet Take 1 tablet by mouth daily OTC      CALCIUM PO Take 500 mg by mouth daily      folic acid (FOLVITE) 005 MCG tablet Take 1 tablet by mouth every morning OTC      methotrexate 2.5 MG tablet Take 8 tablets by mouth once a week Indications:  Take 8 tablets one

## 2023-10-17 NOTE — PATIENT INSTRUCTIONS
PHYSICIAN ORDERS AND DISCHARGE INSTRUCTIONS     Wound cleansing:              Do not scrub or use excessive force. Wash hands with soap and water before and after dressing changes. Prior to applying a clean dressing, cleanse wound with normal saline,               wound cleanser, or mild soap and water. Ask the physician or nurse before getting the wound(s) wet in a shower     Daily Wound management:             Keep weight off wounds and reposition every 2 hours. Avoid standing for long periods of time. Apply wraps/stockings in AM and remove at bedtime. If swelling is present, elevate legs to the level of the heart or above for              30 minutes 4-5 times a day and/or when sitting. When taking antibiotics take entire prescription as ordered by              physician do not stop taking until medicine is all gone. Wound Care Notes:  Rx: Kelsey to find out about purewick and wheelchair. Reliable medical equipment to do home visit. Orders for this week: 10/17/2023     FAX ORDERS TO [facility]! Please restart care. Change on : Order Supplies      []  Paint toes with betadine                Right Buttock wounds - Wash with soap and water, pat dry. Apply Gentamicin and whole pieces of double elizabeth to wound bed secured with mastisol and steristrips   Cover with calcium alginate, Silicone border and Tegaderm secured by mastisol. Leave dressing in place for 3 days. Apply Coban 2 lites to BLE and enclose toes     Pad newly healed right great toe wound with bandaid or wear sock. []   Nurse Visit   Follow Up Instructions: At the [address] [place] [address] in 1 week   Primary Wound Care Provider: Karthik Hudson CNP   Call  for any questions or concerns.   Central [phone] for imaging lab work

## 2023-10-24 ENCOUNTER — HOSPITAL ENCOUNTER (OUTPATIENT)
Dept: WOUND CARE | Age: 83
Discharge: HOME | End: 2023-10-24
Payer: MEDICARE

## 2023-10-24 VITALS
SYSTOLIC BLOOD PRESSURE: 127 MMHG | RESPIRATION RATE: 18 BRPM | DIASTOLIC BLOOD PRESSURE: 53 MMHG | TEMPERATURE: 97.1 F | HEART RATE: 104 BPM

## 2023-10-24 DIAGNOSIS — R26.9 GAIT DISTURBANCE: ICD-10-CM

## 2023-10-24 DIAGNOSIS — L89.312 PRESSURE INJURY OF RIGHT BUTTOCK, STAGE 2 (HCC): Primary | ICD-10-CM

## 2023-10-24 DIAGNOSIS — D84.821 IMMUNOSUPPRESSION DUE TO DRUG THERAPY (HCC): ICD-10-CM

## 2023-10-24 DIAGNOSIS — I63.9 DYSARTHRIA DUE TO ACUTE STROKE (HCC): ICD-10-CM

## 2023-10-24 DIAGNOSIS — M05.79 RHEUMATOID ARTHRITIS INVOLVING MULTIPLE SITES WITH POSITIVE RHEUMATOID FACTOR (HCC): ICD-10-CM

## 2023-10-24 DIAGNOSIS — R47.1 DYSARTHRIA DUE TO ACUTE STROKE (HCC): ICD-10-CM

## 2023-10-24 DIAGNOSIS — N39.45 CONTINUOUS LEAKAGE OF URINE: ICD-10-CM

## 2023-10-24 DIAGNOSIS — Z79.899 IMMUNOSUPPRESSION DUE TO DRUG THERAPY (HCC): ICD-10-CM

## 2023-10-24 PROCEDURE — 11719 TRIM NAIL(S) ANY NUMBER: CPT

## 2023-10-24 PROCEDURE — 6370000000 HC RX 637 (ALT 250 FOR IP): Performed by: NURSE PRACTITIONER

## 2023-10-24 PROCEDURE — 11042 DBRDMT SUBQ TIS 1ST 20SQCM/<: CPT

## 2023-10-24 PROCEDURE — 11042 DBRDMT SUBQ TIS 1ST 20SQCM/<: CPT | Performed by: NURSE PRACTITIONER

## 2023-10-24 PROCEDURE — 11719 TRIM NAIL(S) ANY NUMBER: CPT | Performed by: NURSE PRACTITIONER

## 2023-10-24 RX ORDER — GENTAMICIN SULFATE 1 MG/G
OINTMENT TOPICAL ONCE
OUTPATIENT
Start: 2023-10-24 | End: 2023-10-24

## 2023-10-24 RX ORDER — IBUPROFEN 200 MG
TABLET ORAL ONCE
OUTPATIENT
Start: 2023-10-24 | End: 2023-10-24

## 2023-10-24 RX ORDER — LIDOCAINE 40 MG/G
CREAM TOPICAL ONCE
OUTPATIENT
Start: 2023-10-24 | End: 2023-10-24

## 2023-10-24 RX ORDER — GINSENG 100 MG
CAPSULE ORAL ONCE
OUTPATIENT
Start: 2023-10-24 | End: 2023-10-24

## 2023-10-24 RX ORDER — SODIUM CHLOR/HYPOCHLOROUS ACID 0.033 %
SOLUTION, IRRIGATION IRRIGATION ONCE
OUTPATIENT
Start: 2023-10-24 | End: 2023-10-24

## 2023-10-24 RX ORDER — BACITRACIN ZINC AND POLYMYXIN B SULFATE 500; 1000 [USP'U]/G; [USP'U]/G
OINTMENT TOPICAL ONCE
OUTPATIENT
Start: 2023-10-24 | End: 2023-10-24

## 2023-10-24 RX ORDER — LIDOCAINE HYDROCHLORIDE 40 MG/ML
SOLUTION TOPICAL ONCE
OUTPATIENT
Start: 2023-10-24 | End: 2023-10-24

## 2023-10-24 RX ORDER — GENTAMICIN SULFATE 1 MG/G
OINTMENT TOPICAL ONCE
Status: COMPLETED | OUTPATIENT
Start: 2023-10-24 | End: 2023-10-24

## 2023-10-24 RX ORDER — BETAMETHASONE DIPROPIONATE 0.05 %
OINTMENT (GRAM) TOPICAL ONCE
OUTPATIENT
Start: 2023-10-24 | End: 2023-10-24

## 2023-10-24 RX ORDER — CLOBETASOL PROPIONATE 0.5 MG/G
OINTMENT TOPICAL ONCE
OUTPATIENT
Start: 2023-10-24 | End: 2023-10-24

## 2023-10-24 RX ORDER — TRIAMCINOLONE ACETONIDE 1 MG/G
OINTMENT TOPICAL ONCE
OUTPATIENT
Start: 2023-10-24 | End: 2023-10-24

## 2023-10-24 RX ORDER — LIDOCAINE 50 MG/G
OINTMENT TOPICAL ONCE
OUTPATIENT
Start: 2023-10-24 | End: 2023-10-24

## 2023-10-24 RX ADMIN — GENTAMICIN SULFATE: 1 OINTMENT TOPICAL at 10:27

## 2023-10-24 ASSESSMENT — PAIN DESCRIPTION - FREQUENCY: FREQUENCY: INTERMITTENT

## 2023-10-24 ASSESSMENT — PAIN DESCRIPTION - ORIENTATION: ORIENTATION: MID

## 2023-10-24 ASSESSMENT — PAIN SCALES - GENERAL: PAINLEVEL_OUTOF10: 3

## 2023-10-24 ASSESSMENT — PAIN DESCRIPTION - PAIN TYPE: TYPE: CHRONIC PAIN

## 2023-10-24 ASSESSMENT — PAIN DESCRIPTION - ONSET: ONSET: ON-GOING

## 2023-10-24 ASSESSMENT — PAIN DESCRIPTION - LOCATION: LOCATION: BUTTOCKS

## 2023-10-24 ASSESSMENT — PAIN DESCRIPTION - DESCRIPTORS: DESCRIPTORS: DULL

## 2023-10-24 ASSESSMENT — PAIN - FUNCTIONAL ASSESSMENT: PAIN_FUNCTIONAL_ASSESSMENT: PREVENTS OR INTERFERES SOME ACTIVE ACTIVITIES AND ADLS

## 2023-10-24 NOTE — PATIENT INSTRUCTIONS
PHYSICIAN ORDERS AND DISCHARGE INSTRUCTIONS     Wound cleansing:              Do not scrub or use excessive force. Wash hands with soap and water before and after dressing changes. Prior to applying a clean dressing, cleanse wound with normal saline,               wound cleanser, or mild soap and water. Ask the physician or nurse before getting the wound(s) wet in a shower     Daily Wound management:             Keep weight off wounds and reposition every 2 hours. Avoid standing for long periods of time. Apply wraps/stockings in AM and remove at bedtime. If swelling is present, elevate legs to the level of the heart or above for              30 minutes 4-5 times a day and/or when sitting. When taking antibiotics take entire prescription as ordered by              physician do not stop taking until medicine is all gone. Wound Care Notes:  Rx: Kelsey to find out about purewick and wheelchair. Reliable medical equipment to do home visit. Orders for this week: 10/24/2023     FAX ORDERS TO [facility]! Please restart care. Change on Fridays: Order Supplies      [x]  Paint toes with betadine                Right Buttock wounds - Wash with soap and water, pat dry. Apply Gentamicin, Desitin, Stimulen and Clobetasol - Hydrofera blue cut to fit wound bed and secure with steri strips (home health can use paper tape to create \"steri strips\")  Cover with calcium alginate, ABD and secure with Paper Tape   Leave dressing in place for 3 days. (For Supplies: Aydee Rhymes is primary, ABD is secondary)    Moisturize BLE with A&D and apply Tubi F- give patient a pair of socks     Pad newly healed right great toe wound with bandaid or wear sock. []   Nurse Visit   Follow Up Instructions:  At the [address] [place] [address] in 1 week   Primary Wound Care Provider: Lewis Lance

## 2023-10-28 NOTE — TELEPHONE ENCOUNTER
Patient daughter called and wanted you to be aware that when the hosptial staff changed patient depends today she had blood either from rectum or vaginal area also same blood in depends last night. Nurse indicated she would advise the hospital doctor  as well.
54.4

## 2023-10-30 RX ORDER — DILTIAZEM HYDROCHLORIDE 240 MG/1
240 CAPSULE, EXTENDED RELEASE ORAL DAILY
Qty: 90 CAPSULE | Refills: 3 | Status: SHIPPED | OUTPATIENT
Start: 2023-10-30

## 2023-10-31 ENCOUNTER — HOSPITAL ENCOUNTER (OUTPATIENT)
Dept: WOUND CARE | Age: 83
Discharge: HOME | End: 2023-10-31
Payer: MEDICARE

## 2023-10-31 DIAGNOSIS — D84.821 IMMUNOSUPPRESSION DUE TO DRUG THERAPY (HCC): ICD-10-CM

## 2023-10-31 DIAGNOSIS — R47.1 DYSARTHRIA DUE TO ACUTE STROKE (HCC): ICD-10-CM

## 2023-10-31 DIAGNOSIS — I63.9 DYSARTHRIA DUE TO ACUTE STROKE (HCC): ICD-10-CM

## 2023-10-31 DIAGNOSIS — M05.79 RHEUMATOID ARTHRITIS INVOLVING MULTIPLE SITES WITH POSITIVE RHEUMATOID FACTOR (HCC): ICD-10-CM

## 2023-10-31 DIAGNOSIS — Z79.899 IMMUNOSUPPRESSION DUE TO DRUG THERAPY (HCC): ICD-10-CM

## 2023-10-31 DIAGNOSIS — L89.312 PRESSURE INJURY OF RIGHT BUTTOCK, STAGE 2 (HCC): Primary | ICD-10-CM

## 2023-10-31 DIAGNOSIS — N39.45 CONTINUOUS LEAKAGE OF URINE: ICD-10-CM

## 2023-10-31 DIAGNOSIS — R26.9 GAIT DISTURBANCE: ICD-10-CM

## 2023-10-31 PROCEDURE — 11042 DBRDMT SUBQ TIS 1ST 20SQCM/<: CPT

## 2023-10-31 PROCEDURE — 11042 DBRDMT SUBQ TIS 1ST 20SQCM/<: CPT | Performed by: NURSE PRACTITIONER

## 2023-10-31 RX ORDER — GENTAMICIN SULFATE 1 MG/G
OINTMENT TOPICAL ONCE
OUTPATIENT
Start: 2023-10-31 | End: 2023-10-31

## 2023-10-31 RX ORDER — TRIAMCINOLONE ACETONIDE 1 MG/G
OINTMENT TOPICAL ONCE
OUTPATIENT
Start: 2023-10-31 | End: 2023-10-31

## 2023-10-31 RX ORDER — LIDOCAINE HYDROCHLORIDE 40 MG/ML
SOLUTION TOPICAL ONCE
OUTPATIENT
Start: 2023-10-31 | End: 2023-10-31

## 2023-10-31 RX ORDER — GENTAMICIN SULFATE 1 MG/G
OINTMENT TOPICAL ONCE
Status: DISCONTINUED | OUTPATIENT
Start: 2023-10-31 | End: 2023-11-01 | Stop reason: HOSPADM

## 2023-10-31 RX ORDER — CLOBETASOL PROPIONATE 0.5 MG/G
OINTMENT TOPICAL ONCE
OUTPATIENT
Start: 2023-10-31 | End: 2023-10-31

## 2023-10-31 RX ORDER — BETAMETHASONE DIPROPIONATE 0.05 %
OINTMENT (GRAM) TOPICAL ONCE
OUTPATIENT
Start: 2023-10-31 | End: 2023-10-31

## 2023-10-31 RX ORDER — LIDOCAINE 50 MG/G
OINTMENT TOPICAL ONCE
OUTPATIENT
Start: 2023-10-31 | End: 2023-10-31

## 2023-10-31 RX ORDER — IBUPROFEN 200 MG
TABLET ORAL ONCE
OUTPATIENT
Start: 2023-10-31 | End: 2023-10-31

## 2023-10-31 RX ORDER — GINSENG 100 MG
CAPSULE ORAL ONCE
OUTPATIENT
Start: 2023-10-31 | End: 2023-10-31

## 2023-10-31 RX ORDER — BACITRACIN ZINC AND POLYMYXIN B SULFATE 500; 1000 [USP'U]/G; [USP'U]/G
OINTMENT TOPICAL ONCE
OUTPATIENT
Start: 2023-10-31 | End: 2023-10-31

## 2023-10-31 RX ORDER — SODIUM CHLOR/HYPOCHLOROUS ACID 0.033 %
SOLUTION, IRRIGATION IRRIGATION ONCE
OUTPATIENT
Start: 2023-10-31 | End: 2023-10-31

## 2023-10-31 RX ORDER — LIDOCAINE 40 MG/G
CREAM TOPICAL ONCE
OUTPATIENT
Start: 2023-10-31 | End: 2023-10-31

## 2023-10-31 NOTE — PATIENT INSTRUCTIONS
PHYSICIAN ORDERS AND DISCHARGE INSTRUCTIONS     Wound cleansing:              Do not scrub or use excessive force. Wash hands with soap and water before and after dressing changes. Prior to applying a clean dressing, cleanse wound with normal saline,               wound cleanser, or mild soap and water. Ask the physician or nurse before getting the wound(s) wet in a shower     Daily Wound management:             Keep weight off wounds and reposition every 2 hours. Avoid standing for long periods of time. Apply wraps/stockings in AM and remove at bedtime. If swelling is present, elevate legs to the level of the heart or above for              30 minutes 4-5 times a day and/or when sitting. When taking antibiotics take entire prescription as ordered by              physician do not stop taking until medicine is all gone. Wound Care Notes:  Rx: Kelsey to find out about purewick and wheelchair. Reliable medical equipment to do home visit. Orders for this week: 10/31/2023     FAX ORDERS TO [facility]! Please restart care. Change on Fridays: Order Supplies      [x]  Paint toes with betadine                Right Buttock wounds - Wash with soap and water, pat dry. Apply Gentamicin, Desitin, Stimulen and Clobetasol - Hydrofera blue cut to fit wound bed and secure with steri strips (home health can use paper tape to create \"steri strips\")  Cover with calcium alginate, ABD and secure with Paper Tape   Leave dressing in place for 3 days. (For Supplies: Tacy Kevon is primary, ABD is secondary)    Moisturize BLE with A&D and apply Tubi F- give patient a pair of socks        []   Nurse Visit   Follow Up Instructions:  At the [address] [place] [address] in 1 week   Primary Wound Care Provider: Greyson Colón, [facility]  for any questions or

## 2023-10-31 NOTE — PROGRESS NOTES
include: periodic debridement and wound care as below. Problem List Items Addressed This Visit          Circulatory    Dysarthria due to acute stroke ([address])    Relevant Medications    gentamicin (GARAMYCIN) 0.1 % ointment    Other Relevant Orders    Initiate Outpatient Wound Care Protocol       Other    Gait disturbance    Relevant Medications    gentamicin (GARAMYCIN) 0.1 % ointment    Other Relevant Orders    Initiate Outpatient Wound Care Protocol    Rheumatoid arthritis involving multiple sites with positive rheumatoid factor (HCC)    Relevant Medications    gentamicin (GARAMYCIN) 0.1 % ointment    Other Relevant Orders    Initiate Outpatient Wound Care Protocol    WD-Pressure injury of right buttock, stage 2 (HCC) - Primary    Relevant Medications    gentamicin (GARAMYCIN) 0.1 % ointment    Other Relevant Orders    Initiate Outpatient Wound Care Protocol    Continuous leakage of urine    Relevant Medications    gentamicin (GARAMYCIN) 0.1 % ointment    Other Relevant Orders    Initiate Outpatient Wound Care Protocol    Immunosuppression due to drug therapy ([address])    Relevant Medications    gentamicin (GARAMYCIN) 0.1 % ointment    Other Relevant Orders    Initiate Outpatient Wound Care Protocol       Procedure Note    Indications:  Based on my examination of this patient's wound(s) today, sharp excision into subcutaneous tissue is required to promote healing and evaluate the extent of previous healing. Performed by: TARUN Farah CNP    Consent obtained: Yes    Time out taken:  Yes    Pain Control:  2% Lidocaine spray    Debridement:Excisional Debridement    Using curette the wound(s) was/were sharply debrided down through and including the removal of subcutaneous tissue.         Devitalized Tissue Debrided:  fibrin, biofilm, slough, and exudate    Pre Debridement Measurements:  Are located in the Wound Documentation Flow Sheet    All active wounds listed below with today's date are

## 2023-11-02 ENCOUNTER — PROCEDURE VISIT (OUTPATIENT)
Dept: CARDIOLOGY CLINIC | Age: 83
End: 2023-11-02
Payer: MEDICARE

## 2023-11-02 DIAGNOSIS — I73.9 PAD (PERIPHERAL ARTERY DISEASE) (HCC): ICD-10-CM

## 2023-11-02 PROCEDURE — 93925 LOWER EXTREMITY STUDY: CPT | Performed by: INTERNAL MEDICINE

## 2023-11-02 PROCEDURE — 93922 UPR/L XTREMITY ART 2 LEVELS: CPT | Performed by: INTERNAL MEDICINE

## 2023-11-02 NOTE — PROGRESS NOTES
07/19/23 2956   Encounter Summary   Encounter Overview/Reason  Spiritual/Emotional Needs   Service Provided For: Patient and family together   Referral/Consult From: Family   Support System Children;Family members   Last Encounter  07/19/23  (Had prayer and emotional support with patient and daughter in room. Family was getting descuraged but comfort and emotional support.  Patient and family was feeling better.)   Complexity of Encounter Moderate   Begin Time 0850   End Time  0906   Total Time Calculated 16 min   Encounter    Type Follow up   Crisis   Type Family Care   Spiritual/Emotional needs   Type Spiritual Support   Grief, Loss, and Adjustments   Type Life Adjustments   Assessment/Intervention/Outcome   Assessment Powerlessness   Intervention Active listening;Empowerment;Nurtured Hope;Prayer (assurance of)/Heber;Read/Provided Scripture;Sustaining Presence/Ministry of presence   Outcome Optimistic;New perspective/awareness;Expressed Gratitude;Expressed feelings of Peggy, Peace and/or Love;Engaged in conversation;Encouraged   Plan and Referrals   Plan/Referrals Continue Support (comment)  (as needed) normal appearance , without tenderness upon palpation

## 2023-11-03 ENCOUNTER — TELEPHONE (OUTPATIENT)
Dept: CARDIOLOGY CLINIC | Age: 83
End: 2023-11-03

## 2023-11-03 ENCOUNTER — HOSPITAL ENCOUNTER (OUTPATIENT)
Age: 83
Setting detail: SPECIMEN
Discharge: HOME | End: 2023-11-03
Payer: MEDICARE

## 2023-11-03 LAB
ALBUMIN SERPL-MCNC: 3.3 GM/DL (ref 3.4–5)
ALP BLD-CCNC: 141 IU/L (ref 40–128)
ALT SERPL-CCNC: 11 U/L (ref 10–40)
ANION GAP SERPL CALCULATED.3IONS-SCNC: 14 MMOL/L (ref 4–16)
AST SERPL-CCNC: 24 IU/L (ref 15–37)
BILIRUB SERPL-MCNC: 0.2 MG/DL (ref 0–1)
BUN SERPL-MCNC: 15 MG/DL (ref 6–23)
CALCIUM SERPL-MCNC: 9.4 MG/DL (ref 8.3–10.6)
CHLORIDE BLD-SCNC: 102 MMOL/L (ref 99–110)
CO2: 21 MMOL/L (ref 21–32)
CREAT SERPL-MCNC: 0.4 MG/DL (ref 0.6–1.1)
GFR SERPL CREATININE-BSD FRML MDRD: >60 ML/MIN/1.73M2
GLUCOSE SERPL-MCNC: 92 MG/DL (ref 70–99)
POTASSIUM SERPL-SCNC: 4.9 MMOL/L (ref 3.5–5.1)
SODIUM BLD-SCNC: 137 MMOL/L (ref 135–145)
TOTAL PROTEIN: 5.4 GM/DL (ref 6.4–8.2)

## 2023-11-03 PROCEDURE — 80053 COMPREHEN METABOLIC PANEL: CPT

## 2023-11-03 NOTE — TELEPHONE ENCOUNTER
Bilateral ABIs show mild to moderate peripheral arterial disease at rest.    Unable to leave msg. Sent SMS phone number for pt to return call. Spoke to dtr oDnato Carcamo regarding results of doppler, set up follow up appt. Patient advised and voices understanding.

## 2023-11-07 ENCOUNTER — HOSPITAL ENCOUNTER (OUTPATIENT)
Dept: WOUND CARE | Age: 83
Discharge: HOME | End: 2023-11-07
Payer: MEDICARE

## 2023-11-07 VITALS
DIASTOLIC BLOOD PRESSURE: 54 MMHG | RESPIRATION RATE: 18 BRPM | SYSTOLIC BLOOD PRESSURE: 105 MMHG | TEMPERATURE: 96.9 F | HEART RATE: 74 BPM

## 2023-11-07 DIAGNOSIS — R26.9 GAIT DISTURBANCE: ICD-10-CM

## 2023-11-07 DIAGNOSIS — L89.312 PRESSURE INJURY OF RIGHT BUTTOCK, STAGE 2 (HCC): Primary | ICD-10-CM

## 2023-11-07 DIAGNOSIS — D84.821 IMMUNOSUPPRESSION DUE TO DRUG THERAPY (HCC): ICD-10-CM

## 2023-11-07 DIAGNOSIS — N39.45 CONTINUOUS LEAKAGE OF URINE: ICD-10-CM

## 2023-11-07 DIAGNOSIS — I63.9 DYSARTHRIA DUE TO ACUTE STROKE (HCC): ICD-10-CM

## 2023-11-07 DIAGNOSIS — M05.79 RHEUMATOID ARTHRITIS INVOLVING MULTIPLE SITES WITH POSITIVE RHEUMATOID FACTOR (HCC): ICD-10-CM

## 2023-11-07 DIAGNOSIS — Z79.899 IMMUNOSUPPRESSION DUE TO DRUG THERAPY (HCC): ICD-10-CM

## 2023-11-07 DIAGNOSIS — R47.1 DYSARTHRIA DUE TO ACUTE STROKE (HCC): ICD-10-CM

## 2023-11-07 PROCEDURE — 11042 DBRDMT SUBQ TIS 1ST 20SQCM/<: CPT | Performed by: NURSE PRACTITIONER

## 2023-11-07 RX ORDER — SODIUM CHLOR/HYPOCHLOROUS ACID 0.033 %
SOLUTION, IRRIGATION IRRIGATION ONCE
OUTPATIENT
Start: 2023-11-07 | End: 2023-11-07

## 2023-11-07 RX ORDER — BETAMETHASONE DIPROPIONATE 0.05 %
OINTMENT (GRAM) TOPICAL ONCE
OUTPATIENT
Start: 2023-11-07 | End: 2023-11-07

## 2023-11-07 RX ORDER — BACITRACIN ZINC AND POLYMYXIN B SULFATE 500; 1000 [USP'U]/G; [USP'U]/G
OINTMENT TOPICAL ONCE
OUTPATIENT
Start: 2023-11-07 | End: 2023-11-07

## 2023-11-07 RX ORDER — GINSENG 100 MG
CAPSULE ORAL ONCE
OUTPATIENT
Start: 2023-11-07 | End: 2023-11-07

## 2023-11-07 RX ORDER — LIDOCAINE 40 MG/G
CREAM TOPICAL ONCE
OUTPATIENT
Start: 2023-11-07 | End: 2023-11-07

## 2023-11-07 RX ORDER — LIDOCAINE 50 MG/G
OINTMENT TOPICAL ONCE
OUTPATIENT
Start: 2023-11-07 | End: 2023-11-07

## 2023-11-07 RX ORDER — TRIAMCINOLONE ACETONIDE 1 MG/G
OINTMENT TOPICAL ONCE
OUTPATIENT
Start: 2023-11-07 | End: 2023-11-07

## 2023-11-07 RX ORDER — GENTAMICIN SULFATE 1 MG/G
OINTMENT TOPICAL ONCE
OUTPATIENT
Start: 2023-11-07 | End: 2023-11-07

## 2023-11-07 RX ORDER — LIDOCAINE HYDROCHLORIDE 40 MG/ML
SOLUTION TOPICAL ONCE
OUTPATIENT
Start: 2023-11-07 | End: 2023-11-07

## 2023-11-07 RX ORDER — CLOBETASOL PROPIONATE 0.5 MG/G
OINTMENT TOPICAL ONCE
OUTPATIENT
Start: 2023-11-07 | End: 2023-11-07

## 2023-11-07 RX ORDER — IBUPROFEN 200 MG
TABLET ORAL ONCE
OUTPATIENT
Start: 2023-11-07 | End: 2023-11-07

## 2023-11-07 ASSESSMENT — PAIN DESCRIPTION - DESCRIPTORS: DESCRIPTORS: THROBBING;BURNING;STABBING

## 2023-11-07 ASSESSMENT — PAIN DESCRIPTION - FREQUENCY: FREQUENCY: CONTINUOUS

## 2023-11-07 ASSESSMENT — PAIN DESCRIPTION - LOCATION: LOCATION: BUTTOCKS

## 2023-11-07 ASSESSMENT — PAIN DESCRIPTION - ORIENTATION: ORIENTATION: MID

## 2023-11-07 ASSESSMENT — PAIN SCALES - GENERAL: PAINLEVEL_OUTOF10: 6

## 2023-11-07 ASSESSMENT — PAIN DESCRIPTION - PAIN TYPE: TYPE: ACUTE PAIN

## 2023-11-07 ASSESSMENT — PAIN DESCRIPTION - ONSET: ONSET: ON-GOING

## 2023-11-07 NOTE — PROGRESS NOTES
night. Hand wash and line dry to prevent loss of elasticity. Replace every 3-4 months to ensure proper fit. Weight Management   Will need to ultimately change overall eating behaviors to have success with weight loss. Encouraged to weigh daily and work towards a goal of 1-2 pounds of weight loss weekly. Encouraged to journal all food intake, [employer] pal is a useful tool to help keep track of food intake and caloric value. Keep calorie level at approximately 6630-4116. Protein intake is to be a minimum of 60 grams per day (unless otherwise directed). Water drinking was encouraged with a goal of 64oz-128oz daily. Beverages to be calorie free except for milk. Every other beverage should be water, avoid soda. Continue to increase level of physical activity. Refer to weight management as indicated and requested by patient.           PAST MEDICAL HISTORY        Diagnosis Date    Acid reflux     Anxiety     Bronchitis In Past    Cancer ([address])     colon cancer    Carotid stenosis 11/10/2015    Cerebral embolism with cerebral infarction ([address]) 09/18/2015    Seizure X 1, No Residual    Chronic back pain     Depression     History of blood transfusion     History of external beam radiation therapy 2017    5400 cGy pelvis/anal canal in 2017 per Dr. Piotr Forde at [facility]    Hyperlipidemia     Hypertension     Prolonged emergence from general anesthesia     RA (rheumatoid arthritis) ([address])     \"All Over\"    Seizure ([address]) 09/18/2015    X 1    Sleep apnea     Uses CPAP    Teeth missing     Upper And Lower    Urinary incontinence     UTI (urinary tract infection) In Past    No Current Symptoms    Wears dentures     Full Upper , Partial Lower    Wears glasses     Wears partial dentures     Lower       PAST SURGICAL HISTORY    Past Surgical History:   Procedure Laterality Date    ARTHROCENTESIS / ASPIRATION / INJECTION KNEE  05/11/2021         CAROTID ENDARTERECTOMY Right 11/18/2015    CARPAL TUNNEL RELEASE      COLONOSCOPY  In Past

## 2023-11-13 ENCOUNTER — OFFICE VISIT (OUTPATIENT)
Dept: CARDIOLOGY CLINIC | Age: 83
End: 2023-11-13

## 2023-11-13 VITALS
HEIGHT: 64 IN | OXYGEN SATURATION: 94 % | DIASTOLIC BLOOD PRESSURE: 60 MMHG | HEART RATE: 73 BPM | WEIGHT: 149 LBS | SYSTOLIC BLOOD PRESSURE: 132 MMHG | BODY MASS INDEX: 25.44 KG/M2

## 2023-11-13 DIAGNOSIS — G47.30 SLEEP APNEA, UNSPECIFIED TYPE: ICD-10-CM

## 2023-11-13 DIAGNOSIS — R26.9 GAIT DISTURBANCE: ICD-10-CM

## 2023-11-13 DIAGNOSIS — R56.9 PARTIAL SEIZURES (HCC): ICD-10-CM

## 2023-11-13 DIAGNOSIS — G81.94 LEFT HEMIPARESIS (HCC): ICD-10-CM

## 2023-11-13 DIAGNOSIS — F41.1 GAD (GENERALIZED ANXIETY DISORDER): ICD-10-CM

## 2023-11-13 DIAGNOSIS — G40.909 SEIZURE DISORDER (HCC): ICD-10-CM

## 2023-11-13 DIAGNOSIS — R07.2 PRECORDIAL PAIN: ICD-10-CM

## 2023-11-13 DIAGNOSIS — E78.2 MIXED HYPERLIPIDEMIA: ICD-10-CM

## 2023-11-13 DIAGNOSIS — I10 ESSENTIAL HYPERTENSION: ICD-10-CM

## 2023-11-13 DIAGNOSIS — I69.354 HEMIPARESIS OF LEFT NONDOMINANT SIDE AS LATE EFFECT OF CEREBRAL INFARCTION (HCC): ICD-10-CM

## 2023-11-13 DIAGNOSIS — I73.9 PAD (PERIPHERAL ARTERY DISEASE) (HCC): ICD-10-CM

## 2023-11-13 DIAGNOSIS — I63.9 ACUTE CVA (CEREBROVASCULAR ACCIDENT) (HCC): ICD-10-CM

## 2023-11-13 DIAGNOSIS — I69.359 CVA, OLD, HEMIPARESIS (HCC): Primary | Chronic | ICD-10-CM

## 2023-11-13 DIAGNOSIS — I63.429 CEREBRAL INFARCTION DUE TO EMBOLISM OF ANTERIOR CEREBRAL ARTERY, UNSPECIFIED BLOOD VESSEL LATERALITY (HCC): ICD-10-CM

## 2023-11-13 NOTE — PROGRESS NOTES
All labs, medications and tests reviewed by myself including data and history from outside source , patient and available family . Assessment & Plan:      1. CVA, old, hemiparesis ([address])    2. 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 Left hemiparesis ([address])    4. Partial seizures ([address])    5. Gait disturbance    6. Essential hypertension    7. Precordial pain    8. 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 Acute CVA (cerebrovascular accident) ([address])    10. Mixed hyperlipidemia    11. PAD (peripheral artery disease) ([address])    12. LOYDA (generalized anxiety disorder)    13. Sleep apnea, unspecified type    14. Seizure disorder [facility])             CVA, old, hemiparesis ([address])   History of stroke with left  hemiplegia  30-day monitor in March 2022 showed no  A. fib carotids were normal on CT scan in April 2021 for now continue aspirin  Carotid were done in 2019   Daughter says neurologist believes she has hd more strokes then on MRI    She is on  aspirin 81 mg sees neurology       SVT (supraventricular tachycardia) ([address])   Noted to have episodes of SVT during hospitalization in April 2021 converted with adenosine for now continue Cardizem 240 mg daily  fairly well controlled she was evaluated by EPS in April 2020 when she was hospitalized, stress test in 2021 showed no ischemia  She is on metoprolol 25  mg bid H/o DVT    H/o DVT   Nonocclusive seen on ultrasound in rehab but it is cleared not on anticoagulation now        PAD    Right foot toe ulcer getting wound care and has significant PAD   Arterial doppler shows monophasic flow but three vessels flow   Daughter was updated     Dyslipidemia :  All available lab work was reviewed. Patient was advised to repeat lab work before next visit.  Necessary orders were placed , instructions given by myself       Counseled extensively and medication compliance

## 2023-11-14 ENCOUNTER — HOSPITAL ENCOUNTER (OUTPATIENT)
Dept: WOUND CARE | Age: 83
Discharge: HOME | End: 2023-11-14
Payer: MEDICARE

## 2023-11-14 VITALS — DIASTOLIC BLOOD PRESSURE: 77 MMHG | HEART RATE: 77 BPM | RESPIRATION RATE: 16 BRPM | SYSTOLIC BLOOD PRESSURE: 142 MMHG

## 2023-11-14 DIAGNOSIS — L89.313 PRESSURE INJURY OF RIGHT BUTTOCK, STAGE 3 (HCC): ICD-10-CM

## 2023-11-14 DIAGNOSIS — M05.79 RHEUMATOID ARTHRITIS INVOLVING MULTIPLE SITES WITH POSITIVE RHEUMATOID FACTOR (HCC): ICD-10-CM

## 2023-11-14 DIAGNOSIS — N39.45 CONTINUOUS LEAKAGE OF URINE: Primary | ICD-10-CM

## 2023-11-14 DIAGNOSIS — R26.9 GAIT DISTURBANCE: ICD-10-CM

## 2023-11-14 DIAGNOSIS — Z79.899 IMMUNOSUPPRESSION DUE TO DRUG THERAPY (HCC): ICD-10-CM

## 2023-11-14 DIAGNOSIS — D84.821 IMMUNOSUPPRESSION DUE TO DRUG THERAPY (HCC): ICD-10-CM

## 2023-11-14 DIAGNOSIS — I63.9 DYSARTHRIA DUE TO ACUTE STROKE (HCC): ICD-10-CM

## 2023-11-14 DIAGNOSIS — R47.1 DYSARTHRIA DUE TO ACUTE STROKE (HCC): ICD-10-CM

## 2023-11-14 PROCEDURE — 11042 DBRDMT SUBQ TIS 1ST 20SQCM/<: CPT | Performed by: NURSE PRACTITIONER

## 2023-11-14 PROCEDURE — 11042 DBRDMT SUBQ TIS 1ST 20SQCM/<: CPT

## 2023-11-14 RX ORDER — GINSENG 100 MG
CAPSULE ORAL ONCE
OUTPATIENT
Start: 2023-11-14 | End: 2023-11-14

## 2023-11-14 RX ORDER — SODIUM CHLOR/HYPOCHLOROUS ACID 0.033 %
SOLUTION, IRRIGATION IRRIGATION ONCE
OUTPATIENT
Start: 2023-11-14 | End: 2023-11-14

## 2023-11-14 RX ORDER — IBUPROFEN 200 MG
TABLET ORAL ONCE
OUTPATIENT
Start: 2023-11-14 | End: 2023-11-14

## 2023-11-14 RX ORDER — CLOBETASOL PROPIONATE 0.5 MG/G
OINTMENT TOPICAL ONCE
OUTPATIENT
Start: 2023-11-14 | End: 2023-11-14

## 2023-11-14 RX ORDER — TRIAMCINOLONE ACETONIDE 1 MG/G
OINTMENT TOPICAL ONCE
OUTPATIENT
Start: 2023-11-14 | End: 2023-11-14

## 2023-11-14 RX ORDER — BACITRACIN ZINC AND POLYMYXIN B SULFATE 500; 1000 [USP'U]/G; [USP'U]/G
OINTMENT TOPICAL ONCE
OUTPATIENT
Start: 2023-11-14 | End: 2023-11-14

## 2023-11-14 RX ORDER — LIDOCAINE 40 MG/G
CREAM TOPICAL ONCE
OUTPATIENT
Start: 2023-11-14 | End: 2023-11-14

## 2023-11-14 RX ORDER — BETAMETHASONE DIPROPIONATE 0.05 %
OINTMENT (GRAM) TOPICAL ONCE
OUTPATIENT
Start: 2023-11-14 | End: 2023-11-14

## 2023-11-14 RX ORDER — GENTAMICIN SULFATE 1 MG/G
OINTMENT TOPICAL ONCE
OUTPATIENT
Start: 2023-11-14 | End: 2023-11-14

## 2023-11-14 RX ORDER — LIDOCAINE HYDROCHLORIDE 40 MG/ML
SOLUTION TOPICAL ONCE
OUTPATIENT
Start: 2023-11-14 | End: 2023-11-14

## 2023-11-14 RX ORDER — LIDOCAINE 50 MG/G
OINTMENT TOPICAL ONCE
OUTPATIENT
Start: 2023-11-14 | End: 2023-11-14

## 2023-11-14 NOTE — PATIENT INSTRUCTIONS
PHYSICIAN ORDERS AND DISCHARGE INSTRUCTIONS     Wound cleansing:              Do not scrub or use excessive force. Wash hands with soap and water before and after dressing changes. Prior to applying a clean dressing, cleanse wound with normal saline,               wound cleanser, or mild soap and water. Ask the physician or nurse before getting the wound(s) wet in a shower     Daily Wound management:             Keep weight off wounds and reposition every 2 hours. Avoid standing for long periods of time. Apply wraps/stockings in AM and remove at bedtime. If swelling is present, elevate legs to the level of the heart or above for              30 minutes 4-5 times a day and/or when sitting. When taking antibiotics take entire prescription as ordered by              physician do not stop taking until medicine is all gone. Wound Care Notes:         Stephaniele Hussain to find out about purewick and wheelchair. Reliable medical equipment to do home visit. Orders for this week: 2023     FAX ORDERS TO [facility]! Please restart care. Change on : Order Supplies!!! Right Buttock wounds - Wash with soap and water, pat dry. At home apply Stimulen powder, clobetasol, desitin, lidocaine to irritated areas of buttocks  Apply puracol dampened with anasept spray to wound bed   Cover with calcium alginate, gentac. Secure with mastisol and tegaderm   Leave dressing in place for 3 days. []   Nurse Visit   Follow Up Instructions: At the [address] [place] [address] in 1 week   Primary Wound Care Provider: Harriet Stewart CNP   Call  for any questions or concerns.   Central [phone] for imaging lab work

## 2023-11-14 NOTE — PROGRESS NOTES
11/14/23 1004   Drainage Description Yellow 11/14/23 1004   Odor None 11/14/23 1004   Malka-wound Assessment Fragile; Intact; Maceration 11/14/23 1004   Margins Defined edges 11/14/23 1004   Wound Thickness Description not for Pressure Injury Full thickness 11/14/23 1004   Number of days: 125     Total  Area  Debrided:  0.3 sq cm     Bleeding:  Minimal    Hemostasis Achieved:  by pressure    Procedural Pain:  0  / 10     Post Procedural Pain:  0 / 10     Response to treatment:  Well tolerated by patient. Status of wound progress and description from last visit:  Improved today. Wound debrided today, regimen discussed and established with patient, will follow up next week. Discussed pressure reduction and moisture management to include skin care and incontinence care. [address] in place. [facility] just before hospitalization for a recent stroke. Since just coming home physical therapy verbalizes this does not not fit her. Working with [employer] on the wheelchair. Also looking into the [employer] system to better manage urinary incontinence, information provided to the daughter. The patients records were reviewed and discussed. Time was given for questions . All questions were answered to the patients satisfaction. A total time of 20 minutes was spent with at least 50% related to face to face counseling and coordination of care. Plan:     Discharge instructions:    Patient Instructions   PHYSICIAN ORDERS AND DISCHARGE INSTRUCTIONS     Wound cleansing:              Do not scrub or use excessive force. Wash hands with soap and water before and after dressing changes. Prior to applying a clean dressing, cleanse wound with normal saline,               wound cleanser, or mild soap and water.               Ask the physician or nurse before getting the wound(s) wet in a shower     Daily Wound management:             Keep weight off wounds and

## 2023-11-21 ENCOUNTER — HOSPITAL ENCOUNTER (OUTPATIENT)
Dept: WOUND CARE | Age: 83
Discharge: HOME | End: 2023-11-21
Payer: MEDICARE

## 2023-11-21 VITALS
SYSTOLIC BLOOD PRESSURE: 119 MMHG | DIASTOLIC BLOOD PRESSURE: 58 MMHG | HEART RATE: 73 BPM | RESPIRATION RATE: 16 BRPM | TEMPERATURE: 96.8 F

## 2023-11-21 DIAGNOSIS — M05.79 RHEUMATOID ARTHRITIS INVOLVING MULTIPLE SITES WITH POSITIVE RHEUMATOID FACTOR (HCC): ICD-10-CM

## 2023-11-21 DIAGNOSIS — Z79.899 IMMUNOSUPPRESSION DUE TO DRUG THERAPY (HCC): ICD-10-CM

## 2023-11-21 DIAGNOSIS — R26.9 GAIT DISTURBANCE: ICD-10-CM

## 2023-11-21 DIAGNOSIS — D84.821 IMMUNOSUPPRESSION DUE TO DRUG THERAPY (HCC): ICD-10-CM

## 2023-11-21 DIAGNOSIS — R47.1 DYSARTHRIA DUE TO ACUTE STROKE (HCC): ICD-10-CM

## 2023-11-21 DIAGNOSIS — N39.45 CONTINUOUS LEAKAGE OF URINE: ICD-10-CM

## 2023-11-21 DIAGNOSIS — L89.312 PRESSURE INJURY OF RIGHT BUTTOCK, STAGE 2 (HCC): Primary | ICD-10-CM

## 2023-11-21 DIAGNOSIS — I63.9 DYSARTHRIA DUE TO ACUTE STROKE (HCC): ICD-10-CM

## 2023-11-21 PROCEDURE — 11042 DBRDMT SUBQ TIS 1ST 20SQCM/<: CPT

## 2023-11-21 PROCEDURE — 11042 DBRDMT SUBQ TIS 1ST 20SQCM/<: CPT | Performed by: NURSE PRACTITIONER

## 2023-11-21 RX ORDER — TRIAMCINOLONE ACETONIDE 1 MG/G
OINTMENT TOPICAL ONCE
OUTPATIENT
Start: 2023-11-21 | End: 2023-11-21

## 2023-11-21 RX ORDER — LIDOCAINE 40 MG/G
CREAM TOPICAL ONCE
OUTPATIENT
Start: 2023-11-21 | End: 2023-11-21

## 2023-11-21 RX ORDER — GINSENG 100 MG
CAPSULE ORAL ONCE
OUTPATIENT
Start: 2023-11-21 | End: 2023-11-21

## 2023-11-21 RX ORDER — LIDOCAINE 50 MG/G
OINTMENT TOPICAL ONCE
OUTPATIENT
Start: 2023-11-21 | End: 2023-11-21

## 2023-11-21 RX ORDER — BETAMETHASONE DIPROPIONATE 0.05 %
OINTMENT (GRAM) TOPICAL ONCE
OUTPATIENT
Start: 2023-11-21 | End: 2023-11-21

## 2023-11-21 RX ORDER — IBUPROFEN 200 MG
TABLET ORAL ONCE
OUTPATIENT
Start: 2023-11-21 | End: 2023-11-21

## 2023-11-21 RX ORDER — SODIUM CHLOR/HYPOCHLOROUS ACID 0.033 %
SOLUTION, IRRIGATION IRRIGATION ONCE
OUTPATIENT
Start: 2023-11-21 | End: 2023-11-21

## 2023-11-21 RX ORDER — GENTAMICIN SULFATE 1 MG/G
OINTMENT TOPICAL ONCE
OUTPATIENT
Start: 2023-11-21 | End: 2023-11-21

## 2023-11-21 RX ORDER — CLOBETASOL PROPIONATE 0.5 MG/G
OINTMENT TOPICAL ONCE
OUTPATIENT
Start: 2023-11-21 | End: 2023-11-21

## 2023-11-21 RX ORDER — BACITRACIN ZINC AND POLYMYXIN B SULFATE 500; 1000 [USP'U]/G; [USP'U]/G
OINTMENT TOPICAL ONCE
OUTPATIENT
Start: 2023-11-21 | End: 2023-11-21

## 2023-11-21 RX ORDER — LIDOCAINE HYDROCHLORIDE 40 MG/ML
SOLUTION TOPICAL ONCE
OUTPATIENT
Start: 2023-11-21 | End: 2023-11-21

## 2023-11-21 ASSESSMENT — PAIN - FUNCTIONAL ASSESSMENT: PAIN_FUNCTIONAL_ASSESSMENT: ACTIVITIES ARE NOT PREVENTED

## 2023-11-21 ASSESSMENT — PAIN DESCRIPTION - PAIN TYPE: TYPE: ACUTE PAIN

## 2023-11-21 ASSESSMENT — PAIN DESCRIPTION - LOCATION: LOCATION: BUTTOCKS

## 2023-11-21 ASSESSMENT — PAIN DESCRIPTION - FREQUENCY: FREQUENCY: INTERMITTENT

## 2023-11-21 ASSESSMENT — PAIN SCALES - GENERAL: PAINLEVEL_OUTOF10: 0

## 2023-11-21 ASSESSMENT — PAIN DESCRIPTION - ONSET: ONSET: ON-GOING

## 2023-11-21 NOTE — PATIENT INSTRUCTIONS
PHYSICIAN ORDERS AND DISCHARGE INSTRUCTIONS     Wound cleansing:              Do not scrub or use excessive force. Wash hands with soap and water before and after dressing changes. Prior to applying a clean dressing, cleanse wound with normal saline,               wound cleanser, or mild soap and water. Ask the physician or nurse before getting the wound(s) wet in a shower     Wound Care Notes:         Narinder Simpsoner to find out about purewick and wheelchair. Reliable medical equipment to do home visit. Orders for this week: 2023     FAX ORDERS TO [facility]! Please restart care. Change on : Order Supplies!!! Right Buttock wounds - Wash with soap and water, pat dry. At home apply Stimulen powder, clobetasol, desitin, lidocaine to irritated areas of buttocks  Apply puracol dampened with anasept spray and calcium algiante on gentac. Apply mastisol to periwound. Apply gentac with medication to wound. Secure with tegaderm   Leave dressing in place for 3 days. []   Nurse Visit   Follow Up Instructions: At the [address] [place] [address] in 1 week: Nurse visit with Blayne Ariza Due to Narinder Torresller being off. 2 weeks   Primary Wound Care Provider: Greyson Colón CNP   Call  for any questions or concerns.   Central [phone] for imaging lab work

## 2023-11-28 ENCOUNTER — HOSPITAL ENCOUNTER (OUTPATIENT)
Dept: WOUND CARE | Age: 83
Discharge: HOME | End: 2023-11-28
Payer: MEDICARE

## 2023-11-28 VITALS
HEART RATE: 76 BPM | TEMPERATURE: 97.2 F | SYSTOLIC BLOOD PRESSURE: 144 MMHG | RESPIRATION RATE: 16 BRPM | DIASTOLIC BLOOD PRESSURE: 67 MMHG

## 2023-11-28 PROCEDURE — 99214 OFFICE O/P EST MOD 30 MIN: CPT

## 2023-11-28 ASSESSMENT — PAIN SCALES - GENERAL: PAINLEVEL_OUTOF10: 0

## 2023-12-05 ENCOUNTER — HOSPITAL ENCOUNTER (OUTPATIENT)
Dept: WOUND CARE | Age: 83
Discharge: HOME | End: 2023-12-05
Payer: MEDICARE

## 2023-12-05 VITALS
TEMPERATURE: 97.2 F | RESPIRATION RATE: 16 BRPM | SYSTOLIC BLOOD PRESSURE: 135 MMHG | HEART RATE: 65 BPM | DIASTOLIC BLOOD PRESSURE: 55 MMHG

## 2023-12-05 DIAGNOSIS — L89.312 PRESSURE INJURY OF RIGHT BUTTOCK, STAGE 2 (HCC): ICD-10-CM

## 2023-12-05 DIAGNOSIS — R26.9 GAIT DISTURBANCE: Primary | ICD-10-CM

## 2023-12-05 DIAGNOSIS — M05.79 RHEUMATOID ARTHRITIS INVOLVING MULTIPLE SITES WITH POSITIVE RHEUMATOID FACTOR (HCC): ICD-10-CM

## 2023-12-05 DIAGNOSIS — I63.9 DYSARTHRIA DUE TO ACUTE STROKE (HCC): ICD-10-CM

## 2023-12-05 DIAGNOSIS — D84.821 IMMUNOSUPPRESSION DUE TO DRUG THERAPY (HCC): ICD-10-CM

## 2023-12-05 DIAGNOSIS — Z79.899 IMMUNOSUPPRESSION DUE TO DRUG THERAPY (HCC): ICD-10-CM

## 2023-12-05 DIAGNOSIS — R47.1 DYSARTHRIA DUE TO ACUTE STROKE (HCC): ICD-10-CM

## 2023-12-05 DIAGNOSIS — N39.45 CONTINUOUS LEAKAGE OF URINE: ICD-10-CM

## 2023-12-05 PROCEDURE — 11042 DBRDMT SUBQ TIS 1ST 20SQCM/<: CPT

## 2023-12-05 PROCEDURE — 11042 DBRDMT SUBQ TIS 1ST 20SQCM/<: CPT | Performed by: NURSE PRACTITIONER

## 2023-12-05 RX ORDER — BETAMETHASONE DIPROPIONATE 0.05 %
OINTMENT (GRAM) TOPICAL ONCE
OUTPATIENT
Start: 2023-12-05 | End: 2023-12-05

## 2023-12-05 RX ORDER — LIDOCAINE 50 MG/G
OINTMENT TOPICAL ONCE
OUTPATIENT
Start: 2023-12-05 | End: 2023-12-05

## 2023-12-05 RX ORDER — LIDOCAINE HYDROCHLORIDE 40 MG/ML
SOLUTION TOPICAL ONCE
OUTPATIENT
Start: 2023-12-05 | End: 2023-12-05

## 2023-12-05 RX ORDER — BACITRACIN ZINC AND POLYMYXIN B SULFATE 500; 1000 [USP'U]/G; [USP'U]/G
OINTMENT TOPICAL ONCE
OUTPATIENT
Start: 2023-12-05 | End: 2023-12-05

## 2023-12-05 RX ORDER — TRIAMCINOLONE ACETONIDE 1 MG/G
OINTMENT TOPICAL ONCE
OUTPATIENT
Start: 2023-12-05 | End: 2023-12-05

## 2023-12-05 RX ORDER — LIDOCAINE 40 MG/G
CREAM TOPICAL ONCE
OUTPATIENT
Start: 2023-12-05 | End: 2023-12-05

## 2023-12-05 RX ORDER — IBUPROFEN 200 MG
TABLET ORAL ONCE
OUTPATIENT
Start: 2023-12-05 | End: 2023-12-05

## 2023-12-05 RX ORDER — CLOBETASOL PROPIONATE 0.5 MG/G
OINTMENT TOPICAL ONCE
OUTPATIENT
Start: 2023-12-05 | End: 2023-12-05

## 2023-12-05 RX ORDER — GINSENG 100 MG
CAPSULE ORAL ONCE
OUTPATIENT
Start: 2023-12-05 | End: 2023-12-05

## 2023-12-05 RX ORDER — SODIUM CHLOR/HYPOCHLOROUS ACID 0.033 %
SOLUTION, IRRIGATION IRRIGATION ONCE
OUTPATIENT
Start: 2023-12-05 | End: 2023-12-05

## 2023-12-05 RX ORDER — GENTAMICIN SULFATE 1 MG/G
OINTMENT TOPICAL ONCE
OUTPATIENT
Start: 2023-12-05 | End: 2023-12-05

## 2023-12-05 ASSESSMENT — PAIN SCALES - GENERAL: PAINLEVEL_OUTOF10: 3

## 2023-12-05 ASSESSMENT — PAIN DESCRIPTION - ONSET: ONSET: ON-GOING

## 2023-12-05 ASSESSMENT — PAIN DESCRIPTION - PAIN TYPE: TYPE: ACUTE PAIN

## 2023-12-05 ASSESSMENT — PAIN DESCRIPTION - DESCRIPTORS: DESCRIPTORS: TENDER

## 2023-12-05 ASSESSMENT — PAIN DESCRIPTION - FREQUENCY: FREQUENCY: INTERMITTENT

## 2023-12-05 ASSESSMENT — PAIN DESCRIPTION - LOCATION: LOCATION: BUTTOCKS

## 2023-12-05 ASSESSMENT — PAIN - FUNCTIONAL ASSESSMENT: PAIN_FUNCTIONAL_ASSESSMENT: ACTIVITIES ARE NOT PREVENTED

## 2023-12-05 ASSESSMENT — PAIN DESCRIPTION - ORIENTATION: ORIENTATION: MID

## 2023-12-05 NOTE — PATIENT INSTRUCTIONS
PHYSICIAN ORDERS AND DISCHARGE INSTRUCTIONS     Wound cleansing:              Do not scrub or use excessive force. Wash hands with soap and water before and after dressing changes. Prior to applying a clean dressing, cleanse wound with normal saline,               wound cleanser, or mild soap and water. Ask the physician or nurse before getting the wound(s) wet in a shower     Wound Care Notes:         [facility] to find out about purewick and wheelchair. Reliable medical equipment to do home visit. Orders for this week: 2023     FAX ORDERS TO [facility]! Please restart care. Change on : Order Supplies!!! Right Buttock wounds - Wash with soap and water, pat dry. At home apply Stimulen powder, clobetasol, desitin, lidocaine to irritated areas of buttocks  Apply puracol dampened with anasept spray and calcium algiante on gentac. Apply mastisol to periwound. Apply gentac with medication to wound. Secure with tegaderm   Leave dressing in place for 3 days. []   Nurse Visit   Follow Up Instructions: At the [address] [place] [address] in 1 week   Primary Wound Care Provider: Harriet Stewart CNP   Call  for any questions or concerns.   Central [phone] for imaging lab work

## 2023-12-12 ENCOUNTER — HOSPITAL ENCOUNTER (OUTPATIENT)
Dept: WOUND CARE | Age: 83
Discharge: HOME | End: 2023-12-12
Payer: MEDICARE

## 2023-12-12 VITALS
SYSTOLIC BLOOD PRESSURE: 150 MMHG | DIASTOLIC BLOOD PRESSURE: 89 MMHG | RESPIRATION RATE: 16 BRPM | HEART RATE: 68 BPM | TEMPERATURE: 96.8 F

## 2023-12-12 DIAGNOSIS — R47.1 DYSARTHRIA DUE TO ACUTE STROKE (HCC): ICD-10-CM

## 2023-12-12 DIAGNOSIS — R26.9 GAIT DISTURBANCE: Primary | ICD-10-CM

## 2023-12-12 DIAGNOSIS — I63.9 DYSARTHRIA DUE TO ACUTE STROKE (HCC): ICD-10-CM

## 2023-12-12 DIAGNOSIS — N39.45 CONTINUOUS LEAKAGE OF URINE: ICD-10-CM

## 2023-12-12 DIAGNOSIS — M05.79 RHEUMATOID ARTHRITIS INVOLVING MULTIPLE SITES WITH POSITIVE RHEUMATOID FACTOR (HCC): ICD-10-CM

## 2023-12-12 DIAGNOSIS — D84.821 IMMUNOSUPPRESSION DUE TO DRUG THERAPY (HCC): ICD-10-CM

## 2023-12-12 DIAGNOSIS — L89.312 PRESSURE INJURY OF RIGHT BUTTOCK, STAGE 2 (HCC): ICD-10-CM

## 2023-12-12 DIAGNOSIS — Z79.899 IMMUNOSUPPRESSION DUE TO DRUG THERAPY (HCC): ICD-10-CM

## 2023-12-12 PROCEDURE — 11042 DBRDMT SUBQ TIS 1ST 20SQCM/<: CPT

## 2023-12-12 RX ORDER — SODIUM CHLOR/HYPOCHLOROUS ACID 0.033 %
SOLUTION, IRRIGATION IRRIGATION ONCE
OUTPATIENT
Start: 2023-12-12 | End: 2023-12-12

## 2023-12-12 RX ORDER — TRIAMCINOLONE ACETONIDE 1 MG/G
OINTMENT TOPICAL ONCE
OUTPATIENT
Start: 2023-12-12 | End: 2023-12-12

## 2023-12-12 RX ORDER — CLOBETASOL PROPIONATE 0.5 MG/G
OINTMENT TOPICAL ONCE
OUTPATIENT
Start: 2023-12-12 | End: 2023-12-12

## 2023-12-12 RX ORDER — GENTAMICIN SULFATE 1 MG/G
OINTMENT TOPICAL ONCE
OUTPATIENT
Start: 2023-12-12 | End: 2023-12-12

## 2023-12-12 RX ORDER — GINSENG 100 MG
CAPSULE ORAL ONCE
OUTPATIENT
Start: 2023-12-12 | End: 2023-12-12

## 2023-12-12 RX ORDER — LIDOCAINE 40 MG/G
CREAM TOPICAL ONCE
OUTPATIENT
Start: 2023-12-12 | End: 2023-12-12

## 2023-12-12 RX ORDER — BETAMETHASONE DIPROPIONATE 0.05 %
OINTMENT (GRAM) TOPICAL ONCE
OUTPATIENT
Start: 2023-12-12 | End: 2023-12-12

## 2023-12-12 RX ORDER — IBUPROFEN 200 MG
TABLET ORAL ONCE
OUTPATIENT
Start: 2023-12-12 | End: 2023-12-12

## 2023-12-12 RX ORDER — BACITRACIN ZINC AND POLYMYXIN B SULFATE 500; 1000 [USP'U]/G; [USP'U]/G
OINTMENT TOPICAL ONCE
OUTPATIENT
Start: 2023-12-12 | End: 2023-12-12

## 2023-12-12 RX ORDER — LIDOCAINE HYDROCHLORIDE 40 MG/ML
SOLUTION TOPICAL ONCE
OUTPATIENT
Start: 2023-12-12 | End: 2023-12-12

## 2023-12-12 RX ORDER — LIDOCAINE 50 MG/G
OINTMENT TOPICAL ONCE
OUTPATIENT
Start: 2023-12-12 | End: 2023-12-12

## 2023-12-12 NOTE — PATIENT INSTRUCTIONS
PHYSICIAN ORDERS AND DISCHARGE INSTRUCTIONS     Wound cleansing:              Do not scrub or use excessive force. Wash hands with soap and water before and after dressing changes. Prior to applying a clean dressing, cleanse wound with normal saline,               wound cleanser, or mild soap and water. Ask the physician or nurse before getting the wound(s) wet in a shower     Wound Care Notes:         Karyle Qua to find out about purewick and wheelchair. Reliable medical equipment to do home visit. Orders for this week: 2023     FAX ORDERS TO [facility]! Please restart care. Change on : Order Supplies!!! Right Buttock wounds - Wash with soap and water, pat dry. At home apply Stimulen powder, clobetasol, desitin, lidocaine to irritated areas of buttocks  Apply puracol dampened with anasept spray and calcium algiante on gentac. Apply mastisol to periwound. Apply gentac with medication to wound. Secure with tegaderm   Leave dressing in place for 3 days. []   Nurse Visit   Follow Up Instructions: At the [address] [place] [address] in 1 week   Primary Wound Care Provider: Harriet Stewart CNP   Call  for any questions or concerns.   Central [phone] for imaging lab work

## 2023-12-27 ENCOUNTER — OFFICE VISIT (OUTPATIENT)
Dept: NEUROLOGY | Age: 83
End: 2023-12-27
Payer: MEDICARE

## 2023-12-27 VITALS — OXYGEN SATURATION: 94 % | SYSTOLIC BLOOD PRESSURE: 122 MMHG | DIASTOLIC BLOOD PRESSURE: 60 MMHG | HEART RATE: 75 BPM

## 2023-12-27 DIAGNOSIS — R56.9 PARTIAL SEIZURES (HCC): ICD-10-CM

## 2023-12-27 DIAGNOSIS — F01.A0 MILD VASCULAR DEMENTIA WITHOUT BEHAVIORAL DISTURBANCE, PSYCHOTIC DISTURBANCE, MOOD DISTURBANCE, OR ANXIETY (HCC): ICD-10-CM

## 2023-12-27 PROCEDURE — G8484 FLU IMMUNIZE NO ADMIN: HCPCS | Performed by: NURSE PRACTITIONER

## 2023-12-27 PROCEDURE — 1036F TOBACCO NON-USER: CPT | Performed by: NURSE PRACTITIONER

## 2023-12-27 PROCEDURE — G8427 DOCREV CUR MEDS BY ELIG CLIN: HCPCS | Performed by: NURSE PRACTITIONER

## 2023-12-27 PROCEDURE — G8399 PT W/DXA RESULTS DOCUMENT: HCPCS | Performed by: NURSE PRACTITIONER

## 2023-12-27 PROCEDURE — 1090F PRES/ABSN URINE INCON ASSESS: CPT | Performed by: NURSE PRACTITIONER

## 2023-12-27 PROCEDURE — 99213 OFFICE O/P EST LOW 20 MIN: CPT | Performed by: NURSE PRACTITIONER

## 2023-12-27 PROCEDURE — G8419 CALC BMI OUT NRM PARAM NOF/U: HCPCS | Performed by: NURSE PRACTITIONER

## 2023-12-27 PROCEDURE — 3078F DIAST BP <80 MM HG: CPT | Performed by: NURSE PRACTITIONER

## 2023-12-27 PROCEDURE — 3074F SYST BP LT 130 MM HG: CPT | Performed by: NURSE PRACTITIONER

## 2023-12-27 PROCEDURE — 1123F ACP DISCUSS/DSCN MKR DOCD: CPT | Performed by: NURSE PRACTITIONER

## 2023-12-27 RX ORDER — DONEPEZIL HYDROCHLORIDE 10 MG/1
10 TABLET, FILM COATED ORAL EVERY MORNING
Qty: 90 TABLET | Refills: 1 | Status: SHIPPED | OUTPATIENT
Start: 2023-12-27

## 2023-12-27 RX ORDER — LEVETIRACETAM 500 MG/1
500 TABLET ORAL 2 TIMES DAILY
Qty: 180 TABLET | Refills: 3 | Status: SHIPPED | OUTPATIENT
Start: 2023-12-27

## 2023-12-27 RX ORDER — MEMANTINE HYDROCHLORIDE 10 MG/1
10 TABLET ORAL 2 TIMES DAILY
Qty: 60 TABLET | Refills: 5 | Status: SHIPPED | OUTPATIENT
Start: 2023-12-27

## 2023-12-27 NOTE — PROGRESS NOTES
Coordination/Cerebellum:       Tremors--none      Rapidly alternating movements:  dysdiadochokinesia b/l                Heel-to-Shin: no dysmetria b/l      Finger-to-Nose: dysmetria left     Gait and stance:      Gait: Dependent on wheelchair    /60 (Site: Left Upper Arm, Position: Sitting, Cuff Size: Large Adult)   Pulse 75   SpO2 94%     Assessment and Plan     Diagnosis Orders   1. Partial seizures (HCC)  levETIRAcetam (KEPPRA) 500 MG tablet      2. Mild vascular dementia without behavioral disturbance, psychotic disturbance, mood disturbance, or anxiety (HCC)  memantine (NAMENDA) 10 MG tablet    donepezil (ARICEPT) 10 MG tablet      Altagracia was seen in neurological follow up in regards to seizure, history of stroke, dementia.   -She remains on aspirin and statin for secondary stroke prevention.  -She continues to participate in OT. She completed PT. Her daughters goal is to have Altagracia participate in PT/OT outpatient once she is strong enough. She remains living at home with her daughter.  -She remains on Keppra 500 mg twice daily for seizure prophylaxis.  -She remains on Aricept and Namenda help slow progression of memory loss. -They are considering adult  at [facility]. Return in about 3 months (around 3/27/2024).     TARUN Lynn - CNP

## 2024-01-01 NOTE — PATIENT INSTRUCTIONS
PHYSICIAN ORDERS AND DISCHARGE INSTRUCTIONS     Wound cleansing:              Do not scrub or use excessive force.             Wash hands with soap and water before and after dressing changes.             Prior to applying a clean dressing, cleanse wound with normal saline,               wound cleanser, or mild soap and water.              Ask the physician or nurse before getting the wound(s) wet in a shower     Wound Care Notes:         Kelsey to find out about purewick and wheelchair. Reliable medical equipment to do home visit.                               Orders for this week: 2024     FAX ORDERS TO [facility]! Please restart care. Change on : Order Supplies!!!        Right Buttock wounds - Wash with soap and water, pat dry.   At home apply Stimulen powder, clobetasol, desitin, lidocaine to irritated areas of buttocks  Apply puracol dampened with anasept spray and calcium algiante on gentac.   Apply mastisol to periwound.   Apply gentac with medication to wound.   Secure with tegaderm   Leave dressing in place for 3 days.       []   Nurse Visit   Follow Up Instructions: At the Wound Care Center in 1 week:    Primary Wound Care Provider: Kelsey Marinelli CNP   Call  for any questions or concerns.  Central [phone] for imaging lab work

## 2024-01-02 ENCOUNTER — HOSPITAL ENCOUNTER (OUTPATIENT)
Dept: WOUND CARE | Age: 84
Discharge: HOME | End: 2024-01-02
Payer: MEDICARE

## 2024-01-02 VITALS
HEART RATE: 66 BPM | DIASTOLIC BLOOD PRESSURE: 80 MMHG | SYSTOLIC BLOOD PRESSURE: 132 MMHG | TEMPERATURE: 97.8 F | RESPIRATION RATE: 16 BRPM

## 2024-01-02 DIAGNOSIS — I63.9 DYSARTHRIA DUE TO ACUTE STROKE (HCC): ICD-10-CM

## 2024-01-02 DIAGNOSIS — R26.9 GAIT DISTURBANCE: ICD-10-CM

## 2024-01-02 DIAGNOSIS — R47.1 DYSARTHRIA DUE TO ACUTE STROKE (HCC): ICD-10-CM

## 2024-01-02 DIAGNOSIS — M05.79 RHEUMATOID ARTHRITIS INVOLVING MULTIPLE SITES WITH POSITIVE RHEUMATOID FACTOR (HCC): ICD-10-CM

## 2024-01-02 DIAGNOSIS — N39.45 CONTINUOUS LEAKAGE OF URINE: ICD-10-CM

## 2024-01-02 DIAGNOSIS — D84.821 IMMUNOSUPPRESSION DUE TO DRUG THERAPY (HCC): ICD-10-CM

## 2024-01-02 DIAGNOSIS — L89.312 PRESSURE INJURY OF RIGHT BUTTOCK, STAGE 2 (HCC): Primary | ICD-10-CM

## 2024-01-02 DIAGNOSIS — Z79.899 IMMUNOSUPPRESSION DUE TO DRUG THERAPY (HCC): ICD-10-CM

## 2024-01-02 PROCEDURE — 11042 DBRDMT SUBQ TIS 1ST 20SQCM/<: CPT

## 2024-01-02 PROCEDURE — 11042 DBRDMT SUBQ TIS 1ST 20SQCM/<: CPT | Performed by: SURGERY

## 2024-01-02 RX ORDER — BETAMETHASONE DIPROPIONATE 0.05 %
OINTMENT (GRAM) TOPICAL ONCE
OUTPATIENT
Start: 2024-01-02 | End: 2024-01-02

## 2024-01-02 RX ORDER — CLOBETASOL PROPIONATE 0.5 MG/G
OINTMENT TOPICAL ONCE
OUTPATIENT
Start: 2024-01-02 | End: 2024-01-02

## 2024-01-02 RX ORDER — LIDOCAINE 50 MG/G
OINTMENT TOPICAL ONCE
OUTPATIENT
Start: 2024-01-02 | End: 2024-01-02

## 2024-01-02 RX ORDER — TRIAMCINOLONE ACETONIDE 1 MG/G
OINTMENT TOPICAL ONCE
OUTPATIENT
Start: 2024-01-02 | End: 2024-01-02

## 2024-01-02 RX ORDER — BACITRACIN ZINC 500 [USP'U]/G
OINTMENT TOPICAL ONCE
OUTPATIENT
Start: 2024-01-02 | End: 2024-01-02

## 2024-01-02 RX ORDER — BACITRACIN ZINC AND POLYMYXIN B SULFATE 500; 1000 [USP'U]/G; [USP'U]/G
OINTMENT TOPICAL ONCE
OUTPATIENT
Start: 2024-01-02 | End: 2024-01-02

## 2024-01-02 RX ORDER — LIDOCAINE 40 MG/G
CREAM TOPICAL ONCE
OUTPATIENT
Start: 2024-01-02 | End: 2024-01-02

## 2024-01-02 RX ORDER — IBUPROFEN 200 MG
TABLET ORAL ONCE
OUTPATIENT
Start: 2024-01-02 | End: 2024-01-02

## 2024-01-02 RX ORDER — GENTAMICIN SULFATE 1 MG/G
OINTMENT TOPICAL ONCE
OUTPATIENT
Start: 2024-01-02 | End: 2024-01-02

## 2024-01-02 RX ORDER — SODIUM CHLOR/HYPOCHLOROUS ACID 0.033 %
SOLUTION, IRRIGATION IRRIGATION ONCE
OUTPATIENT
Start: 2024-01-02 | End: 2024-01-02

## 2024-01-02 RX ORDER — LIDOCAINE HYDROCHLORIDE 40 MG/ML
SOLUTION TOPICAL ONCE
OUTPATIENT
Start: 2024-01-02 | End: 2024-01-02

## 2024-01-02 ASSESSMENT — PAIN DESCRIPTION - LOCATION: LOCATION: BUTTOCKS

## 2024-01-02 ASSESSMENT — PAIN SCALES - GENERAL: PAINLEVEL_OUTOF10: 0

## 2024-01-02 ASSESSMENT — PAIN - FUNCTIONAL ASSESSMENT: PAIN_FUNCTIONAL_ASSESSMENT: ACTIVITIES ARE NOT PREVENTED

## 2024-01-02 ASSESSMENT — PAIN DESCRIPTION - PAIN TYPE: TYPE: ACUTE PAIN

## 2024-01-02 ASSESSMENT — PAIN DESCRIPTION - ORIENTATION: ORIENTATION: MID

## 2024-01-02 ASSESSMENT — PAIN DESCRIPTION - ONSET: ONSET: ON-GOING

## 2024-01-02 ASSESSMENT — PAIN DESCRIPTION - FREQUENCY: FREQUENCY: INTERMITTENT

## 2024-01-02 ASSESSMENT — PAIN DESCRIPTION - DESCRIPTORS: DESCRIPTORS: TENDER

## 2024-01-02 NOTE — PLAN OF CARE
Problem: Wound:  Goal: Will show signs of wound healing; wound closure and no evidence of infection  Description: Will show signs of wound healing; wound closure and no evidence of infection  Outcome: Progressing     Problem: Pain  Goal: Verbalizes/displays adequate comfort level or baseline comfort level  Outcome: Progressing

## 2024-01-02 NOTE — PROGRESS NOTES
Wound Care Center Progress Visit      Altagracia Wayne  AGE: 83 y.o.   GENDER: female  : 1940  EPISODE DATE:  2024   Referred by: Marshall Dominique     Subjective:     CHIEF COMPLAINT  WOUND   Problem List Items Addressed This Visit          Circulatory    Dysarthria due to acute stroke (HCC)    Relevant Orders    Initiate Outpatient Wound Care Protocol       Other    Gait disturbance    Relevant Orders    Initiate Outpatient Wound Care Protocol    Rheumatoid arthritis involving multiple sites with positive rheumatoid factor (HCC)    Relevant Orders    Initiate Outpatient Wound Care Protocol    WD-Pressure injury of right buttock, stage 2 (HCC) - Primary    Relevant Orders    Initiate Outpatient Wound Care Protocol    Continuous leakage of urine    Relevant Orders    Initiate Outpatient Wound Care Protocol    Immunosuppression due to drug therapy (HCC)    Relevant Orders    Initiate Outpatient Wound Care Protocol     Chief Complaint   Patient presents with    Wound Check        HISTORY of PRESENT ILLNESS      Altagracia Wayne is a 83 y.o. 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 The condition is of moderate severity. The ulcers are recurrent in nature and have been present at varying degrees of severity for > than one year at initial visit to the wound clinic 23. The underlying cause is thought to be pressure and moisture from urinary incontinence. The patients care to date has included santyl. The patient has significant underlying medical conditions as below. Marshall Dominique is PCP.    Wound Pain Timing/Severity: waxing and waning, moderate  Quality of pain: aching, tender, pressure  Severity of pain:  2 / 10   Modifying Factors: chronic pressure, decreased mobility, shear force, immunosuppression, incontinence of stool, and incontinence of urine  Associated Signs/Symptoms: erythema, drainage, and

## 2024-01-08 NOTE — PATIENT INSTRUCTIONS
PHYSICIAN ORDERS AND DISCHARGE INSTRUCTIONS     Wound cleansing:              Do not scrub or use excessive force.             Wash hands with soap and water before and after dressing changes.             Prior to applying a clean dressing, cleanse wound with normal saline,               wound cleanser, or mild soap and water.              Ask the physician or nurse before getting the wound(s) wet in a shower     Wound Care Notes:         Kelsey to find out about purewick and wheelchair. Reliable medical equipment to do home visit.                               Orders for this week: 2024     FAX ORDERS TO [facility]! Please restart care. Change on : Order Supplies!!!        Right Buttock wounds - Wash with soap and water, pat dry.   At home apply Stimulen powder, clobetasol, desitin, lidocaine to irritated areas of buttocks  Apply puracol dampened with anasept spray and calcium algiante on gentac.   Apply mastisol to periwound.   Apply gentac with medication to wound.   Secure with tegaderm   Leave dressing in place for 3 days.       []   Nurse Visit   Follow Up Instructions: At the Wound Care Center in 1 week:    Primary Wound Care Provider: Kelsey Marinelli CNP   Call  for any questions or concerns.  Central [phone] for imaging lab work

## 2024-01-09 ENCOUNTER — HOSPITAL ENCOUNTER (OUTPATIENT)
Dept: WOUND CARE | Age: 84
Discharge: HOME | End: 2024-01-09
Payer: MEDICARE

## 2024-01-09 DIAGNOSIS — D84.821 IMMUNOSUPPRESSION DUE TO DRUG THERAPY (HCC): ICD-10-CM

## 2024-01-09 DIAGNOSIS — N39.45 CONTINUOUS LEAKAGE OF URINE: ICD-10-CM

## 2024-01-09 DIAGNOSIS — I63.9 DYSARTHRIA DUE TO ACUTE STROKE (HCC): ICD-10-CM

## 2024-01-09 DIAGNOSIS — L89.312 PRESSURE INJURY OF RIGHT BUTTOCK, STAGE 2 (HCC): ICD-10-CM

## 2024-01-09 DIAGNOSIS — M05.79 RHEUMATOID ARTHRITIS INVOLVING MULTIPLE SITES WITH POSITIVE RHEUMATOID FACTOR (HCC): ICD-10-CM

## 2024-01-09 DIAGNOSIS — R26.9 GAIT DISTURBANCE: Primary | ICD-10-CM

## 2024-01-09 DIAGNOSIS — R47.1 DYSARTHRIA DUE TO ACUTE STROKE (HCC): ICD-10-CM

## 2024-01-09 DIAGNOSIS — Z79.899 IMMUNOSUPPRESSION DUE TO DRUG THERAPY (HCC): ICD-10-CM

## 2024-01-09 PROCEDURE — 11042 DBRDMT SUBQ TIS 1ST 20SQCM/<: CPT | Performed by: NURSE PRACTITIONER

## 2024-01-09 PROCEDURE — 11042 DBRDMT SUBQ TIS 1ST 20SQCM/<: CPT

## 2024-01-09 RX ORDER — BACITRACIN ZINC AND POLYMYXIN B SULFATE 500; 1000 [USP'U]/G; [USP'U]/G
OINTMENT TOPICAL ONCE
OUTPATIENT
Start: 2024-01-09 | End: 2024-01-09

## 2024-01-09 RX ORDER — BACITRACIN ZINC 500 [USP'U]/G
OINTMENT TOPICAL ONCE
OUTPATIENT
Start: 2024-01-09 | End: 2024-01-09

## 2024-01-09 RX ORDER — LIDOCAINE 40 MG/G
CREAM TOPICAL ONCE
OUTPATIENT
Start: 2024-01-09 | End: 2024-01-09

## 2024-01-09 RX ORDER — SODIUM CHLOR/HYPOCHLOROUS ACID 0.033 %
SOLUTION, IRRIGATION IRRIGATION ONCE
OUTPATIENT
Start: 2024-01-09 | End: 2024-01-09

## 2024-01-09 RX ORDER — GENTAMICIN SULFATE 1 MG/G
OINTMENT TOPICAL ONCE
OUTPATIENT
Start: 2024-01-09 | End: 2024-01-09

## 2024-01-09 RX ORDER — LIDOCAINE 50 MG/G
OINTMENT TOPICAL ONCE
OUTPATIENT
Start: 2024-01-09 | End: 2024-01-09

## 2024-01-09 RX ORDER — BETAMETHASONE DIPROPIONATE 0.05 %
OINTMENT (GRAM) TOPICAL ONCE
OUTPATIENT
Start: 2024-01-09 | End: 2024-01-09

## 2024-01-09 RX ORDER — IBUPROFEN 200 MG
TABLET ORAL ONCE
OUTPATIENT
Start: 2024-01-09 | End: 2024-01-09

## 2024-01-09 RX ORDER — LIDOCAINE HYDROCHLORIDE 40 MG/ML
SOLUTION TOPICAL ONCE
OUTPATIENT
Start: 2024-01-09 | End: 2024-01-09

## 2024-01-09 RX ORDER — TRIAMCINOLONE ACETONIDE 1 MG/G
OINTMENT TOPICAL ONCE
OUTPATIENT
Start: 2024-01-09 | End: 2024-01-09

## 2024-01-09 RX ORDER — CLOBETASOL PROPIONATE 0.5 MG/G
OINTMENT TOPICAL ONCE
OUTPATIENT
Start: 2024-01-09 | End: 2024-01-09

## 2024-01-09 NOTE — PROGRESS NOTES
Wound Care Center Progress Visit      Altagracia Wayne  AGE: 83 y.o.   GENDER: female  : 1940  EPISODE DATE:  2024   Referred by: Marshall Dominique     Subjective:     CHIEF COMPLAINT  WOUND   Problem List Items Addressed This Visit          Circulatory    Dysarthria due to acute stroke (HCC)    Relevant Orders    Initiate Outpatient Wound Care Protocol       Other    Gait disturbance - Primary    Relevant Orders    Initiate Outpatient Wound Care Protocol    Rheumatoid arthritis involving multiple sites with positive rheumatoid factor (HCC)    Relevant Orders    Initiate Outpatient Wound Care Protocol    WD-Pressure injury of right buttock, stage 2 (HCC)    Relevant Orders    Initiate Outpatient Wound Care Protocol    Continuous leakage of urine    Relevant Orders    Initiate Outpatient Wound Care Protocol    Immunosuppression due to drug therapy (HCC)    Relevant Orders    Initiate Outpatient Wound Care Protocol     Chief Complaint   Patient presents with    Wound Check        HISTORY of PRESENT ILLNESS      Altagracia Wayne is a 83 y.o. 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 The condition is of moderate severity. The ulcers are recurrent in nature and have been present at varying degrees of severity for > than one year at initial visit to the wound clinic 23. The underlying cause is thought to be pressure and moisture from urinary incontinence. The patients care to date has included santyl. The patient has significant underlying medical conditions as below. Marshall Dominique is PCP.    Wound Pain Timing/Severity: waxing and waning, moderate  Quality of pain: aching, tender, pressure  Severity of pain:  2 / 10   Modifying Factors: chronic pressure, decreased mobility, shear force, immunosuppression, incontinence of stool, and incontinence of urine  Associated Signs/Symptoms: erythema, drainage, and

## 2024-01-16 ENCOUNTER — HOSPITAL ENCOUNTER (OUTPATIENT)
Dept: WOUND CARE | Age: 84
Discharge: HOME | End: 2024-01-16
Payer: MEDICARE

## 2024-01-16 DIAGNOSIS — R47.1 DYSARTHRIA DUE TO ACUTE STROKE (HCC): ICD-10-CM

## 2024-01-16 DIAGNOSIS — M05.79 RHEUMATOID ARTHRITIS INVOLVING MULTIPLE SITES WITH POSITIVE RHEUMATOID FACTOR (HCC): ICD-10-CM

## 2024-01-16 DIAGNOSIS — L89.312 PRESSURE INJURY OF RIGHT BUTTOCK, STAGE 2 (HCC): Primary | ICD-10-CM

## 2024-01-16 DIAGNOSIS — D84.821 IMMUNOSUPPRESSION DUE TO DRUG THERAPY (HCC): ICD-10-CM

## 2024-01-16 DIAGNOSIS — I63.9 DYSARTHRIA DUE TO ACUTE STROKE (HCC): ICD-10-CM

## 2024-01-16 DIAGNOSIS — N39.45 CONTINUOUS LEAKAGE OF URINE: ICD-10-CM

## 2024-01-16 DIAGNOSIS — R26.9 GAIT DISTURBANCE: ICD-10-CM

## 2024-01-16 DIAGNOSIS — Z79.899 IMMUNOSUPPRESSION DUE TO DRUG THERAPY (HCC): ICD-10-CM

## 2024-01-16 PROCEDURE — 97597 DBRDMT OPN WND 1ST 20 CM/<: CPT | Performed by: NURSE PRACTITIONER

## 2024-01-16 PROCEDURE — 97597 DBRDMT OPN WND 1ST 20 CM/<: CPT

## 2024-01-16 RX ORDER — SODIUM CHLOR/HYPOCHLOROUS ACID 0.033 %
SOLUTION, IRRIGATION IRRIGATION ONCE
OUTPATIENT
Start: 2024-01-16 | End: 2024-01-16

## 2024-01-16 RX ORDER — BETAMETHASONE DIPROPIONATE 0.05 %
OINTMENT (GRAM) TOPICAL ONCE
OUTPATIENT
Start: 2024-01-16 | End: 2024-01-16

## 2024-01-16 RX ORDER — LIDOCAINE 40 MG/G
CREAM TOPICAL ONCE
OUTPATIENT
Start: 2024-01-16 | End: 2024-01-16

## 2024-01-16 RX ORDER — TRIAMCINOLONE ACETONIDE 1 MG/G
OINTMENT TOPICAL ONCE
OUTPATIENT
Start: 2024-01-16 | End: 2024-01-16

## 2024-01-16 RX ORDER — IBUPROFEN 200 MG
TABLET ORAL ONCE
OUTPATIENT
Start: 2024-01-16 | End: 2024-01-16

## 2024-01-16 RX ORDER — BACITRACIN ZINC AND POLYMYXIN B SULFATE 500; 1000 [USP'U]/G; [USP'U]/G
OINTMENT TOPICAL ONCE
OUTPATIENT
Start: 2024-01-16 | End: 2024-01-16

## 2024-01-16 RX ORDER — BACITRACIN ZINC 500 [USP'U]/G
OINTMENT TOPICAL ONCE
OUTPATIENT
Start: 2024-01-16 | End: 2024-01-16

## 2024-01-16 RX ORDER — GENTAMICIN SULFATE 1 MG/G
OINTMENT TOPICAL ONCE
OUTPATIENT
Start: 2024-01-16 | End: 2024-01-16

## 2024-01-16 RX ORDER — CLOBETASOL PROPIONATE 0.5 MG/G
OINTMENT TOPICAL ONCE
OUTPATIENT
Start: 2024-01-16 | End: 2024-01-16

## 2024-01-16 RX ORDER — LIDOCAINE 50 MG/G
OINTMENT TOPICAL ONCE
OUTPATIENT
Start: 2024-01-16 | End: 2024-01-16

## 2024-01-16 RX ORDER — LIDOCAINE HYDROCHLORIDE 40 MG/ML
SOLUTION TOPICAL ONCE
OUTPATIENT
Start: 2024-01-16 | End: 2024-01-16

## 2024-01-16 NOTE — PROGRESS NOTES
Treatment to Buttocks completed as per current order. Patient tolerated procedure well. Instructions and education for skin care and preventative measures given to Patient's daughter who provides majority of care. Patient and daughter reported understanding.  
unlabored, no wheezing   Cardiovascular:   Regular rate and rhythm    Abdomen, GI:   Soft, non-tender, no rebound or guarding    Musculoskeletal:  Atraumatic, no cyanosis or edema    Neurologic:  Nonfocal, strength & sensation grossly intact   Psychiatric: Oriented x3, grossly appropriate judgement and insight      Dermatologic exam: Visual inspection of the periwound reveals the skin to be normal in turgor and texture  Wound exam: see wound description below in procedure note    Assessment:       Altagracia Wayne  appears to have a non-healing wound of the buttocks. The etiology of the wound is felt to be pressure and and moisture . There are multiple complicating factors including chronic pressure, decreased mobility, shear force, immunosuppression, incontinence of stool, and incontinence of urine.  A comprehensive wound management program would be helpful to heal this wound. Assessments completed include fall risk and nutritional, functional,and psychological status.  At this time appropriate care would include: periodic debridement and wound care as below.     Problem List Items Addressed This Visit          Circulatory    Dysarthria due to acute stroke (HCC)    Relevant Orders    Initiate Outpatient Wound Care Protocol       Other    Gait disturbance    Relevant Orders    Initiate Outpatient Wound Care Protocol    Rheumatoid arthritis involving multiple sites with positive rheumatoid factor (HCC)    Relevant Orders    Initiate Outpatient Wound Care Protocol    WD-Pressure injury of right buttock, stage 2 (HCC) - Primary    Relevant Orders    Initiate Outpatient Wound Care Protocol    Continuous leakage of urine    Relevant Orders    Initiate Outpatient Wound Care Protocol    Immunosuppression due to drug therapy (HCC)    Relevant Orders    Initiate Outpatient Wound Care Protocol       Procedure Note    Indications:  Based on my examination of this patient's wound(s) today, sharp excision into devitalized tissue is

## 2024-01-16 NOTE — PATIENT INSTRUCTIONS
PHYSICIAN ORDERS AND DISCHARGE INSTRUCTIONS     Wound cleansing:              Do not scrub or use excessive force.             Wash hands with soap and water before and after dressing changes.             Prior to applying a clean dressing, cleanse wound with normal saline,               wound cleanser, or mild soap and water.              Ask the physician or nurse before getting the wound(s) wet in a shower     Wound Care Notes:         Kelsey to find out about purewick and wheelchair. Reliable medical equipment to do home visit.                               Orders for this week: 2024     FAX ORDERS TO [facility]! Please restart care. Change on : Order Supplies!!!        Right Buttock wounds - Wash with soap and water, pat dry.   At home apply Stimulen powder, clobetasol, desitin, lidocaine to irritated areas of buttocks  Apply puracol dampened with anasept spray and calcium algiante on gentac.   Apply mastisol to periwound.   Apply gentac with medication to wound.   Secure with tegaderm   Leave dressing in place for 3 days.       []   Nurse Visit   Follow Up Instructions: At the Wound Care Center in week: : Discharged   Primary Wound Care Provider: Kelsey Marinelli CNP   Call  for any questions or concerns.  Central [phone] for imaging lab work

## 2024-01-29 ENCOUNTER — OFFICE VISIT (OUTPATIENT)
Dept: CARDIOLOGY CLINIC | Age: 84
End: 2024-01-29
Payer: MEDICARE

## 2024-01-29 VITALS
OXYGEN SATURATION: 92 % | WEIGHT: 149 LBS | BODY MASS INDEX: 25.44 KG/M2 | HEART RATE: 76 BPM | HEIGHT: 64 IN | SYSTOLIC BLOOD PRESSURE: 108 MMHG | DIASTOLIC BLOOD PRESSURE: 70 MMHG

## 2024-01-29 DIAGNOSIS — I10 ESSENTIAL HYPERTENSION: ICD-10-CM

## 2024-01-29 DIAGNOSIS — G47.33 OSA (OBSTRUCTIVE SLEEP APNEA): Primary | ICD-10-CM

## 2024-01-29 DIAGNOSIS — I47.10 SVT (SUPRAVENTRICULAR TACHYCARDIA): ICD-10-CM

## 2024-01-29 DIAGNOSIS — I73.9 PAD (PERIPHERAL ARTERY DISEASE) (HCC): ICD-10-CM

## 2024-01-29 DIAGNOSIS — I69.359 CVA, OLD, HEMIPARESIS (HCC): ICD-10-CM

## 2024-01-29 DIAGNOSIS — I48.91 ATRIAL FIBRILLATION, UNSPECIFIED TYPE (HCC): ICD-10-CM

## 2024-01-29 DIAGNOSIS — E78.2 MIXED HYPERLIPIDEMIA: ICD-10-CM

## 2024-01-29 PROCEDURE — G8484 FLU IMMUNIZE NO ADMIN: HCPCS | Performed by: NURSE PRACTITIONER

## 2024-01-29 PROCEDURE — G8419 CALC BMI OUT NRM PARAM NOF/U: HCPCS | Performed by: NURSE PRACTITIONER

## 2024-01-29 PROCEDURE — 99214 OFFICE O/P EST MOD 30 MIN: CPT | Performed by: NURSE PRACTITIONER

## 2024-01-29 PROCEDURE — 1036F TOBACCO NON-USER: CPT | Performed by: NURSE PRACTITIONER

## 2024-01-29 PROCEDURE — G8427 DOCREV CUR MEDS BY ELIG CLIN: HCPCS | Performed by: NURSE PRACTITIONER

## 2024-01-29 PROCEDURE — 1090F PRES/ABSN URINE INCON ASSESS: CPT | Performed by: NURSE PRACTITIONER

## 2024-01-29 PROCEDURE — 3078F DIAST BP <80 MM HG: CPT | Performed by: NURSE PRACTITIONER

## 2024-01-29 PROCEDURE — 1123F ACP DISCUSS/DSCN MKR DOCD: CPT | Performed by: NURSE PRACTITIONER

## 2024-01-29 PROCEDURE — 3074F SYST BP LT 130 MM HG: CPT | Performed by: NURSE PRACTITIONER

## 2024-01-29 PROCEDURE — G8399 PT W/DXA RESULTS DOCUMENT: HCPCS | Performed by: NURSE PRACTITIONER

## 2024-01-29 NOTE — PROGRESS NOTES
CARDIOLOGY  NOTE    2024    Altagracia Wayne (:  1940) is a 83 y.o. female,an established patient with Dr. Hernandez, here for evaluation of the following chief complaint(s):  3 Month Follow-Up (Pt denies cardiac symptoms)        SUBJECTIVE/OBJECTIVE:    HPI  Altagracia has Past medical history as noted below.      She states that she is feeling good. Daughter states that she is recovering from CVA. She had a fall and hit her head/ she did not have LOC and she has not had additional loss of ROM. She states that she is trying to get better.     her daughter who is her primary caretaker is with her today.  She has some limited memory and right-sided deficits weakness. She had stroke in  , carotid were normal , she has no known h/o afib but has dementia, walks with a walker , she has not had any falls in last few years . Daughter says she has had a lot of pain in her feet and ankles. 30 day monitor showed no afib .  She was in rehab in 2023 after CVA  like symptoms although MRI report did not report new CVA    she had episodes of SVT requiring adenosine infusions.      Review of Systems   Constitutional:  Negative for fatigue and fever.   Respiratory:  Negative for cough and shortness of breath.    Cardiovascular:  Negative for chest pain, palpitations and leg swelling.   Musculoskeletal:  Negative for arthralgias and gait problem.   Neurological:  Positive for weakness. Negative for dizziness, syncope, light-headedness and headaches.       Vitals:    24 1338   BP: 108/70   Site: Right Upper Arm   Position: Sitting   Cuff Size: Large Adult   Pulse: 76   SpO2: 92%   Weight: 67.6 kg (149 lb)   Height: 1.626 m (5' 4.02\")       Wt Readings from Last 3 Encounters:   24 67.6 kg (149 lb)   23 67.6 kg (149 lb)   10/16/23 67.6 kg (149 lb)       BP Readings from Last 3 Encounters:   24 108/70   24 132/80   23 122/60       Prior to Admission medications    Medication Sig Start

## 2024-01-29 NOTE — PATIENT INSTRUCTIONS
Please be informed that if you contact our office outside of normal business hours the physician on call cannot help with any scheduling or rescheduling issues, procedure instruction questions or any type of medication issue.    We advise you for any urgent/emergency that you go to the nearest emergency room!    PLEASE CALL OUR OFFICE DURING NORMAL BUSINESS HOURS    Monday - Friday   8 am to 5 pm    [place]: [phone]    [place]: [phone]    [place]:  [phone]    **It is YOUR responsibilty to bring medication bottles and/or updated medication list to EACH APPOINTMENT. This will allow us to better serve you and all your healthcare needs**    Thank you for allowing us to care for you today!   We want to ensure we can follow your treatment plan and we strive to give you the best outcomes and experience possible.   If you ever have a life threatening emergency and call 911 - for an ambulance (EMS)   Our providers can only care for you at:   [facility] or [facility].   Even if you have someone take you or you drive yourself we can only care for you in a [facility] facility. Our providers are not setup at the other healthcare locations!

## 2024-01-30 ENCOUNTER — HOSPITAL ENCOUNTER (OUTPATIENT)
Dept: WOUND CARE | Age: 84
Discharge: HOME | End: 2024-01-30
Payer: MEDICARE

## 2024-01-30 VITALS
HEART RATE: 64 BPM | TEMPERATURE: 97.5 F | SYSTOLIC BLOOD PRESSURE: 120 MMHG | DIASTOLIC BLOOD PRESSURE: 66 MMHG | RESPIRATION RATE: 16 BRPM

## 2024-01-30 DIAGNOSIS — I63.9 DYSARTHRIA DUE TO ACUTE STROKE (HCC): ICD-10-CM

## 2024-01-30 DIAGNOSIS — R47.1 DYSARTHRIA DUE TO ACUTE STROKE (HCC): ICD-10-CM

## 2024-01-30 DIAGNOSIS — R26.9 GAIT DISTURBANCE: ICD-10-CM

## 2024-01-30 DIAGNOSIS — L89.312 PRESSURE INJURY OF RIGHT BUTTOCK, STAGE 2 (HCC): Primary | ICD-10-CM

## 2024-01-30 DIAGNOSIS — N39.45 CONTINUOUS LEAKAGE OF URINE: ICD-10-CM

## 2024-01-30 DIAGNOSIS — M05.79 RHEUMATOID ARTHRITIS INVOLVING MULTIPLE SITES WITH POSITIVE RHEUMATOID FACTOR (HCC): ICD-10-CM

## 2024-01-30 DIAGNOSIS — Z79.899 IMMUNOSUPPRESSION DUE TO DRUG THERAPY (HCC): ICD-10-CM

## 2024-01-30 DIAGNOSIS — D84.821 IMMUNOSUPPRESSION DUE TO DRUG THERAPY (HCC): ICD-10-CM

## 2024-01-30 PROCEDURE — 11719 TRIM NAIL(S) ANY NUMBER: CPT

## 2024-01-30 PROCEDURE — 99213 OFFICE O/P EST LOW 20 MIN: CPT

## 2024-01-30 RX ORDER — LIDOCAINE 40 MG/G
CREAM TOPICAL ONCE
OUTPATIENT
Start: 2024-01-30 | End: 2024-01-30

## 2024-01-30 RX ORDER — LIDOCAINE 50 MG/G
OINTMENT TOPICAL ONCE
OUTPATIENT
Start: 2024-01-30 | End: 2024-01-30

## 2024-01-30 RX ORDER — BETAMETHASONE DIPROPIONATE 0.05 %
OINTMENT (GRAM) TOPICAL ONCE
OUTPATIENT
Start: 2024-01-30 | End: 2024-01-30

## 2024-01-30 RX ORDER — IBUPROFEN 200 MG
TABLET ORAL ONCE
OUTPATIENT
Start: 2024-01-30 | End: 2024-01-30

## 2024-01-30 RX ORDER — LIDOCAINE HYDROCHLORIDE 40 MG/ML
SOLUTION TOPICAL ONCE
OUTPATIENT
Start: 2024-01-30 | End: 2024-01-30

## 2024-01-30 RX ORDER — CLOBETASOL PROPIONATE 0.5 MG/G
OINTMENT TOPICAL ONCE
OUTPATIENT
Start: 2024-01-30 | End: 2024-01-30

## 2024-01-30 RX ORDER — BACITRACIN ZINC 500 [USP'U]/G
OINTMENT TOPICAL ONCE
OUTPATIENT
Start: 2024-01-30 | End: 2024-01-30

## 2024-01-30 RX ORDER — GENTAMICIN SULFATE 1 MG/G
OINTMENT TOPICAL ONCE
OUTPATIENT
Start: 2024-01-30 | End: 2024-01-30

## 2024-01-30 RX ORDER — SODIUM CHLOR/HYPOCHLOROUS ACID 0.033 %
SOLUTION, IRRIGATION IRRIGATION ONCE
OUTPATIENT
Start: 2024-01-30 | End: 2024-01-30

## 2024-01-30 RX ORDER — BACITRACIN ZINC AND POLYMYXIN B SULFATE 500; 1000 [USP'U]/G; [USP'U]/G
OINTMENT TOPICAL ONCE
OUTPATIENT
Start: 2024-01-30 | End: 2024-01-30

## 2024-01-30 RX ORDER — TRIAMCINOLONE ACETONIDE 1 MG/G
OINTMENT TOPICAL ONCE
OUTPATIENT
Start: 2024-01-30 | End: 2024-01-30

## 2024-01-30 NOTE — PATIENT INSTRUCTIONS
PHYSICIAN ORDERS AND DISCHARGE INSTRUCTIONS     Wound cleansing:              Do not scrub or use excessive force.             Wash hands with soap and water before and after dressing changes.             Prior to applying a clean dressing, cleanse wound with normal saline,               wound cleanser, or mild soap and water.              Ask the physician or nurse before getting the wound(s) wet in a shower     Wound Care Notes:         Kelsey to find out about purewick and wheelchair. Reliable medical equipment to do home visit.                               Orders for this week: 2024   [x] Paint toes with betadine     FAX ORDERS TO [facility]!           []   Nurse Visit   Follow Up Instructions: At the Wound Care Center in week: : Discharged   Primary Wound Care Provider: Kelsey Marinelli CNP   Call  for any questions or concerns.  Central [phone] for imaging lab work

## 2024-01-31 PROBLEM — G30.9 ALZHEIMER'S DISEASE, UNSPECIFIED (CODE) (HCC): Status: ACTIVE | Noted: 2024-01-31

## 2024-01-31 PROBLEM — I48.91 ATRIAL FIBRILLATION, UNSPECIFIED TYPE (HCC): Status: ACTIVE | Noted: 2024-01-31

## 2024-01-31 ASSESSMENT — ENCOUNTER SYMPTOMS
COUGH: 0
SHORTNESS OF BREATH: 0

## 2024-02-20 RX ORDER — DILTIAZEM HYDROCHLORIDE 240 MG/1
240 CAPSULE, EXTENDED RELEASE ORAL DAILY
Qty: 90 CAPSULE | Refills: 3 | Status: SHIPPED | OUTPATIENT
Start: 2024-02-20

## 2024-02-27 ENCOUNTER — HOSPITAL ENCOUNTER (OUTPATIENT)
Dept: WOUND CARE | Age: 84
Discharge: HOME | End: 2024-02-27
Attending: NURSE PRACTITIONER
Payer: MEDICARE

## 2024-02-27 VITALS
TEMPERATURE: 97.5 F | HEART RATE: 69 BPM | DIASTOLIC BLOOD PRESSURE: 56 MMHG | RESPIRATION RATE: 14 BRPM | SYSTOLIC BLOOD PRESSURE: 117 MMHG

## 2024-02-27 DIAGNOSIS — I63.9 DYSARTHRIA DUE TO ACUTE STROKE (HCC): ICD-10-CM

## 2024-02-27 DIAGNOSIS — M05.79 RHEUMATOID ARTHRITIS INVOLVING MULTIPLE SITES WITH POSITIVE RHEUMATOID FACTOR (HCC): ICD-10-CM

## 2024-02-27 DIAGNOSIS — R26.9 GAIT DISTURBANCE: Primary | ICD-10-CM

## 2024-02-27 DIAGNOSIS — D84.821 IMMUNOSUPPRESSION DUE TO DRUG THERAPY (HCC): ICD-10-CM

## 2024-02-27 DIAGNOSIS — N39.45 CONTINUOUS LEAKAGE OF URINE: ICD-10-CM

## 2024-02-27 DIAGNOSIS — R47.1 DYSARTHRIA DUE TO ACUTE STROKE (HCC): ICD-10-CM

## 2024-02-27 DIAGNOSIS — Z79.899 IMMUNOSUPPRESSION DUE TO DRUG THERAPY (HCC): ICD-10-CM

## 2024-02-27 DIAGNOSIS — L89.312 PRESSURE INJURY OF RIGHT BUTTOCK, STAGE 2 (HCC): ICD-10-CM

## 2024-02-27 PROCEDURE — 11719 TRIM NAIL(S) ANY NUMBER: CPT | Performed by: NURSE PRACTITIONER

## 2024-02-27 PROCEDURE — 97597 DBRDMT OPN WND 1ST 20 CM/<: CPT | Performed by: NURSE PRACTITIONER

## 2024-02-27 PROCEDURE — 11719 TRIM NAIL(S) ANY NUMBER: CPT

## 2024-02-27 PROCEDURE — 97597 DBRDMT OPN WND 1ST 20 CM/<: CPT

## 2024-02-27 RX ORDER — TRIAMCINOLONE ACETONIDE 1 MG/G
OINTMENT TOPICAL ONCE
OUTPATIENT
Start: 2024-02-27 | End: 2024-02-27

## 2024-02-27 RX ORDER — BETAMETHASONE DIPROPIONATE 0.05 %
OINTMENT (GRAM) TOPICAL ONCE
OUTPATIENT
Start: 2024-02-27 | End: 2024-02-27

## 2024-02-27 RX ORDER — IBUPROFEN 200 MG
TABLET ORAL ONCE
OUTPATIENT
Start: 2024-02-27 | End: 2024-02-27

## 2024-02-27 RX ORDER — LIDOCAINE HYDROCHLORIDE 40 MG/ML
SOLUTION TOPICAL ONCE
OUTPATIENT
Start: 2024-02-27 | End: 2024-02-27

## 2024-02-27 RX ORDER — GENTAMICIN SULFATE 1 MG/G
OINTMENT TOPICAL ONCE
OUTPATIENT
Start: 2024-02-27 | End: 2024-02-27

## 2024-02-27 RX ORDER — LIDOCAINE 50 MG/G
OINTMENT TOPICAL ONCE
OUTPATIENT
Start: 2024-02-27 | End: 2024-02-27

## 2024-02-27 RX ORDER — CLOBETASOL PROPIONATE 0.5 MG/G
OINTMENT TOPICAL ONCE
OUTPATIENT
Start: 2024-02-27 | End: 2024-02-27

## 2024-02-27 RX ORDER — LIDOCAINE 40 MG/G
CREAM TOPICAL ONCE
OUTPATIENT
Start: 2024-02-27 | End: 2024-02-27

## 2024-02-27 RX ORDER — BACITRACIN ZINC 500 [USP'U]/G
OINTMENT TOPICAL ONCE
OUTPATIENT
Start: 2024-02-27 | End: 2024-02-27

## 2024-02-27 RX ORDER — BACITRACIN ZINC AND POLYMYXIN B SULFATE 500; 1000 [USP'U]/G; [USP'U]/G
OINTMENT TOPICAL ONCE
OUTPATIENT
Start: 2024-02-27 | End: 2024-02-27

## 2024-02-27 RX ORDER — SODIUM CHLOR/HYPOCHLOROUS ACID 0.033 %
SOLUTION, IRRIGATION IRRIGATION ONCE
OUTPATIENT
Start: 2024-02-27 | End: 2024-02-27

## 2024-02-27 ASSESSMENT — PAIN DESCRIPTION - ORIENTATION: ORIENTATION: MID

## 2024-02-27 ASSESSMENT — PAIN - FUNCTIONAL ASSESSMENT: PAIN_FUNCTIONAL_ASSESSMENT: ACTIVITIES ARE NOT PREVENTED

## 2024-02-27 ASSESSMENT — PAIN SCALES - GENERAL: PAINLEVEL_OUTOF10: 0

## 2024-02-27 ASSESSMENT — PAIN DESCRIPTION - LOCATION: LOCATION: BUTTOCKS

## 2024-02-27 NOTE — PATIENT INSTRUCTIONS
PHYSICIAN ORDERS AND DISCHARGE INSTRUCTIONS     Wound cleansing:              Do not scrub or use excessive force.             Wash hands with soap and water before and after dressing changes.             Prior to applying a clean dressing, cleanse wound with normal saline,               wound cleanser, or mild soap and water.              Ask the physician or nurse before getting the wound(s) wet in a shower     Wound Care Notes:         Kelsey to find out about wheelchair. Reliable medical equipment to do home visit. Received 24                              Orders for this week: 2024  No open wounds   Paint toes with betadine   Place A+D to healed wound today  alternate auquaphor  in day and desitin night            FAX ORDERS TO [facility]!           []   Nurse Visit   Follow Up Instructions: At the Wound Care Center in week: : 4 weeks   Primary Wound Care Provider: Kelsey Marinelli CNP   Call  for any questions or concerns.  Central [phone] for imaging lab work

## 2024-02-27 NOTE — PROGRESS NOTES
APPLIED BETADINE TO ESTRELLITA FEET AND A&D TO BUTTOCKS PER PROVIDER ORDERS.. PATIENT TOLERATED WITHOUT ANY PROBLEMS.  
Lower Extremity Arteries exhibit diffuse plaque.  Right ABIs show mild to moderate peripheral arterial disease.  No focal stenosis noted in the arteries of the right lower extremity but there  is monophasic flow in the poplitea, PTA and DPA.  Left Impression  The Left Lower Extremity Arteries exhibit diffuse plaque.  Left ABIs show mild to moderate peripheral arterial disease.  No focal stenosis noted in the arteries of the right lower extremity but there  is monophasic flow in the poplitea, PTA and DPA.  Proximal left popliteal artery not visualized.    Study Location:[facility].    Velocities are measured in cm/s ; Diameters are measured in cm    LE Duplex Measurements    +-------------------++------+----------+----+-----+----------+  !                   !!Right !          !Left!     !          !  +-------------------++------+----------+----+-----+----------+  !Location           !!PSV   !Wave Desc.!    !PSV  !Wave Desc.!  +-------------------++------+----------+----+-----+----------+  !Dist Common Femoral!!114.49!Biphasic  !    !90.25!Biphasic  !  +-------------------++------+----------+----+-----+----------+  !Prox PFA           !!57.97 !Biphasic  !    !32.95!Biphasic  !  +-------------------++------+----------+----+-----+----------+  !Prox SFA           !!67.18 !Biphasic  !    !74.82!Biphasic  !  +-------------------++------+----------+----+-----+----------+  !Mid SFA            !!76.2  !Biphasic  !    !63.8 !Biphasic  !  +-------------------++------+----------+----+-----+----------+  !Dist SFA           !!63.27 !Biphasic  !    !64.91!Biphasic  !  +-------------------++------+----------+----+-----+----------+  !Prox Popliteal     !!42.65 !Monophasic!    !     !          !  +-------------------++------+----------+----+-----+----------+  !Dist Popliteal     !!40.91 !Monophasic!    !35.57!Monophasic!  +-------------------++------+----------+----+-----+----------+  !Dist PTA           !!49.63 !Monophasic!

## 2024-03-26 ENCOUNTER — HOSPITAL ENCOUNTER (OUTPATIENT)
Dept: WOUND CARE | Age: 84
Discharge: HOME | End: 2024-03-26
Attending: NURSE PRACTITIONER
Payer: MEDICARE

## 2024-03-26 VITALS
TEMPERATURE: 98.3 F | SYSTOLIC BLOOD PRESSURE: 127 MMHG | HEART RATE: 100 BPM | DIASTOLIC BLOOD PRESSURE: 60 MMHG | RESPIRATION RATE: 16 BRPM

## 2024-03-26 DIAGNOSIS — R47.1 DYSARTHRIA DUE TO ACUTE STROKE (HCC): ICD-10-CM

## 2024-03-26 DIAGNOSIS — D84.821 IMMUNOSUPPRESSION DUE TO DRUG THERAPY (HCC): ICD-10-CM

## 2024-03-26 DIAGNOSIS — M05.79 RHEUMATOID ARTHRITIS INVOLVING MULTIPLE SITES WITH POSITIVE RHEUMATOID FACTOR (HCC): ICD-10-CM

## 2024-03-26 DIAGNOSIS — N39.45 CONTINUOUS LEAKAGE OF URINE: ICD-10-CM

## 2024-03-26 DIAGNOSIS — R26.9 GAIT DISTURBANCE: Primary | ICD-10-CM

## 2024-03-26 DIAGNOSIS — L89.312 PRESSURE INJURY OF RIGHT BUTTOCK, STAGE 2 (HCC): ICD-10-CM

## 2024-03-26 DIAGNOSIS — Z79.899 IMMUNOSUPPRESSION DUE TO DRUG THERAPY (HCC): ICD-10-CM

## 2024-03-26 DIAGNOSIS — I63.9 DYSARTHRIA DUE TO ACUTE STROKE (HCC): ICD-10-CM

## 2024-03-26 PROCEDURE — 11042 DBRDMT SUBQ TIS 1ST 20SQCM/<: CPT

## 2024-03-26 PROCEDURE — 6370000000 HC RX 637 (ALT 250 FOR IP): Performed by: NURSE PRACTITIONER

## 2024-03-26 RX ORDER — GENTAMICIN SULFATE 1 MG/G
OINTMENT TOPICAL ONCE
OUTPATIENT
Start: 2024-03-26 | End: 2024-03-26

## 2024-03-26 RX ORDER — LIDOCAINE 40 MG/G
CREAM TOPICAL ONCE
OUTPATIENT
Start: 2024-03-26 | End: 2024-03-26

## 2024-03-26 RX ORDER — TRIAMCINOLONE ACETONIDE 1 MG/G
OINTMENT TOPICAL ONCE
OUTPATIENT
Start: 2024-03-26 | End: 2024-03-26

## 2024-03-26 RX ORDER — LIDOCAINE 50 MG/G
OINTMENT TOPICAL ONCE
Status: COMPLETED | OUTPATIENT
Start: 2024-03-26 | End: 2024-03-26

## 2024-03-26 RX ORDER — LIDOCAINE 50 MG/G
OINTMENT TOPICAL ONCE
OUTPATIENT
Start: 2024-03-26 | End: 2024-03-26

## 2024-03-26 RX ORDER — IBUPROFEN 200 MG
TABLET ORAL ONCE
OUTPATIENT
Start: 2024-03-26 | End: 2024-03-26

## 2024-03-26 RX ORDER — SODIUM CHLOR/HYPOCHLOROUS ACID 0.033 %
SOLUTION, IRRIGATION IRRIGATION ONCE
OUTPATIENT
Start: 2024-03-26 | End: 2024-03-26

## 2024-03-26 RX ORDER — BETAMETHASONE DIPROPIONATE 0.05 %
OINTMENT (GRAM) TOPICAL ONCE
OUTPATIENT
Start: 2024-03-26 | End: 2024-03-26

## 2024-03-26 RX ORDER — CLOBETASOL PROPIONATE 0.5 MG/G
OINTMENT TOPICAL ONCE
Status: COMPLETED | OUTPATIENT
Start: 2024-03-26 | End: 2024-03-26

## 2024-03-26 RX ORDER — CLOBETASOL PROPIONATE 0.5 MG/G
OINTMENT TOPICAL ONCE
OUTPATIENT
Start: 2024-03-26 | End: 2024-03-26

## 2024-03-26 RX ORDER — BACITRACIN ZINC AND POLYMYXIN B SULFATE 500; 1000 [USP'U]/G; [USP'U]/G
OINTMENT TOPICAL ONCE
OUTPATIENT
Start: 2024-03-26 | End: 2024-03-26

## 2024-03-26 RX ORDER — LIDOCAINE HYDROCHLORIDE 40 MG/ML
SOLUTION TOPICAL ONCE
OUTPATIENT
Start: 2024-03-26 | End: 2024-03-26

## 2024-03-26 RX ORDER — BACITRACIN ZINC 500 [USP'U]/G
OINTMENT TOPICAL ONCE
OUTPATIENT
Start: 2024-03-26 | End: 2024-03-26

## 2024-03-26 RX ADMIN — CLOBETASOL PROPIONATE: 0.5 OINTMENT TOPICAL at 11:22

## 2024-03-26 RX ADMIN — LIDOCAINE: 50 OINTMENT TOPICAL at 11:22

## 2024-03-26 ASSESSMENT — PAIN SCALES - GENERAL: PAINLEVEL_OUTOF10: 0

## 2024-03-26 NOTE — PATIENT INSTRUCTIONS
PHYSICIAN ORDERS AND DISCHARGE INSTRUCTIONS     Wound cleansing:              Do not scrub or use excessive force.             Wash hands with soap and water before and after dressing changes.             Prior to applying a clean dressing, cleanse wound with normal saline,               wound cleanser, or mild soap and water.              Ask the physician or nurse before getting the wound(s) wet in a shower     Wound Care Notes:         Kelsey to find out about wheelchair. Reliable medical equipment to do home visit. Received 24                              Orders for this week: 3/26/2024  Buttock Wounds  Desitine, Lidocaine, Clobetasol and Stimulen to wounds and dry, excoriated area   Cover with ABD and secure with vac drape     At Home-  Desitine, Lidocaine, Clobetasol and Stimulen to wounds and dry, excoriated area   Cover with Sacral border  Change daily or as needed, if soiled     FAX ORDERS TO [facility]!             []   Nurse Visit   Follow Up Instructions: At the Wound Care Center in week: 2 weeks   Primary Wound Care Provider: Kelsey Marinelli CNP   Call  for any questions or concerns.  Central [phone] for imaging lab work

## 2024-03-27 ENCOUNTER — OFFICE VISIT (OUTPATIENT)
Dept: NEUROLOGY | Age: 84
End: 2024-03-27
Payer: MEDICARE

## 2024-03-27 VITALS — HEART RATE: 64 BPM | OXYGEN SATURATION: 90 % | DIASTOLIC BLOOD PRESSURE: 78 MMHG | SYSTOLIC BLOOD PRESSURE: 108 MMHG

## 2024-03-27 DIAGNOSIS — F01.A0 MILD VASCULAR DEMENTIA WITHOUT BEHAVIORAL DISTURBANCE, PSYCHOTIC DISTURBANCE, MOOD DISTURBANCE, OR ANXIETY (HCC): ICD-10-CM

## 2024-03-27 DIAGNOSIS — G95.89 MYELOMALACIA OF CERVICAL CORD (HCC): Primary | ICD-10-CM

## 2024-03-27 DIAGNOSIS — R56.9 PARTIAL SEIZURES (HCC): ICD-10-CM

## 2024-03-27 DIAGNOSIS — I69.359 CVA, OLD, HEMIPARESIS (HCC): ICD-10-CM

## 2024-03-27 PROCEDURE — 1123F ACP DISCUSS/DSCN MKR DOCD: CPT | Performed by: NURSE PRACTITIONER

## 2024-03-27 PROCEDURE — 99213 OFFICE O/P EST LOW 20 MIN: CPT | Performed by: NURSE PRACTITIONER

## 2024-03-27 PROCEDURE — 3074F SYST BP LT 130 MM HG: CPT | Performed by: NURSE PRACTITIONER

## 2024-03-27 PROCEDURE — 3078F DIAST BP <80 MM HG: CPT | Performed by: NURSE PRACTITIONER

## 2024-03-27 NOTE — PROGRESS NOTES
3/27/24    Altagracia Wayne  1940    Chief Complaint   Patient presents with    Follow-up     3 mo f/u  Partial seizures       History of Present Illness  Altagracia is a 83 y.o. female presenting today for follow-up of: history of stroke, HLD, HTN, VIC, seizure, RA, cervical myelomalacia, dementia.    She is maintained on Keppra 500 mg twice daily.  She continues Aricept and Namenda to help slow the progression of memory loss.  She is on aspirin, atorvastatin for secondary stroke prevention.     She continues to live at home with her son and daughter.  She has 24/7 care from family, respite care comes once weekly for a few hours and home health care aide comes for few hours once weekly as well.  She has completed PT/OT at home.    She continues to follow with the wound clinic but her daughter tells me she is improving quickly.    Their goal would be to participate in formal PT/OT at [facility].  She is currently in a wheelchair.  She does transfer well.      Current Outpatient Medications   Medication Sig Dispense Refill    dilTIAZem (DILT-XR) 240 MG extended release capsule Take 1 capsule by mouth daily 90 capsule 3    levETIRAcetam (KEPPRA) 500 MG tablet Take 1 tablet by mouth 2 times daily 180 tablet 3    memantine (NAMENDA) 10 MG tablet Take 1 tablet by mouth 2 times daily 60 tablet 5    donepezil (ARICEPT) 10 MG tablet Take 1 tablet by mouth every morning 90 tablet 1    clobetasol (TEMOVATE) 0.05 % cream Apply topically with each dressing change 60 g 1    lidocaine (XYLOCAINE) 5 % ointment Apply topically as needed 50 g 1    potassium chloride (MICRO-K) 10 MEQ extended release capsule Take 1 capsule by mouth daily      metoprolol tartrate (LOPRESSOR) 25 MG tablet Take 0.5 tablets by mouth 2 times daily 60 tablet 0    gabapentin (NEURONTIN) 400 MG capsule Take 1 capsule by mouth nightly. 07/11/23 Can take 400 mg in the morning as needed      acetaminophen (TYLENOL) 500 MG tablet Take 2 tablets by mouth every 6

## 2024-04-01 PROBLEM — L89.152 DECUBITUS ULCER OF COCCYX, STAGE 2 (HCC): Status: ACTIVE | Noted: 2024-04-01

## 2024-04-05 ENCOUNTER — HOSPITAL ENCOUNTER (OUTPATIENT)
Age: 84
Setting detail: SPECIMEN
Discharge: HOME | End: 2024-04-05
Payer: MEDICARE

## 2024-04-05 LAB
ALBUMIN SERPL-MCNC: 3.5 GM/DL (ref 3.4–5)
ALP BLD-CCNC: 131 IU/L (ref 40–128)
ALT SERPL-CCNC: 10 U/L (ref 10–40)
ANION GAP SERPL CALCULATED.3IONS-SCNC: 13 MMOL/L (ref 7–16)
AST SERPL-CCNC: 19 IU/L (ref 15–37)
BILIRUB SERPL-MCNC: 0.4 MG/DL (ref 0–1)
BUN SERPL-MCNC: 16 MG/DL (ref 6–23)
CALCIUM SERPL-MCNC: 9.4 MG/DL (ref 8.3–10.6)
CHLORIDE BLD-SCNC: 102 MMOL/L (ref 99–110)
CHOLESTEROL, FASTING: 150 MG/DL
CO2: 25 MMOL/L (ref 21–32)
CREAT SERPL-MCNC: 0.3 MG/DL (ref 0.6–1.1)
GFR SERPL CREATININE-BSD FRML MDRD: >90 ML/MIN/1.73M2
GLUCOSE SERPL-MCNC: 102 MG/DL (ref 70–99)
HCT VFR BLD CALC: 35.8 % (ref 37–47)
HDLC SERPL-MCNC: 63 MG/DL
HEMOGLOBIN: 11.5 GM/DL (ref 12.5–16)
LDLC SERPL CALC-MCNC: 68 MG/DL
MCH RBC QN AUTO: 31.5 PG (ref 27–31)
MCHC RBC AUTO-ENTMCNC: 32.1 % (ref 32–36)
MCV RBC AUTO: 98.1 FL (ref 78–100)
PDW BLD-RTO: 13.5 % (ref 11.7–14.9)
PLATELET # BLD: 369 K/CU MM (ref 140–440)
PMV BLD AUTO: 10.4 FL (ref 7.5–11.1)
POTASSIUM SERPL-SCNC: 3.9 MMOL/L (ref 3.5–5.1)
RBC # BLD: 3.65 M/CU MM (ref 4.2–5.4)
SODIUM BLD-SCNC: 140 MMOL/L (ref 135–145)
TOTAL PROTEIN: 5.6 GM/DL (ref 6.4–8.2)
TRIGLYCERIDE, FASTING: 93 MG/DL
WBC # BLD: 5.1 K/CU MM (ref 4–10.5)

## 2024-04-05 PROCEDURE — 80053 COMPREHEN METABOLIC PANEL: CPT

## 2024-04-05 PROCEDURE — 85027 COMPLETE CBC AUTOMATED: CPT

## 2024-04-05 PROCEDURE — 80061 LIPID PANEL: CPT

## 2024-04-07 ENCOUNTER — APPOINTMENT (OUTPATIENT)
Dept: CT IMAGING | Age: 84
DRG: 312 | End: 2024-04-07
Payer: MEDICARE

## 2024-04-07 ENCOUNTER — HOSPITAL ENCOUNTER (INPATIENT)
Age: 84
LOS: 3 days | Discharge: NURSING, REHAB OR LONG-TERM CARE | DRG: 312 | End: 2024-04-14
Attending: STUDENT IN AN ORGANIZED HEALTH CARE EDUCATION/TRAINING PROGRAM | Admitting: STUDENT IN AN ORGANIZED HEALTH CARE EDUCATION/TRAINING PROGRAM
Payer: MEDICARE

## 2024-04-07 ENCOUNTER — APPOINTMENT (OUTPATIENT)
Dept: GENERAL RADIOLOGY | Age: 84
DRG: 312 | End: 2024-04-07
Payer: MEDICARE

## 2024-04-07 DIAGNOSIS — R55 SYNCOPE AND COLLAPSE: Primary | ICD-10-CM

## 2024-04-07 DIAGNOSIS — R40.20 LOSS OF CONSCIOUSNESS (HCC): ICD-10-CM

## 2024-04-07 LAB
ALBUMIN SERPL-MCNC: 3.6 GM/DL (ref 3.4–5)
ALP BLD-CCNC: 157 IU/L (ref 40–129)
ALT SERPL-CCNC: 10 U/L (ref 10–40)
ANION GAP SERPL CALCULATED.3IONS-SCNC: 14 MMOL/L (ref 7–16)
AST SERPL-CCNC: 24 IU/L (ref 15–37)
BASOPHILS ABSOLUTE: 0 K/CU MM
BASOPHILS RELATIVE PERCENT: 0.4 % (ref 0–1)
BILIRUB SERPL-MCNC: 0.6 MG/DL (ref 0–1)
BUN SERPL-MCNC: 15 MG/DL (ref 6–23)
CALCIUM SERPL-MCNC: 10.3 MG/DL (ref 8.3–10.6)
CHLORIDE BLD-SCNC: 101 MMOL/L (ref 99–110)
CO2: 25 MMOL/L (ref 21–32)
CREAT SERPL-MCNC: 0.4 MG/DL (ref 0.6–1.1)
DIFFERENTIAL TYPE: ABNORMAL
EOSINOPHILS ABSOLUTE: 0.1 K/CU MM
EOSINOPHILS RELATIVE PERCENT: 1 % (ref 0–3)
GFR SERPL CREATININE-BSD FRML MDRD: >90 ML/MIN/1.73M2
GLUCOSE SERPL-MCNC: 104 MG/DL (ref 70–99)
HCT VFR BLD CALC: 43.2 % (ref 37–47)
HEMOGLOBIN: 13.5 GM/DL (ref 12.5–16)
IMMATURE NEUTROPHIL %: 0.7 % (ref 0–0.43)
LIPASE: 34 IU/L (ref 13–60)
LYMPHOCYTES ABSOLUTE: 1.6 K/CU MM
LYMPHOCYTES RELATIVE PERCENT: 22.3 % (ref 24–44)
MCH RBC QN AUTO: 30.7 PG (ref 27–31)
MCHC RBC AUTO-ENTMCNC: 31.3 % (ref 32–36)
MCV RBC AUTO: 98.2 FL (ref 78–100)
MONOCYTES ABSOLUTE: 0.5 K/CU MM
MONOCYTES RELATIVE PERCENT: 7.4 % (ref 0–4)
NUCLEATED RBC %: 0 %
PDW BLD-RTO: 13.3 % (ref 11.7–14.9)
PLATELET # BLD: 454 K/CU MM (ref 140–440)
PMV BLD AUTO: 9.3 FL (ref 7.5–11.1)
POTASSIUM SERPL-SCNC: 3.7 MMOL/L (ref 3.5–5.1)
PRO-BNP: 72.41 PG/ML
RBC # BLD: 4.4 M/CU MM (ref 4.2–5.4)
SEGMENTED NEUTROPHILS ABSOLUTE COUNT: 4.9 K/CU MM
SEGMENTED NEUTROPHILS RELATIVE PERCENT: 68.2 % (ref 36–66)
SODIUM BLD-SCNC: 140 MMOL/L (ref 135–145)
TOTAL IMMATURE NEUTOROPHIL: 0.05 K/CU MM
TOTAL NUCLEATED RBC: 0 K/CU MM
TOTAL PROTEIN: 7.1 GM/DL (ref 6.4–8.2)
TROPONIN, HIGH SENSITIVITY: 28 NG/L (ref 0–14)
TROPONIN, HIGH SENSITIVITY: 29 NG/L (ref 0–14)
WBC # BLD: 7.1 K/CU MM (ref 4–10.5)

## 2024-04-07 PROCEDURE — 6360000004 HC RX CONTRAST MEDICATION: Performed by: PHYSICIAN ASSISTANT

## 2024-04-07 PROCEDURE — 83880 ASSAY OF NATRIURETIC PEPTIDE: CPT

## 2024-04-07 PROCEDURE — 6370000000 HC RX 637 (ALT 250 FOR IP): Performed by: PHYSICIAN ASSISTANT

## 2024-04-07 PROCEDURE — 84484 ASSAY OF TROPONIN QUANT: CPT

## 2024-04-07 PROCEDURE — 99285 EMERGENCY DEPT VISIT HI MDM: CPT

## 2024-04-07 PROCEDURE — 70498 CT ANGIOGRAPHY NECK: CPT

## 2024-04-07 PROCEDURE — 83690 ASSAY OF LIPASE: CPT

## 2024-04-07 PROCEDURE — G0378 HOSPITAL OBSERVATION PER HR: HCPCS

## 2024-04-07 PROCEDURE — 93005 ELECTROCARDIOGRAM TRACING: CPT | Performed by: PHYSICIAN ASSISTANT

## 2024-04-07 PROCEDURE — 71045 X-RAY EXAM CHEST 1 VIEW: CPT

## 2024-04-07 PROCEDURE — 85025 COMPLETE CBC W/AUTO DIFF WBC: CPT

## 2024-04-07 PROCEDURE — 70450 CT HEAD/BRAIN W/O DYE: CPT

## 2024-04-07 PROCEDURE — 80053 COMPREHEN METABOLIC PANEL: CPT

## 2024-04-07 RX ORDER — ASPIRIN 81 MG/1
324 TABLET, CHEWABLE ORAL ONCE
Status: COMPLETED | OUTPATIENT
Start: 2024-04-07 | End: 2024-04-07

## 2024-04-07 RX ORDER — ACETAMINOPHEN 500 MG
500 TABLET ORAL ONCE
Status: COMPLETED | OUTPATIENT
Start: 2024-04-07 | End: 2024-04-07

## 2024-04-07 RX ORDER — LIDOCAINE 4 G/G
1 PATCH TOPICAL ONCE
Status: COMPLETED | OUTPATIENT
Start: 2024-04-07 | End: 2024-04-08

## 2024-04-07 RX ADMIN — ASPIRIN 324 MG: 81 TABLET, CHEWABLE ORAL at 21:42

## 2024-04-07 RX ADMIN — ACETAMINOPHEN 500 MG: 500 TABLET ORAL at 19:51

## 2024-04-07 RX ADMIN — IOPAMIDOL 70 ML: 755 INJECTION, SOLUTION INTRAVENOUS at 21:10

## 2024-04-07 ASSESSMENT — PAIN DESCRIPTION - LOCATION: LOCATION: HEAD

## 2024-04-07 ASSESSMENT — PAIN SCALES - GENERAL: PAINLEVEL_OUTOF10: 4

## 2024-04-07 NOTE — ED PROVIDER NOTES
Triage Chief Complaint:   Loss of Consciousness (Lowered to ground) and Hip Pain (Right hip pain)    [place]:  Today in the ED I had the pleasure of caring for Altagracia Wayne who is a 83 y.o. female that presents today to the ED for evaluation.   Family is primary historian. States that they were getting out of the car and were transferring pt (pivot) into the wheelchair and pt head seem to slump . And was dead weight and pt was lowered onto the ground.    They prior to that pt was out at the park sitting outside for ~15 minutes.  Ems was called and pt was conscious and baseline upon arrival. Pt endorses bilat post neck pain x1 day. No chest pain no sob, no headache, no changes in vision. 3 family members at bedside, site pt is baseline at this time.   Pt does have hx of seizure, on keppra. Last seizure was ~3.5 years ago. No convulsive activity noted today however. Pt does have LLE residual weakness from remote cva    ROS:  REVIEW OF SYSTEMS    At least 10 systems reviewed      All other review of systems are negative  See HPI and nursing notes for additional information       Past Medical History:   Diagnosis Date    Acid reflux     Anxiety     Bronchitis In Past    Cancer ([facility])     colon cancer    Carotid stenosis 11/10/2015    Cerebral embolism with cerebral infarction ([facility]) 09/18/2015    Seizure X 1, No Residual    Chronic back pain     Depression     History of blood transfusion     History of external beam radiation therapy 2017    5400 cGy pelvis/anal canal in 2017 per Dr. Mayberry at [facility]    Hyperlipidemia     Hypertension     Prolonged emergence from general anesthesia     RA (rheumatoid arthritis) ([facility])     \"All Over\"    Seizure ([facility]) 09/18/2015    X 1    Sleep apnea     Uses CPAP    Teeth missing     Upper And Lower    Urinary incontinence     UTI (urinary tract infection) In Past    No Current Symptoms    Wears dentures     Full Upper , Partial Lower    Wears glasses     Wears partial dentures     Lower

## 2024-04-07 NOTE — ED PROVIDER NOTES
12 lead EKG per my interpretation:  Normal Sinus Rhythm at 81  [place] is   Right axis deviation  QTc is  within an acceptable range  There is no specific T wave changes appreciated.  There is no specific ST wave changes appreciated.  No STEMI    Prior EKG to compare with was available and no clinically significant change no morphology compared to prior.    MD Jennifer Lloyd Andrew, MD  04/07/24 4740

## 2024-04-07 NOTE — ACP (ADVANCE CARE PLANNING)
Patient does not have any ACP documents/Medical Power of .     LSW notes hospital will follow [place] Next of Kin hierarchy in the following descending order for priority:    Guardian  Spouse  Majority of adult Children  Parents  Majority of adult Siblings  Nearest Relative not described above    Per [place] Next of Kin hierarchy: Patients' parents adult child will be Primary Healthcare Decision Maker.

## 2024-04-08 LAB
BILIRUBIN URINE: NEGATIVE MG/DL
BLOOD, URINE: NEGATIVE
CLARITY: CLEAR
COLOR: YELLOW
COMMENT UA: ABNORMAL
EKG ATRIAL RATE: 81 BPM
EKG DIAGNOSIS: NORMAL
EKG P-R INTERVAL: 210 MS
EKG Q-T INTERVAL: 430 MS
EKG QRS DURATION: 80 MS
EKG QTC CALCULATION (BAZETT): 499 MS
EKG R AXIS: 187 DEGREES
EKG T AXIS: 126 DEGREES
EKG VENTRICULAR RATE: 81 BPM
GLUCOSE, URINE: NEGATIVE MG/DL
KETONES, URINE: 15 MG/DL
LEUKOCYTE ESTERASE, URINE: NEGATIVE
NITRITE URINE, QUANTITATIVE: NEGATIVE
PH, URINE: 6.5 (ref 5–8)
PROTEIN UA: NEGATIVE MG/DL
SPECIFIC GRAVITY UA: 1.01 (ref 1–1.03)
UROBILINOGEN, URINE: 0.2 MG/DL (ref 0.2–1)

## 2024-04-08 PROCEDURE — 99223 1ST HOSP IP/OBS HIGH 75: CPT | Performed by: STUDENT IN AN ORGANIZED HEALTH CARE EDUCATION/TRAINING PROGRAM

## 2024-04-08 PROCEDURE — 6370000000 HC RX 637 (ALT 250 FOR IP): Performed by: NURSE PRACTITIONER

## 2024-04-08 PROCEDURE — 6370000000 HC RX 637 (ALT 250 FOR IP): Performed by: STUDENT IN AN ORGANIZED HEALTH CARE EDUCATION/TRAINING PROGRAM

## 2024-04-08 PROCEDURE — 97530 THERAPEUTIC ACTIVITIES: CPT

## 2024-04-08 PROCEDURE — 97112 NEUROMUSCULAR REEDUCATION: CPT

## 2024-04-08 PROCEDURE — 97166 OT EVAL MOD COMPLEX 45 MIN: CPT

## 2024-04-08 PROCEDURE — G0378 HOSPITAL OBSERVATION PER HR: HCPCS

## 2024-04-08 PROCEDURE — 93010 ELECTROCARDIOGRAM REPORT: CPT | Performed by: INTERNAL MEDICINE

## 2024-04-08 PROCEDURE — 94761 N-INVAS EAR/PLS OXIMETRY MLT: CPT

## 2024-04-08 PROCEDURE — 6370000000 HC RX 637 (ALT 250 FOR IP): Performed by: FAMILY MEDICINE

## 2024-04-08 PROCEDURE — 96372 THER/PROPH/DIAG INJ SC/IM: CPT

## 2024-04-08 PROCEDURE — 97162 PT EVAL MOD COMPLEX 30 MIN: CPT

## 2024-04-08 PROCEDURE — 6360000002 HC RX W HCPCS: Performed by: STUDENT IN AN ORGANIZED HEALTH CARE EDUCATION/TRAINING PROGRAM

## 2024-04-08 PROCEDURE — 6370000000 HC RX 637 (ALT 250 FOR IP)

## 2024-04-08 PROCEDURE — 81003 URINALYSIS AUTO W/O SCOPE: CPT

## 2024-04-08 RX ORDER — FOLIC ACID 1 MG/1
500 TABLET ORAL EVERY MORNING
Status: DISCONTINUED | OUTPATIENT
Start: 2024-04-08 | End: 2024-04-14 | Stop reason: HOSPADM

## 2024-04-08 RX ORDER — ONDANSETRON 4 MG/1
4 TABLET, ORALLY DISINTEGRATING ORAL EVERY 8 HOURS PRN
Status: DISCONTINUED | OUTPATIENT
Start: 2024-04-08 | End: 2024-04-14 | Stop reason: HOSPADM

## 2024-04-08 RX ORDER — DILTIAZEM HYDROCHLORIDE 240 MG/1
240 CAPSULE, COATED, EXTENDED RELEASE ORAL DAILY
Status: DISCONTINUED | OUTPATIENT
Start: 2024-04-08 | End: 2024-04-14 | Stop reason: HOSPADM

## 2024-04-08 RX ORDER — ACETAMINOPHEN 650 MG/1
650 SUPPOSITORY RECTAL EVERY 6 HOURS PRN
Status: DISCONTINUED | OUTPATIENT
Start: 2024-04-08 | End: 2024-04-14 | Stop reason: HOSPADM

## 2024-04-08 RX ORDER — POLYVINYL ALCOHOL 14 MG/ML
2 SOLUTION/ DROPS OPHTHALMIC 4 TIMES DAILY PRN
Status: DISCONTINUED | OUTPATIENT
Start: 2024-04-08 | End: 2024-04-08

## 2024-04-08 RX ORDER — GABAPENTIN 400 MG/1
400 CAPSULE ORAL NIGHTLY
Status: DISCONTINUED | OUTPATIENT
Start: 2024-04-08 | End: 2024-04-08

## 2024-04-08 RX ORDER — LORAZEPAM 2 MG/ML
4 INJECTION INTRAMUSCULAR EVERY 5 MIN PRN
Status: DISCONTINUED | OUTPATIENT
Start: 2024-04-08 | End: 2024-04-14 | Stop reason: HOSPADM

## 2024-04-08 RX ORDER — ACETAMINOPHEN 325 MG/1
650 TABLET ORAL EVERY 6 HOURS PRN
Status: DISCONTINUED | OUTPATIENT
Start: 2024-04-08 | End: 2024-04-14 | Stop reason: HOSPADM

## 2024-04-08 RX ORDER — TRIAMTERENE AND HYDROCHLOROTHIAZIDE 37.5; 25 MG/1; MG/1
1 TABLET ORAL DAILY
Status: DISCONTINUED | OUTPATIENT
Start: 2024-04-08 | End: 2024-04-14 | Stop reason: HOSPADM

## 2024-04-08 RX ORDER — GABAPENTIN 400 MG/1
400 CAPSULE ORAL NIGHTLY
Status: DISCONTINUED | OUTPATIENT
Start: 2024-04-08 | End: 2024-04-14 | Stop reason: HOSPADM

## 2024-04-08 RX ORDER — ATORVASTATIN CALCIUM 10 MG/1
20 TABLET, FILM COATED ORAL DAILY
Status: DISCONTINUED | OUTPATIENT
Start: 2024-04-08 | End: 2024-04-14 | Stop reason: HOSPADM

## 2024-04-08 RX ORDER — ASPIRIN 81 MG/1
81 TABLET, CHEWABLE ORAL DAILY
Status: DISCONTINUED | OUTPATIENT
Start: 2024-04-08 | End: 2024-04-14 | Stop reason: HOSPADM

## 2024-04-08 RX ORDER — METHOTREXATE 2.5 MG/1
20 TABLET ORAL
Status: DISCONTINUED | OUTPATIENT
Start: 2024-04-08 | End: 2024-04-14 | Stop reason: HOSPADM

## 2024-04-08 RX ORDER — ONDANSETRON 2 MG/ML
4 INJECTION INTRAMUSCULAR; INTRAVENOUS EVERY 6 HOURS PRN
Status: DISCONTINUED | OUTPATIENT
Start: 2024-04-08 | End: 2024-04-14 | Stop reason: HOSPADM

## 2024-04-08 RX ORDER — MEMANTINE HYDROCHLORIDE 10 MG/1
10 TABLET ORAL 2 TIMES DAILY
Status: DISCONTINUED | OUTPATIENT
Start: 2024-04-08 | End: 2024-04-14 | Stop reason: HOSPADM

## 2024-04-08 RX ORDER — CARBOXYMETHYLCELLULOSE SODIUM 10 MG/ML
2 GEL OPHTHALMIC 4 TIMES DAILY PRN
Status: DISCONTINUED | OUTPATIENT
Start: 2024-04-08 | End: 2024-04-14 | Stop reason: HOSPADM

## 2024-04-08 RX ORDER — LEVETIRACETAM 500 MG/1
500 TABLET ORAL EVERY 12 HOURS
Status: DISCONTINUED | OUTPATIENT
Start: 2024-04-08 | End: 2024-04-10

## 2024-04-08 RX ORDER — DONEPEZIL HYDROCHLORIDE 10 MG/1
10 TABLET, FILM COATED ORAL EVERY MORNING
Status: DISCONTINUED | OUTPATIENT
Start: 2024-04-08 | End: 2024-04-14 | Stop reason: HOSPADM

## 2024-04-08 RX ORDER — POLYETHYLENE GLYCOL 3350 17 G/17G
17 POWDER, FOR SOLUTION ORAL DAILY PRN
Status: DISCONTINUED | OUTPATIENT
Start: 2024-04-08 | End: 2024-04-14 | Stop reason: HOSPADM

## 2024-04-08 RX ORDER — ENOXAPARIN SODIUM 100 MG/ML
40 INJECTION SUBCUTANEOUS DAILY
Status: DISCONTINUED | OUTPATIENT
Start: 2024-04-08 | End: 2024-04-14 | Stop reason: HOSPADM

## 2024-04-08 RX ADMIN — ASPIRIN 81 MG: 81 TABLET, CHEWABLE ORAL at 08:36

## 2024-04-08 RX ADMIN — LEVETIRACETAM 750 MG: 250 TABLET, FILM COATED ORAL at 01:11

## 2024-04-08 RX ADMIN — TRIAMTERENE AND HYDROCHLOROTHIAZIDE 1 TABLET: 37.5; 25 TABLET ORAL at 08:41

## 2024-04-08 RX ADMIN — FOLIC ACID 500 MCG: 1 TABLET ORAL at 08:36

## 2024-04-08 RX ADMIN — CARBOXYMETHYLCELLULOSE SODIUM 2 DROP: 10 GEL OPHTHALMIC at 15:59

## 2024-04-08 RX ADMIN — ATORVASTATIN CALCIUM 20 MG: 10 TABLET, FILM COATED ORAL at 08:36

## 2024-04-08 RX ADMIN — METOPROLOL TARTRATE 12.5 MG: 25 TABLET, FILM COATED ORAL at 20:25

## 2024-04-08 RX ADMIN — MEMANTINE 10 MG: 10 TABLET ORAL at 20:25

## 2024-04-08 RX ADMIN — DONEPEZIL HYDROCHLORIDE 10 MG: 10 TABLET ORAL at 08:36

## 2024-04-08 RX ADMIN — METHOTREXATE 20 MG: 2.5 TABLET ORAL at 10:22

## 2024-04-08 RX ADMIN — ACETAMINOPHEN 650 MG: 325 TABLET ORAL at 20:28

## 2024-04-08 RX ADMIN — GABAPENTIN 400 MG: 400 CAPSULE ORAL at 01:11

## 2024-04-08 RX ADMIN — ACETAMINOPHEN 650 MG: 325 TABLET ORAL at 08:56

## 2024-04-08 RX ADMIN — DILTIAZEM HYDROCHLORIDE 240 MG: 240 CAPSULE, COATED, EXTENDED RELEASE ORAL at 08:36

## 2024-04-08 RX ADMIN — ACETAMINOPHEN 650 MG: 325 TABLET ORAL at 01:11

## 2024-04-08 RX ADMIN — MEMANTINE 10 MG: 10 TABLET ORAL at 08:36

## 2024-04-08 RX ADMIN — LEVETIRACETAM 500 MG: 500 TABLET, FILM COATED ORAL at 13:32

## 2024-04-08 RX ADMIN — GABAPENTIN 400 MG: 400 CAPSULE ORAL at 20:25

## 2024-04-08 RX ADMIN — METOPROLOL TARTRATE 12.5 MG: 25 TABLET, FILM COATED ORAL at 08:41

## 2024-04-08 RX ADMIN — ENOXAPARIN SODIUM 40 MG: 100 INJECTION SUBCUTANEOUS at 08:36

## 2024-04-08 ASSESSMENT — PAIN DESCRIPTION - LOCATION: LOCATION: FOOT

## 2024-04-08 ASSESSMENT — PAIN SCALES - GENERAL: PAINLEVEL_OUTOF10: 0

## 2024-04-08 NOTE — ED NOTES
ED TO INPATIENT SBAR HANDOFF    Patient Name: Altagracia Wayne   :  1940  83 y.o.   Preferred Name  Altagracia  Family/Caregiver Present yes   Restraints no   C-SSRS: Risk of Suicide: No Risk  Sitter no   Sepsis Risk Score Sepsis Risk Score: 0.91      Situation  Chief Complaint   Patient presents with    Loss of Consciousness     Lowered to ground    Hip Pain     Right hip pain     Brief Description of Patient's Condition: 83 y.o. female that presents today to the ED for evaluation.   Family is primary historian. States that they were getting out of the car and were transferring pt (pivot) into the wheelchair and pt's head seem to slump and was dead weight and pt was lowered onto the ground.    They said prior to that pt was out at the park sitting outside for ~15 minutes.  Ems was called and pt was conscious and baseline upon arrival. Pt endorses bilat post neck pain x1 day. No chest pain no sob, no headache, no changes in vision. 3 family members at bedside, site pt is baseline at this time.   Pt does have hx of seizure, on keppra. Last seizure was ~3.5 years ago. No convulsive activity noted today however. Pt does have LLE residual weakness from remote cva  Mental Status: oriented, alert, coherent, logical, thought processes intact, and able to concentrate and follow conversation  Arrived from: home    Imaging:   CTA HEAD NECK W CONTRAST   Final Result      Neck      1. Mild carotid calcifications without evidence of stenosis or large vessel occlusion.   2. Small left vertebral artery ending at the inferior cerebellar artery.       Head      1.  No significant intracranial vascular abnormality. No large vessel occlusion.      Electronically signed by Miles Chilel MD      CT HEAD WO CONTRAST   Final Result      1. Stable exam with no acute intracranial abnormality.      Electronically signed by Miles Chilel MD      XR CHEST PORTABLE   Final Result   1. No acute disease.      Electronically signed by Miles

## 2024-04-08 NOTE — H&P
History and Physical      Name:  Altagracia Wayne /Age/Sex: 1940  (83 y.o. female)   MRN & CSN:  [id number] & [id number] Encounter Date/Time: 2024 10:31 PM EDT   Location:  [address] [place] [address] PCP: Marshall Dominique       Assessment and Plan:   Altagracia Wayne is a 83 y.o. female with hypertension, hyperlipidemia, VIC, seizure disorder, RA, cervical myelomalacia, dementia presented from home with transient LOC.     Transient LOC   Resolved upon my evaluation, Aaox3 during my evaluation   CT brain and CTA head and neck in ER reviewed, negative for acute hemorrhage or significant LVO  High suspicion for breakthrough seizure, no concerning metabolic or infectious etiology   Check UA to rule out UTI as a possible cause  Increase Keppra to 750 mg BID. Seizure and fall precautions, as needed Ativan  Consult Neurology for possible EEG inpatient vs ambulatory EEG    Hypertension, continue Maxzide, Cardizem and Lopressor    Hyperlipidemia: Continue Lipitor    Rheumatoid arthritis, Rheumatrex tomorrow    Cervical myelopathy see, continue gabapentin    Dementia without behavioral disturbance, continue Namenda and Aricept    Observation MedSurg  Full code discussed with patient in front of daughters at bedside.    Disposition:     Current Living situation: Home  Expected Disposition: Home  Estimated D/C: 2 days    Diet ADULT DIET; Regular; Low Sodium (2 gm)   DVT Prophylaxis [x] Lovenox, []  Heparin, [] SCDs, [] Ambulation,  [] Eliquis, [] Xarelto, [] Coumadin   Code Status Full Code   Surrogate Decision Maker/ ANIKET Gomez     Personally reviewed Lab Studies and Imaging   EKG interpreted personally and results NSR 81/min first-degree AV block, no acute ST-T wave abnormalities.  Discussed management of the case with ER provider who recommended admission for transient loss of consciousness.    History from:     patient, family member -daughters    History of Present Illness:     Chief Complaint: Transient LOC    Altagracia

## 2024-04-09 ENCOUNTER — HOSPITAL ENCOUNTER (OUTPATIENT)
Dept: WOUND CARE | Age: 84
Discharge: HOME | End: 2024-04-09
Attending: NURSE PRACTITIONER

## 2024-04-09 ENCOUNTER — APPOINTMENT (OUTPATIENT)
Dept: MRI IMAGING | Age: 84
DRG: 312 | End: 2024-04-09
Payer: MEDICARE

## 2024-04-09 LAB
ALBUMIN SERPL-MCNC: 3 GM/DL (ref 3.4–5)
ALP BLD-CCNC: 119 IU/L (ref 40–128)
ALT SERPL-CCNC: 7 U/L (ref 10–40)
ANION GAP SERPL CALCULATED.3IONS-SCNC: 11 MMOL/L (ref 7–16)
AST SERPL-CCNC: 13 IU/L (ref 15–37)
BASOPHILS ABSOLUTE: 0 K/CU MM
BASOPHILS RELATIVE PERCENT: 0.3 % (ref 0–1)
BILIRUB SERPL-MCNC: 0.2 MG/DL (ref 0–1)
BUN SERPL-MCNC: 18 MG/DL (ref 6–23)
CALCIUM SERPL-MCNC: 9 MG/DL (ref 8.3–10.6)
CHLORIDE BLD-SCNC: 102 MMOL/L (ref 99–110)
CO2: 27 MMOL/L (ref 21–32)
CREAT SERPL-MCNC: 0.6 MG/DL (ref 0.6–1.1)
DIFFERENTIAL TYPE: ABNORMAL
EOSINOPHILS ABSOLUTE: 0.1 K/CU MM
EOSINOPHILS RELATIVE PERCENT: 0.8 % (ref 0–3)
GFR SERPL CREATININE-BSD FRML MDRD: >90 ML/MIN/1.73M2
GLUCOSE SERPL-MCNC: 150 MG/DL (ref 70–99)
HCT VFR BLD CALC: 32.9 % (ref 37–47)
HEMOGLOBIN: 10.6 GM/DL (ref 12.5–16)
IMMATURE NEUTROPHIL %: 0.5 % (ref 0–0.43)
LYMPHOCYTES ABSOLUTE: 1.1 K/CU MM
LYMPHOCYTES RELATIVE PERCENT: 17.9 % (ref 24–44)
MCH RBC QN AUTO: 31.2 PG (ref 27–31)
MCHC RBC AUTO-ENTMCNC: 32.2 % (ref 32–36)
MCV RBC AUTO: 96.8 FL (ref 78–100)
MONOCYTES ABSOLUTE: 0.6 K/CU MM
MONOCYTES RELATIVE PERCENT: 10 % (ref 0–4)
NUCLEATED RBC %: 0 %
PDW BLD-RTO: 13.5 % (ref 11.7–14.9)
PLATELET # BLD: 400 K/CU MM (ref 140–440)
PMV BLD AUTO: 9.6 FL (ref 7.5–11.1)
POTASSIUM SERPL-SCNC: 3.9 MMOL/L (ref 3.5–5.1)
RBC # BLD: 3.4 M/CU MM (ref 4.2–5.4)
SEGMENTED NEUTROPHILS ABSOLUTE COUNT: 4.3 K/CU MM
SEGMENTED NEUTROPHILS RELATIVE PERCENT: 70.5 % (ref 36–66)
SODIUM BLD-SCNC: 140 MMOL/L (ref 135–145)
TOTAL IMMATURE NEUTOROPHIL: 0.03 K/CU MM
TOTAL NUCLEATED RBC: 0 K/CU MM
TOTAL PROTEIN: 5 GM/DL (ref 6.4–8.2)
WBC # BLD: 6.1 K/CU MM (ref 4–10.5)

## 2024-04-09 PROCEDURE — 70551 MRI BRAIN STEM W/O DYE: CPT

## 2024-04-09 PROCEDURE — G0378 HOSPITAL OBSERVATION PER HR: HCPCS

## 2024-04-09 PROCEDURE — 6370000000 HC RX 637 (ALT 250 FOR IP): Performed by: STUDENT IN AN ORGANIZED HEALTH CARE EDUCATION/TRAINING PROGRAM

## 2024-04-09 PROCEDURE — 94761 N-INVAS EAR/PLS OXIMETRY MLT: CPT

## 2024-04-09 PROCEDURE — 6370000000 HC RX 637 (ALT 250 FOR IP): Performed by: NURSE PRACTITIONER

## 2024-04-09 PROCEDURE — 6370000000 HC RX 637 (ALT 250 FOR IP)

## 2024-04-09 PROCEDURE — 97112 NEUROMUSCULAR REEDUCATION: CPT

## 2024-04-09 PROCEDURE — 97535 SELF CARE MNGMENT TRAINING: CPT

## 2024-04-09 PROCEDURE — 80053 COMPREHEN METABOLIC PANEL: CPT

## 2024-04-09 PROCEDURE — 36415 COLL VENOUS BLD VENIPUNCTURE: CPT

## 2024-04-09 PROCEDURE — 97530 THERAPEUTIC ACTIVITIES: CPT

## 2024-04-09 PROCEDURE — 6360000002 HC RX W HCPCS: Performed by: STUDENT IN AN ORGANIZED HEALTH CARE EDUCATION/TRAINING PROGRAM

## 2024-04-09 PROCEDURE — 96372 THER/PROPH/DIAG INJ SC/IM: CPT

## 2024-04-09 PROCEDURE — 85025 COMPLETE CBC W/AUTO DIFF WBC: CPT

## 2024-04-09 PROCEDURE — 99232 SBSQ HOSP IP/OBS MODERATE 35: CPT | Performed by: NURSE PRACTITIONER

## 2024-04-09 RX ORDER — PREDNISONE 10 MG/1
10 TABLET ORAL DAILY
Status: DISCONTINUED | OUTPATIENT
Start: 2024-04-09 | End: 2024-04-14 | Stop reason: HOSPADM

## 2024-04-09 RX ADMIN — ENOXAPARIN SODIUM 40 MG: 100 INJECTION SUBCUTANEOUS at 09:15

## 2024-04-09 RX ADMIN — ASPIRIN 81 MG: 81 TABLET, CHEWABLE ORAL at 09:18

## 2024-04-09 RX ADMIN — ACETAMINOPHEN 650 MG: 325 TABLET ORAL at 21:45

## 2024-04-09 RX ADMIN — METOPROLOL TARTRATE 12.5 MG: 25 TABLET, FILM COATED ORAL at 09:18

## 2024-04-09 RX ADMIN — LEVETIRACETAM 500 MG: 500 TABLET, FILM COATED ORAL at 12:38

## 2024-04-09 RX ADMIN — DONEPEZIL HYDROCHLORIDE 10 MG: 10 TABLET ORAL at 09:18

## 2024-04-09 RX ADMIN — ATORVASTATIN CALCIUM 20 MG: 10 TABLET, FILM COATED ORAL at 09:18

## 2024-04-09 RX ADMIN — LEVETIRACETAM 500 MG: 500 TABLET, FILM COATED ORAL at 01:20

## 2024-04-09 RX ADMIN — PREDNISONE 10 MG: 10 TABLET ORAL at 12:38

## 2024-04-09 RX ADMIN — FOLIC ACID 500 MCG: 1 TABLET ORAL at 09:17

## 2024-04-09 RX ADMIN — TRIAMTERENE AND HYDROCHLOROTHIAZIDE 1 TABLET: 37.5; 25 TABLET ORAL at 09:18

## 2024-04-09 RX ADMIN — DILTIAZEM HYDROCHLORIDE 240 MG: 240 CAPSULE, COATED, EXTENDED RELEASE ORAL at 09:18

## 2024-04-09 RX ADMIN — MEMANTINE 10 MG: 10 TABLET ORAL at 21:45

## 2024-04-09 RX ADMIN — MEMANTINE 10 MG: 10 TABLET ORAL at 09:18

## 2024-04-09 RX ADMIN — GABAPENTIN 400 MG: 400 CAPSULE ORAL at 21:45

## 2024-04-09 RX ADMIN — ACETAMINOPHEN 650 MG: 325 TABLET ORAL at 09:29

## 2024-04-09 RX ADMIN — METOPROLOL TARTRATE 12.5 MG: 25 TABLET, FILM COATED ORAL at 21:47

## 2024-04-09 ASSESSMENT — PAIN DESCRIPTION - DESCRIPTORS
DESCRIPTORS: ACHING
DESCRIPTORS: ACHING

## 2024-04-09 ASSESSMENT — PAIN SCALES - GENERAL
PAINLEVEL_OUTOF10: 1
PAINLEVEL_OUTOF10: 3
PAINLEVEL_OUTOF10: 5
PAINLEVEL_OUTOF10: 10

## 2024-04-09 ASSESSMENT — PAIN - FUNCTIONAL ASSESSMENT
PAIN_FUNCTIONAL_ASSESSMENT: ACTIVITIES ARE NOT PREVENTED
PAIN_FUNCTIONAL_ASSESSMENT: PREVENTS OR INTERFERES SOME ACTIVE ACTIVITIES AND ADLS

## 2024-04-09 ASSESSMENT — PAIN DESCRIPTION - LOCATION
LOCATION: GENERALIZED
LOCATION: ARM

## 2024-04-09 ASSESSMENT — PAIN DESCRIPTION - ORIENTATION: ORIENTATION: RIGHT

## 2024-04-10 ENCOUNTER — APPOINTMENT (OUTPATIENT)
Dept: MRI IMAGING | Age: 84
DRG: 312 | End: 2024-04-10
Payer: MEDICARE

## 2024-04-10 PROBLEM — R55 SYNCOPE AND COLLAPSE: Status: ACTIVE | Noted: 2024-04-10

## 2024-04-10 PROCEDURE — G0378 HOSPITAL OBSERVATION PER HR: HCPCS

## 2024-04-10 PROCEDURE — 72156 MRI NECK SPINE W/O & W/DYE: CPT

## 2024-04-10 PROCEDURE — 6370000000 HC RX 637 (ALT 250 FOR IP)

## 2024-04-10 PROCEDURE — 6360000002 HC RX W HCPCS: Performed by: STUDENT IN AN ORGANIZED HEALTH CARE EDUCATION/TRAINING PROGRAM

## 2024-04-10 PROCEDURE — 6370000000 HC RX 637 (ALT 250 FOR IP): Performed by: NURSE PRACTITIONER

## 2024-04-10 PROCEDURE — 6360000004 HC RX CONTRAST MEDICATION: Performed by: PHYSICIAN ASSISTANT

## 2024-04-10 PROCEDURE — 6370000000 HC RX 637 (ALT 250 FOR IP): Performed by: STUDENT IN AN ORGANIZED HEALTH CARE EDUCATION/TRAINING PROGRAM

## 2024-04-10 PROCEDURE — A9579 GAD-BASE MR CONTRAST NOS,1ML: HCPCS | Performed by: PHYSICIAN ASSISTANT

## 2024-04-10 PROCEDURE — 94761 N-INVAS EAR/PLS OXIMETRY MLT: CPT

## 2024-04-10 PROCEDURE — 99233 SBSQ HOSP IP/OBS HIGH 50: CPT | Performed by: NURSE PRACTITIONER

## 2024-04-10 PROCEDURE — 6370000000 HC RX 637 (ALT 250 FOR IP): Performed by: FAMILY MEDICINE

## 2024-04-10 PROCEDURE — 96372 THER/PROPH/DIAG INJ SC/IM: CPT

## 2024-04-10 RX ORDER — LEVETIRACETAM 500 MG/1
500 TABLET ORAL 2 TIMES DAILY
Status: DISCONTINUED | OUTPATIENT
Start: 2024-04-10 | End: 2024-04-14 | Stop reason: HOSPADM

## 2024-04-10 RX ORDER — LIDOCAINE 4 G/G
1 PATCH TOPICAL DAILY
Status: DISCONTINUED | OUTPATIENT
Start: 2024-04-10 | End: 2024-04-14 | Stop reason: HOSPADM

## 2024-04-10 RX ADMIN — LEVETIRACETAM 500 MG: 500 TABLET, FILM COATED ORAL at 02:09

## 2024-04-10 RX ADMIN — METOPROLOL TARTRATE 12.5 MG: 25 TABLET, FILM COATED ORAL at 20:49

## 2024-04-10 RX ADMIN — ATORVASTATIN CALCIUM 20 MG: 10 TABLET, FILM COATED ORAL at 10:11

## 2024-04-10 RX ADMIN — GADOTERIDOL 13 ML: 279.3 INJECTION, SOLUTION INTRAVENOUS at 15:57

## 2024-04-10 RX ADMIN — MEMANTINE 10 MG: 10 TABLET ORAL at 10:11

## 2024-04-10 RX ADMIN — ACETAMINOPHEN 650 MG: 325 TABLET ORAL at 17:14

## 2024-04-10 RX ADMIN — ENOXAPARIN SODIUM 40 MG: 100 INJECTION SUBCUTANEOUS at 10:11

## 2024-04-10 RX ADMIN — LEVETIRACETAM 500 MG: 500 TABLET, FILM COATED ORAL at 12:51

## 2024-04-10 RX ADMIN — ACETAMINOPHEN 650 MG: 325 TABLET ORAL at 23:27

## 2024-04-10 RX ADMIN — ASPIRIN 81 MG: 81 TABLET, CHEWABLE ORAL at 10:11

## 2024-04-10 RX ADMIN — PREDNISONE 10 MG: 10 TABLET ORAL at 10:11

## 2024-04-10 RX ADMIN — LEVETIRACETAM 500 MG: 500 TABLET, FILM COATED ORAL at 21:59

## 2024-04-10 RX ADMIN — DONEPEZIL HYDROCHLORIDE 10 MG: 10 TABLET ORAL at 10:11

## 2024-04-10 RX ADMIN — GABAPENTIN 400 MG: 400 CAPSULE ORAL at 20:49

## 2024-04-10 RX ADMIN — ACETAMINOPHEN 650 MG: 325 TABLET ORAL at 10:22

## 2024-04-10 RX ADMIN — FOLIC ACID 500 MCG: 1 TABLET ORAL at 10:11

## 2024-04-10 RX ADMIN — MEMANTINE 10 MG: 10 TABLET ORAL at 20:50

## 2024-04-10 ASSESSMENT — PAIN DESCRIPTION - LOCATION
LOCATION: NECK;FOOT
LOCATION: BUTTOCKS
LOCATION: NECK;SHOULDER

## 2024-04-10 ASSESSMENT — PAIN DESCRIPTION - DESCRIPTORS
DESCRIPTORS: ACHING

## 2024-04-10 ASSESSMENT — PAIN DESCRIPTION - FREQUENCY: FREQUENCY: INTERMITTENT

## 2024-04-10 ASSESSMENT — PAIN DESCRIPTION - ORIENTATION: ORIENTATION: RIGHT;LEFT;MID

## 2024-04-10 ASSESSMENT — PAIN SCALES - GENERAL
PAINLEVEL_OUTOF10: 3
PAINLEVEL_OUTOF10: 1
PAINLEVEL_OUTOF10: 3
PAINLEVEL_OUTOF10: 0
PAINLEVEL_OUTOF10: 2
PAINLEVEL_OUTOF10: 0

## 2024-04-10 ASSESSMENT — PAIN DESCRIPTION - PAIN TYPE: TYPE: ACUTE PAIN

## 2024-04-10 ASSESSMENT — PAIN DESCRIPTION - ONSET: ONSET: GRADUAL

## 2024-04-11 ENCOUNTER — HOSPITAL ENCOUNTER (OUTPATIENT)
Dept: WOUND CARE | Age: 84
Discharge: HOME | End: 2024-04-11
Attending: NURSE PRACTITIONER

## 2024-04-11 PROBLEM — R40.20 LOSS OF CONSCIOUSNESS (HCC): Status: ACTIVE | Noted: 2024-04-11

## 2024-04-11 LAB
ANION GAP SERPL CALCULATED.3IONS-SCNC: 12 MMOL/L (ref 7–16)
BASOPHILS ABSOLUTE: 0 K/CU MM
BASOPHILS RELATIVE PERCENT: 0.5 % (ref 0–1)
BUN SERPL-MCNC: 18 MG/DL (ref 6–23)
CALCIUM SERPL-MCNC: 9.3 MG/DL (ref 8.3–10.6)
CHLORIDE BLD-SCNC: 106 MMOL/L (ref 99–110)
CO2: 27 MMOL/L (ref 21–32)
CREAT SERPL-MCNC: 0.3 MG/DL (ref 0.6–1.1)
DIFFERENTIAL TYPE: ABNORMAL
EOSINOPHILS ABSOLUTE: 0.1 K/CU MM
EOSINOPHILS RELATIVE PERCENT: 1.8 % (ref 0–3)
GFR SERPL CREATININE-BSD FRML MDRD: >90 ML/MIN/1.73M2
GLUCOSE SERPL-MCNC: 153 MG/DL (ref 70–99)
HCT VFR BLD CALC: 34.4 % (ref 37–47)
HEMOGLOBIN: 10.6 GM/DL (ref 12.5–16)
IMMATURE NEUTROPHIL %: 0.6 % (ref 0–0.43)
LYMPHOCYTES ABSOLUTE: 1.4 K/CU MM
LYMPHOCYTES RELATIVE PERCENT: 21.7 % (ref 24–44)
MCH RBC QN AUTO: 30.6 PG (ref 27–31)
MCHC RBC AUTO-ENTMCNC: 30.8 % (ref 32–36)
MCV RBC AUTO: 99.4 FL (ref 78–100)
MONOCYTES ABSOLUTE: 0.4 K/CU MM
MONOCYTES RELATIVE PERCENT: 5.8 % (ref 0–4)
NEUTROPHILS RELATIVE PERCENT: 69.6 % (ref 36–66)
NUCLEATED RBC %: 0 %
PDW BLD-RTO: 13.4 % (ref 11.7–14.9)
PLATELET # BLD: 420 K/CU MM (ref 140–440)
PMV BLD AUTO: 9.4 FL (ref 7.5–11.1)
POTASSIUM SERPL-SCNC: 3.4 MMOL/L (ref 3.5–5.1)
RBC # BLD: 3.46 M/CU MM (ref 4.2–5.4)
SEGMENTED NEUTROPHILS ABSOLUTE COUNT: 4.6 K/CU MM
SODIUM BLD-SCNC: 145 MMOL/L (ref 135–145)
TOTAL IMMATURE NEUTOROPHIL: 0.04 K/CU MM
TOTAL NUCLEATED RBC: 0 K/CU MM
WBC # BLD: 6.6 K/CU MM (ref 4–10.5)

## 2024-04-11 PROCEDURE — 1200000000 HC SEMI PRIVATE

## 2024-04-11 PROCEDURE — 6360000002 HC RX W HCPCS: Performed by: STUDENT IN AN ORGANIZED HEALTH CARE EDUCATION/TRAINING PROGRAM

## 2024-04-11 PROCEDURE — 6370000000 HC RX 637 (ALT 250 FOR IP)

## 2024-04-11 PROCEDURE — 85025 COMPLETE CBC W/AUTO DIFF WBC: CPT

## 2024-04-11 PROCEDURE — 97112 NEUROMUSCULAR REEDUCATION: CPT

## 2024-04-11 PROCEDURE — 97530 THERAPEUTIC ACTIVITIES: CPT

## 2024-04-11 PROCEDURE — G0378 HOSPITAL OBSERVATION PER HR: HCPCS

## 2024-04-11 PROCEDURE — 99232 SBSQ HOSP IP/OBS MODERATE 35: CPT | Performed by: NURSE PRACTITIONER

## 2024-04-11 PROCEDURE — 6370000000 HC RX 637 (ALT 250 FOR IP): Performed by: STUDENT IN AN ORGANIZED HEALTH CARE EDUCATION/TRAINING PROGRAM

## 2024-04-11 PROCEDURE — 80048 BASIC METABOLIC PNL TOTAL CA: CPT

## 2024-04-11 PROCEDURE — 97164 PT RE-EVAL EST PLAN CARE: CPT

## 2024-04-11 PROCEDURE — 36415 COLL VENOUS BLD VENIPUNCTURE: CPT

## 2024-04-11 PROCEDURE — 6370000000 HC RX 637 (ALT 250 FOR IP): Performed by: FAMILY MEDICINE

## 2024-04-11 RX ORDER — POTASSIUM CHLORIDE 20 MEQ/1
40 TABLET, EXTENDED RELEASE ORAL PRN
Status: DISCONTINUED | OUTPATIENT
Start: 2024-04-11 | End: 2024-04-14 | Stop reason: HOSPADM

## 2024-04-11 RX ORDER — POTASSIUM CHLORIDE 7.45 MG/ML
10 INJECTION INTRAVENOUS PRN
Status: DISCONTINUED | OUTPATIENT
Start: 2024-04-11 | End: 2024-04-14 | Stop reason: HOSPADM

## 2024-04-11 RX ADMIN — METOPROLOL TARTRATE 12.5 MG: 25 TABLET, FILM COATED ORAL at 09:59

## 2024-04-11 RX ADMIN — FOLIC ACID 500 MCG: 1 TABLET ORAL at 09:59

## 2024-04-11 RX ADMIN — ATORVASTATIN CALCIUM 20 MG: 10 TABLET, FILM COATED ORAL at 10:00

## 2024-04-11 RX ADMIN — LEVETIRACETAM 500 MG: 500 TABLET, FILM COATED ORAL at 09:59

## 2024-04-11 RX ADMIN — GABAPENTIN 400 MG: 400 CAPSULE ORAL at 20:54

## 2024-04-11 RX ADMIN — ASPIRIN 81 MG: 81 TABLET, CHEWABLE ORAL at 09:59

## 2024-04-11 RX ADMIN — ENOXAPARIN SODIUM 40 MG: 100 INJECTION SUBCUTANEOUS at 10:00

## 2024-04-11 RX ADMIN — TRIAMTERENE AND HYDROCHLOROTHIAZIDE 1 TABLET: 37.5; 25 TABLET ORAL at 09:59

## 2024-04-11 RX ADMIN — METOPROLOL TARTRATE 12.5 MG: 25 TABLET, FILM COATED ORAL at 20:54

## 2024-04-11 RX ADMIN — PREDNISONE 10 MG: 10 TABLET ORAL at 09:59

## 2024-04-11 RX ADMIN — ACETAMINOPHEN 650 MG: 325 TABLET ORAL at 16:30

## 2024-04-11 RX ADMIN — ACETAMINOPHEN 650 MG: 325 TABLET ORAL at 10:30

## 2024-04-11 RX ADMIN — DONEPEZIL HYDROCHLORIDE 10 MG: 10 TABLET ORAL at 10:00

## 2024-04-11 RX ADMIN — MEMANTINE 10 MG: 10 TABLET ORAL at 20:54

## 2024-04-11 RX ADMIN — DILTIAZEM HYDROCHLORIDE 240 MG: 240 CAPSULE, COATED, EXTENDED RELEASE ORAL at 10:00

## 2024-04-11 RX ADMIN — ACETAMINOPHEN 650 MG: 325 TABLET ORAL at 23:03

## 2024-04-11 RX ADMIN — MEMANTINE 10 MG: 10 TABLET ORAL at 10:00

## 2024-04-11 RX ADMIN — LEVETIRACETAM 500 MG: 500 TABLET, FILM COATED ORAL at 20:54

## 2024-04-11 ASSESSMENT — PAIN DESCRIPTION - DESCRIPTORS
DESCRIPTORS: ACHING;DISCOMFORT
DESCRIPTORS: ACHING
DESCRIPTORS: ACHING;DISCOMFORT
DESCRIPTORS: ACHING
DESCRIPTORS: ACHING;DISCOMFORT
DESCRIPTORS: ACHING;DISCOMFORT
DESCRIPTORS: ACHING

## 2024-04-11 ASSESSMENT — PAIN DESCRIPTION - LOCATION: LOCATION: BUTTOCKS;BACK

## 2024-04-11 ASSESSMENT — PAIN - FUNCTIONAL ASSESSMENT
PAIN_FUNCTIONAL_ASSESSMENT: ACTIVITIES ARE NOT PREVENTED
PAIN_FUNCTIONAL_ASSESSMENT: ACTIVITIES ARE NOT PREVENTED
PAIN_FUNCTIONAL_ASSESSMENT: PREVENTS OR INTERFERES SOME ACTIVE ACTIVITIES AND ADLS
PAIN_FUNCTIONAL_ASSESSMENT: ACTIVITIES ARE NOT PREVENTED

## 2024-04-11 ASSESSMENT — PAIN DESCRIPTION - ORIENTATION
ORIENTATION: MID;RIGHT;LEFT
ORIENTATION: MID
ORIENTATION: MID;RIGHT;LEFT
ORIENTATION: LOWER;RIGHT;LEFT
ORIENTATION: MID;RIGHT;LEFT

## 2024-04-11 ASSESSMENT — PAIN SCALES - GENERAL
PAINLEVEL_OUTOF10: 3
PAINLEVEL_OUTOF10: 9
PAINLEVEL_OUTOF10: 0
PAINLEVEL_OUTOF10: 0
PAINLEVEL_OUTOF10: 2
PAINLEVEL_OUTOF10: 8
PAINLEVEL_OUTOF10: 2
PAINLEVEL_OUTOF10: 3
PAINLEVEL_OUTOF10: 2

## 2024-04-11 ASSESSMENT — PAIN SCALES - WONG BAKER
WONGBAKER_NUMERICALRESPONSE: NO HURT
WONGBAKER_NUMERICALRESPONSE: HURTS A LITTLE BIT
WONGBAKER_NUMERICALRESPONSE: NO HURT
WONGBAKER_NUMERICALRESPONSE: HURTS A LITTLE BIT

## 2024-04-12 ENCOUNTER — APPOINTMENT (OUTPATIENT)
Dept: NON INVASIVE DIAGNOSTICS | Age: 84
DRG: 312 | End: 2024-04-12
Attending: INTERNAL MEDICINE
Payer: MEDICARE

## 2024-04-12 LAB
ANION GAP SERPL CALCULATED.3IONS-SCNC: 13 MMOL/L (ref 7–16)
BASOPHILS ABSOLUTE: 0.1 K/CU MM
BASOPHILS RELATIVE PERCENT: 0.6 % (ref 0–1)
BUN SERPL-MCNC: 21 MG/DL (ref 6–23)
CALCIUM SERPL-MCNC: 9.4 MG/DL (ref 8.3–10.6)
CHLORIDE BLD-SCNC: 102 MMOL/L (ref 99–110)
CO2: 27 MMOL/L (ref 21–32)
CREAT SERPL-MCNC: 0.3 MG/DL (ref 0.6–1.1)
DIFFERENTIAL TYPE: ABNORMAL
ECHO AO ROOT DIAM: 2.3 CM
ECHO AO ROOT INDEX: 1.28 CM/M2
ECHO AV AREA PEAK VELOCITY: 1.5 CM2
ECHO AV AREA VTI: 1.8 CM2
ECHO AV AREA/BSA PEAK VELOCITY: 0.8 CM2/M2
ECHO AV AREA/BSA VTI: 1 CM2/M2
ECHO AV MEAN GRADIENT: 6 MMHG
ECHO AV MEAN VELOCITY: 1.2 M/S
ECHO AV PEAK GRADIENT: 12 MMHG
ECHO AV PEAK VELOCITY: 1.7 M/S
ECHO AV VELOCITY RATIO: 0.59
ECHO AV VTI: 28.8 CM
ECHO BSA: 1.84 M2
ECHO EST RA PRESSURE: 3 MMHG
ECHO IVC PROX: 1.4 CM
ECHO LA AREA 4C: 14.6 CM2
ECHO LA DIAMETER INDEX: 0.83 CM/M2
ECHO LA DIAMETER: 1.5 CM
ECHO LA MAJOR AXIS: 6.2 CM
ECHO LA TO AORTIC ROOT RATIO: 0.65
ECHO LA VOL MOD A4C: 28 ML (ref 22–52)
ECHO LA VOLUME INDEX MOD A4C: 16 ML/M2 (ref 16–34)
ECHO LV E' LATERAL VELOCITY: 11 CM/S
ECHO LV E' SEPTAL VELOCITY: 8 CM/S
ECHO LV EDV A4C: 51 ML
ECHO LV EDV INDEX A4C: 28 ML/M2
ECHO LV EJECTION FRACTION A4C: 53 %
ECHO LV ESV A4C: 24 ML
ECHO LV ESV INDEX A4C: 13 ML/M2
ECHO LVOT AREA: 2.5 CM2
ECHO LVOT AV VTI INDEX: 0.71
ECHO LVOT DIAM: 1.8 CM
ECHO LVOT MEAN GRADIENT: 2 MMHG
ECHO LVOT PEAK GRADIENT: 4 MMHG
ECHO LVOT PEAK VELOCITY: 1 M/S
ECHO LVOT STROKE VOLUME INDEX: 29 ML/M2
ECHO LVOT SV: 52.1 ML
ECHO LVOT VTI: 20.5 CM
ECHO MV A VELOCITY: 1.05 M/S
ECHO MV E VELOCITY: 0.76 M/S
ECHO MV E/A RATIO: 0.72
ECHO MV E/E' LATERAL: 6.91
ECHO MV E/E' RATIO (AVERAGED): 8.2
ECHO RIGHT VENTRICULAR SYSTOLIC PRESSURE (RVSP): 25 MMHG
ECHO TV REGURGITANT MAX VELOCITY: 2.37 M/S
ECHO TV REGURGITANT PEAK GRADIENT: 22 MMHG
EOSINOPHILS ABSOLUTE: 0.1 K/CU MM
EOSINOPHILS RELATIVE PERCENT: 1.4 % (ref 0–3)
GFR SERPL CREATININE-BSD FRML MDRD: >90 ML/MIN/1.73M2
GLUCOSE SERPL-MCNC: 104 MG/DL (ref 70–99)
HCT VFR BLD CALC: 34.3 % (ref 37–47)
HEMOGLOBIN: 10.8 GM/DL (ref 12.5–16)
IMMATURE NEUTROPHIL %: 1.7 % (ref 0–0.43)
LYMPHOCYTES ABSOLUTE: 1.5 K/CU MM
LYMPHOCYTES RELATIVE PERCENT: 15.9 % (ref 24–44)
MAGNESIUM: 1.9 MG/DL (ref 1.8–2.4)
MCH RBC QN AUTO: 31.1 PG (ref 27–31)
MCHC RBC AUTO-ENTMCNC: 31.5 % (ref 32–36)
MCV RBC AUTO: 98.8 FL (ref 78–100)
MONOCYTES ABSOLUTE: 0.7 K/CU MM
MONOCYTES RELATIVE PERCENT: 6.9 % (ref 0–4)
NEUTROPHILS RELATIVE PERCENT: 73.5 % (ref 36–66)
NUCLEATED RBC %: 0 %
PDW BLD-RTO: 13.5 % (ref 11.7–14.9)
PLATELET # BLD: 425 K/CU MM (ref 140–440)
PMV BLD AUTO: 9.6 FL (ref 7.5–11.1)
POTASSIUM SERPL-SCNC: 3.5 MMOL/L (ref 3.5–5.1)
RBC # BLD: 3.47 M/CU MM (ref 4.2–5.4)
SEGMENTED NEUTROPHILS ABSOLUTE COUNT: 7 K/CU MM
SODIUM BLD-SCNC: 142 MMOL/L (ref 135–145)
TOTAL IMMATURE NEUTOROPHIL: 0.16 K/CU MM
TOTAL NUCLEATED RBC: 0 K/CU MM
WBC # BLD: 9.5 K/CU MM (ref 4–10.5)

## 2024-04-12 PROCEDURE — 94761 N-INVAS EAR/PLS OXIMETRY MLT: CPT

## 2024-04-12 PROCEDURE — 6370000000 HC RX 637 (ALT 250 FOR IP): Performed by: FAMILY MEDICINE

## 2024-04-12 PROCEDURE — 80048 BASIC METABOLIC PNL TOTAL CA: CPT

## 2024-04-12 PROCEDURE — 6370000000 HC RX 637 (ALT 250 FOR IP): Performed by: STUDENT IN AN ORGANIZED HEALTH CARE EDUCATION/TRAINING PROGRAM

## 2024-04-12 PROCEDURE — 93306 TTE W/DOPPLER COMPLETE: CPT

## 2024-04-12 PROCEDURE — 6360000002 HC RX W HCPCS: Performed by: STUDENT IN AN ORGANIZED HEALTH CARE EDUCATION/TRAINING PROGRAM

## 2024-04-12 PROCEDURE — 93306 TTE W/DOPPLER COMPLETE: CPT | Performed by: INTERNAL MEDICINE

## 2024-04-12 PROCEDURE — 1200000000 HC SEMI PRIVATE

## 2024-04-12 PROCEDURE — 6370000000 HC RX 637 (ALT 250 FOR IP)

## 2024-04-12 PROCEDURE — 85025 COMPLETE CBC W/AUTO DIFF WBC: CPT

## 2024-04-12 PROCEDURE — 99222 1ST HOSP IP/OBS MODERATE 55: CPT | Performed by: INTERNAL MEDICINE

## 2024-04-12 PROCEDURE — 83735 ASSAY OF MAGNESIUM: CPT

## 2024-04-12 PROCEDURE — 36415 COLL VENOUS BLD VENIPUNCTURE: CPT

## 2024-04-12 RX ADMIN — ASPIRIN 81 MG: 81 TABLET, CHEWABLE ORAL at 10:18

## 2024-04-12 RX ADMIN — ATORVASTATIN CALCIUM 20 MG: 10 TABLET, FILM COATED ORAL at 10:18

## 2024-04-12 RX ADMIN — LEVETIRACETAM 500 MG: 500 TABLET, FILM COATED ORAL at 21:27

## 2024-04-12 RX ADMIN — LEVETIRACETAM 500 MG: 500 TABLET, FILM COATED ORAL at 10:17

## 2024-04-12 RX ADMIN — METOPROLOL TARTRATE 12.5 MG: 25 TABLET, FILM COATED ORAL at 21:27

## 2024-04-12 RX ADMIN — MEMANTINE 10 MG: 10 TABLET ORAL at 10:17

## 2024-04-12 RX ADMIN — TRIAMTERENE AND HYDROCHLOROTHIAZIDE 1 TABLET: 37.5; 25 TABLET ORAL at 10:17

## 2024-04-12 RX ADMIN — METOPROLOL TARTRATE 12.5 MG: 25 TABLET, FILM COATED ORAL at 10:18

## 2024-04-12 RX ADMIN — ENOXAPARIN SODIUM 40 MG: 100 INJECTION SUBCUTANEOUS at 10:18

## 2024-04-12 RX ADMIN — MEMANTINE 10 MG: 10 TABLET ORAL at 21:27

## 2024-04-12 RX ADMIN — PREDNISONE 10 MG: 10 TABLET ORAL at 10:18

## 2024-04-12 RX ADMIN — DONEPEZIL HYDROCHLORIDE 10 MG: 10 TABLET ORAL at 10:17

## 2024-04-12 RX ADMIN — ACETAMINOPHEN 650 MG: 325 TABLET ORAL at 10:17

## 2024-04-12 RX ADMIN — GABAPENTIN 400 MG: 400 CAPSULE ORAL at 21:27

## 2024-04-12 RX ADMIN — DILTIAZEM HYDROCHLORIDE 240 MG: 240 CAPSULE, COATED, EXTENDED RELEASE ORAL at 10:18

## 2024-04-12 RX ADMIN — FOLIC ACID 500 MCG: 1 TABLET ORAL at 10:18

## 2024-04-12 RX ADMIN — ACETAMINOPHEN 650 MG: 325 TABLET ORAL at 19:01

## 2024-04-12 ASSESSMENT — PAIN DESCRIPTION - DESCRIPTORS
DESCRIPTORS: ACHING
DESCRIPTORS: ACHING
DESCRIPTORS: ACHING;DISCOMFORT

## 2024-04-12 ASSESSMENT — PAIN - FUNCTIONAL ASSESSMENT
PAIN_FUNCTIONAL_ASSESSMENT: ACTIVITIES ARE NOT PREVENTED

## 2024-04-12 ASSESSMENT — PAIN SCALES - WONG BAKER
WONGBAKER_NUMERICALRESPONSE: NO HURT
WONGBAKER_NUMERICALRESPONSE: HURTS A LITTLE BIT

## 2024-04-12 ASSESSMENT — PAIN DESCRIPTION - ORIENTATION
ORIENTATION: RIGHT
ORIENTATION: LEFT;RIGHT
ORIENTATION: RIGHT

## 2024-04-12 ASSESSMENT — PAIN DESCRIPTION - LOCATION
LOCATION: ARM
LOCATION: ARM
LOCATION: NECK;BUTTOCKS
LOCATION: BUTTOCKS

## 2024-04-12 ASSESSMENT — PAIN SCALES - GENERAL
PAINLEVEL_OUTOF10: 3
PAINLEVEL_OUTOF10: 3
PAINLEVEL_OUTOF10: 1
PAINLEVEL_OUTOF10: 2

## 2024-04-12 NOTE — CONSULTS
[facility] Nurse  Consult Note       Altagracia Wayne  AGE: 83 y.o.   GENDER: female  : 1940  TODAY'S DATE:  2024    Subjective:     Reason for Evaluation and Assessment: skin assessment      Altagracia Wayne is a 83 y.o. female referred by:   [] Physician  [] Nursing  [] Other:     Wound Identification:  Wound Type:  moisture  Contributing Factors: chronic pressure, decreased mobility, and incontinence of stool        PAST MEDICAL HISTORY        Diagnosis Date    Acid reflux     Anxiety     Bronchitis In Past    Cancer ([facility])     colon cancer    Carotid stenosis 11/10/2015    Cerebral embolism with cerebral infarction ([facility]) 2015    Seizure X 1, No Residual    Chronic back pain     Depression     History of blood transfusion     History of external beam radiation therapy 2017    5400 cGy pelvis/anal canal in 2017 per Dr. Mayberry at [facility]    Hyperlipidemia     Hypertension     Prolonged emergence from general anesthesia     RA (rheumatoid arthritis) ([facility])     \"All Over\"    Seizure ([facility]) 09/18/2015    X 1    Sleep apnea     Uses CPAP    Teeth missing     Upper And Lower    Urinary incontinence     UTI (urinary tract infection) In Past    No Current Symptoms    Wears dentures     Full Upper , Partial Lower    Wears glasses     Wears partial dentures     Lower       PAST SURGICAL HISTORY    Past Surgical History:   Procedure Laterality Date    ARTHROCENTESIS / ASPIRATION / INJECTION KNEE  2021         CAROTID ENDARTERECTOMY Right 2015    CARPAL TUNNEL RELEASE      COLONOSCOPY  In Past    DENTAL SURGERY      Teeth Extracted In Past    DILATION AND CURETTAGE OF UTERUS  's    Prior To Vaginal Hysterectomy    HYSTERECTOMY, VAGINAL  's    JOINT REPLACEMENT      PORT SURGERY N/A 2021    PORT REMOVAL performed by Regino Pina MD at [facility] OR       FAMILY HISTORY    Family History   Problem Relation Age of Onset    Stroke Mother     Heart Disease Mother     
assistance  Grooming: Supervision  Toileting: Needs assistance (SPT to [facility])  Homemaking Assistance: Needs assistance  Ambulation Assistance: Needs assistance (uses w/c form mobiltiy (able to propell w/c short distances wiht cues), walked 6ft with assist 4 months ago)  Transfer Assistance: Needs assistance    Examination of body systems (includes body structures/functions, activity/participation limitations):  Observation:  pt supine in bed upon arrival and agreeable to therapy  Vision:  wears glasses  Hearing:  WFL  Cardiopulmonary:  WFL  Cognition: hx dementia, see OT/SLP note for further evaluation.    Musculoskeletal  ROM R/L:  WFL.    Strength R/L:  4-/5, moderate impairment in function and endurance.    Neuro:  WFL      Mobility:  Rolling L/R:  min A  Supine to sit:  mod A with assist at hips and trunk. Cues provided for sequencing  Transfers: pt completed stand pivot transfer from EOB to chair max A with cues for sequencing  Sitting balance:  poor, pt sat EOB CGA regressing to mod A with L lateral lean which increased as fatigued. Cues provided to correct but not maintain.    Standing balance:  NT.    Gait: NT    [facility] 6 Clicks Inpatient Mobility:  AM-PAC Inpatient Mobility Raw Score : 11    Safety: patient left in chair with alarm on, daughter present, call light within reach, RN notified, gait belt used.    Assessment:  Pt is an 83 y.o. female admitted to the hospital for loss of consciousness. Pt is typically requiring assist for all transfers and assist for WC mobility. Pt is currently performing bed mobiltiy mod A, transferring max A, and sitting balance mod A. Pt is presenting with decreased endurance, impaired transfers, impaired gait, impaired bed mobility, impaired balance. Pt would benefit from continued acute care PT as well as [facility] PT with 24/7 supervision/assist already provided from family.    Complexity: moderate    Prognosis: Good, no significant barriers to participation at this time. 
    Pulse Readings from Last 3 Encounters:   04/08/24 89   03/27/24 64   03/26/24 100        Gen: A&O x 4, NAD, cooperative  HEENT: NC/AT, EOMI, PERRL, mmm,  neck supple, no meningeal signs  Heart: NSR  Lungs: Respirations even and unlabored  Ext: no edema, no calf tenderness b/l  Psych: normal mood and affect  Skin: no rashes or lesions    NEUROLOGIC EXAM:    Mental Status: A&O to self, location, month and year, NAD, speech clear, language fluent, repetition and naming intact, follows commands appropriately    Cranial Nerve Exam:   CN II-XII: PERRL, VFF, no nystagmus, no gaze paresis, sensation V1-V3 intact b/l, muscles of facial expression symmetric; hearing intact to conversational tone, palate elevates symmetrically, shoulder elevation symmetric and tongue protrudes midline with movement side to side.    Motor Exam:       Strength 4/5 UE/LE bilaterally  Tone and bulk normal   No pronator drift    Deep Tendon Reflexes: 1/4 biceps, triceps, brachioradialis, patellar, and achilles b/l; flexor plantar responses b/l    Sensation: Intact light touch/pinprick/vibration UE's/LE's b/l    Coordination/Cerebellum:       Tremors--none      Rapidly alternating movements: no dysdiadochokinesia b/l                Heel-to-Shin: no dysmetria b/l      Finger-to-Nose: no dysmetria b/l    Gait and stance:      Gait: deferred      LABS:     Recent Labs     04/05/24  1100 04/07/24  1806   WBC 5.1 7.1    140   K 3.9 3.7    101   CO2 25 25   BUN 16 15   CREATININE 0.3* 0.4*   GLUCOSE 102* 104*       IMAGING:    CT head w/o contrast:  IMPRESSION:     1. Stable exam with no acute intracranial abnormality.    CTA head and neck:  Neck     1. Mild carotid calcifications without evidence of stenosis or large vessel occlusion.  2. Small left vertebral artery ending at the inferior cerebellar artery.      Head     1.  No significant intracranial vascular abnormality. No large vessel occlusion    All imaging was personally 
04/11/24 1012   Number of days: 16       Wound 03/26/24 Buttocks Right #2 (Active)   Wound Image   03/26/24 0958   Dressing Status New dressing applied 04/10/24 1909   Wound Cleansed Wound cleanser;Soap and water 04/09/24 1606   Dressing/Treatment Water-based;Zinc paste;Silicone border 04/10/24 1909   Offloading for Diabetic Foot Ulcers Other (comment) 04/09/24 1606   Wound Length (cm) 0 cm 04/11/24 1012   Wound Width (cm) 0 cm 04/11/24 1012   Wound Depth (cm) 0 cm 04/11/24 1012   Wound Surface Area (cm^2) 0 cm^2 04/11/24 1012   Change in Wound Size % (l*w) 100 04/11/24 1012   Wound Volume (cm^3) 0 cm^3 04/11/24 1012   Wound Healing % 100 04/11/24 1012   Post-Procedure Length (cm) 0.2 cm 03/26/24 1005   Post-Procedure Width (cm) 0.1 cm 03/26/24 1005   Post-Procedure Surface Area (cm^2) 0.02 cm^2 03/26/24 1005   Distance Tunneling (cm) 0 cm 04/09/24 1606   Tunneling Position ___ O'Clock 0 04/09/24 1606   Undermining Starts ___ O'Clock 0 04/09/24 1606   Undermining Ends___ O'Clock 0 04/09/24 1606   Undermining Maxium Distance (cm) 0 04/09/24 1606   Wound Assessment Epithelialization;Dry 04/10/24 1909   Drainage Amount None (dry) 04/10/24 1909   Odor None 04/10/24 1909   Angie-wound Assessment Dry/flaky;Fragile 04/10/24 1909   Margins Defined edges 04/10/24 1909   Number of days: 16       Wound 04/11/24 Rectum angie rectal (Active)   Wound Etiology Other 04/11/24 1012   Wound Cleansed Cleansed with saline 04/11/24 1012   Dressing/Treatment Moisture barrier 04/11/24 1012   Wound Length (cm) 0.3 cm 04/11/24 1012   Wound Width (cm) 0.3 cm 04/11/24 1012   Wound Depth (cm) 0.1 cm 04/11/24 1012   Wound Surface Area (cm^2) 0.09 cm^2 04/11/24 1012   Wound Volume (cm^3) 0.009 cm^3 04/11/24 1012   Distance Tunneling (cm) 0 cm 04/11/24 1012   Tunneling Position ___ O'Clock 0 04/11/24 1012   Undermining Starts ___ O'Clock 0 04/11/24 1012   Undermining Ends___ O'Clock 0 04/11/24 1012   Undermining Maxium Distance (cm) 0 04/11/24 1012 
echocardiogram  -Hypertension on Cardizem Lopressor  -Hyperlipidemia on Lipitor  -Dementia and myelopathy per primary team         Alesia Hernandez MD, 4/12/2024 12:52 PM       Please note this report has been partially produced using speech recognition software and may contain errors related to that system including errors in grammar, punctuation, and spelling, as well as words and phrases that may be inappropriate. If there are any questions or concerns please feel free to contact the dictating provider for clarification.       
walk 6 feet with therapy, otherwise a stand and pivot transfer, her daughter has been assisting her with this and taking care of her.  Neurosurgeon will review imaging tomorrow and discussed with family recommendations.        Electronically signed by Gena Gomez PA-C on 4/10/2024 at 11:05 AM    Supervising physician: Kulwinder Thorpe DO.  Dr. Thorpe was readily and continuously available by phone for direct consultation regarding the care of this patient.    Time spent with patient in consultation, education, and collaboration with medical time is >50% of total time spent on case, including time spent in chart review and dictation. Total time spent: 40 minutes    Thank you for the opportunity to participate in the care of your patient.      Dragon dictation may have been used in the writing of this note. Spelling or word errors may have occurred. Please call for clarification if there are concerns.

## 2024-04-13 LAB
ANION GAP SERPL CALCULATED.3IONS-SCNC: 11 MMOL/L (ref 7–16)
BASOPHILS ABSOLUTE: 0.1 K/CU MM
BASOPHILS RELATIVE PERCENT: 0.6 % (ref 0–1)
BUN SERPL-MCNC: 26 MG/DL (ref 6–23)
CALCIUM SERPL-MCNC: 8.9 MG/DL (ref 8.3–10.6)
CHLORIDE BLD-SCNC: 104 MMOL/L (ref 99–110)
CO2: 27 MMOL/L (ref 21–32)
CREAT SERPL-MCNC: 0.4 MG/DL (ref 0.6–1.1)
DIFFERENTIAL TYPE: ABNORMAL
EOSINOPHILS ABSOLUTE: 0.1 K/CU MM
EOSINOPHILS RELATIVE PERCENT: 0.8 % (ref 0–3)
GFR SERPL CREATININE-BSD FRML MDRD: >90 ML/MIN/1.73M2
GLUCOSE SERPL-MCNC: 111 MG/DL (ref 70–99)
HCT VFR BLD CALC: 33.8 % (ref 37–47)
HEMOGLOBIN: 10.6 GM/DL (ref 12.5–16)
IMMATURE NEUTROPHIL %: 1.6 % (ref 0–0.43)
LYMPHOCYTES ABSOLUTE: 1.5 K/CU MM
LYMPHOCYTES RELATIVE PERCENT: 14.9 % (ref 24–44)
MCH RBC QN AUTO: 30.9 PG (ref 27–31)
MCHC RBC AUTO-ENTMCNC: 31.4 % (ref 32–36)
MCV RBC AUTO: 98.5 FL (ref 78–100)
MONOCYTES ABSOLUTE: 0.8 K/CU MM
MONOCYTES RELATIVE PERCENT: 8.2 % (ref 0–4)
NEUTROPHILS RELATIVE PERCENT: 73.9 % (ref 36–66)
NUCLEATED RBC %: 0.2 %
PDW BLD-RTO: 13.6 % (ref 11.7–14.9)
PLATELET # BLD: 418 K/CU MM (ref 140–440)
PMV BLD AUTO: 9.7 FL (ref 7.5–11.1)
POTASSIUM SERPL-SCNC: 4.2 MMOL/L (ref 3.5–5.1)
RBC # BLD: 3.43 M/CU MM (ref 4.2–5.4)
SEGMENTED NEUTROPHILS ABSOLUTE COUNT: 7.5 K/CU MM
SODIUM BLD-SCNC: 142 MMOL/L (ref 135–145)
TOTAL IMMATURE NEUTOROPHIL: 0.16 K/CU MM
TOTAL NUCLEATED RBC: 0 K/CU MM
WBC # BLD: 10.1 K/CU MM (ref 4–10.5)

## 2024-04-13 PROCEDURE — 80048 BASIC METABOLIC PNL TOTAL CA: CPT

## 2024-04-13 PROCEDURE — 6370000000 HC RX 637 (ALT 250 FOR IP): Performed by: STUDENT IN AN ORGANIZED HEALTH CARE EDUCATION/TRAINING PROGRAM

## 2024-04-13 PROCEDURE — 85025 COMPLETE CBC W/AUTO DIFF WBC: CPT

## 2024-04-13 PROCEDURE — 6360000002 HC RX W HCPCS: Performed by: STUDENT IN AN ORGANIZED HEALTH CARE EDUCATION/TRAINING PROGRAM

## 2024-04-13 PROCEDURE — 6370000000 HC RX 637 (ALT 250 FOR IP)

## 2024-04-13 PROCEDURE — 94761 N-INVAS EAR/PLS OXIMETRY MLT: CPT

## 2024-04-13 PROCEDURE — 36415 COLL VENOUS BLD VENIPUNCTURE: CPT

## 2024-04-13 PROCEDURE — 1200000000 HC SEMI PRIVATE

## 2024-04-13 PROCEDURE — 6370000000 HC RX 637 (ALT 250 FOR IP): Performed by: FAMILY MEDICINE

## 2024-04-13 RX ADMIN — ACETAMINOPHEN 650 MG: 325 TABLET ORAL at 21:53

## 2024-04-13 RX ADMIN — ENOXAPARIN SODIUM 40 MG: 100 INJECTION SUBCUTANEOUS at 08:48

## 2024-04-13 RX ADMIN — ACETAMINOPHEN 650 MG: 325 TABLET ORAL at 08:47

## 2024-04-13 RX ADMIN — ASPIRIN 81 MG: 81 TABLET, CHEWABLE ORAL at 08:48

## 2024-04-13 RX ADMIN — DONEPEZIL HYDROCHLORIDE 10 MG: 10 TABLET ORAL at 08:47

## 2024-04-13 RX ADMIN — PREDNISONE 10 MG: 10 TABLET ORAL at 08:48

## 2024-04-13 RX ADMIN — ACETAMINOPHEN 650 MG: 325 TABLET ORAL at 15:18

## 2024-04-13 RX ADMIN — FOLIC ACID 500 MCG: 1 TABLET ORAL at 08:47

## 2024-04-13 RX ADMIN — ATORVASTATIN CALCIUM 20 MG: 10 TABLET, FILM COATED ORAL at 08:48

## 2024-04-13 RX ADMIN — METOPROLOL TARTRATE 12.5 MG: 25 TABLET, FILM COATED ORAL at 08:48

## 2024-04-13 RX ADMIN — GABAPENTIN 400 MG: 400 CAPSULE ORAL at 21:54

## 2024-04-13 RX ADMIN — LEVETIRACETAM 500 MG: 500 TABLET, FILM COATED ORAL at 21:53

## 2024-04-13 RX ADMIN — MEMANTINE 10 MG: 10 TABLET ORAL at 21:54

## 2024-04-13 RX ADMIN — DILTIAZEM HYDROCHLORIDE 240 MG: 240 CAPSULE, COATED, EXTENDED RELEASE ORAL at 08:48

## 2024-04-13 RX ADMIN — MEMANTINE 10 MG: 10 TABLET ORAL at 08:48

## 2024-04-13 RX ADMIN — LEVETIRACETAM 500 MG: 500 TABLET, FILM COATED ORAL at 08:47

## 2024-04-13 RX ADMIN — TRIAMTERENE AND HYDROCHLOROTHIAZIDE 1 TABLET: 37.5; 25 TABLET ORAL at 08:48

## 2024-04-13 ASSESSMENT — PAIN SCALES - GENERAL
PAINLEVEL_OUTOF10: 2
PAINLEVEL_OUTOF10: 2
PAINLEVEL_OUTOF10: 6
PAINLEVEL_OUTOF10: 2
PAINLEVEL_OUTOF10: 3

## 2024-04-13 ASSESSMENT — PAIN DESCRIPTION - ORIENTATION
ORIENTATION: LEFT

## 2024-04-13 ASSESSMENT — PAIN DESCRIPTION - DESCRIPTORS
DESCRIPTORS: ACHING
DESCRIPTORS: TINGLING
DESCRIPTORS: ACHING;TINGLING
DESCRIPTORS: ACHING;BURNING

## 2024-04-13 ASSESSMENT — PAIN DESCRIPTION - PAIN TYPE
TYPE: ACUTE PAIN
TYPE: ACUTE PAIN;CHRONIC PAIN

## 2024-04-13 ASSESSMENT — PAIN SCALES - WONG BAKER
WONGBAKER_NUMERICALRESPONSE: HURTS A LITTLE BIT
WONGBAKER_NUMERICALRESPONSE: HURTS A LITTLE BIT
WONGBAKER_NUMERICALRESPONSE: NO HURT

## 2024-04-13 ASSESSMENT — PAIN DESCRIPTION - LOCATION
LOCATION: BUTTOCKS;SHOULDER
LOCATION: COCCYX

## 2024-04-13 NOTE — CARE COORDINATION
04/08/24 1430   Service Assessment   Patient Orientation Alert and Oriented   Cognition Alert   History Provided By Patient;Child/Family   Primary Caregiver Family   Support Systems Children;Family Members   Patient's Healthcare Decision Maker is: Legal Next of Kin   PCP Verified by CM Yes   Prior Functional Level Assistance with the following:;Mobility   Current Functional Level Assistance with the following:;Mobility   Can patient return to prior living arrangement Unknown at present   Ability to make needs known: Good   Family able to assist with home care needs: Yes   Would you like for me to discuss the discharge plan with any other family members/significant others, and if so, who? Yes  (daughter Patricia)   Financial Resources Medicare   Community Resources ECF/Home Care     CM in to see Pt to initiate discharge planning.  Pt daughter Patricia present.     Pt is from home with Patricia who is the Pt's primary caregiver.  Pt states she has all needed DME.  Pt is active with [facility] and would like to continue at discharge.     Pt and Patricia deny any needs at this time.  CM following   
CM in to see Pt to follow up on discharge planning.     Referral made to [facility] at Pt and daughter request. If denied by ARU, discharge plan at this time will be home with family and [facility].      Pt and daughter have concerns about cardiology and neurology plan of care.      PS to Dara Bourne CNP to update    Gena CONNOR updated.     CM following   
CM in to see Pt to follow up on discharge planning.  Plan now SNF.  List of facilities provided to Pt and daughter Patricia.     4:43 PM   CM in to see Pt to follow up on SNF choice. Family present. Facility has not been chosen at this time.      Referral was made to [facility] at Pt and family request as potential plan.  [facility] at [facility] past.    Pt placed on weekend follow up list for SNF choice.   
CM in to see Pt to follow up on discharge planning.  Pt daughter Patricia present.  Discharge plan remains home with family and [facility].     PS to Dara Bourne CNP to update.  Home care order requested.     CM following   
Patient not ARU appropriate d/t poor activity tolerance.  Discussed with Jeffrey NUNEZ.   
Reviewed chart and spoke with pt's daughter at Bedside.  She is still deciding on [facility] vs [facility].  She will call this CM with her decision.   VM left for Admission at [facility] and [facility].        1340 Spoke with Jose at Admission for [facility] either facility will be able to take pt. Citlalyemery is going to call pt's daughter to see if she can answer any questions.        1605 Spoke with Patricia and they want [facility]. Spoke with [facility] and [facility] can take pt tomorrow.      1630 EPASSR completed.    
Yes

## 2024-04-14 VITALS
OXYGEN SATURATION: 99 % | WEIGHT: 165 LBS | BODY MASS INDEX: 28.17 KG/M2 | RESPIRATION RATE: 20 BRPM | TEMPERATURE: 98 F | HEIGHT: 64 IN | SYSTOLIC BLOOD PRESSURE: 101 MMHG | HEART RATE: 86 BPM | DIASTOLIC BLOOD PRESSURE: 66 MMHG

## 2024-04-14 LAB
ANION GAP SERPL CALCULATED.3IONS-SCNC: 9 MMOL/L (ref 7–16)
BASOPHILS ABSOLUTE: 0 K/CU MM
BASOPHILS RELATIVE PERCENT: 0.4 % (ref 0–1)
BUN SERPL-MCNC: 23 MG/DL (ref 6–23)
CALCIUM SERPL-MCNC: 8.9 MG/DL (ref 8.3–10.6)
CHLORIDE BLD-SCNC: 103 MMOL/L (ref 99–110)
CO2: 29 MMOL/L (ref 21–32)
CREAT SERPL-MCNC: 0.3 MG/DL (ref 0.6–1.1)
DIFFERENTIAL TYPE: ABNORMAL
EOSINOPHILS ABSOLUTE: 0.1 K/CU MM
EOSINOPHILS RELATIVE PERCENT: 1.1 % (ref 0–3)
GFR SERPL CREATININE-BSD FRML MDRD: >90 ML/MIN/1.73M2
GLUCOSE SERPL-MCNC: 97 MG/DL (ref 70–99)
HCT VFR BLD CALC: 32.3 % (ref 37–47)
HEMOGLOBIN: 10.1 GM/DL (ref 12.5–16)
IMMATURE NEUTROPHIL %: 1.6 % (ref 0–0.43)
LYMPHOCYTES ABSOLUTE: 1.4 K/CU MM
LYMPHOCYTES RELATIVE PERCENT: 15.5 % (ref 24–44)
MCH RBC QN AUTO: 31.2 PG (ref 27–31)
MCHC RBC AUTO-ENTMCNC: 31.3 % (ref 32–36)
MCV RBC AUTO: 99.7 FL (ref 78–100)
MONOCYTES ABSOLUTE: 1 K/CU MM
MONOCYTES RELATIVE PERCENT: 10.5 % (ref 0–4)
NEUTROPHILS RELATIVE PERCENT: 70.9 % (ref 36–66)
NUCLEATED RBC %: 0.3 %
PDW BLD-RTO: 13.8 % (ref 11.7–14.9)
PLATELET # BLD: 378 K/CU MM (ref 140–440)
PMV BLD AUTO: 9.7 FL (ref 7.5–11.1)
POTASSIUM SERPL-SCNC: 4.3 MMOL/L (ref 3.5–5.1)
RBC # BLD: 3.24 M/CU MM (ref 4.2–5.4)
SEGMENTED NEUTROPHILS ABSOLUTE COUNT: 6.5 K/CU MM
SODIUM BLD-SCNC: 141 MMOL/L (ref 135–145)
TOTAL IMMATURE NEUTOROPHIL: 0.15 K/CU MM
TOTAL NUCLEATED RBC: 0 K/CU MM
WBC # BLD: 9.2 K/CU MM (ref 4–10.5)

## 2024-04-14 PROCEDURE — 6370000000 HC RX 637 (ALT 250 FOR IP)

## 2024-04-14 PROCEDURE — 85025 COMPLETE CBC W/AUTO DIFF WBC: CPT

## 2024-04-14 PROCEDURE — 80048 BASIC METABOLIC PNL TOTAL CA: CPT

## 2024-04-14 PROCEDURE — 36415 COLL VENOUS BLD VENIPUNCTURE: CPT

## 2024-04-14 PROCEDURE — 6370000000 HC RX 637 (ALT 250 FOR IP): Performed by: FAMILY MEDICINE

## 2024-04-14 PROCEDURE — 94761 N-INVAS EAR/PLS OXIMETRY MLT: CPT

## 2024-04-14 PROCEDURE — 6370000000 HC RX 637 (ALT 250 FOR IP): Performed by: STUDENT IN AN ORGANIZED HEALTH CARE EDUCATION/TRAINING PROGRAM

## 2024-04-14 PROCEDURE — 6360000002 HC RX W HCPCS: Performed by: STUDENT IN AN ORGANIZED HEALTH CARE EDUCATION/TRAINING PROGRAM

## 2024-04-14 RX ORDER — POLYETHYLENE GLYCOL 3350 17 G/17G
17 POWDER, FOR SOLUTION ORAL DAILY PRN
Qty: 527 G | Refills: 0
Start: 2024-04-14 | End: 2024-05-14

## 2024-04-14 RX ORDER — CARBOXYMETHYLCELLULOSE SODIUM 10 MG/ML
2 GEL OPHTHALMIC 4 TIMES DAILY PRN
Qty: 30 EACH | Refills: 0
Start: 2024-04-14

## 2024-04-14 RX ADMIN — ATORVASTATIN CALCIUM 20 MG: 10 TABLET, FILM COATED ORAL at 10:44

## 2024-04-14 RX ADMIN — PREDNISONE 10 MG: 10 TABLET ORAL at 10:43

## 2024-04-14 RX ADMIN — ENOXAPARIN SODIUM 40 MG: 100 INJECTION SUBCUTANEOUS at 10:44

## 2024-04-14 RX ADMIN — LEVETIRACETAM 500 MG: 500 TABLET, FILM COATED ORAL at 10:43

## 2024-04-14 RX ADMIN — MEMANTINE 10 MG: 10 TABLET ORAL at 10:44

## 2024-04-14 RX ADMIN — ACETAMINOPHEN 650 MG: 325 TABLET ORAL at 10:41

## 2024-04-14 RX ADMIN — FOLIC ACID 500 MCG: 1 TABLET ORAL at 10:43

## 2024-04-14 RX ADMIN — ASPIRIN 81 MG: 81 TABLET, CHEWABLE ORAL at 10:42

## 2024-04-14 RX ADMIN — DONEPEZIL HYDROCHLORIDE 10 MG: 10 TABLET ORAL at 10:44

## 2024-04-14 RX ADMIN — DILTIAZEM HYDROCHLORIDE 240 MG: 240 CAPSULE, COATED, EXTENDED RELEASE ORAL at 10:42

## 2024-04-14 RX ADMIN — TRIAMTERENE AND HYDROCHLOROTHIAZIDE 1 TABLET: 37.5; 25 TABLET ORAL at 10:44

## 2024-04-14 ASSESSMENT — PAIN SCALES - GENERAL
PAINLEVEL_OUTOF10: 3
PAINLEVEL_OUTOF10: 3

## 2024-04-14 ASSESSMENT — PAIN - FUNCTIONAL ASSESSMENT: PAIN_FUNCTIONAL_ASSESSMENT: ACTIVITIES ARE NOT PREVENTED

## 2024-04-14 ASSESSMENT — PAIN SCALES - WONG BAKER
WONGBAKER_NUMERICALRESPONSE: NO HURT
WONGBAKER_NUMERICALRESPONSE: NO HURT

## 2024-04-14 ASSESSMENT — PAIN DESCRIPTION - LOCATION: LOCATION: LEG

## 2024-04-14 NOTE — PLAN OF CARE
Problem: Discharge Planning  Goal: Discharge to home or other facility with appropriate resources  4/10/2024 2334 by Chen Toney, RN  Outcome: Progressing  4/10/2024 1242 by Jeannette Marquez, RN  Outcome: Progressing     Problem: Pain  Goal: Verbalizes/displays adequate comfort level or baseline comfort level  4/10/2024 2334 by Chen Toney, RN  Outcome: Progressing  4/10/2024 1242 by Jeannette Marquez, RN  Outcome: Progressing     
  Problem: Discharge Planning  Goal: Discharge to home or other facility with appropriate resources  4/11/2024 2308 by Nirmala Duran RN  Outcome: Progressing     Problem: Pain  Goal: Verbalizes/displays adequate comfort level or baseline comfort level  4/11/2024 2308 by Nirmala Duran RN  Outcome: Progressing     Problem: Safety - Adult  Goal: Free from fall injury  4/11/2024 2308 by Nirmala Duran RN  Outcome: Progressing     Problem: ABCDS Injury Assessment  Goal: Absence of physical injury  4/11/2024 2308 by Nirmala Duran RN  Outcome: Progressing     Problem: Skin/Tissue Integrity  Goal: Absence of new skin breakdown  Description: 1.  Monitor for areas of redness and/or skin breakdown  2.  Assess vascular access sites hourly  3.  Every 4-6 hours minimum:  Change oxygen saturation probe site  4.  Every 4-6 hours:  If on nasal continuous positive airway pressure, respiratory therapy assess nares and determine need for appliance change or resting period.  4/11/2024 2308 by Nirmala Duran RN  Outcome: Progressing     Problem: Neurosensory - Adult  Goal: Achieves stable or improved neurological status  4/11/2024 2308 by Nirmala Duran RN  Outcome: Progressing     Problem: Skin/Tissue Integrity - Adult  Goal: Skin integrity remains intact  4/11/2024 2308 by Nirmala Duran RN  Outcome: Progressing     Problem: Musculoskeletal - Adult  Goal: Return mobility to safest level of function  4/11/2024 2308 by Nirmala Duran RN  Outcome: Progressing     Problem: Chronic Conditions and Co-morbidities  Goal: Patient's chronic conditions and co-morbidity symptoms are monitored and maintained or improved  4/11/2024 2308 by Nirmala Duran RN  Outcome: Progressing     Problem: Nutrition Deficit:  Goal: Optimize nutritional status  4/11/2024 2308 by Nirmala Duran RN  Outcome: Progressing     
  Problem: Discharge Planning  Goal: Discharge to home or other facility with appropriate resources  4/14/2024 1039 by Devan Ramos RN  Outcome: Progressing  4/14/2024 0000 by Maddie Mathews RN  Outcome: Progressing     Problem: Pain  Goal: Verbalizes/displays adequate comfort level or baseline comfort level  4/14/2024 1039 by Devan Ramos RN  Outcome: Progressing  4/14/2024 0000 by Maddie Mathews RN  Outcome: Progressing     Problem: Safety - Adult  Goal: Free from fall injury  4/14/2024 1039 by Devan Ramos RN  Outcome: Progressing  4/14/2024 0000 by Maddie Mathews RN  Outcome: Progressing     Problem: ABCDS Injury Assessment  Goal: Absence of physical injury  4/14/2024 1039 by Devan Ramos RN  Outcome: Progressing  4/14/2024 0000 by Maddie Mathews RN  Outcome: Progressing     Problem: Skin/Tissue Integrity  Goal: Absence of new skin breakdown  Description: 1.  Monitor for areas of redness and/or skin breakdown  2.  Assess vascular access sites hourly  3.  Every 4-6 hours minimum:  Change oxygen saturation probe site  4.  Every 4-6 hours:  If on nasal continuous positive airway pressure, respiratory therapy assess nares and determine need for appliance change or resting period.  4/14/2024 1039 by Devan Ramos RN  Outcome: Progressing  4/14/2024 0000 by Maddie Mathews RN  Outcome: Progressing     Problem: Neurosensory - Adult  Goal: Achieves stable or improved neurological status  4/14/2024 1039 by Devan Ramos RN  Outcome: Progressing  4/14/2024 0000 by Maddie Mathews RN  Outcome: Progressing     Problem: Skin/Tissue Integrity - Adult  Goal: Skin integrity remains intact  4/14/2024 1039 by Devan Ramos RN  Outcome: Progressing  4/14/2024 0000 by Maddie Mathews RN  Outcome: Progressing     Problem: Musculoskeletal - Adult  Goal: Return mobility to safest level of function  4/14/2024 1039 by Devan Ramos 
  Problem: Discharge Planning  Goal: Discharge to home or other facility with appropriate resources  4/9/2024 1159 by Bonita Ricci RN  Outcome: Progressing  4/9/2024 0210 by Jean Hodges RN  Outcome: Progressing     Problem: Pain  Goal: Verbalizes/displays adequate comfort level or baseline comfort level  4/9/2024 1159 by Bonita Ricci RN  Outcome: Progressing  Flowsheets (Taken 4/9/2024 0830)  Verbalizes/displays adequate comfort level or baseline comfort level:   Encourage patient to monitor pain and request assistance   Assess pain using appropriate pain scale   Administer analgesics based on type and severity of pain and evaluate response  4/9/2024 0210 by Jean Hodges RN  Outcome: Progressing     Problem: Safety - Adult  Goal: Free from fall injury  4/9/2024 1159 by Bonita Ricci RN  Outcome: Progressing  Flowsheets (Taken 4/9/2024 1009)  Free From Fall Injury:   Instruct family/caregiver on patient safety   Based on caregiver fall risk screen, instruct family/caregiver to ask for assistance with transferring infant if caregiver noted to have fall risk factors  4/9/2024 0210 by Jean Hodges RN  Outcome: Progressing     Problem: ABCDS Injury Assessment  Goal: Absence of physical injury  4/9/2024 1159 by Bonita Ricci RN  Outcome: Progressing  Flowsheets (Taken 4/9/2024 1009)  Absence of Physical Injury: Implement safety measures based on patient assessment  4/9/2024 0210 by Jean Hodges RN  Outcome: Progressing     Problem: Skin/Tissue Integrity  Goal: Absence of new skin breakdown  Description: 1.  Monitor for areas of redness and/or skin breakdown  2.  Assess vascular access sites hourly  3.  Every 4-6 hours minimum:  Change oxygen saturation probe site  4.  Every 4-6 hours:  If on nasal continuous positive airway pressure, respiratory therapy assess nares and determine need for appliance change or resting period.  4/9/2024 1159 by Bonita Ricci RN  Outcome: 
  Problem: Discharge Planning  Goal: Discharge to home or other facility with appropriate resources  Outcome: Progressing     Problem: Pain  Goal: Verbalizes/displays adequate comfort level or baseline comfort level  Outcome: Progressing     Problem: Safety - Adult  Goal: Free from fall injury  Outcome: Progressing     Problem: ABCDS Injury Assessment  Goal: Absence of physical injury  Outcome: Progressing     Problem: Skin/Tissue Integrity  Goal: Absence of new skin breakdown  Description: 1.  Monitor for areas of redness and/or skin breakdown  2.  Assess vascular access sites hourly  3.  Every 4-6 hours minimum:  Change oxygen saturation probe site  4.  Every 4-6 hours:  If on nasal continuous positive airway pressure, respiratory therapy assess nares and determine need for appliance change or resting period.  Outcome: Progressing     Problem: Neurosensory - Adult  Goal: Achieves stable or improved neurological status  Outcome: Progressing     Problem: Skin/Tissue Integrity - Adult  Goal: Skin integrity remains intact  Outcome: Progressing     Problem: Musculoskeletal - Adult  Goal: Return mobility to safest level of function  Outcome: Progressing     
  Problem: Discharge Planning  Goal: Discharge to home or other facility with appropriate resources  Outcome: Progressing     Problem: Pain  Goal: Verbalizes/displays adequate comfort level or baseline comfort level  Outcome: Progressing     Problem: Safety - Adult  Goal: Free from fall injury  Outcome: Progressing     Problem: ABCDS Injury Assessment  Goal: Absence of physical injury  Outcome: Progressing     Problem: Skin/Tissue Integrity  Goal: Absence of new skin breakdown  Description: 1.  Monitor for areas of redness and/or skin breakdown  2.  Assess vascular access sites hourly  3.  Every 4-6 hours minimum:  Change oxygen saturation probe site  4.  Every 4-6 hours:  If on nasal continuous positive airway pressure, respiratory therapy assess nares and determine need for appliance change or resting period.  Outcome: Progressing     Problem: Neurosensory - Adult  Goal: Achieves stable or improved neurological status  Outcome: Progressing     Problem: Skin/Tissue Integrity - Adult  Goal: Skin integrity remains intact  Outcome: Progressing     Problem: Musculoskeletal - Adult  Goal: Return mobility to safest level of function  Outcome: Progressing     Problem: Chronic Conditions and Co-morbidities  Goal: Patient's chronic conditions and co-morbidity symptoms are monitored and maintained or improved  Outcome: Progressing     Problem: Nutrition Deficit:  Goal: Optimize nutritional status  Outcome: Progressing     
Yolanda Schmidt RN  Outcome: Progressing     Problem: Chronic Conditions and Co-morbidities  Goal: Patient's chronic conditions and co-morbidity symptoms are monitored and maintained or improved  4/14/2024 0000 by Maddie Mathews RN  Outcome: Progressing  4/13/2024 1219 by Yolanda Schmidt RN  Outcome: Progressing     Problem: Nutrition Deficit:  Goal: Optimize nutritional status  4/14/2024 0000 by Maddie Mathews RN  Outcome: Progressing  4/13/2024 1219 by Yolanda Schmidt RN  Outcome: Progressing   Care plan reviewed with patient.  Patient  verbalize understanding of the plan of care and contribute to goal setting.

## 2024-04-14 NOTE — PROGRESS NOTES
V2.0  [facility] Hospitalist Progress Note      Name:  Altagracia Wayne /Age/Sex: 1940  (83 y.o. female)   MRN & CSN:  [id number] & [id number] Encounter Date/Time: 2024 9:50 AM EDT    Location:  [address] [place] [address] PCP: Marshall Dominique Sevier       [facility]: 2    Assessment and Plan:   Altagracia Wayne is a 83 y.o. female with pmh as noted below who presents with LOC (loss of consciousness) (HCC)      Plan:  Transient LOC suspecting breakthrough seizure.   Continues to be alert and oriented during exam.   CT brain and CTA head and neck in ER reviewed, negative for acute hemorrhage or significant LVO   no concerning metabolic or infectious etiology   UA neg for infection.  to rule out UTI as a possible cause  Consult Neurology for possible EEG inpatient vs ambulatory EEG  Increased Keppra to 750 mg BID on admission, however eval per Neuro, and recommends keeping dose at 500 mg BID, Further ecs pending at this time   Seizure and fall precautions, as needed Ativan     Hypertension, continue Maxzide, Cardizem and Lopressor     Hyperlipidemia: Continue Lipitor     Rheumatoid arthritis, Rheumatrex tomorrow     Cervical myelopathy , continue gabapentin     Dementia without behavioral disturbance, continue Namenda and Aricept    Diet ADULT DIET; Regular; Low Sodium (2 gm)   DVT Prophylaxis [] Lovenox, []  Heparin, [] SCDs, [] Ambulation,  [] Eliquis, [] Xarelto  [] Coumadin   Code Status Full Code   Disposition From: Home  Expected Disposition: Home  Estimated Date of Discharge: 1-2 days  Patient requires continued admission due to Ongoing Neuro work up   Surrogate Decision Maker/ POA Daughter- Patricia     Subjective:     Chief Complaint: Loss of Consciousness (Lowered to ground) and Hip Pain (Right hip pain)       Patient seen and examined today.  No recent seizure activity.  Denies CP, SOB, N/V/D/Abd pain. Neuro exam at baseline. Denies further needs at this time. Plan of care discussed.    Review of Systems:    As 
    V2.0  [facility] [place]      Name:  Altagracia Wayne /Age/Sex: 1940  (83 y.o. female)   MRN & CSN:  [id number] & [id number] Encounter Date/Time: 2024 9:20 AM EDT    Location:  [address] [place], [place] [address] PCP: Trip [facility] Day: 7    Assessment and Plan:   Altagracia Wayne is a 83 y.o. female with pmh as noted below who presents with LOC (loss of consciousness) (HCC)      Plan:    Hx CVA  Transient LOC : breakthrough seizure is unlikely per Neuro   Alert and oriented, no seizure activity reported.  CT brain and CTA head and neck in ER reviewed, negative for acute hemorrhage or significant LVO   no concerning metabolic or infectious etiology   UA neg for infection.  to rule out UTI as a possible cause  Neurology following, likely ambulatory EEG, MRI negative for acute stroke, d/w neuro. MRI of cervical ordered, as cervical spine on MRI brain may have changed from previous. Needing confirmation. Neurosurgery following, MRI cervical spine completed, awaiting official read  Increased Keppra to 750 mg BID on admission, however eval per Neuro, and recommends keeping dose at 500 mg BID,  Seizure and fall precautions, as needed Ativan  --Negative orthostatic blood pressures  - PT/OT evaluation   --MRI C Spine: stable compared to 23, severe arthritic changes with multilevel foraminal stenosis & anterolisthesis, see report for full findings   -per Neurosurgery, no intervention needed based on C-Spine findings on MRI, can follow up outpatient as needed   --Cardiology Consult: Dr. Hernandez, appreciate recs (sees Dr. Hernandez in office), to follow up outpatient after DC for further cardiac testing such as Holter Monitor   -ECHO: normal LV function, EF 55-60%      Hypertension, continue  Cardizem and Lopressor with parameters      Hyperlipidemia: Continue Lipitor     Rheumatoid arthritis, Rheumatrex, prednisone      Cervical myelopathy , continue gabapentin     Dementia without behavioral 
    V2.0  [facility] Hospitalist Progress Note      Name:  Altagracia Wayne /Age/Sex: 1940  (83 y.o. female)   MRN & CSN:  [id number] & [id number] Encounter Date/Time: 2024 9:20 AM EDT    Location:  [address] [place], [place] [address] PCP: Trip [facility] Day: 6    Assessment and Plan:   Altagracia Wayne is a 83 y.o. female with pmh as noted below who presents with LOC (loss of consciousness) (HCC)      Plan:    Hx CVA  Transient LOC : breakthrough seizure is more unlikely per Neuro   Alert and oriented, no seizure activity reported.  CT brain and CTA head and neck in ER reviewed, negative for acute hemorrhage or significant LVO   no concerning metabolic or infectious etiology   UA neg for infection.  to rule out UTI as a possible cause  Neurology following, likely ambulatory EEG, MRI negative for acute stroke, d/w neuro. MRI of cervical ordered, as cervical spine on MRI brain may have changed from previous. Needing confirmation. Neurosurgery following, MRI cervical spine completed, awaiting official read  Increased Keppra to 750 mg BID on admission, however eval per Neuro, and recommends keeping dose at 500 mg BID,  Seizure and fall precautions, as needed Ativan  --Negative orthostatic blood pressures  - PT/OT evaluation   --MRI C Spine: stable compared to 23, severe arthritic changes with multilevel foraminal stenosis & anterolisthesis, see report for full findings   -per Neurosurgery, no intervention needed based on C-Spine findings on MRI, can follow up outpatient as needed   --Cardiology Consult: Dr. Hernandez, appreciate recs (sees Dr. Hernandez in office), to follow up outpatient after DC for further cardiac testing such as Holter Monitor      Hypertension, continue  Cardizem and Lopressor with parameters      Hyperlipidemia: Continue Lipitor     Rheumatoid arthritis, Rheumatrex, prednisone      Cervical myelopathy , continue gabapentin     Dementia without behavioral disturbance, continue Namenda and 
   04/11/24 1526   Encounter Summary   Encounter Overview/Reason  Initial Encounter;Spiritual/Emotional Needs;Palliative Care   Service Provided For: Patient and family together   Referral/Consult From: [facility]   Support System Children   Last Encounter  04/11/24  (PT dealing with stress and pain of her condition, she is anxious to get back home, concerned for her son, thankful for her daugther's care, she wanted prayer, she finds meaning in her milan, High Risk ReAdmit)   Complexity of Encounter Low   Begin Time 1505   End Time  1528   Total Time Calculated 23 min   Spiritual/Emotional needs   Type Spiritual Support;Emotional Distress   Grief, Loss, and Adjustments   Type Adjustment to illness   Assessment/Intervention/Outcome   Assessment Anxious;Concerns with suffering;Coping;Hopeful;Peaceful;Stress overload   Intervention Active listening;Discussed belief system/Congregational practices/milan;Discussed illness injury and it’s impact;Discussed meaning/purpose;Discussed relationship with God;Explored/Affirmed feelings, thoughts, concerns;Explored Coping Skills/Resources;Prayer (assurance of)/[place];Sustaining Presence/Ministry of presence   Outcome Acceptance;Comfort;Connection/Belonging;Coping;Encouraged;Optimistic;Peace   Plan and Referrals   Plan/Referrals Continue Support (comment)       
  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 Has a pannus noted posterior to C2 abutting the cord. Imaging reviewed with Dr. Thorpe. Not likely that the pannus compression is causing syncopal events. We have ordered a CTA head and neck to rule out vertebral artery stenosis given her history of mid bran ischemia. If surgical intervention were to be considered, she would need to be evaluated at another facility due to needing a higher level of care. Recommend cardiogenic work-up for syncope.  Concern for seizure activity- patient has a history of seizures. Neurology is onboard, do not believe that this syncopal event was seizure relate. Will have patient follow-up for EEG and holter monitor with cardiology.   RA- on steroids and methotrexate    Supervising physician: Kulwinder Thorpe DO.  Dr. Thorpe was readily and continuously available by phone for direct consultation regarding the care of this patient.    Dragon dictation may have been used in the writing of this note. Spelling or word errors may have occurred. Please call for clarification if there are concerns.      Subjective:   Admit Date: 4/7/2024  PCP: Marshall Dominique    Patient sitting up in bed, daughter at bedside. Imaging results were discussed with the daughter as well as the plan of ordering a CTA to evaluate the vertebral arteries.     Data:   Scheduled Meds:   lidocaine  1 patch TransDERmal Daily    levETIRAcetam  500 mg Oral BID    predniSONE  10 mg Oral Daily    enoxaparin  40 mg SubCUTAneous Daily    aspirin  81 mg Oral Daily    atorvastatin  20 mg Oral Daily    dilTIAZem  240 mg Oral Daily    donepezil  10 mg Oral QAM    folic acid  500 mcg Oral QAM    gabapentin  400 mg Oral Nightly    memantine  10 mg Oral BID    metoprolol tartrate  12.5 mg Oral BID    methotrexate  20 mg Oral Once per day on Mon    triamterene-hydroCHLOROthiazide  1 tablet 
  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 Has a pannus noted posterior to C2 abutting the cord. Imaging reviewed with Dr. Thorpe. Not likely that the pannus compression is causing syncopal events.  Recommend cardiogenic work-up for syncope. CTA head and neck shows a small left vert. with internal carotid artery demonstrates a focal narrowing adjacent to the tip of the styloid process which demonstrate extrinsic mass. Can consider neuro intervention consult for this. We can have her follow-up with our office as needed. Daughter is in agreement that the patient would not be a good surgical candidate in regards to her cervical stenosis. We will sign off at this time, please re-consult if there are questions or concerns.  Concern for seizure activity- patient has a history of seizures. Neurology is onboard, do not believe that this syncopal event was seizure relate. Will have patient follow-up for EEG and holter monitor with cardiology.   RA- on steroids and methotrexate    Supervising physician: Kulwinder Thorpe DO.  Dr. Thorpe was readily and continuously available by phone for direct consultation regarding the care of this patient.    Dragon dictation may have been used in the writing of this note. Spelling or word errors may have occurred. Please call for clarification if there are concerns.      Subjective:   Admit Date: 4/7/2024  PCP: Marshall Dominique    Patient sitting up in bed in no distress Daughter at bedside. She is doing well, has some concerns of tingling in her hands bilaterally    Data:   Scheduled Meds:   lidocaine  1 patch TransDERmal Daily    levETIRAcetam  500 mg Oral BID    predniSONE  10 mg Oral Daily    enoxaparin  40 mg SubCUTAneous Daily    aspirin  81 mg Oral Daily    atorvastatin  20 mg Oral Daily    dilTIAZem  240 mg Oral Daily    donepezil  10 mg Oral QAM    folic acid  500 mcg Oral QAM 
  Occupational Therapy Treatment Note    Name: Altagracia Wayne MRN: [id number] :   1940   Date:  2024   Admission Date: 2024 Room:  [address] [place], [place] [address]         Restrictions/Precautions: fall risk, seizure precautions    Communication with other providers: RN approval for therapy, OT present during session     Subjective:  Patient states:  \"I feel fine\"   Pain:   Location, Type, Intensity (0/10 to 10/10): Pain in heels of B feet. No rating given for pain    Objective:    Observation: Pt lying supine in bed upon arrival       Treatment, including education:  Therapeutic Activity Training:   Therapeutic activity training was instructed today.  Cues were given for safety, sequence, UE/LE placement, awareness, and balance.    Activities performed today included bed mobility training, sup-sit, sit-stand, functional mobility, squat pivot     Neuro-Muscular re-education:  Cues were given for position, posture, kinesthetic sense, safety, recruitment, and rationale.  Cues were verbal and/or tactile.      Supine to EOB with mod A for trunk elevation, hip maneuvering, v/c's for sequencing. Sit EOB CGA for balance safety. Don/doff socks x2 /shoes x1 with max A, pt able to elevate BLE for therapist to lace sock/shoes.attempt STS from EOB with max A x1 for force production, blocking of LLE but unable to clear bottom from bed. STS from EOB with max A x2 for force production, blocking of LLE. Maintain stand  for approximately 30 seconds with mod A x2 for balance safety and maintaining upright position, min v/c's for improved posture. Squat pivot EOB to chair with max A for body mechanics, mod v/c's for body mechanics and sequencing. Don L heel floating boot with max A. Provided pt and daughter with dycem and foam built ups to enable greater independence with feeding while in hospital, educated on use.     Pt and daughter educated on role of OT, benefits of OT, and rationale for therapeutic intervention. Educated on need to be 
  Physical Therapy Re-evaluation Note  Name: Altagracia Wayne MRN: [id number] :   1940   Date:  2024   Admission Date: 2024 Room:  [address] [place], [place] [address]     Restrictions/Precautions:          general precautions, fall risk    Communication with other providers:  SARA SANDS    Subjective:  Patient states:  \"Let's do it again\"  Pain:   Location, Type, Intensity (0/10 to 10/10):  denies pain    Objective:    Observation:  pt supine in bed with daughter present. Pt agreeable to session    Treatment, including education/measures:    Bed mobility: pt completed supine to sitting EOB max A with assist at bilateral Les, hips, and trunk. Cues provided for sequencing.    Transfers: pt completed STS to/from EOB x4 max A to RW and stand pivot from EOB to chair max A.     Education: pt and daughter educated on transfers and rehab vs [facility]. Daughter attempted STS transfers to/from chair but unable to complete. Discussed new recommendation of further therapy after discharge as pt is below baseline.    Assessment / Impression:    Pt required re-eval due to decreased transfer level since admission. Now recommend subacute therapy after discharge to address transfer training and strengthening.    Pt up in chair at end of session with needs in reach and daughter present.    Patient's tolerance of treatment:  fair   Adverse Reaction: n/a  Significant change in status and impact:  n/a  Barriers to improvement:  decreased endurance, impaired transfers    Plan for Next Session:    Continue to address transfer training and gait training in future sessions    Time in:  1308  Time out:  1359  Timed treatment minutes:  25  Total treatment time:  51    Previously filed items:  Social/Functional History  Lives With: Family (son zion ART lives at home, rest of family provides 24/7 assist)  Type of Home: House  Home Layout: One level  Home Access: Ramped entrance  Bathroom Shower/Tub: Tub/Shower unit  Bathroom Equipment: Grab bars in shower, 
4 Eyes Skin Assessment     NAME:  Altagracia Wayne  YOB: 1940  MEDICAL RECORD NUMBER:  [id number]    The patient is being assessed for  Admission    I agree that at least one RN has performed a thorough Head to Toe Skin Assessment on the patient. ALL assessment sites listed below have been assessed.      Areas assessed by both nurses:    Head, Face, Ears, Shoulders, Back, Chest, Arms, Elbows, Hands, Sacrum. Buttock, Coccyx, Ischium, Legs. Feet and Heels, Under Medical Devices , and Other          Does the Patient have a Wound? Yes wound(s) were present on assessment. LDA wound assessment was Initiated and completed by RN       Amando Prevention initiated by RN: Yes  Wound Care Orders initiated by RN: Yes    Pressure Injury (Stage 3,4, Unstageable, DTI, NWPT, and Complex wounds) if present, place Wound referral order by RN under : No    New Ostomies, if present place, Ostomy referral order under : No     Nurse 1 eSignature: Electronically signed by Jean Hodges RN on 4/8/24 at 4:24 AM EDT    **SHARE this note so that the co-signing nurse can place an eSignature**    Nurse 2 eSignature: Electronically signed by Selene Christie RN on 4/8/24 at 11:06 PM EDT  
Brief Neurology Note:  Chart was reviewed. Patient has met with neurosurgery, not a surgical candidate. Cardiology following. No further neuro work up at this time. We will sign off. Please contact us with any new concerns.   TARUN Fried - CNP  Neurology  
Cardiology Note    Admit Date:  4/7/2024    Admission diagnosis / Complaint :   syncope    Subjective:  Ms. Wayne is resting in bed      Assessment and Plan:    Syncope- no arrhythmias on tele.   ECHO EF 55-60%. May need outpatient event monitor.     HTN: stable on cardizem and lopressor    HLD- on lipitor    Dementia and myelopathy- per primary team        Dr. ROBLERO    Objective:   /63   Pulse 72   Temp 98 °F (36.7 °C) (Oral)   Resp 16   Ht 1.626 m (5' 4\")   Wt 74.8 kg (165 lb)   SpO2 100%   BMI 28.32 kg/m²     Intake/Output Summary (Last 24 hours) at 4/13/2024 1405  Last data filed at 4/12/2024 1859  Gross per 24 hour   Intake 360 ml   Output --   Net 360 ml       TELEMETRY: Sinus    has a past medical history of Acid reflux, Anxiety, Bronchitis, Cancer (HCC), Carotid stenosis, Cerebral embolism with cerebral infarction (HCC), Chronic back pain, Depression, History of blood transfusion, History of external beam radiation therapy, Hyperlipidemia, Hypertension, Prolonged emergence from general anesthesia, RA (rheumatoid arthritis) (HCC), Seizure (HCC), Sleep apnea, Teeth missing, Urinary incontinence, UTI (urinary tract infection), Wears dentures, Wears glasses, and Wears partial dentures.   has a past surgical history that includes Carpal tunnel release; Dental surgery; Colonoscopy (In Past); Hysterectomy, vaginal (1960's); Dilation and curettage of uterus (1960's); Carotid endarterectomy (Right, 11/18/2015); Port Surgery (N/A, 03/02/2021); ARTHROCENTESIS / ASPIRATION / INJECTION KNEE (05/11/2021); and joint replacement.  Physical Exam:  General:  Awake, alert  Skin:  Warm and dry  Neck:  JVD not appreciated  Chest:  Clear to auscultation, respiration easy  Cardiovascular:  RRR S1S2  Abdomen:  Soft nontender  Extremities:  no edema    Medications:    lidocaine  1 patch TransDERmal Daily    levETIRAcetam  500 mg Oral BID    predniSONE  10 mg Oral Daily    enoxaparin  40 mg SubCUTAneous Daily    aspirin  
Comprehensive Nutrition Assessment    Type and Reason for Visit:  Reassess    Nutrition Recommendations/Plan:   Continue current diet  Assist with feeding/set up as needed  Continue current oral nutrition supplement     Malnutrition Assessment:  Malnutrition Status:  At risk for malnutrition (04/08/24 1028)    Context:  Acute Illness       Nutrition Assessment:    Pt continues with adequate po intake, consuming 76% to all of most meals dos with good appetite. Taking at least some frozen supplement, will continue. Question accuracy of current wt? Follow as moderate nutrition risk.    Nutrition Related Findings:    Cr 0.3, Glu 104   Wound Type: Pressure Injury, Stage II       Current Nutrition Intake & Therapies:    Average Meal Intake: %  Average Supplements Intake: 51-75%, 26-50%  ADULT DIET; Regular; Low Sodium (2 gm)  ADULT ORAL NUTRITION SUPPLEMENT; Breakfast, Dinner, Lunch; Standard High Calorie/High Protein Oral Supplement  ADULT ORAL NUTRITION SUPPLEMENT; Lunch; Frozen Oral Supplement    Anthropometric Measures:  Height: 162.6 cm (5' 4\")  Ideal Body Weight (IBW): 120 lbs (55 kg)    Admission Body Weight: 64.2 kg (141 lb 8.6 oz)  Current Body Weight: 75 kg (165 lb 5.5 oz) (?),   IBW.    Current BMI (kg/m2): 28.4  Usual Body Weight: 66.7 kg (147 lb) (4/10/23)  % Weight Change (Calculated): -3.7                    BMI Categories: Normal Weight (BMI 22.0 to 24.9) age over 65    Estimated Daily Nutrient Needs:  Energy Requirements Based On: Kcal/kg  Weight Used for Energy Requirements: Current  Energy (kcal/day): 5501-3237 (30-35 kcal/kg)  Weight Used for Protein Requirements: Current  Protein (g/day): 77-90 (1.2-1.4 g/kg)  Method Used for Fluid Requirements: 1 ml/kcal  Fluid (ml/day): 2011-7301    Nutrition Diagnosis:   Predicted inadequate energy intake related to increase demand for energy/nutrients as evidenced by wounds    Nutrition Interventions:   Food and/or Nutrient Delivery: Continue Current Diet, 
Neurology Service Progress Note  [facility]   Patient Name: Altagracia Wayne  : 1940        Subjective:   Reason for consult: concern for seizure  Chart was reviewed in detail, patient was seen and assessed. No acute events overnight. MRI cervical spine was completed, neurosurgery following.     Past Medical History:   Diagnosis Date    Acid reflux     Anxiety     Bronchitis In Past    Cancer (HCC)     colon cancer    Carotid stenosis 11/10/2015    Cerebral embolism with cerebral infarction ([facility]) 2015    Seizure X 1, No Residual    Chronic back pain     Depression     History of blood transfusion     History of external beam radiation therapy 2017    5400 cGy pelvis/anal canal in 2017 per Dr. Mayberry at [facility]    Hyperlipidemia     Hypertension     Prolonged emergence from general anesthesia     RA (rheumatoid arthritis) ([facility])     \"All Over\"    Seizure ([facility]) 09/18/2015    X 1    Sleep apnea     Uses CPAP    Teeth missing     Upper And Lower    Urinary incontinence     UTI (urinary tract infection) In Past    No Current Symptoms    Wears dentures     Full Upper , Partial Lower    Wears glasses     Wears partial dentures     Lower    :   Past Surgical History:   Procedure Laterality Date    ARTHROCENTESIS / ASPIRATION / INJECTION KNEE  2021         CAROTID ENDARTERECTOMY Right 2015    CARPAL TUNNEL RELEASE      COLONOSCOPY  In Past    DENTAL SURGERY      Teeth Extracted In Past    DILATION AND CURETTAGE OF UTERUS  's    Prior To Vaginal Hysterectomy    HYSTERECTOMY, VAGINAL  's    JOINT REPLACEMENT      PORT SURGERY N/A 2021    PORT REMOVAL performed by Regino Pina MD at [facility] OR     Medications:  Scheduled Meds:   lidocaine  1 patch TransDERmal Daily    levETIRAcetam  500 mg Oral BID    predniSONE  10 mg Oral Daily    enoxaparin  40 mg SubCUTAneous Daily    aspirin  81 mg Oral Daily    atorvastatin  20 mg Oral Daily    dilTIAZem  240 mg Oral Daily    
Occupational Therapy    Occupational Therapy Treatment Note    Name: Jo Ann Stokes MRN: [id number] :   2/15/1954   Date:  2024   Admission Date: 2024 Room:  -A     Primary Problem:  NSTEMI, GI bleed     Restrictions/Precautions:          General Precautions, Fall Risk, 2L o2, Telemetry, Pulse Ox, BP cuff, Bed/chair alarm     Communication with other providers: RN approved session. COTX with PT for safety and assist.     Subjective:  Patient states:  Pt agreeable to therapy.   Pain:   Location, Type, Intensity (0/10 to 10/10): Denied    Objective:    Observation: Pt supine in bed on arrival. Daughter present during session.    Objective Measures:  AOx4    Treatment, including education:  Pt supine in bed on arrival. Pt transferred sup-sit with Hawa x2 for BLE management and trunk elevation with HOB elevataed. Verbal cues required for hand placements. Pt had good sitting balance EOB and maintained ~5 minutes w/SBA. Pt donned socks and shoes w/DepA. Pt completed STS x3 from EOB w/modA x2 w/FWW and verbal cues for hand placements. Pt held each stand ~10 seconds. Pt required rest breaks breaks after each stand of ~2 minutes. Stand-pivot from EOB to chair completed with maxA x1 and Hawa for hips. Pt's daughter completed STS from chair to simulate her caretaker role in home environment and evaluate how far patient is off of baseline. Daughter reports that pt has a significant decrease in strength since being in the hospital. Pt and daughter were educated on role of OT and benefits of OOB activity. Pt left in chair with call light in reach and all needs met.    Assessment / Impression:    Patient's tolerance of treatment: Well  Adverse Reaction: None  Significant change in status and impact: Improved from initial evaluation.   Barriers to improvement: Decreased activity tolerance.    Licensed Therapist was present, directed the patient's care, made skilled judgement, and was responsible for 
Occupational Therapy  Attempted to see pt on this date for OT session. Pt with RN needs at this time. Will attempt as able and appropriate.    Aurea HILL  
Occupational Therapy  [facility] ACUTE CARE OCCUPATIONAL THERAPY EVALUATION  Altagracia Wayne, 1940, 3028/3028-A, 4/8/2024    Discharge Recommendation: home with 24/7 assist + HHOT vs Swing bed pending caregiver's ability to transfer pt safety     History  [place]:  The encounter diagnosis was Syncope and collapse.  Patient  has a past medical history of Acid reflux, Anxiety, Bronchitis, Cancer ([facility]), Carotid stenosis, Cerebral embolism with cerebral infarction (HCC), Chronic back pain, Depression, History of blood transfusion, History of external beam radiation therapy, Hyperlipidemia, Hypertension, Prolonged emergence from general anesthesia, RA (rheumatoid arthritis) ([facility]), Seizure (HCC), Sleep apnea, Teeth missing, Urinary incontinence, UTI (urinary tract infection), Wears dentures, Wears glasses, and Wears partial dentures.  Patient  has a past surgical history that includes Carpal tunnel release; Dental surgery; Colonoscopy (In Past); Hysterectomy, vaginal (1960's); Dilation and curettage of uterus (1960's); Carotid endarterectomy (Right, 11/18/2015); Port Surgery (N/A, 03/02/2021); ARTHROCENTESIS / ASPIRATION / INJECTION KNEE (05/11/2021); and joint replacement.    Subjective:  Patient states:  \"I'm tired\"   Pain:  denies pain.    Communication: RN, CM, co-eval with PT   Restrictions: fall risk, seizure precautions    Home Setup/Prior level of function  Social/Functional History  Lives With: Family (son zion ART lives at home, rest of family provides 24/7 assist)  Type of Home: House  Home Layout: One level  Home Access: Ramped entrance  Bathroom Shower/Tub: Tub/Shower unit  Bathroom Equipment: Grab bars in shower, Built-in shower seat, Hand-held shower  Home Equipment: Wheelchair-manual, Walker, rolling (transport chair)  Has the patient had two or more falls in the past year or any fall with injury in the past year?: Yes  Receives Help From: Family  ADL Assistance: Needs assistance  Grooming: 
Orthostatic Vitals:      4/9/2024    11:00 AM   Orthostatic Vitals   Orthostatic B/P and Pulse? Yes   Blood Pressure Lying 101/62   Pulse Lying 106 PER MINUTE   Blood Pressure Sitting 110/73   Pulse Sitting 110 PER MINUTE   Blood Pressure Standing 105/75   Pulse Standing 104 PER MINUTE      
Patient has had a very large bowel movement today at 2230 hrs.  
Patient left floor for MRI  
Patient returned from MRI  
Physical Therapy  Pt was being transported to MRI, will cont. Annel aMrtínez PTA      
Report called to Maddie Davenport, all questions answered.  Daughter informed of  time  
Interventions:   Food and/or Nutrient Delivery: Continue Current Diet, Start Oral Nutrition Supplement  Nutrition Education/Counseling: Education initiated  Coordination of Nutrition Care: Feeding Assistance/Environment Change, Continue to monitor while inpatient  Plan of Care discussed with: Pt, Daughter    Goals:     Goals: Meet at least 75% of estimated needs       Nutrition Monitoring and Evaluation:   Behavioral-Environmental Outcomes: None Identified  Food/Nutrient Intake Outcomes: Food and Nutrient Intake, Supplement Intake  Physical Signs/Symptoms Outcomes: Biochemical Data, Fluid Status or Edema, Meal Time Behavior, Nutrition Focused Physical Findings, Skin, Weight    Discharge Planning:    Continue Oral Nutrition Supplement     Paulette Hwang RD, LD  Contact: [phone]    
Date: 4/7/2024  PROCEDURE: CT ANGIOGRAPHY HEAD AND NECK WITH/WITHOUT CONTRAST INDICATION: neck pain, syncope COMPARISON: none TECHNIQUE: Limited noncontrast CT imaging performed for the purposes of IV contrast bolus tracking. Axial CT imaging obtained from thoracic inlet through the top of the head following administration of IV contrast. Axial images, multiplanar reformatted images, and maximum intensity projection images were reviewed for CT angiographic technique.   Individualized dose optimization technique was used in order to meet ALARA standards for radiation dose reduction.  In addition to vendor specific dose reduction algorithms, the dose reduction techniques vary based on the specific scanner utilized but frequently include automated exposure control, adjustment of the mA and/or kV according to patient size, and use of iterative reconstruction technique.  NASCET criteria used to determine percent stenosis measurements (% stenosis = (1-narrowest diameter/diameter of normal distal segment)x100). IV contrast: 100 mL Isovue-370. FINDINGS: NECK: AORTIC ARCH: Standard three-vessel aortic arch anatomy is present. INNOMINATE ARTERY: Normal. RIGHT SUBCLAVIAN ARTERY: Normal. RIGHT VERTEBRAL ARTERY: Normal. RIGHT CAROTID ARTERY: Right common carotid artery origin is intact. Right common carotid artery demonstrates no stenosis. Carotid bifurcation is intact. Internal carotid artery demonstrates a focal narrowing adjacent to the tip of the styloid process which appears to demonstrate extrinsic mass effect. The right internal carotid artery is intact otherwise. LEFT SUBCLAVIAN ARTERY: Normal. LEFT VERTEBRAL ARTERY: Small left vertebral artery which ends at the inferior cerebellar artery. LEFT CAROTID ARTERY: Left carotid origin is intact. The common carotid artery demonstrates no stenosis. Carotid bulb calcifications and calcification along the proximal left internal carotid artery without stenosis. No stenosis of the 
Encounters:   04/09/24 96   03/27/24 64   03/26/24 100        Gen: A&O x 4, NAD, cooperative  HEENT: NC/AT, EOMI, PERRL, mmm,  neck supple, no meningeal signs  Heart: NSR  Lungs: Respirations even and unlabored  Ext: no edema, no calf tenderness b/l  Psych: normal mood and affect  Skin: no rashes or lesions    NEUROLOGIC EXAM:    Mental Status: A&O to self, location, month and year, NAD, speech clear, language fluent, repetition and naming intact, follows commands appropriately    Cranial Nerve Exam:   CN II-XII: PERRL, VFF, no nystagmus, no gaze paresis, sensation V1-V3 intact b/l, muscles of facial expression symmetric; hearing intact to conversational tone, palate elevates symmetrically, shoulder elevation symmetric and tongue protrudes midline with movement side to side.    Motor Exam:       Strength 4/5 UE/LE bilaterally  Tone and bulk normal   No pronator drift    Deep Tendon Reflexes: 1/4 biceps, triceps, brachioradialis, patellar, and achilles b/l; flexor plantar responses b/l    Sensation: Intact light touch/pinprick/vibration UE's/LE's b/l    Coordination/Cerebellum:       Tremors--none      Rapidly alternating movements: no dysdiadochokinesia b/l                Heel-to-Shin: no dysmetria b/l      Finger-to-Nose: no dysmetria b/l    Gait and stance:      Gait: deferred      LABS:     Recent Labs     04/07/24  1806 04/09/24  0256   WBC 7.1 6.1    140   K 3.7 3.9    102   CO2 25 27   BUN 15 18   CREATININE 0.4* 0.6   GLUCOSE 104* 150*         IMAGING:    CT head w/o contrast:  IMPRESSION:     1. Stable exam with no acute intracranial abnormality.    CTA head and neck:  Neck     1. Mild carotid calcifications without evidence of stenosis or large vessel occlusion.  2. Small left vertebral artery ending at the inferior cerebellar artery.      Head     1.  No significant intracranial vascular abnormality. No large vessel occlusion    All imaging was personally reviewed    ASSESSMENT/PLAN:   Patient 
no stenosis. Carotid bifurcation is intact. Internal carotid artery demonstrates a focal narrowing adjacent to the tip of the styloid process which appears to demonstrate extrinsic mass effect. The right internal carotid artery is intact otherwise. LEFT SUBCLAVIAN ARTERY: Normal. LEFT VERTEBRAL ARTERY: Small left vertebral artery which ends at the inferior cerebellar artery. LEFT CAROTID ARTERY: Left carotid origin is intact. The common carotid artery demonstrates no stenosis. Carotid bulb calcifications and calcification along the proximal left internal carotid artery without stenosis. No stenosis of the distal left internal carotid artery otherwise. NECK SOFT TISSUES: Stable epidermal inclusion cyst along the upper back. No lymphadenopathy or mass identified. VISUALIZED MEDIASTINUM: No mass or lymphadenopathy. VISUALIZED LUNG APICES: Clear. BONES: Extensive degenerative changes of the cervical spine similar to prior MRI from 7/18/2023. No acute osseous abnormality identified. HEAD: ANTERIOR CIRCULATION: The intracranial internal carotid arteries, anterior cerebral arteries, and middle cerebral arteries demonstrate no occlusion or stenosis. No evidence for aneurysm or arteriovenous malformation. POSTERIOR CIRCULATION: Small left vertebral artery ending at the inferior cerebellar artery. Right vertebral artery supplies the basilar artery. The basilar artery is small and ends at the superior cerebellar arteries. Fetal origin of the posterior cerebral arteries bilaterally. No large vessel occlusion or aneurysm. INTRACRANIAL VENOUS SYSTEM: No evidence for intracranial venous thrombosis. INTRACRANIAL HEMORRHAGE: None. VENTRICLES: Normal in size and configuration for age. BRAIN PARENCHYMA: Gray-white matter differentiation is normal. No intracranial mass effect. SKULL: No destructive osseous process or fracture. PARANASAL SINUSES / MASTOIDS: No acute sinusitis or mastoiditis. ORBITS: Normal. EXTRACRANIAL SOFT TISSUES: 
    Recent Labs     04/07/24  1806 04/09/24  0256   WBC 7.1 6.1    140   K 3.7 3.9    102   CO2 25 27   BUN 15 18   CREATININE 0.4* 0.6   GLUCOSE 104* 150*         IMAGING:    CT head w/o contrast:  IMPRESSION:     1. Stable exam with no acute intracranial abnormality.    CTA head and neck:  Neck     1. Mild carotid calcifications without evidence of stenosis or large vessel occlusion.  2. Small left vertebral artery ending at the inferior cerebellar artery.      Head     1.  No significant intracranial vascular abnormality. No large vessel occlusion    MRI brain w/o contrast:  IMPRESSION:  No evidence of acute cerebral infarct. Extensive chronic ischemic to  moderate changes as seen involving both cerebral hemispheres and also the  brainstem, unchanged when compared to the study of July 12, 2023.     MRI c spine w/o contrast:  Pending    All imaging was personally reviewed    ASSESSMENT/PLAN:   Patient with no acute events overnight. MRI brain completed. No evidence of new stroke. Does appear to have some worsening of the degenerative changes to the cervical spine. Will order MRI c spine to further evaluate given patients complaints of worsening neck pain, N/T of the right hand, burning in the hands.    Episode of loss of consciousness secondary to syncope v breakthrough seizure v recrudescence. No evidence of acute stroke on MRI imaging  Neuro imaging as above  CT head no acute findings  CTA head and neck with no acute findings  MRI brain as above, no evidence of acute stroke  MRI c spine pending  Neurosurgery consult ordered for cervical stenosis  Will hold on EEG at this time. Patient is back to baseline.   Recommend ambulatory EEG at discharge.   Continue home dose Keppra 500 mg twice daily.   Please report any seizure like activity.   Continue home dose aspirin and atorvastatin 20 mg for secondary stroke prevention  PT/OT/ST as recommended  Recommend follow up with cardiology at discharge.   Follow 
cervical spine similar to prior MRI from 7/18/2023. No acute osseous abnormality identified. HEAD: ANTERIOR CIRCULATION: The intracranial internal carotid arteries, anterior cerebral arteries, and middle cerebral arteries demonstrate no occlusion or stenosis. No evidence for aneurysm or arteriovenous malformation. POSTERIOR CIRCULATION: Small left vertebral artery ending at the inferior cerebellar artery. Right vertebral artery supplies the basilar artery. The basilar artery is small and ends at the superior cerebellar arteries. Fetal origin of the posterior cerebral arteries bilaterally. No large vessel occlusion or aneurysm. INTRACRANIAL VENOUS SYSTEM: No evidence for intracranial venous thrombosis. INTRACRANIAL HEMORRHAGE: None. VENTRICLES: Normal in size and configuration for age. BRAIN PARENCHYMA: Gray-white matter differentiation is normal. No intracranial mass effect. SKULL: No destructive osseous process or fracture. PARANASAL SINUSES / MASTOIDS: No acute sinusitis or mastoiditis. ORBITS: Normal. EXTRACRANIAL SOFT TISSUES: Normal. OTHER: None.     Neck 1. Mild carotid calcifications without evidence of stenosis or large vessel occlusion. 2. Small left vertebral artery ending at the inferior cerebellar artery. Head 1.  No significant intracranial vascular abnormality. No large vessel occlusion. Electronically signed by Miles Chilel MD    CT HEAD WO CONTRAST    Result Date: 4/7/2024  HISTORY: Syncope. EXAM : CT OF THE HEAD WITHOUT CONTRAST TECHNIQUE: Multiple axial images were obtained of the brain without the use of contrast. Individualized dose optimization technique was used in order to meet ALARA standards for radiation dose reduction.  In addition to vendor specific dose reduction algorithms, the dose reduction techniques vary based on the specific scanner utilized but frequently include automated exposure control, adjustment of the mA and/or kV according to patient size, and use of iterative

## 2024-04-14 NOTE — DISCHARGE SUMMARY
subcortical white matter hypodensities suggesting small vessel ischemic disease  or age related degenerative changes. No focal hypodensity to suggest acute ischemia.     1. Stable exam with no acute intracranial abnormality. Electronically signed by Miles Chilel MD    XR CHEST PORTABLE    Result Date: 4/7/2024  History: Chest pain. AP chest. COMPARISON: 7/21/2023. FINDINGS: Normal heart size. No effusion or consolidation. No acute osseous abnormality. Mediastinal contours are unremarkable.     1. No acute disease. Electronically signed by Miles Chilel MD      CBC:   Recent Labs     04/12/24  0940 04/13/24 0421 04/14/24  0555   WBC 9.5 10.1 9.2   HGB 10.8* 10.6* 10.1*    418 378     BMP:    Recent Labs     04/12/24 0940 04/13/24 0421 04/14/24  0555    142 141   K 3.5 4.2 4.3    104 103   CO2 27 27 29   BUN 21 26* 23   CREATININE 0.3* 0.4* 0.3*   GLUCOSE 104* 111* 97     Hepatic: No results for input(s): \"AST\", \"ALT\", \"ALB\", \"BILITOT\", \"ALKPHOS\" in the last 72 hours.  Lipids:   Lab Results   Component Value Date/Time    CHOL 143 07/12/2023 06:41 AM    CHOL 183 10/10/2022 08:58 AM    HDL 63 04/05/2024 11:00 AM    TRIG 82 07/12/2023 06:41 AM     Hemoglobin A1C:   Lab Results   Component Value Date/Time    LABA1C 7.2 07/12/2023 06:41 AM     TSH: No results found for: \"TSH\"  Troponin:   Lab Results   Component Value Date/Time    TROPONINT <0.010 08/03/2023 11:38 AM    TROPONINT <0.010 07/15/2023 11:28 AM    TROPONINT <0.010 07/11/2023 02:59 PM     Lactic Acid: No results for input(s): \"LACTA\" in the last 72 hours.  BNP: No results for input(s): \"PROBNP\" in the last 72 hours.  UA:  Lab Results   Component Value Date/Time    NITRU NEGATIVE 04/08/2024 04:00 AM    COLORU YELLOW 04/08/2024 04:00 AM    WBCUA <1 04/07/2021 07:08 PM    RBCUA NONE SEEN 04/07/2021 07:08 PM    MUCUS RARE 04/07/2021 07:08 PM    TRICHOMONAS NONE SEEN 04/07/2021 07:08 PM    BACTERIA NEGATIVE 04/07/2021 07:08 PM    CLARITYU

## 2024-04-14 NOTE — DISCHARGE INSTRUCTIONS
Wound care: Coccyx cleanse with NS open areas apply collagen (Puracol) cover with silicone foam border change every 2 days and prn. Lower  buttocks angie rectal Apply moisture barrier cream  BID and prn incontinence. Call [employer] at [phone] with any issues with rental mattress. Pt is a moderate risk for skin breakdown AEB [facility]. Follow [facility] orders. Electronically signed by Sneha Pittman RN on 4/11/2024 at 11:47 AM

## 2024-04-14 NOTE — DISCHARGE INSTR - COC
Wound cleanser;Soap and water 04/09/24 1606   Dressing/Treatment Water-based;Zinc paste;Silicone border 04/10/24 1909   Offloading for Diabetic Foot Ulcers Other (comment) 04/09/24 1606   Wound Length (cm) 0 cm 04/11/24 1012   Wound Width (cm) 0 cm 04/11/24 1012   Wound Depth (cm) 0 cm 04/11/24 1012   Wound Surface Area (cm^2) 0 cm^2 04/11/24 1012   Change in Wound Size % (l*w) 100 04/11/24 1012   Wound Volume (cm^3) 0 cm^3 04/11/24 1012   Wound Healing % 100 04/11/24 1012   Post-Procedure Length (cm) 0.2 cm 03/26/24 1005   Post-Procedure Width (cm) 0.1 cm 03/26/24 1005   Post-Procedure Surface Area (cm^2) 0.02 cm^2 03/26/24 1005   Distance Tunneling (cm) 0 cm 04/09/24 1606   Tunneling Position ___ O'Clock 0 04/09/24 1606   Undermining Starts ___ O'Clock 0 04/09/24 1606   Undermining Ends___ O'Clock 0 04/09/24 1606   Undermining Maxium Distance (cm) 0 04/09/24 1606   Wound Assessment Epithelialization;Dry 04/10/24 1909   Drainage Amount None (dry) 04/10/24 1909   Odor None 04/10/24 1909   Angie-wound Assessment Dry/flaky;Fragile 04/10/24 1909   Margins Defined edges 04/10/24 1909   Number of days: 19       Wound 04/11/24 Rectum angie rectal (Active)   Wound Etiology Other 04/11/24 1012   Dressing Status Clean;Dry;Intact 04/14/24 0002   Wound Cleansed Cleansed with saline 04/13/24 0800   Dressing/Treatment Moisture barrier 04/13/24 0800   Wound Length (cm) 0.3 cm 04/11/24 1012   Wound Width (cm) 0.3 cm 04/11/24 1012   Wound Depth (cm) 0.1 cm 04/11/24 1012   Wound Surface Area (cm^2) 0.09 cm^2 04/11/24 1012   Wound Volume (cm^3) 0.009 cm^3 04/11/24 1012   Distance Tunneling (cm) 0 cm 04/11/24 1012   Tunneling Position ___ O'Clock 0 04/11/24 1012   Undermining Starts ___ O'Clock 0 04/11/24 1012   Undermining Ends___ O'Clock 0 04/11/24 1012   Undermining Maxium Distance (cm) 0 04/11/24 1012   Wound Assessment Pink/red 04/13/24 0800   Drainage Amount None (dry) 04/13/24 0800   Drainage Description Serosanguinous 04/11/24

## 2024-04-16 ENCOUNTER — HOSPITAL ENCOUNTER (OUTPATIENT)
Dept: WOUND CARE | Age: 84
Discharge: HOME | End: 2024-04-16
Attending: NURSE PRACTITIONER
Payer: MEDICARE

## 2024-04-16 VITALS
RESPIRATION RATE: 18 BRPM | DIASTOLIC BLOOD PRESSURE: 58 MMHG | SYSTOLIC BLOOD PRESSURE: 104 MMHG | TEMPERATURE: 97.9 F | HEART RATE: 76 BPM

## 2024-04-16 DIAGNOSIS — M05.79 RHEUMATOID ARTHRITIS INVOLVING MULTIPLE SITES WITH POSITIVE RHEUMATOID FACTOR (HCC): ICD-10-CM

## 2024-04-16 DIAGNOSIS — I63.9 DYSARTHRIA DUE TO ACUTE STROKE (HCC): ICD-10-CM

## 2024-04-16 DIAGNOSIS — R26.9 GAIT DISTURBANCE: Primary | ICD-10-CM

## 2024-04-16 DIAGNOSIS — Z79.899 IMMUNOSUPPRESSION DUE TO DRUG THERAPY (HCC): ICD-10-CM

## 2024-04-16 DIAGNOSIS — L89.312 PRESSURE INJURY OF RIGHT BUTTOCK, STAGE 2 (HCC): ICD-10-CM

## 2024-04-16 DIAGNOSIS — N39.45 CONTINUOUS LEAKAGE OF URINE: ICD-10-CM

## 2024-04-16 DIAGNOSIS — R47.1 DYSARTHRIA DUE TO ACUTE STROKE (HCC): ICD-10-CM

## 2024-04-16 DIAGNOSIS — D84.821 IMMUNOSUPPRESSION DUE TO DRUG THERAPY (HCC): ICD-10-CM

## 2024-04-16 PROCEDURE — 97597 DBRDMT OPN WND 1ST 20 CM/<: CPT

## 2024-04-16 PROCEDURE — 6370000000 HC RX 637 (ALT 250 FOR IP): Performed by: NURSE PRACTITIONER

## 2024-04-16 RX ORDER — CLOBETASOL PROPIONATE 0.5 MG/G
OINTMENT TOPICAL ONCE
Status: COMPLETED | OUTPATIENT
Start: 2024-04-16 | End: 2024-04-16

## 2024-04-16 RX ORDER — BACITRACIN ZINC AND POLYMYXIN B SULFATE 500; 1000 [USP'U]/G; [USP'U]/G
OINTMENT TOPICAL ONCE
OUTPATIENT
Start: 2024-04-16 | End: 2024-04-16

## 2024-04-16 RX ORDER — IBUPROFEN 200 MG
TABLET ORAL ONCE
OUTPATIENT
Start: 2024-04-16 | End: 2024-04-16

## 2024-04-16 RX ORDER — BETAMETHASONE DIPROPIONATE 0.05 %
OINTMENT (GRAM) TOPICAL ONCE
OUTPATIENT
Start: 2024-04-16 | End: 2024-04-16

## 2024-04-16 RX ORDER — GENTAMICIN SULFATE 1 MG/G
OINTMENT TOPICAL ONCE
OUTPATIENT
Start: 2024-04-16 | End: 2024-04-16

## 2024-04-16 RX ORDER — TRIAMCINOLONE ACETONIDE 1 MG/G
OINTMENT TOPICAL ONCE
OUTPATIENT
Start: 2024-04-16 | End: 2024-04-16

## 2024-04-16 RX ORDER — LIDOCAINE HYDROCHLORIDE 40 MG/ML
SOLUTION TOPICAL ONCE
OUTPATIENT
Start: 2024-04-16 | End: 2024-04-16

## 2024-04-16 RX ORDER — CLOBETASOL PROPIONATE 0.5 MG/G
OINTMENT TOPICAL ONCE
OUTPATIENT
Start: 2024-04-16 | End: 2024-04-16

## 2024-04-16 RX ORDER — LIDOCAINE 50 MG/G
OINTMENT TOPICAL ONCE
OUTPATIENT
Start: 2024-04-16 | End: 2024-04-16

## 2024-04-16 RX ORDER — SODIUM CHLOR/HYPOCHLOROUS ACID 0.033 %
SOLUTION, IRRIGATION IRRIGATION ONCE
OUTPATIENT
Start: 2024-04-16 | End: 2024-04-16

## 2024-04-16 RX ORDER — LIDOCAINE 40 MG/G
CREAM TOPICAL ONCE
OUTPATIENT
Start: 2024-04-16 | End: 2024-04-16

## 2024-04-16 RX ORDER — BACITRACIN ZINC 500 [USP'U]/G
OINTMENT TOPICAL ONCE
OUTPATIENT
Start: 2024-04-16 | End: 2024-04-16

## 2024-04-16 RX ADMIN — CLOBETASOL PROPIONATE: 0.5 OINTMENT TOPICAL at 11:37

## 2024-04-16 ASSESSMENT — PAIN SCALES - WONG BAKER: WONGBAKER_NUMERICALRESPONSE: NO HURT

## 2024-04-16 ASSESSMENT — PAIN SCALES - GENERAL: PAINLEVEL_OUTOF10: 0

## 2024-04-16 NOTE — PATIENT INSTRUCTIONS
PHYSICIAN ORDERS AND DISCHARGE INSTRUCTIONS     Wound cleansing:              Do not scrub or use excessive force.             Wash hands with soap and water before and after dressing changes.             Prior to applying a clean dressing, cleanse wound with normal saline,               wound cleanser, or mild soap and water.              Ask the physician or nurse before getting the wound(s) wet in a shower     Wound Care Notes:         Kelsey to find out about wheelchair. Reliable medical equipment to do home visit. Received 24                              Orders for this week: 2024    FAX TO [facility] [place].  FAX ORDERS TO [facility]!     Buttock Wounds: Clean with soap and water. Pat dry with 4x4.   Desitin, Clobetasol and Stimulen to wounds healed wound (Sample given to patient family to use until depleted)   Apply daily and as needed.     Use desitin family brings after sample is depleted               Follow Up Instructions: At the [facility] 1 weeks: Monday  Primary Wound Care Provider: Kelsey Marinelli CNP   Call  for any questions or concerns.  Central [phone] for imaging lab work

## 2024-04-16 NOTE — PROGRESS NOTES
Desitin, Clobetasol, and Stimulen applied to area of concern on coccyx as a preventative measure. Area left open to air. Patient tolerated procedure well.

## 2024-04-22 ENCOUNTER — TELEPHONE (OUTPATIENT)
Age: 84
End: 2024-04-22

## 2024-04-22 NOTE — TELEPHONE ENCOUNTER
Tried calling patient at number listed in Epic ([phone]). MB full and unable to leave message. Called patient's daughter Patricia at [phone] and left a message for her to return call to the office to schedule her mother an appointment in our office for a hospital follow up for carotid stenosis.

## 2024-04-23 ENCOUNTER — TRANSCRIBE ORDERS (OUTPATIENT)
Dept: ADMINISTRATIVE | Age: 84
End: 2024-04-23

## 2024-04-23 DIAGNOSIS — G40.89 OTHER SEIZURES (HCC): ICD-10-CM

## 2024-04-23 DIAGNOSIS — R56.9 CONVULSIONS, UNSPECIFIED CONVULSION TYPE (HCC): Primary | ICD-10-CM

## 2024-04-23 NOTE — TELEPHONE ENCOUNTER
Maddie Barrios called to schedule patient a hospital follow up for carotid stenosis.  Appointment scheduled with Faye KEYES on Wednesday, 5/1/24 @ 1045 am.

## 2024-04-24 NOTE — H&P (VIEW-ONLY)
Outpatient Vascular Neurology Service Consult Note     Patient Name: Altagracia Wayne  : 1940        Subjective:   Reason for consult:   Altagracia Wayne is a 83 y.o. female with past medical history of GERD, anxiety, bronchitis, colon cancer, carotid stenosis, stroke, seizure on AED, depression, HLD, HTN, rheumatoid arthritis and VIC, presenting for following abnormal CTA findings.  The patient presented to  ED on 24 following a syncopal event.   The patients daughter states that she was transferring the patient into her wheel chair when she had a LOC.  The daughter states the patient was already in the wheel chair when she noticed her mother was not responding.  She dialed 911.  She states her mother was not breathing and she lowered her to the ground to start CPR however her mother started responding before compressions were started.    CT of the head with no acute intracranial abnormalities  CTA: Mild carotid calcifications without significant stenosis or large vessel occlusion.  The right Internal carotid artery demonstrates a focal narrowing adjacent to the tip of the styloid process which appears to demonstrate extrinsic mass effect.  Small left vertebral artery ending in the inferior cerebellar artery.  The right vertebral artery supplies the basilar artery.  The basilar artery is small and ends at the superior cerebellar arteries.  Fetal origin of PCAs bilaterally.  No significant intracranial vascular abnormalities.  No large vessel occlusion.    MRI of the brain was nonacute.  Extensive chronic ischemic changes of bilateral cerebral hemispheres and brainstem.  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 Has a pannus noted posterior to C2 abutting the cord.       The patient does have a history of seizures on Keppra.  Neurology was consulted and recommended ambulatory EEG.  Cardiology was consulted as well.  TTE EF of 55-60% with no regional wall abnormalities.  Bubble study

## 2024-04-25 ENCOUNTER — HOSPITAL ENCOUNTER (OUTPATIENT)
Dept: WOUND CARE | Age: 84
Discharge: HOME | End: 2024-04-25
Attending: NURSE PRACTITIONER
Payer: MEDICARE

## 2024-04-25 DIAGNOSIS — Z79.899 IMMUNOSUPPRESSION DUE TO DRUG THERAPY (HCC): ICD-10-CM

## 2024-04-25 DIAGNOSIS — N39.45 CONTINUOUS LEAKAGE OF URINE: ICD-10-CM

## 2024-04-25 DIAGNOSIS — I63.9 DYSARTHRIA DUE TO ACUTE STROKE (HCC): Primary | ICD-10-CM

## 2024-04-25 DIAGNOSIS — L89.152 DECUBITUS ULCER OF COCCYX, STAGE 2 (HCC): ICD-10-CM

## 2024-04-25 DIAGNOSIS — D84.821 IMMUNOSUPPRESSION DUE TO DRUG THERAPY (HCC): ICD-10-CM

## 2024-04-25 DIAGNOSIS — R47.1 DYSARTHRIA DUE TO ACUTE STROKE (HCC): Primary | ICD-10-CM

## 2024-04-25 PROCEDURE — 6370000000 HC RX 637 (ALT 250 FOR IP): Performed by: NURSE PRACTITIONER

## 2024-04-25 PROCEDURE — 97597 DBRDMT OPN WND 1ST 20 CM/<: CPT

## 2024-04-25 PROCEDURE — 11042 DBRDMT SUBQ TIS 1ST 20SQCM/<: CPT

## 2024-04-25 RX ORDER — LIDOCAINE 50 MG/G
OINTMENT TOPICAL ONCE
OUTPATIENT
Start: 2024-04-25 | End: 2024-04-25

## 2024-04-25 RX ORDER — IBUPROFEN 200 MG
TABLET ORAL ONCE
OUTPATIENT
Start: 2024-04-25 | End: 2024-04-25

## 2024-04-25 RX ORDER — BACITRACIN ZINC 500 [USP'U]/G
OINTMENT TOPICAL ONCE
OUTPATIENT
Start: 2024-04-25 | End: 2024-04-25

## 2024-04-25 RX ORDER — TRIAMCINOLONE ACETONIDE 1 MG/G
OINTMENT TOPICAL ONCE
OUTPATIENT
Start: 2024-04-25 | End: 2024-04-25

## 2024-04-25 RX ORDER — BACITRACIN ZINC AND POLYMYXIN B SULFATE 500; 1000 [USP'U]/G; [USP'U]/G
OINTMENT TOPICAL ONCE
OUTPATIENT
Start: 2024-04-25 | End: 2024-04-25

## 2024-04-25 RX ORDER — BETAMETHASONE DIPROPIONATE 0.05 %
OINTMENT (GRAM) TOPICAL ONCE
OUTPATIENT
Start: 2024-04-25 | End: 2024-04-25

## 2024-04-25 RX ORDER — SODIUM CHLOR/HYPOCHLOROUS ACID 0.033 %
SOLUTION, IRRIGATION IRRIGATION ONCE
OUTPATIENT
Start: 2024-04-25 | End: 2024-04-25

## 2024-04-25 RX ORDER — LIDOCAINE 40 MG/G
CREAM TOPICAL ONCE
OUTPATIENT
Start: 2024-04-25 | End: 2024-04-25

## 2024-04-25 RX ORDER — LIDOCAINE HYDROCHLORIDE 40 MG/ML
SOLUTION TOPICAL ONCE
OUTPATIENT
Start: 2024-04-25 | End: 2024-04-25

## 2024-04-25 RX ORDER — CLOBETASOL PROPIONATE 0.5 MG/G
OINTMENT TOPICAL ONCE
OUTPATIENT
Start: 2024-04-25 | End: 2024-04-25

## 2024-04-25 RX ORDER — GENTAMICIN SULFATE 1 MG/G
OINTMENT TOPICAL ONCE
OUTPATIENT
Start: 2024-04-25 | End: 2024-04-25

## 2024-04-25 RX ORDER — CLOBETASOL PROPIONATE 0.5 MG/G
OINTMENT TOPICAL ONCE
Status: COMPLETED | OUTPATIENT
Start: 2024-04-25 | End: 2024-04-25

## 2024-04-25 RX ADMIN — CLOBETASOL PROPIONATE: 0.5 OINTMENT TOPICAL at 13:50

## 2024-04-25 NOTE — PROGRESS NOTES
Nurse applied desitin clobet stimulen  and painted bilat toes with skin prep. Tolerated well  
lesion(s)      Objective:      There were no vitals taken for this visit.    BP Readings from Last 3 Encounters:   04/16/24 (!) 104/58   04/14/24 101/66   03/27/24 108/78        PHYSICAL EXAM  General Appearance:   Alert, cooperative, no distress   Head:   Normocephalic, without obvious abnormality, atraumatic    Eyes:   PERRL, conjunctiva/corneas clear    Ears:   Normal external appearance, hearing grossly intact    Nose:  Nares normal, mucosa normal, no drainage    Throat:  Lips, mucosa, and tongue normal    Neck:  Supple, trachea midline    Respiratory:   Equal chest rise, respirations unlabored, no wheezing   Cardiovascular:   Regular rate and rhythm    Abdomen, GI:   Soft, non-tender, no rebound or guarding    Musculoskeletal:  Atraumatic, no cyanosis or edema    Neurologic:  Nonfocal, strength & sensation grossly intact   Psychiatric: Oriented x3, grossly appropriate judgement and insight      Dermatologic exam: Visual inspection of the periwound reveals the skin to be normal in turgor and texture  Wound exam: see wound description below in procedure note    Assessment:       Altagracia Wayne  appears to have a non-healing wound of the buttocks. The etiology of the wound is felt to be pressure and and moisture . There are multiple complicating factors including chronic pressure, decreased mobility, shear force, immunosuppression, incontinence of stool, and incontinence of urine.  A comprehensive wound management program would be helpful to heal this wound. Assessments completed include fall risk and nutritional, functional,and psychological status.  At this time appropriate care would include: periodic debridement and wound care as below.     Problem List Items Addressed This Visit          Circulatory    Dysarthria due to acute stroke (HCC) - Primary    Relevant Orders    Initiate Outpatient Wound Care Protocol       Other    Continuous leakage of urine    Relevant Orders    Initiate Outpatient Wound Care

## 2024-04-25 NOTE — PATIENT INSTRUCTIONS
PHYSICIAN ORDERS AND DISCHARGE INSTRUCTIONS     Wound cleansing:              Do not scrub or use excessive force.             Wash hands with soap and water before and after dressing changes.             Prior to applying a clean dressing, cleanse wound with normal saline,               wound cleanser, or mild soap and water.              Ask the physician or nurse before getting the wound(s) wet in a shower     Wound Care Notes:         Kelsey to find out about wheelchair. Reliable medical equipment to do home visit. Received 24                              Orders for this week: 2024    FAX TO [facility].  FAX ORDERS TO [facility]!     Buttock Wounds: Clean with soap and water. Pat dry with 4x4.   Desitin, Clobetasol and Stimulen to wounds healed wound (Sample given to patient family to use until depleted)   Apply daily and as needed.     Paint bilateral toes with skin prep today in clinic 24.    Use desitin family brings after sample is depleted               Follow Up Instructions: At the Wound Care Center 2 weeks: Monday  Primary Wound Care Provider: Kelsey Marinelli CNP   Call  for any questions or concerns.  Central [phone] for imaging lab work

## 2024-05-01 ENCOUNTER — OFFICE VISIT (OUTPATIENT)
Age: 84
End: 2024-05-01

## 2024-05-01 VITALS
RESPIRATION RATE: 15 BRPM | OXYGEN SATURATION: 97 % | HEART RATE: 119 BPM | SYSTOLIC BLOOD PRESSURE: 112 MMHG | DIASTOLIC BLOOD PRESSURE: 70 MMHG

## 2024-05-01 DIAGNOSIS — Z86.73 HISTORY OF STROKE: ICD-10-CM

## 2024-05-01 DIAGNOSIS — R55 SYNCOPE, UNSPECIFIED SYNCOPE TYPE: ICD-10-CM

## 2024-05-01 DIAGNOSIS — I65.21 STENOSIS OF RIGHT CAROTID ARTERY: Primary | Chronic | ICD-10-CM

## 2024-05-01 DIAGNOSIS — R41.3 MEMORY CHANGES: ICD-10-CM

## 2024-05-01 DIAGNOSIS — Z87.898 HISTORY OF SEIZURE: ICD-10-CM

## 2024-05-01 DIAGNOSIS — M06.38 RHEUMATOID PANNUS OF CERVICAL SPINE (HCC): ICD-10-CM

## 2024-05-02 ENCOUNTER — PREP FOR PROCEDURE (OUTPATIENT)
Age: 84
End: 2024-05-02

## 2024-05-02 PROBLEM — J44.9 CHRONIC OBSTRUCTIVE PULMONARY DISEASE, UNSPECIFIED (HCC): Status: ACTIVE | Noted: 2024-05-02

## 2024-05-02 RX ORDER — SODIUM CHLORIDE 9 MG/ML
INJECTION, SOLUTION INTRAVENOUS PRN
Status: CANCELLED | OUTPATIENT
Start: 2024-05-02

## 2024-05-02 RX ORDER — SODIUM CHLORIDE 9 MG/ML
INJECTION, SOLUTION INTRAVENOUS CONTINUOUS
Status: CANCELLED | OUTPATIENT
Start: 2024-05-02

## 2024-05-02 RX ORDER — SODIUM CHLORIDE 0.9 % (FLUSH) 0.9 %
5-40 SYRINGE (ML) INJECTION PRN
Status: CANCELLED | OUTPATIENT
Start: 2024-05-02

## 2024-05-02 RX ORDER — SODIUM CHLORIDE 0.9 % (FLUSH) 0.9 %
5-40 SYRINGE (ML) INJECTION EVERY 12 HOURS SCHEDULED
Status: CANCELLED | OUTPATIENT
Start: 2024-05-02

## 2024-05-07 ENCOUNTER — HOSPITAL ENCOUNTER (OUTPATIENT)
Dept: WOUND CARE | Age: 84
Discharge: HOME | End: 2024-05-07
Attending: NURSE PRACTITIONER
Payer: MEDICARE

## 2024-05-07 VITALS
TEMPERATURE: 97.6 F | HEART RATE: 82 BPM | RESPIRATION RATE: 14 BRPM | SYSTOLIC BLOOD PRESSURE: 135 MMHG | DIASTOLIC BLOOD PRESSURE: 72 MMHG

## 2024-05-07 DIAGNOSIS — Z79.899 IMMUNOSUPPRESSION DUE TO DRUG THERAPY (HCC): ICD-10-CM

## 2024-05-07 DIAGNOSIS — M05.79 RHEUMATOID ARTHRITIS INVOLVING MULTIPLE SITES WITH POSITIVE RHEUMATOID FACTOR (HCC): ICD-10-CM

## 2024-05-07 DIAGNOSIS — L89.312 PRESSURE INJURY OF RIGHT BUTTOCK, STAGE 2 (HCC): ICD-10-CM

## 2024-05-07 DIAGNOSIS — R47.1 DYSARTHRIA DUE TO ACUTE STROKE (HCC): ICD-10-CM

## 2024-05-07 DIAGNOSIS — I63.9 DYSARTHRIA DUE TO ACUTE STROKE (HCC): ICD-10-CM

## 2024-05-07 DIAGNOSIS — N39.45 CONTINUOUS LEAKAGE OF URINE: ICD-10-CM

## 2024-05-07 DIAGNOSIS — D84.821 IMMUNOSUPPRESSION DUE TO DRUG THERAPY (HCC): ICD-10-CM

## 2024-05-07 DIAGNOSIS — R26.9 GAIT DISTURBANCE: Primary | ICD-10-CM

## 2024-05-07 PROCEDURE — 99213 OFFICE O/P EST LOW 20 MIN: CPT | Performed by: NURSE PRACTITIONER

## 2024-05-07 PROCEDURE — 97597 DBRDMT OPN WND 1ST 20 CM/<: CPT

## 2024-05-07 PROCEDURE — 6370000000 HC RX 637 (ALT 250 FOR IP): Performed by: NURSE PRACTITIONER

## 2024-05-07 RX ORDER — TRIAMCINOLONE ACETONIDE 1 MG/G
OINTMENT TOPICAL ONCE
OUTPATIENT
Start: 2024-05-07 | End: 2024-05-07

## 2024-05-07 RX ORDER — CLOBETASOL PROPIONATE 0.5 MG/G
OINTMENT TOPICAL ONCE
OUTPATIENT
Start: 2024-05-07 | End: 2024-05-07

## 2024-05-07 RX ORDER — SODIUM CHLOR/HYPOCHLOROUS ACID 0.033 %
SOLUTION, IRRIGATION IRRIGATION ONCE
OUTPATIENT
Start: 2024-05-07 | End: 2024-05-07

## 2024-05-07 RX ORDER — BACITRACIN ZINC AND POLYMYXIN B SULFATE 500; 1000 [USP'U]/G; [USP'U]/G
OINTMENT TOPICAL ONCE
OUTPATIENT
Start: 2024-05-07 | End: 2024-05-07

## 2024-05-07 RX ORDER — LIDOCAINE HYDROCHLORIDE 40 MG/ML
SOLUTION TOPICAL ONCE
OUTPATIENT
Start: 2024-05-07 | End: 2024-05-07

## 2024-05-07 RX ORDER — LIDOCAINE 50 MG/G
OINTMENT TOPICAL ONCE
Status: COMPLETED | OUTPATIENT
Start: 2024-05-07 | End: 2024-05-07

## 2024-05-07 RX ORDER — GENTAMICIN SULFATE 1 MG/G
OINTMENT TOPICAL ONCE
OUTPATIENT
Start: 2024-05-07 | End: 2024-05-07

## 2024-05-07 RX ORDER — IBUPROFEN 200 MG
TABLET ORAL ONCE
OUTPATIENT
Start: 2024-05-07 | End: 2024-05-07

## 2024-05-07 RX ORDER — BACITRACIN ZINC 500 [USP'U]/G
OINTMENT TOPICAL ONCE
OUTPATIENT
Start: 2024-05-07 | End: 2024-05-07

## 2024-05-07 RX ORDER — LIDOCAINE 40 MG/G
CREAM TOPICAL ONCE
OUTPATIENT
Start: 2024-05-07 | End: 2024-05-07

## 2024-05-07 RX ORDER — LIDOCAINE 50 MG/G
OINTMENT TOPICAL ONCE
OUTPATIENT
Start: 2024-05-07 | End: 2024-05-07

## 2024-05-07 RX ORDER — CLOBETASOL PROPIONATE 0.5 MG/G
OINTMENT TOPICAL ONCE
Status: COMPLETED | OUTPATIENT
Start: 2024-05-07 | End: 2024-05-07

## 2024-05-07 RX ORDER — BETAMETHASONE DIPROPIONATE 0.05 %
OINTMENT (GRAM) TOPICAL ONCE
OUTPATIENT
Start: 2024-05-07 | End: 2024-05-07

## 2024-05-07 RX ADMIN — CLOBETASOL PROPIONATE: 0.5 OINTMENT TOPICAL at 13:55

## 2024-05-07 RX ADMIN — LIDOCAINE: 50 OINTMENT TOPICAL at 13:56

## 2024-05-07 NOTE — PROGRESS NOTES
Treatment to Bilateral Buttocks completed as follows: Desitin, Clobetasol, Lidocaine ointment, Stimulen powder to healed areas/areas of concern. Apply daily as needed. Patient tolerated procedure well.

## 2024-05-07 NOTE — PROGRESS NOTES
Wound Care Center Progress Visit      Altagracia Wayne  AGE: 83 y.o.   GENDER: female  : 1940  EPISODE DATE:  2024   Referred by: Marshall Dominique APRN - CNP     Subjective:     CHIEF COMPLAINT  WOUND   Problem List Items Addressed This Visit          Circulatory    Dysarthria due to acute stroke (HCC)    Relevant Orders    Initiate Outpatient Wound Care Protocol       Other    Gait disturbance - Primary    Relevant Orders    Initiate Outpatient Wound Care Protocol    Rheumatoid arthritis involving multiple sites with positive rheumatoid factor (HCC)    Relevant Orders    Initiate Outpatient Wound Care Protocol    WD-Pressure injury of right buttock, stage 2 (HCC)    Relevant Orders    Initiate Outpatient Wound Care Protocol    Continuous leakage of urine    Relevant Orders    Initiate Outpatient Wound Care Protocol    Immunosuppression due to drug therapy (HCC)    Relevant Orders    Initiate Outpatient Wound Care Protocol     Chief Complaint   Patient presents with    Wound Check        HISTORY of PRESENT ILLNESS      Altagracia Wayne is a 83 y.o. 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 The condition is of moderate severity. The ulcers are recurrent in nature and have been present at varying degrees of severity for > than one year at initial visit to the wound clinic 23. The underlying cause is thought to be pressure and moisture from urinary incontinence. The patients care to date has included santyl. The patient has significant underlying medical conditions as below. Marshall Dominique APRN - CNP is PCP.    Wound Pain Timing/Severity: waxing and waning, moderate  Quality of pain: aching, tender, pressure  Severity of pain:  1 / 10   Modifying Factors: chronic pressure, decreased mobility, shear force, immunosuppression, incontinence of stool, and incontinence of urine  Associated Signs/Symptoms: pain    TANA: as

## 2024-05-07 NOTE — PATIENT INSTRUCTIONS
PHYSICIAN ORDERS AND DISCHARGE INSTRUCTIONS     Wound cleansing:              Do not scrub or use excessive force.             Wash hands with soap and water before and after dressing changes.             Prior to applying a clean dressing, cleanse wound with normal saline,               wound cleanser, or mild soap and water.              Ask the physician or nurse before getting the wound(s) wet in a shower     Wound Care Notes:         Kelsey to find out about wheelchair. Reliable medical equipment to do home visit. Received 24                              Orders for this week: 2024    FAX TO [facility].  FAX ORDERS TO [facility]!     Buttock: Clean with soap and water. Pat dry with 4x4.   Desitin, Clobetasol, lidocaine, and Stimulen to wounds healed wound   Apply daily and as needed.         Follow Up Instructions: At the Wound Care Center 4 weeks: Tuesday   Primary Wound Care Provider: Kelsey Marinelli CNP   Call  for any questions or concerns.  Central [phone] for imaging lab work

## 2024-05-08 NOTE — PROGRESS NOTES
IR Procedure at [facility]: Left a message on patient's daughter Patricia's phone for patient to arrive at 0800 at [facility] on 5/14/2024 for her procedure at 1000. Also went over below instructions and told her patient can take her medications as scheduled. Ask her to call me back and let me know she got my  message.    NPO at Midnight      2.   Follow your directions as prescribed by the doctor for your procedure and medications.  3.   Consult your provider as to when to stop blood thinner  4.   Do not take any pain medication within 6 hours of your procedure  5.   Do not drink any alcoholic beverages or use any street drugs 24 hours before procedure.  6.   Please wear simple, loose fitting clothing to the hospital.  Do not bring valuables (money,             credit cards, checkbooks, etc.)     7.   If you  have a Living Will and Durable Power of  for Healthcare, please bring in a copy.  8.   Please bring picture ID,  insurance card, paperwork from the doctors office            (H & P, Consent,  & card for implantable devices).  9.   Report to the information desk on the ground floor.  10. Take a shower the night before or morning of your procedure, do not apply any lotion, oil or powder.  11. If you are going to be sedated for the procedure, you will need a responsible adult to drive you home.

## 2024-05-13 DIAGNOSIS — Z87.898 HISTORY OF SEIZURE: Primary | ICD-10-CM

## 2024-05-14 ENCOUNTER — HOSPITAL ENCOUNTER (OUTPATIENT)
Dept: INTERVENTIONAL RADIOLOGY/VASCULAR | Age: 84
Discharge: HOME | End: 2024-05-14

## 2024-05-14 ENCOUNTER — OFFICE VISIT (OUTPATIENT)
Dept: CARDIOLOGY CLINIC | Age: 84
End: 2024-05-14
Payer: MEDICARE

## 2024-05-14 VITALS
HEART RATE: 75 BPM | BODY MASS INDEX: 28.17 KG/M2 | WEIGHT: 165 LBS | DIASTOLIC BLOOD PRESSURE: 68 MMHG | HEIGHT: 64 IN | SYSTOLIC BLOOD PRESSURE: 110 MMHG | OXYGEN SATURATION: 96 %

## 2024-05-14 DIAGNOSIS — R41.3 MILD MEMORY DISTURBANCE: ICD-10-CM

## 2024-05-14 DIAGNOSIS — R55 SYNCOPE AND COLLAPSE: ICD-10-CM

## 2024-05-14 DIAGNOSIS — E78.2 MIXED HYPERLIPIDEMIA: ICD-10-CM

## 2024-05-14 DIAGNOSIS — R07.2 PRECORDIAL PAIN: ICD-10-CM

## 2024-05-14 DIAGNOSIS — G47.33 OSA (OBSTRUCTIVE SLEEP APNEA): ICD-10-CM

## 2024-05-14 DIAGNOSIS — G47.30 SLEEP APNEA, UNSPECIFIED TYPE: ICD-10-CM

## 2024-05-14 DIAGNOSIS — I47.10 SVT (SUPRAVENTRICULAR TACHYCARDIA) (HCC): Primary | ICD-10-CM

## 2024-05-14 DIAGNOSIS — I73.9 PAD (PERIPHERAL ARTERY DISEASE) (HCC): ICD-10-CM

## 2024-05-14 PROCEDURE — G8419 CALC BMI OUT NRM PARAM NOF/U: HCPCS | Performed by: INTERNAL MEDICINE

## 2024-05-14 PROCEDURE — 1111F DSCHRG MED/CURRENT MED MERGE: CPT | Performed by: INTERNAL MEDICINE

## 2024-05-14 PROCEDURE — 1123F ACP DISCUSS/DSCN MKR DOCD: CPT | Performed by: INTERNAL MEDICINE

## 2024-05-14 PROCEDURE — 1090F PRES/ABSN URINE INCON ASSESS: CPT | Performed by: INTERNAL MEDICINE

## 2024-05-14 PROCEDURE — 3074F SYST BP LT 130 MM HG: CPT | Performed by: INTERNAL MEDICINE

## 2024-05-14 PROCEDURE — 1036F TOBACCO NON-USER: CPT | Performed by: INTERNAL MEDICINE

## 2024-05-14 PROCEDURE — 99214 OFFICE O/P EST MOD 30 MIN: CPT | Performed by: INTERNAL MEDICINE

## 2024-05-14 PROCEDURE — G8399 PT W/DXA RESULTS DOCUMENT: HCPCS | Performed by: INTERNAL MEDICINE

## 2024-05-14 PROCEDURE — 3078F DIAST BP <80 MM HG: CPT | Performed by: INTERNAL MEDICINE

## 2024-05-14 PROCEDURE — G8427 DOCREV CUR MEDS BY ELIG CLIN: HCPCS | Performed by: INTERNAL MEDICINE

## 2024-05-14 NOTE — PROGRESS NOTES
.IR Procedure at [facility]: 5/21/2024 1000 arrival 0830      NPO at Midnight     (Paracentesis, Thoracentesis, Thyroid Bx and Injections may have a light breakfast)  2.   Follow your directions as prescribed by the doctor for your procedure and medications.  3.   Consult your provider as to when to stop blood thinner  4.   Do not take any pain medication within 6 hours of your procedure  5.   Do not drink any alcoholic beverages or use any street drugs 24 hours before procedure.  6.   Please wear simple, loose fitting clothing to the hospital.  Do not bring valuables (money,             credit cards, checkbooks, etc.)     7.   If you  have a Living Will and Durable Power of  for Healthcare, please bring in a copy.  8.   Please bring picture ID,  insurance card, paperwork from the doctors office            (H & P, Consent,  & card for implantable devices).  9.   Report to the information desk on the ground floor.  10. Take a shower the night before or morning of your procedure, do not apply any lotion, oil or powder.  11. If you are going to be sedated for the procedure, you will need a responsible adult to drive you home.

## 2024-05-14 NOTE — PROGRESS NOTES
unspecified 5/2/2024    Depression     History of blood transfusion     History of external beam radiation therapy 2017    5400 cGy pelvis/anal canal in 2017 per Dr. Mayberry at [facility]    Hyperlipidemia     Hypertension     Prolonged emergence from general anesthesia     RA (rheumatoid arthritis) ([facility])     \"All Over\"    Seizure ([facility]) 09/18/2015    X 1    Sleep apnea     Uses CPAP    Teeth missing     Upper And Lower    Urinary incontinence     UTI (urinary tract infection) In Past    No Current Symptoms    Wears dentures     Full Upper , Partial Lower    Wears glasses     Wears partial dentures     Lower     Past Surgical History:   Procedure Laterality Date    ARTHROCENTESIS / ASPIRATION / INJECTION KNEE  05/11/2021         CAROTID ENDARTERECTOMY Right 11/18/2015    CARPAL TUNNEL RELEASE      COLONOSCOPY  In Past    DENTAL SURGERY      Teeth Extracted In Past    DILATION AND CURETTAGE OF UTERUS  1960's    Prior To Vaginal Hysterectomy    HYSTERECTOMY, VAGINAL  1960's    JOINT REPLACEMENT      PORT SURGERY N/A 03/02/2021    PORT REMOVAL performed by Regino Pina MD at [facility] OR     Family History   Problem Relation Age of Onset    Stroke Mother     Heart Disease Mother     Arthritis Mother     Diabetes Mother     High Blood Pressure Mother     High Cholesterol Mother     Miscarriages / Stillbirths Mother     Other Father         \"Surgery For Twisted Bowels\"    Heart Disease Sister     Stroke Sister     High Blood Pressure Brother     Heart Disease Brother     Hearing Loss Brother         Heart Attack    Diabetes Sister     High Blood Pressure Sister     Other Sister         Gout    Arthritis Sister     Mental Illness Sister         Schizophrenia    High Blood Pressure Brother     High Blood Pressure Son     Mental Illness Son         Anxiety    Depression Son     Other Son         Pancreatitis, Surgery For Brain Aneurysm    High Blood Pressure Son     Other Son         Gout    Learning Disabilities Son

## 2024-05-15 ENCOUNTER — TELEPHONE (OUTPATIENT)
Dept: NEUROLOGY | Age: 84
End: 2024-05-15

## 2024-05-17 ENCOUNTER — TELEPHONE (OUTPATIENT)
Age: 84
End: 2024-05-17

## 2024-05-20 NOTE — PROGRESS NOTES
IR Procedure at [facility]:  Spoke with patient's daughter Patricia and patient will arrive at 0800 for her procedure at 1000, she also stated \"that Dr Daniels's office had given her instructions.\"    NPO at Midnight     (Paracentesis, Thoracentesis, Thyroid Bx and Injections may have a light breakfast)  2.   Follow your directions as prescribed by the doctor for your procedure and medications.  3.   Consult your provider as to when to stop blood thinner  4.   Do not take any pain medication within 6 hours of your procedure  5.   Do not drink any alcoholic beverages or use any street drugs 24 hours before procedure.  6.   Please wear simple, loose fitting clothing to the hospital.  Do not bring valuables (money,             credit cards, checkbooks, etc.)     7.   If you  have a Living Will and Durable Power of  for Healthcare, please bring in a copy.  8.   Please bring picture ID,  insurance card, paperwork from the doctors office            (H & P, Consent,  & card for implantable devices).  9.   Report to the information desk on the ground floor.  10. Take a shower the night before or morning of your procedure, do not apply any lotion, oil or powder.  11. If you are going to be sedated for the procedure, you will need a responsible adult to drive you home.

## 2024-05-20 NOTE — TELEPHONE ENCOUNTER
Spoke to patient's daughter Patricia (HIPAA) regarding upcoming diagnostic cerebral angiogram procedure scheduled with Dr Daniels on Tuesday, 5/21/24 @ 10 am.    Patient was informed of the following:  - Arrive 2 hours before the scheduled procedure - 8 am  - Check in at the main registration desk  - Do not eat or drink anything 8 hours before the scheduled procedure  - Confirmed that patient does NOT have any allergies to IV dye/contrast, shellfish, metal or latex  - Patient is not on any oral diabetic medications   - Take all other prescription medication as directed with a small sip of water  - Continue ASA  - Make sure you have someone to drive you home after the procedure as you will not be permitted to drive home      Patricia agreeable and verbalized understanding.

## 2024-05-21 ENCOUNTER — HOSPITAL ENCOUNTER (OUTPATIENT)
Dept: INTERVENTIONAL RADIOLOGY/VASCULAR | Age: 84
Discharge: HOME | End: 2024-05-21
Payer: MEDICARE

## 2024-05-21 VITALS
BODY MASS INDEX: 28.17 KG/M2 | HEIGHT: 64 IN | DIASTOLIC BLOOD PRESSURE: 69 MMHG | WEIGHT: 165 LBS | SYSTOLIC BLOOD PRESSURE: 120 MMHG | RESPIRATION RATE: 11 BRPM | HEART RATE: 65 BPM | OXYGEN SATURATION: 98 %

## 2024-05-21 LAB
ANION GAP SERPL CALCULATED.3IONS-SCNC: 10 MMOL/L (ref 7–16)
APTT: 25.7 SECONDS (ref 25.1–37.1)
BUN SERPL-MCNC: 21 MG/DL (ref 6–23)
CALCIUM SERPL-MCNC: 9.3 MG/DL (ref 8.3–10.6)
CHLORIDE BLD-SCNC: 103 MMOL/L (ref 99–110)
CO2: 28 MMOL/L (ref 21–32)
CREAT SERPL-MCNC: 0.4 MG/DL (ref 0.6–1.1)
GFR, ESTIMATED: >90 ML/MIN/1.73M2
GLUCOSE SERPL-MCNC: 168 MG/DL (ref 70–99)
HCT VFR BLD CALC: 40.5 % (ref 37–47)
HEMOGLOBIN: 13 GM/DL (ref 12.5–16)
INR BLD: 0.9 INDEX
MCH RBC QN AUTO: 31.5 PG (ref 27–31)
MCHC RBC AUTO-ENTMCNC: 32.1 % (ref 32–36)
MCV RBC AUTO: 98.1 FL (ref 78–100)
PDW BLD-RTO: 15.3 % (ref 11.7–14.9)
PLATELET # BLD: 350 K/CU MM (ref 140–440)
PMV BLD AUTO: 9.5 FL (ref 7.5–11.1)
POTASSIUM SERPL-SCNC: 4.3 MMOL/L (ref 3.5–5.1)
PROTHROMBIN TIME: 12.8 SECONDS (ref 11.7–14.5)
RBC # BLD: 4.13 M/CU MM (ref 4.2–5.4)
SODIUM BLD-SCNC: 141 MMOL/L (ref 135–145)
WBC # BLD: 6.7 K/CU MM (ref 4–10.5)

## 2024-05-21 PROCEDURE — 6360000002 HC RX W HCPCS

## 2024-05-21 PROCEDURE — 36226 PLACE CATH VERTEBRAL ART: CPT

## 2024-05-21 PROCEDURE — 6360000002 HC RX W HCPCS: Performed by: PSYCHIATRY & NEUROLOGY

## 2024-05-21 PROCEDURE — 2500000003 HC RX 250 WO HCPCS: Performed by: PSYCHIATRY & NEUROLOGY

## 2024-05-21 PROCEDURE — C1769 GUIDE WIRE: HCPCS

## 2024-05-21 PROCEDURE — 36225 PLACE CATH SUBCLAVIAN ART: CPT

## 2024-05-21 PROCEDURE — 99153 MOD SED SAME PHYS/QHP EA: CPT

## 2024-05-21 PROCEDURE — 36224 PLACE CATH CAROTD ART: CPT

## 2024-05-21 PROCEDURE — 80048 BASIC METABOLIC PNL TOTAL CA: CPT

## 2024-05-21 PROCEDURE — 6360000004 HC RX CONTRAST MEDICATION

## 2024-05-21 PROCEDURE — 99152 MOD SED SAME PHYS/QHP 5/>YRS: CPT

## 2024-05-21 PROCEDURE — 85027 COMPLETE CBC AUTOMATED: CPT

## 2024-05-21 PROCEDURE — 36227 PLACE CATH XTRNL CAROTID: CPT

## 2024-05-21 PROCEDURE — 85730 THROMBOPLASTIN TIME PARTIAL: CPT

## 2024-05-21 PROCEDURE — 85610 PROTHROMBIN TIME: CPT

## 2024-05-21 PROCEDURE — 2500000003 HC RX 250 WO HCPCS

## 2024-05-21 RX ORDER — FENTANYL CITRATE 50 UG/ML
INJECTION, SOLUTION INTRAMUSCULAR; INTRAVENOUS PRN
Status: COMPLETED | OUTPATIENT
Start: 2024-05-21 | End: 2024-05-21

## 2024-05-21 RX ORDER — ACETAMINOPHEN 325 MG/1
650 TABLET ORAL EVERY 4 HOURS PRN
Status: DISCONTINUED | OUTPATIENT
Start: 2024-05-21 | End: 2024-05-22 | Stop reason: HOSPADM

## 2024-05-21 RX ORDER — SODIUM CHLORIDE 0.9 % (FLUSH) 0.9 %
5-40 SYRINGE (ML) INJECTION EVERY 12 HOURS SCHEDULED
Status: DISCONTINUED | OUTPATIENT
Start: 2024-05-21 | End: 2024-05-22 | Stop reason: HOSPADM

## 2024-05-21 RX ORDER — ONDANSETRON 4 MG/1
4 TABLET, ORALLY DISINTEGRATING ORAL EVERY 8 HOURS PRN
Status: DISCONTINUED | OUTPATIENT
Start: 2024-05-21 | End: 2024-05-22 | Stop reason: HOSPADM

## 2024-05-21 RX ORDER — SODIUM CHLORIDE 9 MG/ML
INJECTION, SOLUTION INTRAVENOUS PRN
Status: DISCONTINUED | OUTPATIENT
Start: 2024-05-21 | End: 2024-05-22 | Stop reason: HOSPADM

## 2024-05-21 RX ORDER — MIDAZOLAM HYDROCHLORIDE 5 MG/ML
INJECTION INTRAMUSCULAR; INTRAVENOUS PRN
Status: COMPLETED | OUTPATIENT
Start: 2024-05-21 | End: 2024-05-21

## 2024-05-21 RX ORDER — SODIUM CHLORIDE 0.9 % (FLUSH) 0.9 %
5-40 SYRINGE (ML) INJECTION PRN
Status: DISCONTINUED | OUTPATIENT
Start: 2024-05-21 | End: 2024-05-22 | Stop reason: HOSPADM

## 2024-05-21 RX ORDER — LIDOCAINE HYDROCHLORIDE 10 MG/ML
INJECTION, SOLUTION EPIDURAL; INFILTRATION; INTRACAUDAL; PERINEURAL PRN
Status: COMPLETED | OUTPATIENT
Start: 2024-05-21 | End: 2024-05-21

## 2024-05-21 RX ORDER — ONDANSETRON 2 MG/ML
4 INJECTION INTRAMUSCULAR; INTRAVENOUS EVERY 6 HOURS PRN
Status: DISCONTINUED | OUTPATIENT
Start: 2024-05-21 | End: 2024-05-22 | Stop reason: HOSPADM

## 2024-05-21 RX ORDER — SODIUM CHLORIDE 9 MG/ML
INJECTION, SOLUTION INTRAVENOUS CONTINUOUS
Status: DISCONTINUED | OUTPATIENT
Start: 2024-05-21 | End: 2024-05-22 | Stop reason: HOSPADM

## 2024-05-21 RX ADMIN — FENTANYL CITRATE 50 MCG: 50 INJECTION, SOLUTION INTRAMUSCULAR; INTRAVENOUS at 10:25

## 2024-05-21 RX ADMIN — LIDOCAINE HYDROCHLORIDE 5 ML: 10 INJECTION, SOLUTION EPIDURAL; INFILTRATION; INTRACAUDAL; PERINEURAL at 10:25

## 2024-05-21 RX ADMIN — MIDAZOLAM 1 MG: 5 INJECTION INTRAMUSCULAR; INTRAVENOUS at 10:25

## 2024-05-21 ASSESSMENT — PAIN - FUNCTIONAL ASSESSMENT: PAIN_FUNCTIONAL_ASSESSMENT: NONE - DENIES PAIN

## 2024-05-21 NOTE — INTERVAL H&P NOTE
Update History & Physical    The patient's History and Physical of May 21, 2024 was reviewed with the patient and I examined the patient. There was no change. The surgical site was confirmed by the patient and me.     Plan: The risks, benefits, expected outcome, and alternative to the recommended procedure have been discussed with the patient. Patient understands and wants to proceed with the procedure.     Electronically signed by TARUN Quinones CNP on 5/21/2024 at 8:58 AM

## 2024-05-21 NOTE — OR NURSING
PROCEDURE START: 1025   PROCEDURE END: 1051  FLUORO TIME: 2.3 min   AK: 571 mgy  DAP: 146  CONTRAST VOLUME: 60 mL    ARRHYTHMIA: NONE  INTERVENTION: NONE

## 2024-05-21 NOTE — PROGRESS NOTES
TRANSFER - OUT REPORT:    Verbal report given to SARA Jara  on Altagracia Wayne being transferred to [facility] for routine progression of patient care       Report consisted of patient's Situation, Background, Assessment and   Recommendations(SBAR).     Information from the following report(s) Nurse Handoff Report was reviewed with the receiving nurse.    Opportunity for questions and clarification was provided.      Patient transported with:   Registered Nurse

## 2024-05-21 NOTE — PRE SEDATION
take 1 tablet by mouth once daily 3/16/21   Jessica Valdivia MD   atorvastatin (LIPITOR) 20 MG tablet Take 1 tablet by mouth daily    Jessica Valdivia MD   aspirin 81 MG tablet Take 1 tablet by mouth daily OTC    Jessica Valdivia MD   CALCIUM PO Take 500 mg by mouth daily    Jessica Valdivia MD   folic acid (FOLVITE) 400 MCG tablet Take 1 tablet by mouth every morning OTC    Jessica Valdivia MD   methotrexate 2.5 MG tablet Take 8 tablets by mouth once a week Indications: Take 8 tablets one time a week Takes on Monday    Jessica Valdivia MD   predniSONE (DELTASONE) 5 MG tablet Take 1 tablet by mouth 3 times daily    Jessica Valdivia MD     Coumadin Use Last 7 Days:  no  Antiplatelet drug therapy use last 7 days: yes - ASA  Other anticoagulant use last 7 days: no  Additional Medication Information:  See above      Pre-Sedation Documentation and Exam:   I have personally completed a history, physical exam & review of systems for this patient (see notes).    Mallampati Airway Assessment:  Mallampati Class II - (soft palate, fauces & uvula are visible)    Prior History of Anesthesia Complications:   none    ASA Classification:  Class 3 - A patient with severe systemic disease that limits activity but is not incapacitating    Sedation/ Anesthesia Plan:   intravenous sedation    Medications Planned:   Fentanyl and versed intravenously    Patient is an appropriate candidate for plan of sedation: yes    Electronically signed by TARUN Quinones CNP on 5/21/2024 at 8:58 AM

## 2024-05-22 ENCOUNTER — TELEPHONE (OUTPATIENT)
Dept: NEUROLOGY | Age: 84
End: 2024-05-22

## 2024-05-22 NOTE — TELEPHONE ENCOUNTER
Received call from pt dtr Patricia stating that pt is being discharged from [facility] today and she is requesting a letter stating that her mother needs care overnight and is unable to be left alone. She is requesting the letter to be excused from training she is supposed to attend. Informed that we are unable to provide [facility] paperwork for her since she is not our patient but would inquire about the letter only. Please advise.

## 2024-05-23 ENCOUNTER — HOSPITAL ENCOUNTER (OUTPATIENT)
Dept: SLEEP CENTER | Age: 84
Discharge: HOME | End: 2024-05-23
Attending: STUDENT IN AN ORGANIZED HEALTH CARE EDUCATION/TRAINING PROGRAM
Payer: MEDICARE

## 2024-05-23 DIAGNOSIS — G40.89 OTHER SEIZURES (HCC): ICD-10-CM

## 2024-05-23 DIAGNOSIS — R56.9 CONVULSIONS, UNSPECIFIED CONVULSION TYPE (HCC): ICD-10-CM

## 2024-05-23 PROCEDURE — 95816 EEG AWAKE AND DROWSY: CPT | Performed by: STUDENT IN AN ORGANIZED HEALTH CARE EDUCATION/TRAINING PROGRAM

## 2024-05-23 PROCEDURE — 95819 EEG AWAKE AND ASLEEP: CPT

## 2024-05-24 ENCOUNTER — TELEPHONE (OUTPATIENT)
Dept: CARDIOLOGY CLINIC | Age: 84
End: 2024-05-24

## 2024-05-24 NOTE — PROCEDURES
ROUTINE ELECTROENCEPHALOGRAM    Identifying Information:  Name: Altagracia Wayne  MRN: [id number]  : 1940  Interpreting Physician: Marina Brown DO  Referring Provider: Patricia Abad DO  Date of EE24  Procedure Location: Outpatient [facility]     Clinical History:  Altagracia Wayne is a 83 y.o. female with concern for seizures     Current Medications:        Indication:  Rule out seizure/seizure disorder     Technical Summary:  28 channels of EEG were recorded in a digital format on a patient who is reported to be awake and drowsy during the recording. The patient was not sleep deprived prior to the EEG.     The background consists of 6-7 Hz activity in the theta frequency range.  It is symmetric, normal voltage and continuous.  Posterior dominant rhythm (PDR) is absent but the background is reactive to stimulation.     Photic stimulation performed and did not produce any abnormalities. During the recording stage II sleep  was not seen.     The EKG lead revealed no rhythm abnormalties.       EEG Interpretation:   The EEG was abnormal due to the presence of:     Mild generalized slowing. This is a non-specific finding but is consistent with a generalized disturbance of cerebral function. It may be seen in a variety of conditions, such as toxic, metabolic, post-anoxic, multi-focal or diffuse structural abnormalities.   No electrographic seizures or non-convulsive status epilepticus was seen over the entire monitoring period.        Clinical correlation recommended.     Marina Brown DO   Epileptologist  2024 10:37 AM

## 2024-05-28 ENCOUNTER — TELEPHONE (OUTPATIENT)
Dept: WOUND CARE | Age: 84
End: 2024-05-28

## 2024-06-10 ENCOUNTER — HOSPITAL ENCOUNTER (OUTPATIENT)
Dept: WOUND CARE | Age: 84
Discharge: HOME | End: 2024-06-10
Attending: NURSE PRACTITIONER
Payer: MEDICARE

## 2024-06-10 VITALS
DIASTOLIC BLOOD PRESSURE: 63 MMHG | RESPIRATION RATE: 16 BRPM | HEART RATE: 86 BPM | SYSTOLIC BLOOD PRESSURE: 118 MMHG | TEMPERATURE: 97.9 F

## 2024-06-10 DIAGNOSIS — I63.9 DYSARTHRIA DUE TO ACUTE STROKE (HCC): ICD-10-CM

## 2024-06-10 DIAGNOSIS — D84.821 IMMUNOSUPPRESSION DUE TO DRUG THERAPY (HCC): ICD-10-CM

## 2024-06-10 DIAGNOSIS — Z79.899 IMMUNOSUPPRESSION DUE TO DRUG THERAPY (HCC): ICD-10-CM

## 2024-06-10 DIAGNOSIS — R47.1 DYSARTHRIA DUE TO ACUTE STROKE (HCC): ICD-10-CM

## 2024-06-10 DIAGNOSIS — L89.312 PRESSURE INJURY OF RIGHT BUTTOCK, STAGE 2 (HCC): ICD-10-CM

## 2024-06-10 DIAGNOSIS — R26.9 GAIT DISTURBANCE: Primary | ICD-10-CM

## 2024-06-10 DIAGNOSIS — M05.79 RHEUMATOID ARTHRITIS INVOLVING MULTIPLE SITES WITH POSITIVE RHEUMATOID FACTOR (HCC): ICD-10-CM

## 2024-06-10 DIAGNOSIS — N39.45 CONTINUOUS LEAKAGE OF URINE: ICD-10-CM

## 2024-06-10 PROCEDURE — 11719 TRIM NAIL(S) ANY NUMBER: CPT

## 2024-06-10 PROCEDURE — 97597 DBRDMT OPN WND 1ST 20 CM/<: CPT

## 2024-06-10 PROCEDURE — 11719 TRIM NAIL(S) ANY NUMBER: CPT | Performed by: NURSE PRACTITIONER

## 2024-06-10 RX ORDER — MELOXICAM 7.5 MG/1
7.5 TABLET ORAL DAILY
COMMUNITY

## 2024-06-10 RX ORDER — IBUPROFEN 200 MG
TABLET ORAL ONCE
OUTPATIENT
Start: 2024-06-10 | End: 2024-06-10

## 2024-06-10 RX ORDER — LIDOCAINE 50 MG/G
OINTMENT TOPICAL ONCE
OUTPATIENT
Start: 2024-06-10 | End: 2024-06-10

## 2024-06-10 RX ORDER — LIDOCAINE 40 MG/G
CREAM TOPICAL ONCE
OUTPATIENT
Start: 2024-06-10 | End: 2024-06-10

## 2024-06-10 RX ORDER — CLOBETASOL PROPIONATE 0.5 MG/G
OINTMENT TOPICAL ONCE
OUTPATIENT
Start: 2024-06-10 | End: 2024-06-10

## 2024-06-10 RX ORDER — BACITRACIN ZINC AND POLYMYXIN B SULFATE 500; 1000 [USP'U]/G; [USP'U]/G
OINTMENT TOPICAL ONCE
OUTPATIENT
Start: 2024-06-10 | End: 2024-06-10

## 2024-06-10 RX ORDER — GENTAMICIN SULFATE 1 MG/G
OINTMENT TOPICAL ONCE
OUTPATIENT
Start: 2024-06-10 | End: 2024-06-10

## 2024-06-10 RX ORDER — DOXYCYCLINE HYCLATE 100 MG/1
100 CAPSULE ORAL 2 TIMES DAILY
COMMUNITY

## 2024-06-10 RX ORDER — LIDOCAINE HYDROCHLORIDE 40 MG/ML
SOLUTION TOPICAL ONCE
OUTPATIENT
Start: 2024-06-10 | End: 2024-06-10

## 2024-06-10 RX ORDER — BACITRACIN ZINC 500 [USP'U]/G
OINTMENT TOPICAL ONCE
OUTPATIENT
Start: 2024-06-10 | End: 2024-06-10

## 2024-06-10 RX ORDER — BETAMETHASONE DIPROPIONATE 0.05 %
OINTMENT (GRAM) TOPICAL ONCE
OUTPATIENT
Start: 2024-06-10 | End: 2024-06-10

## 2024-06-10 RX ORDER — MIRABEGRON 25 MG/1
25 TABLET, FILM COATED, EXTENDED RELEASE ORAL DAILY
COMMUNITY

## 2024-06-10 RX ORDER — TRIAMCINOLONE ACETONIDE 1 MG/G
OINTMENT TOPICAL ONCE
OUTPATIENT
Start: 2024-06-10 | End: 2024-06-10

## 2024-06-10 RX ORDER — SODIUM CHLOR/HYPOCHLOROUS ACID 0.033 %
SOLUTION, IRRIGATION IRRIGATION ONCE
OUTPATIENT
Start: 2024-06-10 | End: 2024-06-10

## 2024-06-10 NOTE — PATIENT INSTRUCTIONS
PHYSICIAN ORDERS AND DISCHARGE INSTRUCTIONS     Wound cleansing:              Do not scrub or use excessive force.             Wash hands with soap and water before and after dressing changes.             Prior to applying a clean dressing, cleanse wound with normal saline,               wound cleanser, or mild soap and water.              Ask the physician or nurse before getting the wound(s) wet in a shower     Wound Care Notes:         Kelsey to find out about wheelchair. Reliable medical equipment to do home visit. Received 24                              Orders for this week: 6/10/2024    FAX TO [facility].  FAX ORDERS TO [facility]!     Bilateral lower feet: Moisturize with A+D             Follow Up Instructions: At the Wound Care Center 8 weeks:   Primary Wound Care Provider: Kelsey Marinelli CNP   Call  for any questions or concerns.  Central [phone] for imaging lab work

## 2024-06-11 NOTE — PROGRESS NOTES
Wound Care Center Progress Visit      Altagracia Wayne  AGE: 83 y.o.   GENDER: female  : 1940  EPISODE DATE:  6/10/2024   Referred by: Marshall Dominique APRN - CNP     Subjective:     CHIEF COMPLAINT  WOUND   Problem List Items Addressed This Visit          Circulatory    Dysarthria due to acute stroke (HCC)       Other    Gait disturbance - Primary    Rheumatoid arthritis involving multiple sites with positive rheumatoid factor (HCC)    Relevant Medications    meloxicam (MOBIC) 7.5 MG tablet    WD-Pressure injury of right buttock, stage 2 (HCC)    Continuous leakage of urine    Relevant Medications    mirabegron (MYRBETRIQ) 25 MG TB24    Immunosuppression due to drug therapy (HCC)     Chief Complaint   Patient presents with    Wound Check        HISTORY of PRESENT ILLNESS      Altagracia Wayne is a 83 y.o. 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 The condition is of moderate severity. The ulcers are recurrent in nature and have been present at varying degrees of severity for > than one year at initial visit to the wound clinic 23. The underlying cause is thought to be pressure and moisture from urinary incontinence. The patients care to date has included santyl. The patient has significant underlying medical conditions as below. Marshall Dominique APRN - CNP is PCP.    Wound Pain Timing/Severity: waxing and waning, moderate  Quality of pain: aching, tender, pressure  Severity of pain:  1 / 10   Modifying Factors: chronic pressure, decreased mobility, shear force, immunosuppression, incontinence of stool, and incontinence of urine  Associated Signs/Symptoms: pain    TANA: as indicated base on wound location and assessment    Wound infection: wound culture will be obtained as needed for symptoms of infection     No results found for: \"CULTURE\"     Arterial evaluation: if indicated based on wound, location, symptoms and

## 2024-06-14 ENCOUNTER — HOSPITAL ENCOUNTER (OUTPATIENT)
Age: 84
Setting detail: SPECIMEN
Discharge: HOME | End: 2024-06-14
Payer: MEDICARE

## 2024-06-14 LAB
ALT SERPL-CCNC: 9 U/L (ref 10–40)
AST SERPL-CCNC: 10 IU/L (ref 15–37)
BASOPHILS ABSOLUTE: 0 K/CU MM
BASOPHILS RELATIVE PERCENT: 0.4 % (ref 0–1)
BUN SERPL-MCNC: 17 MG/DL (ref 6–23)
CREAT SERPL-MCNC: 0.4 MG/DL (ref 0.6–1.1)
DIFFERENTIAL TYPE: ABNORMAL
EOSINOPHILS ABSOLUTE: 0.1 K/CU MM
EOSINOPHILS RELATIVE PERCENT: 0.9 % (ref 0–3)
GFR, ESTIMATED: >90 ML/MIN/1.73M2
HCT VFR BLD CALC: 39.4 % (ref 37–47)
HEMOGLOBIN: 12.7 GM/DL (ref 12.5–16)
IMMATURE NEUTROPHIL %: 1.3 % (ref 0–0.43)
LYMPHOCYTES ABSOLUTE: 1.2 K/CU MM
LYMPHOCYTES RELATIVE PERCENT: 21.4 % (ref 24–44)
MCH RBC QN AUTO: 31 PG (ref 27–31)
MCHC RBC AUTO-ENTMCNC: 32.2 % (ref 32–36)
MCV RBC AUTO: 96.1 FL (ref 78–100)
MONOCYTES ABSOLUTE: 0.4 K/CU MM
MONOCYTES RELATIVE PERCENT: 7.9 % (ref 0–4)
NEUTROPHILS ABSOLUTE: 3.8 K/CU MM
NEUTROPHILS RELATIVE PERCENT: 68.1 % (ref 36–66)
NUCLEATED RBC %: 0 %
PDW BLD-RTO: 14.5 % (ref 11.7–14.9)
PLATELET # BLD: 311 K/CU MM (ref 140–440)
PMV BLD AUTO: 10 FL (ref 7.5–11.1)
RBC # BLD: 4.1 M/CU MM (ref 4.2–5.4)
SED RATE, AUTOMATED: 20 MM/HR (ref 0–30)
TOTAL IMMATURE NEUTOROPHIL: 0.07 K/CU MM
TOTAL NUCLEATED RBC: 0 K/CU MM
WBC # BLD: 5.6 K/CU MM (ref 4–10.5)

## 2024-06-14 PROCEDURE — 84450 TRANSFERASE (AST) (SGOT): CPT

## 2024-06-14 PROCEDURE — 84520 ASSAY OF UREA NITROGEN: CPT

## 2024-06-14 PROCEDURE — 85652 RBC SED RATE AUTOMATED: CPT

## 2024-06-14 PROCEDURE — 84460 ALANINE AMINO (ALT) (SGPT): CPT

## 2024-06-14 PROCEDURE — 82565 ASSAY OF CREATININE: CPT

## 2024-06-14 PROCEDURE — 85025 COMPLETE CBC W/AUTO DIFF WBC: CPT

## 2024-06-24 ENCOUNTER — TELEPHONE (OUTPATIENT)
Dept: CARDIOLOGY CLINIC | Age: 84
End: 2024-06-24

## 2024-06-24 NOTE — TELEPHONE ENCOUNTER
Mailbox full        This is cardiac monitor report, basic rhtyhm is sinus, min hr is 50 bpm max hr is 120 bpm, no afib, no heart block, no VTACH , few pvcs and APCs noted

## 2024-06-27 ENCOUNTER — OFFICE VISIT (OUTPATIENT)
Dept: NEUROLOGY | Age: 84
End: 2024-06-27
Payer: MEDICARE

## 2024-06-27 VITALS — DIASTOLIC BLOOD PRESSURE: 62 MMHG | OXYGEN SATURATION: 98 % | HEART RATE: 78 BPM | SYSTOLIC BLOOD PRESSURE: 132 MMHG

## 2024-06-27 DIAGNOSIS — F01.50 VASCULAR DEMENTIA WITHOUT BEHAVIORAL DISTURBANCE, PSYCHOTIC DISTURBANCE, MOOD DISTURBANCE, OR ANXIETY, UNSPECIFIED DEMENTIA SEVERITY (HCC): ICD-10-CM

## 2024-06-27 DIAGNOSIS — Z87.898 HISTORY OF SEIZURE: ICD-10-CM

## 2024-06-27 DIAGNOSIS — I65.21 STENOSIS OF RIGHT CAROTID ARTERY: Primary | ICD-10-CM

## 2024-06-27 DIAGNOSIS — Z86.73 HISTORY OF STROKE: ICD-10-CM

## 2024-06-27 DIAGNOSIS — M06.38 RHEUMATOID PANNUS OF CERVICAL SPINE (HCC): ICD-10-CM

## 2024-06-27 DIAGNOSIS — R55 SYNCOPE, UNSPECIFIED SYNCOPE TYPE: ICD-10-CM

## 2024-06-27 PROCEDURE — G8427 DOCREV CUR MEDS BY ELIG CLIN: HCPCS | Performed by: NURSE PRACTITIONER

## 2024-06-27 PROCEDURE — G8399 PT W/DXA RESULTS DOCUMENT: HCPCS | Performed by: NURSE PRACTITIONER

## 2024-06-27 PROCEDURE — 3075F SYST BP GE 130 - 139MM HG: CPT | Performed by: NURSE PRACTITIONER

## 2024-06-27 PROCEDURE — 3078F DIAST BP <80 MM HG: CPT | Performed by: NURSE PRACTITIONER

## 2024-06-27 PROCEDURE — 1123F ACP DISCUSS/DSCN MKR DOCD: CPT | Performed by: NURSE PRACTITIONER

## 2024-06-27 PROCEDURE — G8419 CALC BMI OUT NRM PARAM NOF/U: HCPCS | Performed by: NURSE PRACTITIONER

## 2024-06-27 PROCEDURE — 99213 OFFICE O/P EST LOW 20 MIN: CPT | Performed by: NURSE PRACTITIONER

## 2024-06-27 PROCEDURE — 1036F TOBACCO NON-USER: CPT | Performed by: NURSE PRACTITIONER

## 2024-06-27 PROCEDURE — 1090F PRES/ABSN URINE INCON ASSESS: CPT | Performed by: NURSE PRACTITIONER

## 2024-06-27 NOTE — PROGRESS NOTES
6/27/24    Altagracia Wayne  1940    Chief Complaint   Patient presents with    Follow-up     3m follow up        History of Present Illness  Altagracia is a 83 y.o. female presenting today for follow-up of: history of stroke, HLD, HTN, VIC, seizure, RA, cervical myelomalacia, dementia.      She is maintained on Keppra 500 mg twice daily.  She continues Aricept and Namenda to help slow the progression of memory loss.  She is on aspirin, atorvastatin for secondary stroke prevention.     She continues to live at home with her son and daughter. She has 24/7 care from family, respite care comes once weekly for a few hours and home health care aide comes for few hours once weekly as well.     In chart review : she went to the ED on 4/7/2024 following a syncopal episode.  Her daughter Patricia was transferring her into her wheelchair when she lost consciousness.  MRI brain was nonacute.  She has a history of right MCA stroke in 2015 due to symptomatic right ICA stenosis s/p right CEA.  She follows up with cardiology, completed heart monitor.  She completed routine EEG which was without seizure activity.  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 Plan for diagnostic cerebral angiogram for further evaluation of right ICA as well as hypoplastic posterior circulation. It showed    Summary: Mild impingement with right head rotation of the right internal carotid artery. This produces approximately 40% narrowing. No intervention recommended.    She has not been wearing CPAP routinely but daughter states she is going to restart it.     She is participating in PT/OT at home again after her hospitalization currently.    Current Outpatient Medications   Medication Sig Dispense Refill    mirabegron (MYRBETRIQ) 25 MG TB24 Take 1 tablet by mouth

## 2024-07-25 ENCOUNTER — HOSPITAL ENCOUNTER (OUTPATIENT)
Dept: WOUND CARE | Age: 84
Discharge: HOME | End: 2024-07-25
Attending: NURSE PRACTITIONER
Payer: MEDICARE

## 2024-07-25 VITALS
SYSTOLIC BLOOD PRESSURE: 119 MMHG | DIASTOLIC BLOOD PRESSURE: 71 MMHG | TEMPERATURE: 96.8 F | RESPIRATION RATE: 16 BRPM | HEART RATE: 81 BPM

## 2024-07-25 DIAGNOSIS — L89.152 DECUBITUS ULCER OF COCCYX, STAGE 2 (HCC): ICD-10-CM

## 2024-07-25 DIAGNOSIS — I63.9 DYSARTHRIA DUE TO ACUTE STROKE (HCC): ICD-10-CM

## 2024-07-25 DIAGNOSIS — N39.45 CONTINUOUS LEAKAGE OF URINE: ICD-10-CM

## 2024-07-25 DIAGNOSIS — M05.79 RHEUMATOID ARTHRITIS INVOLVING MULTIPLE SITES WITH POSITIVE RHEUMATOID FACTOR (HCC): Primary | ICD-10-CM

## 2024-07-25 DIAGNOSIS — Z79.899 IMMUNOSUPPRESSION DUE TO DRUG THERAPY (HCC): ICD-10-CM

## 2024-07-25 DIAGNOSIS — D84.821 IMMUNOSUPPRESSION DUE TO DRUG THERAPY (HCC): ICD-10-CM

## 2024-07-25 DIAGNOSIS — R47.1 DYSARTHRIA DUE TO ACUTE STROKE (HCC): ICD-10-CM

## 2024-07-25 PROCEDURE — 6370000000 HC RX 637 (ALT 250 FOR IP): Performed by: NURSE PRACTITIONER

## 2024-07-25 PROCEDURE — 11042 DBRDMT SUBQ TIS 1ST 20SQCM/<: CPT

## 2024-07-25 RX ORDER — BETAMETHASONE DIPROPIONATE 0.05 %
OINTMENT (GRAM) TOPICAL ONCE
OUTPATIENT
Start: 2024-07-25 | End: 2024-07-25

## 2024-07-25 RX ORDER — TRIAMCINOLONE ACETONIDE 1 MG/G
OINTMENT TOPICAL ONCE
OUTPATIENT
Start: 2024-07-25 | End: 2024-07-25

## 2024-07-25 RX ORDER — CLOBETASOL PROPIONATE 0.5 MG/G
OINTMENT TOPICAL ONCE
OUTPATIENT
Start: 2024-07-25 | End: 2024-07-25

## 2024-07-25 RX ORDER — LIDOCAINE 50 MG/G
OINTMENT TOPICAL ONCE
OUTPATIENT
Start: 2024-07-25 | End: 2024-07-25

## 2024-07-25 RX ORDER — LIDOCAINE HYDROCHLORIDE 40 MG/ML
SOLUTION TOPICAL ONCE
OUTPATIENT
Start: 2024-07-25 | End: 2024-07-25

## 2024-07-25 RX ORDER — LIDOCAINE 50 MG/G
OINTMENT TOPICAL ONCE
Status: COMPLETED | OUTPATIENT
Start: 2024-07-25 | End: 2024-07-25

## 2024-07-25 RX ORDER — GENTAMICIN SULFATE 1 MG/G
OINTMENT TOPICAL ONCE
OUTPATIENT
Start: 2024-07-25 | End: 2024-07-25

## 2024-07-25 RX ORDER — LIDOCAINE 40 MG/G
CREAM TOPICAL ONCE
OUTPATIENT
Start: 2024-07-25 | End: 2024-07-25

## 2024-07-25 RX ORDER — CLOBETASOL PROPIONATE 0.5 MG/G
OINTMENT TOPICAL ONCE
Status: COMPLETED | OUTPATIENT
Start: 2024-07-25 | End: 2024-07-25

## 2024-07-25 RX ORDER — BACITRACIN ZINC 500 [USP'U]/G
OINTMENT TOPICAL ONCE
OUTPATIENT
Start: 2024-07-25 | End: 2024-07-25

## 2024-07-25 RX ORDER — BACITRACIN ZINC AND POLYMYXIN B SULFATE 500; 1000 [USP'U]/G; [USP'U]/G
OINTMENT TOPICAL ONCE
OUTPATIENT
Start: 2024-07-25 | End: 2024-07-25

## 2024-07-25 RX ORDER — IBUPROFEN 200 MG
TABLET ORAL ONCE
OUTPATIENT
Start: 2024-07-25 | End: 2024-07-25

## 2024-07-25 RX ORDER — SODIUM CHLOR/HYPOCHLOROUS ACID 0.033 %
SOLUTION, IRRIGATION IRRIGATION ONCE
OUTPATIENT
Start: 2024-07-25 | End: 2024-07-25

## 2024-07-25 RX ADMIN — LIDOCAINE: 50 OINTMENT TOPICAL at 16:11

## 2024-07-25 RX ADMIN — CLOBETASOL PROPIONATE: 0.5 OINTMENT TOPICAL at 16:10

## 2024-07-25 NOTE — PATIENT INSTRUCTIONS
PHYSICIAN ORDERS AND DISCHARGE INSTRUCTIONS     Wound cleansing:              Do not scrub or use excessive force.             Wash hands with soap and water before and after dressing changes.             Prior to applying a clean dressing, cleanse wound with normal saline,               wound cleanser, or mild soap and water.              Ask the physician or nurse before getting the wound(s) wet in a shower     Wound Care Notes:         Kelsey to find out about wheelchair. Reliable medical equipment to do home visit. Received 24                              Orders for this week: 2024    FAX TO [facility].  FAX ORDERS TO [facility]!     Coccyx: Clean with soap and water, pat dry  Apply Desatin, Clobetasol, Lidocaine and Stimulen to Wound Bed.            Follow Up Instructions: At the Wound Care Center 2 weeks:   Primary Wound Care Provider: Kelsey Marinelli CNP   Call  for any questions or concerns.  Central [phone] for imaging lab work

## 2024-08-20 ENCOUNTER — HOSPITAL ENCOUNTER (OUTPATIENT)
Dept: WOMENS IMAGING | Age: 84
Discharge: HOME | End: 2024-08-20
Payer: MEDICARE

## 2024-08-20 ENCOUNTER — HOSPITAL ENCOUNTER (OUTPATIENT)
Dept: GENERAL RADIOLOGY | Age: 84
Discharge: HOME | End: 2024-08-20
Attending: ORTHOPAEDIC SURGERY

## 2024-08-20 ENCOUNTER — OFFICE VISIT (OUTPATIENT)
Dept: ORTHOPEDIC SURGERY | Age: 84
End: 2024-08-20

## 2024-08-20 VITALS — HEART RATE: 62 BPM | RESPIRATION RATE: 17 BRPM | OXYGEN SATURATION: 96 % | TEMPERATURE: 97.6 F

## 2024-08-20 DIAGNOSIS — Z12.31 ENCOUNTER FOR SCREENING MAMMOGRAM FOR BREAST CANCER: ICD-10-CM

## 2024-08-20 DIAGNOSIS — S82.64XA CLOSED NONDISPLACED FRACTURE OF LATERAL MALLEOLUS OF RIGHT FIBULA, INITIAL ENCOUNTER: ICD-10-CM

## 2024-08-20 DIAGNOSIS — Z00.6 EXAMINATION FOR NORMAL COMPARISON OR CONTROL IN CLINICAL RESEARCH: ICD-10-CM

## 2024-08-20 DIAGNOSIS — Z78.0 ASYMPTOMATIC MENOPAUSAL STATE: ICD-10-CM

## 2024-08-20 DIAGNOSIS — M19.071 PRIMARY LOCALIZED OSTEOARTHROSIS OF RIGHT ANKLE AND FOOT: Primary | ICD-10-CM

## 2024-08-20 PROCEDURE — 77063 BREAST TOMOSYNTHESIS BI: CPT

## 2024-08-20 PROCEDURE — 77080 DXA BONE DENSITY AXIAL: CPT
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
## 2024-08-20 NOTE — PATIENT INSTRUCTIONS
Continue weight-bearing as tolerated.  Continue range of motion exercises as instructed.  Ice and elevate as needed.  Tylenol or Motrin for pain.  Restart home therapy  Start wearing lace up ankle brace  Follow up in 3 weeks    We are committed to providing you the best care possible.     If you receive a survey after visiting one of our offices, please take time to share your experience concerning your physician office visit.  These surveys are confidential and no health information about you is shared.     We are eager to improve for you and we are counting on your feedback to help make that happen. If you felt my care was outstanding, please mention me by name: Blanche LIM

## 2024-08-21 ENCOUNTER — TELEPHONE (OUTPATIENT)
Dept: WOUND CARE | Age: 84
End: 2024-08-21

## 2024-08-22 ENCOUNTER — HOSPITAL ENCOUNTER (OUTPATIENT)
Dept: WOUND CARE | Age: 84
Discharge: HOME | End: 2024-08-22
Attending: NURSE PRACTITIONER
Payer: MEDICARE

## 2024-08-22 VITALS
HEART RATE: 68 BPM | DIASTOLIC BLOOD PRESSURE: 79 MMHG | RESPIRATION RATE: 16 BRPM | TEMPERATURE: 96.5 F | SYSTOLIC BLOOD PRESSURE: 141 MMHG

## 2024-08-22 DIAGNOSIS — L84 CALLUS OF TOE: ICD-10-CM

## 2024-08-22 DIAGNOSIS — L89.312 PRESSURE INJURY OF RIGHT BUTTOCK, STAGE 2 (HCC): ICD-10-CM

## 2024-08-22 DIAGNOSIS — I63.9 DYSARTHRIA DUE TO ACUTE STROKE (HCC): ICD-10-CM

## 2024-08-22 DIAGNOSIS — Z79.899 IMMUNOSUPPRESSION DUE TO DRUG THERAPY (HCC): ICD-10-CM

## 2024-08-22 DIAGNOSIS — N39.45 CONTINUOUS LEAKAGE OF URINE: ICD-10-CM

## 2024-08-22 DIAGNOSIS — M05.79 RHEUMATOID ARTHRITIS INVOLVING MULTIPLE SITES WITH POSITIVE RHEUMATOID FACTOR (HCC): ICD-10-CM

## 2024-08-22 DIAGNOSIS — R26.9 GAIT DISTURBANCE: Primary | ICD-10-CM

## 2024-08-22 DIAGNOSIS — R47.1 DYSARTHRIA DUE TO ACUTE STROKE (HCC): ICD-10-CM

## 2024-08-22 DIAGNOSIS — D84.821 IMMUNOSUPPRESSION DUE TO DRUG THERAPY (HCC): ICD-10-CM

## 2024-08-22 PROCEDURE — 11055 PARING/CUTG B9 HYPRKER LES 1: CPT

## 2024-08-22 PROCEDURE — 6370000000 HC RX 637 (ALT 250 FOR IP): Performed by: NURSE PRACTITIONER

## 2024-08-22 PROCEDURE — 11042 DBRDMT SUBQ TIS 1ST 20SQCM/<: CPT

## 2024-08-22 RX ORDER — CLOBETASOL PROPIONATE 0.5 MG/G
OINTMENT TOPICAL ONCE
OUTPATIENT
Start: 2024-08-22 | End: 2024-08-22

## 2024-08-22 RX ORDER — CLOTRIMAZOLE AND BETAMETHASONE DIPROPIONATE 10; .64 MG/G; MG/G
CREAM TOPICAL
Qty: 45 G | Refills: 3 | Status: SHIPPED | OUTPATIENT
Start: 2024-08-22

## 2024-08-22 RX ORDER — CLOBETASOL PROPIONATE 0.5 MG/G
OINTMENT TOPICAL
Qty: 60 G | Refills: 3 | Status: SHIPPED | OUTPATIENT
Start: 2024-08-22 | End: 2024-08-22

## 2024-08-22 RX ORDER — LIDOCAINE HYDROCHLORIDE 40 MG/ML
SOLUTION TOPICAL ONCE
OUTPATIENT
Start: 2024-08-22 | End: 2024-08-22

## 2024-08-22 RX ORDER — BACITRACIN ZINC 500 [USP'U]/G
OINTMENT TOPICAL ONCE
OUTPATIENT
Start: 2024-08-22 | End: 2024-08-22

## 2024-08-22 RX ORDER — BETAMETHASONE DIPROPIONATE 0.05 %
OINTMENT (GRAM) TOPICAL ONCE
OUTPATIENT
Start: 2024-08-22 | End: 2024-08-22

## 2024-08-22 RX ORDER — IBUPROFEN 200 MG
TABLET ORAL ONCE
OUTPATIENT
Start: 2024-08-22 | End: 2024-08-22

## 2024-08-22 RX ORDER — CLOBETASOL PROPIONATE 0.5 MG/G
OINTMENT TOPICAL ONCE
Status: COMPLETED | OUTPATIENT
Start: 2024-08-22 | End: 2024-08-22

## 2024-08-22 RX ORDER — LIDOCAINE 50 MG/G
OINTMENT TOPICAL ONCE
OUTPATIENT
Start: 2024-08-22 | End: 2024-08-22

## 2024-08-22 RX ORDER — LIDOCAINE 50 MG/G
OINTMENT TOPICAL
Qty: 50 G | Refills: 3 | Status: SHIPPED | OUTPATIENT
Start: 2024-08-22

## 2024-08-22 RX ORDER — SODIUM CHLOR/HYPOCHLOROUS ACID 0.033 %
SOLUTION, IRRIGATION IRRIGATION ONCE
OUTPATIENT
Start: 2024-08-22 | End: 2024-08-22

## 2024-08-22 RX ORDER — GENTAMICIN SULFATE 1 MG/G
OINTMENT TOPICAL ONCE
OUTPATIENT
Start: 2024-08-22 | End: 2024-08-22

## 2024-08-22 RX ORDER — BACITRACIN ZINC AND POLYMYXIN B SULFATE 500; 1000 [USP'U]/G; [USP'U]/G
OINTMENT TOPICAL ONCE
OUTPATIENT
Start: 2024-08-22 | End: 2024-08-22

## 2024-08-22 RX ORDER — LIDOCAINE 40 MG/G
CREAM TOPICAL ONCE
OUTPATIENT
Start: 2024-08-22 | End: 2024-08-22

## 2024-08-22 RX ORDER — LIDOCAINE 50 MG/G
OINTMENT TOPICAL ONCE
Status: COMPLETED | OUTPATIENT
Start: 2024-08-22 | End: 2024-08-22

## 2024-08-22 RX ORDER — TRIAMCINOLONE ACETONIDE 1 MG/G
OINTMENT TOPICAL ONCE
OUTPATIENT
Start: 2024-08-22 | End: 2024-08-22

## 2024-08-22 RX ADMIN — CLOBETASOL PROPIONATE: 0.5 OINTMENT TOPICAL at 10:16

## 2024-08-22 RX ADMIN — LIDOCAINE: 50 OINTMENT TOPICAL at 10:15

## 2024-08-22 ASSESSMENT — PAIN SCALES - GENERAL
PAINLEVEL_OUTOF10: 3
PAINLEVEL_OUTOF10: 0

## 2024-08-22 ASSESSMENT — PAIN DESCRIPTION - DESCRIPTORS: DESCRIPTORS: DULL

## 2024-08-22 ASSESSMENT — PAIN DESCRIPTION - ORIENTATION: ORIENTATION: MID

## 2024-08-22 ASSESSMENT — PAIN DESCRIPTION - LOCATION: LOCATION: COCCYX

## 2024-08-22 NOTE — PATIENT INSTRUCTIONS
PHYSICIAN ORDERS AND DISCHARGE INSTRUCTIONS     Wound cleansing:              Do not scrub or use excessive force.             Wash hands with soap and water before and after dressing changes.             Prior to applying a clean dressing, cleanse wound with normal saline,               wound cleanser, or mild soap and water.              Ask the physician or nurse before getting the wound(s) wet in a shower     Wound Care Notes:         Kelsey to find out about wheelchair. Reliable medical equipment to do home visit. Received 24                              Orders for this week: 2024    FAX TO [facility].  FAX ORDERS TO [facility]!     Coccyx: Clean with soap and water, pat dry  Apply Desatin, Clobetasol, Lidocaine and Stimulen to Wound Bed.  Apply Daily    Plan:           Follow Up Instructions: At the Wound Care Center 2 weeks:   Primary Wound Care Provider: Kelsey Marinelli CNP   Call  for any questions or concerns.  Central [phone] for imaging lab work

## 2024-08-22 NOTE — PROGRESS NOTES
Wound Care Center Progress Visit      Altagracia Wayne  AGE: 83 y.o.   GENDER: female  : 1940  EPISODE DATE:  2024   Referred by: Marshall Dominique APRN - CNP     Subjective:     CHIEF COMPLAINT  WOUND   Problem List Items Addressed This Visit          Circulatory    Dysarthria due to acute stroke (HCC)    Relevant Orders    Initiate Outpatient Wound Care Protocol       Other    Gait disturbance - Primary    Relevant Orders    Initiate Outpatient Wound Care Protocol    Rheumatoid arthritis involving multiple sites with positive rheumatoid factor (HCC)    Relevant Orders    Initiate Outpatient Wound Care Protocol    WD-Pressure injury of right buttock, stage 2 (HCC)    Relevant Orders    Initiate Outpatient Wound Care Protocol    Continuous leakage of urine    Relevant Orders    Initiate Outpatient Wound Care Protocol    Immunosuppression due to drug therapy (HCC)    Relevant Orders    Initiate Outpatient Wound Care Protocol    Callus of toe     Chief Complaint   Patient presents with    Wound Check        HISTORY of PRESENT ILLNESS      Altagracia Wayne is a 83 y.o. 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 The condition is of moderate severity. The ulcers are recurrent in nature and have been present at varying degrees of severity for > than one year at initial visit to the wound clinic 23. The underlying cause is thought to be pressure and moisture from urinary incontinence. The patients care to date has included santyl. The patient has significant underlying medical conditions as below. Marshall Dominique APRN - CNP is PCP.    Wound Pain Timing/Severity: waxing and waning, moderate  Quality of pain: aching, tender, pressure  Severity of pain:  1 / 10   Modifying Factors: chronic pressure, decreased mobility, shear force, immunosuppression, incontinence of stool, and incontinence of urine  Associated Signs/Symptoms:

## 2024-08-25 PROBLEM — S82.64XA CLOSED NONDISPLACED FRACTURE OF LATERAL MALLEOLUS OF RIGHT FIBULA: Status: ACTIVE | Noted: 2024-08-25

## 2024-08-25 PROBLEM — M19.071 PRIMARY LOCALIZED OSTEOARTHROSIS OF RIGHT ANKLE AND FOOT: Status: ACTIVE | Noted: 2024-08-25

## 2024-09-05 ENCOUNTER — HOSPITAL ENCOUNTER (OUTPATIENT)
Dept: WOUND CARE | Age: 84
Discharge: HOME | End: 2024-09-05
Attending: NURSE PRACTITIONER
Payer: MEDICARE

## 2024-09-05 VITALS
RESPIRATION RATE: 16 BRPM | HEART RATE: 73 BPM | TEMPERATURE: 96.9 F | DIASTOLIC BLOOD PRESSURE: 86 MMHG | SYSTOLIC BLOOD PRESSURE: 164 MMHG

## 2024-09-05 DIAGNOSIS — I63.9 DYSARTHRIA DUE TO ACUTE STROKE (HCC): ICD-10-CM

## 2024-09-05 DIAGNOSIS — R47.1 DYSARTHRIA DUE TO ACUTE STROKE (HCC): ICD-10-CM

## 2024-09-05 DIAGNOSIS — N39.45 CONTINUOUS LEAKAGE OF URINE: ICD-10-CM

## 2024-09-05 DIAGNOSIS — L89.322 PRESSURE INJURY OF LEFT BUTTOCK, STAGE 2 (HCC): ICD-10-CM

## 2024-09-05 DIAGNOSIS — M05.79 RHEUMATOID ARTHRITIS INVOLVING MULTIPLE SITES WITH POSITIVE RHEUMATOID FACTOR (HCC): ICD-10-CM

## 2024-09-05 DIAGNOSIS — R26.9 GAIT DISTURBANCE: Primary | ICD-10-CM

## 2024-09-05 DIAGNOSIS — Z79.899 IMMUNOSUPPRESSION DUE TO DRUG THERAPY (HCC): ICD-10-CM

## 2024-09-05 DIAGNOSIS — D84.821 IMMUNOSUPPRESSION DUE TO DRUG THERAPY (HCC): ICD-10-CM

## 2024-09-05 PROCEDURE — 11042 DBRDMT SUBQ TIS 1ST 20SQCM/<: CPT | Performed by: NURSE PRACTITIONER

## 2024-09-05 PROCEDURE — 11042 DBRDMT SUBQ TIS 1ST 20SQCM/<: CPT

## 2024-09-05 PROCEDURE — 6370000000 HC RX 637 (ALT 250 FOR IP): Performed by: NURSE PRACTITIONER

## 2024-09-05 RX ORDER — LIDOCAINE 50 MG/G
OINTMENT TOPICAL ONCE
Status: COMPLETED | OUTPATIENT
Start: 2024-09-05 | End: 2024-09-05

## 2024-09-05 RX ORDER — TRIAMCINOLONE ACETONIDE 1 MG/G
OINTMENT TOPICAL ONCE
OUTPATIENT
Start: 2024-09-05 | End: 2024-09-05

## 2024-09-05 RX ORDER — CLOBETASOL PROPIONATE 0.5 MG/G
OINTMENT TOPICAL ONCE
OUTPATIENT
Start: 2024-09-05 | End: 2024-09-05

## 2024-09-05 RX ORDER — SILVER SULFADIAZINE 10 MG/G
CREAM TOPICAL ONCE
OUTPATIENT
Start: 2024-09-05 | End: 2024-09-05

## 2024-09-05 RX ORDER — BACITRACIN ZINC 500 [USP'U]/G
OINTMENT TOPICAL ONCE
OUTPATIENT
Start: 2024-09-05 | End: 2024-09-05

## 2024-09-05 RX ORDER — LIDOCAINE 40 MG/G
CREAM TOPICAL ONCE
OUTPATIENT
Start: 2024-09-05 | End: 2024-09-05

## 2024-09-05 RX ORDER — BACITRACIN ZINC AND POLYMYXIN B SULFATE 500; 1000 [USP'U]/G; [USP'U]/G
OINTMENT TOPICAL ONCE
OUTPATIENT
Start: 2024-09-05 | End: 2024-09-05

## 2024-09-05 RX ORDER — LIDOCAINE HYDROCHLORIDE 40 MG/ML
SOLUTION TOPICAL ONCE
OUTPATIENT
Start: 2024-09-05 | End: 2024-09-05

## 2024-09-05 RX ORDER — BETAMETHASONE DIPROPIONATE 0.05 %
OINTMENT (GRAM) TOPICAL ONCE
OUTPATIENT
Start: 2024-09-05 | End: 2024-09-05

## 2024-09-05 RX ORDER — MUPIROCIN 20 MG/G
OINTMENT TOPICAL ONCE
OUTPATIENT
Start: 2024-09-05 | End: 2024-09-05

## 2024-09-05 RX ORDER — NEOMYCIN/BACITRACIN/POLYMYXINB 3.5-400-5K
OINTMENT (GRAM) TOPICAL ONCE
OUTPATIENT
Start: 2024-09-05 | End: 2024-09-05

## 2024-09-05 RX ORDER — SODIUM CHLOR/HYPOCHLOROUS ACID 0.033 %
SOLUTION, IRRIGATION IRRIGATION ONCE
OUTPATIENT
Start: 2024-09-05 | End: 2024-09-05

## 2024-09-05 RX ORDER — LORATADINE 10 MG/1
10 TABLET ORAL DAILY
COMMUNITY

## 2024-09-05 RX ORDER — GENTAMICIN SULFATE 1 MG/G
OINTMENT TOPICAL ONCE
OUTPATIENT
Start: 2024-09-05 | End: 2024-09-05

## 2024-09-05 RX ORDER — LIDOCAINE 50 MG/G
OINTMENT TOPICAL ONCE
OUTPATIENT
Start: 2024-09-05 | End: 2024-09-05

## 2024-09-05 RX ORDER — CLOBETASOL PROPIONATE 0.5 MG/G
OINTMENT TOPICAL ONCE
Status: COMPLETED | OUTPATIENT
Start: 2024-09-05 | End: 2024-09-05

## 2024-09-05 RX ORDER — DEXTROMETHORPHAN HYDROBROMIDE AND PROMETHAZINE HYDROCHLORIDE 15; 6.25 MG/5ML; MG/5ML
5 SYRUP ORAL 4 TIMES DAILY PRN
COMMUNITY

## 2024-09-05 RX ADMIN — CLOBETASOL PROPIONATE: 0.5 OINTMENT TOPICAL at 10:39

## 2024-09-05 RX ADMIN — LIDOCAINE: 50 OINTMENT TOPICAL at 10:39

## 2024-09-05 ASSESSMENT — PAIN DESCRIPTION - DESCRIPTORS: DESCRIPTORS: TENDER

## 2024-09-05 ASSESSMENT — PAIN SCALES - GENERAL
PAINLEVEL_OUTOF10: 1
PAINLEVEL_OUTOF10: 0

## 2024-09-05 NOTE — PATIENT INSTRUCTIONS
PHYSICIAN ORDERS AND DISCHARGE INSTRUCTIONS     Wound cleansing:              Do not scrub or use excessive force.             Wash hands with soap and water before and after dressing changes.             Prior to applying a clean dressing, cleanse wound with normal saline,               wound cleanser, or mild soap and water.              Ask the physician or nurse before getting the wound(s) wet in a shower     Wound Care Notes:         Kelsey to find out about wheelchair. Reliable medical equipment to do home visit. Received 24                              Orders for this week: 2024    FAX TO [facility].  FAX ORDERS TO [facility]!     Left Buttocks:  Clean with soap and water, pat dry  Apply Desatin, Clobetasol, Lidocaine and Stimulen to Wound Bed.  Apply Daily    Plan:           Follow Up Instructions: At the Wound Care Center 2 weeks:   Primary Wound Care Provider: Kelsey Marinelli CNP   Call  for any questions or concerns.  Central [phone] for imaging lab work

## 2024-09-05 NOTE — PROGRESS NOTES
Wound Care Center Progress Visit      Altagracia Wayne  AGE: 83 y.o.   GENDER: female  : 1940  EPISODE DATE:  2024   Referred by: Marshall Dominique APRN - CNP     Subjective:     CHIEF COMPLAINT  WOUND   Problem List Items Addressed This Visit          Circulatory    Dysarthria due to acute stroke (HCC)    Relevant Orders    Initiate Outpatient Wound Care Protocol       Other    Gait disturbance - Primary    Relevant Orders    Initiate Outpatient Wound Care Protocol    Rheumatoid arthritis involving multiple sites with positive rheumatoid factor (HCC)    Relevant Orders    Initiate Outpatient Wound Care Protocol    Continuous leakage of urine    Relevant Orders    Initiate Outpatient Wound Care Protocol    Immunosuppression due to drug therapy (HCC)    Relevant Orders    Initiate Outpatient Wound Care Protocol    Pressure injury of left buttock, stage 2 (HCC)     Chief Complaint   Patient presents with    Wound Check        HISTORY of PRESENT ILLNESS      Altagracia Wayne is a 83 y.o. 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 The condition is of moderate severity. The ulcers are recurrent in nature and have been present at varying degrees of severity for > than one year at initial visit to the wound clinic 23. The underlying cause is thought to be pressure and moisture from urinary incontinence. The patients care to date has included santyl. The patient has significant underlying medical conditions as below. Marshall Dominique APRN - CNP is PCP.    Wound Pain Timing/Severity: waxing and waning, moderate  Quality of pain: aching, tender, pressure  Severity of pain:  1 / 10   Modifying Factors: chronic pressure, decreased mobility, shear force, immunosuppression, incontinence of stool, and incontinence of urine  Associated Signs/Symptoms: pain    TANA: as indicated base on wound location and assessment    Wound infection:

## 2024-09-19 ENCOUNTER — HOSPITAL ENCOUNTER (OUTPATIENT)
Dept: WOUND CARE | Age: 84
Discharge: HOME | End: 2024-09-19
Attending: NURSE PRACTITIONER
Payer: MEDICARE

## 2024-09-19 VITALS
TEMPERATURE: 97.6 F | RESPIRATION RATE: 16 BRPM | DIASTOLIC BLOOD PRESSURE: 89 MMHG | HEART RATE: 76 BPM | SYSTOLIC BLOOD PRESSURE: 155 MMHG

## 2024-09-19 DIAGNOSIS — G30.9 ALZHEIMER'S DISEASE, UNSPECIFIED (CODE) (HCC): ICD-10-CM

## 2024-09-19 DIAGNOSIS — D84.821 IMMUNOSUPPRESSION DUE TO DRUG THERAPY (HCC): ICD-10-CM

## 2024-09-19 DIAGNOSIS — L84 CALLUS OF TOE: ICD-10-CM

## 2024-09-19 DIAGNOSIS — Z79.899 IMMUNOSUPPRESSION DUE TO DRUG THERAPY (HCC): ICD-10-CM

## 2024-09-19 DIAGNOSIS — L89.322 PRESSURE INJURY OF LEFT BUTTOCK, STAGE 2 (HCC): Primary | ICD-10-CM

## 2024-09-19 DIAGNOSIS — N39.45 CONTINUOUS LEAKAGE OF URINE: ICD-10-CM

## 2024-09-19 PROCEDURE — 97597 DBRDMT OPN WND 1ST 20 CM/<: CPT | Performed by: NURSE PRACTITIONER

## 2024-09-19 PROCEDURE — 97597 DBRDMT OPN WND 1ST 20 CM/<: CPT

## 2024-09-19 ASSESSMENT — PAIN SCALES - GENERAL: PAINLEVEL_OUTOF10: 0

## 2024-09-20 ENCOUNTER — HOSPITAL ENCOUNTER (OUTPATIENT)
Age: 84
Setting detail: SPECIMEN
Discharge: HOME | End: 2024-09-20
Payer: MEDICARE

## 2024-09-20 LAB — URATE SERPL-MCNC: 4.8 MG/DL (ref 2.6–6)

## 2024-09-20 PROCEDURE — 84550 ASSAY OF BLOOD/URIC ACID: CPT

## 2024-09-20 RX ORDER — TRIAMCINOLONE ACETONIDE 1 MG/G
OINTMENT TOPICAL ONCE
OUTPATIENT
Start: 2024-09-20 | End: 2024-09-20

## 2024-09-20 RX ORDER — SODIUM CHLOR/HYPOCHLOROUS ACID 0.033 %
SOLUTION, IRRIGATION IRRIGATION ONCE
OUTPATIENT
Start: 2024-09-20 | End: 2024-09-20

## 2024-09-20 RX ORDER — NEOMYCIN/BACITRACIN/POLYMYXINB 3.5-400-5K
OINTMENT (GRAM) TOPICAL ONCE
OUTPATIENT
Start: 2024-09-20 | End: 2024-09-20

## 2024-09-20 RX ORDER — LIDOCAINE 50 MG/G
OINTMENT TOPICAL ONCE
OUTPATIENT
Start: 2024-09-20 | End: 2024-09-20

## 2024-09-20 RX ORDER — SILVER SULFADIAZINE 10 MG/G
CREAM TOPICAL ONCE
OUTPATIENT
Start: 2024-09-20 | End: 2024-09-20

## 2024-09-20 RX ORDER — BACITRACIN ZINC AND POLYMYXIN B SULFATE 500; 1000 [USP'U]/G; [USP'U]/G
OINTMENT TOPICAL ONCE
OUTPATIENT
Start: 2024-09-20 | End: 2024-09-20

## 2024-09-20 RX ORDER — MUPIROCIN 20 MG/G
OINTMENT TOPICAL ONCE
OUTPATIENT
Start: 2024-09-20 | End: 2024-09-20

## 2024-09-20 RX ORDER — LIDOCAINE 40 MG/G
CREAM TOPICAL ONCE
OUTPATIENT
Start: 2024-09-20 | End: 2024-09-20

## 2024-09-20 RX ORDER — BACITRACIN ZINC 500 [USP'U]/G
OINTMENT TOPICAL ONCE
OUTPATIENT
Start: 2024-09-20 | End: 2024-09-20

## 2024-09-20 RX ORDER — LIDOCAINE HYDROCHLORIDE 40 MG/ML
SOLUTION TOPICAL ONCE
OUTPATIENT
Start: 2024-09-20 | End: 2024-09-20

## 2024-09-20 RX ORDER — CLOBETASOL PROPIONATE 0.5 MG/G
OINTMENT TOPICAL ONCE
OUTPATIENT
Start: 2024-09-20 | End: 2024-09-20

## 2024-09-20 RX ORDER — GENTAMICIN SULFATE 1 MG/G
OINTMENT TOPICAL ONCE
OUTPATIENT
Start: 2024-09-20 | End: 2024-09-20

## 2024-09-28 LAB
ALT SERPL-CCNC: 18 U/L (ref 0–60)
AST SERPL-CCNC: 19 U/L (ref 0–55)
BUN / CREAT RATIO: 26 (ref 7–25)
BUN BLDV-MCNC: 13 MG/DL (ref 3–29)
CREAT SERPL-MCNC: 0.5 MG/DL (ref 0.5–1.2)
HCT VFR BLD CALC: 35.6 % (ref 34–49)
HEMOGLOBIN: 11.9 G/DL (ref 11.2–15.7)
MCH RBC QN AUTO: 31 PG (ref 26–34)
MCHC RBC AUTO-ENTMCNC: 33.4 G/DL (ref 30.7–35.5)
MCV RBC AUTO: 92.7 FL (ref 80–100)
PDW BLD-RTO: 13.7 %
PLATELET # BLD: 265 K/UL (ref 140–400)
PMV BLD AUTO: 10.7 FL (ref 7.2–11.7)
RBC # BLD: 3.84 M/UL (ref 3.95–5.26)
SED RATE, AUTOMATED: 35 MM/HR (ref 0–30)
WBC # BLD: 8.2 K/UL (ref 3.5–10.9)

## 2024-10-03 DIAGNOSIS — F01.A0 MILD VASCULAR DEMENTIA WITHOUT BEHAVIORAL DISTURBANCE, PSYCHOTIC DISTURBANCE, MOOD DISTURBANCE, OR ANXIETY (HCC): ICD-10-CM

## 2024-10-04 RX ORDER — MEMANTINE HYDROCHLORIDE 10 MG/1
10 TABLET ORAL 2 TIMES DAILY
Qty: 60 TABLET | Refills: 5 | Status: SHIPPED | OUTPATIENT
Start: 2024-10-04

## 2024-10-17 ENCOUNTER — HOSPITAL ENCOUNTER (OUTPATIENT)
Dept: WOUND CARE | Age: 84
Discharge: HOME | End: 2024-10-17
Attending: NURSE PRACTITIONER
Payer: MEDICARE

## 2024-10-17 VITALS — HEART RATE: 70 BPM | SYSTOLIC BLOOD PRESSURE: 146 MMHG | DIASTOLIC BLOOD PRESSURE: 65 MMHG | TEMPERATURE: 96.4 F

## 2024-10-17 DIAGNOSIS — Z79.899 IMMUNOSUPPRESSION DUE TO DRUG THERAPY (HCC): ICD-10-CM

## 2024-10-17 DIAGNOSIS — R26.9 GAIT DISTURBANCE: Primary | ICD-10-CM

## 2024-10-17 DIAGNOSIS — I63.9 DYSARTHRIA DUE TO ACUTE STROKE (HCC): ICD-10-CM

## 2024-10-17 DIAGNOSIS — D84.821 IMMUNOSUPPRESSION DUE TO DRUG THERAPY (HCC): ICD-10-CM

## 2024-10-17 DIAGNOSIS — M05.79 RHEUMATOID ARTHRITIS INVOLVING MULTIPLE SITES WITH POSITIVE RHEUMATOID FACTOR (HCC): ICD-10-CM

## 2024-10-17 DIAGNOSIS — R47.1 DYSARTHRIA DUE TO ACUTE STROKE (HCC): ICD-10-CM

## 2024-10-17 DIAGNOSIS — N39.45 CONTINUOUS LEAKAGE OF URINE: ICD-10-CM

## 2024-10-17 DIAGNOSIS — L89.312 PRESSURE INJURY OF RIGHT BUTTOCK, STAGE 2 (HCC): ICD-10-CM

## 2024-10-17 DIAGNOSIS — L89.322 PRESSURE INJURY OF LEFT BUTTOCK, STAGE 2 (HCC): ICD-10-CM

## 2024-10-17 PROCEDURE — 6370000000 HC RX 637 (ALT 250 FOR IP): Performed by: NURSE PRACTITIONER

## 2024-10-17 PROCEDURE — 99213 OFFICE O/P EST LOW 20 MIN: CPT

## 2024-10-17 RX ORDER — LIDOCAINE 40 MG/G
CREAM TOPICAL ONCE
OUTPATIENT
Start: 2024-10-17 | End: 2024-10-17

## 2024-10-17 RX ORDER — SILVER SULFADIAZINE 10 MG/G
CREAM TOPICAL ONCE
OUTPATIENT
Start: 2024-10-17 | End: 2024-10-17

## 2024-10-17 RX ORDER — LIDOCAINE 50 MG/G
OINTMENT TOPICAL ONCE
OUTPATIENT
Start: 2024-10-17 | End: 2024-10-17

## 2024-10-17 RX ORDER — SODIUM CHLOR/HYPOCHLOROUS ACID 0.033 %
SOLUTION, IRRIGATION IRRIGATION ONCE
OUTPATIENT
Start: 2024-10-17 | End: 2024-10-17

## 2024-10-17 RX ORDER — CLOBETASOL PROPIONATE 0.5 MG/G
OINTMENT TOPICAL ONCE
Status: COMPLETED | OUTPATIENT
Start: 2024-10-17 | End: 2024-10-17

## 2024-10-17 RX ORDER — NEOMYCIN/BACITRACIN/POLYMYXINB 3.5-400-5K
OINTMENT (GRAM) TOPICAL ONCE
OUTPATIENT
Start: 2024-10-17 | End: 2024-10-17

## 2024-10-17 RX ORDER — CLOBETASOL PROPIONATE 0.5 MG/G
OINTMENT TOPICAL ONCE
OUTPATIENT
Start: 2024-10-17 | End: 2024-10-17

## 2024-10-17 RX ORDER — BACITRACIN ZINC AND POLYMYXIN B SULFATE 500; 1000 [USP'U]/G; [USP'U]/G
OINTMENT TOPICAL ONCE
OUTPATIENT
Start: 2024-10-17 | End: 2024-10-17

## 2024-10-17 RX ORDER — MUPIROCIN 20 MG/G
OINTMENT TOPICAL ONCE
OUTPATIENT
Start: 2024-10-17 | End: 2024-10-17

## 2024-10-17 RX ORDER — LIDOCAINE HYDROCHLORIDE 40 MG/ML
SOLUTION TOPICAL ONCE
OUTPATIENT
Start: 2024-10-17 | End: 2024-10-17

## 2024-10-17 RX ORDER — LIDOCAINE 50 MG/G
OINTMENT TOPICAL ONCE
Status: COMPLETED | OUTPATIENT
Start: 2024-10-17 | End: 2024-10-17

## 2024-10-17 RX ORDER — BACITRACIN ZINC 500 [USP'U]/G
OINTMENT TOPICAL ONCE
OUTPATIENT
Start: 2024-10-17 | End: 2024-10-17

## 2024-10-17 RX ORDER — GENTAMICIN SULFATE 1 MG/G
OINTMENT TOPICAL ONCE
OUTPATIENT
Start: 2024-10-17 | End: 2024-10-17

## 2024-10-17 RX ORDER — TRIAMCINOLONE ACETONIDE 1 MG/G
OINTMENT TOPICAL ONCE
OUTPATIENT
Start: 2024-10-17 | End: 2024-10-17

## 2024-10-17 RX ADMIN — LIDOCAINE: 50 OINTMENT TOPICAL at 10:04

## 2024-10-17 RX ADMIN — CLOBETASOL PROPIONATE: 0.5 OINTMENT TOPICAL at 10:04

## 2024-10-17 ASSESSMENT — PAIN SCALES - GENERAL
PAINLEVEL_OUTOF10: 3
PAINLEVEL_OUTOF10: 0

## 2024-10-17 NOTE — PROGRESS NOTES
Applied desitin, clobetasol, lidocaine and stimulin to buttock & betadine to toenails per provider order. Patient tolerated without any problaem.

## 2024-10-17 NOTE — PROGRESS NOTES
Wound Care Center Progress Visit      Altagracia Wayne  AGE: 83 y.o.   GENDER: female  : 1940  EPISODE DATE:  10/17/2024   Referred by: Marshall Dominique APRN - CNP     Subjective:     CHIEF COMPLAINT  WOUND   Problem List Items Addressed This Visit          Circulatory    Dysarthria due to acute stroke (HCC)    Relevant Orders    Initiate Outpatient Wound Care Protocol       Other    Gait disturbance - Primary    Relevant Orders    Initiate Outpatient Wound Care Protocol    Rheumatoid arthritis involving multiple sites with positive rheumatoid factor (HCC)    Relevant Orders    Initiate Outpatient Wound Care Protocol    WD-Pressure injury of right buttock, stage 2 (HCC)    Relevant Orders    Initiate Outpatient Wound Care Protocol    Continuous leakage of urine    Relevant Orders    Initiate Outpatient Wound Care Protocol    Immunosuppression due to drug therapy (HCC)    Relevant Orders    Initiate Outpatient Wound Care Protocol    Pressure injury of left buttock, stage 2 (HCC)     Chief Complaint   Patient presents with    Wound Check        HISTORY of PRESENT ILLNESS      Altagracia Wayne is a 83 y.o. 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 The condition is of moderate severity. The ulcers are recurrent in nature and have been present at varying degrees of severity for > than one year at initial visit to the wound clinic 23. The underlying cause is thought to be pressure and moisture from urinary incontinence. The patients care to date has included santyl. The patient has significant underlying medical conditions as below. Marshall Dominique APRN - CNP is PCP.    Wound Pain Timing/Severity: None  Quality of pain: N/A  Severity of pain:  0 / 10   Modifying Factors: chronic pressure, decreased mobility, shear force, immunosuppression, incontinence of stool, and incontinence of urine  Associated Signs/Symptoms:

## 2024-10-22 ENCOUNTER — OFFICE VISIT (OUTPATIENT)
Dept: ORTHOPEDIC SURGERY | Age: 84
End: 2024-10-22

## 2024-10-22 VITALS — RESPIRATION RATE: 15 BRPM | OXYGEN SATURATION: 97 % | BODY MASS INDEX: 27.81 KG/M2 | HEIGHT: 64 IN | HEART RATE: 52 BPM

## 2024-10-22 DIAGNOSIS — S82.64XA CLOSED NONDISPLACED FRACTURE OF LATERAL MALLEOLUS OF RIGHT FIBULA, INITIAL ENCOUNTER: Primary | ICD-10-CM

## 2024-10-22 RX ORDER — TRAMADOL HYDROCHLORIDE 50 MG/1
50 TABLET ORAL EVERY 6 HOURS PRN
COMMUNITY
Start: 2024-08-17

## 2024-10-22 NOTE — PATIENT INSTRUCTIONS
Brace as needed.  Continue weight-bearing as tolerated.  Continue range of motion exercises as instructed.  Ice and elevate as needed.  Tylenol or Motrin for pain.  Follow up in 6 weeks.

## 2024-10-22 NOTE — PROGRESS NOTES
Patient returns to the office with a right foot and ankle injury. Pt stated that she has noticed some improvement and deals with most of the pain after therapy. Pt stated that she has continued to wear the brace with no issues. Pt is nonweightbearing and was before her injury.

## 2024-11-14 ENCOUNTER — HOSPITAL ENCOUNTER (OUTPATIENT)
Dept: WOUND CARE | Age: 84
Discharge: HOME | End: 2024-11-14
Attending: NURSE PRACTITIONER
Payer: MEDICARE

## 2024-11-14 VITALS
SYSTOLIC BLOOD PRESSURE: 140 MMHG | HEART RATE: 72 BPM | DIASTOLIC BLOOD PRESSURE: 76 MMHG | TEMPERATURE: 97.5 F | RESPIRATION RATE: 16 BRPM

## 2024-11-14 DIAGNOSIS — N39.45 CONTINUOUS LEAKAGE OF URINE: ICD-10-CM

## 2024-11-14 DIAGNOSIS — L89.312 PRESSURE INJURY OF RIGHT BUTTOCK, STAGE 2 (HCC): ICD-10-CM

## 2024-11-14 DIAGNOSIS — M05.79 RHEUMATOID ARTHRITIS INVOLVING MULTIPLE SITES WITH POSITIVE RHEUMATOID FACTOR (HCC): ICD-10-CM

## 2024-11-14 DIAGNOSIS — L89.152 DECUBITUS ULCER OF COCCYX, STAGE 2 (HCC): ICD-10-CM

## 2024-11-14 DIAGNOSIS — I63.9 DYSARTHRIA DUE TO ACUTE STROKE (HCC): ICD-10-CM

## 2024-11-14 DIAGNOSIS — D84.821 IMMUNOSUPPRESSION DUE TO DRUG THERAPY (HCC): ICD-10-CM

## 2024-11-14 DIAGNOSIS — Z79.899 IMMUNOSUPPRESSION DUE TO DRUG THERAPY (HCC): ICD-10-CM

## 2024-11-14 DIAGNOSIS — R26.9 GAIT DISTURBANCE: Primary | ICD-10-CM

## 2024-11-14 DIAGNOSIS — R47.1 DYSARTHRIA DUE TO ACUTE STROKE (HCC): ICD-10-CM

## 2024-11-14 PROCEDURE — 11042 DBRDMT SUBQ TIS 1ST 20SQCM/<: CPT

## 2024-11-14 PROCEDURE — 6370000000 HC RX 637 (ALT 250 FOR IP): Performed by: NURSE PRACTITIONER

## 2024-11-14 RX ORDER — GENTAMICIN SULFATE 1 MG/G
OINTMENT TOPICAL ONCE
OUTPATIENT
Start: 2024-11-14 | End: 2024-11-14

## 2024-11-14 RX ORDER — LIDOCAINE 50 MG/G
OINTMENT TOPICAL ONCE
OUTPATIENT
Start: 2024-11-14 | End: 2024-11-14

## 2024-11-14 RX ORDER — BACITRACIN ZINC 500 [USP'U]/G
OINTMENT TOPICAL ONCE
OUTPATIENT
Start: 2024-11-14 | End: 2024-11-14

## 2024-11-14 RX ORDER — LIDOCAINE 50 MG/G
OINTMENT TOPICAL ONCE
Status: COMPLETED | OUTPATIENT
Start: 2024-11-14 | End: 2024-11-14

## 2024-11-14 RX ORDER — TRIAMCINOLONE ACETONIDE 1 MG/G
OINTMENT TOPICAL ONCE
OUTPATIENT
Start: 2024-11-14 | End: 2024-11-14

## 2024-11-14 RX ORDER — CLOBETASOL PROPIONATE 0.5 MG/G
OINTMENT TOPICAL ONCE
OUTPATIENT
Start: 2024-11-14 | End: 2024-11-14

## 2024-11-14 RX ORDER — SILVER SULFADIAZINE 10 MG/G
CREAM TOPICAL ONCE
OUTPATIENT
Start: 2024-11-14 | End: 2024-11-14

## 2024-11-14 RX ORDER — MUPIROCIN 20 MG/G
OINTMENT TOPICAL ONCE
OUTPATIENT
Start: 2024-11-14 | End: 2024-11-14

## 2024-11-14 RX ORDER — CLOBETASOL PROPIONATE 0.5 MG/G
OINTMENT TOPICAL ONCE
Status: COMPLETED | OUTPATIENT
Start: 2024-11-14 | End: 2024-11-14

## 2024-11-14 RX ORDER — LIDOCAINE HYDROCHLORIDE 40 MG/ML
SOLUTION TOPICAL ONCE
OUTPATIENT
Start: 2024-11-14 | End: 2024-11-14

## 2024-11-14 RX ORDER — BACITRACIN ZINC AND POLYMYXIN B SULFATE 500; 1000 [USP'U]/G; [USP'U]/G
OINTMENT TOPICAL ONCE
OUTPATIENT
Start: 2024-11-14 | End: 2024-11-14

## 2024-11-14 RX ORDER — NEOMYCIN/BACITRACIN/POLYMYXINB 3.5-400-5K
OINTMENT (GRAM) TOPICAL ONCE
OUTPATIENT
Start: 2024-11-14 | End: 2024-11-14

## 2024-11-14 RX ORDER — LIDOCAINE 40 MG/G
CREAM TOPICAL ONCE
OUTPATIENT
Start: 2024-11-14 | End: 2024-11-14

## 2024-11-14 RX ORDER — SODIUM CHLOR/HYPOCHLOROUS ACID 0.033 %
SOLUTION, IRRIGATION IRRIGATION ONCE
OUTPATIENT
Start: 2024-11-14 | End: 2024-11-14

## 2024-11-14 RX ADMIN — CLOBETASOL PROPIONATE: 0.5 OINTMENT TOPICAL at 10:37

## 2024-11-14 RX ADMIN — LIDOCAINE: 50 OINTMENT TOPICAL at 10:38

## 2024-11-14 NOTE — PATIENT INSTRUCTIONS
PHYSICIAN ORDERS AND DISCHARGE INSTRUCTIONS     Wound cleansing:              Do not scrub or use excessive force.             Wash hands with soap and water before and after dressing changes.             Prior to applying a clean dressing, cleanse wound with normal saline,               wound cleanser, or mild soap and water.              Ask the physician or nurse before getting the wound(s) wet in a shower     Wound Care Notes:         Kelsey to find out about wheelchair. Reliable medical equipment to do home visit. Received 24                              Orders for this week: 2024    FAX TO [facility].  FAX ORDERS TO [facility]!     Bilateral Buttocks wounds : Clean with soap and water, pat dry.  Continue to apply Desitin, Clobetasol, Lidocaine and Stimulen to Wound Beds.  Cover with DriGo and hold in place with depends/underwear.  Apply Daily    Plan:        Follow Up Instructions: At the Wound Care Center 1 weeks   Primary Wound Care Provider: Kelsey Marinelli CNP   Call  for any questions or concerns.  Central [phone] for imaging lab work

## 2024-11-15 NOTE — PROGRESS NOTES
in a shower     Wound Care Notes:         Kelsey to find out about wheelchair. Reliable medical equipment to do home visit. Received 24                              Orders for this week: 2024    [facility]X TO [facility].  FAX ORDERS TO [facility]!     Bilateral Buttocks wounds : Clean with soap and water, pat dry.  Continue to apply Desitin, Clobetasol, Lidocaine and Stimulen to Wound Beds.  Cover with DriGo and hold in place with depends/underwear.  Apply Daily    Plan:        Follow Up Instructions: At the Wound Care Center 1 weeks   Primary Wound Care Provider: Kelsey Marinelli CNP   Call  for any questions or concerns.  Central [phone] for imaging lab work       Electronically signed by TARUN Oh CNP on 11/15/2024 at 11:47 AM

## 2024-11-21 ENCOUNTER — HOSPITAL ENCOUNTER (OUTPATIENT)
Dept: WOUND CARE | Age: 84
Discharge: HOME | End: 2024-11-21
Attending: NURSE PRACTITIONER
Payer: MEDICARE

## 2024-11-21 VITALS
RESPIRATION RATE: 16 BRPM | DIASTOLIC BLOOD PRESSURE: 64 MMHG | HEART RATE: 67 BPM | SYSTOLIC BLOOD PRESSURE: 158 MMHG | TEMPERATURE: 97.2 F

## 2024-11-21 DIAGNOSIS — Z79.899 IMMUNOSUPPRESSION DUE TO DRUG THERAPY (HCC): ICD-10-CM

## 2024-11-21 DIAGNOSIS — L89.312 PRESSURE INJURY OF RIGHT BUTTOCK, STAGE 2 (HCC): ICD-10-CM

## 2024-11-21 DIAGNOSIS — D84.821 IMMUNOSUPPRESSION DUE TO DRUG THERAPY (HCC): ICD-10-CM

## 2024-11-21 DIAGNOSIS — M05.79 RHEUMATOID ARTHRITIS INVOLVING MULTIPLE SITES WITH POSITIVE RHEUMATOID FACTOR (HCC): ICD-10-CM

## 2024-11-21 DIAGNOSIS — L89.322 PRESSURE INJURY OF LEFT BUTTOCK, STAGE 2 (HCC): Primary | ICD-10-CM

## 2024-11-21 DIAGNOSIS — N39.45 CONTINUOUS LEAKAGE OF URINE: ICD-10-CM

## 2024-11-21 PROCEDURE — 6370000000 HC RX 637 (ALT 250 FOR IP): Performed by: NURSE PRACTITIONER

## 2024-11-21 PROCEDURE — 11042 DBRDMT SUBQ TIS 1ST 20SQCM/<: CPT | Performed by: NURSE PRACTITIONER

## 2024-11-21 PROCEDURE — 11042 DBRDMT SUBQ TIS 1ST 20SQCM/<: CPT

## 2024-11-21 RX ORDER — LIDOCAINE HYDROCHLORIDE 40 MG/ML
SOLUTION TOPICAL ONCE
OUTPATIENT
Start: 2024-11-21 | End: 2024-11-21

## 2024-11-21 RX ORDER — LIDOCAINE 40 MG/G
CREAM TOPICAL ONCE
OUTPATIENT
Start: 2024-11-21 | End: 2024-11-21

## 2024-11-21 RX ORDER — CLOBETASOL PROPIONATE 0.5 MG/G
OINTMENT TOPICAL ONCE
OUTPATIENT
Start: 2024-11-21 | End: 2024-11-21

## 2024-11-21 RX ORDER — NEOMYCIN/BACITRACIN/POLYMYXINB 3.5-400-5K
OINTMENT (GRAM) TOPICAL ONCE
OUTPATIENT
Start: 2024-11-21 | End: 2024-11-21

## 2024-11-21 RX ORDER — LIDOCAINE 50 MG/G
OINTMENT TOPICAL ONCE
OUTPATIENT
Start: 2024-11-21 | End: 2024-11-21

## 2024-11-21 RX ORDER — TRIAMCINOLONE ACETONIDE 1 MG/G
OINTMENT TOPICAL ONCE
OUTPATIENT
Start: 2024-11-21 | End: 2024-11-21

## 2024-11-21 RX ORDER — GENTAMICIN SULFATE 1 MG/G
OINTMENT TOPICAL ONCE
OUTPATIENT
Start: 2024-11-21 | End: 2024-11-21

## 2024-11-21 RX ORDER — MUPIROCIN 20 MG/G
OINTMENT TOPICAL ONCE
OUTPATIENT
Start: 2024-11-21 | End: 2024-11-21

## 2024-11-21 RX ORDER — CLOBETASOL PROPIONATE 0.5 MG/G
OINTMENT TOPICAL ONCE
Status: DISCONTINUED | OUTPATIENT
Start: 2024-11-21 | End: 2024-11-22 | Stop reason: HOSPADM

## 2024-11-21 RX ORDER — SODIUM CHLOR/HYPOCHLOROUS ACID 0.033 %
SOLUTION, IRRIGATION IRRIGATION ONCE
OUTPATIENT
Start: 2024-11-21 | End: 2024-11-21

## 2024-11-21 RX ORDER — LIDOCAINE 50 MG/G
OINTMENT TOPICAL ONCE
Status: DISCONTINUED | OUTPATIENT
Start: 2024-11-21 | End: 2024-11-22 | Stop reason: HOSPADM

## 2024-11-21 RX ORDER — SILVER SULFADIAZINE 10 MG/G
CREAM TOPICAL ONCE
OUTPATIENT
Start: 2024-11-21 | End: 2024-11-21

## 2024-11-21 RX ORDER — BACITRACIN ZINC 500 [USP'U]/G
OINTMENT TOPICAL ONCE
OUTPATIENT
Start: 2024-11-21 | End: 2024-11-21

## 2024-11-21 RX ORDER — BACITRACIN ZINC AND POLYMYXIN B SULFATE 500; 1000 [USP'U]/G; [USP'U]/G
OINTMENT TOPICAL ONCE
OUTPATIENT
Start: 2024-11-21 | End: 2024-11-21

## 2024-11-21 NOTE — PATIENT INSTRUCTIONS
PHYSICIAN ORDERS AND DISCHARGE INSTRUCTIONS     Wound cleansing:              Do not scrub or use excessive force.             Wash hands with soap and water before and after dressing changes.             Prior to applying a clean dressing, cleanse wound with normal saline,               wound cleanser, or mild soap and water.              Ask the physician or nurse before getting the wound(s) wet in a shower     Wound Care Notes:         Kelsey to find out about wheelchair. Reliable medical equipment to do home visit. Received 24                              Orders for this week: 2024    FAX TO [facility].  FAX ORDERS TO [facility]!     Bilateral Buttocks wounds : Clean with soap and water, pat dry.  Continue to apply Desitin, Clobetasol, Lidocaine and Stimulen to Wound Beds.  Cover with DriGo and hold in place with depends/underwear.  Apply Daily    Plan:        Follow Up Instructions: At the Wound Care Center 3 weeks   Primary Wound Care Provider: Kelsey Marinelli CNP   Call  for any questions or concerns.  Central [phone] for imaging lab work

## 2024-11-21 NOTE — PROGRESS NOTES
Daily    Plan:        Follow Up Instructions: At the Wound Care Center 3 weeks   Primary Wound Care Provider: Kelsey Marinelli CNP   Call  for any questions or concerns.  Central [phone] for imaging lab work       Electronically signed by TARUN Oh CNP on 2024 at 11:26 AM

## 2024-12-12 ENCOUNTER — HOSPITAL ENCOUNTER (OUTPATIENT)
Dept: WOUND CARE | Age: 84
Discharge: HOME | End: 2024-12-12
Attending: NURSE PRACTITIONER

## 2024-12-12 NOTE — PATIENT INSTRUCTIONS
PHYSICIAN ORDERS AND DISCHARGE INSTRUCTIONS     Wound cleansing:              Do not scrub or use excessive force.             Wash hands with soap and water before and after dressing changes.             Prior to applying a clean dressing, cleanse wound with normal saline,               wound cleanser, or mild soap and water.              Ask the physician or nurse before getting the wound(s) wet in a shower     Wound Care Notes:         Kelsey to find out about wheelchair. Reliable medical equipment to do home visit. Received 24                              Orders for this week: 2024    FAX TO [facility].  FAX ORDERS TO [facility]!     Bilateral Buttocks wounds : Clean with soap and water, pat dry.  Continue to apply Desitin, Clobetasol, Lidocaine and Stimulen to Wound Beds.  Cover with DriGo and hold in place with depends/underwear.  Apply Daily    Plan:        Follow Up Instructions: At the Wound Care Center 3 weeks   Primary Wound Care Provider: Kelsey Marinelli CNP   Call  for any questions or concerns.  Central [phone] for imaging lab work

## 2024-12-19 ENCOUNTER — HOSPITAL ENCOUNTER (OUTPATIENT)
Dept: WOUND CARE | Age: 84
Discharge: HOME | End: 2024-12-19
Attending: NURSE PRACTITIONER
Payer: MEDICARE

## 2024-12-19 VITALS
TEMPERATURE: 97.5 F | DIASTOLIC BLOOD PRESSURE: 83 MMHG | RESPIRATION RATE: 18 BRPM | SYSTOLIC BLOOD PRESSURE: 108 MMHG | HEART RATE: 72 BPM

## 2024-12-19 DIAGNOSIS — R26.9 GAIT DISTURBANCE: Primary | ICD-10-CM

## 2024-12-19 DIAGNOSIS — N39.45 CONTINUOUS LEAKAGE OF URINE: ICD-10-CM

## 2024-12-19 DIAGNOSIS — D84.821 IMMUNOSUPPRESSION DUE TO DRUG THERAPY (HCC): ICD-10-CM

## 2024-12-19 DIAGNOSIS — M05.79 RHEUMATOID ARTHRITIS INVOLVING MULTIPLE SITES WITH POSITIVE RHEUMATOID FACTOR (HCC): ICD-10-CM

## 2024-12-19 DIAGNOSIS — L89.312 PRESSURE INJURY OF RIGHT BUTTOCK, STAGE 2 (HCC): ICD-10-CM

## 2024-12-19 DIAGNOSIS — Z79.899 IMMUNOSUPPRESSION DUE TO DRUG THERAPY (HCC): ICD-10-CM

## 2024-12-19 DIAGNOSIS — L89.322 PRESSURE INJURY OF LEFT BUTTOCK, STAGE 2 (HCC): ICD-10-CM

## 2024-12-19 DIAGNOSIS — R47.1 DYSARTHRIA DUE TO ACUTE STROKE (HCC): ICD-10-CM

## 2024-12-19 DIAGNOSIS — I63.9 DYSARTHRIA DUE TO ACUTE STROKE (HCC): ICD-10-CM

## 2024-12-19 PROBLEM — L84 CALLUS OF TOE: Status: RESOLVED | Noted: 2024-08-22 | Resolved: 2024-12-19

## 2024-12-19 PROCEDURE — 11719 TRIM NAIL(S) ANY NUMBER: CPT

## 2024-12-19 PROCEDURE — 6370000000 HC RX 637 (ALT 250 FOR IP): Performed by: NURSE PRACTITIONER

## 2024-12-19 PROCEDURE — 97597 DBRDMT OPN WND 1ST 20 CM/<: CPT

## 2024-12-19 RX ORDER — MUPIROCIN 20 MG/G
OINTMENT TOPICAL ONCE
OUTPATIENT
Start: 2024-12-19 | End: 2024-12-19

## 2024-12-19 RX ORDER — GINSENG 100 MG
CAPSULE ORAL ONCE
OUTPATIENT
Start: 2024-12-19 | End: 2024-12-19

## 2024-12-19 RX ORDER — LIDOCAINE 50 MG/G
OINTMENT TOPICAL ONCE
Status: COMPLETED | OUTPATIENT
Start: 2024-12-19 | End: 2024-12-19

## 2024-12-19 RX ORDER — LIDOCAINE 50 MG/G
OINTMENT TOPICAL ONCE
OUTPATIENT
Start: 2024-12-19 | End: 2024-12-19

## 2024-12-19 RX ORDER — CLOBETASOL PROPIONATE 0.5 MG/G
OINTMENT TOPICAL ONCE
Status: COMPLETED | OUTPATIENT
Start: 2024-12-19 | End: 2024-12-19

## 2024-12-19 RX ORDER — BACITRACIN ZINC AND POLYMYXIN B SULFATE 500; 1000 [USP'U]/G; [USP'U]/G
OINTMENT TOPICAL ONCE
OUTPATIENT
Start: 2024-12-19 | End: 2024-12-19

## 2024-12-19 RX ORDER — LIDOCAINE 40 MG/G
CREAM TOPICAL ONCE
OUTPATIENT
Start: 2024-12-19 | End: 2024-12-19

## 2024-12-19 RX ORDER — SILVER SULFADIAZINE 10 MG/G
CREAM TOPICAL ONCE
OUTPATIENT
Start: 2024-12-19 | End: 2024-12-19

## 2024-12-19 RX ORDER — GENTAMICIN SULFATE 1 MG/G
OINTMENT TOPICAL ONCE
OUTPATIENT
Start: 2024-12-19 | End: 2024-12-19

## 2024-12-19 RX ORDER — TRIAMCINOLONE ACETONIDE 1 MG/G
OINTMENT TOPICAL ONCE
OUTPATIENT
Start: 2024-12-19 | End: 2024-12-19

## 2024-12-19 RX ORDER — SODIUM CHLOR/HYPOCHLOROUS ACID 0.033 %
SOLUTION, IRRIGATION IRRIGATION ONCE
OUTPATIENT
Start: 2024-12-19 | End: 2024-12-19

## 2024-12-19 RX ORDER — LIDOCAINE HYDROCHLORIDE 40 MG/ML
SOLUTION TOPICAL ONCE
OUTPATIENT
Start: 2024-12-19 | End: 2024-12-19

## 2024-12-19 RX ORDER — CLOBETASOL PROPIONATE 0.5 MG/G
OINTMENT TOPICAL ONCE
OUTPATIENT
Start: 2024-12-19 | End: 2024-12-19

## 2024-12-19 RX ORDER — NEOMYCIN/BACITRACIN/POLYMYXINB 3.5-400-5K
OINTMENT (GRAM) TOPICAL ONCE
OUTPATIENT
Start: 2024-12-19 | End: 2024-12-19

## 2024-12-19 RX ADMIN — CLOBETASOL PROPIONATE: 0.5 OINTMENT TOPICAL at 10:49

## 2024-12-19 RX ADMIN — LIDOCAINE: 50 OINTMENT TOPICAL at 10:49

## 2024-12-19 NOTE — PROGRESS NOTES
Wound Care Center Progress Visit      Altgaracia Wayne  AGE: 84 y.o.   GENDER: female  : 1940  EPISODE DATE:  2024   Referred by: Marshall Dominique APRN - CNP     Subjective:     CHIEF COMPLAINT  WOUND   Problem List Items Addressed This Visit          Circulatory    Dysarthria due to acute stroke (HCC)    Relevant Orders    Initiate Outpatient Wound Care Protocol       Other    Gait disturbance - Primary    Relevant Orders    Initiate Outpatient Wound Care Protocol    Rheumatoid arthritis involving multiple sites with positive rheumatoid factor (HCC)    Relevant Orders    Initiate Outpatient Wound Care Protocol    WD-Pressure injury of right buttock, stage 2 (HCC)    Relevant Orders    Initiate Outpatient Wound Care Protocol    Continuous leakage of urine    Relevant Orders    Initiate Outpatient Wound Care Protocol    Immunosuppression due to drug therapy (HCC)    Relevant Orders    Initiate Outpatient Wound Care Protocol    Pressure injury of left buttock, stage 2 (HCC)     Chief Complaint   Patient presents with    Wound Check        HISTORY of PRESENT ILLNESS      Altagracia Wayne is a 84 y.o. 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 The condition is of moderate severity. The ulcers are recurrent in nature and have been present at varying degrees of severity for > than one year at initial visit to the wound clinic 23. The underlying cause is thought to be pressure and moisture from urinary incontinence. The patients care to date has included santyl. The patient has significant underlying medical conditions as below. Marshall Dominique APRN - CNP is PCP.    Wound Pain Timing/Severity: None  Quality of pain: N/A  Severity of pain:  0 / 10   Modifying Factors: chronic pressure, decreased mobility, shear force, immunosuppression, incontinence of stool, and incontinence of urine  Associated Signs/Symptoms:

## 2024-12-19 NOTE — PATIENT INSTRUCTIONS
PHYSICIAN ORDERS AND DISCHARGE INSTRUCTIONS     Wound cleansing:              Do not scrub or use excessive force.             Wash hands with soap and water before and after dressing changes.             Prior to applying a clean dressing, cleanse wound with normal saline,               wound cleanser, or mild soap and water.              Ask the physician or nurse before getting the wound(s) wet in a shower     Wound Care Notes:         Kelsey to find out about wheelchair. Reliable medical equipment to do home visit. Received 24                              Orders for this week: 2024     FAX ORDERS TO [facility]!     Bilateral Buttocks wounds : Clean with soap and water, pat dry.  Continue to apply Desitin, Clobetasol, Lidocaine, and Stimulen to Wound Beds.  Cover with DriGo and hold in place with depends/underwear.  Apply Daily    Left Achilles - Wash with soap and water, pat dry.  Today in clinic 24 - Paint with betadine, apply stimulen, and Cover with ca alginate and silicone island border.  Leave in place for 2-3 days, then may remove and continue applying skin prep daily to protect.    Paint toenails with betadine today in clinic (24), due to trimming nails.      Plan:        Follow Up Instructions: At the Wound Care Center in 1 month.  Primary Wound Care Provider: Kelsey Marinelli CNP   Call  for any questions or concerns.  Central [phone] for imaging lab work

## 2024-12-23 ENCOUNTER — OFFICE VISIT (OUTPATIENT)
Dept: CARDIOLOGY CLINIC | Age: 84
End: 2024-12-23
Payer: MEDICARE

## 2024-12-23 VITALS
BODY MASS INDEX: 27.81 KG/M2 | HEIGHT: 64 IN | SYSTOLIC BLOOD PRESSURE: 118 MMHG | HEART RATE: 73 BPM | DIASTOLIC BLOOD PRESSURE: 72 MMHG

## 2024-12-23 DIAGNOSIS — I48.91 ATRIAL FIBRILLATION, UNSPECIFIED TYPE (HCC): Primary | ICD-10-CM

## 2024-12-23 DIAGNOSIS — G47.33 OSA (OBSTRUCTIVE SLEEP APNEA): ICD-10-CM

## 2024-12-23 DIAGNOSIS — I65.21 STENOSIS OF RIGHT CAROTID ARTERY: Chronic | ICD-10-CM

## 2024-12-23 DIAGNOSIS — I73.9 PAD (PERIPHERAL ARTERY DISEASE) (HCC): ICD-10-CM

## 2024-12-23 DIAGNOSIS — E78.2 MIXED HYPERLIPIDEMIA: ICD-10-CM

## 2024-12-23 PROCEDURE — 99214 OFFICE O/P EST MOD 30 MIN: CPT | Performed by: NURSE PRACTITIONER

## 2024-12-23 PROCEDURE — 3078F DIAST BP <80 MM HG: CPT | Performed by: NURSE PRACTITIONER

## 2024-12-23 PROCEDURE — 1036F TOBACCO NON-USER: CPT | Performed by: NURSE PRACTITIONER

## 2024-12-23 PROCEDURE — 1090F PRES/ABSN URINE INCON ASSESS: CPT | Performed by: NURSE PRACTITIONER

## 2024-12-23 PROCEDURE — 93000 ELECTROCARDIOGRAM COMPLETE: CPT | Performed by: NURSE PRACTITIONER

## 2024-12-23 PROCEDURE — G8399 PT W/DXA RESULTS DOCUMENT: HCPCS | Performed by: NURSE PRACTITIONER

## 2024-12-23 PROCEDURE — 1159F MED LIST DOCD IN RCRD: CPT | Performed by: NURSE PRACTITIONER

## 2024-12-23 PROCEDURE — G8419 CALC BMI OUT NRM PARAM NOF/U: HCPCS | Performed by: NURSE PRACTITIONER

## 2024-12-23 PROCEDURE — 3074F SYST BP LT 130 MM HG: CPT | Performed by: NURSE PRACTITIONER

## 2024-12-23 PROCEDURE — 1123F ACP DISCUSS/DSCN MKR DOCD: CPT | Performed by: NURSE PRACTITIONER

## 2024-12-23 PROCEDURE — 1160F RVW MEDS BY RX/DR IN RCRD: CPT | Performed by: NURSE PRACTITIONER

## 2024-12-23 PROCEDURE — G8484 FLU IMMUNIZE NO ADMIN: HCPCS | Performed by: NURSE PRACTITIONER

## 2024-12-23 PROCEDURE — G8427 DOCREV CUR MEDS BY ELIG CLIN: HCPCS | Performed by: NURSE PRACTITIONER

## 2024-12-23 ASSESSMENT — ENCOUNTER SYMPTOMS
SHORTNESS OF BREATH: 0
COUGH: 0

## 2024-12-23 NOTE — PROGRESS NOTES
7/9/24   Saulo Lechuga MD   mirabegron (MYRBETRIQ) 25 MG TB24 Take 1 tablet by mouth daily  Patient not taking: Reported on 12/23/2024    ProviderJessica MD   meloxicam (MOBIC) 7.5 MG tablet Take 1 tablet by mouth daily  Patient not taking: Reported on 12/23/2024    Provider, Jessica, MD       Physical Exam  Vitals reviewed.   Constitutional:       General: She is not in acute distress.     Appearance: Normal appearance. She is not ill-appearing.   HENT:      Head: Atraumatic.   Neck:      Vascular: No carotid bruit.   Cardiovascular:      Rate and Rhythm: Normal rate and regular rhythm.      Pulses: Normal pulses.      Heart sounds: Normal heart sounds. No murmur heard.  Pulmonary:      Effort: Pulmonary effort is normal. No respiratory distress.      Breath sounds: Normal breath sounds.   Musculoskeletal:         General: No swelling or deformity.      Cervical back: Neck supple. No muscular tenderness.   Neurological:      Mental Status: She is alert.         Lab Review   Lab Results   Component Value Date/Time    CHOL 143 07/12/2023 06:41 AM    CHOL 183 10/10/2022 08:58 AM    TRIG 82 07/12/2023 06:41 AM    HDL 63 04/05/2024 11:00 AM    LDLDIRECT 95 09/21/2015 06:23 AM        Event monitor 8/30/21     30-day monitor with 3 patient triggers average heart rate was 82 lowest heart was 42 maximum heart rate is 150 no episodes of atrial fibrillation recorded.  Maximum heart rate 150 8:55 PM no significant arrhythmias or abnormality noted normal 30-day monitor     Echo 5/8/21      Summary   Left ventricular systolic function is normal.   Ejection fraction is visually estimated at 50-55%.   No significant valvular abnormalities.   No evidence of any pericardial effusion.     STRES TEST  3/2022  Summary    Supervising physician Dr. Mathis .    Imaged with arms down    Normal EF 72 % with normal ventricular contractility. No infarct or ischemia    noted. No infarct or ischemia noted.

## 2025-01-09 ENCOUNTER — HOSPITAL ENCOUNTER (OUTPATIENT)
Dept: WOUND CARE | Age: 85
Discharge: HOME | End: 2025-01-09
Attending: NURSE PRACTITIONER
Payer: MEDICARE

## 2025-01-09 VITALS
DIASTOLIC BLOOD PRESSURE: 47 MMHG | SYSTOLIC BLOOD PRESSURE: 120 MMHG | HEART RATE: 66 BPM | TEMPERATURE: 97.8 F | RESPIRATION RATE: 18 BRPM

## 2025-01-09 DIAGNOSIS — R26.9 GAIT DISTURBANCE: Primary | ICD-10-CM

## 2025-01-09 DIAGNOSIS — Z79.899 IMMUNOSUPPRESSION DUE TO DRUG THERAPY (HCC): ICD-10-CM

## 2025-01-09 DIAGNOSIS — N39.45 CONTINUOUS LEAKAGE OF URINE: ICD-10-CM

## 2025-01-09 DIAGNOSIS — L89.322 PRESSURE INJURY OF LEFT BUTTOCK, STAGE 2 (HCC): ICD-10-CM

## 2025-01-09 DIAGNOSIS — L89.152 DECUBITUS ULCER OF COCCYX, STAGE 2 (HCC): ICD-10-CM

## 2025-01-09 DIAGNOSIS — L89.312 PRESSURE INJURY OF RIGHT BUTTOCK, STAGE 2 (HCC): ICD-10-CM

## 2025-01-09 DIAGNOSIS — M05.79 RHEUMATOID ARTHRITIS INVOLVING MULTIPLE SITES WITH POSITIVE RHEUMATOID FACTOR (HCC): ICD-10-CM

## 2025-01-09 DIAGNOSIS — D84.821 IMMUNOSUPPRESSION DUE TO DRUG THERAPY (HCC): ICD-10-CM

## 2025-01-09 PROCEDURE — 6370000000 HC RX 637 (ALT 250 FOR IP): Performed by: NURSE PRACTITIONER

## 2025-01-09 PROCEDURE — 17250 CHEM CAUT OF GRANLTJ TISSUE: CPT

## 2025-01-09 PROCEDURE — 97597 DBRDMT OPN WND 1ST 20 CM/<: CPT | Performed by: NURSE PRACTITIONER

## 2025-01-09 PROCEDURE — 11042 DBRDMT SUBQ TIS 1ST 20SQCM/<: CPT | Performed by: NURSE PRACTITIONER

## 2025-01-09 PROCEDURE — 97597 DBRDMT OPN WND 1ST 20 CM/<: CPT

## 2025-01-09 PROCEDURE — 99213 OFFICE O/P EST LOW 20 MIN: CPT | Performed by: NURSE PRACTITIONER

## 2025-01-09 RX ORDER — CLOBETASOL PROPIONATE 0.5 MG/G
OINTMENT TOPICAL ONCE
Status: COMPLETED | OUTPATIENT
Start: 2025-01-09 | End: 2025-01-09

## 2025-01-09 RX ORDER — SODIUM CHLOR/HYPOCHLOROUS ACID 0.033 %
SOLUTION, IRRIGATION IRRIGATION ONCE
OUTPATIENT
Start: 2025-01-09 | End: 2025-01-09

## 2025-01-09 RX ORDER — GINSENG 100 MG
CAPSULE ORAL ONCE
Status: CANCELLED | OUTPATIENT
Start: 2025-01-09 | End: 2025-01-09

## 2025-01-09 RX ORDER — LIDOCAINE 50 MG/G
OINTMENT TOPICAL ONCE
OUTPATIENT
Start: 2025-01-09 | End: 2025-01-09

## 2025-01-09 RX ORDER — NEOMYCIN/BACITRACIN/POLYMYXINB 3.5-400-5K
OINTMENT (GRAM) TOPICAL ONCE
Status: CANCELLED | OUTPATIENT
Start: 2025-01-09 | End: 2025-01-09

## 2025-01-09 RX ORDER — LIDOCAINE 50 MG/G
OINTMENT TOPICAL ONCE
Status: COMPLETED | OUTPATIENT
Start: 2025-01-09 | End: 2025-01-09

## 2025-01-09 RX ORDER — LIDOCAINE 40 MG/G
CREAM TOPICAL ONCE
OUTPATIENT
Start: 2025-01-09 | End: 2025-01-09

## 2025-01-09 RX ORDER — LIDOCAINE HYDROCHLORIDE 40 MG/ML
SOLUTION TOPICAL ONCE
Status: CANCELLED | OUTPATIENT
Start: 2025-01-09 | End: 2025-01-09

## 2025-01-09 RX ORDER — SODIUM CHLOR/HYPOCHLOROUS ACID 0.033 %
SOLUTION, IRRIGATION IRRIGATION ONCE
Status: CANCELLED | OUTPATIENT
Start: 2025-01-09 | End: 2025-01-09

## 2025-01-09 RX ORDER — NEOMYCIN/BACITRACIN/POLYMYXINB 3.5-400-5K
OINTMENT (GRAM) TOPICAL ONCE
OUTPATIENT
Start: 2025-01-09 | End: 2025-01-09

## 2025-01-09 RX ORDER — MUPIROCIN 20 MG/G
OINTMENT TOPICAL ONCE
Status: CANCELLED | OUTPATIENT
Start: 2025-01-09 | End: 2025-01-09

## 2025-01-09 RX ORDER — BACITRACIN ZINC AND POLYMYXIN B SULFATE 500; 1000 [USP'U]/G; [USP'U]/G
OINTMENT TOPICAL ONCE
Status: CANCELLED | OUTPATIENT
Start: 2025-01-09 | End: 2025-01-09

## 2025-01-09 RX ORDER — LIDOCAINE HYDROCHLORIDE 40 MG/ML
SOLUTION TOPICAL ONCE
OUTPATIENT
Start: 2025-01-09 | End: 2025-01-09

## 2025-01-09 RX ORDER — TRIAMCINOLONE ACETONIDE 1 MG/G
OINTMENT TOPICAL ONCE
Status: CANCELLED | OUTPATIENT
Start: 2025-01-09 | End: 2025-01-09

## 2025-01-09 RX ORDER — LIDOCAINE 40 MG/G
CREAM TOPICAL ONCE
Status: CANCELLED | OUTPATIENT
Start: 2025-01-09 | End: 2025-01-09

## 2025-01-09 RX ORDER — MUPIROCIN 20 MG/G
OINTMENT TOPICAL ONCE
OUTPATIENT
Start: 2025-01-09 | End: 2025-01-09

## 2025-01-09 RX ORDER — SILVER SULFADIAZINE 10 MG/G
CREAM TOPICAL ONCE
OUTPATIENT
Start: 2025-01-09 | End: 2025-01-09

## 2025-01-09 RX ORDER — GENTAMICIN SULFATE 1 MG/G
OINTMENT TOPICAL ONCE
OUTPATIENT
Start: 2025-01-09 | End: 2025-01-09

## 2025-01-09 RX ORDER — TRIAMCINOLONE ACETONIDE 1 MG/G
OINTMENT TOPICAL ONCE
OUTPATIENT
Start: 2025-01-09 | End: 2025-01-09

## 2025-01-09 RX ORDER — CLOBETASOL PROPIONATE 0.5 MG/G
OINTMENT TOPICAL ONCE
OUTPATIENT
Start: 2025-01-09 | End: 2025-01-09

## 2025-01-09 RX ORDER — GINSENG 100 MG
CAPSULE ORAL ONCE
OUTPATIENT
Start: 2025-01-09 | End: 2025-01-09

## 2025-01-09 RX ORDER — SILVER SULFADIAZINE 10 MG/G
CREAM TOPICAL ONCE
Status: CANCELLED | OUTPATIENT
Start: 2025-01-09 | End: 2025-01-09

## 2025-01-09 RX ORDER — GENTAMICIN SULFATE 1 MG/G
OINTMENT TOPICAL ONCE
Status: CANCELLED | OUTPATIENT
Start: 2025-01-09 | End: 2025-01-09

## 2025-01-09 RX ORDER — BACITRACIN ZINC AND POLYMYXIN B SULFATE 500; 1000 [USP'U]/G; [USP'U]/G
OINTMENT TOPICAL ONCE
OUTPATIENT
Start: 2025-01-09 | End: 2025-01-09

## 2025-01-09 RX ADMIN — LIDOCAINE: 50 OINTMENT TOPICAL at 16:25

## 2025-01-09 RX ADMIN — CLOBETASOL PROPIONATE: 0.5 OINTMENT TOPICAL at 16:25

## 2025-01-09 NOTE — PROGRESS NOTES
wound management program would be helpful to heal this wound. Assessments completed include fall risk and nutritional, functional,and psychological status.  At this time appropriate care would include: periodic debridement and wound care as below.     Problem List Items Addressed This Visit          Other    Gait disturbance - Primary    Relevant Orders    Initiate Outpatient Wound Care Protocol    Initiate Outpatient Wound Care Protocol    Rheumatoid arthritis involving multiple sites with positive rheumatoid factor (HCC)    Relevant Orders    Initiate Outpatient Wound Care Protocol    Initiate Outpatient Wound Care Protocol    WD-Pressure injury of right buttock, stage 2 (HCC)    Relevant Orders    Initiate Outpatient Wound Care Protocol    Initiate Outpatient Wound Care Protocol    Continuous leakage of urine    Relevant Orders    Initiate Outpatient Wound Care Protocol    Initiate Outpatient Wound Care Protocol    Immunosuppression due to drug therapy (HCC)    Relevant Orders    Initiate Outpatient Wound Care Protocol    Initiate Outpatient Wound Care Protocol    Decubitus ulcer of coccyx, stage 2 (HCC)    Pressure injury of left buttock, stage 2 (HCC)       Procedure Note    Indications:  Based on my examination of this patient's wound(s) today, sharp excision into subcutaneous tissue is required to promote healing and evaluate the extent of previous healing.    Performed by: TARUN Oh - CNP    Consent obtained: Yes    Time out taken:  Yes    Pain Control: 2% Lidocaine topical spray    Debridement:Excisional Debridement    Using curette the wound(s) was/were sharply debrided down through and including the removal of subcutaneous tissue.        Devitalized Tissue Debrided:  fibrin, biofilm, slough, and exudate    Pre Debridement Measurements:  Are located in the Wound Documentation Flow Sheet    All active wounds listed below with today's date are evaluated  Wound(s)    debrided this date include # :

## 2025-01-09 NOTE — PATIENT INSTRUCTIONS
PHYSICIAN ORDERS AND DISCHARGE INSTRUCTIONS     Wound cleansing:              Do not scrub or use excessive force.             Wash hands with soap and water before and after dressing changes.             Prior to applying a clean dressing, cleanse wound with normal saline,               wound cleanser, or mild soap and water.              Ask the physician or nurse before getting the wound(s) wet in a shower     Wound Care Notes:         Kelsey to find out about wheelchair. Reliable medical equipment to do home visit. Received 24                              Orders for this week: 2025     FAX ORDERS TO [facility]!     Bilateral Buttocks wounds : Clean with soap and water, pat dry.  Continue to apply Desitin, Clobetasol, Lidocaine, and Stimulen to Wound Beds.  Cover with DriGo and hold in place with depends/underwear.  Apply Daily    Left Heel: Healed: Cover with ca alginate and gentac       Plan:        Follow Up Instructions: At the Wound Care Center in 1 month.  Primary Wound Care Provider: Kelsey Marinelli CNP   Call  for any questions or concerns.  Central [phone] for imaging lab work

## 2025-01-09 NOTE — PLAN OF CARE
Problem: Wound:  Goal: Will show signs of wound healing; wound closure and no evidence of infection  Description: Will show signs of wound healing; wound closure and no evidence of infection  1/9/2025 1615 by Sneha Sweet LPN  Outcome: Progressing  1/9/2025 1614 by Sneha Sweet LPN  Outcome: Progressing

## 2025-01-10 ENCOUNTER — HOSPITAL ENCOUNTER (OUTPATIENT)
Age: 85
Setting detail: SPECIMEN
Discharge: HOME | End: 2025-01-10
Payer: MEDICARE

## 2025-01-10 LAB
BACTERIA URNS QL MICRO: ABNORMAL
BILIRUB UR QL STRIP: NEGATIVE
CLARITY UR: CLEAR
COLOR UR: YELLOW
GLUCOSE UR STRIP-MCNC: NEGATIVE MG/DL
HGB UR QL STRIP.AUTO: ABNORMAL
KETONES UR STRIP-MCNC: NEGATIVE MG/DL
LEUKOCYTE ESTERASE UR QL STRIP: NEGATIVE
MUCOUS THREADS URNS QL MICRO: ABNORMAL
NITRITE UR QL STRIP: NEGATIVE
PH UR STRIP: 7 [PH] (ref 5–8)
PROT UR STRIP-MCNC: NEGATIVE MG/DL
RBC #/AREA URNS HPF: 7 /HPF (ref 0–2)
SP GR UR STRIP: <1.005 (ref 1–1.03)
UROBILINOGEN UR STRIP-ACNC: 0.2 EU/DL (ref 0–1)
WBC #/AREA URNS HPF: 3 /HPF (ref 0–5)

## 2025-01-10 PROCEDURE — 81001 URINALYSIS AUTO W/SCOPE: CPT

## 2025-01-10 PROCEDURE — 87086 URINE CULTURE/COLONY COUNT: CPT

## 2025-01-10 PROCEDURE — 87077 CULTURE AEROBIC IDENTIFY: CPT

## 2025-01-10 PROCEDURE — 87186 SC STD MICRODIL/AGAR DIL: CPT

## 2025-01-12 LAB
MICROORGANISM SPEC CULT: ABNORMAL
MICROORGANISM SPEC CULT: ABNORMAL
SPECIMEN DESCRIPTION: ABNORMAL

## 2025-01-17 ENCOUNTER — TELEPHONE (OUTPATIENT)
Age: 85
End: 2025-01-17

## 2025-01-17 NOTE — TELEPHONE ENCOUNTER
Attempted to call patient's daughter, we received VM that patient will need to cancelled today and wanted to reschedule. I attempted to call to reschedule appointment but no asnwer, VM full. Was able to send SMS text (option given) with our phone number to call us back.

## 2025-01-23 ENCOUNTER — HOSPITAL ENCOUNTER (OUTPATIENT)
Dept: WOUND CARE | Age: 85
Discharge: HOME | End: 2025-01-23
Attending: NURSE PRACTITIONER

## 2025-01-23 NOTE — PATIENT INSTRUCTIONS
PHYSICIAN ORDERS AND DISCHARGE INSTRUCTIONS     Wound cleansing:              Do not scrub or use excessive force.             Wash hands with soap and water before and after dressing changes.             Prior to applying a clean dressing, cleanse wound with normal saline,               wound cleanser, or mild soap and water.              Ask the physician or nurse before getting the wound(s) wet in a shower     Wound Care Notes:         Kelsey to find out about wheelchair. Reliable medical equipment to do home visit. Received 24                              Orders for this week: 2025     FAX ORDERS TO [facility]!     Bilateral Buttocks wounds : Clean with soap and water, pat dry.  Continue to apply Desitin, Clobetasol, Lidocaine, and Stimulen to Wound Beds.  Cover with DriGo and hold in place with depends/underwear.  Apply Daily      Plan:        Follow Up Instructions: At the Wound Care Center in 1 month.  Primary Wound Care Provider: Kelsey Marinelli CNP   Call  for any questions or concerns.  Central [phone] for imaging lab work

## 2025-02-06 ENCOUNTER — HOSPITAL ENCOUNTER (OUTPATIENT)
Dept: WOUND CARE | Age: 85
Discharge: HOME | End: 2025-02-06
Attending: NURSE PRACTITIONER
Payer: MEDICARE

## 2025-02-06 VITALS
RESPIRATION RATE: 18 BRPM | DIASTOLIC BLOOD PRESSURE: 63 MMHG | TEMPERATURE: 97.7 F | HEART RATE: 87 BPM | SYSTOLIC BLOOD PRESSURE: 131 MMHG

## 2025-02-06 DIAGNOSIS — Z79.899 IMMUNOSUPPRESSION DUE TO DRUG THERAPY (HCC): ICD-10-CM

## 2025-02-06 DIAGNOSIS — L89.312 PRESSURE INJURY OF RIGHT BUTTOCK, STAGE 2 (HCC): Primary | ICD-10-CM

## 2025-02-06 DIAGNOSIS — R26.9 GAIT DISTURBANCE: ICD-10-CM

## 2025-02-06 DIAGNOSIS — N39.45 CONTINUOUS LEAKAGE OF URINE: ICD-10-CM

## 2025-02-06 DIAGNOSIS — D84.821 IMMUNOSUPPRESSION DUE TO DRUG THERAPY (HCC): ICD-10-CM

## 2025-02-06 DIAGNOSIS — M05.79 RHEUMATOID ARTHRITIS INVOLVING MULTIPLE SITES WITH POSITIVE RHEUMATOID FACTOR (HCC): ICD-10-CM

## 2025-02-06 DIAGNOSIS — R47.1 DYSARTHRIA DUE TO ACUTE STROKE (HCC): ICD-10-CM

## 2025-02-06 DIAGNOSIS — I63.9 DYSARTHRIA DUE TO ACUTE STROKE (HCC): ICD-10-CM

## 2025-02-06 PROCEDURE — 97597 DBRDMT OPN WND 1ST 20 CM/<: CPT

## 2025-02-06 PROCEDURE — 97597 DBRDMT OPN WND 1ST 20 CM/<: CPT | Performed by: NURSE PRACTITIONER

## 2025-02-06 RX ORDER — TRIAMCINOLONE ACETONIDE 1 MG/G
OINTMENT TOPICAL ONCE
OUTPATIENT
Start: 2025-02-06 | End: 2025-02-06

## 2025-02-06 RX ORDER — SODIUM CHLOR/HYPOCHLOROUS ACID 0.033 %
SOLUTION, IRRIGATION IRRIGATION ONCE
OUTPATIENT
Start: 2025-02-06 | End: 2025-02-06

## 2025-02-06 RX ORDER — LIDOCAINE HYDROCHLORIDE 40 MG/ML
SOLUTION TOPICAL ONCE
OUTPATIENT
Start: 2025-02-06 | End: 2025-02-06

## 2025-02-06 RX ORDER — GENTAMICIN SULFATE 1 MG/G
OINTMENT TOPICAL ONCE
OUTPATIENT
Start: 2025-02-06 | End: 2025-02-06

## 2025-02-06 RX ORDER — CLOBETASOL PROPIONATE 0.5 MG/G
OINTMENT TOPICAL ONCE
OUTPATIENT
Start: 2025-02-06 | End: 2025-02-06

## 2025-02-06 RX ORDER — LIDOCAINE 50 MG/G
OINTMENT TOPICAL ONCE
OUTPATIENT
Start: 2025-02-06 | End: 2025-02-06

## 2025-02-06 RX ORDER — SILVER SULFADIAZINE 10 MG/G
CREAM TOPICAL ONCE
OUTPATIENT
Start: 2025-02-06 | End: 2025-02-06

## 2025-02-06 RX ORDER — NEOMYCIN/BACITRACIN/POLYMYXINB 3.5-400-5K
OINTMENT (GRAM) TOPICAL ONCE
OUTPATIENT
Start: 2025-02-06 | End: 2025-02-06

## 2025-02-06 RX ORDER — LIDOCAINE 40 MG/G
CREAM TOPICAL ONCE
OUTPATIENT
Start: 2025-02-06 | End: 2025-02-06

## 2025-02-06 RX ORDER — MUPIROCIN 20 MG/G
OINTMENT TOPICAL ONCE
OUTPATIENT
Start: 2025-02-06 | End: 2025-02-06

## 2025-02-06 RX ORDER — GINSENG 100 MG
CAPSULE ORAL ONCE
OUTPATIENT
Start: 2025-02-06 | End: 2025-02-06

## 2025-02-06 RX ORDER — BACITRACIN ZINC AND POLYMYXIN B SULFATE 500; 1000 [USP'U]/G; [USP'U]/G
OINTMENT TOPICAL ONCE
OUTPATIENT
Start: 2025-02-06 | End: 2025-02-06

## 2025-02-06 ASSESSMENT — PAIN SCALES - GENERAL: PAINLEVEL_OUTOF10: 0

## 2025-02-06 ASSESSMENT — PAIN SCALES - WONG BAKER: WONGBAKER_NUMERICALRESPONSE: NO HURT

## 2025-02-06 NOTE — PATIENT INSTRUCTIONS
PHYSICIAN ORDERS AND DISCHARGE INSTRUCTIONS     Wound cleansing:              Do not scrub or use excessive force.             Wash hands with soap and water before and after dressing changes.             Prior to applying a clean dressing, cleanse wound with normal saline,               wound cleanser, or mild soap and water.              Ask the physician or nurse before getting the wound(s) wet in a shower     Wound Care Notes:         Kelsey to find out about wheelchair. Reliable medical equipment to do home visit. Received 24                              Orders for this week: 2025     FAX ORDERS TO [facility]!     Coccyx  : Clean with soap and water, pat dry.  Continue to apply Desitin, Clobetasol, and Stimulen to Wound Beds.  Cover with DriGo and hold in place with depends/underwear.  Apply Daily      Plan:        Follow Up Instructions: At the Wound Care Center in 1 month.  Primary Wound Care Provider: Kelsey Marinelli CNP   Call  for any questions or concerns.  Central [phone] for imaging lab work

## 2025-02-06 NOTE — PROGRESS NOTES
Cleaned Coccyx with soap and water, pat dry.  Applied Desitin, Clobetasol, and Stimulen to coccyx  Covered with DriGo and held in place with depends/underwear.  
    Problem List Items Addressed This Visit          Circulatory    Dysarthria due to acute stroke (HCC)    Relevant Orders    Initiate Outpatient Wound Care Protocol       Other    Gait disturbance    Relevant Orders    Initiate Outpatient Wound Care Protocol    Rheumatoid arthritis involving multiple sites with positive rheumatoid factor (HCC)    Relevant Orders    Initiate Outpatient Wound Care Protocol    WD-Pressure injury of right buttock, stage 2 (HCC) - Primary    Relevant Orders    Initiate Outpatient Wound Care Protocol    Continuous leakage of urine    Relevant Orders    Initiate Outpatient Wound Care Protocol    Immunosuppression due to drug therapy (HCC)    Relevant Orders    Initiate Outpatient Wound Care Protocol     Procedure Note    Indications:  Based on my examination of this patient's wound(s) today, sharp excision into devitalized tissue is required to promote healing and evaluate the extent of previous healing.    Performed by: TARUN Oh - CNP    Consent obtained: Yes    Time out taken:  Yes    Pain Control: not needed    Debridement:Excisional Debridement    Using curette the wound(s) was/were sharply debrided down through and including the removal of devitalized tissue.        Devitalized Tissue Debrided:  slough and exudate    Pre Debridement Measurements:  Are located in the Wound Documentation Flow Sheet    All active wounds listed below with today's date are evaluated  Wound(s) debrided this date include # : 1     Post  Debridement Measurements:  Wound 01/09/25 #1 Coccyx (Removed)   Wound Image   02/06/25 1440   Dressing Status New dressing applied 01/09/25 1623   Wound Cleansed Wound cleanser 02/06/25 1440   Offloading for Diabetic Foot Ulcers Other (comment) 01/09/25 1623   Wound Length (cm) 0 cm 02/06/25 1440   Wound Width (cm) 0 cm 02/06/25 1440   Wound Depth (cm) 0 cm 02/06/25 1440   Wound Surface Area (cm^2) 0 cm^2 02/06/25 1440   Change in Wound Size % (l*w) 100

## 2025-02-06 NOTE — PLAN OF CARE
Problem: Wound:  Goal: Will show signs of wound healing; wound closure and no evidence of infection  Description: Will show signs of wound healing; wound closure and no evidence of infection  Outcome: Adequate for Discharge

## 2025-03-05 NOTE — PATIENT INSTRUCTIONS
PHYSICIAN ORDERS AND DISCHARGE INSTRUCTIONS     Wound cleansing:              Do not scrub or use excessive force.             Wash hands with soap and water before and after dressing changes.             Prior to applying a clean dressing, cleanse wound with normal saline,               wound cleanser, or mild soap and water.              Ask the physician or nurse before getting the wound(s) wet in a shower     Wound Care Notes:         Kelsey to find out about wheelchair. Reliable medical equipment to do home visit. Received 24                              Orders for this week: 3/6/2025     FAX ORDERS TO [facility]!     A+D to BLE   Coccyx  : Clean with soap and water, pat dry.  Continue to apply Desitin, Clobetasol, and Stimulen to Wound Beds.  Cover with DriGo and hold in place with depends/underwear.  Apply Daily      Plan:        Follow Up Instructions:  4 weeks   Primary Wound Care Provider: Kelsey Marinelli CNP   Call  for any questions or concerns.  Central [phone] for imaging lab work

## 2025-03-06 ENCOUNTER — HOSPITAL ENCOUNTER (OUTPATIENT)
Dept: WOUND CARE | Age: 85
Discharge: HOME | End: 2025-03-06
Attending: NURSE PRACTITIONER
Payer: MEDICARE

## 2025-03-06 VITALS
DIASTOLIC BLOOD PRESSURE: 97 MMHG | SYSTOLIC BLOOD PRESSURE: 124 MMHG | TEMPERATURE: 97.9 F | HEART RATE: 73 BPM | RESPIRATION RATE: 18 BRPM

## 2025-03-06 DIAGNOSIS — I63.9 DYSARTHRIA DUE TO ACUTE STROKE (HCC): ICD-10-CM

## 2025-03-06 DIAGNOSIS — L89.312 PRESSURE INJURY OF RIGHT BUTTOCK, STAGE 2 (HCC): Primary | ICD-10-CM

## 2025-03-06 DIAGNOSIS — Z79.899 IMMUNOSUPPRESSION DUE TO DRUG THERAPY: ICD-10-CM

## 2025-03-06 DIAGNOSIS — M05.79 RHEUMATOID ARTHRITIS INVOLVING MULTIPLE SITES WITH POSITIVE RHEUMATOID FACTOR (HCC): ICD-10-CM

## 2025-03-06 DIAGNOSIS — N39.45 CONTINUOUS LEAKAGE OF URINE: ICD-10-CM

## 2025-03-06 DIAGNOSIS — D84.821 IMMUNOSUPPRESSION DUE TO DRUG THERAPY: ICD-10-CM

## 2025-03-06 DIAGNOSIS — R26.9 GAIT DISTURBANCE: ICD-10-CM

## 2025-03-06 DIAGNOSIS — R47.1 DYSARTHRIA DUE TO ACUTE STROKE (HCC): ICD-10-CM

## 2025-03-06 PROCEDURE — 97597 DBRDMT OPN WND 1ST 20 CM/<: CPT | Performed by: NURSE PRACTITIONER

## 2025-03-06 PROCEDURE — 6370000000 HC RX 637 (ALT 250 FOR IP): Performed by: NURSE PRACTITIONER

## 2025-03-06 PROCEDURE — 97597 DBRDMT OPN WND 1ST 20 CM/<: CPT

## 2025-03-06 RX ORDER — TRIAMCINOLONE ACETONIDE 1 MG/G
OINTMENT TOPICAL ONCE
OUTPATIENT
Start: 2025-03-06 | End: 2025-03-06

## 2025-03-06 RX ORDER — LIDOCAINE HYDROCHLORIDE 40 MG/ML
SOLUTION TOPICAL ONCE
OUTPATIENT
Start: 2025-03-06 | End: 2025-03-06

## 2025-03-06 RX ORDER — SODIUM CHLOR/HYPOCHLOROUS ACID 0.033 %
SOLUTION, IRRIGATION IRRIGATION ONCE
OUTPATIENT
Start: 2025-03-06 | End: 2025-03-06

## 2025-03-06 RX ORDER — CLOBETASOL PROPIONATE 0.5 MG/G
OINTMENT TOPICAL ONCE
Status: COMPLETED | OUTPATIENT
Start: 2025-03-06 | End: 2025-03-06

## 2025-03-06 RX ORDER — LIDOCAINE 40 MG/G
CREAM TOPICAL ONCE
OUTPATIENT
Start: 2025-03-06 | End: 2025-03-06

## 2025-03-06 RX ORDER — GINSENG 100 MG
CAPSULE ORAL ONCE
OUTPATIENT
Start: 2025-03-06 | End: 2025-03-06

## 2025-03-06 RX ORDER — CLOBETASOL PROPIONATE 0.5 MG/G
OINTMENT TOPICAL ONCE
OUTPATIENT
Start: 2025-03-06 | End: 2025-03-06

## 2025-03-06 RX ORDER — MUPIROCIN 20 MG/G
OINTMENT TOPICAL ONCE
OUTPATIENT
Start: 2025-03-06 | End: 2025-03-06

## 2025-03-06 RX ORDER — BACITRACIN ZINC AND POLYMYXIN B SULFATE 500; 1000 [USP'U]/G; [USP'U]/G
OINTMENT TOPICAL ONCE
OUTPATIENT
Start: 2025-03-06 | End: 2025-03-06

## 2025-03-06 RX ORDER — NEOMYCIN/BACITRACIN/POLYMYXINB 3.5-400-5K
OINTMENT (GRAM) TOPICAL ONCE
OUTPATIENT
Start: 2025-03-06 | End: 2025-03-06

## 2025-03-06 RX ORDER — GENTAMICIN SULFATE 1 MG/G
OINTMENT TOPICAL ONCE
OUTPATIENT
Start: 2025-03-06 | End: 2025-03-06

## 2025-03-06 RX ORDER — SILVER SULFADIAZINE 10 MG/G
CREAM TOPICAL ONCE
OUTPATIENT
Start: 2025-03-06 | End: 2025-03-06

## 2025-03-06 RX ORDER — LIDOCAINE 50 MG/G
OINTMENT TOPICAL ONCE
OUTPATIENT
Start: 2025-03-06 | End: 2025-03-06

## 2025-03-06 RX ADMIN — CLOBETASOL PROPIONATE: 0.5 OINTMENT TOPICAL at 14:42

## 2025-03-06 NOTE — PROGRESS NOTES
Applied desitin, clobetasol, and stimulen to coccyx and covered with dri-go. Patient tolerated well.  
  Call  for any questions or concerns.  Central [phone] for imaging lab work       Electronically signed by TARUN Myers CNP on 3/6/2025 at 3:55 PM

## 2025-03-07 DIAGNOSIS — R56.9 PARTIAL SEIZURES (HCC): ICD-10-CM

## 2025-03-11 RX ORDER — LEVETIRACETAM 500 MG/1
500 TABLET ORAL 2 TIMES DAILY
Qty: 180 TABLET | Refills: 0 | Status: SHIPPED | OUTPATIENT
Start: 2025-03-11

## 2025-03-11 NOTE — TELEPHONE ENCOUNTER
Last ov 6//27/24. Called dtsusannah Gomez to schedule follow up for 3/19/25. Rx pending.     Requested Prescriptions     Pending Prescriptions Disp Refills    levETIRAcetam (KEPPRA) 500 MG tablet [Pharmacy Med Name: LEVETIRACETAM 500MG TABLETS] 180 tablet 0     Sig: TAKE 1 TABLET BY MOUTH TWICE A DAY

## 2025-03-28 ENCOUNTER — OFFICE VISIT (OUTPATIENT)
Age: 85
End: 2025-03-28
Payer: MEDICARE

## 2025-03-28 VITALS
BODY MASS INDEX: 29.35 KG/M2 | DIASTOLIC BLOOD PRESSURE: 64 MMHG | OXYGEN SATURATION: 100 % | WEIGHT: 171 LBS | HEART RATE: 107 BPM | SYSTOLIC BLOOD PRESSURE: 102 MMHG

## 2025-03-28 DIAGNOSIS — F01.A0 MILD VASCULAR DEMENTIA WITHOUT BEHAVIORAL DISTURBANCE, PSYCHOTIC DISTURBANCE, MOOD DISTURBANCE, OR ANXIETY (HCC): ICD-10-CM

## 2025-03-28 DIAGNOSIS — G30.9 ALZHEIMER'S DISEASE, UNSPECIFIED (CODE): ICD-10-CM

## 2025-03-28 DIAGNOSIS — G95.89 MYELOMALACIA OF CERVICAL CORD (HCC): ICD-10-CM

## 2025-03-28 DIAGNOSIS — I69.359 CVA, OLD, HEMIPARESIS (HCC): ICD-10-CM

## 2025-03-28 DIAGNOSIS — R56.9 PARTIAL SEIZURES (HCC): ICD-10-CM

## 2025-03-28 PROCEDURE — 99212 OFFICE O/P EST SF 10 MIN: CPT | Performed by: NURSE PRACTITIONER

## 2025-03-28 RX ORDER — MEMANTINE HYDROCHLORIDE 10 MG/1
10 TABLET ORAL 2 TIMES DAILY
Qty: 180 TABLET | Refills: 3 | Status: SHIPPED | OUTPATIENT
Start: 2025-03-28

## 2025-03-28 RX ORDER — DONEPEZIL HYDROCHLORIDE 10 MG/1
10 TABLET, FILM COATED ORAL EVERY MORNING
Qty: 90 TABLET | Refills: 3 | Status: SHIPPED | OUTPATIENT
Start: 2025-03-28

## 2025-03-28 RX ORDER — LEVETIRACETAM 500 MG/1
500 TABLET ORAL 2 TIMES DAILY
Qty: 180 TABLET | Refills: 3 | Status: SHIPPED | OUTPATIENT
Start: 2025-03-28

## 2025-03-28 NOTE — PROGRESS NOTES
3/28/25    Altagracia Wayne  1940    Chief Complaint   Patient presents with    Follow-up     Pt here for follow up of seizure, stroke. Pts daughter states there may be memory issues.        History of Present Illness  Altagracia is a 84 y.o. female presenting today for follow-up of:history of stroke, HLD, HTN, VIC, seizure, RA, cervical myelomalacia, dementia.       She is maintained on Keppra 500 mg twice daily.  She continues Aricept and Namenda to help slow the progression of memory loss.  She is on aspirin, atorvastatin for secondary stroke prevention.      She continues to live at home with her daughter.  She has 24/7 care from family,  home health care aide comes for 2 hours twice weekly.  Fortunately, she lost her son and a good friend a few months ago.    She is accompanied by her daughter today.  She is in a wheelchair.  She is alert and conversing.  She is about to restart PT/OT soon.  Appetite is good.  No hallucinations.  No mood changes.  Current Outpatient Medications   Medication Sig Dispense Refill    levETIRAcetam (KEPPRA) 500 MG tablet Take 1 tablet by mouth 2 times daily 180 tablet 3    donepezil (ARICEPT) 10 MG tablet Take 1 tablet by mouth every morning 90 tablet 3    memantine (NAMENDA) 10 MG tablet Take 1 tablet by mouth 2 times daily 180 tablet 3    oxyBUTYnin (DITROPAN-XL) 10 MG extended release tablet Take 1 tablet by mouth daily      traMADol (ULTRAM) 50 MG tablet Take 1 tablet by mouth every 6 hours as needed.      naloxegol (MOVANTIK) 25 MG TABS tablet Take 1 tablet by mouth daily      loratadine (CLARITIN) 10 MG tablet Take 1 tablet by mouth daily      LORazepam (ATIVAN) 0.5 MG tablet Take 1 tablet by mouth nightly as needed.      BREO ELLIPTA 100-25 MCG/ACT inhaler TAKE ONE INHALATION DAILY      promethazine-dextromethorphan (PROMETHAZINE-DM) 6.25-15 MG/5ML syrup Take 5 mLs by mouth 4 times daily as needed for Cough      lidocaine (XYLOCAINE) 5 % ointment Apply topically as needed. 50 g

## 2025-04-03 ENCOUNTER — HOSPITAL ENCOUNTER (OUTPATIENT)
Dept: WOUND CARE | Age: 85
Discharge: HOME | End: 2025-04-03
Attending: NURSE PRACTITIONER
Payer: MEDICARE

## 2025-04-03 VITALS
TEMPERATURE: 97 F | SYSTOLIC BLOOD PRESSURE: 119 MMHG | HEART RATE: 97 BPM | RESPIRATION RATE: 18 BRPM | DIASTOLIC BLOOD PRESSURE: 93 MMHG

## 2025-04-03 DIAGNOSIS — Z79.899 IMMUNOSUPPRESSION DUE TO DRUG THERAPY: ICD-10-CM

## 2025-04-03 DIAGNOSIS — L60.2 LONG TOENAIL: ICD-10-CM

## 2025-04-03 DIAGNOSIS — M05.79 RHEUMATOID ARTHRITIS INVOLVING MULTIPLE SITES WITH POSITIVE RHEUMATOID FACTOR (HCC): ICD-10-CM

## 2025-04-03 DIAGNOSIS — R47.1 DYSARTHRIA DUE TO ACUTE STROKE (HCC): ICD-10-CM

## 2025-04-03 DIAGNOSIS — D84.821 IMMUNOSUPPRESSION DUE TO DRUG THERAPY: ICD-10-CM

## 2025-04-03 DIAGNOSIS — N39.45 CONTINUOUS LEAKAGE OF URINE: ICD-10-CM

## 2025-04-03 DIAGNOSIS — L89.312 PRESSURE INJURY OF RIGHT BUTTOCK, STAGE 2 (HCC): Primary | ICD-10-CM

## 2025-04-03 DIAGNOSIS — I63.9 DYSARTHRIA DUE TO ACUTE STROKE (HCC): ICD-10-CM

## 2025-04-03 DIAGNOSIS — R26.9 GAIT DISTURBANCE: ICD-10-CM

## 2025-04-03 PROCEDURE — 11719 TRIM NAIL(S) ANY NUMBER: CPT

## 2025-04-03 RX ORDER — LIDOCAINE 40 MG/G
CREAM TOPICAL ONCE
OUTPATIENT
Start: 2025-04-03 | End: 2025-04-03

## 2025-04-03 RX ORDER — SODIUM CHLOR/HYPOCHLOROUS ACID 0.033 %
SOLUTION, IRRIGATION IRRIGATION ONCE
OUTPATIENT
Start: 2025-04-03 | End: 2025-04-03

## 2025-04-03 RX ORDER — CLOBETASOL PROPIONATE 0.5 MG/G
OINTMENT TOPICAL ONCE
OUTPATIENT
Start: 2025-04-03 | End: 2025-04-03

## 2025-04-03 RX ORDER — GINSENG 100 MG
CAPSULE ORAL ONCE
OUTPATIENT
Start: 2025-04-03 | End: 2025-04-03

## 2025-04-03 RX ORDER — BACITRACIN ZINC AND POLYMYXIN B SULFATE 500; 1000 [USP'U]/G; [USP'U]/G
OINTMENT TOPICAL ONCE
OUTPATIENT
Start: 2025-04-03 | End: 2025-04-03

## 2025-04-03 RX ORDER — GENTAMICIN SULFATE 1 MG/G
OINTMENT TOPICAL ONCE
OUTPATIENT
Start: 2025-04-03 | End: 2025-04-03

## 2025-04-03 RX ORDER — TRIAMCINOLONE ACETONIDE 1 MG/G
OINTMENT TOPICAL ONCE
OUTPATIENT
Start: 2025-04-03 | End: 2025-04-03

## 2025-04-03 RX ORDER — SILVER SULFADIAZINE 10 MG/G
CREAM TOPICAL ONCE
OUTPATIENT
Start: 2025-04-03 | End: 2025-04-03

## 2025-04-03 RX ORDER — LIDOCAINE 50 MG/G
OINTMENT TOPICAL ONCE
OUTPATIENT
Start: 2025-04-03 | End: 2025-04-03

## 2025-04-03 RX ORDER — LIDOCAINE HYDROCHLORIDE 40 MG/ML
SOLUTION TOPICAL ONCE
OUTPATIENT
Start: 2025-04-03 | End: 2025-04-03

## 2025-04-03 RX ORDER — MUPIROCIN 20 MG/G
OINTMENT TOPICAL ONCE
OUTPATIENT
Start: 2025-04-03 | End: 2025-04-03

## 2025-04-03 RX ORDER — NEOMYCIN/BACITRACIN/POLYMYXINB 3.5-400-5K
OINTMENT (GRAM) TOPICAL ONCE
OUTPATIENT
Start: 2025-04-03 | End: 2025-04-03

## 2025-04-03 NOTE — PATIENT INSTRUCTIONS
PHYSICIAN ORDERS AND DISCHARGE INSTRUCTIONS     Wound cleansing:              Do not scrub or use excessive force.             Wash hands with soap and water before and after dressing changes.             Prior to applying a clean dressing, cleanse wound with normal saline,               wound cleanser, or mild soap and water.              Ask the physician or nurse before getting the wound(s) wet in a shower     Wound Care Notes:         Kelsey to find out about wheelchair. Reliable medical equipment to do home visit. Received 24                              Orders for this week: 4/3/2025     FAX ORDERS TO [facility]!     A+D to BLE   Coccyx  : Clean with soap and water, pat dry.  Continue to apply Desitin, Clobetasol, and Stimulen to Wound Beds.  Cover with DriGo and hold in place with depends/underwear.  Apply Daily      Plan: Toenails 4/3/25       Follow Up Instructions:  61 Days    Primary Wound Care Provider: Kelsey Marinelli CNP   Call  for any questions or concerns.  Central [phone] for imaging lab work

## 2025-04-04 PROBLEM — L89.892 PRESSURE INJURY OF TOE OF LEFT FOOT, STAGE 2 (HCC): Status: RESOLVED | Noted: 2023-09-12 | Resolved: 2025-04-04

## 2025-04-04 PROBLEM — L60.2 LONG TOENAIL: Status: ACTIVE | Noted: 2025-04-04

## 2025-04-04 PROBLEM — L89.313 PRESSURE INJURY OF RIGHT BUTTOCK, STAGE 3 (HCC): Status: RESOLVED | Noted: 2023-11-14 | Resolved: 2025-04-04

## 2025-04-21 DIAGNOSIS — F01.A0 MILD VASCULAR DEMENTIA WITHOUT BEHAVIORAL DISTURBANCE, PSYCHOTIC DISTURBANCE, MOOD DISTURBANCE, OR ANXIETY (HCC): ICD-10-CM

## 2025-04-21 RX ORDER — MEMANTINE HYDROCHLORIDE 10 MG/1
10 TABLET ORAL 2 TIMES DAILY
Qty: 60 TABLET | OUTPATIENT
Start: 2025-04-21

## 2025-05-15 NOTE — PROGRESS NOTES
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 Overall the patient has improved. 2. Inc. Activity. 3. Discussed with the family.    Lavelle Hadley MD   7/31/2023  10:25 AM done

## 2025-06-04 ENCOUNTER — HOSPITAL ENCOUNTER (OUTPATIENT)
Dept: WOUND CARE | Age: 85
Discharge: HOME | End: 2025-06-04
Attending: NURSE PRACTITIONER
Payer: MEDICARE

## 2025-06-04 VITALS
DIASTOLIC BLOOD PRESSURE: 51 MMHG | TEMPERATURE: 97.6 F | RESPIRATION RATE: 19 BRPM | SYSTOLIC BLOOD PRESSURE: 118 MMHG | HEART RATE: 76 BPM

## 2025-06-04 DIAGNOSIS — D84.821 IMMUNOSUPPRESSION DUE TO DRUG THERAPY: ICD-10-CM

## 2025-06-04 DIAGNOSIS — R26.9 GAIT DISTURBANCE: ICD-10-CM

## 2025-06-04 DIAGNOSIS — R47.1 DYSARTHRIA DUE TO ACUTE STROKE (HCC): ICD-10-CM

## 2025-06-04 DIAGNOSIS — Z79.899 IMMUNOSUPPRESSION DUE TO DRUG THERAPY: ICD-10-CM

## 2025-06-04 DIAGNOSIS — N39.45 CONTINUOUS LEAKAGE OF URINE: ICD-10-CM

## 2025-06-04 DIAGNOSIS — I63.9 DYSARTHRIA DUE TO ACUTE STROKE (HCC): ICD-10-CM

## 2025-06-04 DIAGNOSIS — L89.323 DECUBITUS ULCER OF LEFT BUTTOCK, STAGE 3 (HCC): Primary | ICD-10-CM

## 2025-06-04 DIAGNOSIS — M05.79 RHEUMATOID ARTHRITIS INVOLVING MULTIPLE SITES WITH POSITIVE RHEUMATOID FACTOR (HCC): ICD-10-CM

## 2025-06-04 DIAGNOSIS — L60.2 LONG TOENAIL: ICD-10-CM

## 2025-06-04 PROBLEM — L89.322 PRESSURE INJURY OF LEFT BUTTOCK, STAGE 2 (HCC): Status: RESOLVED | Noted: 2024-09-05 | Resolved: 2025-06-04

## 2025-06-04 PROCEDURE — 6370000000 HC RX 637 (ALT 250 FOR IP): Performed by: NURSE PRACTITIONER

## 2025-06-04 PROCEDURE — 11719 TRIM NAIL(S) ANY NUMBER: CPT

## 2025-06-04 PROCEDURE — 11042 DBRDMT SUBQ TIS 1ST 20SQCM/<: CPT

## 2025-06-04 RX ORDER — NEOMYCIN/BACITRACIN/POLYMYXINB 3.5-400-5K
OINTMENT (GRAM) TOPICAL ONCE
OUTPATIENT
Start: 2025-06-04 | End: 2025-06-04

## 2025-06-04 RX ORDER — LIDOCAINE 50 MG/G
OINTMENT TOPICAL ONCE
OUTPATIENT
Start: 2025-06-04 | End: 2025-06-04

## 2025-06-04 RX ORDER — MUPIROCIN 20 MG/G
OINTMENT TOPICAL ONCE
OUTPATIENT
Start: 2025-06-04 | End: 2025-06-04

## 2025-06-04 RX ORDER — BACITRACIN ZINC AND POLYMYXIN B SULFATE 500; 1000 [USP'U]/G; [USP'U]/G
OINTMENT TOPICAL ONCE
OUTPATIENT
Start: 2025-06-04 | End: 2025-06-04

## 2025-06-04 RX ORDER — GINSENG 100 MG
CAPSULE ORAL ONCE
OUTPATIENT
Start: 2025-06-04 | End: 2025-06-04

## 2025-06-04 RX ORDER — LIDOCAINE HYDROCHLORIDE 40 MG/ML
SOLUTION TOPICAL ONCE
OUTPATIENT
Start: 2025-06-04 | End: 2025-06-04

## 2025-06-04 RX ORDER — LIDOCAINE 40 MG/G
CREAM TOPICAL ONCE
OUTPATIENT
Start: 2025-06-04 | End: 2025-06-04

## 2025-06-04 RX ORDER — GENTAMICIN SULFATE 1 MG/G
OINTMENT TOPICAL ONCE
OUTPATIENT
Start: 2025-06-04 | End: 2025-06-04

## 2025-06-04 RX ORDER — SODIUM CHLOR/HYPOCHLOROUS ACID 0.033 %
SOLUTION, IRRIGATION IRRIGATION ONCE
OUTPATIENT
Start: 2025-06-04 | End: 2025-06-04

## 2025-06-04 RX ORDER — SILVER SULFADIAZINE 10 MG/G
CREAM TOPICAL ONCE
OUTPATIENT
Start: 2025-06-04 | End: 2025-06-04

## 2025-06-04 RX ORDER — CLOBETASOL PROPIONATE 0.5 MG/G
OINTMENT TOPICAL ONCE
OUTPATIENT
Start: 2025-06-04 | End: 2025-06-04

## 2025-06-04 RX ORDER — CLOBETASOL PROPIONATE 0.5 MG/G
OINTMENT TOPICAL ONCE
Status: COMPLETED | OUTPATIENT
Start: 2025-06-04 | End: 2025-06-04

## 2025-06-04 RX ORDER — TRIAMCINOLONE ACETONIDE 1 MG/G
OINTMENT TOPICAL ONCE
OUTPATIENT
Start: 2025-06-04 | End: 2025-06-04

## 2025-06-04 RX ADMIN — CLOBETASOL PROPIONATE: 0.5 OINTMENT TOPICAL at 15:35

## 2025-06-04 ASSESSMENT — PAIN DESCRIPTION - ONSET: ONSET: ON-GOING

## 2025-06-04 ASSESSMENT — PAIN DESCRIPTION - PAIN TYPE: TYPE: ACUTE PAIN

## 2025-06-04 ASSESSMENT — PAIN DESCRIPTION - LOCATION: LOCATION: COCCYX

## 2025-06-04 ASSESSMENT — PAIN SCALES - GENERAL: PAINLEVEL_OUTOF10: 2

## 2025-06-04 ASSESSMENT — PAIN DESCRIPTION - ORIENTATION: ORIENTATION: MID

## 2025-06-04 ASSESSMENT — PAIN DESCRIPTION - DESCRIPTORS: DESCRIPTORS: ACHING

## 2025-06-04 ASSESSMENT — PAIN - FUNCTIONAL ASSESSMENT: PAIN_FUNCTIONAL_ASSESSMENT: PREVENTS OR INTERFERES SOME ACTIVE ACTIVITIES AND ADLS

## 2025-06-04 ASSESSMENT — PAIN DESCRIPTION - FREQUENCY: FREQUENCY: CONTINUOUS

## 2025-06-04 NOTE — PROGRESS NOTES
Wound Care Center Progress Visit      Altagracia Wayne  AGE: 84 y.o.   GENDER: female  : 1940  EPISODE DATE:  2025   Referred by: Marshall Dominique APRN - CNP     Subjective:     CHIEF COMPLAINT  WOUND   Problem List Items Addressed This Visit          Circulatory    Dysarthria due to acute stroke (HCC)    Relevant Orders    Initiate Outpatient Wound Care Protocol       Musculoskeletal and Integument    Rheumatoid arthritis involving multiple sites with positive rheumatoid factor (HCC)    Relevant Orders    Initiate Outpatient Wound Care Protocol       Other    Gait disturbance    Relevant Orders    Initiate Outpatient Wound Care Protocol    Continuous leakage of urine    Relevant Orders    Initiate Outpatient Wound Care Protocol    Immunosuppression due to drug therapy    Relevant Orders    Initiate Outpatient Wound Care Protocol    Long toenail    Relevant Orders    Initiate Outpatient Wound Care Protocol    Decubitus ulcer of left buttock, stage 3 (HCC) - Primary     Chief Complaint   Patient presents with    Wound Check        HISTORY of PRESENT ILLNESS      Altagracia Wayne is a 84 y.o. 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 The condition is of moderate severity. The ulcers are recurrent in nature and have been present at varying degrees of severity for > than one year at initial visit to the wound clinic 23. The underlying cause is thought to be pressure and moisture from urinary incontinence. The patients care to date has included santyl. The patient has significant underlying medical conditions as below. Marshall Dominique APRN - CNP is PCP.    Wound Pain Timing/Severity: None  Quality of pain: N/A  Severity of pain:  0 / 10   Modifying Factors: chronic pressure, decreased mobility, shear force, immunosuppression, incontinence of stool, and incontinence of urine  Associated Signs/Symptoms: pain    Wound

## 2025-06-04 NOTE — PATIENT INSTRUCTIONS
PHYSICIAN ORDERS AND DISCHARGE INSTRUCTIONS     Wound cleansing:              Do not scrub or use excessive force.             Wash hands with soap and water before and after dressing changes.             Prior to applying a clean dressing, cleanse wound with normal saline,               wound cleanser, or mild soap and water.              Ask the physician or nurse before getting the wound(s) wet in a shower     Wound Care Notes:         Kelsey to find out about wheelchair. Reliable medical equipment to do home visit. Received 24                              Orders for this week: 2025      A+D to BLE   Coccyx  : Clean with soap and water, pat dry.  Continue to apply Desitin, Clobetasol, and Stimulen to Wound Beds.  Cover with DriGo and hold in place with depends/underwear.  Apply Daily      Plan: Toenails 4/3/25, 25       Follow Up Instructions:  2 weeks  Primary Wound Care Provider: Kelsey Marinelli CNP   Call  for any questions or concerns.  Central [phone] for imaging lab work

## 2025-06-16 ENCOUNTER — TELEPHONE (OUTPATIENT)
Age: 85
End: 2025-06-16

## 2025-06-16 NOTE — TELEPHONE ENCOUNTER
Pts daughter called to inform us the pts Physical Therapy was denied and she is trying to appeal. She is requesting a letter to provide them. (PCP is filling one out as well)     She said its required to have the following:     Doctors office letter head    Dated and Signed     Must have \"Regarding Med Cert\" in the attention line.     At minium must say, \"Failure to continue Physical Therapy will put this patient at significant risk\"     Must also include case # [id number]     Fax# [phone]     Is this something you are willing to provide?

## 2025-06-16 NOTE — PROGRESS NOTES
MRN: [id number]  Name: Altagracia Wayne  : 1940    Insurance: Payor: MEDICARE /  /  /      Phone #: [phone]  Provider: TARUN Paiz CNP     Date of Visit: 2025    Reason for visit: 6 month f/u Recent Hospitalization Date:    Reason for Hospitalization:    Last EK25  Type of Device:       Vitals BP HR O2% WT HT ORTHO BP LYING ORTHO BP SITTING ORTHO BP SITTING   Today's Findings           Patients work up- Check List     Testing Last Date Completed Date Expected  ([facility]) Additional Notes    MA to document For provider to complete Either MA or Provider    Carotid Duplex  STAT 1 WK 6 MTH       THIS WK 2 WK 1 YEAR     Cardiac CTA  STAT 1 WK 6 MTH       THIS WK 2 WK 1 YEAR     Cardiac CT Calcium scoring  STAT 1 WK 6 MTH       THIS WK 2 WK 1 YEAR     CTA Chest, Abdomen & Pelvis  STAT 1 WK 6 MTH       THIS WK 2 WK 1 YEAR     CT Chest IV w/ Contrast  STAT 1 WK 6 MTH       THIS WK 2 WK 1 YEAR     CT Chest w/o Contrast  STAT 1 WK 6 MTH       THIS WK 2 WK 1 YEAR     CXR  STAT 1 WK 6 MTH       THIS WK 2 WK 1 YEAR     ECHO  Stress Complete Limited     MRI- Cardiac  STAT 1 WK 6 MTH       THIS WK 2 WK 1 YEAR     MUGA Scan  STAT 1 WK 6 MTH       THIS WK 2 WK 1 YEAR     Nuclear Stress  Lexiscan Cardiolite     PFT  STAT 1 WK 6 MTH       THIS WK 2 WK 1 YEAR     Treadmill Stress Test  STAT 1 WK 6 MTH       THIS WK 2 WK 1 YEAR     Vascular Duplex  Lower: Right Left Bilat       Upper: Right Left Bilat     Other Test Not Listed:    Monitors Last Date Completed Day's Request/Ordered     Holter  Short term 24 hours 48 hours      Long term 3 days 7 days 14 days   Event   (1-30 days)      Procedures Last Date Performed Procedure Details Date Expected   Additional Notes    ASD Closure        Carotid Angio        Cardioversion        Heart Cath  R L R&L      Peripheral Angio  R L      PFO Closure        PTCA/PCI        RAYMUNDO        RAYMUNDO/Cardioversion        Venogram        Tilt Table        Other Type of

## 2025-06-16 NOTE — TELEPHONE ENCOUNTER
Letter completed and ready to fax. Left msg for pt dtr to call back to verify whether letter is going to [employer].

## 2025-06-18 ENCOUNTER — HOSPITAL ENCOUNTER (OUTPATIENT)
Dept: WOUND CARE | Age: 85
Discharge: HOME | End: 2025-06-18
Attending: NURSE PRACTITIONER
Payer: MEDICARE

## 2025-06-18 VITALS
DIASTOLIC BLOOD PRESSURE: 79 MMHG | SYSTOLIC BLOOD PRESSURE: 101 MMHG | TEMPERATURE: 97.5 F | RESPIRATION RATE: 18 BRPM | HEART RATE: 87 BPM

## 2025-06-18 DIAGNOSIS — I63.9 DYSARTHRIA DUE TO ACUTE STROKE (HCC): ICD-10-CM

## 2025-06-18 DIAGNOSIS — N39.45 CONTINUOUS LEAKAGE OF URINE: ICD-10-CM

## 2025-06-18 DIAGNOSIS — L89.323 DECUBITUS ULCER OF LEFT BUTTOCK, STAGE 3 (HCC): Primary | ICD-10-CM

## 2025-06-18 DIAGNOSIS — Z79.899 IMMUNOSUPPRESSION DUE TO DRUG THERAPY: ICD-10-CM

## 2025-06-18 DIAGNOSIS — L60.2 LONG TOENAIL: ICD-10-CM

## 2025-06-18 DIAGNOSIS — D84.821 IMMUNOSUPPRESSION DUE TO DRUG THERAPY: ICD-10-CM

## 2025-06-18 DIAGNOSIS — L89.312 PRESSURE INJURY OF RIGHT BUTTOCK, STAGE 2 (HCC): ICD-10-CM

## 2025-06-18 DIAGNOSIS — R47.1 DYSARTHRIA DUE TO ACUTE STROKE (HCC): ICD-10-CM

## 2025-06-18 DIAGNOSIS — M05.79 RHEUMATOID ARTHRITIS INVOLVING MULTIPLE SITES WITH POSITIVE RHEUMATOID FACTOR (HCC): ICD-10-CM

## 2025-06-18 DIAGNOSIS — R26.9 GAIT DISTURBANCE: ICD-10-CM

## 2025-06-18 PROCEDURE — 11042 DBRDMT SUBQ TIS 1ST 20SQCM/<: CPT

## 2025-06-18 PROCEDURE — 6370000000 HC RX 637 (ALT 250 FOR IP): Performed by: NURSE PRACTITIONER

## 2025-06-18 RX ORDER — GENTAMICIN SULFATE 1 MG/G
OINTMENT TOPICAL ONCE
OUTPATIENT
Start: 2025-06-18 | End: 2025-06-18

## 2025-06-18 RX ORDER — CLOBETASOL PROPIONATE 0.5 MG/G
OINTMENT TOPICAL ONCE
Status: COMPLETED | OUTPATIENT
Start: 2025-06-18 | End: 2025-06-18

## 2025-06-18 RX ORDER — CLOTRIMAZOLE AND BETAMETHASONE DIPROPIONATE 10; .64 MG/G; MG/G
CREAM TOPICAL
Qty: 45 G | Refills: 3 | Status: SHIPPED | OUTPATIENT
Start: 2025-06-18

## 2025-06-18 RX ORDER — CLOBETASOL PROPIONATE 0.5 MG/G
OINTMENT TOPICAL ONCE
OUTPATIENT
Start: 2025-06-18 | End: 2025-06-18

## 2025-06-18 RX ORDER — SODIUM CHLOR/HYPOCHLOROUS ACID 0.033 %
SOLUTION, IRRIGATION IRRIGATION ONCE
OUTPATIENT
Start: 2025-06-18 | End: 2025-06-18

## 2025-06-18 RX ORDER — BACITRACIN ZINC AND POLYMYXIN B SULFATE 500; 1000 [USP'U]/G; [USP'U]/G
OINTMENT TOPICAL ONCE
OUTPATIENT
Start: 2025-06-18 | End: 2025-06-18

## 2025-06-18 RX ORDER — LIDOCAINE HYDROCHLORIDE 40 MG/ML
SOLUTION TOPICAL ONCE
OUTPATIENT
Start: 2025-06-18 | End: 2025-06-18

## 2025-06-18 RX ORDER — LIDOCAINE 40 MG/G
CREAM TOPICAL ONCE
OUTPATIENT
Start: 2025-06-18 | End: 2025-06-18

## 2025-06-18 RX ORDER — TRIAMCINOLONE ACETONIDE 1 MG/G
OINTMENT TOPICAL ONCE
OUTPATIENT
Start: 2025-06-18 | End: 2025-06-18

## 2025-06-18 RX ORDER — NEOMYCIN/BACITRACIN/POLYMYXINB 3.5-400-5K
OINTMENT (GRAM) TOPICAL ONCE
OUTPATIENT
Start: 2025-06-18 | End: 2025-06-18

## 2025-06-18 RX ORDER — SILVER SULFADIAZINE 10 MG/G
CREAM TOPICAL ONCE
OUTPATIENT
Start: 2025-06-18 | End: 2025-06-18

## 2025-06-18 RX ORDER — LIDOCAINE 50 MG/G
OINTMENT TOPICAL ONCE
OUTPATIENT
Start: 2025-06-18 | End: 2025-06-18

## 2025-06-18 RX ORDER — GINSENG 100 MG
CAPSULE ORAL ONCE
OUTPATIENT
Start: 2025-06-18 | End: 2025-06-18

## 2025-06-18 RX ORDER — MUPIROCIN 2 %
OINTMENT (GRAM) TOPICAL ONCE
OUTPATIENT
Start: 2025-06-18 | End: 2025-06-18

## 2025-06-18 RX ADMIN — CLOBETASOL PROPIONATE: 0.5 OINTMENT TOPICAL at 16:19

## 2025-06-18 ASSESSMENT — PAIN SCALES - GENERAL: PAINLEVEL_OUTOF10: 0

## 2025-06-18 NOTE — PATIENT INSTRUCTIONS
PHYSICIAN ORDERS AND DISCHARGE INSTRUCTIONS     Wound cleansing:              Do not scrub or use excessive force.             Wash hands with soap and water before and after dressing changes.             Prior to applying a clean dressing, cleanse wound with normal saline,               wound cleanser, or mild soap and water.              Ask the physician or nurse before getting the wound(s) wet in a shower     Wound Care Notes:         Kelsey to find out about wheelchair. Reliable medical equipment to do home visit. Received 24                              Orders for this week: 2025      A+D to BLE   Coccyx  : Clean with soap and water, pat dry.  Continue to apply Desitin, Clobetasol, and Stimulen to Wound Beds.  Cover with DriGo and hold in place with depends/underwear.  Apply Daily      Plan: Toenails 4/3/25, 25       Follow Up Instructions:  2 weeks  Primary Wound Care Provider: Kelsey Marinelli CNP   Call  for any questions or concerns.  Central [phone] for imaging lab work

## 2025-06-18 NOTE — PLAN OF CARE
Wound Care Supplies      Supply Company:     [employer]  [address], [place] [address] p: [phone] f: [phone]      Ordering Center:     [facility]  [address] [facility] [address] [phone]  WOUND CARE Dept: [phone]   FAX NUMBER [phone]    Patient Information:      Renea Wayne  [address] [facility] [address]   [phone]   : 1940  AGE: 84 y.o.     GENDER: female   EPISODE DATE: 2025    Insurance:      PRIMARY INSURANCE:  Plan: MEDICARE PART A AND B  Coverage: MEDICARE  Effective Date: 2007  Group Number: [unfilled]  Subscriber Number: [id number] - (Medicare)    Payer/Plan Subscr  Sex Relation Sub. Ins. ID Effective Group Num   1. MEDICARE - ME* RENEA WAYNE 1940 Female Self [id number]                                    [address] [address]   2. AET[employer] - [employer]ET[employer]* RENEA WAYNE 1940 Female Self [id number]                                    [address] [address]       Patient Wound Information:      Problem List Items Addressed This Visit          Circulatory    Dysarthria due to acute stroke (HCC)       Musculoskeletal and Integument    Rheumatoid arthritis involving multiple sites with positive rheumatoid factor (HCC)    Relevant Orders    Initiate Outpatient Wound Care Protocol       Other    Gait disturbance    WD-Pressure injury of right buttock, stage 2 (HCC)    Continuous leakage of urine    Relevant Orders    Initiate Outpatient Wound Care Protocol    Immunosuppression due to drug therapy    Relevant Orders    Initiate Outpatient Wound Care Protocol    Long toenail    Decubitus ulcer of left buttock, stage 3 (HCC) - Primary       WOUNDS REQUIRING DRESSING SUPPLIES:     Wound 25 #1 Left Buttock (Active)   Wound Image   25 1548   Wound Etiology Pressure Stage 3 25 1519   Dressing Status New dressing applied 25 1618   Wound Cleansed Wound cleanser 25 1547   Dressing/Treatment

## 2025-06-18 NOTE — PROGRESS NOTES
listed below with today's date are evaluated  Wound(s)    debrided this date include # : 1     Post  Debridement Measurements:  Wound 06/04/25 #1 Left Buttock (Active)   Wound Image   06/18/25 1548   Wound Etiology Pressure Stage 3 06/04/25 1519   Dressing Status New dressing applied 06/04/25 1527   Wound Cleansed Wound cleanser 06/18/25 1547   Dressing/Treatment Other (comment) 06/04/25 1527   Offloading for Diabetic Foot Ulcers Other (comment) 06/18/25 1547   Wound Length (cm) 0.9 cm 06/18/25 1547   Wound Width (cm) 0.7 cm 06/18/25 1547   Wound Depth (cm) 0.1 cm 06/18/25 1547   Wound Surface Area (cm^2) 0.63 cm^2 06/18/25 1547   Change in Wound Size % (l*w) -152 06/18/25 1547   Wound Volume (cm^3) 0.063 cm^3 06/18/25 1547   Wound Healing % -152 06/18/25 1547   Post-Procedure Length (cm) 0.9 cm 06/18/25 1557   Post-Procedure Width (cm) 0.7 cm 06/18/25 1557   Post-Procedure Depth (cm) 0.1 cm 06/18/25 1557   Post-Procedure Surface Area (cm^2) 0.63 cm^2 06/18/25 1557   Post-Procedure Volume (cm^3) 0.063 cm^3 06/18/25 1557   Distance Tunneling (cm) 0 cm 06/18/25 1547   Tunneling Position ___ O'Clock 0 06/18/25 1547   Undermining Starts ___ O'Clock 0 06/18/25 1547   Undermining Ends___ O'Clock 0 06/18/25 1547   Undermining Maxium Distance (cm) 0 06/18/25 1547   Wound Assessment Dry;Slough 06/18/25 1547   Drainage Amount None (dry) 06/18/25 1547   Odor None 06/18/25 1547   Malka-wound Assessment Fragile 06/18/25 1547   Margins Defined edges 06/18/25 1547   Wound Thickness Description not for Pressure Injury Partial thickness 06/18/25 1547   Number of days: 14     Total  Area  Debrided:  0.6 sq cm     Bleeding:  Minimal    Hemostasis Achieved:  by pressure    Procedural Pain:  0  / 10     Post Procedural Pain:  0 / 10     Response to treatment:  Well tolerated by patient.      Plan:     Discharge instructions:    Patient Instructions   PHYSICIAN ORDERS AND DISCHARGE INSTRUCTIONS     Wound cleansing:              Do not

## 2025-06-23 ENCOUNTER — OFFICE VISIT (OUTPATIENT)
Dept: CARDIOLOGY CLINIC | Age: 85
End: 2025-06-23
Payer: MEDICARE

## 2025-06-23 VITALS
BODY MASS INDEX: 29.71 KG/M2 | WEIGHT: 174 LBS | SYSTOLIC BLOOD PRESSURE: 100 MMHG | DIASTOLIC BLOOD PRESSURE: 68 MMHG | HEIGHT: 64 IN | HEART RATE: 72 BPM

## 2025-06-23 DIAGNOSIS — G47.33 OSA (OBSTRUCTIVE SLEEP APNEA): ICD-10-CM

## 2025-06-23 DIAGNOSIS — I10 ESSENTIAL HYPERTENSION: ICD-10-CM

## 2025-06-23 DIAGNOSIS — I73.9 PAD (PERIPHERAL ARTERY DISEASE): ICD-10-CM

## 2025-06-23 DIAGNOSIS — R55 SYNCOPE AND COLLAPSE: ICD-10-CM

## 2025-06-23 DIAGNOSIS — G81.94 LEFT HEMIPARESIS (HCC): ICD-10-CM

## 2025-06-23 DIAGNOSIS — G47.30 SLEEP APNEA, UNSPECIFIED TYPE: ICD-10-CM

## 2025-06-23 DIAGNOSIS — E78.2 MIXED HYPERLIPIDEMIA: Primary | ICD-10-CM

## 2025-06-23 DIAGNOSIS — I69.359 CVA, OLD, HEMIPARESIS (HCC): ICD-10-CM

## 2025-06-23 DIAGNOSIS — I47.10 SVT (SUPRAVENTRICULAR TACHYCARDIA): ICD-10-CM

## 2025-06-23 DIAGNOSIS — I65.21 STENOSIS OF RIGHT CAROTID ARTERY: ICD-10-CM

## 2025-06-23 PROBLEM — Z86.73 HISTORY OF CVA (CEREBROVASCULAR ACCIDENT): Status: ACTIVE | Noted: 2023-07-13

## 2025-06-23 PROBLEM — S82.64XA CLOSED NONDISPLACED FRACTURE OF LATERAL MALLEOLUS OF RIGHT FIBULA: Status: RESOLVED | Noted: 2024-08-25 | Resolved: 2025-06-23

## 2025-06-23 PROCEDURE — 1159F MED LIST DOCD IN RCRD: CPT | Performed by: NURSE PRACTITIONER

## 2025-06-23 PROCEDURE — 1090F PRES/ABSN URINE INCON ASSESS: CPT | Performed by: NURSE PRACTITIONER

## 2025-06-23 PROCEDURE — 1123F ACP DISCUSS/DSCN MKR DOCD: CPT | Performed by: NURSE PRACTITIONER

## 2025-06-23 PROCEDURE — 99214 OFFICE O/P EST MOD 30 MIN: CPT | Performed by: NURSE PRACTITIONER

## 2025-06-23 PROCEDURE — 3074F SYST BP LT 130 MM HG: CPT | Performed by: NURSE PRACTITIONER

## 2025-06-23 PROCEDURE — G8427 DOCREV CUR MEDS BY ELIG CLIN: HCPCS | Performed by: NURSE PRACTITIONER

## 2025-06-23 PROCEDURE — 1036F TOBACCO NON-USER: CPT | Performed by: NURSE PRACTITIONER

## 2025-06-23 PROCEDURE — G8399 PT W/DXA RESULTS DOCUMENT: HCPCS | Performed by: NURSE PRACTITIONER

## 2025-06-23 PROCEDURE — G8419 CALC BMI OUT NRM PARAM NOF/U: HCPCS | Performed by: NURSE PRACTITIONER

## 2025-06-23 PROCEDURE — 3078F DIAST BP <80 MM HG: CPT | Performed by: NURSE PRACTITIONER

## 2025-06-23 NOTE — PROGRESS NOTES
CARDIOLOGY  NOTE    2025    Altagracia Wayne (:  1940) is a 84 y.o. female,an established patient with Dr. Hernandez, here for evaluation of the following chief complaint(s):  Follow-up (Pt denies all cardiac sx. )        SUBJECTIVE/OBJECTIVE:  History of Present Illness  The patient presents for evaluation of blood pressure management, atrial fibrillation, and sleep apnea. History is reported by another person in the presence of the patient.    She has been experiencing low blood pressure, which has been monitored by her primary care physician. Her metoprolol dosage was reduced to half a tablet once daily due to this issue. She has been on metoprolol since her hospitalization in . Blood pressure is also monitored during home therapy sessions.    She has a history of atrial fibrillation, as documented by a physician's assistant in 2021. She underwent an angiogram, which revealed a 40% narrowing of the internal carotid artery, but no stent was placed. 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 She was previously under the care of Dr. Cerda, who diagnosed her with artery blockage during her first stroke. She was placed on Cardizem in , but the reason for this is unclear. She has undergone several stress tests and other evaluations prior to her knee replacement surgery.    She has not been using her CPAP machine recently.    She has been spitting up some phlegm before coming in here. She had a stroke 2 years ago. She has a small wound, which is under control.       her daughter who is her primary caretaker is with her today.  She has some limited memory and right-sided deficits weakness. She had stroke in  , carotid were normal , she has no known h/o afib but has dementia, walks with a walker , she has not had any falls in last few years . Daughter says she has had a lot of pain in her feet and ankles. 30 day monitor showed no afib .  She was in rehab in 2023 after

## 2025-06-23 NOTE — PROGRESS NOTES
CLINICAL STAFF DOCUMENTATION    Jeannette Valverde, ELIZA     Altagracia Wayne  1940  [id number]    Have you had any Chest Pain recently? - No  Have you had any Shortness of Breath - No  Have you had any dizziness - No  Have you had any palpitations recently? - No  Do you have any edema - swelling in No    When did you have your last labs drawn 1/25  What doctor ordered joe  Do we have the labs in their chart Yes  Do you need any prescriptions refilled? - Yes    Do you have a surgery or procedure scheduled in the near future - No  Do use tobacco products? - No  Do you drink alcohol? - No  Do you use any illicit drugs? - No  Caffeine? - Yes  How much caffeine? .occ      Check medication list thoroughly!!! AND RECONCILE OUTSIDE MEDICATIONS  If dose has changed change the entire order not just the MG  BE SURE TO ASK PATIENT IF THEY NEED MEDICATION REFILLS  Verify Pharmacy and update if incorrect    Add to every patient's \"wrap up\" the following dot phrase AFTERVISITCARDIOHEARTHOUSE and ensure we explain this to our patients

## 2025-06-25 ENCOUNTER — HOSPITAL ENCOUNTER (OUTPATIENT)
Age: 85
Discharge: HOME | End: 2025-06-25
Payer: MEDICARE

## 2025-06-25 DIAGNOSIS — E78.2 MIXED HYPERLIPIDEMIA: ICD-10-CM

## 2025-06-25 LAB
CHOLEST SERPL-MCNC: 150 MG/DL (ref 125–199)
HDLC SERPL-MCNC: 59 MG/DL
LDLC SERPL CALC-MCNC: 74 MG/DL
TRIGL SERPL-MCNC: 86 MG/DL

## 2025-06-25 PROCEDURE — 80061 LIPID PANEL: CPT

## 2025-06-26 ENCOUNTER — RESULTS FOLLOW-UP (OUTPATIENT)
Dept: CARDIOLOGY CLINIC | Age: 85
End: 2025-06-26

## 2025-07-02 ENCOUNTER — HOSPITAL ENCOUNTER (OUTPATIENT)
Dept: WOUND CARE | Age: 85
Discharge: HOME | End: 2025-07-02
Attending: NURSE PRACTITIONER
Payer: MEDICARE

## 2025-07-02 DIAGNOSIS — L89.323 DECUBITUS ULCER OF LEFT BUTTOCK, STAGE 3 (HCC): Primary | ICD-10-CM

## 2025-07-02 DIAGNOSIS — Z79.899 IMMUNOSUPPRESSION DUE TO DRUG THERAPY: ICD-10-CM

## 2025-07-02 DIAGNOSIS — L89.313 DECUBITUS ULCER OF RIGHT BUTTOCK, STAGE 3 (HCC): ICD-10-CM

## 2025-07-02 DIAGNOSIS — D84.821 IMMUNOSUPPRESSION DUE TO DRUG THERAPY: ICD-10-CM

## 2025-07-02 DIAGNOSIS — N39.45 CONTINUOUS LEAKAGE OF URINE: ICD-10-CM

## 2025-07-02 PROCEDURE — 11042 DBRDMT SUBQ TIS 1ST 20SQCM/<: CPT

## 2025-07-02 PROCEDURE — 11042 DBRDMT SUBQ TIS 1ST 20SQCM/<: CPT | Performed by: NURSE PRACTITIONER

## 2025-07-02 NOTE — PROGRESS NOTES
Wound Care Center Progress Visit      Altagracia Wayne  AGE: 84 y.o.   GENDER: female  : 1940  EPISODE DATE:  2025   Referred by: Nori Moseley PA-C     Subjective:     CHIEF COMPLAINT  WOUND   Problem List Items Addressed This Visit          Other    Continuous leakage of urine    Immunosuppression due to drug therapy    Decubitus ulcer of right buttock, stage 3 (HCC)    Decubitus ulcer of left buttock, stage 3 (HCC) - Primary     Chief Complaint   Patient presents with    Wound Check        HISTORY of PRESENT ILLNESS      Altagracia Wayne is a 84 y.o. 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 The condition is of moderate severity. The ulcers are recurrent in nature and have been present at varying degrees of severity for > than one year at initial visit to the wound clinic 23. The underlying cause is thought to be pressure and moisture from urinary incontinence. The patients care to date has included santyl. The patient has significant underlying medical conditions as below. Nori Moseley PA-C is PCP.    Wound Pain Timing/Severity: None  Quality of pain: N/A  Severity of pain:  0 / 10   Modifying Factors: chronic pressure, decreased mobility, shear force, immunosuppression, incontinence of stool, and incontinence of urine  Associated Signs/Symptoms: pain    Wound status: 2025       Patient presents for follow up of  bilateral buttock wounds. Bedside debridement completed, patient tolerated well. Regimen discussed and established with patient/family as below. The patients records were reviewed and discussed.  Time was given for questions. All questions were answered to the patients satisfaction.      [x] Stable [] Improved [x] Worse [] Initial visit []  Revisit []  Healed    [] Adjustments made to regimen of care  [x] Off loading regimen  [] Compression regimen  [] Wound Vac Therapy  [] Increased

## 2025-07-02 NOTE — PATIENT INSTRUCTIONS
PHYSICIAN ORDERS AND DISCHARGE INSTRUCTIONS     Wound cleansing:              Do not scrub or use excessive force.             Wash hands with soap and water before and after dressing changes.             Prior to applying a clean dressing, cleanse wound with normal saline,               wound cleanser, or mild soap and water.              Ask the physician or nurse before getting the wound(s) wet in a shower     Wound Care Notes:         Kelsey to find out about wheelchair. Reliable medical equipment to do home visit. Received 24                              Orders for this week: 2025    Left and Right Buttock  : Clean with soap and water, pat dry.  Continue to apply Desitin, Clobetasol, and Stimulen to Wound Beds.  Cover with DriGo and hold in place with depends/underwear.  Apply Daily      Plan: Toenails 4/3/25, 25       Follow Up Instructions:  2 weeks  Primary Wound Care Provider: Kelsey Marinelli CNP   Call  for any questions or concerns.  Central [phone] for imaging lab work

## 2025-07-16 ENCOUNTER — HOSPITAL ENCOUNTER (OUTPATIENT)
Dept: WOUND CARE | Age: 85
Discharge: HOME | End: 2025-07-16
Attending: NURSE PRACTITIONER
Payer: MEDICARE

## 2025-07-16 VITALS
DIASTOLIC BLOOD PRESSURE: 47 MMHG | HEART RATE: 86 BPM | TEMPERATURE: 97.6 F | RESPIRATION RATE: 16 BRPM | SYSTOLIC BLOOD PRESSURE: 106 MMHG

## 2025-07-16 DIAGNOSIS — Z79.899 IMMUNOSUPPRESSION DUE TO DRUG THERAPY: ICD-10-CM

## 2025-07-16 DIAGNOSIS — I63.9 DYSARTHRIA DUE TO ACUTE STROKE (HCC): ICD-10-CM

## 2025-07-16 DIAGNOSIS — R47.1 DYSARTHRIA DUE TO ACUTE STROKE (HCC): ICD-10-CM

## 2025-07-16 DIAGNOSIS — L89.313 DECUBITUS ULCER OF RIGHT BUTTOCK, STAGE 3 (HCC): Primary | ICD-10-CM

## 2025-07-16 DIAGNOSIS — L89.323 DECUBITUS ULCER OF LEFT BUTTOCK, STAGE 3 (HCC): ICD-10-CM

## 2025-07-16 DIAGNOSIS — M05.79 RHEUMATOID ARTHRITIS INVOLVING MULTIPLE SITES WITH POSITIVE RHEUMATOID FACTOR (HCC): ICD-10-CM

## 2025-07-16 DIAGNOSIS — N39.45 CONTINUOUS LEAKAGE OF URINE: ICD-10-CM

## 2025-07-16 DIAGNOSIS — D84.821 IMMUNOSUPPRESSION DUE TO DRUG THERAPY: ICD-10-CM

## 2025-07-16 PROCEDURE — 11042 DBRDMT SUBQ TIS 1ST 20SQCM/<: CPT

## 2025-07-16 PROCEDURE — 11042 DBRDMT SUBQ TIS 1ST 20SQCM/<: CPT | Performed by: NURSE PRACTITIONER

## 2025-07-16 RX ORDER — SODIUM CHLOR/HYPOCHLOROUS ACID 0.033 %
SOLUTION, IRRIGATION IRRIGATION ONCE
OUTPATIENT
Start: 2025-07-16 | End: 2025-07-16

## 2025-07-16 RX ORDER — CLOBETASOL PROPIONATE 0.5 MG/G
OINTMENT TOPICAL ONCE
OUTPATIENT
Start: 2025-07-16 | End: 2025-07-16

## 2025-07-16 RX ORDER — LIDOCAINE HYDROCHLORIDE 40 MG/ML
SOLUTION TOPICAL ONCE
OUTPATIENT
Start: 2025-07-16 | End: 2025-07-16

## 2025-07-16 RX ORDER — BACITRACIN ZINC AND POLYMYXIN B SULFATE 500; 1000 [USP'U]/G; [USP'U]/G
OINTMENT TOPICAL ONCE
OUTPATIENT
Start: 2025-07-16 | End: 2025-07-16

## 2025-07-16 RX ORDER — GINSENG 100 MG
CAPSULE ORAL ONCE
OUTPATIENT
Start: 2025-07-16 | End: 2025-07-16

## 2025-07-16 RX ORDER — TRIAMCINOLONE ACETONIDE 1 MG/G
OINTMENT TOPICAL ONCE
OUTPATIENT
Start: 2025-07-16 | End: 2025-07-16

## 2025-07-16 RX ORDER — SILVER SULFADIAZINE 10 MG/G
CREAM TOPICAL ONCE
OUTPATIENT
Start: 2025-07-16 | End: 2025-07-16

## 2025-07-16 RX ORDER — LIDOCAINE 50 MG/G
OINTMENT TOPICAL ONCE
OUTPATIENT
Start: 2025-07-16 | End: 2025-07-16

## 2025-07-16 RX ORDER — GENTAMICIN SULFATE 1 MG/G
OINTMENT TOPICAL ONCE
OUTPATIENT
Start: 2025-07-16 | End: 2025-07-16

## 2025-07-16 RX ORDER — LIDOCAINE 40 MG/G
CREAM TOPICAL ONCE
OUTPATIENT
Start: 2025-07-16 | End: 2025-07-16

## 2025-07-16 RX ORDER — NEOMYCIN/BACITRACIN/POLYMYXINB 3.5-400-5K
OINTMENT (GRAM) TOPICAL ONCE
OUTPATIENT
Start: 2025-07-16 | End: 2025-07-16

## 2025-07-16 RX ORDER — MUPIROCIN 2 %
OINTMENT (GRAM) TOPICAL ONCE
OUTPATIENT
Start: 2025-07-16 | End: 2025-07-16

## 2025-07-16 NOTE — PROGRESS NOTES
INSTRUCTIONS     Wound cleansing:              Do not scrub or use excessive force.             Wash hands with soap and water before and after dressing changes.             Prior to applying a clean dressing, cleanse wound with normal saline,               wound cleanser, or mild soap and water.              Ask the physician or nurse before getting the wound(s) wet in a shower     Wound Care Notes:         Kelsey to find out about wheelchair. Reliable medical equipment to do home visit. Received 24                              Orders for this week: 2025    Left and Right Buttock  : Clean with soap and water, pat dry.  Continue to apply Desitin, around angie wound  Double Layer Collactive Ag to wound bed  Cover Coccyx with Zetuvit Plus Silicone Border  Change Daily      Plan: Toenails 4/3/25, 25       Follow Up Instructions:  1 weeks  Primary Wound Care Provider: Kelsey Marinelli CNP   Call  for any questions or concerns.  Central [phone] for imaging lab work       Electronically signed by TARUN Oh CNP on 2025 at 4:17 PM

## 2025-07-16 NOTE — PATIENT INSTRUCTIONS
PHYSICIAN ORDERS AND DISCHARGE INSTRUCTIONS     Wound cleansing:              Do not scrub or use excessive force.             Wash hands with soap and water before and after dressing changes.             Prior to applying a clean dressing, cleanse wound with normal saline,               wound cleanser, or mild soap and water.              Ask the physician or nurse before getting the wound(s) wet in a shower     Wound Care Notes:         Kelsey to find out about wheelchair. Reliable medical equipment to do home visit. Received 24                              Orders for this week: 2025    Left and Right Buttock  : Clean with soap and water, pat dry.  Continue to apply Desitin, around angie wound  Double Layer Collactive Ag to wound bed  Cover Coccyx with Zetuvit Plus Silicone Border  Change Daily      Plan: Toenails 4/3/25, 25       Follow Up Instructions:  2 weeks  Primary Wound Care Provider: Kelsey Marinelli CNP   Call  for any questions or concerns.  Central [phone] for imaging lab work

## 2025-07-23 ENCOUNTER — HOSPITAL ENCOUNTER (OUTPATIENT)
Dept: WOUND CARE | Age: 85
Discharge: HOME | End: 2025-07-23
Attending: NURSE PRACTITIONER
Payer: MEDICARE

## 2025-07-23 VITALS
RESPIRATION RATE: 16 BRPM | DIASTOLIC BLOOD PRESSURE: 63 MMHG | HEART RATE: 84 BPM | TEMPERATURE: 97 F | SYSTOLIC BLOOD PRESSURE: 123 MMHG

## 2025-07-23 DIAGNOSIS — N39.45 CONTINUOUS LEAKAGE OF URINE: ICD-10-CM

## 2025-07-23 DIAGNOSIS — D84.821 IMMUNOSUPPRESSION DUE TO DRUG THERAPY: ICD-10-CM

## 2025-07-23 DIAGNOSIS — M05.79 RHEUMATOID ARTHRITIS INVOLVING MULTIPLE SITES WITH POSITIVE RHEUMATOID FACTOR (HCC): ICD-10-CM

## 2025-07-23 DIAGNOSIS — L89.313 DECUBITUS ULCER OF RIGHT BUTTOCK, STAGE 3 (HCC): ICD-10-CM

## 2025-07-23 DIAGNOSIS — Z79.899 IMMUNOSUPPRESSION DUE TO DRUG THERAPY: ICD-10-CM

## 2025-07-23 DIAGNOSIS — L89.323 DECUBITUS ULCER OF LEFT BUTTOCK, STAGE 3 (HCC): Primary | ICD-10-CM

## 2025-07-23 PROCEDURE — 11042 DBRDMT SUBQ TIS 1ST 20SQCM/<: CPT

## 2025-07-23 PROCEDURE — 11042 DBRDMT SUBQ TIS 1ST 20SQCM/<: CPT | Performed by: NURSE PRACTITIONER

## 2025-07-23 RX ORDER — TRIAMCINOLONE ACETONIDE 1 MG/G
OINTMENT TOPICAL ONCE
OUTPATIENT
Start: 2025-07-23 | End: 2025-07-23

## 2025-07-23 RX ORDER — LIDOCAINE 50 MG/G
OINTMENT TOPICAL ONCE
OUTPATIENT
Start: 2025-07-23 | End: 2025-07-23

## 2025-07-23 RX ORDER — NEOMYCIN/BACITRACIN/POLYMYXINB 3.5-400-5K
OINTMENT (GRAM) TOPICAL ONCE
OUTPATIENT
Start: 2025-07-23 | End: 2025-07-23

## 2025-07-23 RX ORDER — LIDOCAINE 40 MG/G
CREAM TOPICAL ONCE
OUTPATIENT
Start: 2025-07-23 | End: 2025-07-23

## 2025-07-23 RX ORDER — GENTAMICIN SULFATE 1 MG/G
OINTMENT TOPICAL ONCE
OUTPATIENT
Start: 2025-07-23 | End: 2025-07-23

## 2025-07-23 RX ORDER — SILVER SULFADIAZINE 10 MG/G
CREAM TOPICAL ONCE
OUTPATIENT
Start: 2025-07-23 | End: 2025-07-23

## 2025-07-23 RX ORDER — SODIUM CHLOR/HYPOCHLOROUS ACID 0.033 %
SOLUTION, IRRIGATION IRRIGATION ONCE
OUTPATIENT
Start: 2025-07-23 | End: 2025-07-23

## 2025-07-23 RX ORDER — CLOBETASOL PROPIONATE 0.5 MG/G
OINTMENT TOPICAL ONCE
OUTPATIENT
Start: 2025-07-23 | End: 2025-07-23

## 2025-07-23 RX ORDER — LIDOCAINE HYDROCHLORIDE 40 MG/ML
SOLUTION TOPICAL ONCE
OUTPATIENT
Start: 2025-07-23 | End: 2025-07-23

## 2025-07-23 RX ORDER — MUPIROCIN 2 %
OINTMENT (GRAM) TOPICAL ONCE
OUTPATIENT
Start: 2025-07-23 | End: 2025-07-23

## 2025-07-23 RX ORDER — GINSENG 100 MG
CAPSULE ORAL ONCE
OUTPATIENT
Start: 2025-07-23 | End: 2025-07-23

## 2025-07-23 RX ORDER — BACITRACIN ZINC AND POLYMYXIN B SULFATE 500; 1000 [USP'U]/G; [USP'U]/G
OINTMENT TOPICAL ONCE
OUTPATIENT
Start: 2025-07-23 | End: 2025-07-23

## 2025-07-23 NOTE — PROGRESS NOTES
physician or nurse before getting the wound(s) wet in a shower     Wound Care Notes:         Kelsey to find out about wheelchair. Reliable medical equipment to do home visit. Received 24                              Orders for this week: 2025    Fax to [facility]: needs new supplies ordered     Left and Right Buttock  : Clean with soap and water, pat dry.  Continue to apply Desitin, around angie wound  Double Layer Colactive Ag to wound bed  Cover Coccyx with Zetuvit Plus Silicone Border  Change Daily      Plan: Toenails 4/3/25, 25       Follow Up Instructions:  1 week  Primary Wound Care Provider: Kelsey Marinelli CNP   Call  for any questions or concerns.  Central [phone] for imaging lab work       Electronically signed by TARUN Oh CNP on 2025 at 3:49 PM

## 2025-07-23 NOTE — PATIENT INSTRUCTIONS
PHYSICIAN ORDERS AND DISCHARGE INSTRUCTIONS     Wound cleansing:              Do not scrub or use excessive force.             Wash hands with soap and water before and after dressing changes.             Prior to applying a clean dressing, cleanse wound with normal saline,               wound cleanser, or mild soap and water.              Ask the physician or nurse before getting the wound(s) wet in a shower     Wound Care Notes:         Kelsey to find out about wheelchair. Reliable medical equipment to do home visit. Received 24                              Orders for this week: 2025    Fax to [facility]: needs new supplies ordered     Left and Right Buttock  : Clean with soap and water, pat dry.  Continue to apply Desitin, around angie wound  Double Layer Colactive Ag to wound bed  Cover Coccyx with Zetuvit Plus Silicone Border  Change Daily      Plan: Toenails 4/3/25, 25       Follow Up Instructions:  1 week  Primary Wound Care Provider: Kelsey Marinelli CNP   Call  for any questions or concerns.  Central [phone] for imaging lab work

## 2025-07-30 ENCOUNTER — HOSPITAL ENCOUNTER (OUTPATIENT)
Dept: WOUND CARE | Age: 85
Discharge: HOME | End: 2025-07-30
Attending: NURSE PRACTITIONER
Payer: MEDICARE

## 2025-07-30 VITALS
SYSTOLIC BLOOD PRESSURE: 111 MMHG | HEART RATE: 97 BPM | DIASTOLIC BLOOD PRESSURE: 74 MMHG | TEMPERATURE: 98.2 F | RESPIRATION RATE: 16 BRPM

## 2025-07-30 DIAGNOSIS — N39.45 CONTINUOUS LEAKAGE OF URINE: ICD-10-CM

## 2025-07-30 DIAGNOSIS — L89.313 DECUBITUS ULCER OF RIGHT BUTTOCK, STAGE 3 (HCC): Primary | ICD-10-CM

## 2025-07-30 DIAGNOSIS — M05.79 RHEUMATOID ARTHRITIS INVOLVING MULTIPLE SITES WITH POSITIVE RHEUMATOID FACTOR (HCC): ICD-10-CM

## 2025-07-30 DIAGNOSIS — Z79.899 IMMUNOSUPPRESSION DUE TO DRUG THERAPY: ICD-10-CM

## 2025-07-30 DIAGNOSIS — L89.323 DECUBITUS ULCER OF LEFT BUTTOCK, STAGE 3 (HCC): ICD-10-CM

## 2025-07-30 DIAGNOSIS — D84.821 IMMUNOSUPPRESSION DUE TO DRUG THERAPY: ICD-10-CM

## 2025-07-30 PROCEDURE — 11042 DBRDMT SUBQ TIS 1ST 20SQCM/<: CPT

## 2025-07-30 PROCEDURE — 11042 DBRDMT SUBQ TIS 1ST 20SQCM/<: CPT | Performed by: NURSE PRACTITIONER

## 2025-07-30 RX ORDER — NEOMYCIN/BACITRACIN/POLYMYXINB 3.5-400-5K
OINTMENT (GRAM) TOPICAL ONCE
OUTPATIENT
Start: 2025-07-30 | End: 2025-07-30

## 2025-07-30 RX ORDER — LIDOCAINE HYDROCHLORIDE 40 MG/ML
SOLUTION TOPICAL ONCE
OUTPATIENT
Start: 2025-07-30 | End: 2025-07-30

## 2025-07-30 RX ORDER — GINSENG 100 MG
CAPSULE ORAL ONCE
OUTPATIENT
Start: 2025-07-30 | End: 2025-07-30

## 2025-07-30 RX ORDER — SILVER SULFADIAZINE 10 MG/G
CREAM TOPICAL ONCE
OUTPATIENT
Start: 2025-07-30 | End: 2025-07-30

## 2025-07-30 RX ORDER — LIDOCAINE 50 MG/G
OINTMENT TOPICAL ONCE
OUTPATIENT
Start: 2025-07-30 | End: 2025-07-30

## 2025-07-30 RX ORDER — SODIUM CHLOR/HYPOCHLOROUS ACID 0.033 %
SOLUTION, IRRIGATION IRRIGATION ONCE
OUTPATIENT
Start: 2025-07-30 | End: 2025-07-30

## 2025-07-30 RX ORDER — BACITRACIN ZINC AND POLYMYXIN B SULFATE 500; 1000 [USP'U]/G; [USP'U]/G
OINTMENT TOPICAL ONCE
OUTPATIENT
Start: 2025-07-30 | End: 2025-07-30

## 2025-07-30 RX ORDER — CLOBETASOL PROPIONATE 0.5 MG/G
OINTMENT TOPICAL ONCE
OUTPATIENT
Start: 2025-07-30 | End: 2025-07-30

## 2025-07-30 RX ORDER — LIDOCAINE 40 MG/G
CREAM TOPICAL ONCE
OUTPATIENT
Start: 2025-07-30 | End: 2025-07-30

## 2025-07-30 RX ORDER — MUPIROCIN 2 %
OINTMENT (GRAM) TOPICAL ONCE
OUTPATIENT
Start: 2025-07-30 | End: 2025-07-30

## 2025-07-30 RX ORDER — GENTAMICIN SULFATE 1 MG/G
OINTMENT TOPICAL ONCE
OUTPATIENT
Start: 2025-07-30 | End: 2025-07-30

## 2025-07-30 RX ORDER — TRIAMCINOLONE ACETONIDE 1 MG/G
OINTMENT TOPICAL ONCE
OUTPATIENT
Start: 2025-07-30 | End: 2025-07-30

## 2025-07-30 NOTE — PATIENT INSTRUCTIONS
PHYSICIAN ORDERS AND DISCHARGE INSTRUCTIONS     Wound cleansing:              Do not scrub or use excessive force.             Wash hands with soap and water before and after dressing changes.             Prior to applying a clean dressing, cleanse wound with normal saline,               wound cleanser, or mild soap and water.              Ask the physician or nurse before getting the wound(s) wet in a shower     Wound Care Notes:         Kelsey to find out about wheelchair. Reliable medical equipment to do home visit. Received 24                              Orders for this week: 2025    Fax to [facility]    Left and Right Buttock  : Clean with soap and water, pat dry.  Continue to apply Desitin, around angie wound  Double Layer Colactive Ag to wound bed  Cover Coccyx with Zetuvit Plus Silicone Border  Change Daily      Plan: Toenails 4/3/25, 25       Follow Up Instructions:  1 week  Primary Wound Care Provider: Kelsey Marinelli CNP   Call  for any questions or concerns.  Central [phone] for imaging lab work

## 2025-07-30 NOTE — PROGRESS NOTES
Wound Care Center Progress Visit      Altagracia Wayne  AGE: 84 y.o.   GENDER: female  : 1940  EPISODE DATE:  2025   Referred by: Nori Moseley PA-C     Subjective:     CHIEF COMPLAINT  WOUND   Problem List Items Addressed This Visit          Musculoskeletal and Integument    Rheumatoid arthritis involving multiple sites with positive rheumatoid factor (HCC)    Relevant Orders    Initiate Outpatient Wound Care Protocol       Other    Continuous leakage of urine    Relevant Orders    Initiate Outpatient Wound Care Protocol    Immunosuppression due to drug therapy    Relevant Orders    Initiate Outpatient Wound Care Protocol    Decubitus ulcer of right buttock, stage 3 (HCC) - Primary    Decubitus ulcer of left buttock, stage 3 (HCC)     Chief Complaint   Patient presents with    Wound Check        HISTORY of PRESENT ILLNESS      Altagracia Wayne is a 84 y.o. 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 The condition is of moderate severity. The ulcers are recurrent in nature and have been present at varying degrees of severity for > than one year at initial visit to the wound clinic 23. The underlying cause is thought to be pressure and moisture from urinary incontinence. The patients care to date has included santyl. The patient has significant underlying medical conditions as below. Nori Moseley PA-C is PCP.    Wound Pain Timing/Severity: None  Quality of pain: N/A  Severity of pain:  0 / 10   Modifying Factors: chronic pressure, decreased mobility, shear force, immunosuppression, incontinence of stool, and incontinence of urine  Associated Signs/Symptoms: pain    Wound status: 2025     Patient presents for follow up of  bilateral buttock wounds. Bedside debridement completed, patient tolerated well. Regimen discussed and established with patient/family as below. The patients records were reviewed and

## 2025-08-04 RX ORDER — DILTIAZEM HYDROCHLORIDE 240 MG/1
240 CAPSULE, EXTENDED RELEASE ORAL DAILY
Qty: 90 CAPSULE | Refills: 3 | Status: SHIPPED | OUTPATIENT
Start: 2025-08-04

## 2025-08-06 ENCOUNTER — HOSPITAL ENCOUNTER (OUTPATIENT)
Dept: WOUND CARE | Age: 85
Discharge: HOME | End: 2025-08-06
Attending: NURSE PRACTITIONER
Payer: MEDICARE

## 2025-08-06 VITALS
TEMPERATURE: 97 F | DIASTOLIC BLOOD PRESSURE: 73 MMHG | HEART RATE: 80 BPM | SYSTOLIC BLOOD PRESSURE: 106 MMHG | RESPIRATION RATE: 16 BRPM

## 2025-08-06 DIAGNOSIS — L89.312 PRESSURE INJURY OF RIGHT BUTTOCK, STAGE 2 (HCC): ICD-10-CM

## 2025-08-06 DIAGNOSIS — L89.323 DECUBITUS ULCER OF LEFT BUTTOCK, STAGE 3 (HCC): Primary | ICD-10-CM

## 2025-08-06 DIAGNOSIS — L60.2 LONG TOENAIL: ICD-10-CM

## 2025-08-06 DIAGNOSIS — L89.313 DECUBITUS ULCER OF RIGHT BUTTOCK, STAGE 3 (HCC): ICD-10-CM

## 2025-08-06 DIAGNOSIS — D84.821 IMMUNOSUPPRESSION DUE TO DRUG THERAPY: ICD-10-CM

## 2025-08-06 DIAGNOSIS — I63.9 DYSARTHRIA DUE TO ACUTE STROKE (HCC): ICD-10-CM

## 2025-08-06 DIAGNOSIS — M05.79 RHEUMATOID ARTHRITIS INVOLVING MULTIPLE SITES WITH POSITIVE RHEUMATOID FACTOR (HCC): ICD-10-CM

## 2025-08-06 DIAGNOSIS — N39.45 CONTINUOUS LEAKAGE OF URINE: ICD-10-CM

## 2025-08-06 DIAGNOSIS — Z79.899 IMMUNOSUPPRESSION DUE TO DRUG THERAPY: ICD-10-CM

## 2025-08-06 DIAGNOSIS — R26.9 GAIT DISTURBANCE: ICD-10-CM

## 2025-08-06 DIAGNOSIS — R47.1 DYSARTHRIA DUE TO ACUTE STROKE (HCC): ICD-10-CM

## 2025-08-06 PROCEDURE — 11042 DBRDMT SUBQ TIS 1ST 20SQCM/<: CPT | Performed by: NURSE PRACTITIONER

## 2025-08-06 PROCEDURE — 11042 DBRDMT SUBQ TIS 1ST 20SQCM/<: CPT

## 2025-08-06 RX ORDER — BACITRACIN ZINC AND POLYMYXIN B SULFATE 500; 1000 [USP'U]/G; [USP'U]/G
OINTMENT TOPICAL ONCE
OUTPATIENT
Start: 2025-08-06 | End: 2025-08-06

## 2025-08-06 RX ORDER — SODIUM CHLOR/HYPOCHLOROUS ACID 0.033 %
SOLUTION, IRRIGATION IRRIGATION ONCE
OUTPATIENT
Start: 2025-08-06 | End: 2025-08-06

## 2025-08-06 RX ORDER — GENTAMICIN SULFATE 1 MG/G
OINTMENT TOPICAL ONCE
OUTPATIENT
Start: 2025-08-06 | End: 2025-08-06

## 2025-08-06 RX ORDER — LIDOCAINE HYDROCHLORIDE 40 MG/ML
SOLUTION TOPICAL ONCE
OUTPATIENT
Start: 2025-08-06 | End: 2025-08-06

## 2025-08-06 RX ORDER — LIDOCAINE 40 MG/G
CREAM TOPICAL ONCE
OUTPATIENT
Start: 2025-08-06 | End: 2025-08-06

## 2025-08-06 RX ORDER — LIDOCAINE 50 MG/G
OINTMENT TOPICAL ONCE
OUTPATIENT
Start: 2025-08-06 | End: 2025-08-06

## 2025-08-06 RX ORDER — TRIAMCINOLONE ACETONIDE 1 MG/G
OINTMENT TOPICAL ONCE
OUTPATIENT
Start: 2025-08-06 | End: 2025-08-06

## 2025-08-06 RX ORDER — SILVER SULFADIAZINE 10 MG/G
CREAM TOPICAL ONCE
OUTPATIENT
Start: 2025-08-06 | End: 2025-08-06

## 2025-08-06 RX ORDER — CLOBETASOL PROPIONATE 0.5 MG/G
OINTMENT TOPICAL ONCE
OUTPATIENT
Start: 2025-08-06 | End: 2025-08-06

## 2025-08-06 RX ORDER — GINSENG 100 MG
CAPSULE ORAL ONCE
OUTPATIENT
Start: 2025-08-06 | End: 2025-08-06

## 2025-08-06 RX ORDER — NEOMYCIN/BACITRACIN/POLYMYXINB 3.5-400-5K
OINTMENT (GRAM) TOPICAL ONCE
OUTPATIENT
Start: 2025-08-06 | End: 2025-08-06

## 2025-08-06 RX ORDER — MUPIROCIN 2 %
OINTMENT (GRAM) TOPICAL ONCE
OUTPATIENT
Start: 2025-08-06 | End: 2025-08-06

## 2025-08-06 ASSESSMENT — PAIN SCALES - GENERAL: PAINLEVEL_OUTOF10: 0

## 2025-08-06 ASSESSMENT — PAIN SCALES - WONG BAKER: WONGBAKER_NUMERICALRESPONSE: NO HURT

## 2025-08-07 ENCOUNTER — HOSPITAL ENCOUNTER (OUTPATIENT)
Dept: INFUSION THERAPY | Age: 85
Discharge: HOME | End: 2025-08-07
Payer: MEDICARE

## 2025-08-07 ENCOUNTER — OFFICE VISIT (OUTPATIENT)
Dept: ONCOLOGY | Age: 85
End: 2025-08-07
Payer: MEDICARE

## 2025-08-07 VITALS
DIASTOLIC BLOOD PRESSURE: 61 MMHG | SYSTOLIC BLOOD PRESSURE: 98 MMHG | TEMPERATURE: 98.3 F | HEART RATE: 82 BPM | OXYGEN SATURATION: 97 %

## 2025-08-07 DIAGNOSIS — D64.9 ANEMIA, UNSPECIFIED TYPE: Primary | ICD-10-CM

## 2025-08-07 DIAGNOSIS — C21.0 ANAL CANCER (HCC): ICD-10-CM

## 2025-08-07 DIAGNOSIS — D64.9 ANEMIA, UNSPECIFIED TYPE: ICD-10-CM

## 2025-08-07 LAB
ALBUMIN SERPL-MCNC: 3.5 G/DL (ref 3.4–5)
ALBUMIN/GLOB SERPL: 1.3 {RATIO} (ref 1.1–2.2)
ALP SERPL-CCNC: 147 U/L (ref 40–129)
ALT SERPL-CCNC: 8 U/L (ref 10–40)
ANION GAP SERPL CALCULATED.3IONS-SCNC: 11 MMOL/L (ref 9–17)
AST SERPL-CCNC: 20 U/L (ref 15–37)
BASOPHILS # BLD: 0.02 K/UL
BASOPHILS NFR BLD: 0 % (ref 0–1)
BILIRUB SERPL-MCNC: 0.4 MG/DL (ref 0–1)
BUN SERPL-MCNC: 17 MG/DL (ref 7–20)
CALCIUM SERPL-MCNC: 9.3 MG/DL (ref 8.3–10.6)
CEA SERPL-MCNC: 9.8 NG/ML (ref 0–5)
CHLORIDE SERPL-SCNC: 100 MMOL/L (ref 99–110)
CO2 SERPL-SCNC: 27 MMOL/L (ref 21–32)
CREAT SERPL-MCNC: 0.6 MG/DL (ref 0.6–1.2)
EOSINOPHIL # BLD: 0.32 K/UL
EOSINOPHILS RELATIVE PERCENT: 6 % (ref 0–3)
ERYTHROCYTE [DISTWIDTH] IN BLOOD BY AUTOMATED COUNT: 14.1 % (ref 11.7–14.9)
GFR, ESTIMATED: >90 ML/MIN/1.73M2
GLUCOSE SERPL-MCNC: 136 MG/DL (ref 74–99)
HCT VFR BLD AUTO: 34.9 % (ref 37–47)
HGB BLD-MCNC: 11.1 G/DL (ref 12.5–16)
LYMPHOCYTES NFR BLD: 1.49 K/UL
LYMPHOCYTES RELATIVE PERCENT: 29 % (ref 24–44)
MCH RBC QN AUTO: 29 PG (ref 27–31)
MCHC RBC AUTO-ENTMCNC: 31.8 G/DL (ref 32–36)
MCV RBC AUTO: 91.1 FL (ref 78–100)
MONOCYTES NFR BLD: 0.45 K/UL
MONOCYTES NFR BLD: 9 % (ref 0–5)
NEUTROPHILS NFR BLD: 56 % (ref 36–66)
NEUTS SEG NFR BLD: 2.85 K/UL
PLATELET # BLD AUTO: 351 K/UL (ref 140–440)
PMV BLD AUTO: 9 FL (ref 7.5–11.1)
POTASSIUM SERPL-SCNC: 3.5 MMOL/L (ref 3.5–5.1)
PROT SERPL-MCNC: 6.3 G/DL (ref 6.4–8.2)
RBC # BLD AUTO: 3.83 M/UL (ref 4.2–5.4)
SODIUM SERPL-SCNC: 138 MMOL/L (ref 136–145)
WBC OTHER # BLD: 5.1 K/UL (ref 4–10.5)

## 2025-08-07 PROCEDURE — 3074F SYST BP LT 130 MM HG: CPT | Performed by: INTERNAL MEDICINE

## 2025-08-07 PROCEDURE — 99212 OFFICE O/P EST SF 10 MIN: CPT

## 2025-08-07 PROCEDURE — 82378 CARCINOEMBRYONIC ANTIGEN: CPT

## 2025-08-07 PROCEDURE — 1036F TOBACCO NON-USER: CPT | Performed by: INTERNAL MEDICINE

## 2025-08-07 PROCEDURE — 36415 COLL VENOUS BLD VENIPUNCTURE: CPT

## 2025-08-07 PROCEDURE — 99214 OFFICE O/P EST MOD 30 MIN: CPT | Performed by: INTERNAL MEDICINE

## 2025-08-07 PROCEDURE — 1123F ACP DISCUSS/DSCN MKR DOCD: CPT | Performed by: INTERNAL MEDICINE

## 2025-08-07 PROCEDURE — 85025 COMPLETE CBC W/AUTO DIFF WBC: CPT

## 2025-08-07 PROCEDURE — G8399 PT W/DXA RESULTS DOCUMENT: HCPCS | Performed by: INTERNAL MEDICINE

## 2025-08-07 PROCEDURE — 3078F DIAST BP <80 MM HG: CPT | Performed by: INTERNAL MEDICINE

## 2025-08-07 PROCEDURE — G8419 CALC BMI OUT NRM PARAM NOF/U: HCPCS | Performed by: INTERNAL MEDICINE

## 2025-08-07 PROCEDURE — G8427 DOCREV CUR MEDS BY ELIG CLIN: HCPCS | Performed by: INTERNAL MEDICINE

## 2025-08-07 PROCEDURE — 1159F MED LIST DOCD IN RCRD: CPT | Performed by: INTERNAL MEDICINE

## 2025-08-07 PROCEDURE — 1090F PRES/ABSN URINE INCON ASSESS: CPT | Performed by: INTERNAL MEDICINE

## 2025-08-07 PROCEDURE — 1126F AMNT PAIN NOTED NONE PRSNT: CPT | Performed by: INTERNAL MEDICINE

## 2025-08-07 PROCEDURE — 80053 COMPREHEN METABOLIC PANEL: CPT

## 2025-08-13 ENCOUNTER — HOSPITAL ENCOUNTER (OUTPATIENT)
Dept: WOUND CARE | Age: 85
Discharge: HOME | End: 2025-08-13
Attending: NURSE PRACTITIONER
Payer: MEDICARE

## 2025-08-13 VITALS
RESPIRATION RATE: 16 BRPM | DIASTOLIC BLOOD PRESSURE: 56 MMHG | HEART RATE: 71 BPM | SYSTOLIC BLOOD PRESSURE: 131 MMHG | TEMPERATURE: 98.4 F

## 2025-08-13 DIAGNOSIS — L89.323 DECUBITUS ULCER OF LEFT BUTTOCK, STAGE 3 (HCC): ICD-10-CM

## 2025-08-13 DIAGNOSIS — D84.821 IMMUNOSUPPRESSION DUE TO DRUG THERAPY: ICD-10-CM

## 2025-08-13 DIAGNOSIS — M05.79 RHEUMATOID ARTHRITIS INVOLVING MULTIPLE SITES WITH POSITIVE RHEUMATOID FACTOR (HCC): ICD-10-CM

## 2025-08-13 DIAGNOSIS — L89.313 DECUBITUS ULCER OF RIGHT BUTTOCK, STAGE 3 (HCC): Primary | ICD-10-CM

## 2025-08-13 DIAGNOSIS — Z79.899 IMMUNOSUPPRESSION DUE TO DRUG THERAPY: ICD-10-CM

## 2025-08-13 DIAGNOSIS — N39.45 CONTINUOUS LEAKAGE OF URINE: ICD-10-CM

## 2025-08-13 PROCEDURE — 11042 DBRDMT SUBQ TIS 1ST 20SQCM/<: CPT

## 2025-08-13 PROCEDURE — 11042 DBRDMT SUBQ TIS 1ST 20SQCM/<: CPT | Performed by: NURSE PRACTITIONER

## 2025-08-13 RX ORDER — SODIUM CHLOR/HYPOCHLOROUS ACID 0.033 %
SOLUTION, IRRIGATION IRRIGATION ONCE
OUTPATIENT
Start: 2025-08-13 | End: 2025-08-13

## 2025-08-13 RX ORDER — LIDOCAINE HYDROCHLORIDE 40 MG/ML
SOLUTION TOPICAL ONCE
OUTPATIENT
Start: 2025-08-13 | End: 2025-08-13

## 2025-08-13 RX ORDER — MUPIROCIN 2 %
OINTMENT (GRAM) TOPICAL ONCE
OUTPATIENT
Start: 2025-08-13 | End: 2025-08-13

## 2025-08-13 RX ORDER — NEOMYCIN/BACITRACIN/POLYMYXINB 3.5-400-5K
OINTMENT (GRAM) TOPICAL ONCE
OUTPATIENT
Start: 2025-08-13 | End: 2025-08-13

## 2025-08-13 RX ORDER — BACITRACIN ZINC AND POLYMYXIN B SULFATE 500; 1000 [USP'U]/G; [USP'U]/G
OINTMENT TOPICAL ONCE
OUTPATIENT
Start: 2025-08-13 | End: 2025-08-13

## 2025-08-13 RX ORDER — SILVER SULFADIAZINE 10 MG/G
CREAM TOPICAL ONCE
OUTPATIENT
Start: 2025-08-13 | End: 2025-08-13

## 2025-08-13 RX ORDER — LIDOCAINE 50 MG/G
OINTMENT TOPICAL ONCE
OUTPATIENT
Start: 2025-08-13 | End: 2025-08-13

## 2025-08-13 RX ORDER — LIDOCAINE 40 MG/G
CREAM TOPICAL ONCE
OUTPATIENT
Start: 2025-08-13 | End: 2025-08-13

## 2025-08-13 RX ORDER — GINSENG 100 MG
CAPSULE ORAL ONCE
OUTPATIENT
Start: 2025-08-13 | End: 2025-08-13

## 2025-08-13 RX ORDER — TRIAMCINOLONE ACETONIDE 1 MG/G
OINTMENT TOPICAL ONCE
OUTPATIENT
Start: 2025-08-13 | End: 2025-08-13

## 2025-08-13 RX ORDER — CLOBETASOL PROPIONATE 0.5 MG/G
OINTMENT TOPICAL ONCE
OUTPATIENT
Start: 2025-08-13 | End: 2025-08-13

## 2025-08-13 RX ORDER — GENTAMICIN SULFATE 1 MG/G
OINTMENT TOPICAL ONCE
OUTPATIENT
Start: 2025-08-13 | End: 2025-08-13

## 2025-08-13 ASSESSMENT — PAIN SCALES - GENERAL: PAINLEVEL_OUTOF10: 0

## 2025-08-20 ENCOUNTER — HOSPITAL ENCOUNTER (OUTPATIENT)
Dept: WOUND CARE | Age: 85
Discharge: HOME | End: 2025-08-20
Attending: NURSE PRACTITIONER
Payer: MEDICARE

## 2025-08-20 VITALS
TEMPERATURE: 98 F | DIASTOLIC BLOOD PRESSURE: 62 MMHG | HEART RATE: 74 BPM | SYSTOLIC BLOOD PRESSURE: 119 MMHG | RESPIRATION RATE: 20 BRPM

## 2025-08-20 DIAGNOSIS — I63.9 DYSARTHRIA DUE TO ACUTE STROKE (HCC): ICD-10-CM

## 2025-08-20 DIAGNOSIS — D84.821 IMMUNOSUPPRESSION DUE TO DRUG THERAPY: ICD-10-CM

## 2025-08-20 DIAGNOSIS — Z79.899 IMMUNOSUPPRESSION DUE TO DRUG THERAPY: ICD-10-CM

## 2025-08-20 DIAGNOSIS — L60.2 LONG TOENAIL: ICD-10-CM

## 2025-08-20 DIAGNOSIS — R26.9 GAIT DISTURBANCE: ICD-10-CM

## 2025-08-20 DIAGNOSIS — L89.312 PRESSURE INJURY OF RIGHT BUTTOCK, STAGE 2 (HCC): Primary | ICD-10-CM

## 2025-08-20 DIAGNOSIS — R47.1 DYSARTHRIA DUE TO ACUTE STROKE (HCC): ICD-10-CM

## 2025-08-20 DIAGNOSIS — M05.79 RHEUMATOID ARTHRITIS INVOLVING MULTIPLE SITES WITH POSITIVE RHEUMATOID FACTOR (HCC): ICD-10-CM

## 2025-08-20 DIAGNOSIS — N39.45 CONTINUOUS LEAKAGE OF URINE: ICD-10-CM

## 2025-08-20 PROCEDURE — 11042 DBRDMT SUBQ TIS 1ST 20SQCM/<: CPT

## 2025-08-20 PROCEDURE — 11042 DBRDMT SUBQ TIS 1ST 20SQCM/<: CPT | Performed by: NURSE PRACTITIONER

## 2025-08-20 RX ORDER — TRIAMCINOLONE ACETONIDE 1 MG/G
OINTMENT TOPICAL ONCE
OUTPATIENT
Start: 2025-08-20 | End: 2025-08-20

## 2025-08-20 RX ORDER — NEOMYCIN/BACITRACIN/POLYMYXINB 3.5-400-5K
OINTMENT (GRAM) TOPICAL ONCE
OUTPATIENT
Start: 2025-08-20 | End: 2025-08-20

## 2025-08-20 RX ORDER — LIDOCAINE 40 MG/G
CREAM TOPICAL ONCE
OUTPATIENT
Start: 2025-08-20 | End: 2025-08-20

## 2025-08-20 RX ORDER — BACITRACIN ZINC AND POLYMYXIN B SULFATE 500; 1000 [USP'U]/G; [USP'U]/G
OINTMENT TOPICAL ONCE
OUTPATIENT
Start: 2025-08-20 | End: 2025-08-20

## 2025-08-20 RX ORDER — LIDOCAINE 50 MG/G
OINTMENT TOPICAL ONCE
OUTPATIENT
Start: 2025-08-20 | End: 2025-08-20

## 2025-08-20 RX ORDER — MUPIROCIN 2 %
OINTMENT (GRAM) TOPICAL ONCE
OUTPATIENT
Start: 2025-08-20 | End: 2025-08-20

## 2025-08-20 RX ORDER — GENTAMICIN SULFATE 1 MG/G
OINTMENT TOPICAL ONCE
OUTPATIENT
Start: 2025-08-20 | End: 2025-08-20

## 2025-08-20 RX ORDER — GINSENG 100 MG
CAPSULE ORAL ONCE
OUTPATIENT
Start: 2025-08-20 | End: 2025-08-20

## 2025-08-20 RX ORDER — LIDOCAINE HYDROCHLORIDE 40 MG/ML
SOLUTION TOPICAL ONCE
OUTPATIENT
Start: 2025-08-20 | End: 2025-08-20

## 2025-08-20 RX ORDER — SILVER SULFADIAZINE 10 MG/G
CREAM TOPICAL ONCE
OUTPATIENT
Start: 2025-08-20 | End: 2025-08-20

## 2025-08-20 RX ORDER — SODIUM CHLOR/HYPOCHLOROUS ACID 0.033 %
SOLUTION, IRRIGATION IRRIGATION ONCE
OUTPATIENT
Start: 2025-08-20 | End: 2025-08-20

## 2025-08-20 RX ORDER — CLOBETASOL PROPIONATE 0.5 MG/G
OINTMENT TOPICAL ONCE
OUTPATIENT
Start: 2025-08-20 | End: 2025-08-20

## 2025-08-25 LAB — COMMENT: NORMAL

## 2025-08-26 ENCOUNTER — TELEPHONE (OUTPATIENT)
Age: 85
End: 2025-08-26

## 2025-08-26 LAB
A/G RATIO: 1.6 RATIO (ref 0.8–2.6)
ALBUMIN: 4.1 G/DL (ref 3.5–5.2)
ALP BLD-CCNC: 190 U/L (ref 23–144)
ALT SERPL-CCNC: 9 U/L (ref 0–60)
AST SERPL-CCNC: 16 U/L (ref 0–55)
BASOPHILS ABSOLUTE: 0 K/UL (ref 0–0.3)
BASOPHILS RELATIVE PERCENT: 0.4 % (ref 0–2)
BILIRUB SERPL-MCNC: 0.4 MG/DL (ref 0–1.2)
BUN / CREAT RATIO: 46 (ref 7–25)
BUN BLDV-MCNC: 23 MG/DL (ref 3–29)
CALCIUM SERPL-MCNC: 9.6 MG/DL (ref 8.5–10.5)
CHLORIDE BLD-SCNC: 106 MEQ/L (ref 96–110)
CO2: 25 MEQ/L (ref 19–32)
CREAT SERPL-MCNC: 0.5 MG/DL (ref 0.5–1.2)
DIFFERENTIAL COUNT: NORMAL
EOSINOPHILS ABSOLUTE: 0.1 K/UL (ref 0–0.5)
EOSINOPHILS RELATIVE PERCENT: 1.3 % (ref 0–5)
ESTIMATED GLOMERULAR FILTRATION RATE CREATININE EQUATION: 94 MLS/MIN/1.73M2
FASTING STATUS: ABNORMAL
GLOBULIN: 2.6 G/DL (ref 1.9–3.6)
GLUCOSE BLD-MCNC: 114 MG/DL (ref 70–99)
HCT VFR BLD CALC: 39.2 % (ref 34–49)
HEMOGLOBIN: 12.8 G/DL (ref 11.2–15.7)
IMMATURE GRANS (ABS): 0 K/UL (ref 0–0.1)
IMMATURE GRANULOCYTES %: 0.4 %
LYMPHOCYTES ABSOLUTE: 1.5 K/UL (ref 0.9–4.1)
LYMPHOCYTES RELATIVE PERCENT: 20 % (ref 14–51)
MCH RBC QN AUTO: 29.2 PG (ref 26–34)
MCHC RBC AUTO-ENTMCNC: 32.7 G/DL (ref 30.7–35.5)
MCV RBC AUTO: 89.3 FL (ref 80–100)
MONOCYTES ABSOLUTE: 0.5 K/UL (ref 0.2–1)
MONOCYTES RELATIVE PERCENT: 6.3 % (ref 4–12)
NEUTROPHILS ABSOLUTE: 5.3 K/UL (ref 1.8–7.5)
NEUTROPHILS RELATIVE PERCENT: 71.6 % (ref 42–80)
PDW BLD-RTO: 13.4 %
PLATELET # BLD: 384 K/UL (ref 140–400)
PMV BLD AUTO: 9.9 FL (ref 7.2–11.7)
POTASSIUM SERPL-SCNC: 3.8 MEQ/L (ref 3.4–5.3)
RBC # BLD: 4.39 M/UL (ref 3.95–5.26)
RETICULOCYTE ABSOLUTE COUNT: 0 /100 WBC
SODIUM BLD-SCNC: 143 MEQ/L (ref 135–148)
TOTAL PROTEIN: 6.7 G/DL (ref 6–8.3)
WBC # BLD: 7.5 K/UL (ref 3.5–10.9)

## 2025-08-27 ENCOUNTER — HOSPITAL ENCOUNTER (OUTPATIENT)
Dept: WOUND CARE | Age: 85
Discharge: HOME | End: 2025-08-27
Attending: NURSE PRACTITIONER
Payer: MEDICARE

## 2025-08-27 VITALS
HEART RATE: 81 BPM | SYSTOLIC BLOOD PRESSURE: 127 MMHG | TEMPERATURE: 97.8 F | RESPIRATION RATE: 16 BRPM | DIASTOLIC BLOOD PRESSURE: 63 MMHG

## 2025-08-27 DIAGNOSIS — L89.313 DECUBITUS ULCER OF RIGHT BUTTOCK, STAGE 3 (HCC): Primary | ICD-10-CM

## 2025-08-27 DIAGNOSIS — N39.45 CONTINUOUS LEAKAGE OF URINE: ICD-10-CM

## 2025-08-27 DIAGNOSIS — M05.79 RHEUMATOID ARTHRITIS INVOLVING MULTIPLE SITES WITH POSITIVE RHEUMATOID FACTOR (HCC): ICD-10-CM

## 2025-08-27 DIAGNOSIS — D84.821 IMMUNOSUPPRESSION DUE TO DRUG THERAPY: ICD-10-CM

## 2025-08-27 DIAGNOSIS — Z79.899 IMMUNOSUPPRESSION DUE TO DRUG THERAPY: ICD-10-CM

## 2025-08-27 DIAGNOSIS — L89.323 DECUBITUS ULCER OF LEFT BUTTOCK, STAGE 3 (HCC): ICD-10-CM

## 2025-08-27 PROCEDURE — 11042 DBRDMT SUBQ TIS 1ST 20SQCM/<: CPT

## 2025-08-27 RX ORDER — GENTAMICIN SULFATE 1 MG/G
OINTMENT TOPICAL ONCE
OUTPATIENT
Start: 2025-08-27 | End: 2025-08-27

## 2025-08-27 RX ORDER — BACITRACIN ZINC AND POLYMYXIN B SULFATE 500; 1000 [USP'U]/G; [USP'U]/G
OINTMENT TOPICAL ONCE
OUTPATIENT
Start: 2025-08-27 | End: 2025-08-27

## 2025-08-27 RX ORDER — SODIUM CHLOR/HYPOCHLOROUS ACID 0.033 %
SOLUTION, IRRIGATION IRRIGATION ONCE
OUTPATIENT
Start: 2025-08-27 | End: 2025-08-27

## 2025-08-27 RX ORDER — LIDOCAINE HYDROCHLORIDE 40 MG/ML
SOLUTION TOPICAL ONCE
OUTPATIENT
Start: 2025-08-27 | End: 2025-08-27

## 2025-08-27 RX ORDER — TRIAMCINOLONE ACETONIDE 1 MG/G
OINTMENT TOPICAL ONCE
OUTPATIENT
Start: 2025-08-27 | End: 2025-08-27

## 2025-08-27 RX ORDER — MUPIROCIN 2 %
OINTMENT (GRAM) TOPICAL ONCE
OUTPATIENT
Start: 2025-08-27 | End: 2025-08-27

## 2025-08-27 RX ORDER — LIDOCAINE 50 MG/G
OINTMENT TOPICAL ONCE
OUTPATIENT
Start: 2025-08-27 | End: 2025-08-27

## 2025-08-27 RX ORDER — NEOMYCIN/BACITRACIN/POLYMYXINB 3.5-400-5K
OINTMENT (GRAM) TOPICAL ONCE
OUTPATIENT
Start: 2025-08-27 | End: 2025-08-27

## 2025-08-27 RX ORDER — LIDOCAINE 40 MG/G
CREAM TOPICAL ONCE
OUTPATIENT
Start: 2025-08-27 | End: 2025-08-27

## 2025-08-27 RX ORDER — CLOBETASOL PROPIONATE 0.5 MG/G
OINTMENT TOPICAL ONCE
OUTPATIENT
Start: 2025-08-27 | End: 2025-08-27

## 2025-08-27 RX ORDER — GINSENG 100 MG
CAPSULE ORAL ONCE
OUTPATIENT
Start: 2025-08-27 | End: 2025-08-27

## 2025-08-27 RX ORDER — SILVER SULFADIAZINE 10 MG/G
CREAM TOPICAL ONCE
OUTPATIENT
Start: 2025-08-27 | End: 2025-08-27

## 2025-08-27 ASSESSMENT — PAIN SCALES - GENERAL: PAINLEVEL_OUTOF10: 0

## 2025-09-04 ENCOUNTER — HOSPITAL ENCOUNTER (OUTPATIENT)
Dept: WOUND CARE | Age: 85
Discharge: HOME | End: 2025-09-04
Attending: NURSE PRACTITIONER
Payer: MEDICARE

## 2025-09-04 VITALS
DIASTOLIC BLOOD PRESSURE: 70 MMHG | TEMPERATURE: 97.4 F | HEART RATE: 87 BPM | RESPIRATION RATE: 16 BRPM | SYSTOLIC BLOOD PRESSURE: 122 MMHG

## 2025-09-04 DIAGNOSIS — L89.323 DECUBITUS ULCER OF LEFT BUTTOCK, STAGE 3 (HCC): Primary | ICD-10-CM

## 2025-09-04 DIAGNOSIS — M05.79 RHEUMATOID ARTHRITIS INVOLVING MULTIPLE SITES WITH POSITIVE RHEUMATOID FACTOR (HCC): ICD-10-CM

## 2025-09-04 DIAGNOSIS — L60.2 LONG TOENAIL: ICD-10-CM

## 2025-09-04 DIAGNOSIS — D84.821 IMMUNOSUPPRESSION DUE TO DRUG THERAPY: ICD-10-CM

## 2025-09-04 DIAGNOSIS — N39.45 CONTINUOUS LEAKAGE OF URINE: ICD-10-CM

## 2025-09-04 DIAGNOSIS — R26.9 GAIT DISTURBANCE: ICD-10-CM

## 2025-09-04 DIAGNOSIS — R47.1 DYSARTHRIA DUE TO ACUTE STROKE (HCC): ICD-10-CM

## 2025-09-04 DIAGNOSIS — L89.312 PRESSURE INJURY OF RIGHT BUTTOCK, STAGE 2 (HCC): ICD-10-CM

## 2025-09-04 DIAGNOSIS — Z79.899 IMMUNOSUPPRESSION DUE TO DRUG THERAPY: ICD-10-CM

## 2025-09-04 DIAGNOSIS — I63.9 DYSARTHRIA DUE TO ACUTE STROKE (HCC): ICD-10-CM

## 2025-09-04 PROCEDURE — 11042 DBRDMT SUBQ TIS 1ST 20SQCM/<: CPT | Performed by: NURSE PRACTITIONER

## 2025-09-04 PROCEDURE — 11042 DBRDMT SUBQ TIS 1ST 20SQCM/<: CPT

## 2025-09-04 RX ORDER — LIDOCAINE 40 MG/G
CREAM TOPICAL ONCE
OUTPATIENT
Start: 2025-09-04 | End: 2025-09-04

## 2025-09-04 RX ORDER — CLOBETASOL PROPIONATE 0.5 MG/G
OINTMENT TOPICAL ONCE
OUTPATIENT
Start: 2025-09-04 | End: 2025-09-04

## 2025-09-04 RX ORDER — LIDOCAINE HYDROCHLORIDE 40 MG/ML
SOLUTION TOPICAL ONCE
OUTPATIENT
Start: 2025-09-04 | End: 2025-09-04

## 2025-09-04 RX ORDER — LIDOCAINE 50 MG/G
OINTMENT TOPICAL ONCE
OUTPATIENT
Start: 2025-09-04 | End: 2025-09-04

## 2025-09-04 RX ORDER — NEOMYCIN/BACITRACIN/POLYMYXINB 3.5-400-5K
OINTMENT (GRAM) TOPICAL ONCE
OUTPATIENT
Start: 2025-09-04 | End: 2025-09-04

## 2025-09-04 RX ORDER — BACITRACIN ZINC AND POLYMYXIN B SULFATE 500; 1000 [USP'U]/G; [USP'U]/G
OINTMENT TOPICAL ONCE
OUTPATIENT
Start: 2025-09-04 | End: 2025-09-04

## 2025-09-04 RX ORDER — GENTAMICIN SULFATE 1 MG/G
OINTMENT TOPICAL ONCE
OUTPATIENT
Start: 2025-09-04 | End: 2025-09-04

## 2025-09-04 RX ORDER — MUPIROCIN 2 %
OINTMENT (GRAM) TOPICAL ONCE
OUTPATIENT
Start: 2025-09-04 | End: 2025-09-04

## 2025-09-04 RX ORDER — GINSENG 100 MG
CAPSULE ORAL ONCE
OUTPATIENT
Start: 2025-09-04 | End: 2025-09-04

## 2025-09-04 RX ORDER — SODIUM CHLOR/HYPOCHLOROUS ACID 0.033 %
SOLUTION, IRRIGATION IRRIGATION ONCE
OUTPATIENT
Start: 2025-09-04 | End: 2025-09-04

## 2025-09-04 RX ORDER — TRIAMCINOLONE ACETONIDE 1 MG/G
OINTMENT TOPICAL ONCE
OUTPATIENT
Start: 2025-09-04 | End: 2025-09-04

## 2025-09-04 RX ORDER — SILVER SULFADIAZINE 10 MG/G
CREAM TOPICAL ONCE
OUTPATIENT
Start: 2025-09-04 | End: 2025-09-04

## 2025-09-04 ASSESSMENT — PAIN SCALES - GENERAL: PAINLEVEL_OUTOF10: 0

## (undated) DEVICE — YANKAUER,BULB TIP,W/O VENT,RIGID,STERILE: Brand: MEDLINE

## (undated) DEVICE — TUBING, SUCTION, 3/16" X 10', STRAIGHT: Brand: MEDLINE

## (undated) DEVICE — ELECTRODE ES L2.75IN S STL INSUL BLDE W/ SL EDGE

## (undated) DEVICE — MARKER SURG SKIN UTIL REGULAR/FINE 2 TIP W/ RUL AND 9 LBL

## (undated) DEVICE — GLOVE SURG SZ 7 CRM LTX FREE POLYISOPRENE POLYMER BEAD ANTI

## (undated) DEVICE — APPLICATOR MEDICATED 26 CC SOLUTION HI LT ORNG CHLORAPREP

## (undated) DEVICE — SUTURE VCRL SZ 3-0 L27IN ABSRB UD L24MM PS-1 3/8 CIR PRIM J936H

## (undated) DEVICE — GLOVE SURG SZ 65 CRM LTX FREE POLYISOPRENE POLYMER BEAD ANTI

## (undated) DEVICE — COUNTER NDL 30 COUNT FOAM STRP SGL MAG

## (undated) DEVICE — DECANTER FLD 9IN ST BG FOR ASEP TRNSF OF FLD

## (undated) DEVICE — CONTAINER,SPECIMEN,OR STERILE,4OZ: Brand: MEDLINE

## (undated) DEVICE — PENCIL ES CRD L10FT HND SWCHING ROCK SWCH W/ EDGE COAT BLDE

## (undated) DEVICE — ADHESIVE SKIN CLSR 0.7ML TOP DERMBND ADV

## (undated) DEVICE — MONTGOMERY STRAPS: Brand: CENTURION

## (undated) DEVICE — TOWEL,OR,DSP,ST,BLUE,STD,6/PK,12PK/CS: Brand: MEDLINE

## (undated) DEVICE — DRESSING TRNSPAR W5XL4.5IN FLM SHT SEMIPERMEABLE WIND

## (undated) DEVICE — GOWN,ECLIPSE,POLYRNF,BRTHSLV,L,30/CS: Brand: MEDLINE

## (undated) DEVICE — ELECTRODE ES AD CRDLSS PT RET REM POLYHESIVE

## (undated) DEVICE — TELFA NON-ADHERENT ABSORBENT DRESSING: Brand: TELFA

## (undated) DEVICE — SYRINGE IRRIG 60ML SFT PLIABLE BLB EZ TO GRP 1 HND USE W/